# Patient Record
Sex: MALE | Race: WHITE | NOT HISPANIC OR LATINO | Employment: OTHER | URBAN - NONMETROPOLITAN AREA
[De-identification: names, ages, dates, MRNs, and addresses within clinical notes are randomized per-mention and may not be internally consistent; named-entity substitution may affect disease eponyms.]

---

## 2017-01-09 DIAGNOSIS — G89.4 CHRONIC PAIN SYNDROME: ICD-10-CM

## 2017-01-09 RX ORDER — METHOCARBAMOL 500 MG/1
TABLET, FILM COATED ORAL
Qty: 180 TABLET | Refills: 1 | Status: SHIPPED | OUTPATIENT
Start: 2017-01-09 | End: 2017-02-15

## 2017-01-09 NOTE — TELEPHONE ENCOUNTER
Routing refill request to provider for review/approval because:  Drug not on the [facility] refill protocol     Thank you  Colette WALL RN

## 2017-01-09 NOTE — TELEPHONE ENCOUNTER
Methocarbamol      Last Written Prescription Date: 5/4/16  Last Fill Quantity: 180,  # refills: 1   Last Office Visit with [facility], [facility] or  Health prescribing provider: 11/23/16 HERBER Martinez [facility]                                           Next 5 appointments (look out 90 days)     Feb 15, 2017  3:00 PM   Return Visit with Carolann Motley MD   [facility] [place] ([facility])    [address] [facility] [id number] [phone]

## 2017-01-10 DIAGNOSIS — F51.01 PRIMARY INSOMNIA: Primary | ICD-10-CM

## 2017-01-10 NOTE — TELEPHONE ENCOUNTER
zolpidem (AMBIEN) 10 MG tablet      Last Written Prescription Date:  7/13/16  Last Fill Quantity: 60,   # refills: 2  Last Office Visit with [facility], [facility] or [facility] prescribing provider: 11/22/16  Future Office visit:    Next 5 appointments (look out 90 days)     Feb 15, 2017  3:00 PM   Return Visit with Carolann Motley MD   [facility] [place] ([facility] [place])    [address]  [place] [id number] [phone]                   Routing refill request to provider for review/approval because:  Drug not on the [facility], [facility] or [facility] refill protocol or controlled substance

## 2017-01-11 RX ORDER — ZOLPIDEM TARTRATE 10 MG/1
10 TABLET ORAL
Qty: 60 TABLET | Refills: 2 | Status: SHIPPED | OUTPATIENT
Start: 2017-01-11 | End: 2017-07-13

## 2017-01-16 DIAGNOSIS — F33.42 RECURRENT MAJOR DEPRESSION IN COMPLETE REMISSION (H): Primary | ICD-10-CM

## 2017-01-16 RX ORDER — DULOXETIN HYDROCHLORIDE 60 MG/1
60 CAPSULE, DELAYED RELEASE ORAL DAILY
Qty: 90 CAPSULE | Refills: 0 | Status: SHIPPED | OUTPATIENT
Start: 2017-01-16 | End: 2017-05-31

## 2017-01-16 NOTE — TELEPHONE ENCOUNTER
Duloxetine     Last Written Prescription Date: 7/13/16  Last Fill Quantity: 90, # refills: 1  Last Office Visit with [facility], [facility] or  Health prescribing provider: 11/23/16 HERBER [facility]     Next 5 appointments (look out 90 days)     Feb 15, 2017  3:00 PM   Return Visit with Carolann Motley MD   [facility] [place] ([facility] [place])    [address]  [place] [address] [phone]                   BP Readings from Last 3 Encounters:   11/23/16 138/89   09/08/16 143/89   08/10/16 130/88     Pulse: (for Fetzima)  CREATININE   Date Value Ref Range Status   05/09/2012 0.81 0.66 - 1.25 mg/dL Final   ]    Last PHQ-9 score on record=   PHQ-9 SCORE 7/7/2016   Total Score -   Total Score 11

## 2017-01-17 DIAGNOSIS — M96.1 FAILED BACK SURGICAL SYNDROME: Primary | ICD-10-CM

## 2017-01-17 NOTE — TELEPHONE ENCOUNTER
Pt calling in at 9:42 am 1/16/17 for fentanyl patch refill . Pt did not mention how he wanted it processed. Will route to [facility]. Liya julio rn

## 2017-01-17 NOTE — TELEPHONE ENCOUNTER
Medication refill information reviewed.     Due date for Fentanyl is 1/20/2017    Prescriptions prepped for review. - Pt will need to come to [place]     Will route to provider.     Kristi Schneider RN, [facility]  Pain Clinic Care Coordinator

## 2017-01-17 NOTE — TELEPHONE ENCOUNTER
Received call from patient requesting refill(s) of fentaNYL (DURAGESIC) 12 mcg/hr 72 hr patch    Last picked up from pharmacy on 12/21/16    Pt last seen by prescribing provider on 9/8/16  Next appt scheduled for 2/15/17    Last urine drug screen date 9/8/16  Current opioid agreement on file (completed within the last year) Yes Date of opioid agreement: 9/8/16    Processing (pick one and delete the others):  Unable to reach or  leave [facility]     Syeda Montelongo MA  Pain Management Center    Will route to nursing pool for review and preparation of prescription(s).

## 2017-01-18 RX ORDER — FENTANYL 12.5 UG/1
1 PATCH TRANSDERMAL
Qty: 10 PATCH | Refills: 0 | Status: SHIPPED | OUTPATIENT
Start: 2017-01-18 | End: 2017-02-15

## 2017-01-18 NOTE — TELEPHONE ENCOUNTER
Called patient. Script for fentanyl was signed and kept in file folder for patient wife April to pick at [facility] 2nd floor.      Aidan Naidu MA

## 2017-01-18 NOTE — TELEPHONE ENCOUNTER
Signed Prescriptions:                        Disp   Refills    fentaNYL (DURAGESIC) 12 mcg/hr 72 hr patch 10 pat*0        Sig: Place 1 patch onto the skin every 72 hours . May fill           on/after 1/18/2017 NOT to start till 1/20/2017.  Authorizing Provider: YADIRA CARRILLO MD  [facility]

## 2017-01-19 ENCOUNTER — OFFICE VISIT (OUTPATIENT)
Dept: FAMILY MEDICINE | Facility: CLINIC | Age: 47
End: 2017-01-19
Payer: COMMERCIAL

## 2017-01-19 VITALS
DIASTOLIC BLOOD PRESSURE: 94 MMHG | WEIGHT: 230 LBS | HEIGHT: 70 IN | HEART RATE: 80 BPM | SYSTOLIC BLOOD PRESSURE: 136 MMHG | TEMPERATURE: 91.5 F | RESPIRATION RATE: 16 BRPM | BODY MASS INDEX: 32.93 KG/M2

## 2017-01-19 DIAGNOSIS — F33.42 RECURRENT MAJOR DEPRESSION IN COMPLETE REMISSION (H): Primary | ICD-10-CM

## 2017-01-19 DIAGNOSIS — G89.4 CHRONIC PAIN SYNDROME: ICD-10-CM

## 2017-01-19 DIAGNOSIS — S81.801A OPEN WOUND OF LOWER LIMB, RIGHT, INITIAL ENCOUNTER: ICD-10-CM

## 2017-01-19 DIAGNOSIS — F11.20 CONTINUOUS OPIOID DEPENDENCE (H): ICD-10-CM

## 2017-01-19 DIAGNOSIS — F41.9 ANXIETY: ICD-10-CM

## 2017-01-19 PROCEDURE — 99214 OFFICE O/P EST MOD 30 MIN: CPT | Performed by: FAMILY MEDICINE

## 2017-01-19 RX ORDER — LORAZEPAM 0.5 MG/1
TABLET ORAL
Qty: 30 TABLET | Refills: 0 | Status: SHIPPED | OUTPATIENT
Start: 2017-01-19 | End: 2018-01-18

## 2017-01-19 RX ORDER — CEPHALEXIN 500 MG/1
CAPSULE ORAL 4 TIMES DAILY
COMMUNITY
End: 2017-04-17

## 2017-01-19 NOTE — MR AVS SNAPSHOT
"              After Visit Summary   1/19/2017    David Wilcox    MRN: [id number]           Patient Information     Date Of Birth          1970        Visit Information        Provider Department      1/19/2017 11:00 AM Regan Llamas MD [facility]        Today's Diagnoses     Recurrent major depression in complete remission (H)    -  1     Anxiety            Follow-ups after your visit        Your next 10 appointments already scheduled     Feb 15, 2017  3:00 PM   Return Visit with Carolann Motley MD   [facility] ([facility])    [address] [facility] [place] [address] [phone]              Who to contact     If you have questions or need follow up information about today's clinic visit or your schedule please contact [facility] directly at [phone].  Normal or non-critical lab and imaging results will be communicated to you by MyChart, letter or phone within 4 business days after the clinic has received the results. If you do not hear from us within 7 days, please contact the clinic through MyChart or phone. If you have a critical or abnormal lab result, we will notify you by phone as soon as possible.  Submit refill requests through SocialEars or call your pharmacy and they will forward the refill request to us. Please allow 3 business days for your refill to be completed.          Additional Information About Your Visit        MyChart Information     SocialEars lets you send messages to your doctor, view your test results, renew your prescriptions, schedule appointments and more. To sign up, go to [email or web address] . Click on \"Log in\" on the left side of the screen, which will take you to the Welcome page. Then click on \"Sign up Now\" on the right side of the page.     You will be asked to enter the access code listed below, as well as some personal information. Please follow the directions to create your username and " "password.     Your access code is: [email or web address]  Expires: 2017  2:56 PM     Your access code will  in 90 days. If you need help or a new code, please call your [place] clinic or [phone].        Care EveryWhere ID     This is your Care EveryWhere ID. This could be used by other organizations to access your [place] medical records  [id number]        Your Vitals Were     Pulse Temperature Respirations Height BMI (Body Mass Index)       80 91.5  F (33.1  C) (Tympanic) 16 5' 10\" (1.778 m) 33.00 kg/m2        Blood Pressure from Last 3 Encounters:   17 136/94   16 138/89   16 143/89    Weight from Last 3 Encounters:   17 230 lb (104.327 kg)   16 233 lb (105.688 kg)   16 230 lb (104.327 kg)              Today, you had the following     No orders found for display         Where to get your medicines      Some of these will need a paper prescription and others can be bought over the counter.  Ask your nurse if you have questions.     Bring a paper prescription for each of these medications    - LORazepam 0.5 MG tablet       Primary Care Provider Office Phone # Fax #    Regan Llamas [phone]       [facility] [address] [place] [address]        Thank you!     Thank you for choosing [facility]  for your care. Our goal is always to provide you with excellent care. Hearing back from our patients is one way we can continue to improve our services. Please take a few minutes to complete the written survey that you may receive in the mail after your visit with us. Thank you!             Your Updated Medication List - Protect others around you: Learn how to safely use, store and throw away your medicines at www.disposemymeds.org.          This list is accurate as of: 17 11:27 AM.  Always use your most recent med list.                   Brand Name Dispense Instructions for use    DULoxetine 60 MG EC capsule    " CYMBALTA    90 capsule    Take 1 capsule (60 mg) by mouth daily Takes with 30mg tab for 90mg total daily dose       fentaNYL 12 mcg/hr 72 hr patch    DURAGESIC    10 patch    Place 1 patch onto the skin every 72 hours . May fill on/after 1/18/2017 NOT to start till 1/20/2017.       gabapentin 300 MG capsule    NEURONTIN    540 capsule    Start 300mg by mouth twice daily.  Titrate as instructed in clinic to goal dose of 600mg (2 tabs) 3 times daily.  3 month supply       KEFLEX 500 MG capsule   Generic drug:  cephALEXin      Take by mouth 4 times daily       LORazepam 0.5 MG tablet    ATIVAN    30 tablet    One bid prn anxiety. Rare use       methocarbamol 500 MG tablet    ROBAXIN    180 tablet    2 nightly for back pain/spasm       omeprazole 20 MG CR capsule    priLOSEC    90 capsule    Take 1 capsule (20 mg) by mouth daily .       oxyCODONE-acetaminophen  MG per tablet    PERCOCET    40 tablet    One pill bid prn       TYLENOL 500 MG tablet   Generic drug:  acetaminophen      3 po prn for pain       zaleplon 10 MG capsule    SONATA    30 capsule    Take 1 capsule (10 mg) by mouth At Bedtime (do not use with ambien)       zolpidem 10 MG tablet    AMBIEN    60 tablet    Take 1 tablet (10 mg) by mouth nightly as needed for sleep

## 2017-01-19 NOTE — PROGRESS NOTES
"  SUBJECTIVE:                                                    David Wilcox is a 46 year old male who presents to clinic today for the following health issues:       Anxiety Follow-Up    Status since last visit: Worsened     Other associated symptoms:None    Complicating factors:   Significant life event: Yes-  stress   Current substance abuse: None  Depression symptoms: No  KAMRYN-7 SCORE 4/30/2014 8/26/2015 5/4/2016   Total Score 10 - -   Total Score - 7 8        GAD7                     Amount of exercise or physical activity: None    Problems taking medications regularly: No    Medication side effects: none  Diet: regular (no restrictions)  Wound Check      Duration: 1/16/17    Description (location/character/radiation): right leg - went to [facility]     Intensity:  moderate    Accompanying signs and symptoms: none    History (similar episodes/previous evaluation): Previous eval    Precipitating or alleviating factors: None    Therapies tried and outcome: Keflex        : David Wilcox is a 46 year old male with wound R outside lower leg.  Unsure how it happened.  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  Went to ED, keflex given, they felt it looked like a burn.  Unclear what happened.    He thinks it's improving.  Putting ointment and bandage to keep protected.     Anxiety: worse recently.  0.5mg ativan prn with rare use, last script 2014.  He knows of caution with his chronic pain meds on this issue.    Chronic back pain: better on fentanyl.  Knows to not take oxycodone prn with the ativan.  Feels this is stable    Problem list, med list, additional histories reviewed and updated, as indicated.      No cp or sob.  No fever    O:/94 mmHg  Pulse 80  Temp(Src) 91.5  F (33.1  C) (Tympanic)  Resp 16  Ht 5' 10\" (1.778 m)  Wt 230 lb (104.327 kg)  BMI 33.00 kg/m2  GEN: Alert and oriented, in no acute distress  Has 2 inch round ulceration lateral lower leg above ankle.  Central necrosis.  " Minimal surrounding erythema.  No swelling around leg in general.   Mood good today    A; chronic pain, fentanyl and oxycodone, stable       Anxiety, worse recently       Leg wound, healing slowly.  On abx    P; finish abx.  30 more ativan given, see above for my counseling re: use with opioids.    F/u if wound not slowly healing over time.  He agrees.

## 2017-01-19 NOTE — NURSING NOTE
"Chief Complaint   Patient presents with     Wound Check     1/14/17 - [facility], right leg     Anxiety     having panic attacks - requesting refill of Lorazepam        Initial /94 mmHg  Pulse 80  Temp(Src) 91.5  F (33.1  C) (Tympanic)  Resp 16  Ht 5' 10\" (1.778 m)  Wt 230 lb (104.327 kg)  BMI 33.00 kg/m2 Estimated body mass index is 33 kg/(m^2) as calculated from the following:    Height as of this encounter: 5' 10\" (1.778 m).    Weight as of this encounter: 230 lb (104.327 kg).  BP completed using cuff size: zoe Tian, CMA    "

## 2017-02-15 ENCOUNTER — OFFICE VISIT (OUTPATIENT)
Dept: PALLIATIVE MEDICINE | Facility: CLINIC | Age: 47
End: 2017-02-15
Payer: COMMERCIAL

## 2017-02-15 VITALS
HEART RATE: 69 BPM | WEIGHT: 231 LBS | HEIGHT: 70 IN | SYSTOLIC BLOOD PRESSURE: 151 MMHG | DIASTOLIC BLOOD PRESSURE: 96 MMHG | BODY MASS INDEX: 33.07 KG/M2

## 2017-02-15 DIAGNOSIS — M96.1 FAILED BACK SURGICAL SYNDROME: Primary | ICD-10-CM

## 2017-02-15 DIAGNOSIS — G89.29 CHRONIC LOW BACK PAIN, UNSPECIFIED BACK PAIN LATERALITY, WITH SCIATICA PRESENCE UNSPECIFIED: ICD-10-CM

## 2017-02-15 DIAGNOSIS — G89.4 CHRONIC PAIN SYNDROME: ICD-10-CM

## 2017-02-15 DIAGNOSIS — M54.5 CHRONIC LOW BACK PAIN, UNSPECIFIED BACK PAIN LATERALITY, WITH SCIATICA PRESENCE UNSPECIFIED: ICD-10-CM

## 2017-02-15 DIAGNOSIS — G62.9 PERIPHERAL POLYNEUROPATHY: ICD-10-CM

## 2017-02-15 DIAGNOSIS — G60.9 HEREDITARY AND IDIOPATHIC NEUROPATHY: ICD-10-CM

## 2017-02-15 LAB
ERYTHROCYTE [SEDIMENTATION RATE] IN BLOOD BY WESTERGREN METHOD: 4 MM/H (ref 0–15)
FOLATE SERPL-MCNC: 17.2 NG/ML
GLUCOSE SERPL-MCNC: 95 MG/DL (ref 70–99)
TSH SERPL DL<=0.05 MIU/L-ACNC: 1.25 MU/L (ref 0.4–4)
VIT B12 SERPL-MCNC: 522 PG/ML (ref 193–986)

## 2017-02-15 PROCEDURE — 84443 ASSAY THYROID STIM HORMONE: CPT | Performed by: PSYCHIATRY & NEUROLOGY

## 2017-02-15 PROCEDURE — 82947 ASSAY GLUCOSE BLOOD QUANT: CPT | Performed by: PSYCHIATRY & NEUROLOGY

## 2017-02-15 PROCEDURE — 82607 VITAMIN B-12: CPT | Performed by: PSYCHIATRY & NEUROLOGY

## 2017-02-15 PROCEDURE — 99214 OFFICE O/P EST MOD 30 MIN: CPT | Performed by: PSYCHIATRY & NEUROLOGY

## 2017-02-15 PROCEDURE — 82746 ASSAY OF FOLIC ACID SERUM: CPT | Performed by: PSYCHIATRY & NEUROLOGY

## 2017-02-15 PROCEDURE — 36415 COLL VENOUS BLD VENIPUNCTURE: CPT | Performed by: PSYCHIATRY & NEUROLOGY

## 2017-02-15 PROCEDURE — 85652 RBC SED RATE AUTOMATED: CPT | Performed by: PSYCHIATRY & NEUROLOGY

## 2017-02-15 RX ORDER — FENTANYL 12.5 UG/1
1 PATCH TRANSDERMAL
Qty: 10 PATCH | Refills: 0 | Status: SHIPPED | OUTPATIENT
Start: 2017-02-15 | End: 2017-03-09

## 2017-02-15 RX ORDER — OXYCODONE AND ACETAMINOPHEN 10; 325 MG/1; MG/1
.5-1 TABLET ORAL EVERY 4 HOURS PRN
Qty: 40 TABLET | Refills: 0 | Status: SHIPPED | OUTPATIENT
Start: 2017-02-15 | End: 2017-03-31

## 2017-02-15 RX ORDER — METHOCARBAMOL 500 MG/1
500-1000 TABLET, FILM COATED ORAL 3 TIMES DAILY PRN
Qty: 90 TABLET | Refills: 3 | Status: SHIPPED | OUTPATIENT
Start: 2017-02-15 | End: 2017-07-13

## 2017-02-15 RX ORDER — FENTANYL 12.5 UG/1
1 PATCH TRANSDERMAL
Qty: 10 PATCH | Refills: 0 | Status: SHIPPED | OUTPATIENT
Start: 2017-02-15 | End: 2017-02-15

## 2017-02-15 RX ORDER — GABAPENTIN 600 MG/1
1200 TABLET ORAL 3 TIMES DAILY
Qty: 540 TABLET | Refills: 1 | Status: SHIPPED | OUTPATIENT
Start: 2017-02-15 | End: 2017-08-30

## 2017-02-15 ASSESSMENT — PAIN SCALES - GENERAL: PAINLEVEL: EXTREME PAIN (8)

## 2017-02-15 NOTE — NURSING NOTE
"Chief Complaint   Patient presents with     Pain     Follow up        Initial BP (!) 151/96 (BP Location: Left arm)  Pulse 69  Ht 1.778 m (5' 10\")  Wt 104.8 kg (231 lb)  BMI 33.15 kg/m2 Estimated body mass index is 33.15 kg/(m^2) as calculated from the following:    Height as of this encounter: 1.778 m (5' 10\").    Weight as of this encounter: 104.8 kg (231 lb).  Medication Reconciliation: complete     Aidan Naidu MA      "

## 2017-02-15 NOTE — MR AVS SNAPSHOT
After Visit Summary   2/15/2017    David Wilcox    MRN: [id number]           Patient Information     Date Of Birth          1970        Visit Information        Provider Department      2/15/2017 3:00 PM [facility]e [facility]        Today's Diagnoses     Failed back surgical syndrome        Chronic low back pain, unspecified back pain laterality, with sciatica presence unspecified        Chronic pain syndrome          Care Instructions    1. Increase the gabapentin to 900mg (3 of the 300mg tabs) 3 times daily.  After 2-3 weeks, call with update.  We can then increase to 1200mg 3 times daily.  I will send in a new script as well for 600mg tabs.  2. Stop downtstairs for labs today.  3. Look into PT again  4. We aren't making any changes on the opioids  5. Try using the robaxin during the day.  You can try lower doses.  Can be sedating.  Do not drive until you know how the medication affects you.   6. Schedule follow up in 2 months    Nurse Triage line:  [phone]   Call this number with any questions or concerns. You may leave a detailed message anytime. Calls are typically returned Monday through Friday between 8 AM and 4:30 PM. We usually get back to you within 2 business days depending on the issue/request.       Medication refills:    For non-narcotic medications, call your pharmacy directly to request a refill. The pharmacy will contact the Pain Management Center for authorization. Please allow 3-4 days for these refills to be processed.     For narcotic refills, call the nurse triage line or send a [place] [employer] message. Please contact us 7-10 days before your refill is due. The message MUST include the name of the specific medication(s) requested and how you would like to receive the prescription(s). The options are as follows:    Pain Clinic staff can mail the prescription to your pharmacy. Please tell us the name of the pharmacy.    You may pick the prescription up  "at the Pain Clinic (tell us the location) or during a clinic visit with your pain provider    Pain Clinic staff can deliver the prescription to the [facility] pharmacy in the clinic building. Please tell us the location.      Scheduling number: [phone].  Call this number to schedule or change appointments.    We believe regular attendance is key to your success in our program.    Any time you are unable to keep your appointment we ask that you call us at least 24 hours in advance to let us know. This will allow us to offer the appointment time to another patient.             Follow-ups after your visit        Who to contact     If you have questions or need follow up information about today's clinic visit or your schedule please contact [facility] directly at [phone].  Normal or non-critical lab and imaging results will be communicated to you by Fiberstarhart, letter or phone within 4 business days after the clinic has received the results. If you do not hear from us within 7 days, please contact the clinic through Fiberstarhart or phone. If you have a critical or abnormal lab result, we will notify you by phone as soon as possible.  Submit refill requests through Umbrella Here or call your pharmacy and they will forward the refill request to us. Please allow 3 business days for your refill to be completed.          Additional Information About Your Visit        Umbrella Here Information     Umbrella Here lets you send messages to your doctor, view your test results, renew your prescriptions, schedule appointments and more. To sign up, go to [email or web address] . Click on \"Log in\" on the left side of the screen, which will take you to the Welcome page. Then click on \"Sign up Now\" on the right side of the page.     You will be asked to enter the access code listed below, as well as some personal information. Please follow the directions to create your username and password.     Your access code is: [email or web address]  Expires: " "2017  2:56 PM     Your access code will  in 90 days. If you need help or a new code, please call your [place] clinic or [phone].        Care EveryWhere ID     This is your Care EveryWhere ID. This could be used by other organizations to access your [place] medical records  [id number]        Your Vitals Were     Pulse Height BMI (Body Mass Index)             69 1.778 m (5' 10\") 33.15 kg/m2          Blood Pressure from Last 3 Encounters:   02/15/17 (!) 151/96   17 (!) 136/94   16 138/89    Weight from Last 3 Encounters:   02/15/17 104.8 kg (231 lb)   17 104.3 kg (230 lb)   16 105.7 kg (233 lb)              Today, you had the following     No orders found for display         Today's Medication Changes          These changes are accurate as of: 2/15/17  3:47 PM.  If you have any questions, ask your nurse or doctor.               These medicines have changed or have updated prescriptions.        Dose/Directions    * gabapentin 300 MG capsule   Commonly known as:  NEURONTIN   This may have changed:  Another medication with the same name was added. Make sure you understand how and when to take each.   Used for:  Failed back surgical syndrome        Start 300mg by mouth twice daily.  Titrate as instructed in clinic to goal dose of 600mg (2 tabs) 3 times daily.  3 month supply   Quantity:  540 capsule   Refills:  3       * gabapentin 600 MG tablet   Commonly known as:  NEURONTIN   This may have changed:  You were already taking a medication with the same name, and this prescription was added. Make sure you understand how and when to take each.   Used for:  Failed back surgical syndrome        Dose:  1200 mg   Take 2 tablets (1,200 mg) by mouth 3 times daily . Increase to this dose as instructed in clinic. 3 month supply   Quantity:  540 tablet   Refills:  1       methocarbamol 500 MG tablet   Commonly known as:  ROBAXIN   This may have changed:    - how much to take  - how to take " this  - when to take this  - reasons to take this  - additional instructions   Used for:  Chronic pain syndrome        Dose:  500-1000 mg   Take 1-2 tablets (500-1,000 mg) by mouth 3 times daily as needed for muscle spasms . Caution for sedation   Quantity:  90 tablet   Refills:  3       oxyCODONE-acetaminophen  MG per tablet   Commonly known as:  PERCOCET   This may have changed:    - how much to take  - how to take this  - when to take this  - reasons to take this  - additional instructions   Used for:  Chronic low back pain, unspecified back pain laterality, with sciatica presence unspecified        Dose:  0.5-1 tablet   Take 0.5-1 tablets by mouth every 4 hours as needed for moderate to severe pain . Max of 2 tabs/day.  40 tabs per month. Fill on/after 2/18 to start on/after 2/22   Quantity:  40 tablet   Refills:  0       * Notice:  This list has 2 medication(s) that are the same as other medications prescribed for you. Read the directions carefully, and ask your doctor or other care provider to review them with you.         Where to get your medicines      These medications were sent to [facility] [address] [place] [address] - [address] [place] [address] AT [facility] & [address]  [address] [place] [address]     Phone:  [phone]     gabapentin 600 MG tablet    methocarbamol 500 MG tablet         Some of these will need a paper prescription and others can be bought over the counter.  Ask your nurse if you have questions.     Bring a paper prescription for each of these medications     fentaNYL 12 mcg/hr 72 hr patch    oxyCODONE-acetaminophen  MG per tablet                Primary Care Provider Office Phone # Fax #    Regan Llamas [phone]       [facility] [address] [place] [phone]        Thank you!     Thank you for choosing [facility]  for your care. Our goal is always to provide you with excellent care. Hearing back  from our patients is one way we can continue to improve our services. Please take a few minutes to complete the written survey that you may receive in the mail after your visit with us. Thank you!             Your Updated Medication List - Protect others around you: Learn how to safely use, store and throw away your medicines at www.disposemymeds.org.          This list is accurate as of: 2/15/17  3:47 PM.  Always use your most recent med list.                   Brand Name Dispense Instructions for use    DULoxetine 60 MG EC capsule    CYMBALTA    90 capsule    Take 1 capsule (60 mg) by mouth daily Takes with 30mg tab for 90mg total daily dose       fentaNYL 12 mcg/hr 72 hr patch    DURAGESIC    10 patch    Place 1 patch onto the skin every 72 hours . May fill on/after 1/18/2017 NOT to start till 1/20/2017.       * gabapentin 300 MG capsule    NEURONTIN    540 capsule    Start 300mg by mouth twice daily.  Titrate as instructed in clinic to goal dose of 600mg (2 tabs) 3 times daily.  3 month supply       * gabapentin 600 MG tablet    NEURONTIN    540 tablet    Take 2 tablets (1,200 mg) by mouth 3 times daily . Increase to this dose as instructed in clinic. 3 month supply       KEFLEX 500 MG capsule   Generic drug:  cephALEXin      Take by mouth 4 times daily Reported on 2/15/2017       LORazepam 0.5 MG tablet    ATIVAN    30 tablet    One bid prn anxiety. Rare use       methocarbamol 500 MG tablet    ROBAXIN    90 tablet    Take 1-2 tablets (500-1,000 mg) by mouth 3 times daily as needed for muscle spasms . Caution for sedation       omeprazole 20 MG CR capsule    priLOSEC    90 capsule    Take 1 capsule (20 mg) by mouth daily .       oxyCODONE-acetaminophen  MG per tablet    PERCOCET    40 tablet    Take 0.5-1 tablets by mouth every 4 hours as needed for moderate to severe pain . Max of 2 tabs/day.  40 tabs per month. Fill on/after 2/18 to start on/after 2/22       TYLENOL 500 MG tablet   Generic drug:   acetaminophen      3 po prn for pain       zaleplon 10 MG capsule    SONATA    30 capsule    Take 1 capsule (10 mg) by mouth At Bedtime (do not use with ambien)       zolpidem 10 MG tablet    AMBIEN    60 tablet    Take 1 tablet (10 mg) by mouth nightly as needed for sleep       * Notice:  This list has 2 medication(s) that are the same as other medications prescribed for you. Read the directions carefully, and ask your doctor or other care provider to review them with you.

## 2017-02-15 NOTE — PROGRESS NOTES
[facility]    Date of visit: 2/15/2017     Chief complaint:   Chief Complaint   Patient presents with     Pain     Follow up        Interval history:  David Wilcox was last seen by me on 9/8/16.      Recommendations/plan at the last visit included:  1. Physical Therapy:  Not covered by insurance.  Will look into appeals in the near future  2. Clinical Health Psychologist to address issues of relaxation, behavioral change, coping style, and other factors important to improvement.  Difficult due to work, but will look at this again next visit  3. Diagnostic Studies:  Urine drug screen  4. Medication Management:    1. Continue the Robaxin (methocarbamol), but consider taking a dose earlier in the day  2. Continue the Percocet at the same dose  3. Will add on fentanyl 12mcg/hr patch.  Discussed risk and benefits and teaching done  4. Restart gabapentin with titration to 600mg TID  5. Continue Cymbalta at current dose  5. Further procedures recommended: none  6. Recommendations to PCP: none  7. Follow up: 6-8 weeks, but patient will call in 1 week, and when titrated to gabapentin 600mg TID    Since his last visit, David Wilcox reports:    -He reports that he developed cellulitis in his right leg 2-3 weeks ago which was evaluated by his PCP and treated with 10 day oral and topical antibiotic regimen. His wound is better now.  -He reports new upper back-thoracic pain, R>L which started 1-2 months ago insidiously and occurs intermittently. He reports no radiation, describes pain as aching. 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  -He feels that fentanyl patches have been very helpful.  -He feels that his chronic right lower leg numbness is gradually getting numb.  -He is tolerating gabapentin well and feels it is beneficial.  -He continues HEP.  -He states that PT is still not covered by his insurance.    Pain scores:  Pain intensity on  average is 7 on a scale of 0-10.     Current pain treatments:   Percocet 10/325mg daily- takes in the afternoon.  Takes 1-2/day.- takes the edge off- lasts for 4 hours  Robaxin (methocarbamol) 1000mg at night- beneficial  Gabapentin 600 mg TID, beneficial  Cymbalta 60mg daily- tried going to 90mg/day but felt worse    Previous medication treatments included:  Hydrocodone- not helpful  Methadone- taking after 1st surgery  Naprosyn- not helpful  Flexeril- after first back injury (1990s)  Lidocaine- not helpful  Acetaminophen (tylenol)   Gabapentin - didn't work but per notes was at 300mg TID    Other treatments have included:  David Wilcox has been seen at a pain clinic in the past.  MAPS- injections  PT: yes  Acupuncture: no  TENs Unit: yes- out of use- somewhat helpful  Injections: none since last surgery- very  helpful    Side Effects: no side effect    Medications:  Current Outpatient Prescriptions   Medication Sig Dispense Refill     oxyCODONE-acetaminophen (PERCOCET)  MG per tablet Take 0.5-1 tablets by mouth every 4 hours as needed for moderate to severe pain . Max of 2 tabs/day.  40 tabs per month. Fill on/after 2/18 to start on/after 2/22 40 tablet 0     gabapentin (NEURONTIN) 600 MG tablet Take 2 tablets (1,200 mg) by mouth 3 times daily . Increase to this dose as instructed in clinic. 3 month supply 540 tablet 1     methocarbamol (ROBAXIN) 500 MG tablet Take 1-2 tablets (500-1,000 mg) by mouth 3 times daily as needed for muscle spasms . Caution for sedation 90 tablet 3     fentaNYL (DURAGESIC) 12 mcg/hr 72 hr patch Place 1 patch onto the skin every 72 hours . May fill on/after 2/17/2017 not to start till 2/19/2017. 10 patch 0     LORazepam (ATIVAN) 0.5 MG tablet One bid prn anxiety. Rare use 30 tablet 0     DULoxetine (CYMBALTA) 60 MG EC capsule Take 1 capsule (60 mg) by mouth daily Takes with 30mg tab for 90mg total daily dose 90 capsule 0     zolpidem (AMBIEN) 10 MG tablet Take 1 tablet (10 mg) by  "mouth nightly as needed for sleep 60 tablet 2     omeprazole (PRILOSEC) 20 MG capsule Take 1 capsule (20 mg) by mouth daily . 90 capsule 3     gabapentin (NEURONTIN) 300 MG capsule Start 300mg by mouth twice daily.  Titrate as instructed in clinic to goal dose of 600mg (2 tabs) 3 times daily.  3 month supply 540 capsule 3     TYLENOL EXTRA STRENGTH 500 MG OR TABS 3 po prn for pain       cephALEXin (KEFLEX) 500 MG capsule Take by mouth 4 times daily Reported on 2/15/2017       zaleplon (SONATA) 10 MG capsule Take 1 capsule (10 mg) by mouth At Bedtime (do not use with ambien) (Patient not taking: Reported on 2/15/2017) 30 capsule 0       Medical History: any changes in medical history since they were last seen? Right leg cellulitis    Review of Systems:  The 14 system ROS was reviewed from the intake questionnaire, and is positive for: swelling in leg, joint pain, weakness, pain, numbness/tingling, pain, back pain  Any bowel or bladder problems: none  Mood: more freire    Physical Exam:  Blood pressure (!) 151/96, pulse 69, height 1.778 m (5' 10\"), weight 104.8 kg (231 lb).  General: awake, alert   Gait: Antalgic  MSK exam: uses cane for ambulation.  Poor rotation with pain.  Right > left lower thoracic paraspinal tenderness with taut bands on right side.    Assessment:   1. 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    2. Facet arthropathy likely contributing above the level of the future  3. Distant history of alcohol and drug abuse  4. Bipolar disorder- currently managed  5. H/o meningitis after SCS trial  6. Subacute thoracic back pain, likely myofascial etiology.      Plan:  1. Physical Therapy:  Not covered by insurance.  I have asked him to look again in the new year.  2. Clinical Health Psychologist to address issues of relaxation, behavioral change, coping style, and other factors important to improvement.  Difficult due to work, but will look at this again next " visit  3. Diagnostic Studies:  Labs to evaluate for peripheral neuropathy  4. Medication Management:    1. Titrate the gabapentin to 1200mg TID.  Initially will be at 900mg  2. He will try the robaxin during the day  3. No changes to opiods  5. Further procedures recommended: none  6. Recommendations to PCP: none  7. Follow up: 2 month    I saw and examined the patient with the Pain Fellow/Resident. I have reviewed and agree with the resident's note and plan of care and made changes and corrections directly to the body of the note.    TIME SPENT:  BY FELLOW/RESIDENT ALONE 15 MIN  BY MYSELF AND FELLOW/RESIDENT TOGETHER 25 MIN  BY MYSELF WITHOUT THE FELLOW/RESIDENT 0 MIN    These times included 15 minutes I spent counseling him about his diagnosis and treatment options and coordination of care with the primary team    Bindu Motley MD  [facility]

## 2017-02-15 NOTE — PATIENT INSTRUCTIONS
1. Increase the gabapentin to 900mg (3 of the 300mg tabs) 3 times daily.  After 2-3 weeks, call with update.  We can then increase to 1200mg 3 times daily.  I will send in a new script as well for 600mg tabs.  2. Stop downtstairs for labs today.  3. Look into PT again  4. We aren't making any changes on the opioids  5. Try using the robaxin during the day.  You can try lower doses.  Can be sedating.  Do not drive until you know how the medication affects you.   6. Schedule follow up in 2 months    Nurse Triage line:  [phone]   Call this number with any questions or concerns. You may leave a detailed message anytime. Calls are typically returned Monday through Friday between 8 AM and 4:30 PM. We usually get back to you within 2 business days depending on the issue/request.       Medication refills:    For non-narcotic medications, call your pharmacy directly to request a refill. The pharmacy will contact the Pain Management Center for authorization. Please allow 3-4 days for these refills to be processed.     For narcotic refills, call the nurse triage line or send a [employer] message. Please contact us 7-10 days before your refill is due. The message MUST include the name of the specific medication(s) requested and how you would like to receive the prescription(s). The options are as follows:    Pain Clinic staff can mail the prescription to your pharmacy. Please tell us the name of the pharmacy.    You may pick the prescription up at the Pain Clinic (tell us the location) or during a clinic visit with your pain provider    Pain Clinic staff can deliver the prescription to the [facility] pharmacy in the clinic building. Please tell us the location.      Scheduling number: [phone].  Call this number to schedule or change appointments.    We believe regular attendance is key to your success in our program.    Any time you are unable to keep your appointment we ask that you call us at least 24 hours in advance to  let us know. This will allow us to offer the appointment time to another patient.

## 2017-02-16 ENCOUNTER — TELEPHONE (OUTPATIENT)
Dept: PALLIATIVE MEDICINE | Facility: CLINIC | Age: 47
End: 2017-02-16

## 2017-02-16 NOTE — TELEPHONE ENCOUNTER
Please let patient know all his labs for peripheral neuropathy were normal.  I was unable to reach him as  was not set up.    Bindu Motley MD  [facility]

## 2017-02-16 NOTE — LETTER
[facility]  [address] [address]  [phone]      February 20, 2017      David Wilcox  [facility] [place] [id number]      Dear Dr. Tiesha Hernandez wanted you to know that all your labs for peripheral neuropathy were normal.      Sincerely,      Lisa Garner RN-BSN  [facility]

## 2017-02-20 NOTE — TELEPHONE ENCOUNTER
A letter was sent to the pt informing him of the lab results.    Lisa Garner RN-BSN  [facility]

## 2017-03-06 ENCOUNTER — TELEPHONE (OUTPATIENT)
Dept: PALLIATIVE MEDICINE | Facility: CLINIC | Age: 47
End: 2017-03-06

## 2017-03-06 NOTE — TELEPHONE ENCOUNTER
"Called pt. States that he increased to 900 mg TID. Reports that he is functioning and \"doing well.\"  Had \"horrible\" vertigo for the first few days but now it is minimal. Pt feels that the increase has helped his pain and he would like to go to the 1200 mg TID.  Talked about titrating the dose so as to minimize the vertigo or other SE that may occur. Pt states that he would like to \"get it over with\" because he has nothing that he has to do until Thursday. Let him know that I would have Dr. Motley review and that we would get back to him with her specific directions.     JEREMIAH GrissomN, RN-BC  Patient Care Supervisor/Care Coordinator  [facility]    "

## 2017-03-06 NOTE — TELEPHONE ENCOUNTER
Nurse Line Message 3/6/17 1:49 pm    He has a question related to the dose of his gabapentin and would like a call back at [phone].    JEREMIAH PeacockN, RN  Care Coordinator  [facility] [place]

## 2017-03-07 NOTE — TELEPHONE ENCOUNTER
Ok to going to 1200mg TID with the gabapentin - he should start with the nighttime dose and then bring up others.    Script was written this way back on 2/15/17    Bindu Motley MD  [facility]

## 2017-03-08 DIAGNOSIS — M96.1 FAILED BACK SURGICAL SYNDROME: ICD-10-CM

## 2017-03-08 NOTE — TELEPHONE ENCOUNTER
Pt needs Fentanyl patches. Uses [facility] in [place]. Will be picking script- [place]. Spouse to  April Tighe.       Sending to MA pool to prep scripts.     Kristi Schneider RN, [facility]  Pain Clinic Care Coordinator

## 2017-03-08 NOTE — TELEPHONE ENCOUNTER
Called pt and he agreed with that plan.     Kristi Schneider RN, [facility]  Pain Clinic Care Coordinator

## 2017-03-09 ENCOUNTER — TELEPHONE (OUTPATIENT)
Dept: PALLIATIVE MEDICINE | Facility: CLINIC | Age: 47
End: 2017-03-09

## 2017-03-09 RX ORDER — FENTANYL 12.5 UG/1
1 PATCH TRANSDERMAL
Qty: 10 PATCH | Refills: 0 | Status: SHIPPED | OUTPATIENT
Start: 2017-03-09 | End: 2017-04-17

## 2017-03-09 NOTE — TELEPHONE ENCOUNTER
Patient called in clinic upset in regards to his scripts.  Patient would like it from now on if wife, April could come in and get his scripts for him with and ID.  Patient states he is disabled and its hard for him personally to get them.   Couple live far and would like to be able to do this going forward.  Please advise. Routing to nurse pool.    Hilda GOULD    [facility] [place]

## 2017-03-09 NOTE — TELEPHONE ENCOUNTER
Called pt. Let him know that we need him to specify everytime when he would like his wife to  his prescription.  Pt states that he DID specify that when he left a message for his refill.     Reviewed chart and noted the following message on 3/8:  Note      Pt needs Fentanyl patches. Uses [facility] in [place]. Will be picking script- [place]. Spouse to  April Brenden.         Sending to MA pool to prep scripts.      Kristi Schneider RN, [facility]  Pain Clinic Care Coordinator         Apologized for the miscommunication and inconvenience. Pt stated that his wife could not wait for clarification when she stopped by because they are on a timeline with children's schedules this evening. Let him know that we would  the prescription to [facility] in [place] tomorrow.     Prescription put in locked cart to be processed tomorrow.     JEREMIAH GrissomN, RN-BC  Patient Care Supervisor/Care Coordinator  [facility]

## 2017-03-09 NOTE — TELEPHONE ENCOUNTER
Called patient. Script for fentanyl patch was signed and kept in file folder for patient to pick at [facility] 2nd floor.       Aidan Naidu MA

## 2017-03-09 NOTE — TELEPHONE ENCOUNTER
Medication refill information reviewed.     Due date for Fentanyl is 3/21/2017    Prescriptions prepped for review.     Will route to provider.     Kristi Schneider RN, [facility]  Pain Clinic Care Coordinator

## 2017-03-09 NOTE — TELEPHONE ENCOUNTER
Received call from patient requesting refill(s) of fentaNYL (DURAGESIC) 12 mcg/hr 72 hr patch    Last picked up from pharmacy on 2/18/17    Pt last seen by prescribing provider on 2/15/17  Next appt scheduled for 4/17/17    Last urine drug screen date 9/8/16  Current opioid agreement on file (completed within the last year) Yes Date of opioid agreement: 9/8/16    Processing (pick one and delete the others):   at [facility]    Syeda Montelongo MA  Pain Management Center    Will route to nursing pool for review and preparation of prescription(s).

## 2017-03-09 NOTE — TELEPHONE ENCOUNTER
Signed Prescriptions:                        Disp   Refills    fentaNYL (DURAGESIC) 12 mcg/hr 72 hr patch 10 pat*0        Sig: Place 1 patch onto the skin every 72 hours . May fill           on/after 3/20/2017 not to start till 3/21/2017.  Authorizing Provider: YADIRA CARRILLO MD  [facility]

## 2017-03-10 NOTE — TELEPHONE ENCOUNTER
Called the . Script was signed and kept at  for  to deliver to [email or web address].  [address] [address]    Confirmation number: [id number]      Aidan Naidu MA

## 2017-03-13 NOTE — TELEPHONE ENCOUNTER
Called [facility] to verify Fentanyl patches with dispense 03/20/17 received. Closing    Michaela Colon  BSN-RN Care Coordinator  [facility]

## 2017-03-20 ENCOUNTER — TELEPHONE (OUTPATIENT)
Dept: PALLIATIVE MEDICINE | Facility: CLINIC | Age: 47
End: 2017-03-20

## 2017-03-20 NOTE — TELEPHONE ENCOUNTER
Call came in at 8:50 am today.    Pt calling to say that his Wife went to  his scripts at the pharmacy and the would not release them to her.   Nurse called pharmacy and Wife came on the 19th to  Fentanyl script and the release date was the 20th ,to start on the 21st. Please call Pt to inform.  Liya julio rn

## 2017-03-31 DIAGNOSIS — G89.29 CHRONIC LOW BACK PAIN, UNSPECIFIED BACK PAIN LATERALITY, WITH SCIATICA PRESENCE UNSPECIFIED: ICD-10-CM

## 2017-03-31 DIAGNOSIS — M54.5 CHRONIC LOW BACK PAIN, UNSPECIFIED BACK PAIN LATERALITY, WITH SCIATICA PRESENCE UNSPECIFIED: ICD-10-CM

## 2017-03-31 NOTE — TELEPHONE ENCOUNTER
Percocet      Last Written Prescription Date:  2/15/2017 ( to be started on 2/22/2017)   Last Fill Quantity: 40,   # refills: 0  Last Office Visit with [facility], [facility] or [facility] prescribing provider: 2/15/2017 KN  Future Office visit:    Next 5 appointments (look out 90 days)     Apr 17, 2017 11:30 AM CDT   Return Visit with Carolann Motley MD   [facility] [place] ([facility] [place])    [address]  [place] [id number] [phone]                   Routing refill request to provider for review/approval because:  na

## 2017-03-31 NOTE — TELEPHONE ENCOUNTER
Patient is still seeing Pain MD-Dr Motley.     I asked patient if he was supposed to be getting his narcotic Percocet from Dr Motley and he stated he was told by Dr Motley to see if Dr Llamas could order him Percocet and Dr Motley would continue to order the Fentanyl for him as it is a drive for patient to get to Dr Motley's office to  written rx....    Please advise. I did advise patient that Dr HERBER Llamas is out of the office until M 4-3-17. Zoya Miller RN

## 2017-04-03 RX ORDER — OXYCODONE AND ACETAMINOPHEN 10; 325 MG/1; MG/1
TABLET ORAL
Qty: 40 TABLET | Refills: 0 | Status: SHIPPED | OUTPATIENT
Start: 2017-04-03 | End: 2017-04-03

## 2017-04-03 RX ORDER — OXYCODONE AND ACETAMINOPHEN 10; 325 MG/1; MG/1
TABLET ORAL
Qty: 40 TABLET | Refills: 0 | Status: SHIPPED | OUTPATIENT
Start: 2017-04-03 | End: 2017-07-11

## 2017-04-17 ENCOUNTER — OFFICE VISIT (OUTPATIENT)
Dept: PALLIATIVE MEDICINE | Facility: CLINIC | Age: 47
End: 2017-04-17
Payer: COMMERCIAL

## 2017-04-17 VITALS
BODY MASS INDEX: 34.01 KG/M2 | DIASTOLIC BLOOD PRESSURE: 89 MMHG | SYSTOLIC BLOOD PRESSURE: 139 MMHG | HEART RATE: 72 BPM | WEIGHT: 237 LBS

## 2017-04-17 DIAGNOSIS — M21.371 RIGHT FOOT DROP: Primary | ICD-10-CM

## 2017-04-17 DIAGNOSIS — G89.29 CHRONIC LOW BACK PAIN, UNSPECIFIED BACK PAIN LATERALITY, WITH SCIATICA PRESENCE UNSPECIFIED: ICD-10-CM

## 2017-04-17 DIAGNOSIS — M54.5 CHRONIC LOW BACK PAIN, UNSPECIFIED BACK PAIN LATERALITY, WITH SCIATICA PRESENCE UNSPECIFIED: ICD-10-CM

## 2017-04-17 DIAGNOSIS — M96.1 FAILED BACK SURGICAL SYNDROME: ICD-10-CM

## 2017-04-17 DIAGNOSIS — F33.42 RECURRENT MAJOR DEPRESSION IN COMPLETE REMISSION (H): ICD-10-CM

## 2017-04-17 PROCEDURE — 99214 OFFICE O/P EST MOD 30 MIN: CPT | Performed by: PSYCHIATRY & NEUROLOGY

## 2017-04-17 RX ORDER — FENTANYL 12.5 UG/1
1 PATCH TRANSDERMAL
Qty: 10 PATCH | Refills: 0 | Status: SHIPPED | OUTPATIENT
Start: 2017-04-17 | End: 2017-05-15

## 2017-04-17 RX ORDER — DULOXETIN HYDROCHLORIDE 60 MG/1
60 CAPSULE, DELAYED RELEASE ORAL DAILY
Qty: 90 CAPSULE | Refills: 0 | Status: CANCELLED | OUTPATIENT
Start: 2017-04-17

## 2017-04-17 ASSESSMENT — PAIN SCALES - GENERAL: PAINLEVEL: MODERATE PAIN (5)

## 2017-04-17 NOTE — TELEPHONE ENCOUNTER
DULoxetine (CYMBALTA) 60 MG EC capsule  Last Written Prescription Date: 01/06/2017  Last Fill Quantity: 90 capsule, # refills: 0  Last Office Visit with [facility], [facility] or [facility] prescribing provider: 01/19/2017        BP Readings from Last 3 Encounters:   04/17/17 139/89   02/15/17 (!) 151/96   01/19/17 (!) 136/94     Pulse: (for Fetzima)  Creatinine   Date Value Ref Range Status   05/09/2012 0.81 0.66 - 1.25 mg/dL Final   ]    Last PHQ-9 score on record=   PHQ-9 SCORE 7/7/2016   Total Score -   Total Score 11

## 2017-04-17 NOTE — PROGRESS NOTES
"[facility]    Date of visit: 4/17/2017      Chief complaint:   Chief Complaint   Patient presents with     Pain       Interval history:  David Wilcox was last seen by me on 2/15/17.      Recommendations/plan at the last visit included:  1. Physical Therapy:  Not covered by insurance.  I have asked him to look again in the new year.  2. Clinical Health Psychologist to address issues of relaxation, behavioral change, coping style, and other factors important to improvement.  Difficult due to work, but will look at this again next visit  3. Diagnostic Studies:  Labs to evaluate for peripheral neuropathy  4. Medication Management:    1. Titrate the gabapentin to 1200mg TID.  Initially will be at 900mg  2. He will try the robaxin during the day  3. No changes to opiods  5. Further procedures recommended: none  6. Recommendations to PCP: none  7. Follow up: 2 month    Since his last visit, David Wilcox reports:  - he reports he has new spasming in his groin, both sides. Happens more in the evening. He has not been having any sexual intercourse due to this. He also has pain in this area.   - he is mostly just walking 1-2 miles/day  - he was able to use a chainsaw to cut a 1/2 cord of wood  - he reports that the \"drop\" in his right foot has progressed  -He continues to have upper back-thoracic pain, R>L which started 2-3 months ago, reports it is worse when his other pain is worse.   - Denies any new bowel/bladder changes    Pain scores:  Pain intensity on average is 7 on a scale of 0-10.     Current pain treatments:   Percocet 10/325mg daily- takes in the afternoon.  Takes 1-2/day.- takes the edge off- lasts for 4 hours, 40 tabs/month  Fentanyl patch-12 mcg/72 hours  Robaxin (methocarbamol) 1000mg at night- beneficial  Gabapentin 1200 mg TID, beneficial  Cymbalta 60mg daily- tried going to 90mg/day but felt worse    Previous medication treatments included:  Hydrocodone- not " helpful  Methadone- taking after 1st surgery  Naprosyn- not helpful  Flexeril- after first back injury (1990s)  Lidocaine- not helpful  Acetaminophen (tylenol)   Gabapentin    Other treatments have included:  David Wilcox has been seen at a pain clinic in the past.  MAPS- injections  PT: yes  Acupuncture: no  TENs Unit: yes- out of use- somewhat helpful  Injections: none since last surgery- very  helpful    Side Effects: no side effect    Medications:  Current Outpatient Prescriptions   Medication Sig Dispense Refill     oxyCODONE-acetaminophen (PERCOCET)  MG per tablet 0.5 to 1 tab bid prn pain. 40 tablet 0     fentaNYL (DURAGESIC) 12 mcg/hr 72 hr patch Place 1 patch onto the skin every 72 hours . May fill on/after 3/20/2017 not to start till 3/21/2017. 10 patch 0     gabapentin (NEURONTIN) 600 MG tablet Take 2 tablets (1,200 mg) by mouth 3 times daily . Increase to this dose as instructed in clinic. 3 month supply 540 tablet 1     methocarbamol (ROBAXIN) 500 MG tablet Take 1-2 tablets (500-1,000 mg) by mouth 3 times daily as needed for muscle spasms . Caution for sedation 90 tablet 3     LORazepam (ATIVAN) 0.5 MG tablet One bid prn anxiety. Rare use 30 tablet 0     DULoxetine (CYMBALTA) 60 MG EC capsule Take 1 capsule (60 mg) by mouth daily Takes with 30mg tab for 90mg total daily dose 90 capsule 0     zolpidem (AMBIEN) 10 MG tablet Take 1 tablet (10 mg) by mouth nightly as needed for sleep 60 tablet 2     omeprazole (PRILOSEC) 20 MG capsule Take 1 capsule (20 mg) by mouth daily . 90 capsule 3     TYLENOL EXTRA STRENGTH 500 MG OR TABS 3 po prn for pain       zaleplon (SONATA) 10 MG capsule Take 1 capsule (10 mg) by mouth At Bedtime (do not use with ambien) (Patient not taking: Reported on 2/15/2017) 30 capsule 0     gabapentin (NEURONTIN) 300 MG capsule Start 300mg by mouth twice daily.  Titrate as instructed in clinic to goal dose of 600mg (2 tabs) 3 times daily.  3 month supply (Patient not taking:  Reported on 4/17/2017) 540 capsule 3       Medical History: any changes in medical history since they were last seen? Right leg cellulitis    Review of Systems:  The 14 system ROS was reviewed from the intake questionnaire, and is positive for: swelling in leg, joint pain, weakness, pain, numbness/tingling, pain, back pain  Any bowel or bladder problems: none  Mood: more freire    Physical Exam:  Blood pressure 139/89, pulse 72, weight 107.5 kg (237 lb).  General: awake, alert   Gait: Antalgic  MSK exam: uses cane for ambulation.  Poor rotation with pain.  Right > left lower thoracic paraspinal tenderness  Mild weakness right foot    Assessment:   1. 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    2. Facet arthropathy likely contributing above the level of the future  3. Distant history of alcohol and drug abuse  4. Bipolar disorder- currently managed  5. H/o meningitis after SCS trial  6. Subacute thoracic back pain, likely myofascial etiology.    No significant change to foot drop on exam.  Advised to monitor    Plan:  1. Physical Therapy: he is to schedule PT at [place]  2. Clinical Health Psychologist to address issues of relaxation, behavioral change, coping style, and other factors important to improvement.  Difficult due to work, but will look at this again next visit  3. Diagnostic Studies:  Labs to evaluate for peripheral neuropathy  4. Medication Management:    1. Titrate the gabapentin to 1200mg TID.  Initially will be at 900mg  2. He will try the robaxin during the day  3. No changes to opiods. He is to give pill count when he calls for refill  5. Further procedures recommended: none  6. Recommendations to PCP: none  7. Follow up: 2 months    Manisha Eduardo DO  Pain Medicine Fellow     I saw and examined the patient with the Pain Fellow/Resident. I have reviewed and agree with the resident's note and plan of care and made changes and corrections directly to the body of the  note.    TIME SPENT:  BY FELLOW/RESIDENT ALONE 15 MIN  BY MYSELF AND FELLOW/RESIDENT TOGETHER 25 MIN  BY MYSELF WITHOUT THE FELLOW/RESIDENT 0 MIN    These times included 15 minutes I spent counseling him about his diagnosis and treatment options and coordination of care with the primary team    Bindu Motley MD  [facility]

## 2017-04-17 NOTE — MR AVS SNAPSHOT
After Visit Summary   4/17/2017    David Wilcox    MRN: [id number]           Patient Information     Date Of Birth          1970        Visit Information        Provider Department      4/17/2017 11:30 AM Carolann Motley MD [facility]        Today's Diagnoses     Failed back surgical syndrome        Chronic low back pain, unspecified back pain laterality, with sciatica presence unspecified          Care Instructions    1. Schedule physical therapy at [address] [place] [address]     # [phone]  Fax # [phone]  2. Try the daytime dose of the Robaxin (methocarbamol) - you can try a lower dose of 500mg or even 250mg.  Can be sedating.  Do not drive until you know how the medication affects you.    3. Schedule follow up in 2 months    4. Call if you want to consider any other changes or imaging  5. Call us for the Percocet.  When you call, let me know how many you have remaining    ----------------------------------------------------------------  Nurse Triage line:  [phone]   Call this number with any questions or concerns. You may leave a detailed message anytime. Calls are typically returned Monday through Friday between 8 AM and 4:30 PM. We usually get back to you within 2 business days depending on the issue/request.       Medication refills:    For non-narcotic medications, call your pharmacy directly to request a refill. The pharmacy will contact the [facility] for authorization. Please allow 3-4 days for these refills to be processed.     For narcotic refills, call the nurse triage line or send a [employer] message. Please contact us 7-10 days before your refill is due. The message MUST include the name of the specific medication(s) requested and how you would like to receive the prescription(s). The options are as follows:    Pain Clinic staff can mail the prescription to your pharmacy. Please tell us the name of the  "pharmacy.    You may pick the prescription up at the Pain Clinic (tell us the location) or during a clinic visit with your pain provider    Pain Clinic staff can deliver the prescription to the [place] pharmacy in the clinic building. Please tell us the location.      Scheduling number: [phone].  Call this number to schedule or change appointments.    We believe regular attendance is key to your success in our program.    Any time you are unable to keep your appointment we ask that you call us at least 24 hours in advance to let us know. This will allow us to offer the appointment time to another patient.             Follow-ups after your visit        Who to contact     If you have questions or need follow up information about today's clinic visit or your schedule please contact [facility] directly at [phone].  Normal or non-critical lab and imaging results will be communicated to you by MyChart, letter or phone within 4 business days after the clinic has received the results. If you do not hear from us within 7 days, please contact the clinic through MyChart or phone. If you have a critical or abnormal lab result, we will notify you by phone as soon as possible.  Submit refill requests through [employer] or call your pharmacy and they will forward the refill request to us. Please allow 3 business days for your refill to be completed.          Additional Information About Your Visit        Needly Information     Needly lets you send messages to your doctor, view your test results, renew your prescriptions, schedule appointments and more. To sign up, go to [email or web address] . Click on \"Log in\" on the left side of the screen, which will take you to the Welcome page. Then click on \"Sign up Now\" on the right side of the page.     You will be asked to enter the access code listed below, as well as some personal information. Please follow the directions to create your username and password.   "   Your access code is: [email or web address]  Expires: 2017 12:02 PM     Your access code will  in 90 days. If you need help or a new code, please call your [facility] clinic or [phone].        Care EveryWhere ID     This is your Care EveryWhere ID. This could be used by other organizations to access your [facility] medical records  [id number]        Your Vitals Were     Pulse BMI (Body Mass Index)                72 34.01 kg/m2           Blood Pressure from Last 3 Encounters:   17 139/89   02/15/17 (!) 151/96   17 (!) 136/94    Weight from Last 3 Encounters:   17 107.5 kg (237 lb)   02/15/17 104.8 kg (231 lb)   17 104.3 kg (230 lb)              Today, you had the following     No orders found for display         Today's Medication Changes          These changes are accurate as of: 17 12:03 PM.  If you have any questions, ask your nurse or doctor.               These medicines have changed or have updated prescriptions.        Dose/Directions    fentaNYL 12 mcg/hr 72 hr patch   Commonly known as:  DURAGESIC   This may have changed:  additional instructions   Used for:  Failed back surgical syndrome   Changed by:  Carolann Motley MD        Dose:  1 patch   Place 1 patch onto the skin every 72 hours . May fill on/after 2017 not to start till 2017.   Quantity:  10 patch   Refills:  0            Where to get your medicines      Some of these will need a paper prescription and others can be bought over the counter.  Ask your nurse if you have questions.     Bring a paper prescription for each of these medications     fentaNYL 12 mcg/hr 72 hr patch                Primary Care Provider Office Phone # Fax #    Regan Llamas [phone]       [facility] [address] [place] [address] [place] [address]        Thank you!     Thank you for choosing [facility]3]INE  for your care. Our goal is always to provide you with excellent care. Hearing  back from our patients is one way we can continue to improve our services. Please take a few minutes to complete the written survey that you may receive in the mail after your visit with us. Thank you!             Your Updated Medication List - Protect others around you: Learn how to safely use, store and throw away your medicines at www.disposemymeds.org.          This list is accurate as of: 4/17/17 12:03 PM.  Always use your most recent med list.                   Brand Name Dispense Instructions for use    DULoxetine 60 MG EC capsule    CYMBALTA    90 capsule    Take 1 capsule (60 mg) by mouth daily Takes with 30mg tab for 90mg total daily dose       fentaNYL 12 mcg/hr 72 hr patch    DURAGESIC    10 patch    Place 1 patch onto the skin every 72 hours . May fill on/after 4/17/2017 not to start till 4/19/2017.       * gabapentin 300 MG capsule    NEURONTIN    540 capsule    Start 300mg by mouth twice daily.  Titrate as instructed in clinic to goal dose of 600mg (2 tabs) 3 times daily.  3 month supply       * gabapentin 600 MG tablet    NEURONTIN    540 tablet    Take 2 tablets (1,200 mg) by mouth 3 times daily . Increase to this dose as instructed in clinic. 3 month supply       LORazepam 0.5 MG tablet    ATIVAN    30 tablet    One bid prn anxiety. Rare use       methocarbamol 500 MG tablet    ROBAXIN    90 tablet    Take 1-2 tablets (500-1,000 mg) by mouth 3 times daily as needed for muscle spasms . Caution for sedation       omeprazole 20 MG CR capsule    priLOSEC    90 capsule    Take 1 capsule (20 mg) by mouth daily .       oxyCODONE-acetaminophen  MG per tablet    PERCOCET    40 tablet    0.5 to 1 tab bid prn pain.       TYLENOL 500 MG tablet   Generic drug:  acetaminophen      3 po prn for pain       zaleplon 10 MG capsule    SONATA    30 capsule    Take 1 capsule (10 mg) by mouth At Bedtime (do not use with ambien)       zolpidem 10 MG tablet    AMBIEN    60 tablet    Take 1 tablet (10 mg) by mouth  nightly as needed for sleep       * Notice:  This list has 2 medication(s) that are the same as other medications prescribed for you. Read the directions carefully, and ask your doctor or other care provider to review them with you.

## 2017-04-17 NOTE — PATIENT INSTRUCTIONS
1. Schedule physical therapy at [address] [place] [address]     # [phone]  Fax # [phone]  2. Try the daytime dose of the Robaxin (methocarbamol) - you can try a lower dose of 500mg or even 250mg.  Can be sedating.  Do not drive until you know how the medication affects you.    3. Schedule follow up in 2 months    4. Call if you want to consider any other changes or imaging  5. Call us for the Percocet.  When you call, let me know how many you have remaining    ----------------------------------------------------------------  Nurse Triage line:  [phone]   Call this number with any questions or concerns. You may leave a detailed message anytime. Calls are typically returned Monday through Friday between 8 AM and 4:30 PM. We usually get back to you within 2 business days depending on the issue/request.       Medication refills:    For non-narcotic medications, call your pharmacy directly to request a refill. The pharmacy will contact the [facility] for authorization. Please allow 3-4 days for these refills to be processed.     For narcotic refills, call the nurse triage line or send a [employer] message. Please contact us 7-10 days before your refill is due. The message MUST include the name of the specific medication(s) requested and how you would like to receive the prescription(s). The options are as follows:    Pain Clinic staff can mail the prescription to your pharmacy. Please tell us the name of the pharmacy.    You may pick the prescription up at the Pain Clinic (tell us the location) or during a clinic visit with your pain provider    Pain Clinic staff can deliver the prescription to the [place] pharmacy in the clinic building. Please tell us the location.      Scheduling number: [phone].  Call this number to schedule or change appointments.    We believe regular attendance is key to your success in our program.    Any time you are unable to keep your  appointment we ask that you call us at least 24 hours in advance to let us know. This will allow us to offer the appointment time to another patient.

## 2017-04-17 NOTE — NURSING NOTE
"Chief Complaint   Patient presents with     Pain       Initial Wt 107.5 kg (237 lb)  BMI 34.01 kg/m2 Estimated body mass index is 34.01 kg/(m^2) as calculated from the following:    Height as of 2/15/17: 1.778 m (5' 10\").    Weight as of this encounter: 107.5 kg (237 lb).  Medication Reconciliation: complete     Belkis Durbin CMA (AAMA)      "

## 2017-05-15 DIAGNOSIS — M96.1 FAILED BACK SURGICAL SYNDROME: ICD-10-CM

## 2017-05-15 RX ORDER — FENTANYL 12.5 UG/1
1 PATCH TRANSDERMAL
Qty: 10 PATCH | Refills: 0 | Status: SHIPPED | OUTPATIENT
Start: 2017-05-15 | End: 2017-06-16

## 2017-05-15 NOTE — TELEPHONE ENCOUNTER
Reason for call: Medication   If this is a refill request, has the caller requested the refill from the pharmacy already? No  Will the patient be using a [facility]? No  Name of the pharmacy and phone number for the current request: [facility] in [place]     Name of the medication requested: Fentanyl patches     Other request: Patients wife will  when it is ready

## 2017-05-15 NOTE — TELEPHONE ENCOUNTER
Medication refill information reviewed.     Due date for Fentanyl is 5/19/17     Prescriptions prepped for review.     Will route to provider.

## 2017-05-15 NOTE — TELEPHONE ENCOUNTER
Signed Prescriptions:                        Disp   Refills    fentaNYL (DURAGESIC) 12 mcg/hr 72 hr patch 10 pat*0        Sig: Place 1 patch onto the skin every 72 hours . May fill           on/after 5/17/2017 not to start till 5/19/2017.  Authorizing Provider: YADIRA CARRILLO MD  [facility]

## 2017-05-15 NOTE — TELEPHONE ENCOUNTER
Received call from patient requesting refill(s) of fentaNYL (DURAGESIC) 12 mcg/hr 72 hr patch    Last picked up from pharmacy on 4/19/17    Pt last seen by prescribing provider on 4/17/17  Next appt scheduled for none    Last urine drug screen date 9/8/16  Current opioid agreement on file (completed within the last year) Yes Date of opioid agreement: 9/8/16    Processing (pick one and delete the others):  Mail to [facility] [address] [place] [address] - [address] [place] [address] [place] [facility] & [address] [place] [address]    Syeda Montelongo MA  [facility]    Will route to nursing pool for review and preparation of prescription(s).

## 2017-05-31 DIAGNOSIS — F33.42 RECURRENT MAJOR DEPRESSION IN COMPLETE REMISSION (H): ICD-10-CM

## 2017-05-31 RX ORDER — DULOXETIN HYDROCHLORIDE 60 MG/1
60 CAPSULE, DELAYED RELEASE ORAL DAILY
Qty: 90 CAPSULE | Refills: 0 | Status: SHIPPED | OUTPATIENT
Start: 2017-05-31 | End: 2017-07-31

## 2017-05-31 NOTE — TELEPHONE ENCOUNTER
DULoxetine (CYMBALTA) 60 MG EC capsule    Last Written Prescription Date: 01/16/2017  Last Fill Quantity: 90 capsule, # refills: 0  Last Office Visit with [facility], [facility] or [facility] prescribing provider: 01/19/2017        BP Readings from Last 3 Encounters:   04/17/17 139/89   02/15/17 (!) 151/96   01/19/17 (!) 136/94     Pulse: (for Fetzima)  Creatinine   Date Value Ref Range Status   05/09/2012 0.81 0.66 - 1.25 mg/dL Final   ]    Last PHQ-9 score on record=   PHQ-9 SCORE 7/7/2016   Total Score -   Total Score 11

## 2017-06-16 DIAGNOSIS — M96.1 FAILED BACK SURGICAL SYNDROME: ICD-10-CM

## 2017-06-16 RX ORDER — FENTANYL 12.5 UG/1
1 PATCH TRANSDERMAL
Qty: 10 PATCH | Refills: 0 | Status: SHIPPED | OUTPATIENT
Start: 2017-06-16 | End: 2017-07-11

## 2017-06-16 NOTE — TELEPHONE ENCOUNTER
Agree this is not enough time.  Will see if elias is able to sign in [place] today, otherwise will need to be Monday.    Please pack a pink sheet with the script    If signed today, I think it should go to then 2nd floor desk rather than pharmacy, given the pharmacy closes in a couple of hours and unlikely he will get in time, but 2nd floor desk open today and tomorrow.    Pending Prescriptions:                       Disp   Refills    fentaNYL (DURAGESIC) 12 mcg/hr 72 hr patch 10 pat*0        Sig: Place 1 patch onto the skin every 72 hours . May fill           on/after 6/16/2017 not to start till 6/18/2017.        Bindu Motley MD  [facility]

## 2017-06-16 NOTE — TELEPHONE ENCOUNTER
Received call from patient requesting refill(s) of fentaNYL (DURAGESIC) 12 mcg/hr 72 hr patch    Last picked up from pharmacy on 5/19/17    Pt last seen by prescribing provider on 4/17/17  Next appt scheduled for 7/31/17    Last urine drug screen date 9/8/16  Current opioid agreement on file (completed within the last year) Yes Date of opioid agreement: 9/8/16    Processing (pick one and delete the others):   [facility]    Syeda Montelongo MA  [facility]    Will route to nursing pool for review and preparation of prescription(s).

## 2017-06-16 NOTE — TELEPHONE ENCOUNTER
Medication refill information reviewed. I spoke to David and he said he put his last patch on yesterday. I told him that he would need to give us more than a couple hours notice when he is requesting a refill and he became angry and said when he gives more notice he gets yelled at. I told him we always request  between 7 to 10 days notice and that calling on a Friday afternoon to  that same afternoon would not be realistic for us. I told him that I would forward this message to Dr. Motley and that she would be in [place] on Monday. He will wear the current patch until then.    Due date for fentaNYL (DURAGESIC) 12 mcg/hr 72 hr patch is 6/18/17     Prescriptions prepped for review.     Will route to provider.

## 2017-06-16 NOTE — TELEPHONE ENCOUNTER
Routing to [facility] for processing.    Gita Barrios, JEREMIAHN, RN  Care Coordinator  [facility] [place]

## 2017-06-16 NOTE — TELEPHONE ENCOUNTER
Signed Prescriptions:                        Disp   Refills    fentaNYL (DURAGESIC) 12 mcg/hr 72 hr patch 10 pat*0        Sig: Place 1 patch onto the skin every 72 hours . May fill           on/after 6/16/2017 not to start till 6/18/2017.  Authorizing Provider: INA LOPEZ    Signed script placed in touchdown bin at [facility].  Signing for colleague who is not currently at this clinic site.    Ina Lopez APRN CNP FNP RN  [facility]

## 2017-07-10 DIAGNOSIS — M96.1 FAILED BACK SURGICAL SYNDROME: ICD-10-CM

## 2017-07-10 DIAGNOSIS — M54.5 CHRONIC LOW BACK PAIN, UNSPECIFIED BACK PAIN LATERALITY, WITH SCIATICA PRESENCE UNSPECIFIED: ICD-10-CM

## 2017-07-10 DIAGNOSIS — G89.29 CHRONIC LOW BACK PAIN, UNSPECIFIED BACK PAIN LATERALITY, WITH SCIATICA PRESENCE UNSPECIFIED: ICD-10-CM

## 2017-07-10 NOTE — TELEPHONE ENCOUNTER
VM left today at: 2:01 pm    Reason for Call:  Medication or medication refill:    Do you use a [facility]?  Name of the pharmacy and phone number for the current request:   at     Name of the medication requested: fentaNYL (DURAGESIC) 12 mcg/hr 72 hr patch, pt also requested another medication but it was hard to make out what he said.      Can we leave a detailed message on this number? YES    Phone number patient can be reached at: Home number on file [phone] (home)      Call taken on 7/10/2017 at 2:45 PM by Hilda Eid

## 2017-07-10 NOTE — TELEPHONE ENCOUNTER
Received call from patient requesting refill(s) of fentaNYL (DURAGESIC) 12 mcg/hr 72 hr patch Last picked up from pharmacy on 6/20/17    oxyCODONE-acetaminophen (PERCOCET)  MG per tablet  Last picked up from pharmacy on 5/31/17    Pt last seen by prescribing provider on 4/17/17  Next appt scheduled for 7/31/17    Last urine drug screen date 9/8/16  Current opioid agreement on file (completed within the last year) Yes Date of opioid agreement: 9/8/16    Processing (pick one and delete the others):  Memo Montelongo MA  [facility]    Will route to nursing pool for review and preparation of prescription(s).

## 2017-07-11 NOTE — TELEPHONE ENCOUNTER
Medication refill information reviewed.     Due date for (DURAGESIC) 12 mcg/hr 72 hr patch is 7/18/17     oxyCODONE-acetaminophen (PERCOCET)  MG per tablet   is anytime     Prescriptions prepped for review.     Will route to provider.

## 2017-07-12 RX ORDER — OXYCODONE AND ACETAMINOPHEN 10; 325 MG/1; MG/1
TABLET ORAL
Qty: 40 TABLET | Refills: 0 | Status: SHIPPED | OUTPATIENT
Start: 2017-07-12 | End: 2017-07-31

## 2017-07-12 RX ORDER — FENTANYL 12.5 UG/1
1 PATCH TRANSDERMAL
Qty: 10 PATCH | Refills: 0 | Status: SHIPPED | OUTPATIENT
Start: 2017-07-12 | End: 2017-07-31

## 2017-07-12 NOTE — TELEPHONE ENCOUNTER
Signed Rx's placed in  folder, , 2nd floor, [place].  Spoke with patient, and his wife, April, will .  Noted on envelope.

## 2017-07-12 NOTE — TELEPHONE ENCOUNTER
Signed Prescriptions:                        Disp   Refills    fentaNYL (DURAGESIC) 12 mcg/hr 72 hr patch 10 pat*0        Sig: Place 1 patch onto the skin every 72 hours . May fill           on/after 2017 not to start till 2017.  Authorizing Provider: YADIRA CARRILLO    oxyCODONE-acetaminophen (PERCOCET)  *40 tab*0        Si.5 to 1 tab bid prn pain.  Authorizing Provider: YADIRA CARRILLO MD  [facility]

## 2017-07-13 DIAGNOSIS — F51.01 PRIMARY INSOMNIA: ICD-10-CM

## 2017-07-13 DIAGNOSIS — G89.4 CHRONIC PAIN SYNDROME: ICD-10-CM

## 2017-07-13 RX ORDER — METHOCARBAMOL 500 MG/1
500-1000 TABLET, FILM COATED ORAL 3 TIMES DAILY PRN
Qty: 90 TABLET | Refills: 3 | Status: SHIPPED | OUTPATIENT
Start: 2017-07-13 | End: 2017-07-31

## 2017-07-13 RX ORDER — ZOLPIDEM TARTRATE 10 MG/1
10 TABLET ORAL
Qty: 60 TABLET | Refills: 2 | Status: SHIPPED | OUTPATIENT
Start: 2017-07-13 | End: 2018-01-16

## 2017-07-13 NOTE — TELEPHONE ENCOUNTER
methocarbamol      Last Written Prescription Date:  2/15/2017  Last Fill Quantity: 90,   # refills: 3  Last Office Visit with [facility], [facility] or [facility] prescribing provider: 1/19/2017  Future Office visit:    Next 5 appointments (look out 90 days)     Jul 31, 2017  9:00 AM CDT   Return Visit with Carolann Motley MD   [facility] [place] ([facility])    [address]  [place] [address]   [phone]                   Routing refill request to provider for review/approval because:  Drug not on the [facility], [facility] or [facility] refill protocol or controlled substance  zolpidem (AMBIEN) 10 MG tablet      Last Written Prescription Date:  1/11/2017  Last Fill Quantity: 60,   # refills: 2  Last Office Visit with [facility], [facility] or [facility] prescribing provider: 1/19/2017  Future Office visit:    Next 5 appointments (look out 90 days)     Jul 31, 2017  9:00 AM CDT   Return Visit with Carolann Motley MD   [facility] [place] ([facility] [place])    [address]  [place] [address]   [phone]                   Routing refill request to provider for review/approval because:  Drug not on the [facility], [facility] or [facility] refill protocol or controlled substance

## 2017-07-31 ENCOUNTER — OFFICE VISIT (OUTPATIENT)
Dept: PALLIATIVE MEDICINE | Facility: CLINIC | Age: 47
End: 2017-07-31
Payer: COMMERCIAL

## 2017-07-31 VITALS
HEART RATE: 74 BPM | DIASTOLIC BLOOD PRESSURE: 88 MMHG | SYSTOLIC BLOOD PRESSURE: 145 MMHG | BODY MASS INDEX: 33.15 KG/M2 | WEIGHT: 231 LBS

## 2017-07-31 DIAGNOSIS — G89.29 CHRONIC LOW BACK PAIN, UNSPECIFIED BACK PAIN LATERALITY, WITH SCIATICA PRESENCE UNSPECIFIED: ICD-10-CM

## 2017-07-31 DIAGNOSIS — G89.4 CHRONIC PAIN SYNDROME: ICD-10-CM

## 2017-07-31 DIAGNOSIS — M54.5 CHRONIC LOW BACK PAIN, UNSPECIFIED BACK PAIN LATERALITY, WITH SCIATICA PRESENCE UNSPECIFIED: ICD-10-CM

## 2017-07-31 DIAGNOSIS — F33.42 RECURRENT MAJOR DEPRESSION IN COMPLETE REMISSION (H): ICD-10-CM

## 2017-07-31 DIAGNOSIS — M96.1 FAILED BACK SURGICAL SYNDROME: ICD-10-CM

## 2017-07-31 PROCEDURE — 99213 OFFICE O/P EST LOW 20 MIN: CPT | Performed by: PSYCHIATRY & NEUROLOGY

## 2017-07-31 RX ORDER — FENTANYL 12.5 UG/1
1 PATCH TRANSDERMAL
Qty: 15 PATCH | Refills: 0 | Status: SHIPPED | OUTPATIENT
Start: 2017-07-31 | End: 2017-09-14

## 2017-07-31 RX ORDER — METHOCARBAMOL 500 MG/1
500-1000 TABLET, FILM COATED ORAL 3 TIMES DAILY PRN
Qty: 120 TABLET | Refills: 3 | Status: SHIPPED | OUTPATIENT
Start: 2017-07-31 | End: 2017-09-27

## 2017-07-31 RX ORDER — DULOXETIN HYDROCHLORIDE 60 MG/1
60 CAPSULE, DELAYED RELEASE ORAL DAILY
Qty: 90 CAPSULE | Refills: 0 | Status: SHIPPED | OUTPATIENT
Start: 2017-07-31 | End: 2017-08-28

## 2017-07-31 RX ORDER — OXYCODONE AND ACETAMINOPHEN 10; 325 MG/1; MG/1
.5-1 TABLET ORAL EVERY 6 HOURS PRN
Qty: 40 TABLET | Refills: 0 | Status: SHIPPED | OUTPATIENT
Start: 2017-07-31 | End: 2017-09-14

## 2017-07-31 ASSESSMENT — PAIN SCALES - GENERAL: PAINLEVEL: MODERATE PAIN (5)

## 2017-07-31 NOTE — PROGRESS NOTES
[facility]    Date of visit: 7/31/17      Chief complaint:   Chief Complaint   Patient presents with     Pain       Interval history:  David Wilcox was last seen by me on 4/17/17.      Recommendations/plan at the last visit included:  1. Physical Therapy: he is to schedule PT at [facility]  2. Clinical Health Psychologist to address issues of relaxation, behavioral change, coping style, and other factors important to improvement.  Difficult due to work, but will look at this again next visit  3. Diagnostic Studies:  Labs to evaluate for peripheral neuropathy  4. Medication Management:    1. Titrate the gabapentin to 1200mg TID.  Initially will be at 900mg  2. He will try the robaxin during the day  3. No changes to opiods. He is to give pill count when he calls for refill  5. Further procedures recommended: none  6. Recommendations to PCP: none  7. Follow up: 2 months    Since his last visit, David Wilcox reports:  -Fentanyl patch has been lasting about 2 days since march  -He has been increasing his activity level since the spring and into the summer. 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  -No new symptoms otherwise.  -Continues to have low back and mid to upper back pain, worsened with activity.  -He has chronic diarrhea and this has been worse lately. No other bowel or bladder changes.    Pain scores:  Pain intensity on average is 7 on a scale of 0-10.     Current pain treatments:   Percocet 10/325mg daily- takes in the afternoon.  Takes 1-2/day.- takes the edge off- lasts for 4 hours, 40 tabs/month  Fentanyl patch-12 mcg/72 hours  Robaxin (methocarbamol) 1000mg at night and in the morning- the morning dose has been helping  Gabapentin 1200 mg TID, beneficial  Cymbalta 60mg daily- tried going to 90mg/day but felt worse- for pain and mood    Previous medication treatments included:  Hydrocodone- not  helpful  Methadone- taking after 1st surgery  Naprosyn- not helpful  Flexeril- after first back injury (1990s)  Lidocaine- not helpful  Acetaminophen (tylenol)   Gabapentin    Other treatments have included:  David Wilcox has been seen at a pain clinic in the past.  MAPS- injections  PT: yes  Acupuncture: no  TENs Unit: yes- out of use- somewhat helpful  Injections: none since last surgery- very  helpful    Side Effects: no side effect    Medications:  Current Outpatient Prescriptions   Medication Sig Dispense Refill     fentaNYL (DURAGESIC) 12 mcg/hr 72 hr patch Place 1 patch onto the skin every 48 hours . May fill on/after 8/13/2017 not to start till 8/17/2017. 15 patch 0     oxyCODONE-acetaminophen (PERCOCET)  MG per tablet Take 0.5-1 tablets by mouth every 6 hours as needed for moderate to severe pain . Max of 2/day.  Max of 40 tabs per months.  Fill on/after 8/9 to start on/after 8/11 40 tablet 0     methocarbamol (ROBAXIN) 500 MG tablet Take 1-2 tablets (500-1,000 mg) by mouth 3 times daily as needed for muscle spasms . Caution for sedation 120 tablet 3     DULoxetine (CYMBALTA) 60 MG EC capsule Take 1 capsule (60 mg) by mouth daily . 3 month supply 90 capsule 0     zolpidem (AMBIEN) 10 MG tablet Take 1 tablet (10 mg) by mouth nightly as needed for sleep 60 tablet 2     gabapentin (NEURONTIN) 600 MG tablet Take 2 tablets (1,200 mg) by mouth 3 times daily . Increase to this dose as instructed in clinic. 3 month supply 540 tablet 1     LORazepam (ATIVAN) 0.5 MG tablet One bid prn anxiety. Rare use 30 tablet 0     omeprazole (PRILOSEC) 20 MG capsule Take 1 capsule (20 mg) by mouth daily . 90 capsule 3     TYLENOL EXTRA STRENGTH 500 MG OR TABS 3 po prn for pain       [DISCONTINUED] DULoxetine (CYMBALTA) 60 MG EC capsule Take 1 capsule (60 mg) by mouth daily Takes with 30mg tab for 90mg total daily dose 90 capsule 0     zaleplon (SONATA) 10 MG capsule Take 1 capsule (10 mg) by mouth At Bedtime (do not use  with ambien) (Patient not taking: Reported on 2/15/2017) 30 capsule 0     gabapentin (NEURONTIN) 300 MG capsule Start 300mg by mouth twice daily.  Titrate as instructed in clinic to goal dose of 600mg (2 tabs) 3 times daily.  3 month supply (Patient not taking: Reported on 4/17/2017) 540 capsule 3       Medical History: any changes in medical history since they were last seen?     Review of Systems:  The 14 system ROS was reviewed from the intake questionnaire, and is positive for: swelling in leg,diarrhea, back pain  Any bowel or bladder problems: no incontinence, diarrhea on and off  Mood: no changes    Physical Exam:  Blood pressure 145/88, pulse 74, weight 104.8 kg (231 lb).  General: awake, alert , cooperative  Gait: Antalgic on the right, uses a single end cane  MSK exam: uses cane for ambulation. Pain with lumbar ROM, particularly rotation and extension.  Tender to palpation throughout thoracic and lumbar paraspinals, worse on the right. Strength is 4/5 on the right in hip flexion, knee extension, ankle plantar and dorsiflexion. 5/5 throughout the left side.    Assessment:   1. 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    2. Facet arthropathy likely contributing above the level of the future  3. Distant history of alcohol and drug abuse   4. Bipolar disorder- currently managed  5. H/o meningitis after SCS trial  6. Subacute thoracic back pain, likely myofascial etiology.      Plan:  1. Physical Therapy: continue exercises as recommended by PT  2. Clinical Health Psychologist to address issues of relaxation, behavioral change, coping style, and other factors important to improvement.  Difficult due to work, but will look at this again next visit  3. Diagnostic Studies:  none  4. Medication Management:    1. Continue gabapentin, Cymbalta and Robaxin (methocarbamol) at current doses.  2. Will change fentanyl to q48hr dosing with next script.  5. Further procedures recommended:  none  6. Recommendations to PCP: none  7. Follow up: 3 months    Marshal Shrestha DO  Pain Medicine Fellow     I saw and examined the patient with the Pain Fellow/Resident. I have reviewed and agree with the resident's note and plan of care and made changes and corrections directly to the body of the note.    TIME SPENT:  BY FELLOW/RESIDENT ALONE 10 MIN  BY MYSELF AND FELLOW/RESIDENT TOGETHER 15 MIN  BY MYSELF WITHOUT THE FELLOW/RESIDENT 0 MIN    These times included 10 minutes I spent counseling him about his diagnosis and treatment options and coordination of care with the primary team    Bindu Motley MD  [facility]

## 2017-07-31 NOTE — MR AVS SNAPSHOT
After Visit Summary   7/31/2017    David Wilcox    MRN: [id number]           Patient Information     Date Of Birth          1970        Visit Information        Provider Department      7/31/2017 9:00 AM Carolann Motley MD [facility]        Today's Diagnoses     Failed back surgical syndrome        Chronic low back pain, unspecified back pain laterality, with sciatica presence unspecified        Chronic pain syndrome        Recurrent major depression in complete remission (H)          Care Instructions    1. With new prescription, you will use the fentanyl every 48 hours.  This may require prior auth, so send in your script early.    2. Schedule follow up in 3 month    ----------------------------------------------------------------  Nurse Triage line:  [phone]   Call this number with any questions or concerns. You may leave a detailed message anytime. Calls are typically returned Monday through Friday between 8 AM and 4:30 PM. We usually get back to you within 2 business days depending on the issue/request.       Medication refills:    For non-narcotic medications, call your pharmacy directly to request a refill. The pharmacy will contact the [facility] Management Center for authorization. Please allow 3-4 days for these refills to be processed.     For narcotic refills, call the nurse triage line or send a [employer] message. Please contact us 7-10 days before your refill is due. The message MUST include the name of the specific medication(s) requested and how you would like to receive the prescription(s). The options are as follows:    Pain Clinic staff can mail the prescription to your pharmacy. Please tell us the name of the pharmacy.    You may pick the prescription up at the Pain Clinic (tell us the location) or during a clinic visit with your pain provider    Pain Clinic staff can deliver the prescription to the [place] pharmacy in the clinic building. Please tell us the  "location.      Scheduling number: [phone].  Call this number to schedule or change appointments.    We believe regular attendance is key to your success in our program.    Any time you are unable to keep your appointment we ask that you call us at least 24 hours in advance to let us know. This will allow us to offer the appointment time to another patient.               Follow-ups after your visit        Who to contact     If you have questions or need follow up information about today's clinic visit or your schedule please contact [facility] directly at [phone].  Normal or non-critical lab and imaging results will be communicated to you by [employer], letter or phone within 4 business days after the clinic has received the results. If you do not hear from us within 7 days, please contact the clinic through [email or web address] or phone. If you have a critical or abnormal lab result, we will notify you by phone as soon as possible.  Submit refill requests through [employer] or call your pharmacy and they will forward the refill request to us. Please allow 3 business days for your refill to be completed.          Additional Information About Your Visit        [employer] Information     [employer] lets you send messages to your doctor, view your test results, renew your prescriptions, schedule appointments and more. To sign up, go to [email or web address] . Click on \"Log in\" on the left side of the screen, which will take you to the Welcome page. Then click on \"Sign up Now\" on the right side of the page.     You will be asked to enter the access code listed below, as well as some personal information. Please follow the directions to create your username and password.     Your access code is: [email or web address]  Expires: 10/29/2017  9:36 AM     Your access code will  in 90 days. If you need help or a new code, please call your [place] clinic or [phone].        Care EveryWhere ID     This is your Care EveryWhere " ID. This could be used by other organizations to access your [facility] medical records  [id number]        Your Vitals Were     Pulse BMI (Body Mass Index)                74 33.15 kg/m2           Blood Pressure from Last 3 Encounters:   07/31/17 145/88   04/17/17 139/89   02/15/17 (!) 151/96    Weight from Last 3 Encounters:   07/31/17 104.8 kg (231 lb)   04/17/17 107.5 kg (237 lb)   02/15/17 104.8 kg (231 lb)              Today, you had the following     No orders found for display         Today's Medication Changes          These changes are accurate as of: 7/31/17  9:36 AM.  If you have any questions, ask your nurse or doctor.               These medicines have changed or have updated prescriptions.        Dose/Directions    DULoxetine 60 MG EC capsule   Commonly known as:  CYMBALTA   This may have changed:  additional instructions   Used for:  Recurrent major depression in complete remission (H)        Dose:  60 mg   Take 1 capsule (60 mg) by mouth daily . 3 month supply   Quantity:  90 capsule   Refills:  0       fentaNYL 12 mcg/hr 72 hr patch   Commonly known as:  DURAGESIC   This may have changed:    - when to take this  - additional instructions   Used for:  Failed back surgical syndrome        Dose:  1 patch   Place 1 patch onto the skin every 48 hours . May fill on/after 8/13/2017 not to start till 8/17/2017.   Quantity:  15 patch   Refills:  0       oxyCODONE-acetaminophen  MG per tablet   Commonly known as:  PERCOCET   This may have changed:    - how much to take  - how to take this  - when to take this  - reasons to take this  - additional instructions   Used for:  Chronic low back pain, unspecified back pain laterality, with sciatica presence unspecified        Dose:  0.5-1 tablet   Take 0.5-1 tablets by mouth every 6 hours as needed for moderate to severe pain . Max of 2/day.  Max of 40 tabs per months.  Fill on/after 8/9 to start on/after 8/11   Quantity:  40 tablet   Refills:  0             Where to get your medicines      These medications were sent to [id number] - [place], [place] - [address] AT [facility] [address]  [address], [facility] [address]     Phone:  [phone]     DULoxetine 60 MG EC capsule    methocarbamol 500 MG tablet         Some of these will need a paper prescription and others can be bought over the counter.  Ask your nurse if you have questions.     Bring a paper prescription for each of these medications     fentaNYL 12 mcg/hr 72 hr patch    oxyCODONE-acetaminophen  MG per tablet                Primary Care Provider Office Phone # Fax #    Regan Llamas [phone]       [facility] [address] [facility] [address]        Equal Access to Services     ЕЕЛНА FAIR : Hadii cornelia jackson hadasho Soomaali, waaxda luqadaha, qaybta kaalmada adeegyada, kinga pettit. So [facility] [phone].    ATENCIÓN: Si habla español, tiene a chambers disposición servicios gratuitos de asistencia lingüística. Llame al [phone].    We comply with applicable federal civil rights laws and [place] laws. We do not discriminate on the basis of race, color, national origin, age, disability sex, sexual orientation or gender identity.            Thank you!     Thank you for choosing [facility]  for your care. Our goal is always to provide you with excellent care. Hearing back from our patients is one way we can continue to improve our services. Please take a few minutes to complete the written survey that you may receive in the mail after your visit with us. Thank you!             Your Updated Medication List - Protect others around you: Learn how to safely use, store and throw away your medicines at www.disposemymeds.org.          This list is accurate as of: 7/31/17  9:36 AM.  Always use your most recent med list.                   Brand Name Dispense Instructions for use Diagnosis     DULoxetine 60 MG EC capsule    CYMBALTA    90 capsule    Take 1 capsule (60 mg) by mouth daily . 3 month supply    Recurrent major depression in complete remission (H)       fentaNYL 12 mcg/hr 72 hr patch    DURAGESIC    15 patch    Place 1 patch onto the skin every 48 hours . May fill on/after 8/13/2017 not to start till 8/17/2017.    Failed back surgical syndrome       * gabapentin 300 MG capsule    NEURONTIN    540 capsule    Start 300mg by mouth twice daily.  Titrate as instructed in clinic to goal dose of 600mg (2 tabs) 3 times daily.  3 month supply    Failed back surgical syndrome       * gabapentin 600 MG tablet    NEURONTIN    540 tablet    Take 2 tablets (1,200 mg) by mouth 3 times daily . Increase to this dose as instructed in clinic. 3 month supply    Failed back surgical syndrome       LORazepam 0.5 MG tablet    ATIVAN    30 tablet    One bid prn anxiety. Rare use    Recurrent major depression in complete remission (H)       methocarbamol 500 MG tablet    ROBAXIN    120 tablet    Take 1-2 tablets (500-1,000 mg) by mouth 3 times daily as needed for muscle spasms . Caution for sedation    Chronic pain syndrome       omeprazole 20 MG CR capsule    priLOSEC    90 capsule    Take 1 capsule (20 mg) by mouth daily .    Gastroesophageal reflux disease without esophagitis       oxyCODONE-acetaminophen  MG per tablet    PERCOCET    40 tablet    Take 0.5-1 tablets by mouth every 6 hours as needed for moderate to severe pain . Max of 2/day.  Max of 40 tabs per months.  Fill on/after 8/9 to start on/after 8/11    Chronic low back pain, unspecified back pain laterality, with sciatica presence unspecified       TYLENOL 500 MG tablet   Generic drug:  acetaminophen      3 po prn for pain        zaleplon 10 MG capsule    SONATA    30 capsule    Take 1 capsule (10 mg) by mouth At Bedtime (do not use with ambien)    Primary insomnia       zolpidem 10 MG tablet    AMBIEN    60 tablet    Take 1 tablet (10 mg) by mouth  nightly as needed for sleep    Primary insomnia       * Notice:  This list has 2 medication(s) that are the same as other medications prescribed for you. Read the directions carefully, and ask your doctor or other care provider to review them with you.

## 2017-07-31 NOTE — PATIENT INSTRUCTIONS
1. With new prescription, you will use the fentanyl every 48 hours.  This may require prior auth, so send in your script early.    2. Schedule follow up in 3 month    ----------------------------------------------------------------  Nurse Triage line:  [phone]   Call this number with any questions or concerns. You may leave a detailed message anytime. Calls are typically returned Monday through Friday between 8 AM and 4:30 PM. We usually get back to you within 2 business days depending on the issue/request.       Medication refills:    For non-narcotic medications, call your pharmacy directly to request a refill. The pharmacy will contact the [facility] Center for authorization. Please allow 3-4 days for these refills to be processed.     For narcotic refills, call the nurse triage line or send a [employer] message. Please contact us 7-10 days before your refill is due. The message MUST include the name of the specific medication(s) requested and how you would like to receive the prescription(s). The options are as follows:    Pain Clinic staff can mail the prescription to your pharmacy. Please tell us the name of the pharmacy.    You may pick the prescription up at the Pain Clinic (tell us the location) or during a clinic visit with your pain provider    Pain Clinic staff can deliver the prescription to the [facility] pharmacy in the clinic building. Please tell us the location.      Scheduling number: [phone].  Call this number to schedule or change appointments.    We believe regular attendance is key to your success in our program.    Any time you are unable to keep your appointment we ask that you call us at least 24 hours in advance to let us know. This will allow us to offer the appointment time to another patient.

## 2017-08-28 DIAGNOSIS — F33.42 RECURRENT MAJOR DEPRESSION IN COMPLETE REMISSION (H): ICD-10-CM

## 2017-08-28 RX ORDER — DULOXETIN HYDROCHLORIDE 60 MG/1
60 CAPSULE, DELAYED RELEASE ORAL DAILY
Qty: 90 CAPSULE | Refills: 0 | Status: SHIPPED | OUTPATIENT
Start: 2017-08-28 | End: 2017-09-27

## 2017-08-28 NOTE — LETTER
[facility]  [address] [facility] [address]  Phone: [phone]        August 28, 2017      David Wilcox                                                                                                                        [address] [facility] [address]            Dear Mr. Wilcox,    We are concerned about your health care.  We recently provided you with a medication refill.  Many medications require routine follow-up with your Doctor.      At this time we ask that: You schedule a routine office visit with your physician to follow your high blood pressure, depression, and fasting lab work.    Your prescription: Has been refilled for 1 month so you may have time for the above noted follow-up.      Thank you,      Regan Llamas MD/ ss

## 2017-08-28 NOTE — TELEPHONE ENCOUNTER
DULoxetine (CYMBALTA) 60 MG EC capsule    Last Written Prescription Date: 07/31/2017  Last Fill Quantity: 90 capsule, # refills: 0  Last Office Visit with [facility], [facility] or [facility] prescribing provider: 01/19/2017   Next 5 appointments (look out 90 days)     Nov 06, 2017  9:00 AM CST   Return Visit with Carolann Motley MD   [facility] ([facility])    [address] [facility] [address] [phone]                   BP Readings from Last 3 Encounters:   07/31/17 145/88   04/17/17 139/89   02/15/17 (!) 151/96     Pulse: (for Fetzima)  Creatinine   Date Value Ref Range Status   05/09/2012 0.81 0.66 - 1.25 mg/dL Final   ]    Last PHQ-9 score on record=   PHQ-9 SCORE 7/7/2016   Total Score -   Total Score 11

## 2017-08-30 DIAGNOSIS — M96.1 FAILED BACK SURGICAL SYNDROME: ICD-10-CM

## 2017-08-30 RX ORDER — GABAPENTIN 600 MG/1
1200 TABLET ORAL 3 TIMES DAILY
Qty: 540 TABLET | Refills: 1 | Status: SHIPPED | OUTPATIENT
Start: 2017-08-30 | End: 2018-02-07

## 2017-08-30 NOTE — TELEPHONE ENCOUNTER
Received fax request from [facility]  pharmacy requesting refill(s) for gabapentin (NEURONTIN) 600 MG tablet    Last refilled on 5/13/17    Pt last seen on 7/31/17  Next appt scheduled for 11/6/17    Syeda Montelongo MA  [facility] Management Center      Will facilitate refill.

## 2017-09-14 DIAGNOSIS — Z79.891 ENCOUNTER FOR MONITORING OPIOID MAINTENANCE THERAPY: Primary | ICD-10-CM

## 2017-09-14 DIAGNOSIS — Z51.81 ENCOUNTER FOR MONITORING OPIOID MAINTENANCE THERAPY: Primary | ICD-10-CM

## 2017-09-14 DIAGNOSIS — M54.5 CHRONIC LOW BACK PAIN, UNSPECIFIED BACK PAIN LATERALITY, WITH SCIATICA PRESENCE UNSPECIFIED: ICD-10-CM

## 2017-09-14 DIAGNOSIS — G89.29 CHRONIC LOW BACK PAIN, UNSPECIFIED BACK PAIN LATERALITY, WITH SCIATICA PRESENCE UNSPECIFIED: ICD-10-CM

## 2017-09-14 DIAGNOSIS — M96.1 FAILED BACK SURGICAL SYNDROME: ICD-10-CM

## 2017-09-14 RX ORDER — OXYCODONE AND ACETAMINOPHEN 10; 325 MG/1; MG/1
.5-1 TABLET ORAL EVERY 6 HOURS PRN
Qty: 40 TABLET | Refills: 0 | Status: SHIPPED | OUTPATIENT
Start: 2017-09-14 | End: 2017-09-14

## 2017-09-14 RX ORDER — FENTANYL 12.5 UG/1
1 PATCH TRANSDERMAL
Qty: 15 PATCH | Refills: 0 | Status: SHIPPED | OUTPATIENT
Start: 2017-09-14 | End: 2017-10-10

## 2017-09-14 RX ORDER — OXYCODONE AND ACETAMINOPHEN 10; 325 MG/1; MG/1
.5-1 TABLET ORAL EVERY 6 HOURS PRN
Qty: 40 TABLET | Refills: 0 | Status: SHIPPED | OUTPATIENT
Start: 2017-09-14 | End: 2017-10-10

## 2017-09-14 NOTE — TELEPHONE ENCOUNTER
Medication refill information reviewed. Needs updated UDS and OA    Due date for fentaNYL (DURAGESIC) 12 mcg/hr 72 hr patch  is 9/16/17     Prescriptions prepped for review.     Will route to provider.

## 2017-09-14 NOTE — TELEPHONE ENCOUNTER
Urine drug screen due.  Will do opioid agreement later-Dr. Motley is okay with this.    Called pt and informed him of the above.   He will do the urine drug screen @ [facility] by Monday.  His wife April Brenden to .    He also needs a refill of his percocet.  Routed to Dr. Motley to sign.    Lisa Garner RN-BSN  [facility]

## 2017-09-14 NOTE — TELEPHONE ENCOUNTER
9/13 541pm    Patient LM requesting refill of his Fentanyl patch.  [phone]    Jen Hernandez    Pain Management Clinic

## 2017-09-14 NOTE — TELEPHONE ENCOUNTER
I don't feel he has to come down here.  Could we at least have him do a UDS at a [facility] up there?  I can put in an order.    Signed Prescriptions:                        Disp   Refills    fentaNYL (DURAGESIC) 12 mcg/hr 72 hr patch 15 pat*0        Sig: Place 1 patch onto the skin every 48 hours . May fill           on/after 9/14/2017 not to start till 9/16/2017.  Authorizing Provider: YADIRA CARRILLO MD  [facility]

## 2017-09-14 NOTE — TELEPHONE ENCOUNTER
The percocet and fentanyl  script was signed and placed in file folder at the [facility] 2nd floor  for patient .    Lisa Garner RN-BSN  [facility]

## 2017-09-14 NOTE — TELEPHONE ENCOUNTER
Signed Prescriptions:                        Disp   Refills    fentaNYL (DURAGESIC) 12 mcg/hr 72 hr patch 15 pat*0        Sig: Place 1 patch onto the skin every 48 hours . May fill           on/after 9/14/2017 not to start till 9/16/2017.  Authorizing Provider: YADIRA CARRILLO    oxyCODONE-acetaminophen (PERCOCET)  *40 tab*0        Sig: Take 0.5-1 tablets by mouth every 6 hours as needed           for moderate to severe pain . Max of 2/day.  Max           of 40 tabs per months.  Authorizing Provider: YADIRA CARRILLO MD  [facility]

## 2017-09-14 NOTE — TELEPHONE ENCOUNTER
Received call from patient requesting refill(s) of fentaNYL (DURAGESIC) 12 mcg/hr 72 hr patch     Last picked up from pharmacy on 8/17/2017 (patient also picked up Percocet on same day)    [facility] [address] [place], [place] - [address] [facility] [address] [address]  [address] [facility] [address]  Phone: [phone] Fax: [phone]    Pt last seen by prescribing provider on 7/31/2017  Next appt scheduled for 11/6/2017    Last urine drug screen date 9/11/2016  Current opioid agreement on file (completed within the last year) No Date of opioid agreement: 9/8/2016    Processing (pick one and delete the others):       Patient did not state. Will need to call patient for processing.    Will route to nursing pool for review and preparation of prescription(s).

## 2017-09-14 NOTE — TELEPHONE ENCOUNTER
Signed Prescriptions:                        Disp   Refills    fentaNYL (DURAGESIC) 12 mcg/hr 72 hr patch 15 pat*0        Sig: Place 1 patch onto the skin every 48 hours . May fill           on/after 9/14/2017 not to start till 9/16/2017.  Authorizing Provider: YADIRA CARRILLO    oxyCODONE-acetaminophen (PERCOCET)  *40 tab*0        Sig: Take 0.5-1 tablets by mouth every 6 hours as needed           for moderate to severe pain . Max of 2/day.  Max           of 40 tabs per months.  Fill on/after 8/9 to           start on/after 8/11  Authorizing Provider: YADIRA CARRILLO MD  [facility]

## 2017-09-27 ENCOUNTER — OFFICE VISIT (OUTPATIENT)
Dept: FAMILY MEDICINE | Facility: CLINIC | Age: 47
End: 2017-09-27
Payer: COMMERCIAL

## 2017-09-27 VITALS
OXYGEN SATURATION: 97 % | SYSTOLIC BLOOD PRESSURE: 142 MMHG | WEIGHT: 229.2 LBS | TEMPERATURE: 98.8 F | DIASTOLIC BLOOD PRESSURE: 88 MMHG | HEART RATE: 75 BPM | BODY MASS INDEX: 32.89 KG/M2

## 2017-09-27 DIAGNOSIS — Z51.81 ENCOUNTER FOR THERAPEUTIC DRUG MONITORING: Primary | ICD-10-CM

## 2017-09-27 DIAGNOSIS — F33.42 RECURRENT MAJOR DEPRESSION IN COMPLETE REMISSION (H): ICD-10-CM

## 2017-09-27 DIAGNOSIS — M54.5 CHRONIC LOW BACK PAIN, UNSPECIFIED BACK PAIN LATERALITY, WITH SCIATICA PRESENCE UNSPECIFIED: ICD-10-CM

## 2017-09-27 DIAGNOSIS — N52.9 ERECTILE DYSFUNCTION, UNSPECIFIED ERECTILE DYSFUNCTION TYPE: ICD-10-CM

## 2017-09-27 DIAGNOSIS — F33.42 RECURRENT MAJOR DEPRESSION IN COMPLETE REMISSION (H): Primary | ICD-10-CM

## 2017-09-27 DIAGNOSIS — F51.01 PRIMARY INSOMNIA: ICD-10-CM

## 2017-09-27 DIAGNOSIS — G89.29 CHRONIC LOW BACK PAIN, UNSPECIFIED BACK PAIN LATERALITY, WITH SCIATICA PRESENCE UNSPECIFIED: ICD-10-CM

## 2017-09-27 DIAGNOSIS — G89.4 CHRONIC PAIN SYNDROME: ICD-10-CM

## 2017-09-27 DIAGNOSIS — Z51.81 ENCOUNTER FOR MONITORING OPIOID MAINTENANCE THERAPY: ICD-10-CM

## 2017-09-27 DIAGNOSIS — Z79.891 ENCOUNTER FOR MONITORING OPIOID MAINTENANCE THERAPY: ICD-10-CM

## 2017-09-27 PROCEDURE — 80307 DRUG TEST PRSMV CHEM ANLYZR: CPT | Mod: 90 | Performed by: PSYCHIATRY & NEUROLOGY

## 2017-09-27 PROCEDURE — 99214 OFFICE O/P EST MOD 30 MIN: CPT | Performed by: FAMILY MEDICINE

## 2017-09-27 PROCEDURE — 99000 SPECIMEN HANDLING OFFICE-LAB: CPT | Performed by: PSYCHIATRY & NEUROLOGY

## 2017-09-27 RX ORDER — MIRTAZAPINE 15 MG/1
15 TABLET, FILM COATED ORAL AT BEDTIME
Qty: 30 TABLET | Refills: 5 | Status: SHIPPED | OUTPATIENT
Start: 2017-09-27 | End: 2018-05-07

## 2017-09-27 RX ORDER — DULOXETIN HYDROCHLORIDE 60 MG/1
60 CAPSULE, DELAYED RELEASE ORAL DAILY
Qty: 90 CAPSULE | Refills: 3 | Status: SHIPPED | OUTPATIENT
Start: 2017-09-27 | End: 2018-02-07

## 2017-09-27 RX ORDER — METHOCARBAMOL 500 MG/1
500-1000 TABLET, FILM COATED ORAL 3 TIMES DAILY PRN
Qty: 360 TABLET | Refills: 1 | Status: SHIPPED | OUTPATIENT
Start: 2017-09-27 | End: 2018-02-07

## 2017-09-27 RX ORDER — MIRTAZAPINE 15 MG/1
15 TABLET, FILM COATED ORAL AT BEDTIME
Qty: 30 TABLET | Refills: 1 | Status: SHIPPED | OUTPATIENT
Start: 2017-09-27 | End: 2017-09-27

## 2017-09-27 RX ORDER — SILDENAFIL CITRATE 20 MG/1
TABLET ORAL
Qty: 45 TABLET | Refills: 3 | Status: SHIPPED | OUTPATIENT
Start: 2017-09-27 | End: 2018-04-25

## 2017-09-27 NOTE — PROGRESS NOTES
SUBJECTIVE:   David Wilcox is a 47 year old male who presents to clinic today for the following health issues:      Chronic Pain Follow-Up       Type / Location of Pain: mid and lower back, right leg   Analgesia/pain control:       Recent changes:  Worse, numbness in right leg radiating up into torso and left leg       Overall control: Tolerable with discomfort  Activity level/function:      Daily activities:  Can do most things most days, with some rest    Work:  Hobby farm, sometimes affects   Adverse effects:  No  Adherance    Taking medication as directed?  Yes    Participating in other treatments: not applicable  Risk Factors:    Sleep:  Fair    Mood/anxiety:  Controlled, little worse    Recent family or social stressors:  none noted    Other aggravating factors: prolonged sitting, prolonged standing and anything except laying flat   PHQ-9 SCORE 3/21/2016 5/4/2016 7/7/2016   Total Score - - -   Total Score 4 9 11     KAMRYN-7 SCORE 4/30/2014 8/26/2015 5/4/2016   Total Score 10 - -   Total Score - 7 8     Encounter-Level CSA - 09/08/2016:          Controlled Substance Agreement - Scan on 9/29/2016  3:40 PM : CONTROLLED SUBSTANCE AGREEMENT (below)            Encounter-Level CSA - 09/08/2016:          Controlled Substance Agreement - Scan on 1/20/2016 12:40 PM : CONTROLLED SUBSTANCE AGREEMENT 01/14/2016 (below)                  Amount of exercise or physical activity: tries to walk 2 miles a day     Problems taking medications regularly: No    Medication side effects: vertigo  Diet: regular (no restrictions)          S: David Wilcox is a 47 year old male with chronic back pain.  Opioid dependent.  Pain clinic, fentanyl q48 hours and 40 of the 10mg percocet a month.  Doing well there, stable.  Happy with plan.  Due for urine screen through them.   Needs fills on robaxin.  2 tabs bid works for him.      Is having some numbness more in groin over time, hx back surgeries.  Leg pain, weakness, foot drop all chronic.   "Just wanted to let me know.  Not interested in more scans or f/u with spine surgeon at this time.  Erections worsening over time.  Could be related to back, could be related to mood/meds/age.      Recurrent depression: stable, but wonders if he could  Improve mood some.  On cymbalta 60mg.     Insomnia: chronic.  ambien helps some, but has to take 3 day \"washout\" breaks from it every few months, and that causes a few days of terrible sleep for him.  Anything else to do?  Has failed sonata and lunesta, hesitant to do restoril given opioid use    Problem list, med list, additional histories reviewed and updated, as indicated.      No cp or sob.  Walks with cane.  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  Low dose 15mg should be OK with 60mg cymbalta.    Hold off on using it with ambien for first few doses, could add back ambien if sleep still not improved.   Stop if mood worsens    viagra trial as well, discussed use.      He's not interested in MRI/CT or reconnecting with spine surgery at this time.      Fills on robaxin and cymbalta too          "

## 2017-09-27 NOTE — NURSING NOTE
"Chief Complaint   Patient presents with     Pain     med refills       Initial /88 (BP Location: Left arm)  Pulse 75  Temp 98.8  F (37.1  C) (Tympanic)  Wt 229 lb 3.2 oz (104 kg)  SpO2 97%  BMI 32.89 kg/m2 Estimated body mass index is 32.89 kg/(m^2) as calculated from the following:    Height as of 2/15/17: 5' 10\" (1.778 m).    Weight as of this encounter: 229 lb 3.2 oz (104 kg).  Medication Reconciliation: complete    "

## 2017-09-27 NOTE — MR AVS SNAPSHOT
After Visit Summary   9/27/2017    David Wilcox    MRN: [id number]           Patient Information     Date Of Birth          1970        Visit Information        Provider Department      9/27/2017 8:00 AM Regan Llamas MD [facility]        Today's Diagnoses     Recurrent major depression in complete remission (H)    -  1    Chronic pain syndrome        Primary insomnia        Erectile dysfunction, unspecified erectile dysfunction type        Encounter for monitoring opioid maintenance therapy        Chronic low back pain, unspecified back pain laterality, with sciatica presence unspecified           Follow-ups after your visit        Your next 10 appointments already scheduled     Sep 27, 2017  9:00 AM CDT   LAB with  LAB   [facility]    [address] [facility] [address] [phone]           Patient must bring picture ID. Patient should be prepared to give a urine specimen  Please do not eat 10-12 hours before your appointment if you are coming in fasting for labs on lipids, cholesterol, or glucose (sugar). Pregnant women should follow their Care Team instructions. Water with medications is okay. Do not drink coffee or other fluids. If you have concerns about taking  your medications, please ask at office or if scheduling via [employer], send a message by clicking on Secure Messaging, Message Your Care Team.            Nov 06, 2017  9:00 AM CST   Return Visit with Carolann Motley MD   [facility] ([facility])    [address] [facility] [address] [phone]              Who to contact     If you have questions or need follow up information about today's clinic visit or your schedule please contact [facility] directly at [phone].  Normal or non-critical lab and imaging results will be communicated to you by MyChart, letter or phone within 4 business  "days after the clinic has received the results. If you do not hear from us within 7 days, please contact the clinic through EventBoard or phone. If you have a critical or abnormal lab result, we will notify you by phone as soon as possible.  Submit refill requests through EventBoard or call your pharmacy and they will forward the refill request to us. Please allow 3 business days for your refill to be completed.          Additional Information About Your Visit        EventBoard Information     EventBoard lets you send messages to your doctor, view your test results, renew your prescriptions, schedule appointments and more. To sign up, go to [email or web address] . Click on \"Log in\" on the left side of the screen, which will take you to the Welcome page. Then click on \"Sign up Now\" on the right side of the page.     You will be asked to enter the access code listed below, as well as some personal information. Please follow the directions to create your username and password.     Your access code is: [email or web address]  Expires: 10/29/2017  9:36 AM     Your access code will  in 90 days. If you need help or a new code, please call your [place] clinic or [phone].        Care EveryWhere ID     This is your Care EveryWhere ID. This could be used by other organizations to access your [place] medical records  [id number]        Your Vitals Were     Pulse Temperature Pulse Oximetry BMI (Body Mass Index)          75 98.8  F (37.1  C) (Tympanic) 97% 32.89 kg/m2         Blood Pressure from Last 3 Encounters:   17 142/88   17 145/88   17 139/89    Weight from Last 3 Encounters:   17 229 lb 3.2 oz (104 kg)   17 231 lb (104.8 kg)   17 237 lb (107.5 kg)              We Performed the Following     Pain Drug Scr UR W Rptd Meds          Today's Medication Changes          These changes are accurate as of: 17  8:56 AM.  If you have any questions, ask your nurse or doctor.               Start " taking these medicines.        Dose/Directions    mirtazapine 15 MG tablet   Commonly known as:  REMERON   Used for:  Recurrent major depression in complete remission (H), Primary insomnia   Started by:  Regan Llamas MD        Dose:  15 mg   Take 1 tablet (15 mg) by mouth At Bedtime   Quantity:  30 tablet   Refills:  1       sildenafil 20 MG tablet   Commonly known as:  REVATIO   Used for:  Erectile dysfunction, unspecified erectile dysfunction type   Started by:  Regan Llamas MD        2-3 tabs daily for erectile dysfunction.   Quantity:  45 tablet   Refills:  3         These medicines have changed or have updated prescriptions.        Dose/Directions    DULoxetine 60 MG EC capsule   Commonly known as:  CYMBALTA   This may have changed:  additional instructions   Used for:  Recurrent major depression in complete remission (H)   Changed by:  Regan Llamas MD        Dose:  60 mg   Take 1 capsule (60 mg) by mouth daily   Quantity:  90 capsule   Refills:  3       gabapentin 600 MG tablet   Commonly known as:  NEURONTIN   This may have changed:  Another medication with the same name was removed. Continue taking this medication, and follow the directions you see here.   Used for:  Failed back surgical syndrome   Changed by:  Carolann Motley MD        Dose:  1200 mg   Take 2 tablets (1,200 mg) by mouth 3 times daily . Increase to this dose as instructed in clinic. 3 month supply   Quantity:  540 tablet   Refills:  1         Stop taking these medicines if you haven't already. Please contact your care team if you have questions.     zaleplon 10 MG capsule   Commonly known as:  SONATA   Stopped by:  Regan Llamas MD                Where to get your medicines      [facility] [address] [place] [address] AT [facility] [address] [facility] [address]  [address] [place] [address]     Phone:  [phone]     DULoxetine 60 MG EC capsule    methocarbamol 500  MG tablet    mirtazapine 15 MG tablet    sildenafil 20 MG tablet                Primary Care Provider Office Phone # Fax #    Regan Llamas [phone] [address] [place] [address]        Equal Access to Services     ЕЛЕНА FAIR : Gurpreet cornelia jackson adeelo Sosurinderali, waaxda luqadaha, qaybta kaalmada adesugaryada, kinga lai lacecironda pettit. So [facility] [phone].    ATENCIÓN: Si habla español, tiene a chambers disposición servicios gratuitos de asistencia lingüística. Llame al [phone].    We comply with applicable federal civil rights laws and [place] laws. We do not discriminate on the basis of race, color, national origin, age, disability sex, sexual orientation or gender identity.            Thank you!     Thank you for choosing [facility]  for your care. Our goal is always to provide you with excellent care. Hearing back from our patients is one way we can continue to improve our services. Please take a few minutes to complete the written survey that you may receive in the mail after your visit with us. Thank you!             Your Updated Medication List - Protect others around you: Learn how to safely use, store and throw away your medicines at www.disposemymeds.org.          This list is accurate as of: 9/27/17  8:56 AM.  Always use your most recent med list.                   Brand Name Dispense Instructions for use Diagnosis    DULoxetine 60 MG EC capsule    CYMBALTA    90 capsule    Take 1 capsule (60 mg) by mouth daily    Recurrent major depression in complete remission (H)       fentaNYL 12 mcg/hr 72 hr patch    DURAGESIC    15 patch    Place 1 patch onto the skin every 48 hours . May fill on/after 9/14/2017 not to start till 9/16/2017.    Failed back surgical syndrome       gabapentin 600 MG tablet    NEURONTIN    540 tablet    Take 2 tablets (1,200 mg) by mouth 3 times daily . Increase to this dose as instructed in clinic. 3 month supply     Failed back surgical syndrome       LORazepam 0.5 MG tablet    ATIVAN    30 tablet    One bid prn anxiety. Rare use    Recurrent major depression in complete remission (H)       methocarbamol 500 MG tablet    ROBAXIN    360 tablet    Take 1-2 tablets (500-1,000 mg) by mouth 3 times daily as needed for muscle spasms . Caution for sedation    Chronic pain syndrome       mirtazapine 15 MG tablet    REMERON    30 tablet    Take 1 tablet (15 mg) by mouth At Bedtime    Recurrent major depression in complete remission (H), Primary insomnia       omeprazole 20 MG CR capsule    priLOSEC    90 capsule    Take 1 capsule (20 mg) by mouth daily .    Gastroesophageal reflux disease without esophagitis       oxyCODONE-acetaminophen  MG per tablet    PERCOCET    40 tablet    Take 0.5-1 tablets by mouth every 6 hours as needed for moderate to severe pain . Max of 2/day.  Max of 40 tabs per months.    Chronic low back pain, unspecified back pain laterality, with sciatica presence unspecified       sildenafil 20 MG tablet    REVATIO    45 tablet    2-3 tabs daily for erectile dysfunction.    Erectile dysfunction, unspecified erectile dysfunction type       TYLENOL 500 MG tablet   Generic drug:  acetaminophen      3 po prn for pain        zolpidem 10 MG tablet    AMBIEN    60 tablet    Take 1 tablet (10 mg) by mouth nightly as needed for sleep    Primary insomnia

## 2017-09-27 NOTE — TELEPHONE ENCOUNTER
mirtazapine (REMERON) 15 MG tablet       Last Written Prescription Date: 09/27/2017  Last Fill Quantity: 30 tablet; # refills: 1  Last Office Visit with [facility], [facility] or [facility] prescribing provider:  09/27/2017   Next 5 appointments (look out 90 days)     Nov 06, 2017  9:00 AM CST   Return Visit with Carolann Motley MD   [facility] [place] ([facility])    [address] [facility] [address] [phone]                   Last PHQ-9 score on record=   PHQ-9 SCORE 7/7/2016   Total Score -   Total Score 11       Lab Results   Component Value Date    AST 19@ 04/10/2006     Lab Results   Component Value Date    ALT 35@ 04/10/2006

## 2017-10-03 LAB — PAIN DRUG SCR UR W RPTD MEDS: NORMAL

## 2017-10-09 DIAGNOSIS — M54.5 CHRONIC LOW BACK PAIN, UNSPECIFIED BACK PAIN LATERALITY, WITH SCIATICA PRESENCE UNSPECIFIED: ICD-10-CM

## 2017-10-09 DIAGNOSIS — G89.29 CHRONIC LOW BACK PAIN, UNSPECIFIED BACK PAIN LATERALITY, WITH SCIATICA PRESENCE UNSPECIFIED: ICD-10-CM

## 2017-10-09 DIAGNOSIS — M96.1 FAILED BACK SURGICAL SYNDROME: ICD-10-CM

## 2017-10-09 NOTE — TELEPHONE ENCOUNTER
Reason for Call:  Medication or medication refill:     Do you use a [facility]?  Name of the pharmacy and phone number for the current request:   at      Name of the medication requested: fentaNYL (DURAGESIC) 12 mcg/hr 72 hr patch, oxyCODONE-acetaminophen (PERCOCET)  MG per tablet        Can we leave a detailed message on this number? YES     Phone number patient can be reached: [phone]

## 2017-10-10 NOTE — TELEPHONE ENCOUNTER
Medication refill information reviewed. Plan was for patient to update OA at 11/6/17 office visit but he plans on picking his scripts up at the [facility]. Routing to DR. Motley for preference for OA.    Due date for fentaNYL (DURAGESIC) 12 mcg/hr 72 hr patch   And oxyCODONE-acetaminophen (PERCOCET)  MG per tablet is 10/17/17     Prescriptions prepped for review.     Will route to provider.

## 2017-10-10 NOTE — TELEPHONE ENCOUNTER
Received call from patient requesting refill(s) of fentaNYL (DURAGESIC) 12 mcg/hr 72 hr patch   And oxyCODONE-acetaminophen (PERCOCET)  MG per tablet    Last picked up from pharmacy on 9/17/17    Pt last seen by prescribing provider on 7/31/17  Next appt scheduled for     Last urine drug screen date 9/27/17  Current opioid agreement on file (completed within the last year) Yes Date of opioid agreement: 9/8/16 pt probably signed the agreement same day but not scanned yet    Processing (pick one and delete the others):      Syeda Montelongo MA  [facility]    Will route to nursing pool for review and preparation of prescription(s).

## 2017-10-11 RX ORDER — FENTANYL 12.5 UG/1
1 PATCH TRANSDERMAL
Qty: 15 PATCH | Refills: 0 | Status: SHIPPED | OUTPATIENT
Start: 2017-10-11 | End: 2017-11-06

## 2017-10-11 RX ORDER — OXYCODONE AND ACETAMINOPHEN 10; 325 MG/1; MG/1
.5-1 TABLET ORAL EVERY 6 HOURS PRN
Qty: 40 TABLET | Refills: 0 | Status: SHIPPED | OUTPATIENT
Start: 2017-10-11 | End: 2017-11-06

## 2017-10-11 NOTE — TELEPHONE ENCOUNTER
Signed Prescriptions:                        Disp   Refills    fentaNYL (DURAGESIC) 12 mcg/hr 72 hr patch 15 pat*0        Sig: Place 1 patch onto the skin every 48 hours . May fill           on/after 10/15/17 not to start till 10/17/17.  Authorizing Provider: YADIRA CARRILLO    oxyCODONE-acetaminophen (PERCOCET)  *40 tab*0        Sig: Take 0.5-1 tablets by mouth every 6 hours as needed           for moderate to severe pain . Max of 2/day.  Max           of 40 tabs per months. May fill on 10/15/17 and           May start on 10/17/17  Authorizing Provider: YADIRA CARRILLO      Update opioid agreement    Bindu Carrillo MD  [facility]

## 2017-10-11 NOTE — TELEPHONE ENCOUNTER
Called pt and nurse appt made for tomorrow. Scripts placed in trigger point cart.     Kristi Schneider RN, [facility]  [facility] Care Coordinator

## 2017-10-12 ENCOUNTER — OFFICE VISIT (OUTPATIENT)
Dept: PALLIATIVE MEDICINE | Facility: CLINIC | Age: 47
End: 2017-10-12
Payer: COMMERCIAL

## 2017-10-12 DIAGNOSIS — Z79.891 ADMISSION FOR LONG-TERM OPIATE ANALGESIC USE: Primary | ICD-10-CM

## 2017-10-12 PROCEDURE — 99207 ZZC NO CHARGE NURSE ONLY: CPT

## 2017-10-12 NOTE — TELEPHONE ENCOUNTER
This appointment is complete.    JEREMIAH PeacockN, RN  Care Coordinator  [facility] [place]

## 2017-10-12 NOTE — NURSING NOTE
Patient signed opioid agreement and received signed script of Fentanyl and PERCOCET at [facility] 2nd floor.      Aidan Naidu MA

## 2017-10-12 NOTE — MR AVS SNAPSHOT
"              After Visit Summary   10/12/2017    David Wilcox    MRN: [id number]           Patient Information     Date Of Birth          1970        Visit Information        Provider Department      10/12/2017 9:00 AM Nurse,  [facility]        Today's Diagnoses     Admission for long-term opiate analgesic use    -  1      Care Instructions    .          Follow-ups after your visit        Your next 10 appointments already scheduled     Nov 06, 2017  9:00 AM CST   Return Visit with Carolann Motley MD   [facility] ([place] [facility])    [address] [facility]  [place] [address] [phone]              Who to contact     If you have questions or need follow up information about today's clinic visit or your schedule please contact [facility] directly at [phone].  Normal or non-critical lab and imaging results will be communicated to you by MyChart, letter or phone within 4 business days after the clinic has received the results. If you do not hear from us within 7 days, please contact the clinic through MyChart or phone. If you have a critical or abnormal lab result, we will notify you by phone as soon as possible.  Submit refill requests through [employer] or call your pharmacy and they will forward the refill request to us. Please allow 3 business days for your refill to be completed.          Additional Information About Your Visit        MyChart Information     [employer] lets you send messages to your doctor, view your test results, renew your prescriptions, schedule appointments and more. To sign up, go to [email or web address] . Click on \"Log in\" on the left side of the screen, which will take you to the Welcome page. Then click on \"Sign up Now\" on the right side of the page.     You will be asked to enter the access code listed below, as well as some personal information. Please follow the directions to create your username " and password.     Your access code is: [email or web address]  Expires: 10/29/2017  9:36 AM     Your access code will  in 90 days. If you need help or a new code, please call your [place] clinic or [phone].        Care EveryWhere ID     This is your Care EveryWhere ID. This could be used by other organizations to access your [place] medical records  [id number]         Blood Pressure from Last 3 Encounters:   17 142/88   17 145/88   17 139/89    Weight from Last 3 Encounters:   17 104 kg (229 lb 3.2 oz)   17 104.8 kg (231 lb)   17 107.5 kg (237 lb)              Today, you had the following     No orders found for display       Primary Care Provider Office Phone # Fax #    Regan Llamas [phone]       [address] [place] [address]        Equal Access to Services     [facility]: Hadii aad ku hadasho Soomaali, waaxda luqadaha, qaybta kaalmada adeegyada, waxay idiin hayaan adeeg kharapete la'felicitasn . So [facility] [phone].    ATENCIÓN: Si habla español, tiene a chambers disposición servicios gratuitos de asistencia lingüística. Llame al [phone].    We comply with applicable federal civil rights laws and [place] laws. We do not discriminate on the basis of race, color, national origin, age, disability, sex, sexual orientation, or gender identity.            Thank you!     Thank you for choosing [facility]  for your care. Our goal is always to provide you with excellent care. Hearing back from our patients is one way we can continue to improve our services. Please take a few minutes to complete the written survey that you may receive in the mail after your visit with us. Thank you!             Your Updated Medication List - Protect others around you: Learn how to safely use, store and throw away your medicines at www.disposemymeds.org.          This list is accurate as of: 10/12/17 11:59 PM.  Always use your most recent med list.                    Brand Name Dispense Instructions for use Diagnosis    DULoxetine 60 MG EC capsule    CYMBALTA    90 capsule    Take 1 capsule (60 mg) by mouth daily    Recurrent major depression in complete remission (H)       fentaNYL 12 mcg/hr 72 hr patch    DURAGESIC    15 patch    Place 1 patch onto the skin every 48 hours . May fill on/after 10/15/17 not to start till 10/17/17.    Failed back surgical syndrome       gabapentin 600 MG tablet    NEURONTIN    540 tablet    Take 2 tablets (1,200 mg) by mouth 3 times daily . Increase to this dose as instructed in clinic. 3 month supply    Failed back surgical syndrome       LORazepam 0.5 MG tablet    ATIVAN    30 tablet    One bid prn anxiety. Rare use    Recurrent major depression in complete remission (H)       methocarbamol 500 MG tablet    ROBAXIN    360 tablet    Take 1-2 tablets (500-1,000 mg) by mouth 3 times daily as needed for muscle spasms . Caution for sedation    Chronic pain syndrome       mirtazapine 15 MG tablet    REMERON    30 tablet    Take 1 tablet (15 mg) by mouth At Bedtime    Recurrent major depression in complete remission (H), Primary insomnia       omeprazole 20 MG CR capsule    priLOSEC    90 capsule    Take 1 capsule (20 mg) by mouth daily .    Gastroesophageal reflux disease without esophagitis       oxyCODONE-acetaminophen  MG per tablet    PERCOCET    40 tablet    Take 0.5-1 tablets by mouth every 6 hours as needed for moderate to severe pain . Max of 2/day.  Max of 40 tabs per months. May fill on 10/15/17 and May start on 10/17/17    Chronic low back pain, unspecified back pain laterality, with sciatica presence unspecified       sildenafil 20 MG tablet    REVATIO    45 tablet    2-3 tabs daily for erectile dysfunction.    Erectile dysfunction, unspecified erectile dysfunction type       TYLENOL 500 MG tablet   Generic drug:  acetaminophen      3 po prn for pain        zolpidem 10 MG tablet    AMBIEN    60 tablet    Take 1 tablet (10  mg) by mouth nightly as needed for sleep    Primary insomnia

## 2017-10-12 NOTE — LETTER
[facility]    10/12/17    Patient: David Wilcox  YOB: 1970  Medical Record Number: [id number]                                                                  Controlled Substance Agreement  I understand that my care provider has prescribed controlled substances (narcotics, tranquilizers, and/or stimulants) to help manage my condition(s).  I am taking this medicine to help me function or work.  I know that this is strong medicine.  It could have serious side effects and even cause a dependency on the drug.  If I stop these medicines suddenly, I could have severe withdrawal symptoms.    The risks, benefits, and side effects of these medication(s) were explained to me.  I agree that:  1. I will take part in other treatments as advised by my provider.  This may be psychiatry or counseling, physical therapy, behavioral therapy, group treatment, or a referral to a pain clinic.  I will reduce or stop my medicine when my provider tells me to do so.   2. I will take my medicines as prescribed.  I will not change the dose or schedule unless my provider tells me to.  There will be no refills if I  run out early.   I may be contacted at any time without warning and asked to complete a drug test or pill count.   3. I will keep all my appointments at the clinic.  If I miss appointments or fail to follow instructions, my provider may stop my medicine.  4. I will not ask other providers to prescribe controlled substances. And I will not accept controlled substances from other people. If I need another prescribed controlled substance for a new reason, I will notify my provider within one business day.  5. If I enroll in the [place] Medical Marijuana program, I will tell my provider.  I will also sign an agreement to share my medical records with my provider.  6. I will use one pharmacy to fill all of my controlled substance prescriptions.  If my prescription is mailed to my pharmacy, it  may take 5 to 7 days for my medicine to be ready.  7. I understand that my provider, clinic care team, and pharmacy can track controlled substance prescriptions from other providers through a central database (prescription monitoring program).  8. I will bring in my list of medications (or my medicine bottles) each time I come to the clinic.  REV- 04/2016                                                                                                                                            Page 1 of 2      [facility]    10/12/17    Patient: David Wilcox  YOB: 1970  Medical Record Number: [id number]    9. Refills of controlled substances will be made only during office hours.  It is up to me to make sure that I do not run out of my medicines on weekends or holidays.    10. I am responsible for my prescriptions.  If the medicine is lost or stolen, it will not be replaced.   I also agree not to share these medicines with anyone.  11. I agree to not use ANY illegal or recreational drugs.  This includes marijuana, cocaine, bath salts or other drugs.  I agree not to use alcohol unless my provider says I may.  I agree to give urine samples whenever asked.  If I fail to give a urine sample, the provider may stop my medicine.     12. I will tell my nurse or provider right away if I become pregnant or have a new medical problem treated outside of [facility].  13. I understand that this medicine can affect my thinking and judgment.  It may be unsafe for me to drive, use machinery and do dangerous tasks.  I will not do any of these things until I know how the medicine affects me.  If my dose changes, I will wait to see how it affects me.  I will contact my provider if I have concerns about medicine side effects.  I understand that if I do not follow any of the conditions above, my prescriptions or treatment may be stopped.    I agree that my provider, clinic care team, and  pharmacy may work with any city, state or federal law enforcement agency that investigates the misuse, sale, or other diversion of my controlled medicine. I will allow my provider to discuss my care with or share a copy of this agreement with any other treating provider, pharmacy or emergency room where I receive care.  I agree to give up (waive) any right of privacy or confidentiality with respect to these authorizations.   I have read this agreement and have asked questions about anything I did not understand.   ___________________________________    ___________________________  Patient Signature                                                           Date and Time  ___________________________________     ____________________________  Witness                                                                            Date and Time  ___________________________________  Dr. Motley   REV-  04/2016                                                                                                                                                                 Page 2 of 2  Opioid Pain Medicines (also known as Narcotics)  What You Need to Know      What are opioids?   Opioids are pain medicines that must be prescribed by a doctor. Examples are:     morphine (MS Contin, Mimi)    oxycodone (Oxycontin)    oxycodone and acetaminophen (Percocet)    hydrocodone and acetaminophen (Vicodin, Norco)     fentanyl patch (Duragesic)     hydromorphone (Dilaudid)     methadone     What do opioids do well?   Opioids are best for short-term pain after a surgery or injury. They also work well for cancer pain. Unlike other pain medicines, they do not cause liver or kidney failure or ulcers. They may help some people with long-lasting (chronic) pain.     What do opioids NOT do well?   Opioids never get rid of pain entirely, and they do not work well for most patients with chronic pain. Opioids do not reduce swelling, one of the causes of  pain. They also don t work well for nerve pain.     Side effects  Talk to your doctor before you start or decide to keep taking one of these medicines. Side effects include:    Lowers your breathing rate enough that it could cause death    Death due to taking more than the prescribed dose    Serious lifelong opioid use      Dependence is not the same as addiction. Addiction is when people keep using a substance that harms their body, their mind or their relations with others. If you have a history of drug or alcohol abuse, taking opioids can cause a relapse.  Over time, opioids don t work as well. Most people will need higher and higher doses. The higher the dose, the more serious the side effects. We don t know the long-term effects of opioids.   People who have used opioids for a long time have a lower quality of life, worse depression, higher levels of pain and more visits to doctors.  Overdose from prescription drugs is the second leading cause of death in the [place] The risk of overdose rises when opioids are taken with other drugs such as:    Medicines used for anxiety and panic attacks (such as lorazepam, alprazolam, clonazepam    Other sedatives    Alcohol    Illegal drugs such as heroin  Never share your opioids with others. Be sure to store opioids in a secure place, locked if possible.Young children can easily swallow them and overdose.     Are there other ways to manage pain?  Ways to help reduce pain:    Exercise every day.    Treat health problems that may be causing pain.    Treat mental health problems like depression and anxiety.     Worse depression symptoms; Less pleasure in things you usually enjoy    Feeling tired or sluggish    Slower thoughts or cloudy thinking    Being more sensitive to pain over time; Pain is harder to control.    Trouble sleeping or restless sleep    Changes in hormone levels (for example, less testosterone).     Changes in sex drive or ability to have sex    Long lasting  nausea and constipation    Trouble breathing while asleep; This is worse with lung problems like COPD or sleep apnea.    Unsafe driving    Getting sick more often    Itching    Feeling dizzy    Dry mouth    Sweating    Trouble emptying the bladder (peeing). This is worse if you have an enlarged prostate or get urinary tract infections (UTIs).    What else should I know about opioids?  When someone takes opioids for too long or too often, they become dependent. This means that if you stop or reduce the medicine too quickly, you will have withdrawal symptoms.          Practice good sleep habits.  Try to go to bed and get up at the same time every day.    Stop smoking.  Tobacco use can make pain worse.    Do things that you enjoy.    Find a way to work through pain without drugs.  Try deep breathing, meditation, visual imagery and aromatherapy.    Ask your doctor to help you create a plan to manage your pain.

## 2017-10-16 ENCOUNTER — TELEPHONE (OUTPATIENT)
Dept: FAMILY MEDICINE | Facility: CLINIC | Age: 47
End: 2017-10-16

## 2017-10-16 RX ORDER — DULOXETIN HYDROCHLORIDE 30 MG/1
30 CAPSULE, DELAYED RELEASE ORAL DAILY
Qty: 90 CAPSULE | Refills: 0 | Status: CANCELLED | OUTPATIENT
Start: 2017-10-16

## 2017-10-16 NOTE — TELEPHONE ENCOUNTER
Reason for Call:  Medication or medication refill:    Do you use a [facility]?  Name of the pharmacy and phone number for the current request:  [facility]    Name of the medication requested: Duloxetine DR 30 mg Cap. Take daily with 60 mg capsule for total daily dose of 90 mg. Please Advise not on med list.    Can we leave a detailed message on this number? YES    Phone number patient can be reached at: Other phone number:  [phone]*    Best Time: Any Time      Call taken on 10/16/2017 at 1:58 PM by Michelle Bolton

## 2017-10-16 NOTE — TELEPHONE ENCOUNTER
I only want him on 60mg.  He had some issues on 90mg, see the 8/10/16 note;  Should have plenty of the 60mg

## 2017-10-25 DIAGNOSIS — K21.9 GASTROESOPHAGEAL REFLUX DISEASE WITHOUT ESOPHAGITIS: ICD-10-CM

## 2017-10-25 NOTE — TELEPHONE ENCOUNTER
omeprazole (PRILOSEC) 20 MG capsule      Last Written Prescription Date: 10/26/2016  Last Fill Quantity: 90 capsule,  # refills: 3   Last Office Visit with [facility], [facility] or [facility] prescribing provider: 09/27/2017                                         Next 5 appointments (look out 90 days)     Nov 06, 2017  9:00 AM CST   Return Visit with Carolann Motley MD   [facility] ([facility])    [address] [id number] [phone]

## 2017-11-06 ENCOUNTER — OFFICE VISIT (OUTPATIENT)
Dept: PALLIATIVE MEDICINE | Facility: CLINIC | Age: 47
End: 2017-11-06
Payer: COMMERCIAL

## 2017-11-06 VITALS
BODY MASS INDEX: 33.43 KG/M2 | DIASTOLIC BLOOD PRESSURE: 92 MMHG | WEIGHT: 233 LBS | HEART RATE: 73 BPM | SYSTOLIC BLOOD PRESSURE: 151 MMHG

## 2017-11-06 DIAGNOSIS — M54.5 CHRONIC LOW BACK PAIN, UNSPECIFIED BACK PAIN LATERALITY, WITH SCIATICA PRESENCE UNSPECIFIED: ICD-10-CM

## 2017-11-06 DIAGNOSIS — G89.4 CHRONIC PAIN SYNDROME: Primary | ICD-10-CM

## 2017-11-06 DIAGNOSIS — M96.1 FAILED BACK SURGICAL SYNDROME: ICD-10-CM

## 2017-11-06 DIAGNOSIS — G89.29 CHRONIC LOW BACK PAIN, UNSPECIFIED BACK PAIN LATERALITY, WITH SCIATICA PRESENCE UNSPECIFIED: ICD-10-CM

## 2017-11-06 PROCEDURE — 99214 OFFICE O/P EST MOD 30 MIN: CPT | Performed by: PSYCHIATRY & NEUROLOGY

## 2017-11-06 RX ORDER — FENTANYL 12.5 UG/1
1 PATCH TRANSDERMAL
Qty: 15 PATCH | Refills: 0 | Status: SHIPPED | OUTPATIENT
Start: 2017-11-06 | End: 2017-12-18

## 2017-11-06 RX ORDER — OXYCODONE AND ACETAMINOPHEN 10; 325 MG/1; MG/1
.5-1 TABLET ORAL EVERY 6 HOURS PRN
Qty: 60 TABLET | Refills: 0 | Status: SHIPPED | OUTPATIENT
Start: 2017-11-06 | End: 2017-12-18

## 2017-11-06 ASSESSMENT — PAIN SCALES - GENERAL: PAINLEVEL: SEVERE PAIN (7)

## 2017-11-06 NOTE — PATIENT INSTRUCTIONS
1. I will put in a prescription for Narcan.-- your wife will need to learn how to use this.  2. Schedule the MRI at [facility].  I will contact you with results.  [facility]- [phone] or main number [phone]  3. I am increasing the Percocet for the short term to 60 tabs/month.  We will reassess.  4. Keep meds locked up.  5. Schedule follow up in 3 months, but I will talk to you in the meantime when the MRI is back.    ----------------------------------------------------------------  Nurse Triage line:  [phone]   Call this number with any questions or concerns. You may leave a detailed message anytime. Calls are typically returned Monday through Friday between 8 AM and 4:30 PM. We usually get back to you within 2 business days depending on the issue/request.       Medication refills:    For non-narcotic medications, call your pharmacy directly to request a refill. The pharmacy will contact the [facility] for authorization. Please allow 3-4 days for these refills to be processed.     For narcotic refills, call the nurse triage line or send a [employer] message. Please contact us 7-10 days before your refill is due. The message MUST include the name of the specific medication(s) requested and how you would like to receive the prescription(s). The options are as follows:    Pain Clinic staff can mail the prescription to your pharmacy. Please tell us the name of the pharmacy.    You may pick the prescription up at the Pain Clinic (tell us the location) or during a clinic visit with your pain provider    Pain Clinic staff can deliver the prescription to the [facility] in the clinic building. Please tell us the location.      Scheduling number: [phone].  Call this number to schedule or change appointments.    We believe regular attendance is key to your success in our program.    Any time you are unable to keep your appointment we ask that you call us at least  24 hours in advance to let us know. This will allow us to offer the appointment time to another patient.

## 2017-11-06 NOTE — PROGRESS NOTES
[facility]    Date of visit: 11/6/2017     Chief complaint:   Chief Complaint   Patient presents with     Pain     Follow up for medication        Interval history:  David Wilcox was last seen by me on 7/31/17.      Recommendations/plan at the last visit included:  1. Physical Therapy: continue exercises as recommended by PT  2. Clinical Health Psychologist to address issues of relaxation, behavioral change, coping style, and other factors important to improvement.  Difficult due to work, but will look at this again next visit  3. Diagnostic Studies:  none  4. Medication Management:    1. Continue gabapentin, Cymbalta and Robaxin (methocarbamol) at current doses.  2. Will change fentanyl to q48hr dosing with next script.  5. Further procedures recommended: none  6. Recommendations to PCP: none  7. Follow up: 3 months      Since his last visit, David Wilcox reports:  -has been in more pain in the last month  -no specific reason for the flare.  Not sure about it being related to activity  -feels like 500lbs on his shoulders, unless laying down.  Left leg has been weaker.  Hips and lower back worse.  -showers do make him feel better, unable to do a bath      Pain scores:  Pain intensity on average is 7 on a scale of 0-10.     Current pain treatments:   Percocet 10/325mg daily- takes in the afternoon.  Takes 1-2/day.- takes the edge off- lasts for 4 hours, 40 tabs/month  Fentanyl patch-12 mcg/72 hours  Robaxin (methocarbamol) 1000mg at night and in the morning- the morning dose has been helping  Gabapentin 1200 mg TID, beneficial  Cymbalta 60mg daily- tried going to 90mg/day but felt worse- for pain and mood    Previous medication treatments included:  Hydrocodone- not helpful  Methadone- taking after 1st surgery  Naprosyn- not helpful  Flexeril- after first back injury (1990s)  Lidocaine- not helpful  Acetaminophen (tylenol)   Gabapentin    Other treatments have  included:  David Wilcox has been seen at a pain clinic in the past.  MAPS- injections  PT: yes  Acupuncture: no  TENs Unit: yes- out of use- somewhat helpful  Injections: none since last surgery- very  helpful    Side Effects: no side effect    Medications:  Current Outpatient Prescriptions   Medication Sig Dispense Refill     naloxone (NARCAN) nasal spray Spray 1 spray (4 mg) into one nostril alternating nostrils as needed for opioid reversal every 2-3 minutes until assistance arrives 0.2 mL 0     omeprazole (PRILOSEC) 20 MG CR capsule Take 1 capsule (20 mg) by mouth daily . 90 capsule 3     DULoxetine (CYMBALTA) 60 MG EC capsule Take 1 capsule (60 mg) by mouth daily 90 capsule 3     methocarbamol (ROBAXIN) 500 MG tablet Take 1-2 tablets (500-1,000 mg) by mouth 3 times daily as needed for muscle spasms . Caution for sedation 360 tablet 1     mirtazapine (REMERON) 15 MG tablet Take 1 tablet (15 mg) by mouth At Bedtime 30 tablet 5     gabapentin (NEURONTIN) 600 MG tablet Take 2 tablets (1,200 mg) by mouth 3 times daily . Increase to this dose as instructed in clinic. 3 month supply 540 tablet 1     zolpidem (AMBIEN) 10 MG tablet Take 1 tablet (10 mg) by mouth nightly as needed for sleep 60 tablet 2     LORazepam (ATIVAN) 0.5 MG tablet One bid prn anxiety. Rare use 30 tablet 0     TYLENOL EXTRA STRENGTH 500 MG OR TABS 3 po prn for pain       oxyCODONE-acetaminophen (PERCOCET)  MG per tablet Take 0.5-1 tablets by mouth every 6 hours as needed for moderate to severe pain . Max of 2/day.  May fill on 12/18/17 and May start on 12/18/17 60 tablet 0     fentaNYL (DURAGESIC) 12 mcg/hr 72 hr patch Place 1 patch onto the skin every 48 hours . May fill on/after 12/18/17 not to start till 12/18/17. 15 patch 0     sildenafil (REVATIO) 20 MG tablet 2-3 tabs daily for erectile dysfunction. (Patient not taking: Reported on 11/6/2017) 45 tablet 3       Medical History: any changes in medical history since they were last seen?  no    Review of Systems:  The 14 system ROS was reviewed from the intake questionnaire, and is positive for: dizziness, rash, stiffness  Mood: stress, depression, anxiety. Denies suicidal ideation     Physical Exam:  Blood pressure (!) 151/92, pulse 73, weight 105.7 kg (233 lb).  General: awake, alert , cooperative  Gait: Antalgic on the right, uses a single end cane  MSK exam: uses cane for ambulation. Pain with lumbar ROM, particularly rotation and extension.  Tender to palpation throughout thoracic and lumbar paraspinals, worse on the right. Strength is 4/5 on the right in hip flexion, knee extension, ankle plantar and dorsiflexion. 5/5 throughout the left side.    Assessment:   1. 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    2. Facet arthropathy likely contributing above the level of the future  3. Distant history of alcohol and drug abuse   4. Bipolar disorder- currently managed  5. H/o meningitis after SCS trial  6. Subacute thoracic back pain, likely myofascial etiology.      Plan:  1. Physical Therapy: continue exercises as recommended by PT  2. Clinical Health Psychologist to address issues of relaxation, behavioral change, coping style, and other factors important to improvement.  Difficult due to work, but will look at this again next visit  3. Diagnostic Studies:  MRI lumbar spine  4. Medication Management:    1. Increase Percocet to 60/month and will reassess at next visit  2. Keep meds locked  3. Naloxone provided  5. Recommendations to PCP: none  6. Follow up: 3 months, follow up when MRI done      Total time spent was 30 minutes, and more than 50% of face to face time was spent in counseling and/or coordination of care regarding medications, imaging.   Bindu Motley MD  [facility]

## 2017-11-06 NOTE — MR AVS SNAPSHOT
After Visit Summary   11/6/2017    David Wilcox    MRN: [id number]           Patient Information     Date Of Birth          1970        Visit Information        Provider Department      11/6/2017 9:00 AM Carolann Motley MD [facility]        Today's Diagnoses     Failed back surgical syndrome        Chronic low back pain, unspecified back pain laterality, with sciatica presence unspecified          Care Instructions    1. I will put in a prescription for Narcan.-- your wife will need to learn how to use this.  2. Schedule the MRI at [facility].  I will contact you with results.  [facility]) imaging- [phone] or main number [phone]  3. I am increasing the Percocet for the short term to 60 tabs/month.  We will reassess.  4. Keep meds locked up.  5. Schedule follow up in 3 months, but I will talk to you in the meantime when the MRI is back.    ----------------------------------------------------------------  Nurse Triage line:  [phone]   Call this number with any questions or concerns. You may leave a detailed message anytime. Calls are typically returned Monday through Friday between 8 AM and 4:30 PM. We usually get back to you within 2 business days depending on the issue/request.       Medication refills:    For non-narcotic medications, call your pharmacy directly to request a refill. The pharmacy will contact the [facility] for authorization. Please allow 3-4 days for these refills to be processed.     For narcotic refills, call the nurse triage line or send a [email or web address] message. Please contact us 7-10 days before your refill is due. The message MUST include the name of the specific medication(s) requested and how you would like to receive the prescription(s). The options are as follows:    Pain Clinic staff can mail the prescription to your pharmacy. Please tell us the name of the pharmacy.    You may pick the prescription up at the  "Pain Clinic (tell us the location) or during a clinic visit with your pain provider    Pain Clinic staff can deliver the prescription to the [place] pharmacy in the clinic building. Please tell us the location.      Scheduling number: [phone].  Call this number to schedule or change appointments.    We believe regular attendance is key to your success in our program.    Any time you are unable to keep your appointment we ask that you call us at least 24 hours in advance to let us know. This will allow us to offer the appointment time to another patient.               Follow-ups after your visit        Who to contact     If you have questions or need follow up information about today's clinic visit or your schedule please contact [facility] directly at [phone].  Normal or non-critical lab and imaging results will be communicated to you by Hortorhart, letter or phone within 4 business days after the clinic has received the results. If you do not hear from us within 7 days, please contact the clinic through Hortorhart or phone. If you have a critical or abnormal lab result, we will notify you by phone as soon as possible.  Submit refill requests through TATE'S LIST or call your pharmacy and they will forward the refill request to us. Please allow 3 business days for your refill to be completed.          Additional Information About Your Visit        TATE'S LIST Information     TATE'S LIST lets you send messages to your doctor, view your test results, renew your prescriptions, schedule appointments and more. To sign up, go to [email or web address] . Click on \"Log in\" on the left side of the screen, which will take you to the Welcome page. Then click on \"Sign up Now\" on the right side of the page.     You will be asked to enter the access code listed below, as well as some personal information. Please follow the directions to create your username and password.     Your access code is: [email or web address]  Expires: " 2018  9:37 AM     Your access code will  in 90 days. If you need help or a new code, please call your [facility] clinic or [phone].        Care EveryWhere ID     This is your Care EveryWhere ID. This could be used by other organizations to access your [facility] medical records  [id number]        Your Vitals Were     Pulse BMI (Body Mass Index)                73 33.43 kg/m2           Blood Pressure from Last 3 Encounters:   17 (!) 151/92   17 142/88   17 145/88    Weight from Last 3 Encounters:   17 105.7 kg (233 lb)   17 104 kg (229 lb 3.2 oz)   17 104.8 kg (231 lb)              Today, you had the following     No orders found for display         Today's Medication Changes          These changes are accurate as of: 17  9:37 AM.  If you have any questions, ask your nurse or doctor.               These medicines have changed or have updated prescriptions.        Dose/Directions    fentaNYL 12 mcg/hr 72 hr patch   Commonly known as:  DURAGESIC   This may have changed:  additional instructions   Used for:  Failed back surgical syndrome        Dose:  1 patch   Place 1 patch onto the skin every 48 hours . May fill on/after 17 not to start till 17.   Quantity:  15 patch   Refills:  0       oxyCODONE-acetaminophen  MG per tablet   Commonly known as:  PERCOCET   This may have changed:  additional instructions   Used for:  Chronic low back pain, unspecified back pain laterality, with sciatica presence unspecified        Dose:  0.5-1 tablet   Take 0.5-1 tablets by mouth every 6 hours as needed for moderate to severe pain . Max of 2/day.  Allow early fill as changing amount per month. May fill on 17 and May start on 17   Quantity:  60 tablet   Refills:  0            Where to get your medicines      Some of these will need a paper prescription and others can be bought over the counter.  Ask your nurse if you have questions.     Bring a paper  prescription for each of these medications     fentaNYL 12 mcg/hr 72 hr patch    oxyCODONE-acetaminophen  MG per tablet                Primary Care Provider Office Phone # Fax #    Regan Llamas [phone] [address] [place] [phone]        Equal Access to Services     [facility] MARV : Hadii aad ku hadasho Soomaali, waaxda luqadaha, qaybta kaalmada adeegyada, waxay irenain hayfelicitasn adesugar joellepete pettit. So [facility] [phone].    ATENCIÓN: Si habla español, tiene a chambers disposición servicios gratuitos de asistencia lingüística. [facility] [phone].    We comply with applicable federal civil rights laws and [place] laws. We do not discriminate on the basis of race, color, national origin, age, disability, sex, sexual orientation, or gender identity.            Thank you!     Thank you for choosing [facility]  for your care. Our goal is always to provide you with excellent care. Hearing back from our patients is one way we can continue to improve our services. Please take a few minutes to complete the written survey that you may receive in the mail after your visit with us. Thank you!             Your Updated Medication List - Protect others around you: Learn how to safely use, store and throw away your medicines at www.disposemymeds.org.          This list is accurate as of: 11/6/17  9:37 AM.  Always use your most recent med list.                   Brand Name Dispense Instructions for use Diagnosis    DULoxetine 60 MG EC capsule    CYMBALTA    90 capsule    Take 1 capsule (60 mg) by mouth daily    Recurrent major depression in complete remission (H)       fentaNYL 12 mcg/hr 72 hr patch    DURAGESIC    15 patch    Place 1 patch onto the skin every 48 hours . May fill on/after 11/14/17 not to start till 11/16/17.    Failed back surgical syndrome       gabapentin 600 MG tablet    NEURONTIN    540 tablet    Take 2 tablets (1,200 mg) by mouth 3 times daily .  Increase to this dose as instructed in clinic. 3 month supply    Failed back surgical syndrome       LORazepam 0.5 MG tablet    ATIVAN    30 tablet    One bid prn anxiety. Rare use    Recurrent major depression in complete remission (H)       methocarbamol 500 MG tablet    ROBAXIN    360 tablet    Take 1-2 tablets (500-1,000 mg) by mouth 3 times daily as needed for muscle spasms . Caution for sedation    Chronic pain syndrome       mirtazapine 15 MG tablet    REMERON    30 tablet    Take 1 tablet (15 mg) by mouth At Bedtime    Recurrent major depression in complete remission (H), Primary insomnia       omeprazole 20 MG CR capsule    priLOSEC    90 capsule    Take 1 capsule (20 mg) by mouth daily .    Gastroesophageal reflux disease without esophagitis       oxyCODONE-acetaminophen  MG per tablet    PERCOCET    60 tablet    Take 0.5-1 tablets by mouth every 6 hours as needed for moderate to severe pain . Max of 2/day.  Allow early fill as changing amount per month. May fill on 11/9/17 and May start on 11/11/17    Chronic low back pain, unspecified back pain laterality, with sciatica presence unspecified       sildenafil 20 MG tablet    REVATIO    45 tablet    2-3 tabs daily for erectile dysfunction.    Erectile dysfunction, unspecified erectile dysfunction type       TYLENOL 500 MG tablet   Generic drug:  acetaminophen      3 po prn for pain        zolpidem 10 MG tablet    AMBIEN    60 tablet    Take 1 tablet (10 mg) by mouth nightly as needed for sleep    Primary insomnia

## 2017-11-06 NOTE — NURSING NOTE
"Chief Complaint   Patient presents with     Pain     Follow up for medication        Initial BP (!) 151/92  Pulse 73  Wt 105.7 kg (233 lb)  BMI 33.43 kg/m2 Estimated body mass index is 33.43 kg/(m^2) as calculated from the following:    Height as of 2/15/17: 1.778 m (5' 10\").    Weight as of this encounter: 105.7 kg (233 lb).  Medication Reconciliation: complete     Aidan Naiud MA    "

## 2017-12-11 DIAGNOSIS — F33.42 RECURRENT MAJOR DEPRESSION IN COMPLETE REMISSION (H): ICD-10-CM

## 2017-12-11 DIAGNOSIS — F51.01 PRIMARY INSOMNIA: ICD-10-CM

## 2017-12-11 RX ORDER — MIRTAZAPINE 15 MG/1
15 TABLET, FILM COATED ORAL AT BEDTIME
Qty: 30 TABLET | Refills: 5 | Status: CANCELLED | OUTPATIENT
Start: 2017-12-11

## 2017-12-11 NOTE — TELEPHONE ENCOUNTER
mirtazapine (REMERON) 15 MG tablet       Last Written Prescription Date: 09/27/2017  Last Fill Quantity: 30 tablet; # refills: 5  Last Office Visit with [facility], [facility] or [facility] prescribing provider:  09/27/2017   Next 5 appointments (look out 90 days)     Feb 07, 2018  1:30 PM CST   Return Visit with Carolann Motley MD   [facility] [place] ([place] [facility])    [address]  [place] [address] [phone]                   Last PHQ-9 score on record=   PHQ-9 SCORE 7/7/2016   Total Score -   Total Score 11       Lab Results   Component Value Date    AST 19@ 04/10/2006     Lab Results   Component Value Date    ALT 35@ 04/10/2006

## 2017-12-18 DIAGNOSIS — M96.1 FAILED BACK SURGICAL SYNDROME: ICD-10-CM

## 2017-12-18 DIAGNOSIS — G89.29 CHRONIC LOW BACK PAIN, UNSPECIFIED BACK PAIN LATERALITY, WITH SCIATICA PRESENCE UNSPECIFIED: ICD-10-CM

## 2017-12-18 DIAGNOSIS — M54.5 CHRONIC LOW BACK PAIN, UNSPECIFIED BACK PAIN LATERALITY, WITH SCIATICA PRESENCE UNSPECIFIED: ICD-10-CM

## 2017-12-18 RX ORDER — FENTANYL 12.5 UG/1
1 PATCH TRANSDERMAL
Qty: 15 PATCH | Refills: 0 | Status: SHIPPED | OUTPATIENT
Start: 2017-12-18 | End: 2018-01-17

## 2017-12-18 RX ORDER — OXYCODONE AND ACETAMINOPHEN 10; 325 MG/1; MG/1
.5-1 TABLET ORAL EVERY 6 HOURS PRN
Qty: 60 TABLET | Refills: 0 | Status: SHIPPED | OUTPATIENT
Start: 2017-12-18 | End: 2018-01-17

## 2017-12-18 NOTE — TELEPHONE ENCOUNTER
Please remind patient to call with 7-10 days notice  Pt will  in clinic at [place] location. Ok, for patient wife (April) to pick Rx.     Signed Prescriptions:                        Disp   Refills    oxyCODONE-acetaminophen (PERCOCET)  *60 tab*0        Sig: Take 0.5-1 tablets by mouth every 6 hours as needed           for moderate to severe pain . Max of 2/day.  May           fill on 12/18/17 and May start on 12/18/17  Authorizing Provider: YADIRA CARRILLO    fentaNYL (DURAGESIC) 12 mcg/hr 72 hr patch 15 pat*0        Sig: Place 1 patch onto the skin every 48 hours . May fill           on/after 12/18/17 not to start till 12/18/17.  Authorizing Provider: YADIRA CARRILLO MD  [facility]

## 2017-12-18 NOTE — TELEPHONE ENCOUNTER
Medication refill information reviewed.     Due date for oxyCODONE-acetaminophen (PERCOCET)  MG per tablet  And fentaNYL (DURAGESIC is 12/18/17     Prescriptions prepped for review.     Will route to provider.

## 2017-12-18 NOTE — TELEPHONE ENCOUNTER
Signed Rx placed in  folder, , 2nd floor, [place]. Note for wife to be able to  put on envelope. Patient was informed.

## 2017-12-18 NOTE — TELEPHONE ENCOUNTER
Patient requesting refill(s) of oxyCODONE-acetaminophen (PERCOCET)  MG per tablet  Last picked up from pharmacy on 11/18/17    fentaNYL (DURAGESIC) 12 mcg/hr 72 hr patch  Last picked up from pharmacy on 11/16/17    Pt last seen by prescribing provider on 11/6/17  Next appt scheduled for 2/7/18    Last urine drug screen date 9/27/17  Current opioid agreement on file (completed within the last year) Yes Date of opioid agreement: 11/1/17    Processing (pick one)     Pt will  in clinic at [facility]. Ok, for patient wife (April) to pick Rx.       Will route to nursing pool for review and preparation of prescription(s).

## 2017-12-18 NOTE — TELEPHONE ENCOUNTER
12/17 849pm    Patient LM requesting refill Oxycodone and Fentanyl patches. [phone] Patient wife (April) will  when ready.     Jen Hernandez    [facility]

## 2018-01-08 ENCOUNTER — HOSPITAL ENCOUNTER (OUTPATIENT)
Dept: MRI IMAGING | Facility: CLINIC | Age: 48
Discharge: HOME | End: 2018-01-08
Attending: PSYCHIATRY & NEUROLOGY | Admitting: PSYCHIATRY & NEUROLOGY
Payer: MEDICARE

## 2018-01-08 ENCOUNTER — TELEPHONE (OUTPATIENT)
Dept: PALLIATIVE MEDICINE | Facility: CLINIC | Age: 48
End: 2018-01-08

## 2018-01-08 DIAGNOSIS — M96.1 FAILED BACK SURGICAL SYNDROME: ICD-10-CM

## 2018-01-08 DIAGNOSIS — G89.29 CHRONIC LOW BACK PAIN, UNSPECIFIED BACK PAIN LATERALITY, WITH SCIATICA PRESENCE UNSPECIFIED: ICD-10-CM

## 2018-01-08 DIAGNOSIS — M54.5 CHRONIC LOW BACK PAIN, UNSPECIFIED BACK PAIN LATERALITY, WITH SCIATICA PRESENCE UNSPECIFIED: ICD-10-CM

## 2018-01-08 PROCEDURE — 72158 MRI LUMBAR SPINE W/O & W/DYE: CPT

## 2018-01-08 PROCEDURE — 25000128 H RX IP 250 OP 636: Performed by: RADIOLOGY

## 2018-01-08 PROCEDURE — A9585 GADOBUTROL INJECTION: HCPCS | Performed by: RADIOLOGY

## 2018-01-08 RX ORDER — GADOBUTROL 604.72 MG/ML
10 INJECTION INTRAVENOUS ONCE
Status: COMPLETED | OUTPATIENT
Start: 2018-01-08 | End: 2018-01-08

## 2018-01-08 RX ADMIN — GADOBUTROL 10 ML: 604.72 INJECTION INTRAVENOUS at 10:29

## 2018-01-08 NOTE — TELEPHONE ENCOUNTER
Please let patient know that his lumbar spine imaging doesn't show a specific cause for his flare.  No evidence of infection or arachnoiditis.  There was question of the screw from fusion being slightly loose on the last imaging, and that is still the case with this imaging although there is artifact from the metal.    Bindu Motley MD  [facility]      IMPRESSION:    1. No evidence of epidural abscess. No evidence of arachnoiditis.  2. At L3-L4 there is moderate bilateral foraminal stenosis.  3. At L5-S1 there is moderate right and mild left foraminal stenosis,  likely not clinically significant at a solidly fused level.  4. Note that the facet screws were found to likely be loose on the  prior CT study. This cannot be determined due to metallic artifact on  MRI but is likely still present if there has been no surgery in  between these exams.

## 2018-01-09 NOTE — TELEPHONE ENCOUNTER
Information below is provided to the patient.    JOSEE Peacock, RN  Care Coordinator  [facility] [place]

## 2018-01-16 DIAGNOSIS — F51.01 PRIMARY INSOMNIA: ICD-10-CM

## 2018-01-16 NOTE — TELEPHONE ENCOUNTER
Requested Prescriptions   Pending Prescriptions Disp Refills     zolpidem (AMBIEN) 10 MG tablet 60 tablet      Sig: Take 1 tablet (10 mg) by mouth nightly as needed for sleep    There is no refill protocol information for this order          Last Written Prescription Date:  7/13/17  Last Fill Quantity: 60,  # refills: 2   Last Office Visit with [facility], [facility] or [facility] prescribing provider:  9/27/17 Dr. HERBER Llamas   Future Office Visit:    Next 5 appointments (look out 90 days)     Feb 07, 2018  1:30 PM CST   Return Visit with Carolann Motley MD   [facility] [place] ([place] [facility] [place])    [address]  [place] [address] [phone]

## 2018-01-17 DIAGNOSIS — M96.1 FAILED BACK SURGICAL SYNDROME: ICD-10-CM

## 2018-01-17 DIAGNOSIS — M54.5 CHRONIC LOW BACK PAIN, UNSPECIFIED BACK PAIN LATERALITY, WITH SCIATICA PRESENCE UNSPECIFIED: ICD-10-CM

## 2018-01-17 DIAGNOSIS — G89.29 CHRONIC LOW BACK PAIN, UNSPECIFIED BACK PAIN LATERALITY, WITH SCIATICA PRESENCE UNSPECIFIED: ICD-10-CM

## 2018-01-17 RX ORDER — ZOLPIDEM TARTRATE 10 MG/1
10 TABLET ORAL
Qty: 60 TABLET | Refills: 2 | Status: SHIPPED | OUTPATIENT
Start: 2018-01-17 | End: 2018-09-19

## 2018-01-17 RX ORDER — OXYCODONE AND ACETAMINOPHEN 10; 325 MG/1; MG/1
.5-1 TABLET ORAL EVERY 6 HOURS PRN
Qty: 60 TABLET | Refills: 0 | Status: SHIPPED | OUTPATIENT
Start: 2018-01-17 | End: 2018-02-07

## 2018-01-17 RX ORDER — FENTANYL 12.5 UG/1
1 PATCH TRANSDERMAL
Qty: 15 PATCH | Refills: 0 | Status: SHIPPED | OUTPATIENT
Start: 2018-01-17 | End: 2018-04-20 | Stop reason: DRUGHIGH

## 2018-01-17 NOTE — TELEPHONE ENCOUNTER
Signed Prescriptions:                        Disp   Refills    oxyCODONE-acetaminophen (PERCOCET)  *60 tab*0        Sig: Take 0.5-1 tablets by mouth every 6 hours as needed           for moderate to severe pain . Max of 2/day.  May           fill on 1/18/18 and May start on 1/20/18  Authorizing Provider: YADIRA CARRILLO    fentaNYL (DURAGESIC) 12 mcg/hr 72 hr patch 15 pat*0        Sig: Place 1 patch onto the skin every 48 hours . May fill           on/after 1/18/18 not to start till 1/20/18.  Authorizing Provider: YADIRA CARRILLO MD  [facility]

## 2018-01-17 NOTE — TELEPHONE ENCOUNTER
Scripts signed for fentanyl patches and percocet.      Called patient; Will  at [facility].  Patient stated OK for wife, April Tighe, to  scripts at [facility] .    Gita Grayson, PT  [facility] [place]

## 2018-01-17 NOTE — TELEPHONE ENCOUNTER
1/16 731pm    Patient LM requesting Fentanyl and Oxycodone. [phone].     Jen Hernandez    [facility]

## 2018-01-17 NOTE — TELEPHONE ENCOUNTER
Dependent on this, important given mental health hx as in problem list.  Will fill for another 6 mo.

## 2018-01-17 NOTE — TELEPHONE ENCOUNTER
Received call from patient requesting refill(s) of oxyCODONE-acetaminophen (PERCOCET)  MG per tablet  And fentaNYL (DURAGESIC) 12 mcg/hr 72 hr patch    Last picked up from pharmacy on 12/21/17    Pt last seen by prescribing provider on 11/6/17  Next appt scheduled for 2/7/18    Last urine drug screen date 9/27/17  Current opioid agreement on file (completed within the last year) Yes Date of opioid agreement: 10/12/17    Processing (pick one and delete the others):  Mail to   [facility] [address] [place] [address] - [address] [place] [address] AT [facility] & [address]  [address] [facility] [address] [facility] [address]  Phone: [phone] Fax: [phone]      Syeda Montelongo MA  [facility]    Will route to nursing pool for review and preparation of prescription(s).

## 2018-01-17 NOTE — TELEPHONE ENCOUNTER
Medication refill information reviewed.     Due date for oxyCODONE-acetaminophen (PERCOCET)  MG per tablet  And fentaNYL (DURAGESIC) 12 mcg/hr 72 hr patch is 1/20/18     Prescriptions prepped for review.     Will route to provider.

## 2018-01-18 DIAGNOSIS — F33.42 RECURRENT MAJOR DEPRESSION IN COMPLETE REMISSION (H): ICD-10-CM

## 2018-01-18 RX ORDER — LORAZEPAM 0.5 MG/1
TABLET ORAL
Qty: 30 TABLET | Refills: 0 | Status: SHIPPED | OUTPATIENT
Start: 2018-01-18 | End: 2024-08-19

## 2018-01-18 NOTE — TELEPHONE ENCOUNTER
Requested Prescriptions   Pending Prescriptions Disp Refills     LORazepam (ATIVAN) 0.5 MG tablet 30 tablet 0     Sig: One bid prn anxiety. Rare use    There is no refill protocol information for this order      LORazepam (ATIVAN) 0.5 MG tablet      Last Written Prescription Date:  1/19/2017  Last Fill Quantity: 30,   # refills: 0  Last Office Visit: 9/27/2017  Future Office visit:    Next 5 appointments (look out 90 days)     Feb 07, 2018  1:30 PM CST   Return Visit with Carolann Motley MD   [facility] [place] ([place] [facility] [place])    [address]  [place] [address]   [phone]                   Routing refill request to provider for review/approval because:  Drug not on the [facility], [facility] or [facility] refill protocol or controlled substance

## 2018-02-07 ENCOUNTER — OFFICE VISIT (OUTPATIENT)
Dept: PALLIATIVE MEDICINE | Facility: CLINIC | Age: 48
End: 2018-02-07
Payer: COMMERCIAL

## 2018-02-07 ENCOUNTER — TELEPHONE (OUTPATIENT)
Dept: PALLIATIVE MEDICINE | Facility: CLINIC | Age: 48
End: 2018-02-07

## 2018-02-07 VITALS
HEART RATE: 85 BPM | DIASTOLIC BLOOD PRESSURE: 85 MMHG | WEIGHT: 252 LBS | SYSTOLIC BLOOD PRESSURE: 144 MMHG | BODY MASS INDEX: 36.16 KG/M2

## 2018-02-07 DIAGNOSIS — M96.1 FAILED BACK SURGICAL SYNDROME: ICD-10-CM

## 2018-02-07 DIAGNOSIS — M54.5 CHRONIC LOW BACK PAIN, UNSPECIFIED BACK PAIN LATERALITY, WITH SCIATICA PRESENCE UNSPECIFIED: ICD-10-CM

## 2018-02-07 DIAGNOSIS — G89.29 CHRONIC LOW BACK PAIN, UNSPECIFIED BACK PAIN LATERALITY, WITH SCIATICA PRESENCE UNSPECIFIED: ICD-10-CM

## 2018-02-07 DIAGNOSIS — G89.4 CHRONIC PAIN SYNDROME: ICD-10-CM

## 2018-02-07 DIAGNOSIS — F33.42 RECURRENT MAJOR DEPRESSION IN COMPLETE REMISSION (H): ICD-10-CM

## 2018-02-07 PROCEDURE — 99214 OFFICE O/P EST MOD 30 MIN: CPT | Performed by: PSYCHIATRY & NEUROLOGY

## 2018-02-07 RX ORDER — FENTANYL 25 UG/1
1 PATCH TRANSDERMAL
Qty: 15 PATCH | Refills: 0 | Status: SHIPPED | OUTPATIENT
Start: 2018-02-07 | End: 2018-03-12

## 2018-02-07 RX ORDER — METHOCARBAMOL 500 MG/1
500-1000 TABLET, FILM COATED ORAL 3 TIMES DAILY PRN
Qty: 360 TABLET | Refills: 1 | Status: SHIPPED | OUTPATIENT
Start: 2018-02-07 | End: 2018-04-25

## 2018-02-07 RX ORDER — DULOXETIN HYDROCHLORIDE 60 MG/1
60 CAPSULE, DELAYED RELEASE ORAL DAILY
Qty: 90 CAPSULE | Refills: 6 | Status: SHIPPED | OUTPATIENT
Start: 2018-02-07 | End: 2019-05-02

## 2018-02-07 RX ORDER — OXYCODONE AND ACETAMINOPHEN 10; 325 MG/1; MG/1
.5-1 TABLET ORAL EVERY 6 HOURS PRN
Qty: 60 TABLET | Refills: 0 | Status: SHIPPED | OUTPATIENT
Start: 2018-02-07 | End: 2018-03-12

## 2018-02-07 RX ORDER — GABAPENTIN 600 MG/1
1200 TABLET ORAL 3 TIMES DAILY
Qty: 540 TABLET | Refills: 1 | Status: SHIPPED | OUTPATIENT
Start: 2018-02-07 | End: 2018-09-07

## 2018-02-07 ASSESSMENT — PAIN SCALES - GENERAL: PAINLEVEL: SEVERE PAIN (7)

## 2018-02-07 NOTE — MR AVS SNAPSHOT
After Visit Summary   2/7/2018    David Wilcox    MRN: [id number]           Patient Information     Date Of Birth          1970        Visit Information        Provider Department      2/7/2018 1:30 PM Carolann Motley MD [facility]        Today's Diagnoses     Failed back surgical syndrome        Chronic low back pain, unspecified back pain laterality, with sciatica presence unspecified        Recurrent major depression in complete remission (H)        Chronic pain syndrome          Care Instructions    1. Use the fentanyl 12mcg patches, using 2 patches at a time.  You will take off your current patch and put on the 2 NEW ones today.  Let me know when you start the next prescription for the fentanyl 25mcg patch, so that I can line up your prescriptions in the future  2. You will continue the Percocet, and limit if able to 1 tab/day.   3. Schedule follow up in 3 months.  4. Schedule epidural steroid injection [phone]    ----------------------------------------------------------------  Nurse Triage line:  [phone]   Call this number with any questions or concerns. You may leave a detailed message anytime. Calls are typically returned Monday through Friday between 8 AM and 4:30 PM. We usually get back to you within 2 business days depending on the issue/request.       Medication refills:    For non-narcotic medications, call your pharmacy directly to request a refill. The pharmacy will contact the [facility] for authorization. Please allow 3-4 days for these refills to be processed.     For narcotic refills, call the nurse triage line or send a Jasper message. Please contact us 7-10 days before your refill is due. The message MUST include the name of the specific medication(s) requested and how you would like to receive the prescription(s). The options are as follows:    Pain Clinic staff can mail the prescription to your pharmacy. Please tell us the name of  "the pharmacy.    You may pick the prescription up at the Pain Clinic (tell us the location) or during a clinic visit with your pain provider    Pain Clinic staff can deliver the prescription to the [place] pharmacy in the clinic building. Please tell us the location.      Scheduling number: [phone].  Call this number to schedule or change appointments.    We believe regular attendance is key to your success in our program.    Any time you are unable to keep your appointment we ask that you call us at least 24 hours in advance to let us know. This will allow us to offer the appointment time to another patient.               Follow-ups after your visit        Additional Services     PAIN INJECTION EVAL/TREAT/FOLLOW UP                 Who to contact     If you have questions or need follow up information about today's clinic visit or your schedule please contact [facility] directly at [phone].  Normal or non-critical lab and imaging results will be communicated to you by Visibizhart, letter or phone within 4 business days after the clinic has received the results. If you do not hear from us within 7 days, please contact the clinic through Visibizhart or phone. If you have a critical or abnormal lab result, we will notify you by phone as soon as possible.  Submit refill requests through uberVU or call your pharmacy and they will forward the refill request to us. Please allow 3 business days for your refill to be completed.          Additional Information About Your Visit        uberVU Information     uberVU lets you send messages to your doctor, view your test results, renew your prescriptions, schedule appointments and more. To sign up, go to [email or web address] . Click on \"Log in\" on the left side of the screen, which will take you to the Welcome page. Then click on \"Sign up Now\" on the right side of the page.     You will be asked to enter the access code listed below, as well as some personal " information. Please follow the directions to create your username and password.     Your access code is: [email or web address]  Expires: 2018  1:51 PM     Your access code will  in 90 days. If you need help or a new code, please call your [facility] clinic or [phone].        Care EveryWhere ID     This is your Care EveryWhere ID. This could be used by other organizations to access your [facility] medical records  [id number]        Your Vitals Were     Pulse BMI (Body Mass Index)                85 36.16 kg/m2           Blood Pressure from Last 3 Encounters:   18 144/85   17 (!) 151/92   17 142/88    Weight from Last 3 Encounters:   18 114.3 kg (252 lb)   17 105.7 kg (233 lb)   17 104 kg (229 lb 3.2 oz)              We Performed the Following     PAIN INJECTION EVAL/TREAT/FOLLOW UP          Today's Medication Changes          These changes are accurate as of 18  1:51 PM.  If you have any questions, ask your nurse or doctor.               These medicines have changed or have updated prescriptions.        Dose/Directions    * fentaNYL 12 mcg/hr 72 hr patch   Commonly known as:  DURAGESIC   This may have changed:  Another medication with the same name was added. Make sure you understand how and when to take each.   Used for:  Failed back surgical syndrome   Changed by:  Carolann Motley MD        Dose:  1 patch   Place 1 patch onto the skin every 48 hours . May fill on/after 18 not to start till 18.   Quantity:  15 patch   Refills:  0       * fentaNYL 25 mcg/hr 72 hr patch   Commonly known as:  DURAGESIC   This may have changed:  You were already taking a medication with the same name, and this prescription was added. Make sure you understand how and when to take each.   Used for:  Failed back surgical syndrome   Changed by:  Carolann Motley MD        Dose:  1 patch   Place 1 patch onto the skin every 72 hours . Fill on/after  to start on/after     Quantity:  15 patch   Refills:  0       oxyCODONE-acetaminophen  MG per tablet   Commonly known as:  PERCOCET   This may have changed:  additional instructions   Used for:  Chronic low back pain, unspecified back pain laterality, with sciatica presence unspecified   Changed by:  Carolann Motley MD        Dose:  0.5-1 tablet   Take 0.5-1 tablets by mouth every 6 hours as needed for moderate to severe pain . Max of 2/day.  May fill on 2/16/18 and May start on 2/19/18   Quantity:  60 tablet   Refills:  0       * Notice:  This list has 2 medication(s) that are the same as other medications prescribed for you. Read the directions carefully, and ask your doctor or other care provider to review them with you.         Where to get your medicines      These medications were sent to [facility] [id number] [place] [address] [address]    Hours:  M-F 9-7 SAT 9-6 SUN 11-3 Phone:  [phone]     DULoxetine 60 MG EC capsule    gabapentin 600 MG tablet    methocarbamol 500 MG tablet         Some of these will need a paper prescription and others can be bought over the counter.  Ask your nurse if you have questions.     Bring a paper prescription for each of these medications     fentaNYL 25 mcg/hr 72 hr patch    oxyCODONE-acetaminophen  MG per tablet                Primary Care Provider Office Phone # Fax #    Regan Llamas [phone]       [address] [place] [phone]        Equal Access to Services     MELIDA FAIR AH: Hadii cornelia ku hadasho Soomaali, waaxda luqadaha, qaybta kaalmada adeegyada, kinga pettit. So [facility] [phone].    ATENCIÓN: Si habla español, tiene a chambers disposición servicios gratuitos de asistencia lingüística. Rex al [phone].    We comply with applicable federal civil rights laws and [place] laws. We do not discriminate on the basis of race, color, national origin,  age, disability, sex, sexual orientation, or gender identity.            Thank you!     Thank you for choosing [facility]  for your care. Our goal is always to provide you with excellent care. Hearing back from our patients is one way we can continue to improve our services. Please take a few minutes to complete the written survey that you may receive in the mail after your visit with us. Thank you!             Your Updated Medication List - Protect others around you: Learn how to safely use, store and throw away your medicines at www.disposemymeds.org.          This list is accurate as of 2/7/18  1:51 PM.  Always use your most recent med list.                   Brand Name Dispense Instructions for use Diagnosis    DULoxetine 60 MG EC capsule    CYMBALTA    90 capsule    Take 1 capsule (60 mg) by mouth daily    Recurrent major depression in complete remission (H)       * fentaNYL 12 mcg/hr 72 hr patch    DURAGESIC    15 patch    Place 1 patch onto the skin every 48 hours . May fill on/after 1/18/18 not to start till 1/20/18.    Failed back surgical syndrome       * fentaNYL 25 mcg/hr 72 hr patch    DURAGESIC    15 patch    Place 1 patch onto the skin every 72 hours . Fill on/after 2/13 to start on/after 2/18    Failed back surgical syndrome       gabapentin 600 MG tablet    NEURONTIN    540 tablet    Take 2 tablets (1,200 mg) by mouth 3 times daily . Increase to this dose as instructed in clinic. 3 month supply    Failed back surgical syndrome       LORazepam 0.5 MG tablet    ATIVAN    30 tablet    One bid prn anxiety. Rare use    Recurrent major depression in complete remission (H)       methocarbamol 500 MG tablet    ROBAXIN    360 tablet    Take 1-2 tablets (500-1,000 mg) by mouth 3 times daily as needed for muscle spasms . Caution for sedation    Chronic pain syndrome       mirtazapine 15 MG tablet    REMERON    30 tablet    Take 1 tablet (15 mg) by mouth At Bedtime    Recurrent major depression in  complete remission (H), Primary insomnia       naloxone nasal spray    NARCAN    0.2 mL    Spray 1 spray (4 mg) into one nostril alternating nostrils as needed for opioid reversal every 2-3 minutes until assistance arrives    Chronic low back pain, unspecified back pain laterality, with sciatica presence unspecified, Failed back surgical syndrome       omeprazole 20 MG CR capsule    priLOSEC    90 capsule    Take 1 capsule (20 mg) by mouth daily .    Gastroesophageal reflux disease without esophagitis       oxyCODONE-acetaminophen  MG per tablet    PERCOCET    60 tablet    Take 0.5-1 tablets by mouth every 6 hours as needed for moderate to severe pain . Max of 2/day.  May fill on 2/16/18 and May start on 2/19/18    Chronic low back pain, unspecified back pain laterality, with sciatica presence unspecified       sildenafil 20 MG tablet    REVATIO    45 tablet    2-3 tabs daily for erectile dysfunction.    Erectile dysfunction, unspecified erectile dysfunction type       TYLENOL 500 MG tablet   Generic drug:  acetaminophen      3 po prn for pain        zolpidem 10 MG tablet    AMBIEN    60 tablet    Take 1 tablet (10 mg) by mouth nightly as needed for sleep    Primary insomnia       * Notice:  This list has 2 medication(s) that are the same as other medications prescribed for you. Read the directions carefully, and ask your doctor or other care provider to review them with you.

## 2018-02-07 NOTE — PATIENT INSTRUCTIONS
1. Use the fentanyl 12mcg patches, using 2 patches at a time.  You will take off your current patch and put on the 2 NEW ones today.  Let me know when you start the next prescription for the fentanyl 25mcg patch, so that I can line up your prescriptions in the future  2. You will continue the Percocet, and limit if able to 1 tab/day.   3. Schedule follow up in 3 months.  4. Schedule epidural steroid injection [phone]    ----------------------------------------------------------------  Nurse Triage line:  [phone]   Call this number with any questions or concerns. You may leave a detailed message anytime. Calls are typically returned Monday through Friday between 8 AM and 4:30 PM. We usually get back to you within 2 business days depending on the issue/request.       Medication refills:    For non-narcotic medications, call your pharmacy directly to request a refill. The pharmacy will contact the [facility] for authorization. Please allow 3-4 days for these refills to be processed.     For narcotic refills, call the nurse triage line or send a [employer] message. Please contact us 7-10 days before your refill is due. The message MUST include the name of the specific medication(s) requested and how you would like to receive the prescription(s). The options are as follows:    Pain Clinic staff can mail the prescription to your pharmacy. Please tell us the name of the pharmacy.    You may pick the prescription up at the Pain Clinic (tell us the location) or during a clinic visit with your pain provider    Pain Clinic staff can deliver the prescription to the [place] pharmacy in the clinic building. Please tell us the location.      Scheduling number: [phone].  Call this number to schedule or change appointments.    We believe regular attendance is key to your success in our program.    Any time you are unable to keep your appointment we ask that you call us at least 24 hours in advance to let us  know. This will allow us to offer the appointment time to another patient.

## 2018-02-07 NOTE — PROGRESS NOTES
[facility]    Date of visit: 2/7/2018     Chief complaint:   Chief Complaint   Patient presents with     Pain       Interval history:  David Wilcox was last seen by me on 11/6/17.      Recommendations/plan at the last visit included:  1. Physical Therapy: continue exercises as recommended by PT  2. Clinical Health Psychologist to address issues of relaxation, behavioral change, coping style, and other factors important to improvement.  Difficult due to work, but will look at this again next visit  3. Diagnostic Studies:  MRI lumbar spine  4. Medication Management:    1. Increase Percocet to 60/month and will reassess at next visit  2. Keep meds locked  3. Naloxone provided  5. Recommendations to PCP: none  6. Follow up: 3 months, follow up when MRI done      Since his last visit, David Wilcox reports:  -flare has not gotten better  -doing the treadmill daily, but not as long as he used to  -feels poor long-term control, including getting up on the morning.  -willing to consider injection    Pain scores:  Pain intensity on average is 7 on a scale of 0-10.     Current pain treatments:   Percocet 10/325mg daily- takes in the afternoon.  Takes 1-2/day.- takes the edge off- lasts for 4 hours, 60 tabs/month  Fentanyl patch-12 mcg/72 hours  Robaxin (methocarbamol) 1000mg at night and in the morning- the morning dose has been helping  Gabapentin 1200 mg TID, beneficial  Cymbalta 60mg daily- tried going to 90mg/day but felt worse- for pain and mood    Previous medication treatments included:  Hydrocodone- not helpful  Methadone- taking after 1st surgery  Naprosyn- not helpful  Flexeril- after first back injury (1990s)  Lidocaine- not helpful  Acetaminophen (tylenol)   Gabapentin    Other treatments have included:  David Wilcox has been seen at a pain clinic in the past.  MAPS- injections  PT: yes  Acupuncture: no  TENs Unit: yes- out of use- somewhat helpful  Injections: none  since last surgery- very  helpful    Side Effects: no side effect    Medications:  Current Outpatient Prescriptions   Medication Sig Dispense Refill     fentaNYL (DURAGESIC) 25 mcg/hr 72 hr patch Place 1 patch onto the skin every 72 hours . Fill on/after 2/13 to start on/after 2/18 15 patch 0     oxyCODONE-acetaminophen (PERCOCET)  MG per tablet Take 0.5-1 tablets by mouth every 6 hours as needed for moderate to severe pain . Max of 2/day.  May fill on 2/16/18 and May start on 2/19/18 60 tablet 0     DULoxetine (CYMBALTA) 60 MG EC capsule Take 1 capsule (60 mg) by mouth daily 90 capsule 6     methocarbamol (ROBAXIN) 500 MG tablet Take 1-2 tablets (500-1,000 mg) by mouth 3 times daily as needed for muscle spasms . Caution for sedation 360 tablet 1     gabapentin (NEURONTIN) 600 MG tablet Take 2 tablets (1,200 mg) by mouth 3 times daily . Increase to this dose as instructed in clinic. 3 month supply 540 tablet 1     LORazepam (ATIVAN) 0.5 MG tablet One bid prn anxiety. Rare use 30 tablet 0     zolpidem (AMBIEN) 10 MG tablet Take 1 tablet (10 mg) by mouth nightly as needed for sleep 60 tablet 2     fentaNYL (DURAGESIC) 12 mcg/hr 72 hr patch Place 1 patch onto the skin every 48 hours . May fill on/after 1/18/18 not to start till 1/20/18. 15 patch 0     naloxone (NARCAN) nasal spray Spray 1 spray (4 mg) into one nostril alternating nostrils as needed for opioid reversal every 2-3 minutes until assistance arrives 0.2 mL 0     omeprazole (PRILOSEC) 20 MG CR capsule Take 1 capsule (20 mg) by mouth daily . 90 capsule 3     sildenafil (REVATIO) 20 MG tablet 2-3 tabs daily for erectile dysfunction. 45 tablet 3     mirtazapine (REMERON) 15 MG tablet Take 1 tablet (15 mg) by mouth At Bedtime 30 tablet 5     TYLENOL EXTRA STRENGTH 500 MG OR TABS 3 po prn for pain         Medical History: any changes in medical history since they were last seen? none    Review of Systems:  The 14 system ROS was reviewed from the intake  questionnaire, and is positive for: fatigue, dizziness, n/t, weakness  Mood: stress, depression, anxiety. Denies suicidal ideation     Physical Exam:  Blood pressure 144/85, pulse 85, weight 114.3 kg (252 lb).  General: awake, alert , cooperative  Gait: Antalgic on the right, uses a single end cane  MSK exam: uses cane for ambulation. Pain with lumbar ROM, particularly rotation and extension.  Tender to palpation throughout thoracic and lumbar paraspinals, worse on the right. Pain with range of motion     Assessment:   1. 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    2. Facet arthropathy likely contributing above the level of the future  3. Distant history of alcohol and drug abuse   4. Bipolar disorder- currently managed  5. H/o meningitis after SCS trial  6. Subacute thoracic back pain, likely myofascial etiology.      Plan:  1. Physical Therapy: continue exercises as recommended by PT  2. Clinical Health Psychologist to address issues of relaxation, behavioral change, coping style, and other factors important to improvement.  Difficult due to work, but will look at this again next visit  3. Diagnostic Studies: none  4. Schedule epidural steroid injection - transforaminal epidural steroid injection vs interlam  5. Medication Management:    1. Change fentanyl patch to 25mcg/hr  2. Limit the Percocet, down to 1/day if able, may not be able to do that the first week.  3. With next refill, will need to determien due date for the meds and decrease quantity on the Percocet  6. Recommendations to PCP: none  7. Follow up: 3 months,       Total time spent was 30 minutes, and more than 50% of face to face time was spent in counseling and/or coordination of care regarding medications, imaging.   Bindu Motley MD  [facility]

## 2018-02-07 NOTE — TELEPHONE ENCOUNTER
Pt calling in stating his fentaNYL (DURAGESIC) 25 mcg/hr 72 hr patch script is not correct based on the information him and Dr. Motley spoke about.   He would like a call back before he fills the script.         Hilda GOULD    [facility] [place]

## 2018-02-07 NOTE — NURSING NOTE
"Chief Complaint   Patient presents with     Pain       Initial /85 (BP Location: Right arm, Patient Position: Chair, Cuff Size: Adult Small)  Pulse 85  Wt 114.3 kg (252 lb)  BMI 36.16 kg/m2 Estimated body mass index is 36.16 kg/(m^2) as calculated from the following:    Height as of 2/15/17: 1.778 m (5' 10\").    Weight as of this encounter: 114.3 kg (252 lb).  Medication Reconciliation: complete     Syeda Montelongo MA  [facility]      "

## 2018-02-08 ENCOUNTER — TELEPHONE (OUTPATIENT)
Dept: PALLIATIVE MEDICINE | Facility: CLINIC | Age: 48
End: 2018-02-08

## 2018-02-08 NOTE — TELEPHONE ENCOUNTER
Patients Fentanyl 25 mcg script reads to change every 72 hours. He reports he is supposed to change every 48 hours. Qty is for 15. Will confirm with Dr. Motley and call both patient and pharmacy to clarify.    JEREMIAH PeacockN, RN  Care Coordinator  [facility]

## 2018-02-08 NOTE — TELEPHONE ENCOUNTER
Pre-screening Questions for Radiology Injections:    Injection to be done at which interventional clinic site? [facility] [place]    Procedure ordered by Tiesha    Procedure ordered? Lumbar Epidural Steroid Injection    What insurance would patient like us to bill for this procedure? [employer]      Worker's comp or MVA (motor vehicle accident) -Any injection DO NOT SCHEDULE and route to Jen Hernandez.      [employer] insurance - For SI joint injections, DO NOT SCHEDULE and route Venus Alejandra.      [employer]- MBB's must be scheduled at LEAST two weeks apart      [employer] - Any injection besides hip/shoulder/knee joint DO NOT SCHEDULE and route to Venus Alejandra. She will obtain PA and call pt back to schedule procedure or notify pt of denial.       [employer]-PA REQUIRED FOR NON-ALVA OR Joint injections    Any chance of pregnancy? Not Applicable   If YES, do NOT schedule and route to RN pool    Is an  needed? No     Patient has a drive home? (mandatory) YES:     Is patient taking any blood thinners (plavix, coumadin, jantoven, warfarin, heparin, pradaxa or dabigatran )? No   If hold needed, do NOT schedule, route to RN pool     Is patient taking any aspirin products? No     If more than 325mg/day do NOT schedule; route to RN pool     For CERVICAL procedures, hold all aspirin products for 6 days.      Does the patient have a bleeding or clotting disorder? No     If YES, okay to schedule AND route to RN nurse pool    **For any patients with platelet count <100, must be forwarded to provider**    Is patient diabetic?  No  If YES, have them bring their glucometer.    Does patient have an active infection or treated for one within the past week? No     Is patient currently taking any antibiotics?  No     For patients on chronic, preventative, or prophylactic antibiotics, procedures may be scheduled.     For patients on antibiotics for active or recent infection:    Bayron Kumar  Tiesha Mack Burton, [place]-antibiotic course must have been completed for 4 days    Dr. Jackson-antibiotic course must have been completed for 7 days    Is patient currently taking any steroid medications? (i.e. Prednisone, Medrol)  No     For patients on steroid medications:    Tiesha Martines Burton, [place]-steroid course must have been completed for 4 days    -steroid course must have been completed for 7 days    Reviewed with patient:  If you are started on any steroids or antibiotics between now and your appointment, you must contact us because it may affect our ability to perform your procedure.  Yes    Is patient actively being treated for cancer or immunocompromised? No  If YES, do NOT schedule and route to RN pool     Are you able to get on and off an exam table with minimal or no assistance? Yes  If NO, do NOT schedule and route to RN pool    Are you able to roll over and lay on your stomach with minimal or no assistance? Yes  If NO, do NOT schedule and route to RN pool     Any allergies to contrast dye, iodine, shellfish, or numbing and steroid medications? No  If YES, route to RN pool AND add allergy information to appointment notes    Allergies: Aspartame; Food; Saccharin; and Sucralose      Has the patient had a flu shot or any other vaccinations within 7 days before or after the procedure.  No     Does patient have an MRI/CT?  YES: MRI  (SI joint, hip injections, lumbar sympathetic blocks, and stellate ganglion blocks do not require an MRI)    Was the MRI done w/in the last 3 years?  Yes    Was MRI done at [place]? Yes      If not, where was it done? N/A       If MRI was not done at [place], [facility] or [facility] do NOT schedule and route to nursing.  If pt has an imaging disc, the injection may be scheduled but pt has to bring disc to appt. If they show up w/out disc the injection cannot be done    Reminders (please tell patient if applicable):       Instructed pt to arrive 30  minutes early for IV start if this is for a cervical procedure, ALL sympathetic (stellate ganglion, hypogastric, or lumbar sympathetic block) and all sedation procedures (RFA, spinal cord stimulation trials).  Not Applicable   -IVs are not routinely placed for Dr. Ramsay cervical cases   -Dr. Hickman: IVs for cervical ESIs and cervical TBDs (not CMBBs/facet inj)      If NPO for sedation, informed patient that it is okay to take medications with sips of water (except if they are to hold blood thinners).  Not Applicable   *DO take blood pressure medication if it is prescribed*      If this is for a cervical ALVA, informed patient that aspirin needs to be held for 6 days.   Not Applicable      For all patients not having spinal cord stimulator (SCS) trials or radiofrequency ablations (RFAs), informed patient:    IV sedation is not provided for this procedure.  If you feel that an oral anti-anxiety medication is needed, you can discuss this further with your referring provider or primary care provider.  The Pain Clinic provider will discuss specifics of what the procedure includes at your appointment.  Most procedures last 10-20 minutes.  We use numbing medications to help with any discomfort during the procedure.  Not Applicable      Do not schedule procedures requiring IV placement in the first appointment of the day or first appointment after lunch.       For patients 85 or older we recommend having an adult stay w/ them for the remainder of the day.       Does the patient have any questions?  NO  Jen Hernandez  [facility]

## 2018-02-08 NOTE — TELEPHONE ENCOUNTER
Yes, should be q48hr to match what he did with previous patch.    So quantity is correct, but directions should be changed.    Bindu Motley MD  [facility]

## 2018-02-09 NOTE — TELEPHONE ENCOUNTER
Confirmed with patient and pharmacist sig should read change every 48 hours.    JEREMIAH PeacockN, RN  Care Coordinator  [facility] [place]

## 2018-03-12 DIAGNOSIS — M96.1 FAILED BACK SURGICAL SYNDROME: ICD-10-CM

## 2018-03-12 DIAGNOSIS — G89.29 CHRONIC LOW BACK PAIN, UNSPECIFIED BACK PAIN LATERALITY, WITH SCIATICA PRESENCE UNSPECIFIED: ICD-10-CM

## 2018-03-12 DIAGNOSIS — M54.5 CHRONIC LOW BACK PAIN, UNSPECIFIED BACK PAIN LATERALITY, WITH SCIATICA PRESENCE UNSPECIFIED: ICD-10-CM

## 2018-03-12 RX ORDER — FENTANYL 25 UG/1
1 PATCH TRANSDERMAL
Qty: 15 PATCH | Refills: 0 | Status: SHIPPED | OUTPATIENT
Start: 2018-03-12 | End: 2018-04-20

## 2018-03-12 RX ORDER — OXYCODONE AND ACETAMINOPHEN 10; 325 MG/1; MG/1
.5-1 TABLET ORAL EVERY 6 HOURS PRN
Qty: 60 TABLET | Refills: 0 | Status: SHIPPED | OUTPATIENT
Start: 2018-03-12 | End: 2018-04-25

## 2018-03-12 NOTE — TELEPHONE ENCOUNTER
Wife, April will  script.  Notified patient it would be at the desk.    Next month, will consider decrease of the Percocet to 7.5/325mg dose.    Signed Prescriptions:                        Disp   Refills    oxyCODONE-acetaminophen (PERCOCET)  *60 tab*0        Sig: Take 0.5-1 tablets by mouth every 6 hours as needed           for moderate to severe pain . Max of 2/day.  May           fill on 3/18/18 and May start on 3/20/18  Authorizing Provider: YADIRA CARRILLO    fentaNYL (DURAGESIC) 25 mcg/hr 72 hr patch 15 pat*0        Sig: Place 1 patch onto the skin every 72 hours . Fill           on/after 3/18/18 to start on/after 3/20/18  Authorizing Provider: YADIRA CARRILLO MD on 3/12/2018 at 11:24 AM

## 2018-03-12 NOTE — TELEPHONE ENCOUNTER
Medication refill information reviewed.     Due date for oxyCODONE-acetaminophen (PERCOCET)  MG per tablet and fentaNYL (DURAGESIC) 25 mcg/hr 72 hr patch is 3/20/18     Prescriptions prepped for review.     Will route to provider.

## 2018-03-12 NOTE — TELEPHONE ENCOUNTER
Received call from patient requesting refill(s) of oxyCODONE-acetaminophen (PERCOCET)  MG per tablet   Last picked up from pharmacy on  2/21/18    fentaNYL (DURAGESIC) 25 mcg/hr 72 hr patch   2/15/18    Pt last seen by prescribing provider on 2/7/18  Next appt scheduled for 3/26/18    Last urine drug screen date 9/27/17  Current opioid agreement on file (completed within the last year) Yes Date of opioid agreement: 10/12/17    Processing (pick one and delete the others):   [facility] clinic    Syeda Montelongo MA  [facility]    Will route to nursing pool for review and preparation of prescription(s).

## 2018-03-12 NOTE — TELEPHONE ENCOUNTER
3/9 705pm    Refill Oxycodone and Fentanyl. Patient will . [phone]    Jen Hernandez    [facility]

## 2018-03-12 NOTE — TELEPHONE ENCOUNTER
Called patient at 13:23. Spoke with patient. Let him know the scripts will be waiting for him at the .  Wife April to .    Violet Mckenzie RT (R)

## 2018-03-26 ENCOUNTER — RADIANT APPOINTMENT (OUTPATIENT)
Dept: RADIOLOGY | Facility: CLINIC | Age: 48
End: 2018-03-26
Attending: PSYCHIATRY & NEUROLOGY
Payer: COMMERCIAL

## 2018-03-26 ENCOUNTER — RADIOLOGY INJECTION OFFICE VISIT (OUTPATIENT)
Dept: PALLIATIVE MEDICINE | Facility: CLINIC | Age: 48
End: 2018-03-26
Attending: PSYCHIATRY & NEUROLOGY
Payer: COMMERCIAL

## 2018-03-26 VITALS — SYSTOLIC BLOOD PRESSURE: 139 MMHG | OXYGEN SATURATION: 99 % | DIASTOLIC BLOOD PRESSURE: 92 MMHG | HEART RATE: 67 BPM

## 2018-03-26 DIAGNOSIS — M54.16 LUMBAR RADICULOPATHY: Primary | ICD-10-CM

## 2018-03-26 DIAGNOSIS — M54.16 LUMBAR RADICULOPATHY: ICD-10-CM

## 2018-03-26 PROCEDURE — 62323 NJX INTERLAMINAR LMBR/SAC: CPT | Performed by: PSYCHIATRY & NEUROLOGY

## 2018-03-26 PROCEDURE — 64483 NJX AA&/STRD TFRM EPI L/S 1: CPT | Mod: RT | Performed by: PSYCHIATRY & NEUROLOGY

## 2018-03-26 ASSESSMENT — PAIN SCALES - GENERAL: PAINLEVEL: SEVERE PAIN (7)

## 2018-03-26 NOTE — NURSING NOTE
"Chief Complaint   Patient presents with     Pain       Initial /88  Pulse 70 Estimated body mass index is 36.16 kg/(m^2) as calculated from the following:    Height as of 2/15/17: 1.778 m (5' 10\").    Weight as of 2/7/18: 114.3 kg (252 lb).  Medication Reconciliation: complete     Pre-procedure Intake    Have you been fasting? NA    If yes, for how long? No     Are you taking a prescribed blood thinner such as coumadin, Plavix, Xarelto?    No    If yes, when did you take your last dose? No     Do you take aspirin?  No    If cervical procedure, have you held aspirin for 6 days?   NA    Do you have any allergies to contrast dye, iodine, steroid and/or numbing medications?  NO    Are you currently taking antibiotics or have an active infection?  NO    Have you had a fever/elevated temperature within the past week? NO    Are you currently taking oral steroids? NO    Do you have a ? Yes       Are you pregnant or breastfeeding?  Not Applicable    Are the vital signs normal?  Yes      Aidan Naidu MA            "

## 2018-03-26 NOTE — MR AVS SNAPSHOT
After Visit Summary   3/26/2018    David Wilcox    MRN: [id number]           Patient Information     Date Of Birth          1970        Visit Information        Provider Department      3/26/2018 2:15 PM Carolann Motley MD [facility] [place]        Care Instructions    [facility]   Procedure Discharge Instructions    Today you saw:   Dr. Carolann Motley     You had an:  Trans foraminal Epidural steroid injection      Medications used:  Lidocaine   Dexamethasone  Omnipaque  Ropivicaine            Be cautious when walking. Numbness and/or weakness in the lower extremities may occur for up to 6-8 hours after the procedure due to effect of the local anesthetic    Do not drive for 6 hours. The effect of the local anesthetic could slow your reflexes.     You may resume your regular activities after 24 hours    Avoid strenuous activity for the first 24 hours    You may shower, however avoid swimming, tub baths or hot tubs for 24 hours following your procedure    You may have a mild to moderate increase in pain for several days following the injection.    It may take up to 14 days for the steroid medication to start working although you may feel the effect as early as a few days after the procedure.       You may use ice packs for 10-15 minutes, 3 to 4 times a day at the injection site for comfort    Do not use heat to painful areas for 6 to 8 hours. This will give the local anesthetic time to wear off and prevent you from accidentally burning your skin.     You may use anti-inflammatory medications (such as Ibuprofen or Aleve or Advil) or Tylenol for pain control if necessary    If you were fasting, you may resume your normal diet and medications after the procedure    If you experience any of the following, call the pain center nursing line during work hours at [phone] or the after hours provider line at [phone]:  -Fever over 100 degree F  -Swelling, bleeding,  "redness, drainage, warmth at the injection site  -Progressive weakness or numbness in your legs   -Loss of bowel or bladder function  -Unusual new onset of pain that is not improving      Phone #s:  Appointment line: [phone];  Nurse line: [phone]            Follow-ups after your visit        Your next 10 appointments already scheduled     2018  1:30 PM CDT   Return Visit with Carolann Motley MD   [facility] [place] ([facility] [place])    [id number]  [place] [address] [phone]              Who to contact     If you have questions or need follow up information about today's clinic visit or your schedule please contact [facility] directly at [phone].  Normal or non-critical lab and imaging results will be communicated to you by MyChart, letter or phone within 4 business days after the clinic has received the results. If you do not hear from us within 7 days, please contact the clinic through MyChart or phone. If you have a critical or abnormal lab result, we will notify you by phone as soon as possible.  Submit refill requests through [employer] or call your pharmacy and they will forward the refill request to us. Please allow 3 business days for your refill to be completed.          Additional Information About Your Visit        [employer] Information     [employer] lets you send messages to your doctor, view your test results, renew your prescriptions, schedule appointments and more. To sign up, go to [email or web address] . Click on \"Log in\" on the left side of the screen, which will take you to the Welcome page. Then click on \"Sign up Now\" on the right side of the page.     You will be asked to enter the access code listed below, as well as some personal information. Please follow the directions to create your username and password.     Your access code is: [email or web address]  Expires: 2018  2:51 PM     Your access code will  in 90 " days. If you need help or a new code, please call your [place] clinic or [phone].        Care EveryWhere ID     This is your Care EveryWhere ID. This could be used by other organizations to access your [place] medical records  [id number]        Your Vitals Were     Pulse                   70            Blood Pressure from Last 3 Encounters:   03/26/18 137/88   02/07/18 144/85   11/06/17 (!) 151/92    Weight from Last 3 Encounters:   02/07/18 114.3 kg (252 lb)   11/06/17 105.7 kg (233 lb)   09/27/17 104 kg (229 lb 3.2 oz)              Today, you had the following     No orders found for display       Primary Care Provider Office Phone # Fax #    Regan Llamas [phone] [address] [place] [id number]        Equal Access to Services     [facility]: Hadii aad ku hadasho Soomaali, waaxda luqadaha, qaybta kaalmada adeegyada, waxay irenain hayaan ulises rodriges . So [facility] [phone].    ATENCIÓN: Si habla español, tiene a chambers disposición servicios gratuitos de asistencia lingüística. Rex al [phone].    We comply with applicable federal civil rights laws and [place] laws. We do not discriminate on the basis of race, color, national origin, age, disability, sex, sexual orientation, or gender identity.            Thank you!     Thank you for choosing [facility]  for your care. Our goal is always to provide you with excellent care. Hearing back from our patients is one way we can continue to improve our services. Please take a few minutes to complete the written survey that you may receive in the mail after your visit with us. Thank you!             Your Updated Medication List - Protect others around you: Learn how to safely use, store and throw away your medicines at www.disposemymeds.org.          This list is accurate as of 3/26/18  2:44 PM.  Always use your most recent med list.                   Brand Name Dispense Instructions for use  Diagnosis    DULoxetine 60 MG EC capsule    CYMBALTA    90 capsule    Take 1 capsule (60 mg) by mouth daily    Recurrent major depression in complete remission (H)       * fentaNYL 12 mcg/hr 72 hr patch    DURAGESIC    15 patch    Place 1 patch onto the skin every 48 hours . May fill on/after 1/18/18 not to start till 1/20/18.    Failed back surgical syndrome       * fentaNYL 25 mcg/hr 72 hr patch    DURAGESIC    15 patch    Place 1 patch onto the skin every 72 hours . Fill on/after 3/18/18 to start on/after 3/20/18    Failed back surgical syndrome       gabapentin 600 MG tablet    NEURONTIN    540 tablet    Take 2 tablets (1,200 mg) by mouth 3 times daily . Increase to this dose as instructed in clinic. 3 month supply    Failed back surgical syndrome       LORazepam 0.5 MG tablet    ATIVAN    30 tablet    One bid prn anxiety. Rare use    Recurrent major depression in complete remission (H)       methocarbamol 500 MG tablet    ROBAXIN    360 tablet    Take 1-2 tablets (500-1,000 mg) by mouth 3 times daily as needed for muscle spasms . Caution for sedation    Chronic pain syndrome       mirtazapine 15 MG tablet    REMERON    30 tablet    Take 1 tablet (15 mg) by mouth At Bedtime    Recurrent major depression in complete remission (H), Primary insomnia       naloxone nasal spray    NARCAN    0.2 mL    Spray 1 spray (4 mg) into one nostril alternating nostrils as needed for opioid reversal every 2-3 minutes until assistance arrives    Chronic low back pain, unspecified back pain laterality, with sciatica presence unspecified, Failed back surgical syndrome       omeprazole 20 MG CR capsule    priLOSEC    90 capsule    Take 1 capsule (20 mg) by mouth daily .    Gastroesophageal reflux disease without esophagitis       oxyCODONE-acetaminophen  MG per tablet    PERCOCET    60 tablet    Take 0.5-1 tablets by mouth every 6 hours as needed for moderate to severe pain . Max of 2/day.  May fill on 3/18/18 and May start on  3/20/18    Chronic low back pain, unspecified back pain laterality, with sciatica presence unspecified       sildenafil 20 MG tablet    REVATIO    45 tablet    2-3 tabs daily for erectile dysfunction.    Erectile dysfunction, unspecified erectile dysfunction type       TYLENOL 500 MG tablet   Generic drug:  acetaminophen      3 po prn for pain        zolpidem 10 MG tablet    AMBIEN    60 tablet    Take 1 tablet (10 mg) by mouth nightly as needed for sleep    Primary insomnia       * Notice:  This list has 2 medication(s) that are the same as other medications prescribed for you. Read the directions carefully, and ask your doctor or other care provider to review them with you.

## 2018-03-26 NOTE — NURSING NOTE
Discharge Information    IV Discontiued Time:  NA    Amount of Fluid Infused:  NA    Discharge Criteria = When patient returns to baseline or as per MD order    Consciousness:  Pt is fully awake    Circulation:  BP +/- 20% of pre-procedure level    Respiration:  Patient is able to breathe deeply    O2 Sat:  Patient is able to maintain O2 Sat >92% on room air    Activity:  Moves 4 extremities on command    Ambulation:  Patient is able to stand and walk or stand and pivot into wheelchair    Dressing:  Clean/dry or No Dressing    Notes:   Discharge instructions and AVS given to patient    Patient meets criteria for discharge?  YES    Admitted to PCU?  No    Responsible adult present to accompany patient home?  Yes    Signature/Title:    Lelia Schneider RN Care Coordinator  [facility] [place]

## 2018-03-26 NOTE — PATIENT INSTRUCTIONS
[facility]   Procedure Discharge Instructions    Today you saw:   Dr. Carolann Motley     You had an:  Trans foraminal Epidural steroid injection      Medications used:  Lidocaine   Dexamethasone  Omnipaque  Ropivicaine            Be cautious when walking. Numbness and/or weakness in the lower extremities may occur for up to 6-8 hours after the procedure due to effect of the local anesthetic    Do not drive for 6 hours. The effect of the local anesthetic could slow your reflexes.     You may resume your regular activities after 24 hours    Avoid strenuous activity for the first 24 hours    You may shower, however avoid swimming, tub baths or hot tubs for 24 hours following your procedure    You may have a mild to moderate increase in pain for several days following the injection.    It may take up to 14 days for the steroid medication to start working although you may feel the effect as early as a few days after the procedure.       You may use ice packs for 10-15 minutes, 3 to 4 times a day at the injection site for comfort    Do not use heat to painful areas for 6 to 8 hours. This will give the local anesthetic time to wear off and prevent you from accidentally burning your skin.     You may use anti-inflammatory medications (such as Ibuprofen or Aleve or Advil) or Tylenol for pain control if necessary    If you were fasting, you may resume your normal diet and medications after the procedure    If you experience any of the following, call the pain center nursing line during work hours at [phone] or the after hours provider line at [phone]:  -Fever over 100 degree F  -Swelling, bleeding, redness, drainage, warmth at the injection site  -Progressive weakness or numbness in your legs   -Loss of bowel or bladder function  -Unusual new onset of pain that is not improving      Phone #s:  Appointment line: [phone];  Nurse line: [phone]

## 2018-03-26 NOTE — PROGRESS NOTES
Pre procedure Diagnosis: lumbar radiculopathy    Post procedure Diagnosis: Same  Procedure performed: attempted right L5/S1 transforaminal epidural steroid injection, transitioned to caudal epidural steroid injection   Anesthesia: none  Complications: none  Operators: Bindu Motley MD     Indications:   David Wilcox is a 47 year old male seen by me in clinic.  They have a history of low back and leg pain, with pain going into the right leg the worst..  Exam shows antalgic gait, decreased power right leg and they have tried conservative treatment including medications.    MRI was done on 1/8/18 which showed   IMPRESSION:    1. No evidence of epidural abscess. No evidence of arachnoiditis.  2. At L3-L4 there is moderate bilateral foraminal stenosis.  3. At L5-S1 there is moderate right and mild left foraminal stenosis,  likely not clinically significant at a solidly fused level.  4. Note that the facet screws were found to likely be loose on the  prior CT study. This cannot be determined due to metallic artifact on  MRI but is likely still present if there has been no surgery in  between these exams.    Options/alternatives, benefits and risks were discussed with the patient including bleeding, infection, tissue trauma, numbness, weakness, paralysis, spinal cord injury, radiation exposure, headache and reaction to medications.   Questions were answered to his satisfaction and he agrees to proceed. Voluntary informed consent was obtained and signed.     Vitals were reviewed: Yes  Allergies were reviewed:  Yes   Medications were reviewed:  Yes   Pre-procedure pain score: 7/10    Procedure:  After getting informed consent, patient was brought into the procedure suite and was placed in a prone position on the procedure table.   A Pause for the Cause was performed.  Patient was prepped and draped in sterile fashion.     After identifying the right L5/S1 neuroforamen, the C-arm was rotated to a right lateral oblique angle.   A total of 3ml of Lidocaine 1% was used to anesthetize the skin and the needle track at a skin entry site coaxial with the fluoroscopy beam, and overriding the superior aspect of the neuroforamen.  A 22 gauge 5 inch spinal needle was advanced, but unable to get into foramen due to bony material.  Needle redirected by unable to get into foramen.  Needle removed.    The sacral hiatus and cornua were palpated.  Under lateral fluoroscopic guidance sacral hiatus was identified.  2 ml of lidocaine 1% was then injected at the needle entry point to anesthetize the skin. Then a 22 gauge 5 inch quincke type spinal needle was inserted into sacral hiatus.  Needle was moved lower x1 occurrence.  Once the needle was advanced through the sacral hiatus, the needle angle was decreased to allow entrance into the sacral canal and epidural space.  This was verified in both the AP and lateral view for correct alignment.   Attempt to move more cranial led to inability to inject through the needle.  The needle was withdrawn to injected. The needle was advanced using intermittent fluoroscopy.  After negative aspiration for heme and CSF, 6ml of Omnipaque 300 contrast dye was injected.  The contrast showed good epidural spread, and was verified in both the AP and lateral view. 4ml was wasted.    The injection was then accomplished with 1.5ml of  0.5% ropivacaine, 10mg of dexamethasone and 3.5ml of preservative free saline.  Spread was noted up to L5. The needle was withdrawn, patient's back was cleaned, and they were brought to recovery area.     Hemostasis was achieved, the area was cleaned, and bandaids were placed when appropriate.  The patient tolerated the procedure well, and was taken to the recovery room.    Images were saved to PACS.    Post-procedure pain score: 2/10  Follow-up includes:   -f/u phone call in one week  -f/u with referring provider  Advised loss of bowel control that has been going on for a while not likely related  to spine, given his imaging in 2018 and symptoms before then.  Advised sometimes from numbing could have problems today, but in general I would not expect that from the back.    Bindu Motley MD  [facility]

## 2018-04-03 ENCOUNTER — TELEPHONE (OUTPATIENT)
Dept: PALLIATIVE MEDICINE | Facility: CLINIC | Age: 48
End: 2018-04-03

## 2018-04-03 NOTE — TELEPHONE ENCOUNTER
Patient had a caudal epidural steroid injection on 3/26/18.  Called patient for an update.      Pt reported the following details:  He said he now feels back to where he was before the injection. Said the week after the injection he had fluid retention, diarrhea and, in addition, an allergy attack due to accidentally eating artificial sweeteners. I  asked if pt felt he should speak to nursing but he said currently he feels well other than pain, and would prefer to wait a little longer to see if he will benefit from the injection.Told patient that the information will be forwarded to his provider.  Also explained that, if a steroid medication was used, it could take up to 14 days to feel the full effect and if pt has any further questions or concerns pt should call the clinic at [phone].

## 2018-04-20 DIAGNOSIS — M96.1 FAILED BACK SURGICAL SYNDROME: ICD-10-CM

## 2018-04-20 NOTE — TELEPHONE ENCOUNTER
Received call from patient requesting refill(s) of fentaNYL (DURAGESIC) 12 mcg/hr 72 hr patch  AND oxyCODONE-acetaminophen (PERCOCET)  MG per tablet    Last picked up from pharmacy on 3/21/18    Pt last seen by prescribing provider on 3/26/18  Next appt scheduled for 4/25/18     checked in the past 6 months? Yes If no, print current report and give to RN    Last urine drug screen date 9/27/17  Current opioid agreement on file (completed within the last year) Yes Date of opioid agreement: 10/11/17    Mail to [facility] [address] [place] [address]  Phone: [phone] Fax: [phone]    Syeda Montelongo MA  [facility]    Will route to nursing pool for review and preparation of prescription(s).

## 2018-04-20 NOTE — TELEPHONE ENCOUNTER
Medication refill information reviewed. Spoke to patient and he would like to pick this up at the [facility] on Monday as he forgot to call early enough. He has enough med until Monday only. Percocet was pended for 7.5 MG based on med note. Also sig on Fentanyl adjusted to reflect an every 48 hour use vs 72 hour use. Please review.    Due date for fentaNYL (DURAGESIC) 25mcg/hr 72 hr patch  AND oxyCODONE-acetaminophen (PERCOCET)  MG per tablet is 4/20/18     Prescriptions prepped for review.     Will route to provider.

## 2018-04-20 NOTE — TELEPHONE ENCOUNTER
4/19 921pm    Patient LM requesting refill Oxycodone and Fentanyl. [phone].    Jen Hernandez    [facility]

## 2018-04-23 RX ORDER — OXYCODONE AND ACETAMINOPHEN 7.5; 325 MG/1; MG/1
TABLET ORAL
Qty: 60 TABLET | Refills: 0 | Status: SHIPPED | OUTPATIENT
Start: 2018-04-23 | End: 2018-05-16

## 2018-04-23 RX ORDER — FENTANYL 25 UG/1
1 PATCH TRANSDERMAL
Qty: 15 PATCH | Refills: 0 | Status: SHIPPED | OUTPATIENT
Start: 2018-04-23 | End: 2018-05-16

## 2018-04-23 NOTE — TELEPHONE ENCOUNTER
Signed Prescriptions:                        Disp   Refills    fentaNYL (DURAGESIC) 25 mcg/hr 72 hr patch 15 pat*0        Sig: Place 1 patch onto the skin every 48 hours . Fill           on/after 4/22/18 to start on/after 4/22/18  Authorizing Provider: YADIRA CARRILLO    oxyCODONE-acetaminophen (PERCOCET) 7.5-325*60 tab*0        Sig: Take 0.5-1 tablets by mouth every 6 hours as needed           for moderate to severe pain . Max of 2/day.  May           fill on 4/22/18 and May start on 4/22/18  Authorizing Provider: YADIRA CARRILLO MD  [facility]

## 2018-04-23 NOTE — TELEPHONE ENCOUNTER
Signed Rx's placed in  tray, , Raphael. [place] reminder slip enclosed. Also enclosed consent to communicate form for patient to fill out and send back so that we can allow her to  future prescriptions.

## 2018-04-25 ENCOUNTER — OFFICE VISIT (OUTPATIENT)
Dept: PALLIATIVE MEDICINE | Facility: CLINIC | Age: 48
End: 2018-04-25
Payer: COMMERCIAL

## 2018-04-25 VITALS
SYSTOLIC BLOOD PRESSURE: 128 MMHG | BODY MASS INDEX: 33.47 KG/M2 | DIASTOLIC BLOOD PRESSURE: 78 MMHG | HEIGHT: 69 IN | HEART RATE: 79 BPM | WEIGHT: 226 LBS

## 2018-04-25 DIAGNOSIS — G89.4 CHRONIC PAIN SYNDROME: ICD-10-CM

## 2018-04-25 DIAGNOSIS — G89.29 CHRONIC LOW BACK PAIN, UNSPECIFIED BACK PAIN LATERALITY, WITH SCIATICA PRESENCE UNSPECIFIED: ICD-10-CM

## 2018-04-25 DIAGNOSIS — M54.5 CHRONIC LOW BACK PAIN, UNSPECIFIED BACK PAIN LATERALITY, WITH SCIATICA PRESENCE UNSPECIFIED: ICD-10-CM

## 2018-04-25 PROCEDURE — 99214 OFFICE O/P EST MOD 30 MIN: CPT | Performed by: PSYCHIATRY & NEUROLOGY

## 2018-04-25 RX ORDER — METHOCARBAMOL 500 MG/1
500-1000 TABLET, FILM COATED ORAL 3 TIMES DAILY PRN
Qty: 360 TABLET | Refills: 1 | Status: SHIPPED | OUTPATIENT
Start: 2018-04-25 | End: 2018-10-31

## 2018-04-25 RX ORDER — OXYCODONE AND ACETAMINOPHEN 10; 325 MG/1; MG/1
.5-1 TABLET ORAL EVERY 6 HOURS PRN
Qty: 60 TABLET | Refills: 0 | Status: SHIPPED | OUTPATIENT
Start: 2018-04-25 | End: 2018-04-25

## 2018-04-25 ASSESSMENT — PAIN SCALES - GENERAL: PAINLEVEL: EXTREME PAIN (8)

## 2018-04-25 NOTE — MR AVS SNAPSHOT
After Visit Summary   4/25/2018    David Wilcox    MRN: [id number]           Patient Information     Date Of Birth          1970        Visit Information        Provider Department      4/25/2018 1:30 PM Carolann Motley MD [facility]        Today's Diagnoses     Chronic low back pain, unspecified back pain laterality, with sciatica presence unspecified        Chronic pain syndrome          Care Instructions    1. Schedule imaging at [facility]  - I would like this in the next few days.  [facility]) imaging- [phone] or main number [phone]    2. Continue fentanyl patch at 1 patch every 48 hours.  3. You arleady have the new script of Percocet  5. I will call with MRI result  6. Update with next refill of fentanyl  7. Schedule follow up  3 months            Follow-ups after your visit        Future tests that were ordered for you today     Open Future Orders        Priority Expected Expires Ordered    MR Lumbar Spine w/o Contrast Routine  4/25/2019 4/25/2018            Who to contact     If you have questions or need follow up information about today's clinic visit or your schedule please contact [facility] directly at [phone].  Normal or non-critical lab and imaging results will be communicated to you by MyChart, letter or phone within 4 business days after the clinic has received the results. If you do not hear from us within 7 days, please contact the clinic through [facility] or phone. If you have a critical or abnormal lab result, we will notify you by phone as soon as possible.  Submit refill requests through [facility] or call your pharmacy and they will forward the refill request to us. Please allow 3 business days for your refill to be completed.          Additional Information About Your Visit        MyChart Information     [employer] lets you send messages to your doctor, view your test results, renew your prescriptions, schedule  "appointments and more. To sign up, go to [email or web address] . Click on \"Log in\" on the left side of the screen, which will take you to the Welcome page. Then click on \"Sign up Now\" on the right side of the page.     You will be asked to enter the access code listed below, as well as some personal information. Please follow the directions to create your username and password.     Your access code is: [email or web address]  Expires: 2018  2:51 PM     Your access code will  in 90 days. If you need help or a new code, please call your [place] clinic or [phone].        Care EveryWhere ID     This is your Care EveryWhere ID. This could be used by other organizations to access your [place] medical records  [id number]        Your Vitals Were     Pulse Height BMI (Body Mass Index)             79 1.753 m (5' 9\") 33.37 kg/m2          Blood Pressure from Last 3 Encounters:   18 128/78   18 (!) 139/92   18 144/85    Weight from Last 3 Encounters:   18 102.5 kg (226 lb)   18 114.3 kg (252 lb)   17 105.7 kg (233 lb)                 Today's Medication Changes          These changes are accurate as of 18  2:03 PM.  If you have any questions, ask your nurse or doctor.               These medicines have changed or have updated prescriptions.        Dose/Directions    oxyCODONE-acetaminophen 7.5-325 MG per tablet   Commonly known as:  PERCOCET   This may have changed:  Another medication with the same name was removed. Continue taking this medication, and follow the directions you see here.   Used for:  Failed back surgical syndrome        Take 0.5-1 tablets by mouth every 6 hours as needed for moderate to severe pain . Max of 2/day.  May fill on 18 and May start on 18   Quantity:  60 tablet   Refills:  0         Stop taking these medicines if you haven't already. Please contact your care team if you have questions.     sildenafil 20 MG tablet   Commonly known as:  " REVATIO                Where to get your medicines      These medications were sent to [facility] [id number] [place] [facility] - [address] [address]    Hours:  M-F 9-7 SAT 9-6 SUN 11-3 Phone:  [phone]     methocarbamol 500 MG tablet                Primary Care Provider Office Phone # Fax #    Regan Llamas [phone]       [address] [facility] [address]        Equal Access to Services     MELIDA FAIR : Hadii aad ku hadasho Soomaali, waaxda luqadaha, qaybta kaalmada adeegyada, waxay idiin hayaan adeeg kharash lakerline . So [facility] [phone].    ATENCIÓN: Si habla español, tiene a chambers disposición servicios gratuitos de asistencia lingüística. [facility] [phone].    We comply with applicable federal civil rights laws and [place] laws. We do not discriminate on the basis of race, color, national origin, age, disability, sex, sexual orientation, or gender identity.            Thank you!     Thank you for choosing [facility]  for your care. Our goal is always to provide you with excellent care. Hearing back from our patients is one way we can continue to improve our services. Please take a few minutes to complete the written survey that you may receive in the mail after your visit with us. Thank you!             Your Updated Medication List - Protect others around you: Learn how to safely use, store and throw away your medicines at www.disposemymeds.org.          This list is accurate as of 4/25/18  2:03 PM.  Always use your most recent med list.                   Brand Name Dispense Instructions for use Diagnosis    DULoxetine 60 MG EC capsule    CYMBALTA    90 capsule    Take 1 capsule (60 mg) by mouth daily    Recurrent major depression in complete remission (H)       fentaNYL 25 mcg/hr 72 hr patch    DURAGESIC    15 patch    Place 1 patch onto the skin every 48 hours . Fill on/after 4/22/18 to start on/after 4/22/18     Failed back surgical syndrome       gabapentin 600 MG tablet    NEURONTIN    540 tablet    Take 2 tablets (1,200 mg) by mouth 3 times daily . Increase to this dose as instructed in clinic. 3 month supply    Failed back surgical syndrome       LORazepam 0.5 MG tablet    ATIVAN    30 tablet    One bid prn anxiety. Rare use    Recurrent major depression in complete remission (H)       methocarbamol 500 MG tablet    ROBAXIN    360 tablet    Take 1-2 tablets (500-1,000 mg) by mouth 3 times daily as needed for muscle spasms . Caution for sedation    Chronic pain syndrome       mirtazapine 15 MG tablet    REMERON    30 tablet    Take 1 tablet (15 mg) by mouth At Bedtime    Recurrent major depression in complete remission (H), Primary insomnia       naloxone nasal spray    NARCAN    0.2 mL    Spray 1 spray (4 mg) into one nostril alternating nostrils as needed for opioid reversal every 2-3 minutes until assistance arrives    Chronic low back pain, unspecified back pain laterality, with sciatica presence unspecified, Failed back surgical syndrome       omeprazole 20 MG CR capsule    priLOSEC    90 capsule    Take 1 capsule (20 mg) by mouth daily .    Gastroesophageal reflux disease without esophagitis       oxyCODONE-acetaminophen 7.5-325 MG per tablet    PERCOCET    60 tablet    Take 0.5-1 tablets by mouth every 6 hours as needed for moderate to severe pain . Max of 2/day.  May fill on 4/22/18 and May start on 4/22/18    Failed back surgical syndrome       TYLENOL 500 MG tablet   Generic drug:  acetaminophen      3 po prn for pain        zolpidem 10 MG tablet    AMBIEN    60 tablet    Take 1 tablet (10 mg) by mouth nightly as needed for sleep    Primary insomnia

## 2018-04-25 NOTE — PATIENT INSTRUCTIONS
1. Schedule imaging at [facility]  - I would like this in the next few days.  [facility] ([place]) imaging- [phone] or main number [phone]    2. Continue fentanyl patch at 1 patch every 48 hours.  3. You arleady have the new script of Percocet  5. I will call with MRI result  6. Update with next refill of fentanyl  7. Schedule follow up  3 months

## 2018-04-25 NOTE — NURSING NOTE
"Chief Complaint   Patient presents with     Pain     Follow up        Initial /78  Pulse 79  Ht 1.753 m (5' 9\")  Wt 102.5 kg (226 lb)  BMI 33.37 kg/m2 Estimated body mass index is 33.37 kg/(m^2) as calculated from the following:    Height as of this encounter: 1.753 m (5' 9\").    Weight as of this encounter: 102.5 kg (226 lb).  Medication Reconciliation: complete     Aidan Naidu MA      "

## 2018-04-25 NOTE — PROGRESS NOTES
[facility]    Date of visit: 4/25/2018     Chief complaint:   Chief Complaint   Patient presents with     Pain     Follow up        Interval history:  David Wilcox was last seen by me on 2/7/18.      Recommendations/plan at the last visit included:  1. Physical Therapy: continue exercises as recommended by PT  2. Clinical Health Psychologist to address issues of relaxation, behavioral change, coping style, and other factors important to improvement.  Difficult due to work, but will look at this again next visit  3. Diagnostic Studies: none  4. Schedule epidural steroid injection - transforaminal epidural steroid injection vs interlam  5. Medication Management:    1. Change fentanyl patch to 25mcg/hr  2. Limit the Percocet, down to 1/day if able, may not be able to do that the first week.  3. With next refill, will need to determien due date for the meds and decrease quantity on the Percocet  6. Recommendations to PCP: none  7. Follow up: 3 months,   1.       Since his last visit, Davdi Wilcox reports:  -bad flare after his last caudal injection, now 2 months later getting back to   -flare has not gotten better  -willing to consider injection  -having some bowel and bladder loss of control.  This has been going on for 1 months.  At times he will have normal bowel movements. Happening 2-3 times per week.  Usually bowel.  -no fevers  -had some weakness in the legs but back to baseline.    Pain scores:  Pain intensity on average is 8 on a scale of 0-10.     Current pain treatments:   Percocet 10/325mg daily- takes in the afternoon.  Takes 1-2/day.- takes the edge off- lasts for 4 hours, 60 tabs/month  Fentanyl patch-25 mcg/48 hours  Robaxin (methocarbamol) 1000mg at night and in the morning- the morning dose has been helping  Gabapentin 1200 mg TID, beneficial  Cymbalta 60mg daily- tried going to 90mg/day but felt worse- for pain and mood    Previous medication treatments  included:  Hydrocodone- not helpful  Methadone- taking after 1st surgery  Naprosyn- not helpful  Flexeril- after first back injury (1990s)  Lidocaine- not helpful  Acetaminophen (tylenol)   Gabapentin    Other treatments have included:  David Wilcox has been seen at a pain clinic in the past.  MAPS- injections  PT: yes  Acupuncture: no  TENs Unit: yes- out of use- somewhat helpful  Injections: none since last surgery- very  helpful    Side Effects: Vertigo?    Medications:  Current Outpatient Prescriptions   Medication Sig Dispense Refill     DULoxetine (CYMBALTA) 60 MG EC capsule Take 1 capsule (60 mg) by mouth daily 90 capsule 6     fentaNYL (DURAGESIC) 25 mcg/hr 72 hr patch Place 1 patch onto the skin every 48 hours . Fill on/after 4/22/18 to start on/after 4/22/18 15 patch 0     gabapentin (NEURONTIN) 600 MG tablet Take 2 tablets (1,200 mg) by mouth 3 times daily . Increase to this dose as instructed in clinic. 3 month supply 540 tablet 1     LORazepam (ATIVAN) 0.5 MG tablet One bid prn anxiety. Rare use 30 tablet 0     methocarbamol (ROBAXIN) 500 MG tablet Take 1-2 tablets (500-1,000 mg) by mouth 3 times daily as needed for muscle spasms . Caution for sedation 360 tablet 1     mirtazapine (REMERON) 15 MG tablet Take 1 tablet (15 mg) by mouth At Bedtime 30 tablet 5     omeprazole (PRILOSEC) 20 MG CR capsule Take 1 capsule (20 mg) by mouth daily . 90 capsule 3     oxyCODONE-acetaminophen (PERCOCET)  MG per tablet Take 0.5-1 tablets by mouth every 6 hours as needed for moderate to severe pain . Max of 2/day.  May fill on 3/18/18 and May start on 3/20/18 60 tablet 0     sildenafil (REVATIO) 20 MG tablet 2-3 tabs daily for erectile dysfunction. 45 tablet 3     TYLENOL EXTRA STRENGTH 500 MG OR TABS 3 po prn for pain       zolpidem (AMBIEN) 10 MG tablet Take 1 tablet (10 mg) by mouth nightly as needed for sleep 60 tablet 2     naloxone (NARCAN) nasal spray Spray 1 spray (4 mg) into one nostril alternating  "nostrils as needed for opioid reversal every 2-3 minutes until assistance arrives (Patient not taking: Reported on 3/26/2018) 0.2 mL 0     oxyCODONE-acetaminophen (PERCOCET) 7.5-325 MG per tablet Take 0.5-1 tablets by mouth every 6 hours as needed for moderate to severe pain . Max of 2/day.  May fill on 4/22/18 and May start on 4/22/18 (Patient not taking: Reported on 4/25/2018) 60 tablet 0       Medical History: any changes in medical history since they were last seen? none    Review of Systems:  The 14 system ROS was reviewed from the intake questionnaire, and is positive for:, vertigo  Mood: stress, depression, anxiety. Denies suicidal ideation     Physical Exam:  Blood pressure 128/78, pulse 79, height 1.753 m (5' 9\"), weight 102.5 kg (226 lb).  General: awake, alert , cooperative  Gait: Antalgic on the right, uses a single end cane  MSK exam: uses cane for ambulation. Pain with lumbar ROM, tenderness to palpation  Normal sensation to LT  Mild weakness hip flexion, otherwise normal    Assessment:   1. 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    2. Facet arthropathy likely contributing above the level of the future  3. Distant history of alcohol and drug abuse   4. Bipolar disorder- currently managed  5. H/o meningitis after SCS trial      Plan:  1. Physical Therapy: continue exercises as recommended by PT  2. Clinical Health Psychologist to address issues of relaxation, behavioral change, coping style, and other factors important to improvement.  Difficult due to work, but will look at this again next visit  3. Diagnostic Studies: MRI lumbar spine to make sure no concerns from the injection  4. Schedule epidural steroid injection - transforaminal epidural steroid injection vs interlam  5. Medication Management:    1. Continue fentanyl patch  2. Given an update with next refill  6. Recommendations to PCP: none  7. Follow up: 3 months,       Total time spent was 30 minutes, and " more than 50% of face to face time was spent in counseling and/or coordination of care regarding medications, imaging.   Bindu Motley MD  [facility]

## 2018-04-27 ENCOUNTER — HOSPITAL ENCOUNTER (OUTPATIENT)
Dept: MRI IMAGING | Facility: CLINIC | Age: 48
Discharge: HOME | End: 2018-04-27
Attending: PSYCHIATRY & NEUROLOGY | Admitting: PSYCHIATRY & NEUROLOGY
Payer: MEDICARE

## 2018-04-27 ENCOUNTER — TELEPHONE (OUTPATIENT)
Dept: PALLIATIVE MEDICINE | Facility: CLINIC | Age: 48
End: 2018-04-27

## 2018-04-27 DIAGNOSIS — M54.16 LUMBAR RADICULOPATHY: Primary | ICD-10-CM

## 2018-04-27 DIAGNOSIS — M54.5 CHRONIC LOW BACK PAIN, UNSPECIFIED BACK PAIN LATERALITY, WITH SCIATICA PRESENCE UNSPECIFIED: ICD-10-CM

## 2018-04-27 DIAGNOSIS — G89.29 CHRONIC LOW BACK PAIN, UNSPECIFIED BACK PAIN LATERALITY, WITH SCIATICA PRESENCE UNSPECIFIED: ICD-10-CM

## 2018-04-27 PROCEDURE — 72158 MRI LUMBAR SPINE W/O & W/DYE: CPT

## 2018-04-27 PROCEDURE — A9585 GADOBUTROL INJECTION: HCPCS | Performed by: RADIOLOGY

## 2018-04-27 PROCEDURE — 25000128 H RX IP 250 OP 636: Performed by: RADIOLOGY

## 2018-04-27 RX ORDER — TOPIRAMATE 25 MG/1
TABLET, FILM COATED ORAL
Qty: 120 TABLET | Refills: 1 | Status: SHIPPED | OUTPATIENT
Start: 2018-04-27 | End: 2018-05-07

## 2018-04-27 RX ORDER — GADOBUTROL 604.72 MG/ML
10 INJECTION INTRAVENOUS ONCE
Status: COMPLETED | OUTPATIENT
Start: 2018-04-27 | End: 2018-04-27

## 2018-04-27 RX ADMIN — GADOBUTROL 10 ML: 604.72 INJECTION INTRAVENOUS at 15:16

## 2018-04-27 NOTE — TELEPHONE ENCOUNTER
Called patient with MRI results.  He had some bowel and bladder issues, as well as pain, weakness after last injection about 1 month ago.    Lumbar spine imaging is mostly unchanged, no reason for symtoms in imaging.  Advised he make an appt with PCP    He is on max dose of gabapentin and higher doses of cymbalta caused problems.    Plan:  topamax titration.  Outlined and read back by patient.    1 tab= 25mg  AM   PM  0   25mg (1 tab).  If tolerating, after 1 week go to the next line.  25mg (1 tab)  25mg (1 tab).  If tolerating, after 1 week go to the next line.  25mg (1 tab)  50mg (2 tabs).  If tolerating, after 1 week go to the next line.  50mg (2 tabs)  50mg (2 tabs). Call us when you are at this dose or with any concerns    -remain well hydrated, due to risk of kidney stones  -do not drive until you know how it affects you  -new numbness or tingling in the toes may be related to how well hydrated you are- if this occurs- drink more fluids and it should get better     Allergy of saccharin noted when singing topamax order, called [facility] pharmacy- unclear of what this reaction is, but could be related to sulfa.  Patient has tolerated sulfa.  Advised of potential for reaction but hard to say if this will happen.  Patient understands.    Bindu Carrillo MD  [facility]     Signed Prescriptions:                        Disp   Refills    topiramate (TOPAMAX) 25 MG tablet          120 ta*1        Sig: Take 25mg (1 tab) by mouth at bedtime.  Increase by           25mg (1 tab) every 7 days, to goal of 50mg (2           tabs) twice daily  Authorizing Provider: YADIRA CARRILLO

## 2018-04-30 ENCOUNTER — TELEPHONE (OUTPATIENT)
Dept: PALLIATIVE MEDICINE | Facility: CLINIC | Age: 48
End: 2018-04-30

## 2018-04-30 DIAGNOSIS — M54.16 LUMBAR RADICULOPATHY: Primary | ICD-10-CM

## 2018-04-30 NOTE — TELEPHONE ENCOUNTER
Received letter from pharmacy stating patient is requesting changing his Rx (topiramate (TOPAMAX) 25 MG tablet) for the fallowing reasons: patient is concerned about this medication because it has polysorbate 80 as an active ingredient. He did not want to take Topamax because of that. Patient is requesting alternative medication.     Will route to Dr. Motley for further decisions.       Syeda Montelongo MA  [facility]

## 2018-04-30 NOTE — TELEPHONE ENCOUNTER
I believe this is what led to the warning we discussed on Friday.    I am concerned about TCA in the setting of Cymbalta. There is an interaction.  I would be willing to do low dose but not much further.  This would be nortriptyline which is sedating which is why it is used at night (and given to patients with sleep problems).  When he first tries, he should not have to get up and go the next morning as sometimes it can last slightly into the morning.  He should have someone with him in case he is overly sedated by it.  Other side effect is dry mouth.    He is already on gabapentin, so I would not use Lyrica.  He has already tried higher doses of Cymbalta.     Other than the nortripyline, I don't have something different to offer him.    Bindu Motley MD  [facility]

## 2018-05-01 RX ORDER — NORTRIPTYLINE HCL 10 MG
10 CAPSULE ORAL AT BEDTIME
Qty: 60 CAPSULE | Refills: 3 | Status: SHIPPED | OUTPATIENT
Start: 2018-05-01 | End: 2018-09-04

## 2018-05-01 NOTE — TELEPHONE ENCOUNTER
Signed Prescriptions:                        Disp   Refills    nortriptyline (PAMELOR) 10 MG capsule      60 cap*3        Sig: Take 1 capsule (10 mg) by mouth At Bedtime . If           tolerating, after 1 week increase to 20mg. Call           when at this dose or any concerns.  Authorizing Provider: YADIRA CARRILLO MD  [facility]

## 2018-05-01 NOTE — TELEPHONE ENCOUNTER
Reviewed the information below with the patient and he will give the Nortriptyline a try. Topamax was declined at the pharmacy related to an allergy of an ingredient in the coating. Pharmacy selected.    JEREMIAH PeacockN, RN  Care Coordinator  [facility] [place]

## 2018-05-07 ENCOUNTER — OFFICE VISIT (OUTPATIENT)
Dept: FAMILY MEDICINE | Facility: CLINIC | Age: 48
End: 2018-05-07
Payer: COMMERCIAL

## 2018-05-07 VITALS
BODY MASS INDEX: 33.37 KG/M2 | HEART RATE: 78 BPM | SYSTOLIC BLOOD PRESSURE: 136 MMHG | RESPIRATION RATE: 24 BRPM | OXYGEN SATURATION: 97 % | DIASTOLIC BLOOD PRESSURE: 84 MMHG | TEMPERATURE: 97.8 F | WEIGHT: 226 LBS

## 2018-05-07 DIAGNOSIS — G89.4 CHRONIC PAIN SYNDROME: ICD-10-CM

## 2018-05-07 DIAGNOSIS — F33.42 RECURRENT MAJOR DEPRESSION IN COMPLETE REMISSION (H): Primary | ICD-10-CM

## 2018-05-07 DIAGNOSIS — R15.9 INCONTINENCE OF FECES, UNSPECIFIED FECAL INCONTINENCE TYPE: ICD-10-CM

## 2018-05-07 DIAGNOSIS — F51.01 PRIMARY INSOMNIA: ICD-10-CM

## 2018-05-07 DIAGNOSIS — F11.20 CONTINUOUS OPIOID DEPENDENCE (H): ICD-10-CM

## 2018-05-07 PROCEDURE — 99214 OFFICE O/P EST MOD 30 MIN: CPT | Performed by: FAMILY MEDICINE

## 2018-05-07 RX ORDER — MIRTAZAPINE 15 MG/1
15 TABLET, FILM COATED ORAL AT BEDTIME
Qty: 90 TABLET | Refills: 3 | Status: SHIPPED | OUTPATIENT
Start: 2018-05-07 | End: 2019-04-30

## 2018-05-07 ASSESSMENT — PATIENT HEALTH QUESTIONNAIRE - PHQ9: 5. POOR APPETITE OR OVEREATING: MORE THAN HALF THE DAYS

## 2018-05-07 ASSESSMENT — ANXIETY QUESTIONNAIRES
1. FEELING NERVOUS, ANXIOUS, OR ON EDGE: SEVERAL DAYS
5. BEING SO RESTLESS THAT IT IS HARD TO SIT STILL: MORE THAN HALF THE DAYS
6. BECOMING EASILY ANNOYED OR IRRITABLE: SEVERAL DAYS
GAD7 TOTAL SCORE: 9
3. WORRYING TOO MUCH ABOUT DIFFERENT THINGS: MORE THAN HALF THE DAYS
2. NOT BEING ABLE TO STOP OR CONTROL WORRYING: SEVERAL DAYS
7. FEELING AFRAID AS IF SOMETHING AWFUL MIGHT HAPPEN: NOT AT ALL

## 2018-05-07 ASSESSMENT — PAIN SCALES - GENERAL: PAINLEVEL: SEVERE PAIN (7)

## 2018-05-07 NOTE — MR AVS SNAPSHOT
"              After Visit Summary   5/7/2018    David Wilcox    MRN: [id number]           Patient Information     Date Of Birth          1970        Visit Information        Provider Department      5/7/2018 11:00 AM Regan Llamas MD [facility]        Today's Diagnoses     Recurrent major depression in complete remission (H)    -  1    Primary insomnia        Chronic pain syndrome        Continuous opioid dependence (H)        Incontinence of feces, unspecified fecal incontinence type           Follow-ups after your visit        Your next 10 appointments already scheduled     Jul 30, 2018 11:30 AM CDT   Return Visit with Carolann Motley MD   [facility] ([facility])    [address] [facility] [address] [facility] [address] [phone]              Who to contact     If you have questions or need follow up information about today's clinic visit or your schedule please contact [facility] directly at [phone].  Normal or non-critical lab and imaging results will be communicated to you by MyChart, letter or phone within 4 business days after the clinic has received the results. If you do not hear from us within 7 days, please contact the clinic through [employer] or phone. If you have a critical or abnormal lab result, we will notify you by phone as soon as possible.  Submit refill requests through [employer] or call your pharmacy and they will forward the refill request to us. Please allow 3 business days for your refill to be completed.          Additional Information About Your Visit        [employer] Information     [employer] lets you send messages to your doctor, view your test results, renew your prescriptions, schedule appointments and more. To sign up, go to [email or web address] . Click on \"Log in\" on the left side of the screen, which will take you to the Welcome page. Then click on \"Sign up Now\" on the right side of the page.     You " will be asked to enter the access code listed below, as well as some personal information. Please follow the directions to create your username and password.     Your access code is: [email or web address]  Expires: 2018  2:51 PM     Your access code will  in 90 days. If you need help or a new code, please call your [place] [facility] clinic or [phone].        Care EveryWhere ID     This is your Care EveryWhere ID. This could be used by other organizations to access your [facility] medical records  [id number]        Your Vitals Were     Pulse Temperature Respirations Pulse Oximetry BMI (Body Mass Index)       78 97.8  F (36.6  C) (Tympanic) 24 97% 33.37 kg/m2        Blood Pressure from Last 3 Encounters:   18 136/84   18 128/78   18 (!) 139/92    Weight from Last 3 Encounters:   18 226 lb (102.5 kg)   18 226 lb (102.5 kg)   18 252 lb (114.3 kg)              Today, you had the following     No orders found for display         Today's Medication Changes          These changes are accurate as of 18 11:45 AM.  If you have any questions, ask your nurse or doctor.               Stop taking these medicines if you haven't already. Please contact your care team if you have questions.     topiramate 25 MG tablet   Commonly known as:  TOPAMAX   Stopped by:  Regan Llamas MD                Where to get your medicines      These medications were sent to [place] [address] [place] [address] [address]    Hours:  M-F 9-7 SAT 9-6 SUN 11-3 Phone:  [phone]     mirtazapine 15 MG tablet                Primary Care Provider Office Phone # Fax #    Regan Llamas [phone] [address] [place] [address]        Equal Access to Services     [facility] AH: Gurpreet jackson hadasho Soomaali, waaxda luqadaha, qaybta kaalmada adeegyada, waxivette rodriges . So [facility]  [phone].    ATENCIÓN: Si marcus dennis, tiene a chambers disposición servicios gratuitos de asistencia lingüística. Rex mayes [phone].    We comply with applicable federal civil rights laws and [place] laws. We do not discriminate on the basis of race, color, national origin, age, disability, sex, sexual orientation, or gender identity.            Thank you!     Thank you for choosing [facility]  for your care. Our goal is always to provide you with excellent care. Hearing back from our patients is one way we can continue to improve our services. Please take a few minutes to complete the written survey that you may receive in the mail after your visit with us. Thank you!             Your Updated Medication List - Protect others around you: Learn how to safely use, store and throw away your medicines at www.disposemymeds.org.          This list is accurate as of 5/7/18 11:45 AM.  Always use your most recent med list.                   Brand Name Dispense Instructions for use Diagnosis    DULoxetine 60 MG EC capsule    CYMBALTA    90 capsule    Take 1 capsule (60 mg) by mouth daily    Recurrent major depression in complete remission (H)       fentaNYL 25 mcg/hr 72 hr patch    DURAGESIC    15 patch    Place 1 patch onto the skin every 48 hours . Fill on/after 4/22/18 to start on/after 4/22/18    Failed back surgical syndrome       gabapentin 600 MG tablet    NEURONTIN    540 tablet    Take 2 tablets (1,200 mg) by mouth 3 times daily . Increase to this dose as instructed in clinic. 3 month supply    Failed back surgical syndrome       LORazepam 0.5 MG tablet    ATIVAN    30 tablet    One bid prn anxiety. Rare use    Recurrent major depression in complete remission (H)       methocarbamol 500 MG tablet    ROBAXIN    360 tablet    Take 1-2 tablets (500-1,000 mg) by mouth 3 times daily as needed for muscle spasms . Caution for sedation    Chronic pain syndrome       mirtazapine 15 MG tablet    REMERON     90 tablet    Take 1 tablet (15 mg) by mouth At Bedtime    Recurrent major depression in complete remission (H), Primary insomnia       naloxone nasal spray    NARCAN    0.2 mL    Spray 1 spray (4 mg) into one nostril alternating nostrils as needed for opioid reversal every 2-3 minutes until assistance arrives    Chronic low back pain, unspecified back pain laterality, with sciatica presence unspecified, Failed back surgical syndrome       nortriptyline 10 MG capsule    PAMELOR    60 capsule    Take 1 capsule (10 mg) by mouth At Bedtime . If tolerating, after 1 week increase to 20mg. Call when at this dose or any concerns.    Lumbar radiculopathy       omeprazole 20 MG CR capsule    priLOSEC    90 capsule    Take 1 capsule (20 mg) by mouth daily .    Gastroesophageal reflux disease without esophagitis       oxyCODONE-acetaminophen 7.5-325 MG per tablet    PERCOCET    60 tablet    Take 0.5-1 tablets by mouth every 6 hours as needed for moderate to severe pain . Max of 2/day.  May fill on 4/22/18 and May start on 4/22/18    Failed back surgical syndrome       TYLENOL 500 MG tablet   Generic drug:  acetaminophen      3 po prn for pain        zolpidem 10 MG tablet    AMBIEN    60 tablet    Take 1 tablet (10 mg) by mouth nightly as needed for sleep    Primary insomnia

## 2018-05-07 NOTE — PROGRESS NOTES
SUBJECTIVE:   David Wilcox is a 47 year old male who presents to clinic today for the following health issues:      Medication Followup     Taking Medication as prescribed: yes    Side Effects:  None    Medication Helping Symptoms:  yes       S: David Wilcox is a 47 year old male with remeron.  Sleep and depression.  Improved sleep some, alternates months with it and ambien.      Depression: improved some with better sleep    Chronic pain: has found a regimen that works.  Some low dose TCA at night now too.  Is going to titrate to 20mg if possible.     Once a month will have some fecal incontinence.  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  Is doing kegel type exercises he learned in previous rehab from this.     Can't predict when it happens    Problem list, med list, additional histories reviewed and updated, as indicated.      No fever.  No new pains    O:/84  Pulse 78  Temp 97.8  F (36.6  C) (Tympanic)  Resp 24  Wt 226 lb (102.5 kg)  SpO2 97%  BMI 33.37 kg/m2   GEN: Alert and oriented, in no acute distress  Walking with cane    A: depression, some improvement      Insomnia, ongoing ,some improvement      Chronic pain, opioid dependent        Opioid dependency, continuous        Fecal incontinence, occasional    P: he wasn't interested in incontinence therapy.  Will see how it goes for now    Fills on remeron.  Stay on ambien too, should have enough for a  Few more months.  Fills on that OK long term.  opioids per chronic pain clinic.

## 2018-05-08 ASSESSMENT — PATIENT HEALTH QUESTIONNAIRE - PHQ9: SUM OF ALL RESPONSES TO PHQ QUESTIONS 1-9: 10

## 2018-05-08 ASSESSMENT — ANXIETY QUESTIONNAIRES: GAD7 TOTAL SCORE: 9

## 2018-05-16 DIAGNOSIS — M96.1 FAILED BACK SURGICAL SYNDROME: ICD-10-CM

## 2018-05-16 RX ORDER — OXYCODONE AND ACETAMINOPHEN 7.5; 325 MG/1; MG/1
TABLET ORAL
Qty: 60 TABLET | Refills: 0 | Status: SHIPPED | OUTPATIENT
Start: 2018-05-16 | End: 2018-06-26

## 2018-05-16 RX ORDER — FENTANYL 25 UG/1
1 PATCH TRANSDERMAL
Qty: 15 PATCH | Refills: 0 | Status: SHIPPED | OUTPATIENT
Start: 2018-05-16 | End: 2018-06-26

## 2018-05-16 NOTE — TELEPHONE ENCOUNTER
Medication refill information reviewed.     Due date for  fentaNYL (DURAGESIC) 25 mcg/hr 72 hr patch  AND oxyCODONE-acetaminophen (PERCOCET) 7.5-325 MG per tablet  is 5/22/18     Prescriptions prepped for review.     Will route to provider.

## 2018-05-16 NOTE — TELEPHONE ENCOUNTER
Signed Prescriptions:                        Disp   Refills    fentaNYL (DURAGESIC) 25 mcg/hr 72 hr patch 15 pat*0        Sig: Place 1 patch onto the skin every 48 hours . Fill           on/after 5/20/18 to start on/after 5/22/18  Authorizing Provider: YADIRA CARRILLO    oxyCODONE-acetaminophen (PERCOCET) 7.5-325*60 tab*0        Sig: Take 0.5-1 tablets by mouth every 6 hours as needed           for moderate to severe pain . Max of 2/day.  May           fill on 5/20/18 and May start on 5/22/18  Authorizing Provider: YADIRA CARRILLO MD  [facility]

## 2018-05-16 NOTE — TELEPHONE ENCOUNTER
Received call from patient requesting refill(s) of fentaNYL (DURAGESIC) 25 mcg/hr 72 hr patch  AND oxyCODONE-acetaminophen (PERCOCET) 7.5-325 MG per tablet   Last picked up from pharmacy on 4/23/18    nortriptyline (PAMELOR) 10 MG capsule  Pt have refills left and will call the pharmacy to fill.     Pt last seen by prescribing provider on 4/25/18  Next appt scheduled for 7/30/18     checked in the past 6 months? Yes If no, print current report and give to RN    Last urine drug screen date 9/27/17  Current opioid agreement on file (completed within the last year) Yes Date of opioid agreement: 10/12/17    Processing (pick one and delete the others):  Patient's wife will  at [facility]    Syeda Montelongo MA  [facility]    Will route to nursing pool for review and preparation of prescription(s).

## 2018-05-16 NOTE — TELEPHONE ENCOUNTER
Patient left  5/15 at 9:37 pm      Rx- Fentanyl, Oxycodone, Nortriptyline         Venus REZA    [facility]

## 2018-07-25 DIAGNOSIS — M96.1 FAILED BACK SURGICAL SYNDROME: ICD-10-CM

## 2018-07-25 RX ORDER — OXYCODONE AND ACETAMINOPHEN 7.5; 325 MG/1; MG/1
TABLET ORAL
Qty: 60 TABLET | Refills: 0 | Status: SHIPPED | OUTPATIENT
Start: 2018-07-25 | End: 2018-10-31

## 2018-07-25 RX ORDER — FENTANYL 25 UG/1
1 PATCH TRANSDERMAL
Qty: 15 PATCH | Refills: 0 | Status: SHIPPED | OUTPATIENT
Start: 2018-07-25 | End: 2018-10-31

## 2018-07-25 NOTE — TELEPHONE ENCOUNTER
Received call from patient requesting refill(s) of oxyCODONE-acetaminophen (PERCOCET) 7.5-325 MG per tablet  AND fentaNYL (DURAGESIC) 25 mcg/hr 72 hr patch    Last picked up from pharmacy on 6/29/18    Pt last seen by prescribing provider on 4/25/18  Next appt scheduled for 7/30/18     checked in the past 6 months? Yes If no, print current report and give to RN    Last urine drug screen date 9/27/18  Current opioid agreement on file (completed within the last year) Yes Date of opioid agreement: 10/12/17    Processing (pick one and delete the others):   [facility]    Syeda Montelongo MA  [facility]    Will route to nursing pool for review and preparation of prescription(s).

## 2018-07-25 NOTE — TELEPHONE ENCOUNTER
Signed Prescriptions:                        Disp   Refills    fentaNYL (DURAGESIC) 25 mcg/hr 72 hr patch 15 pat*0        Sig: Place 1 patch onto the skin every 48 hours . Fill           on/after 7/27/18 to start on/after 7/31/18  Authorizing Provider: INA LOPEZ    oxyCODONE-acetaminophen (PERCOCET) 7.5-325*60 tab*0        Sig: Take 0.5-1 tablets by mouth every 6 hours as needed           for moderate to severe pain . Max of 2/day.  May           fill on 7/25/18 and May start on 7/27/18  Authorizing Provider: INA LOPEZ    Checked [place] , no concerning fills.   UDS and OA are up to date.   Signing for colleague who is out of the office this week.     Signed script placed in locked in top drawer of trigger point cart at [facility]    Ina TORIBIO RN CNP, FNP  [facility] [place]

## 2018-07-25 NOTE — TELEPHONE ENCOUNTER
Reason for call:  Medication   If this is a refill request, has the caller requested the refill from the pharmacy already? N/A  Will the patient be using a [facility]? N/A  Name of the pharmacy and phone number for the current request: N/A     Name of the medication requested: fentaNYL (DURAGESIC) 25 mcg/hr 72 hr patch and oxyCODONE-acetaminophen (PERCOCET) 7.5-325 MG per tablet     Other request: Patient will p/u at clinic. Pt will also bring signed form with wife so wife can p/u script for him.     Phone number to reach patient:  Home number on file [phone] (home)      Hilda GOULD    [facility] [place]

## 2018-07-25 NOTE — TELEPHONE ENCOUNTER
Medication refill information reviewed.     Due date for oxyCODONE-acetaminophen (PERCOCET) 7.5-325 MG per tablet is 7/27/18  AND fentaNYL (DURAGESIC) 25 mcg/hr 72 hr patch is 7/31/18     Prescriptions prepped for review.     Will route to provider.

## 2018-07-26 NOTE — TELEPHONE ENCOUNTER
Called pt and let him know that the prescriptions are ready to be picked up.   Verified that his wife, April, will  the prescriptions and will bring in the signed consent.     Prescriptions put at the  at the [facility], 2nd floor.     JOSEE Grissom, RN-BC  Patient Care Supervisor/Care Coordinator  [facility] [place]

## 2018-07-30 ENCOUNTER — OFFICE VISIT (OUTPATIENT)
Dept: PALLIATIVE MEDICINE | Facility: CLINIC | Age: 48
End: 2018-07-30
Payer: COMMERCIAL

## 2018-07-30 VITALS — SYSTOLIC BLOOD PRESSURE: 134 MMHG | HEART RATE: 67 BPM | DIASTOLIC BLOOD PRESSURE: 86 MMHG

## 2018-07-30 DIAGNOSIS — G89.4 CHRONIC PAIN SYNDROME: Primary | ICD-10-CM

## 2018-07-30 DIAGNOSIS — M96.1 FAILED BACK SURGICAL SYNDROME: ICD-10-CM

## 2018-07-30 PROCEDURE — 99214 OFFICE O/P EST MOD 30 MIN: CPT | Performed by: PSYCHIATRY & NEUROLOGY

## 2018-07-30 RX ORDER — OXYCODONE AND ACETAMINOPHEN 10; 325 MG/1; MG/1
.5-1 TABLET ORAL EVERY 6 HOURS PRN
Qty: 60 TABLET | Refills: 0 | Status: SHIPPED | OUTPATIENT
Start: 2018-07-30 | End: 2018-08-28

## 2018-07-30 RX ORDER — FENTANYL 12.5 UG/1
1 PATCH TRANSDERMAL
Qty: 15 PATCH | Refills: 0 | Status: SHIPPED | OUTPATIENT
Start: 2018-07-30 | End: 2018-08-28

## 2018-07-30 ASSESSMENT — PAIN SCALES - GENERAL: PAINLEVEL: EXTREME PAIN (8)

## 2018-07-30 NOTE — PATIENT INSTRUCTIONS
1. You are going to change the fentanyl to 12 mcg/hr patches  2. You are also going to change the Percocet to the higher dose of 10/325mg.  This will account for the change of the fentanyl.  Max of 2/day.    3. Follow up in 3 months.    ----------------------------------------------------------------  Nurse Triage line:  [phone]   Call this number with any questions or concerns. You may leave a detailed message anytime. Calls are typically returned Monday through Friday between 8 AM and 4:30 PM. We usually get back to you within 2 business days depending on the issue/request.       Medication refills:    For non-narcotic medications, call your pharmacy directly to request a refill. The pharmacy will contact the [facility] for authorization. Please allow 3-4 days for these refills to be processed.     For narcotic refills, call the nurse triage line or send a [employer] message. Please contact us 7-10 days before your refill is due. The message MUST include the name of the specific medication(s) requested and how you would like to receive the prescription(s). The options are as follows:    Pain Clinic staff can mail the prescription to your pharmacy. Please tell us the name of the pharmacy.    You may pick the prescription up at the Pain Clinic (tell us the location) or during a clinic visit with your pain provider    Pain Clinic staff can deliver the prescription to the [place] pharmacy in the clinic building. Please tell us the location.      Scheduling number: [phone].  Call this number to schedule or change appointments.    We believe regular attendance is key to your success in our program.    Any time you are unable to keep your appointment we ask that you call us at least 24 hours in advance to let us know. This will allow us to offer the appointment time to another patient.

## 2018-07-30 NOTE — MR AVS SNAPSHOT
After Visit Summary   7/30/2018    David Wilcox    MRN: [id number]           Patient Information     Date Of Birth          1970        Visit Information        Provider Department      7/30/2018 11:30 AM Carolann Motley MD [facility]        Today's Diagnoses     Failed back surgical syndrome          Care Instructions    1. You are going to change the fentanyl to 12 mcg/hr patches  2. You are also going to change the Percocet to the higher dose of 10/325mg.  This will account for the change of the fentanyl.  Max of 2/day.    3. Follow up in 3 months.    ----------------------------------------------------------------  Nurse Triage line:  [phone]   Call this number with any questions or concerns. You may leave a detailed message anytime. Calls are typically returned Monday through Friday between 8 AM and 4:30 PM. We usually get back to you within 2 business days depending on the issue/request.       Medication refills:    For non-narcotic medications, call your pharmacy directly to request a refill. The pharmacy will contact the [facility] for authorization. Please allow 3-4 days for these refills to be processed.     For narcotic refills, call the nurse triage line or send a [employer] message. Please contact us 7-10 days before your refill is due. The message MUST include the name of the specific medication(s) requested and how you would like to receive the prescription(s). The options are as follows:    Pain Clinic staff can mail the prescription to your pharmacy. Please tell us the name of the pharmacy.    You may pick the prescription up at the Pain Clinic (tell us the location) or during a clinic visit with your pain provider    Pain Clinic staff can deliver the prescription to the [facility] pharmacy in the clinic building. Please tell us the location.      Scheduling number: [phone].  Call this number to schedule or change appointments.    We believe  "regular attendance is key to your success in our program.    Any time you are unable to keep your appointment we ask that you call us at least 24 hours in advance to let us know. This will allow us to offer the appointment time to another patient.               Follow-ups after your visit        Who to contact     If you have questions or need follow up information about today's clinic visit or your schedule please contact [facility] directly at [phone].  Normal or non-critical lab and imaging results will be communicated to you by [employer], letter or phone within 4 business days after the clinic has received the results. If you do not hear from us within 7 days, please contact the clinic through [employer] or phone. If you have a critical or abnormal lab result, we will notify you by phone as soon as possible.  Submit refill requests through [employer] or call your pharmacy and they will forward the refill request to us. Please allow 3 business days for your refill to be completed.          Additional Information About Your Visit        [employer] Information     [employer] lets you send messages to your doctor, view your test results, renew your prescriptions, schedule appointments and more. To sign up, go to [email or web address] . Click on \"Log in\" on the left side of the screen, which will take you to the Welcome page. Then click on \"Sign up Now\" on the right side of the page.     You will be asked to enter the access code listed below, as well as some personal information. Please follow the directions to create your username and password.     Your access code is: [email or web address]  Expires: 10/28/2018 12:07 PM     Your access code will  in 90 days. If you need help or a new code, please call your [place] clinic or [phone].        Care EveryWhere ID     This is your Care EveryWhere ID. This could be used by other organizations to access your [place] medical records  [id number]        Your " Vitals Were     Pulse                   67            Blood Pressure from Last 3 Encounters:   07/30/18 134/86   05/07/18 136/84   04/25/18 128/78    Weight from Last 3 Encounters:   05/07/18 102.5 kg (226 lb)   04/25/18 102.5 kg (226 lb)   02/07/18 114.3 kg (252 lb)              Today, you had the following     No orders found for display         Today's Medication Changes          These changes are accurate as of 7/30/18 12:08 PM.  If you have any questions, ask your nurse or doctor.               These medicines have changed or have updated prescriptions.        Dose/Directions    * fentaNYL 25 mcg/hr 72 hr patch   Commonly known as:  DURAGESIC   This may have changed:  Another medication with the same name was added. Make sure you understand how and when to take each.   Used for:  Failed back surgical syndrome   Changed by:  Carolann Motley MD        Dose:  1 patch   Place 1 patch onto the skin every 48 hours . Fill on/after 7/27/18 to start on/after 7/31/18   Quantity:  15 patch   Refills:  0       * fentaNYL 12 mcg/hr 72 hr patch   Commonly known as:  DURAGESIC   This may have changed:  You were already taking a medication with the same name, and this prescription was added. Make sure you understand how and when to take each.   Used for:  Failed back surgical syndrome   Changed by:  Carolann Motley MD        Dose:  1 patch   Place 1 patch onto the skin every 48 hours remove old patch.  Fill on/after 7/27/18 to start on/after 7/31/18   Quantity:  15 patch   Refills:  0       * oxyCODONE-acetaminophen 7.5-325 MG per tablet   Commonly known as:  PERCOCET   This may have changed:  Another medication with the same name was added. Make sure you understand how and when to take each.   Used for:  Failed back surgical syndrome   Changed by:  Carolann Motley MD        Take 0.5-1 tablets by mouth every 6 hours as needed for moderate to severe pain . Max of 2/day.  May fill on 7/25/18 and May start  on 7/27/18   Quantity:  60 tablet   Refills:  0       * oxyCODONE-acetaminophen  MG per tablet   Commonly known as:  PERCOCET   This may have changed:  You were already taking a medication with the same name, and this prescription was added. Make sure you understand how and when to take each.   Used for:  Failed back surgical syndrome   Changed by:  Carolann Motley MD        Dose:  0.5-1 tablet   Take 0.5-1 tablets by mouth every 6 hours as needed for severe pain Max of 2/day.  May fill on 7/30/18 and May start on 7/30/18   Quantity:  60 tablet   Refills:  0       * Notice:  This list has 4 medication(s) that are the same as other medications prescribed for you. Read the directions carefully, and ask your doctor or other care provider to review them with you.         Where to get your medicines      Some of these will need a paper prescription and others can be bought over the counter.  Ask your nurse if you have questions.     Bring a paper prescription for each of these medications     fentaNYL 12 mcg/hr 72 hr patch    oxyCODONE-acetaminophen  MG per tablet               Information about OPIOIDS     PRESCRIPTION OPIOIDS: WHAT YOU NEED TO KNOW   We gave you an opioid (narcotic) pain medicine. It is important to manage your pain, but opioids are not always the best choice. You should first try all the other options your care team gave you. Take this medicine for as short a time (and as few doses) as possible.     These medicines have risks:    DO NOT drive when on new or higher doses of pain medicine. These medicines can affect your alertness and reaction times, and you could be arrested for driving under the influence (DUI). If you need to use opioids long-term, talk to your care team about driving.    DO NOT operate heave machinery    DO NOT do any other dangerous activities while taking these medicines.     DO NOT drink any alcohol while taking these medicines.      If the opioid prescribed  includes acetaminophen, DO NOT take with any other medicines that contain acetaminophen. Read all labels carefully. Look for the word  acetaminophen  or  Tylenol.  Ask your pharmacist if you have questions or are unsure.    You can get addicted to pain medicines, especially if you have a history of addiction (chemical, alcohol or substance dependence). Talk to your care team about ways to reduce this risk.    Store your pills in a secure place, locked if possible. We will not replace any lost or stolen medicine. If you don t finish your medicine, please throw away (dispose) as directed by your pharmacist. The [place] Pollution Control Agency has more information about safe disposal: https://www.pca.[facility].mn.us/living-green/managing-unwanted-medications.     All opioids tend to cause constipation. Drink plenty of water and eat foods that have a lot of fiber, such as fruits, vegetables, prune juice, apple juice and high-fiber cereal. Take a laxative (Miralax, milk of magnesia, Colace, Senna) if you don t move your bowels at least every other day.          Primary Care Provider Office Phone # Fax #    Regan Llamas [phone]       [address] [place] [phone]        Equal Access to Services     ЕЛЕНА FAIR AH: Hadii cornelia deniso Soluis, waaxda luqadaha, qaybta kaalmada adesugaryada, kinga pettit. So [facility] [phone].    ATENCIÓN: Si habla español, tiene a chambers disposición servicios gratuitos de asistencia lingüística. Rex al [phone].    We comply with applicable federal civil rights laws and [place] laws. We do not discriminate on the basis of race, color, national origin, age, disability, sex, sexual orientation, or gender identity.            Thank you!     Thank you for choosing [facility]  for your care. Our goal is always to provide you with excellent care. Hearing back from our patients is one way we can continue to improve  our services. Please take a few minutes to complete the written survey that you may receive in the mail after your visit with us. Thank you!             Your Updated Medication List - Protect others around you: Learn how to safely use, store and throw away your medicines at www.disposemymeds.org.          This list is accurate as of 7/30/18 12:08 PM.  Always use your most recent med list.                   Brand Name Dispense Instructions for use Diagnosis    DULoxetine 60 MG EC capsule    CYMBALTA    90 capsule    Take 1 capsule (60 mg) by mouth daily    Recurrent major depression in complete remission (H)       * fentaNYL 25 mcg/hr 72 hr patch    DURAGESIC    15 patch    Place 1 patch onto the skin every 48 hours . Fill on/after 7/27/18 to start on/after 7/31/18    Failed back surgical syndrome       * fentaNYL 12 mcg/hr 72 hr patch    DURAGESIC    15 patch    Place 1 patch onto the skin every 48 hours remove old patch.  Fill on/after 7/27/18 to start on/after 7/31/18    Failed back surgical syndrome       gabapentin 600 MG tablet    NEURONTIN    540 tablet    Take 2 tablets (1,200 mg) by mouth 3 times daily . Increase to this dose as instructed in clinic. 3 month supply    Failed back surgical syndrome       LORazepam 0.5 MG tablet    ATIVAN    30 tablet    One bid prn anxiety. Rare use    Recurrent major depression in complete remission (H)       methocarbamol 500 MG tablet    ROBAXIN    360 tablet    Take 1-2 tablets (500-1,000 mg) by mouth 3 times daily as needed for muscle spasms . Caution for sedation    Chronic pain syndrome       mirtazapine 15 MG tablet    REMERON    90 tablet    Take 1 tablet (15 mg) by mouth At Bedtime    Recurrent major depression in complete remission (H), Primary insomnia       naloxone nasal spray    NARCAN    0.2 mL    Spray 1 spray (4 mg) into one nostril alternating nostrils as needed for opioid reversal every 2-3 minutes until assistance arrives    Chronic low back pain,  unspecified back pain laterality, with sciatica presence unspecified, Failed back surgical syndrome       nortriptyline 10 MG capsule    PAMELOR    60 capsule    Take 1 capsule (10 mg) by mouth At Bedtime . If tolerating, after 1 week increase to 20mg. Call when at this dose or any concerns.    Lumbar radiculopathy       omeprazole 20 MG CR capsule    priLOSEC    90 capsule    Take 1 capsule (20 mg) by mouth daily .    Gastroesophageal reflux disease without esophagitis       * oxyCODONE-acetaminophen 7.5-325 MG per tablet    PERCOCET    60 tablet    Take 0.5-1 tablets by mouth every 6 hours as needed for moderate to severe pain . Max of 2/day.  May fill on 7/25/18 and May start on 7/27/18    Failed back surgical syndrome       * oxyCODONE-acetaminophen  MG per tablet    PERCOCET    60 tablet    Take 0.5-1 tablets by mouth every 6 hours as needed for severe pain Max of 2/day.  May fill on 7/30/18 and May start on 7/30/18    Failed back surgical syndrome       TYLENOL 500 MG tablet   Generic drug:  acetaminophen      3 po prn for pain        zolpidem 10 MG tablet    AMBIEN    60 tablet    Take 1 tablet (10 mg) by mouth nightly as needed for sleep    Primary insomnia       * Notice:  This list has 4 medication(s) that are the same as other medications prescribed for you. Read the directions carefully, and ask your doctor or other care provider to review them with you.

## 2018-07-30 NOTE — PROGRESS NOTES
[facility]    Date of visit: 07/30/18      Chief complaint:   Chief Complaint   Patient presents with     Pain       Interval history:  David Wilcox was last seen by me on 4/25/2018.      Recommendations/plan at the last visit included:  1. Physical Therapy: continue exercises as recommended by PT  2. Clinical Health Psychologist to address issues of relaxation, behavioral change, coping style, and other factors important to improvement.  Difficult due to work, but will look at this again next visit  3. Diagnostic Studies: MRI lumbar spine to make sure no concerns from the injection  4. Schedule epidural steroid injection - transforaminal epidural steroid injection vs interlam  5. Medication Management:    1. Continue fentanyl patch  2. Given an update with next refill  6. Recommendations to PCP: none  7. Follow up: 3 months      Since his last visit, David Wilcox reports:  - increased weakness, fatigue, and pain. The pain has not changed in location from his previous visit, but he states that he is noticing it more. 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 He attributes some of the symptoms to the increase dose of his fentanyl patch from his previous appointment (up to 25mcg/hr from 12.5mcg/hr). He especially notices increased weakness and pre-syncopal like feelings when in extreme heat.     He notes that his worsening symptoms began approximately 1 month ago when taking care of his son who was in a mental health institution. He states that the stressors in his life have since improved but his own health has not improved.     Pain scores:  Pain intensity on average is 8 on a scale of 0-10.     Current pain treatments:   Percocet 7.5/325mg daily- twice per day, morning and lunch  Fentanyl patch-25 mcg/48 hours - feels it works too strongly with heat/sun exposure  Robaxin (methocarbamol) 1000mg at night  and in the morning- the morning dose has been helping  Gabapentin 1200 mg TID, beneficial  Cymbalta 60mg daily- tried going to 90mg/day but felt worse- for pain and mood  Nortriptyline 20 mg at bedtime - causing dry mouth but okay to continue    Previous medication treatments included:  Hydrocodone- not helpful  Methadone- taking after 1st surgery  Naprosyn- not helpful  Flexeril- after first back injury (1990s)  Lidocaine- not helpful  Acetaminophen (tylenol)   Gabapentin    Other treatments have included:  David Wilcox has been seen at a pain clinic in the past.  MAPS- injections  PT: yes  Acupuncture: no  TENs Unit: yes- out of use- somewhat helpful  Injections: none since last surgery- very  helpful    Side Effects: none    Medications:  Current Outpatient Prescriptions   Medication Sig Dispense Refill     DULoxetine (CYMBALTA) 60 MG EC capsule Take 1 capsule (60 mg) by mouth daily 90 capsule 6     fentaNYL (DURAGESIC) 25 mcg/hr 72 hr patch Place 1 patch onto the skin every 48 hours . Fill on/after 7/27/18 to start on/after 7/31/18 15 patch 0     gabapentin (NEURONTIN) 600 MG tablet Take 2 tablets (1,200 mg) by mouth 3 times daily . Increase to this dose as instructed in clinic. 3 month supply 540 tablet 1     LORazepam (ATIVAN) 0.5 MG tablet One bid prn anxiety. Rare use 30 tablet 0     methocarbamol (ROBAXIN) 500 MG tablet Take 1-2 tablets (500-1,000 mg) by mouth 3 times daily as needed for muscle spasms . Caution for sedation 360 tablet 1     mirtazapine (REMERON) 15 MG tablet Take 1 tablet (15 mg) by mouth At Bedtime 90 tablet 3     omeprazole (PRILOSEC) 20 MG CR capsule Take 1 capsule (20 mg) by mouth daily . 90 capsule 3     oxyCODONE-acetaminophen (PERCOCET) 7.5-325 MG per tablet Take 0.5-1 tablets by mouth every 6 hours as needed for moderate to severe pain . Max of 2/day.  May fill on 7/25/18 and May start on 7/27/18 60 tablet 0     TYLENOL EXTRA STRENGTH 500 MG OR TABS 3 po prn for pain       zolpidem  (AMBIEN) 10 MG tablet Take 1 tablet (10 mg) by mouth nightly as needed for sleep 60 tablet 2     fentaNYL (DURAGESIC) 12 mcg/hr 72 hr patch Place 1 patch onto the skin every 48 hours remove old patch.  Fill on/after 8/28/18 to start on/after 8/30/18 15 patch 0     naloxone (NARCAN) nasal spray Spray 1 spray (4 mg) into one nostril alternating nostrils as needed for opioid reversal every 2-3 minutes until assistance arrives (Patient not taking: Reported on 7/30/2018) 0.2 mL 0     nortriptyline (PAMELOR) 10 MG capsule Take 2 capsules (20 mg) by mouth At Bedtime 60 capsule 3     oxyCODONE-acetaminophen (PERCOCET)  MG per tablet Take 0.5-1 tablets by mouth every 6 hours as needed for severe pain Max of 2/day.  May fill on 8/28/18 and May start on 8/30/18 60 tablet 0       Medical History: any changes in medical history since they were last seen? Increased fatigue, weakness that he attributes to increased dose of fentanyl patch, especially while out in the heat.     Review of Systems:  The 14 system ROS was reviewed from the intake questionnaire, and is positive for: itching  Mood: denies concern    Physical Exam:  Blood pressure 134/86, pulse 67.  General: awake, alert , cooperative  Gait: Antalgic on the right, uses a single end cane  MSK exam: uses cane for ambulation. Pain with lumbar ROM, particularly rotation and extension.  Tender to palpation throughout thoracic and lumbar paraspinals,    Assessment:   1. 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    2. Facet arthropathy likely contributing above the level of the future  3. Distant history of alcohol and drug abuse   4. Bipolar disorder- currently managed  5. H/o meningitis after SCS trial  6. Subacute thoracic back pain, likely myofascial etiology.    Plan:  1. Physical Therapy: continue exercises as recommended by PT  2. Clinical Health Psychologist to address issues of relaxation, behavioral change, coping style, and  other factors important to improvement.  Difficult due to work, but will look at this again next visit  3. Diagnostic Studies: none  4. Schedule epidural steroid injection - transforaminal epidural steroid injection vs interlam  5. Medication Management:    1. Change fentanyl patch to 12.5mcg/hr   (from 25 mcg/hr) and plan on changing back to higher dose if needed sometime before winter, when he will have less exposure to heat/sun.   6. Increase Percocet to 10 mg dose (0.5 to 2 tabs per day as needed) to compensate for decreased fentanyl dose  7. Recommendations to PCP: none  8. Follow up: 3 months    Total time spent was 30 minutes, and more than 50% of face to face time was spent in counseling and/or coordination of care regarding medications, imaging.   Bindu Motley MD  [facility]

## 2018-08-16 ENCOUNTER — TELEPHONE (OUTPATIENT)
Dept: FAMILY MEDICINE | Facility: CLINIC | Age: 48
End: 2018-08-16

## 2018-08-16 NOTE — TELEPHONE ENCOUNTER
Reason for call:  Patient reporting a symptom    Symptom or request: David says yesterday he was bending over to  a pail and thinks he may have torn something in his mid back area. He says he is having a hard time breathing and moving. He says he is on a high dose of pain meds already so doesn't think they will give him anything more. He is afraid if he goes in, he would sit there for several hours to have them tell him they can't do anything.     Duration (how long have symptoms been present): Yesterday    Phone Number patient can be reached at:  Home number on file [phone] (home)    Best Time:  anytime    Can we leave a detailed message on this number:  YES    Call taken on 8/16/2018 at 3:21 PM by Kristi Mir

## 2018-08-16 NOTE — TELEPHONE ENCOUNTER
Pt called. States he is in terrible pain, unable to take deep breaths. Advised he needs to go the emergency. Nannette Rubio RN

## 2018-08-17 ENCOUNTER — TELEPHONE (OUTPATIENT)
Dept: PALLIATIVE MEDICINE | Facility: CLINIC | Age: 48
End: 2018-08-17

## 2018-08-17 NOTE — TELEPHONE ENCOUNTER
Per Dr. Motley ok to increase Percocet to 4 tabs daily for a max of 5 days and offer a repeat caudal ALVA if its an exacerbation of chronic pain. Patient refused ALVA. I recommended he use the additional tabs sparingly if able and he agreed.    Gita Barrios, JEREMIAHN, RN  Care Coordinator  [facility]

## 2018-08-17 NOTE — TELEPHONE ENCOUNTER
Received call from patient who was seen in the ER last night. At that time it was suggested that he start taking more oxycodone than what he is currently prescribed. He would like to discuss this with someone. Please call. Phone #[phone]      Stacey Spears    [facility]

## 2018-08-17 NOTE — TELEPHONE ENCOUNTER
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     He went to the ED 8/17/18 and received injections of hydromorphone and Toradol which did not offer any relief. Patient reports they did imaging and did not find any issues.     He reports the ER told him to use additional Percocet from his current supply and he has thus far used 1 additional tab. We reviewed the policy here about self escalating dose of pain medication.     Patient reports he is laying flat on his back today because of the pain. We discussed conservative treatments and time to heal for acute injury.     Current pain regimen is Fentanyl 12 mcg and Percocet 10 mg bid.    Patient is wondering if there are options for a temporary increase in his Percocet dose.    Routing to Dr. Motley to review.

## 2018-08-31 ENCOUNTER — TELEPHONE (OUTPATIENT)
Dept: PALLIATIVE MEDICINE | Facility: CLINIC | Age: 48
End: 2018-08-31

## 2018-08-31 NOTE — TELEPHONE ENCOUNTER
8- at 3:53pm    Faxed completed form to [phone] - confirmation received. Form placed in clinic's Scanning bin to be processed into patient's chart.    Received 8-    Reason for Message:  Form  Our goal is to have forms completed within 72 hours, however some forms may require a visit or additional information.     Who is the form from? -[facility] (if other please explain)  Where did the form come from? form was faxed in  What clinic location was the form placed at? [place]  Where was the form placed? 's Box  What number is listed as a contact on the form? Ph: [phone]    Phone call message - patient request for a letter, form or note:     Date needed: not indicated  Please fax to [phone] or [phone]  Has the patient signed a consent form for release of information? Not Applicable    Type of letter, form or note: Therapy Duplication Change Authorization Form    Violet [place]  Patient Representative  [facility] [place]

## 2018-09-04 ENCOUNTER — TELEPHONE (OUTPATIENT)
Dept: PALLIATIVE MEDICINE | Facility: CLINIC | Age: 48
End: 2018-09-04

## 2018-09-04 DIAGNOSIS — M54.16 LUMBAR RADICULOPATHY: ICD-10-CM

## 2018-09-04 RX ORDER — NORTRIPTYLINE HCL 10 MG
20 CAPSULE ORAL AT BEDTIME
Qty: 60 CAPSULE | Refills: 3 | Status: SHIPPED | OUTPATIENT
Start: 2018-09-04 | End: 2018-12-31

## 2018-09-04 NOTE — TELEPHONE ENCOUNTER
Signed Prescriptions:                        Disp   Refills    nortriptyline (PAMELOR) 10 MG capsule      60 cap*3        Sig: Take 2 capsules (20 mg) by mouth At Bedtime  Authorizing Provider: YADIRA CARRILLO MD  [facility]

## 2018-09-04 NOTE — TELEPHONE ENCOUNTER
Received fax request from [facility] pharmacy requesting refill(s) for nortriptyline (PAMELOR) 10 MG capsule    Last refilled on 8/1/2018    Pt last seen on 7/30/2018  Next appt scheduled for 10/31/2018    Will facilitate refill.

## 2018-09-07 DIAGNOSIS — M96.1 FAILED BACK SURGICAL SYNDROME: ICD-10-CM

## 2018-09-07 RX ORDER — GABAPENTIN 600 MG/1
1200 TABLET ORAL 3 TIMES DAILY
Qty: 540 TABLET | Refills: 1 | Status: SHIPPED | OUTPATIENT
Start: 2018-09-07 | End: 2019-03-19

## 2018-09-07 NOTE — TELEPHONE ENCOUNTER
Received fax request from   [facility] [id number] - [place], [place] - [address] [facility] [address]  [address] [address]  Phone: [phone] Fax: [phone]       pharmacy requesting refill(s) for gabapentin (NEURONTIN) 600 MG tablet    Last refilled on 2/7/18  Pt last seen on 7/30/18  Next appt scheduled for 10/31/18    Will facilitate refill.

## 2018-09-07 NOTE — TELEPHONE ENCOUNTER
"Received 9-7-2018    Reason for Fax:  Medication  If this is a refill request, has the caller requested the refill from the pharmacy already? Yes  Will the patient be using a [facility]? No  Name of the pharmacy and phone number for the current request: [facility] [address] [place] [facility], [address]    Name of the medication requested: Gabapentin (Neurontin) 600 mg tablet    Other: Faxed refill request stated \"Patient expects to  prescription at 9- at 12:00.\"    Violet Duluth  Patient Representative  [facility]  "

## 2018-09-18 NOTE — TELEPHONE ENCOUNTER
Prescription filled on 9-7-2018. Closing encounter.    Violet Vancouver  Patient Representative  [facility]

## 2018-09-19 DIAGNOSIS — F51.01 PRIMARY INSOMNIA: ICD-10-CM

## 2018-09-19 RX ORDER — ZOLPIDEM TARTRATE 10 MG/1
10 TABLET ORAL
Qty: 60 TABLET | Refills: 2 | Status: SHIPPED | OUTPATIENT
Start: 2018-09-19 | End: 2019-03-27

## 2018-09-19 NOTE — TELEPHONE ENCOUNTER
zolpidem (AMBIEN) 10 MG tablet   Last Written Prescription Date:  1/17/18  Last Fill Quantity: 60,   # refills: 2  Last Office Visit: 5/7/18 [place]  Future Office visit:    Next 5 appointments (look out 90 days)     Oct 31, 2018  1:00 PM CDT   Return Visit with Carolann Motley MD   [facility] [place] ([facility] [place])    [address]  [place] [address]   [phone]                   Routing refill request to provider for review/approval because:  Drug not on the [facility], [facility] or  Health refill protocol or controlled substance

## 2018-09-19 NOTE — TELEPHONE ENCOUNTER
Routing refill request to provider for review/approval because:  Drug not on the [facility] refill protocol     Please advise refill.    GAYATRI Armstrong

## 2018-09-24 ENCOUNTER — TELEPHONE (OUTPATIENT)
Dept: PALLIATIVE MEDICINE | Facility: CLINIC | Age: 48
End: 2018-09-24

## 2018-09-24 NOTE — TELEPHONE ENCOUNTER
Patient is calling, he states that he was told that he can not  the Nortriptyline until this Thursday and he is out of medication.     Please call ASAP.      Venus REZA    [facility]

## 2018-09-24 NOTE — TELEPHONE ENCOUNTER
Patient calling back, he states that he did find a bottle with some in there and he is okay on this medication for now. Please disregard previous message.        Venus REZA    [facility]

## 2018-09-24 NOTE — TELEPHONE ENCOUNTER
Medication was send to [facility] pharmacy on 9/7/18 because we received paper refill request from [facility]. Called patient to informed refills is at the [facility] he stated he switched to [facility] because it is cheaper for him. He stated he will go to [facility] and ask Rx to be transfer to them.       Syeda Montelongo MA  [facility]

## 2018-09-24 NOTE — TELEPHONE ENCOUNTER
Patient left VM 9/23 at 10:49 am    [facility] pharmacy is telling him that there are no refills left for gabapentin and he has 2 days left and he will be out          Venus REZA    [facility]

## 2018-10-03 DIAGNOSIS — M96.1 FAILED BACK SURGICAL SYNDROME: ICD-10-CM

## 2018-10-03 RX ORDER — FENTANYL 12.5 UG/1
1 PATCH TRANSDERMAL
Qty: 15 PATCH | Refills: 0 | Status: SHIPPED | OUTPATIENT
Start: 2018-10-03 | End: 2018-11-01

## 2018-10-03 RX ORDER — OXYCODONE AND ACETAMINOPHEN 10; 325 MG/1; MG/1
.5-1 TABLET ORAL EVERY 6 HOURS PRN
Qty: 60 TABLET | Refills: 0 | Status: SHIPPED | OUTPATIENT
Start: 2018-10-03 | End: 2018-11-27

## 2018-10-03 NOTE — TELEPHONE ENCOUNTER
Writer spoke with patient. Signed Rx for fentanyl and PERCOCET was mailed out to [facility] [place] [facility].     Aidan Naidu MA

## 2018-10-03 NOTE — TELEPHONE ENCOUNTER
Patient left  10/2 at 9:28 pm    Rx- Fentanyl patches and Oxycodone, will  when ready. Please call        Venus REZA    [facility]

## 2018-10-03 NOTE — TELEPHONE ENCOUNTER
Medication refill information reviewed.     Due date for Fentanyl Patches, and Oxycodone is 10/3     Prescriptions prepped for review.     Will route to provider.     [place] [facility]    Torrey Rodrigues, RN  Care Coordinator   [facility]

## 2018-10-03 NOTE — TELEPHONE ENCOUNTER
Signed Prescriptions:                        Disp   Refills    fentaNYL (DURAGESIC) 12 mcg/hr 72 hr patch 15 pat*0        Sig: Place 1 patch onto the skin every 48 hours 10/3remove           old patch.  Fill on/after 10/3/18 to start           on/after 10/3/18  Authorizing Provider: YADIRA CARRILLO    oxyCODONE-acetaminophen (PERCOCET)  *60 tab*0        Sig: Take 0.5-1 tablets by mouth every 6 hours as needed           for severe pain Max of 2/day.  May fill on           10/3/18 and May start on 10/3/18  Authorizing Provider: YADIRA CARRILLO MD  [facility]

## 2018-10-03 NOTE — TELEPHONE ENCOUNTER
Received call from patient requesting refill(s) of Fentanyl Patches, and Oxycodone    Last picked up from pharmacy on 09/04/2018    Pt last seen by prescribing provider on 07/30/2018  Next appt scheduled for 10/31/2018     checked in the past 6 months? Yes If no, print current report and give to RN    Last urine drug screen date 09/27/2017  Current opioid agreement on file (completed within the last year) Yes Date of opioid agreement: 10/12/2017            Pt will  in clinic at [facility]      [facility]      Will route to nursing pool for review and preparation of prescription(s).

## 2018-10-31 ENCOUNTER — TELEPHONE (OUTPATIENT)
Dept: PALLIATIVE MEDICINE | Facility: CLINIC | Age: 48
End: 2018-10-31

## 2018-10-31 ENCOUNTER — OFFICE VISIT (OUTPATIENT)
Dept: PALLIATIVE MEDICINE | Facility: CLINIC | Age: 48
End: 2018-10-31
Payer: COMMERCIAL

## 2018-10-31 VITALS
HEART RATE: 71 BPM | BODY MASS INDEX: 34.85 KG/M2 | WEIGHT: 236 LBS | DIASTOLIC BLOOD PRESSURE: 98 MMHG | SYSTOLIC BLOOD PRESSURE: 156 MMHG

## 2018-10-31 DIAGNOSIS — G89.4 CHRONIC PAIN SYNDROME: ICD-10-CM

## 2018-10-31 DIAGNOSIS — M47.819 FACET ARTHROPATHY: Primary | ICD-10-CM

## 2018-10-31 DIAGNOSIS — K21.9 GASTROESOPHAGEAL REFLUX DISEASE WITHOUT ESOPHAGITIS: ICD-10-CM

## 2018-10-31 DIAGNOSIS — M96.1 FAILED BACK SURGICAL SYNDROME: ICD-10-CM

## 2018-10-31 PROCEDURE — 99214 OFFICE O/P EST MOD 30 MIN: CPT | Performed by: PSYCHIATRY & NEUROLOGY

## 2018-10-31 RX ORDER — METHOCARBAMOL 500 MG/1
500-1000 TABLET, FILM COATED ORAL 3 TIMES DAILY PRN
Qty: 360 TABLET | Refills: 1 | Status: SHIPPED | OUTPATIENT
Start: 2018-10-31 | End: 2019-01-23

## 2018-10-31 RX ORDER — FENTANYL 25 UG/1
1 PATCH TRANSDERMAL
Qty: 15 PATCH | Refills: 0 | Status: SHIPPED | OUTPATIENT
Start: 2018-10-31 | End: 2018-11-27

## 2018-10-31 RX ORDER — OXYCODONE AND ACETAMINOPHEN 7.5; 325 MG/1; MG/1
.5-1 TABLET ORAL EVERY 6 HOURS PRN
Qty: 38 TABLET | Refills: 0 | Status: SHIPPED | OUTPATIENT
Start: 2018-10-31 | End: 2018-11-01

## 2018-10-31 ASSESSMENT — PAIN SCALES - GENERAL: PAINLEVEL: EXTREME PAIN (8)

## 2018-10-31 ASSESSMENT — PATIENT HEALTH QUESTIONNAIRE - PHQ9: SUM OF ALL RESPONSES TO PHQ QUESTIONS 1-9: 11

## 2018-10-31 NOTE — PROGRESS NOTES
"[facility]    Date of visit: 10/31/2018     Chief complaint:   Chief Complaint   Patient presents with     Pain     Interval history:  David Wilcox was last seen by me on 7/30/2018.      Recommendations/plan at the last visit included:  1. Physical Therapy: continue exercises as recommended by PT  2. Clinical Health Psychologist to address issues of relaxation, behavioral change, coping style, and other factors important to improvement.  Difficult due to work, but will look at this again next visit  3. Diagnostic Studies: none  4. Schedule epidural steroid injection - transforaminal epidural steroid injection vs interlam  5. Medication Management:    1. Change fentanyl patch to 12.5mcg/hr   (from 25 mcg/hr) and plan on changing back to higher dose if needed sometime before winter, when he will have less exposure to heat/sun.   6. Increase Percocet to 10 mg dose (0.5 to 2 tabs per day as needed) to compensate for decreased fentanyl dose  7. Recommendations to PCP: none  8. Follow up: 3 months    Since his last visit, David Wilcox reports:  -he has been struggling.  He has been trying to get to more activities like h has before.  -he went to [place] x 2 and it takes almost a week for him to get \"functional\"  -spends about 4 hours per day up, the rest of it is spent laying down.  -he had problems in [place] because he wasn't able to lay down.  -40% back pain, 60% leg pain  -previous epidural steroid injection not fully helpful, wondering if we can try at a higher level (preiv      Pain scores:  Pain intensity on average is 8 on a scale of 0-10.     Current pain treatments:   Percocet 10/325mg daily- 2/day  Fentanyl patch-12 mcg/48 hours - feels it works too strongly with heat/sun exposure  Robaxin (methocarbamol) 1000mg at night and in the morning- the morning dose has been helping  Gabapentin 1200 mg TID, beneficial  Cymbalta 60mg daily- tried going to 90mg/day but felt " worse- for pain and mood  Nortriptyline 20 mg at bedtime - causing dry mouth but okay to continue    Previous medication treatments included:  Hydrocodone- not helpful  Methadone- taking after 1st surgery  Naprosyn- not helpful  Flexeril- after first back injury (1990s)  Lidocaine- not helpful  Acetaminophen (tylenol)   Gabapentin    Other treatments have included:  David Wilcox has been seen at a pain clinic in the past.  MAPS- injections  PT: yes  Acupuncture: no  TENs Unit: yes- out of use- somewhat helpful  Injections: none since last surgery- very  helpful    Side Effects: None    Medications:  Current Outpatient Prescriptions   Medication Sig Dispense Refill     DULoxetine (CYMBALTA) 60 MG EC capsule Take 1 capsule (60 mg) by mouth daily 90 capsule 6     fentaNYL (DURAGESIC) 12 mcg/hr 72 hr patch Place 1 patch onto the skin every 48 hours 10/3remove old patch.  Fill on/after 10/3/18 to start on/after 10/3/18 15 patch 0     fentaNYL (DURAGESIC) 25 mcg/hr 72 hr patch Place 1 patch onto the skin every 48 hours remove old patch.  Fill on/after 10/31/18 to start on/after 11/2/18 15 patch 0     gabapentin (NEURONTIN) 600 MG tablet Take 2 tablets (1,200 mg) by mouth 3 times daily . Increase to this dose as instructed in clinic. 3 month supply 540 tablet 1     LORazepam (ATIVAN) 0.5 MG tablet One bid prn anxiety. Rare use 30 tablet 0     methocarbamol (ROBAXIN) 500 MG tablet Take 1-2 tablets (500-1,000 mg) by mouth 3 times daily as needed for muscle spasms . Caution for sedation 360 tablet 1     mirtazapine (REMERON) 15 MG tablet Take 1 tablet (15 mg) by mouth At Bedtime 90 tablet 3     nortriptyline (PAMELOR) 10 MG capsule Take 2 capsules (20 mg) by mouth At Bedtime 60 capsule 3     omeprazole (PRILOSEC) 20 MG CR capsule TAKE ONE CAPSULE BY MOUTH EVERY DAY 90 capsule 2     oxyCODONE-acetaminophen (PERCOCET)  MG per tablet Take 0.5-1 tablets by mouth every 6 hours as needed for severe pain Max of 2/day.  May  "fill on 10/3/18 and May start on 10/3/18 60 tablet 0     oxyCODONE-acetaminophen (PERCOCET) 7.5-325 MG per tablet Take 0.5-1 tablets by mouth every 6 hours as needed for severe pain . Max of 2/day.  May fill on 10/31/18 and May start on 11/2/18 38 tablet 0     TYLENOL EXTRA STRENGTH 500 MG OR TABS 3 po prn for pain       zolpidem (AMBIEN) 10 MG tablet Take 1 tablet (10 mg) by mouth nightly as needed for sleep 60 tablet 2     naloxone (NARCAN) nasal spray Spray 1 spray (4 mg) into one nostril alternating nostrils as needed for opioid reversal every 2-3 minutes until assistance arrives (Patient not taking: Reported on 7/30/2018) 0.2 mL 0       Medical History: any changes in medical history since they were last seen? none    Review of Systems:  The 14 system ROS was reviewed from the intake questionnaire, and is positive for: dizziness, headache, swelling in feet, diarrhea, stiffness, n/t  Mood: denies concern    Physical Exam:  Blood pressure (!) 156/98, pulse 71, weight 107 kg (236 lb).  General: awake, alert , cooperative  Gait: Antalgic on the right, uses a single end cane  MSK exam: uses cane for ambulation. Pain with lumbar ROM, particularly rotation and extension.  Tender to palpation throughout thoracic and lumbar paraspinals      Assessment:   1. 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    2. Facet arthropathy likely contributing above the level of the future  3. Distant history of alcohol and drug abuse   4. Bipolar disorder- currently managed  5. H/o meningitis after SCS trial  6. Subacute thoracic back pain, likely myofascial etiology.    Plan:  1. Physical Therapy: continue exercises as recommended by PT.  Discuss gradual reconditioning and working on expanding his \"upright time\" to more than 4 hours  2. Clinical Health Psychologist to address issues of relaxation, behavioral change, coping style, and other factors important to improvement.  Difficult due to work.  He " does followin with his PCP for mental health, and they requested we do PHQ9 which was put into his chart today.  Denies suicidal ideation   3. Diagnostic Studies: none  4. Schedule epidural steroid injection - transforaminal epidural steroid injection vs interlam - hard to tell what I can get with transforaminal epidural steroid injection due to hardware, will need to decide day of.  If interlaminar, then only option would be above fusion, and caudal has been done.  5. Medication Management:    1. Increase fentanyl to 25mcg for the winter, and decrease Percocet to 7.5/325mg daily + 8 days per month he can use 2/day.  6. Recommendations to PCP: none  7. Follow up: 3 months    Total time spent was 30 minutes, and more than 50% of face to face time was spent in counseling and/or coordination of care regarding medications, imaging.   Bindu Motley MD  [facility]

## 2018-10-31 NOTE — TELEPHONE ENCOUNTER
"Requested Prescriptions   Pending Prescriptions Disp Refills     omeprazole (PRILOSEC) 20 MG CR capsule [Pharmacy Med Name: OMEPRAZOLE DR 20 MG CAPSULE] 90 capsule 2     Sig: TAKE ONE CAPSULE BY MOUTH EVERY DAY    PPI Protocol Passed    10/31/2018  8:55 AM       Passed - Not on Clopidogrel (unless Pantoprazole ordered)       Passed - No diagnosis of osteoporosis on record       Passed - Recent (12 mo) or future (30 days) visit within the authorizing provider's specialty    Patient had office visit in the last 12 months or has a visit in the next 30 days with authorizing provider or within the authorizing provider's specialty.  See \"Patient Info\" tab in inbasket, or \"Choose Columns\" in Meds & Orders section of the refill encounter.             Passed - Patient is age 18 or older        Last Written Prescription Date:  10/25/17  Last Fill Quantity: 90,  # refills: 3   Last office visit: 5/7/2018 with prescribing provider:     Future Office Visit:   Next 5 appointments (look out 90 days)     Oct 31, 2018  1:00 PM CDT   Return Visit with Carolann Motley MD   [facility] [place] ([facility] [place])    [id number] [facility] [address]  [place] [address] [phone]                   "

## 2018-10-31 NOTE — MR AVS SNAPSHOT
After Visit Summary   10/31/2018    David Wilocx    MRN: [id number]           Patient Information     Date Of Birth          1970        Visit Information        Provider Department      10/31/2018 1:00 PM Carolann Motley MD [facility]        Today's Diagnoses     Failed back surgical syndrome        Chronic pain syndrome          Care Instructions    1. Follow up in 3 months  2. Injection [phone]  3. Next month, change in Percocet scripts to max of 1 tab/day except on 8 days of the month when you can do max of 2 tabs/day    ----------------------------------------------------------------  Clinic Number:  [phone]   Call this number with any questions about your care and for scheduling assistance. Calls are returned Monday through Friday between 8 AM and 4:30 PM. We usually get back to you within 2 business days depending on the issue/request.       Medication refills:    For non-narcotic medications, call your pharmacy directly to request a refill. The pharmacy will contact the [facility] for authorization. Please allow 3-4 days for these refills to be processed.     For narcotic refills, call the clinic number or send a [employer] message. Please contact us 7-10 days before your refill is due. The message MUST include the name of the specific medication(s) requested and how you would like to receive the prescription(s). The options are as follows:    Pain Clinic staff can mail the prescription to your pharmacy. Please tell us the name of the pharmacy.    You may pick the prescription up at the Pain Clinic (tell us the location) or during a clinic visit with your pain provider    Pain Clinic staff can deliver the prescription to the [facility] pharmacy in the clinic building. Please tell us the location.      We believe regular attendance is key to your success in our program.    Any time you are unable to keep your appointment we ask that you call us at least  24 hours in advance to let us know. This will allow us to offer the appointment time to another patient.               Follow-ups after your visit        Who to contact     If you have questions or need follow up information about today's clinic visit or your schedule please contact [facility] directly at [phone].  Normal or non-critical lab and imaging results will be communicated to you by MyChart, letter or phone within 4 business days after the clinic has received the results. If you do not hear from us within 7 days, please contact the clinic through MyChart or phone. If you have a critical or abnormal lab result, we will notify you by phone as soon as possible.  Submit refill requests through [employer] or call your pharmacy and they will forward the refill request to us. Please allow 3 business days for your refill to be completed.          Additional Information About Your Visit        Care EveryWhere ID     This is your Care EveryWhere ID. This could be used by other organizations to access your [facility] medical records  [id number]        Your Vitals Were     Pulse BMI (Body Mass Index)                71 34.85 kg/m2           Blood Pressure from Last 3 Encounters:   10/31/18 (!) 156/98   07/30/18 134/86   05/07/18 136/84    Weight from Last 3 Encounters:   10/31/18 107 kg (236 lb)   05/07/18 102.5 kg (226 lb)   04/25/18 102.5 kg (226 lb)              Today, you had the following     No orders found for display         Today's Medication Changes          These changes are accurate as of 10/31/18  1:27 PM.  If you have any questions, ask your nurse or doctor.               These medicines have changed or have updated prescriptions.        Dose/Directions    * fentaNYL 12 mcg/hr 72 hr patch   Commonly known as:  DURAGESIC   This may have changed:  Another medication with the same name was changed. Make sure you understand how and when to take each.   Used for:  Failed back surgical syndrome    Changed by:  Carolann Motley MD        Dose:  1 patch   Place 1 patch onto the skin every 48 hours 10/3remove old patch.  Fill on/after 10/3/18 to start on/after 10/3/18   Quantity:  15 patch   Refills:  0       * fentaNYL 25 mcg/hr 72 hr patch   Commonly known as:  DURAGESIC   This may have changed:  additional instructions   Used for:  Failed back surgical syndrome   Changed by:  Carolann Motley MD        Dose:  1 patch   Place 1 patch onto the skin every 48 hours remove old patch.  Fill on/after 10/31/18 to start on/after 11/2/18   Quantity:  15 patch   Refills:  0       omeprazole 20 MG CR capsule   Commonly known as:  priLOSEC   This may have changed:  See the new instructions.   Used for:  Gastroesophageal reflux disease without esophagitis   Changed by:  Regan Llamas MD        TAKE ONE CAPSULE BY MOUTH EVERY DAY   Quantity:  90 capsule   Refills:  2       * oxyCODONE-acetaminophen  MG per tablet   Commonly known as:  PERCOCET   This may have changed:  Another medication with the same name was changed. Make sure you understand how and when to take each.   Used for:  Failed back surgical syndrome   Changed by:  Carolann Motley MD        Dose:  0.5-1 tablet   Take 0.5-1 tablets by mouth every 6 hours as needed for severe pain Max of 2/day.  May fill on 10/3/18 and May start on 10/3/18   Quantity:  60 tablet   Refills:  0       * oxyCODONE-acetaminophen 7.5-325 MG per tablet   Commonly known as:  PERCOCET   This may have changed:    - how much to take  - how to take this  - when to take this  - reasons to take this  - additional instructions   Used for:  Failed back surgical syndrome   Changed by:  Carolann Motley MD        Dose:  0.5-1 tablet   Take 0.5-1 tablets by mouth every 6 hours as needed for severe pain . Max of 2/day.  May fill on 10/31/18 and May start on 11/2/18   Quantity:  38 tablet   Refills:  0       * Notice:  This list has 4 medication(s) that are the  same as other medications prescribed for you. Read the directions carefully, and ask your doctor or other care provider to review them with you.         Where to get your medicines      These medications were sent to [facility] [id number] [place] [address] [address]    Hours:  M-F 9-7 SAT 9-6 SUN 11-3 Phone:  [phone]     methocarbamol 500 MG tablet    omeprazole 20 MG CR capsule         Some of these will need a paper prescription and others can be bought over the counter.  Ask your nurse if you have questions.     Bring a paper prescription for each of these medications     fentaNYL 25 mcg/hr 72 hr patch    oxyCODONE-acetaminophen 7.5-325 MG per tablet               Information about OPIOIDS     PRESCRIPTION OPIOIDS: WHAT YOU NEED TO KNOW   We gave you an opioid (narcotic) pain medicine. It is important to manage your pain, but opioids are not always the best choice. You should first try all the other options your care team gave you. Take this medicine for as short a time (and as few doses) as possible.    Some activities can increase your pain, such as bandage changes or therapy sessions. It may help to take your pain medicine 30 to 60 minutes before these activities. Reduce your stress by getting enough sleep, working on hobbies you enjoy and practicing relaxation or meditation. Talk to your care team about ways to manage your pain beyond prescription opioids.    These medicines have risks:    DO NOT drive when on new or higher doses of pain medicine. These medicines can affect your alertness and reaction times, and you could be arrested for driving under the influence (DUI). If you need to use opioids long-term, talk to your care team about driving.    DO NOT operate heavy machinery    DO NOT do any other dangerous activities while taking these medicines.    DO NOT drink any alcohol while taking these medicines.     If the opioid prescribed includes  acetaminophen, DO NOT take with any other medicines that contain acetaminophen. Read all labels carefully. Look for the word  acetaminophen  or  Tylenol.  Ask your pharmacist if you have questions or are unsure.    You can get addicted to pain medicines, especially if you have a history of addiction (chemical, alcohol or substance dependence). Talk to your care team about ways to reduce this risk.    All opioids tend to cause constipation. Drink plenty of water and eat foods that have a lot of fiber, such as fruits, vegetables, prune juice, apple juice and high-fiber cereal. Take a laxative (Miralax, milk of magnesia, Colace, Senna) if you don t move your bowels at least every other day. Other side effects include upset stomach, sleepiness, dizziness, throwing up, tolerance (needing more of the medicine to have the same effect), physical dependence and slowed breathing.    Store your pills in a secure place, locked if possible. We will not replace any lost or stolen medicine. If you don t finish your medicine, please throw away (dispose) as directed by your pharmacist. The [place] Pollution Control Agency has more information about safe disposal: https://www.pca.[facility].[place].us/living-green/managing-unwanted-medications         Primary Care Provider Office Phone # Fax #    Regan Llamas [phone]       [address] [place] [phone]        Equal Access to Services     ЕЛЕНА FAIR : Hadii cornelia jackson hadasho Soomaali, waaxda luqadaha, qaybta kaalmada adesugaryada, kinga pettit. So [facility] [phone].    ATENCIÓN: Si habla español, tiene a chambers disposición servicios gratuitos de asistencia lingüística. Rex mayes [phone].    We comply with applicable federal civil rights laws and [place] laws. We do not discriminate on the basis of race, color, national origin, age, disability, sex, sexual orientation, or gender identity.            Thank you!     Thank you for  [facility]osing [facility]  [facility] your [facility]. Our goal is always to provide you with excellent care. Hearing back from our patients is one way we can continue to improve our services. Please take a few minutes to complete the written survey that you may receive in the mail after your visit with us. Thank you!             Your Updated Medication List - Protect others around you: Learn how to safely use, store and throw away your medicines at www.disposemymeds.org.          This list is accurate as of 10/31/18  1:27 PM.  Always use your most recent med list.                   Brand Name Dispense Instructions for use Diagnosis    DULoxetine 60 MG EC capsule    CYMBALTA    90 capsule    Take 1 capsule (60 mg) by mouth daily    Recurrent major depression in complete remission (H)       * fentaNYL 12 mcg/hr 72 hr patch    DURAGESIC    15 patch    Place 1 patch onto the skin every 48 hours 10/3remove old patch.  Fill on/after 10/3/18 to start on/after 10/3/18    Failed back surgical syndrome       * fentaNYL 25 mcg/hr 72 hr patch    DURAGESIC    15 patch    Place 1 patch onto the skin every 48 hours remove old patch.  Fill on/after 10/31/18 to start on/after 11/2/18    Failed back surgical syndrome       gabapentin 600 MG tablet    NEURONTIN    540 tablet    Take 2 tablets (1,200 mg) by mouth 3 times daily . Increase to this dose as instructed in clinic. 3 month supply    Failed back surgical syndrome       LORazepam 0.5 MG tablet    ATIVAN    30 tablet    One bid prn anxiety. Rare use    Recurrent major depression in complete remission (H)       methocarbamol 500 MG tablet    ROBAXIN    360 tablet    Take 1-2 tablets (500-1,000 mg) by mouth 3 times daily as needed for muscle spasms . Caution for sedation    Chronic pain syndrome       mirtazapine 15 MG tablet    REMERON    90 tablet    Take 1 tablet (15 mg) by mouth At Bedtime    Recurrent major depression in complete remission (H), Primary insomnia       naloxone  nasal spray    NARCAN    0.2 mL    Spray 1 spray (4 mg) into one nostril alternating nostrils as needed for opioid reversal every 2-3 minutes until assistance arrives    Chronic low back pain, unspecified back pain laterality, with sciatica presence unspecified, Failed back surgical syndrome       nortriptyline 10 MG capsule    PAMELOR    60 capsule    Take 2 capsules (20 mg) by mouth At Bedtime    Lumbar radiculopathy       omeprazole 20 MG CR capsule    priLOSEC    90 capsule    TAKE ONE CAPSULE BY MOUTH EVERY DAY    Gastroesophageal reflux disease without esophagitis       * oxyCODONE-acetaminophen  MG per tablet    PERCOCET    60 tablet    Take 0.5-1 tablets by mouth every 6 hours as needed for severe pain Max of 2/day.  May fill on 10/3/18 and May start on 10/3/18    Failed back surgical syndrome       * oxyCODONE-acetaminophen 7.5-325 MG per tablet    PERCOCET    38 tablet    Take 0.5-1 tablets by mouth every 6 hours as needed for severe pain . Max of 2/day.  May fill on 10/31/18 and May start on 11/2/18    Failed back surgical syndrome       TYLENOL 500 MG tablet   Generic drug:  acetaminophen      3 po prn for pain        zolpidem 10 MG tablet    AMBIEN    60 tablet    Take 1 tablet (10 mg) by mouth nightly as needed for sleep    Primary insomnia       * Notice:  This list has 4 medication(s) that are the same as other medications prescribed for you. Read the directions carefully, and ask your doctor or other care provider to review them with you.

## 2018-10-31 NOTE — PATIENT INSTRUCTIONS
1. Follow up in 3 months  2. Injection [phone]  3. Next month, change in Percocet scripts to max of 1 tab/day except on 8 days of the month when you can do max of 2 tabs/day    ----------------------------------------------------------------  Clinic Number:  [phone]   Call this number with any questions about your care and for scheduling assistance. Calls are returned Monday through Friday between 8 AM and 4:30 PM. We usually get back to you within 2 business days depending on the issue/request.       Medication refills:    For non-narcotic medications, call your pharmacy directly to request a refill. The pharmacy will contact the [facility] for authorization. Please allow 3-4 days for these refills to be processed.     For narcotic refills, call the clinic number or send a [employer] message. Please contact us 7-10 days before your refill is due. The message MUST include the name of the specific medication(s) requested and how you would like to receive the prescription(s). The options are as follows:    Pain Clinic staff can mail the prescription to your pharmacy. Please tell us the name of the pharmacy.    You may pick the prescription up at the Pain Clinic (tell us the location) or during a clinic visit with your pain provider    Pain Clinic staff can deliver the prescription to the [facility] pharmacy in the clinic building. Please tell us the location.      We believe regular attendance is key to your success in our program.    Any time you are unable to keep your appointment we ask that you call us at least 24 hours in advance to let us know. This will allow us to offer the appointment time to another patient.

## 2018-10-31 NOTE — TELEPHONE ENCOUNTER
Pre-screening Questions for Radiology Injections:    Injection to be done at which interventional clinic site? [facility]    Instruct patient to arrive as directed prior to the scheduled appointment time:    [place] AND [place]: 30 minutes before      Procedure ordered by Dr. Motley    Procedure ordered? Lumbar Epidural Steroid Injection    What insurance would patient like us to bill for this procedure? [employer]      Worker's comp or MVA (motor vehicle accident) -Any injection DO NOT SCHEDULE and route to Venus Justyn.      [employer] insurance - For SI joint injections, DO NOT SCHEDULE and route Venus Justyn. IMASTE FREEDOM NO PA REQUIRED EFFECTIVE 11/1/2017      [employer]- MBB's must be scheduled at LEAST two weeks apart      [employer] - Any injection besides hip/shoulder/knee joint DO NOT SCHEDULE and route to Venus Alejandra. She will obtain PA and call pt back to schedule procedure or notify pt of denial.        [employer]-Route to Venus for review    Any chance of pregnancy? NO   If YES, do NOT schedule and route to RN pool    Is an  needed? No     Patient has a drive home? (mandatory) YES: INFORMED    Is patient taking any blood thinners (plavix, coumadin, jantoven, warfarin, heparin, pradaxa or dabigatran )? No   If hold needed, do NOT schedule, route to RN pool     Is patient taking any aspirin products (includes Excedrin and Fiorinal)? No     If more than 325mg/day do NOT schedule; route to RN pool     For CERVICAL procedures, hold all aspirin products for 6 days.     Tell pt that if aspirin product is not held for 6 days, the procedure WILL BE cancelled.      Does the patient have a bleeding or clotting disorder? No     If YES, okay to schedule AND route to RN nurse pool    For any patients with platelet count <100, must be forwarded to provider    Is patient diabetic?  No  If YES, have them bring their glucometer.    Does patient have an active  infection or treated for one within the past week? No     Is patient currently taking any antibiotics?  No     For patients on chronic, preventative, or prophylactic antibiotics, procedures may be scheduled.     For patients on antibiotics for active or recent infection:    Tiesha Martines Burton, [place]-antibiotic course must have been completed for 4 days    Is patient currently taking any steroid medications? (i.e. Prednisone, Medrol)  No     For patients on steroid medications:    Tiesha Martines Burton, [place]-steroid course must have been completed for 4 days    Reviewed with patient:  If you are started on any steroids or antibiotics between now and your appointment, you must contact us because the procedure may need to be cancelled.  Yes    Is patient actively being treated for cancer or immunocompromised? No  If YES, do NOT schedule and route to RN pool     Are you able to get on and off an exam table with minimal or no assistance? Yes  If NO, do NOT schedule and route to RN pool    Are you able to roll over and lay on your stomach with minimal or no assistance? Yes  If NO, do NOT schedule and route to RN pool     Any allergies to contrast dye, iodine, shellfish, or numbing and steroid medications? No  If YES, route to RN pool AND add allergy information to appointment notes    Allergies: Aspartame; Food; Saccharin; and Sucralose      Has the patient had a flu shot or any other vaccinations within 7 days before or after the procedure.  No     Does patient have an MRI/CT?  YES: MRI  (SI joint, hip injections, lumbar sympathetic blocks, and stellate ganglion blocks do not require an MRI)    Was the MRI done w/in the last 3 years?  Yes    Was MRI done at [facility]? Yes      If not, where was it done? N/A       If MRI was not done at [facility], [facility] or [facility] do NOT schedule and route to nursing.  If pt has an imaging disc, the injection may be scheduled but pt has to bring disc to  appt. If they show up w/out disc the injection cannot be done    Reminders (please tell patient if applicable):       Instructed pt to arrive 30 minutes early for IV start if this is for a cervical procedure, ALL sympathetic (stellate ganglion, hypogastric, or lumbar sympathetic block) and all sedation procedures (RFA, spinal cord stimulation trials).  Not Applicable   -IVs are not routinely placed for Dr. Ramsay cervical cases   -Dr. Hickman: IVs for cervical ESIs and cervical TBDs (not CMBBs/facet inj)      If NPO for sedation, informed patient that it is okay to take medications with sips of water (except if they are to hold blood thinners).  Not Applicable   *DO take blood pressure medication if it is prescribed*      If this is for a cervical ALVA, informed patient that aspirin needs to be held for 6 days.   Not Applicable      For all patients not having spinal cord stimulator (SCS) trials or radiofrequency ablations (RFAs), informed patient:    IV sedation is not provided for this procedure.  If you feel that an oral anti-anxiety medication is needed, you can discuss this further with your referring provider or primary care provider.  The Pain Clinic provider will discuss specifics of what the procedure includes at your appointment.  Most procedures last 10-20 minutes.  We use numbing medications to help with any discomfort during the procedure.  Not Applicable      Do not schedule procedures requiring IV placement in the first appointment of the day or first appointment after lunch. Do NOT schedule at 0745, 0815 or 1245. N/A      For patients 85 or older we recommend having an adult stay w/ them for the remainder of the day.   N/A    Does the patient have any questions?  NO  Stacey Spears  [facility]

## 2018-11-19 ENCOUNTER — RADIANT APPOINTMENT (OUTPATIENT)
Dept: RADIOLOGY | Facility: CLINIC | Age: 48
End: 2018-11-19
Attending: PSYCHIATRY & NEUROLOGY
Payer: COMMERCIAL

## 2018-11-19 ENCOUNTER — RADIOLOGY INJECTION OFFICE VISIT (OUTPATIENT)
Dept: PALLIATIVE MEDICINE | Facility: CLINIC | Age: 48
End: 2018-11-19
Payer: COMMERCIAL

## 2018-11-19 VITALS
RESPIRATION RATE: 16 BRPM | SYSTOLIC BLOOD PRESSURE: 128 MMHG | DIASTOLIC BLOOD PRESSURE: 91 MMHG | OXYGEN SATURATION: 98 % | HEART RATE: 91 BPM

## 2018-11-19 DIAGNOSIS — M54.16 LUMBAR RADICULOPATHY: Primary | ICD-10-CM

## 2018-11-19 DIAGNOSIS — M96.1 FAILED BACK SURGICAL SYNDROME: ICD-10-CM

## 2018-11-19 PROCEDURE — 64483 NJX AA&/STRD TFRM EPI L/S 1: CPT | Mod: RT | Performed by: PSYCHIATRY & NEUROLOGY

## 2018-11-19 ASSESSMENT — PAIN SCALES - GENERAL: PAINLEVEL: EXTREME PAIN (8)

## 2018-11-19 NOTE — PROGRESS NOTES
Pre procedure Diagnosis: lumbar radiculopathy    Post procedure Diagnosis: Same  Procedure performed: attempted right L4-5, changed to right L3-4 transforaminal epidural steroid injection   Anesthesia: none  Complications: none  Operators: Bindu Motley MD and Constance Flores DO     Indications:   David Wilcox is a 48 year old male seen by me in clinic.  They have a history of low back and leg pain, with pain going into the right leg the worst..  Exam shows antalgic gait, decreased power right leg and they have tried conservative treatment including medications.    MRI was done on 1/8/18 which showed   IMPRESSION:    1. No evidence of epidural abscess. No evidence of arachnoiditis.  2. At L3-L4 there is moderate bilateral foraminal stenosis.  3. At L5-S1 there is moderate right and mild left foraminal stenosis,  likely not clinically significant at a solidly fused level.  4. Note that the facet screws were found to likely be loose on the  prior CT study. This cannot be determined due to metallic artifact on  MRI but is likely still present if there has been no surgery in  between these exams.    Options/alternatives, benefits and risks were discussed with the patient including bleeding, infection, tissue trauma, numbness, weakness, paralysis, spinal cord injury, radiation exposure, headache and reaction to medications.   Questions were answered to his satisfaction and he agrees to proceed. Voluntary informed consent was obtained and signed.     Vitals were reviewed: Yes  Allergies were reviewed:  Yes   Medications were reviewed:  Yes   Pre-procedure pain score: 8/10    Procedure:  After getting informed consent, patient was brought into the procedure suite and was placed in a prone position on the procedure table.   A Pause for the Cause was performed.  Patient was prepped and draped in sterile fashion.     After identifying the right L4-5 neuroforamen, the C-arm was rotated to a right lateral oblique angle.  A  total of 3ml of Lidocaine 1% was used to anesthetize the skin and the needle track at a skin entry site coaxial with the fluoroscopy beam, and overriding the superior aspect of the neuroforamen.  A 22 gauge 5 inch spinal needle was advanced under intermittent fluoroscopy until it encountered bony material consistent with the fusion.  Needle could not be passed further so was withdrawn.    Unable to identify right S1 due to hardware.  Therefore decision made to do injection on right at L3    After identifying the right L3-4 neuroforamen, the C-arm was rotated to a right lateral oblique angle.  A total of 3ml of Lidocaine 1% was used to anesthetize the skin and the needle track at a skin entry site coaxial with the fluoroscopy beam, and overriding the superior aspect of the neuroforamen.  A 20 gauge 5 inch spinal needle was advanced under intermittent fluoroscopy until it entered the foramen superiorly.    The position was then inspected from anteroposterior and lateral views, and the needle adjusted appropriately.  A total of 1.5ml of Omnipaque-300 was injected, confirming appropriate position, with spread into the nerve root sheath and the epidural space, with no intravascular uptake.   8.5ml was wasted.    1 ml of 0.5% bupivacaine with 10mg of dexamethasone was injected.  The needle was flushed with lidocaine and removed.    During the procedure, there was not a paresthesia- associated with pressure    Hemostasis was achieved, the area was cleaned, and bandaids were placed when appropriate.  The patient tolerated the procedure well, and was taken to the recovery room.    Images were saved to PACS.    Post-procedure pain score: 3/10  Follow-up includes:   -f/u phone call in one week  -f/u with me in clinic      Bindu Motley MD  [facility]

## 2018-11-19 NOTE — MR AVS SNAPSHOT
After Visit Summary   11/19/2018    David Wilcox    MRN: [id number]           Patient Information     Date Of Birth          1970        Visit Information        Provider Department      11/19/2018 1:15 PM Carolann Motley MD [facility] [place]        Care Instructions    [facility]   Procedure Discharge Instructions    Today you saw: Dr. Carolann Motley      You had an:  Lumbar Epidural steroid injection      Medications used:  Lidocaine   Bupivacaine   Dexamethasone  Omnipaque            Be cautious when walking. Numbness and/or weakness in the lower extremities may occur for up to 6-8 hours after the procedure due to effect of the local anesthetic    Do not drive for 6 hours. The effect of the local anesthetic could slow your reflexes.     You may resume your regular activities after 24 hours    Avoid strenuous activity for the first 24 hours    You may shower, however avoid swimming, tub baths or hot tubs for 24 hours following your procedure    You may have a mild to moderate increase in pain for several days following the injection.    It may take up to 14 days for the steroid medication to start working although you may feel the effect as early as a few days after the procedure.       You may use ice packs for 10-15 minutes, 3 to 4 times a day at the injection site for comfort    Do not use heat to painful areas for 6 to 8 hours. This will give the local anesthetic time to wear off and prevent you from accidentally burning your skin.     You may use anti-inflammatory medications (such as Ibuprofen or Aleve or Advil) or Tylenol for pain control if necessary    If you experience any of the following, call the Pain Clinic during work hours at [phone] or the Provider Line after hours at [phone]:  -Fever over 100 degree F  -Swelling, bleeding, redness, drainage, warmth at the injection site  -Progressive weakness or numbness in your legs   -Loss of  bowel or bladder function  -Unusual new onset of pain that is not improving                Follow-ups after your visit        Your next 10 appointments already scheduled     Jan 23, 2019  1:00 PM CST   Return Visit with Carolann Motley MD   [facility] [place] ([facility] [place])    [address]  [place] [address] [phone]              Who to contact     If you have questions or need follow up information about today's clinic visit or your schedule please contact [facility] [place] directly at [phone].  Normal or non-critical lab and imaging results will be communicated to you by MyChart, letter or phone within 4 business days after the clinic has received the results. If you do not hear from us within 7 days, please contact the clinic through MyChart or phone. If you have a critical or abnormal lab result, we will notify you by phone as soon as possible.  Submit refill requests through [employer] or call your pharmacy and they will forward the refill request to us. Please allow 3 business days for your refill to be completed.          Additional Information About Your Visit        Care EveryWhere ID     This is your Care EveryWhere ID. This could be used by other organizations to access your [facility] medical records  [id number]        Your Vitals Were     Pulse                   93            Blood Pressure from Last 3 Encounters:   11/19/18 152/79   10/31/18 (!) 156/98   07/30/18 134/86    Weight from Last 3 Encounters:   10/31/18 107 kg (236 lb)   05/07/18 102.5 kg (226 lb)   04/25/18 102.5 kg (226 lb)              Today, you had the following     No orders found for display       Primary Care Provider Office Phone # Fax #    Regan Llamas [phone]       [address] [place] [address]        Equal Access to Services     ЕЛЕНА FAIR : Gurpreet Nugent, alexis murray, kinga preston  marly watkinsisabel lacecironda ah. So [facility] [phone].    ATENCIÓN: Si wilverla jeannie, tiene a chambers disposición servicios gratuitos de asistencia lingüística. Rex mayes [phone].    We comply with applicable federal civil rights laws and [place] laws. We do not discriminate on the basis of race, color, national origin, age, disability, sex, sexual orientation, or gender identity.            Thank you!     Thank you for choosing [facility]  for your care. Our goal is always to provide you with excellent care. Hearing back from our patients is one way we can continue to improve our services. Please take a few minutes to complete the written survey that you may receive in the mail after your visit with us. Thank you!             Your Updated Medication List - Protect others around you: Learn how to safely use, store and throw away your medicines at www.disposemymeds.org.          This list is accurate as of 11/19/18  1:34 PM.  Always use your most recent med list.                   Brand Name Dispense Instructions for use Diagnosis    DULoxetine 60 MG EC capsule    CYMBALTA    90 capsule    Take 1 capsule (60 mg) by mouth daily    Recurrent major depression in complete remission (H)       fentaNYL 25 mcg/hr 72 hr patch    DURAGESIC    15 patch    Place 1 patch onto the skin every 48 hours remove old patch.  Fill on/after 10/31/18 to start on/after 11/2/18    Failed back surgical syndrome       gabapentin 600 MG tablet    NEURONTIN    540 tablet    Take 2 tablets (1,200 mg) by mouth 3 times daily . Increase to this dose as instructed in clinic. 3 month supply    Failed back surgical syndrome       LORazepam 0.5 MG tablet    ATIVAN    30 tablet    One bid prn anxiety. Rare use    Recurrent major depression in complete remission (H)       methocarbamol 500 MG tablet    ROBAXIN    360 tablet    Take 1-2 tablets (500-1,000 mg) by mouth 3 times daily as needed for muscle spasms . Caution for sedation    Chronic  pain syndrome       mirtazapine 15 MG tablet    REMERON    90 tablet    Take 1 tablet (15 mg) by mouth At Bedtime    Recurrent major depression in complete remission (H), Primary insomnia       naloxone 4 MG/0.1ML nasal spray    NARCAN    0.2 mL    Spray 1 spray (4 mg) into one nostril alternating nostrils as needed for opioid reversal every 2-3 minutes until assistance arrives    Chronic low back pain, unspecified back pain laterality, with sciatica presence unspecified, Failed back surgical syndrome       nortriptyline 10 MG capsule    PAMELOR    60 capsule    Take 2 capsules (20 mg) by mouth At Bedtime    Lumbar radiculopathy       omeprazole 20 MG CR capsule    priLOSEC    90 capsule    TAKE ONE CAPSULE BY MOUTH EVERY DAY    Gastroesophageal reflux disease without esophagitis       oxyCODONE-acetaminophen  MG per tablet    PERCOCET    60 tablet    Take 0.5-1 tablets by mouth every 6 hours as needed for severe pain Max of 2/day.  May fill on 10/3/18 and May start on 10/3/18    Failed back surgical syndrome       TYLENOL 500 MG tablet   Generic drug:  acetaminophen      3 po prn for pain        zolpidem 10 MG tablet    AMBIEN    60 tablet    Take 1 tablet (10 mg) by mouth nightly as needed for sleep    Primary insomnia

## 2018-11-19 NOTE — NURSING NOTE
Pre-procedure Intake    Have you been fasting? NA    If yes, for how long? No     Are you taking a prescribed blood thinner such as coumadin, Plavix, Xarelto?    No    If yes, when did you take your last dose? No     Do you take aspirin?  No    If cervical procedure, have you held aspirin for 6 days?   NA    Do you have any allergies to contrast dye, iodine, steroid and/or numbing medications?  NO    Are you currently taking antibiotics or have an active infection?  NO    Have you had a fever/elevated temperature within the past week? NO    Are you currently taking oral steroids? NO    Do you have a ? Yes       Are you pregnant or breastfeeding?  Not applicable     Are the vital signs normal?  Yes    Aidan Naidu MA

## 2018-11-19 NOTE — NURSING NOTE
Discharge Information    IV Discontiued Time:  NA    Amount of Fluid Infused:  NA    Discharge Criteria = When patient returns to baseline or as per MD order    Consciousness:  Pt is fully awake    Circulation:  BP +/- 20% of pre-procedure level    Respiration:  Patient is able to breathe deeply    O2 Sat:  Patient is able to maintain O2 Sat >92% on room air    Activity:  Moves 4 extremities on command    Ambulation:  Patient is able to stand and walk or stand and pivot into wheelchair    Dressing:  Clean/dry or No Dressing    Notes:   Discharge instructions and AVS given to patient    Patient meets criteria for discharge?  YES    Admitted to PCU?  No    Responsible adult present to accompany patient home?  Yes    Signature/Title:    Torrey Rodrigues RN Care Coordinator  [facility]

## 2018-11-27 ENCOUNTER — TELEPHONE (OUTPATIENT)
Dept: PALLIATIVE MEDICINE | Facility: CLINIC | Age: 48
End: 2018-11-27

## 2018-11-27 DIAGNOSIS — M96.1 FAILED BACK SURGICAL SYNDROME: ICD-10-CM

## 2018-11-27 NOTE — TELEPHONE ENCOUNTER
Please check to see where he wants meds- previously he has picked up in [place].    Pending Prescriptions:                       Disp   Refills    fentaNYL (DURAGESIC) 25 mcg/hr 72 hr patch15 pat*0            Sig: Place 1 patch onto the skin every 48 hours remove           old patch.  Fill on/after 11/30/18 to start           on/after 12/2/18    oxyCODONE-acetaminophen (PERCOCET) 7.5-32*38 tab*0            Sig: Take 0.5-1 tablets by mouth every 6 hours as           needed for severe pain . Max of 2/day.  May fill           on 11/30/18 and May start on 12/2/18      From study:  Patient is a current opioid user with chronic pain. To follow patient safety pain recommendations, I have verified that Pain clinic has supported opioid use.

## 2018-11-27 NOTE — TELEPHONE ENCOUNTER
Reason for call:  Medication   If this is a refill request, has the caller requested the refill from the pharmacy already? N/A  Will the patient be using a [facility]? No  Name of the pharmacy and phone number for the current request: [facility] [id number] [facility] - [place], [place] - [address] [place] [address]    Name of the medication requested: fentaNYL (DURAGESIC) 25 mcg/hr 72 hr patch  AND Oxycodone    Other request: Please send to [facility] in [facility] in [place]    Phone number to reach patient:  Home number on file [phone] (home)      Stacey Spears    [facility]

## 2018-11-27 NOTE — TELEPHONE ENCOUNTER
"Patient had a attempted right L4-5, changed to right L3-4 transforaminal epidural steroid injection  on 11/19/18.  Called patient for an update.      Pt reported the following details:  First few days after injection were \"rough\", but beginning Minimal pain relief.   Told patient that the information will be forwarded to her provider.  Also explained that, if a steroid medication was used, it could take up to 14 days to feel the full effect and if pt has any further questions or concerns pt should call the clinic at [phone].      Patient is requesting a medication refill for his pain. Please call to discuss.    "

## 2018-11-27 NOTE — TELEPHONE ENCOUNTER
This was managed in a separate encounter.    JEREMIAH PeacockN, RN  Care Coordinator  [facility]

## 2018-11-27 NOTE — TELEPHONE ENCOUNTER
He would like them mailed to the [facility] pharmacy in [place].    JEREMIAH PeacockN, RN  Care Coordinator  [facility] [place]

## 2018-11-28 RX ORDER — OXYCODONE AND ACETAMINOPHEN 7.5; 325 MG/1; MG/1
.5-1 TABLET ORAL EVERY 6 HOURS PRN
Qty: 38 TABLET | Refills: 0 | Status: SHIPPED | OUTPATIENT
Start: 2018-11-28 | End: 2019-01-02

## 2018-11-28 RX ORDER — FENTANYL 25 UG/1
1 PATCH TRANSDERMAL
Qty: 15 PATCH | Refills: 0 | Status: SHIPPED | OUTPATIENT
Start: 2018-11-28 | End: 2019-01-02

## 2018-11-28 NOTE — TELEPHONE ENCOUNTER
Signed Prescriptions:                        Disp   Refills    fentaNYL (DURAGESIC) 25 mcg/hr 72 hr patch 15 pat*0        Sig: Place 1 patch onto the skin every 48 hours remove old           patch.  Fill on/after 11/30/18 to start on/after           12/2/18  Authorizing Provider: YADIRA CARRILLO    oxyCODONE-acetaminophen (PERCOCET) 7.5-325*38 tab*0        Sig: Take 0.5-1 tablets by mouth every 6 hours as needed           for severe pain . Max of 2/day.  May fill on           11/30/18 and May start on 12/2/18  Authorizing Provider: YADIRA CARRILLO MD  [facility]

## 2018-12-21 ENCOUNTER — TELEPHONE (OUTPATIENT)
Dept: PALLIATIVE MEDICINE | Facility: CLINIC | Age: 48
End: 2018-12-21

## 2018-12-21 DIAGNOSIS — M96.1 FAILED BACK SURGICAL SYNDROME: ICD-10-CM

## 2018-12-21 NOTE — TELEPHONE ENCOUNTER
Received call from patient who states that he is having an increase in pain and is currently having a lot of pain. Patient would like to know what to do. Phone #[phone]      Stacey Spears    [facility]

## 2018-12-24 RX ORDER — METHYLPREDNISOLONE 4 MG
TABLET, DOSE PACK ORAL
Qty: 21 TABLET | Refills: 0 | Status: SHIPPED | OUTPATIENT
Start: 2018-12-24 | End: 2019-01-23

## 2018-12-24 NOTE — TELEPHONE ENCOUNTER
Spoke to patient and he was upset no one has called him sooner. He is having Increased pain in right leg and hip. Patient reports it's his normal pain X10. Medication is not helping. He has never had a pain flare like this. Reports he is desperate. Last injection helped a little but now the pain is unbearable. Patient has used a couple of extra oxycodone that he typically saves for trips to [place]. Will route as high priority.    JEREMIAH PeacockN, RN  Care Coordinator  [facility] [place]

## 2018-12-24 NOTE — TELEPHONE ENCOUNTER
It is possible something new is going on with his back, leading to this pain.  He has been stable with mild fluctuations for some time, so I am worried about that.  It would take new imaging to know.  That would get done after the holiday.    He is at max dose of gabapentin     We could try a steroid dose pack if he wants.  This could cause insomnia or changes in mood so make sure he is aware of that.  He should let me know a pharmacy that is available past noon, as I believe [facility] pharmacies are closing at noon.    If any red flags like incontinence or significant weakness (not related to pain), then he needs to go into ER for evaluation.      If he wants to change the percocet to 2/day consistently, I am ok with that.  We would probably check back in next Monday to see how he is doing and determine plan.  He would run out early- which is something that may happen now.  He would need to let us know when he would need more.    He should avoid using any extra lorazepam, but shouldn't stop his normal dose abruptly.  I don't want him to use more due to risks with combination of opioid and benzo.    Let me know above.    Bindu Motley MD  [facility]

## 2018-12-24 NOTE — TELEPHONE ENCOUNTER
Writer called Pt.      Pt will do as directed with percocet and increase to 2 per day.      Pt would like to start the steroid dose pack.  Send to [facility] [place].    Pt stated he has some weakness but not severe.    Pt verbalized understanding to go to the ED with significant weakness or loss of bowel or bladder,    Torrey Rodrigues, RN  Care Coordinator   [facility] [place]

## 2018-12-24 NOTE — TELEPHONE ENCOUNTER
Signed Prescriptions:                        Disp   Refills    methylPREDNISolone (MEDROL) 4 MG tablet th*21 tab*0        Sig: Follow Package Directions  Authorizing Provider: YADIRA CARRILLO MD  [facility]

## 2018-12-31 ENCOUNTER — TELEPHONE (OUTPATIENT)
Dept: PALLIATIVE MEDICINE | Facility: CLINIC | Age: 48
End: 2018-12-31

## 2018-12-31 DIAGNOSIS — M96.1 FAILED BACK SURGICAL SYNDROME: ICD-10-CM

## 2018-12-31 DIAGNOSIS — M54.16 LUMBAR RADICULOPATHY: ICD-10-CM

## 2018-12-31 RX ORDER — NORTRIPTYLINE HCL 10 MG
20 CAPSULE ORAL AT BEDTIME
Qty: 60 CAPSULE | Refills: 3 | Status: SHIPPED | OUTPATIENT
Start: 2018-12-31 | End: 2019-04-24

## 2018-12-31 NOTE — TELEPHONE ENCOUNTER
Received fax request from [facility]  pharmacy requesting refill(s) for nortriptyline (PAMELOR) 10 MG capsule    Last refilled on 12/1/18    Pt last seen on 11/9/18  Next appt scheduled for none      Syeda Montelongo MA  [facility]      Will facilitate refill.

## 2018-12-31 NOTE — TELEPHONE ENCOUNTER
Pending Prescriptions:                       Disp   Refills    nortriptyline (PAMELOR) 10 MG capsule     60 cap*3            Sig: Take 2 capsules (20 mg) by mouth At Bedtime    MD [facility]

## 2018-12-31 NOTE — TELEPHONE ENCOUNTER
Reason for call:  Medication   If this is a refill request, has the caller requested the refill from the pharmacy already? No  Will the patient be using a [facility]? No  Name of the pharmacy and phone number for the current request: unknown    Name of the medication requested: fentanyl and oxycodene    Phone number to reach patient:  Home number on file [phone] (home)

## 2019-01-02 RX ORDER — FENTANYL 25 UG/1
1 PATCH TRANSDERMAL
Qty: 15 PATCH | Refills: 0 | Status: SHIPPED | OUTPATIENT
Start: 2019-01-02 | End: 2019-01-23

## 2019-01-02 RX ORDER — OXYCODONE AND ACETAMINOPHEN 7.5; 325 MG/1; MG/1
.5-1 TABLET ORAL EVERY 6 HOURS PRN
Qty: 40 TABLET | Refills: 0 | Status: SHIPPED | OUTPATIENT
Start: 2019-01-02 | End: 2019-01-23

## 2019-01-02 NOTE — TELEPHONE ENCOUNTER
Please have patient do UDS at clinic closer to home before end of day Friday.  We can send opioid agreement in the mail.  This was my error for not doing at last appt

## 2019-01-02 NOTE — TELEPHONE ENCOUNTER
Please have patient go to have UDS done in [facility] closer to home, and send the opioid agreement.  This was my fault for not doing.   I would want him to do within 2 days of notification.    Script Eprescribed- please let patient know      Signed Prescriptions:                        Disp   Refills    fentaNYL (DURAGESIC) 25 mcg/hr 72 hr patch 15 pat*0        Sig: Place 1 patch onto the skin every 48 hours remove old           patch.  Fill on/after 1/2/19 to start on/after           1/2/19  Authorizing Provider: YADIRA CARRILLO    oxyCODONE-acetaminophen (PERCOCET) 7.5-325*40 tab*0        Sig: Take 0.5-1 tablets by mouth every 6 hours as needed           for severe pain . Max of 2/day.  May fill on           1/2/19 and May start on 1/2/19  Authorizing Provider: YADIRA CARRILLO MD  [facility]

## 2019-01-02 NOTE — TELEPHONE ENCOUNTER
Received call from patient requesting refill(s) of fentaNYL (DURAGESIC) 25 mcg/hr 72 hr patch  AND Oxycodone    Last picked up from pharmacy on 12/3/18    Pt last seen by prescribing provider on 10/31/18  Next appt scheduled for 1/23/19     checked in the past 6 months? No If no, print current report and give to RN    Last urine drug screen date 9/27/17  Current opioid agreement on file (completed within the last year) Yes Date of opioid agreement: 10/12/17    Processing (pick one and delete the others):    Called pt and he would like Rx to be mail to     [place] [id number] [place] [address] [address]  Phone: [phone] Fax: [phone]    Syeda Montelongo MA  [facility]    Will route to nursing pool for review and preparation of prescription(s).

## 2019-01-02 NOTE — TELEPHONE ENCOUNTER
Medication refill information reviewed.     Due date for  fentaNYL (DURAGESIC) 25 mcg/hr 72 hr patch  AND Oxycodone is 1/2/19     Prescriptions prepped for review.     Will route to provider.

## 2019-01-03 ENCOUNTER — TELEPHONE (OUTPATIENT)
Dept: PALLIATIVE MEDICINE | Facility: CLINIC | Age: 49
End: 2019-01-03

## 2019-01-03 NOTE — TELEPHONE ENCOUNTER
Was is informed and he stated he would like to do the UDS and CSA on his apt with Dr. Motley  date at 1/23/19.     Syeda Montelongo MA  [facility]

## 2019-01-04 ENCOUNTER — TELEPHONE (OUTPATIENT)
Dept: PALLIATIVE MEDICINE | Facility: CLINIC | Age: 49
End: 2019-01-04

## 2019-01-04 NOTE — TELEPHONE ENCOUNTER
Prior Authorization Approval    Authorization Effective Date: 1/1/2019  Authorization Expiration Date: 1/30/2020  Medication: oxyCODONE-acetaminophen (PERCOCET) 7.5-325 MG-APPROVED  Approved Dose/Quantity:    Reference #: [id number]   Insurance Company: [employer] - Phone [phone] Fax [phone]  Expected CoPay:       CoPay Card Available:      Foundation Assistance Needed:    Which Pharmacy is filling the prescription (Not needed for infusion/clinic administered): [id number] [place] [address]  Pharmacy Notified: Yes  Patient Notified: Yes

## 2019-01-04 NOTE — TELEPHONE ENCOUNTER
Central Prior Authorization Team   Phone: [phone]      PA Initiation    Medication: oxyCODONE-acetaminophen (PERCOCET) 7.5-325 MG   Insurance Company: [employer] - Phone [phone] Fax [phone]  Pharmacy Filling the Rx: [id number] - [place], [place] - [address] [place] [address]  Filling Pharmacy Phone: [phone]  Filling Pharmacy Fax: [phone]  Start Date: 1/4/2019

## 2019-01-23 ENCOUNTER — OFFICE VISIT (OUTPATIENT)
Dept: PALLIATIVE MEDICINE | Facility: CLINIC | Age: 49
End: 2019-01-23
Payer: COMMERCIAL

## 2019-01-23 VITALS
BODY MASS INDEX: 35.15 KG/M2 | SYSTOLIC BLOOD PRESSURE: 134 MMHG | HEART RATE: 78 BPM | WEIGHT: 238 LBS | DIASTOLIC BLOOD PRESSURE: 88 MMHG

## 2019-01-23 DIAGNOSIS — Z51.81 ENCOUNTER FOR MONITORING OPIOID MAINTENANCE THERAPY: ICD-10-CM

## 2019-01-23 DIAGNOSIS — N52.9 ERECTILE DYSFUNCTION, UNSPECIFIED ERECTILE DYSFUNCTION TYPE: ICD-10-CM

## 2019-01-23 DIAGNOSIS — Z79.891 ENCOUNTER FOR MONITORING OPIOID MAINTENANCE THERAPY: ICD-10-CM

## 2019-01-23 DIAGNOSIS — M96.1 FAILED BACK SURGICAL SYNDROME: Primary | ICD-10-CM

## 2019-01-23 DIAGNOSIS — G89.4 CHRONIC PAIN SYNDROME: ICD-10-CM

## 2019-01-23 PROBLEM — E66.01 MORBID OBESITY (H): Status: ACTIVE | Noted: 2019-01-23

## 2019-01-23 PROCEDURE — 99000 SPECIMEN HANDLING OFFICE-LAB: CPT | Performed by: PSYCHIATRY & NEUROLOGY

## 2019-01-23 PROCEDURE — 99214 OFFICE O/P EST MOD 30 MIN: CPT | Performed by: PSYCHIATRY & NEUROLOGY

## 2019-01-23 PROCEDURE — 80307 DRUG TEST PRSMV CHEM ANLYZR: CPT | Mod: 90 | Performed by: PSYCHIATRY & NEUROLOGY

## 2019-01-23 RX ORDER — OXYCODONE AND ACETAMINOPHEN 7.5; 325 MG/1; MG/1
.5-1 TABLET ORAL EVERY 6 HOURS PRN
Qty: 40 TABLET | Refills: 0 | Status: SHIPPED | OUTPATIENT
Start: 2019-01-23 | End: 2019-02-27

## 2019-01-23 RX ORDER — FENTANYL 25 UG/1
1 PATCH TRANSDERMAL
Qty: 15 PATCH | Refills: 0 | Status: SHIPPED | OUTPATIENT
Start: 2019-01-23 | End: 2019-02-27

## 2019-01-23 RX ORDER — METHOCARBAMOL 500 MG/1
500-1000 TABLET, FILM COATED ORAL 3 TIMES DAILY PRN
Qty: 360 TABLET | Refills: 1 | Status: SHIPPED | OUTPATIENT
Start: 2019-01-23 | End: 2019-06-03

## 2019-01-23 ASSESSMENT — PAIN SCALES - GENERAL: PAINLEVEL: SEVERE PAIN (7)

## 2019-01-23 NOTE — LETTER
[facility]  01/23/19    Patient: David Wilcox  YOB: 1970  Medical Record Number: [id number]                                                                  Opioid / Opioid Plus Controlled Substance Agreement    I understand that my care provider has prescribed an opioid (narcotic) controlled substance to help manage my condition(s). I am taking this medicine to help me function or work. I know this is strong medicine, and that it can cause serious side effects. Opioid medicine can be sedating, addicting and may cause a dependency on the drug. They can affect my ability to drive or think, and cause depression. They need to be taken exactly as prescribed. Combining opioids with certain medicines or chemicals (such as cocaine, sedatives and tranquilizers, sleeping pills, meth) can be dangerous or even fatal. Also, if I stop opioids suddenly, I may have severe withdrawal symptoms. Last, I understand that opioids do not work for all types of pain nor for all patients. If not helpful, I may be asked to stop them.        The risks, benefits, and side effects of these medicine(s) were explained to me. I agree that:    1. I will take part in other treatments as advised by my care team. This may be psychiatry or counseling, physical therapy, behavioral therapy, group treatment or a referral to a pain clinic. I will reduce or stop my medicine when my care team tells me to do so.  2. I will take my medicines as prescribed. I will not change the dose or schedule unless my care team tells me to. There will be no refills if I  run out early.   I may be contactedwithout warning and asked to complete a urine drug test or pill count at any time.   3. I will keep all my appointments, and understand this is part of the monitoring of opioids. My care team may require an office visit for EVERY opioid/controlled substance refill. If I miss appointments or don t follow instructions, my care team  may stop my medicine.  4. I will not ask other providers to prescribe controlled substances, and I will not accept controlled substances from other people. If I need another prescribed controlled substance for a new reason, I will tell my care team within 1 business day.  5. I will use one pharmacy to fill all of my controlled substance prescriptions, and it is up to me to make sure that I do not run out of my medicines on weekends or holidays. If my care team is willing to refill my opioid prescription without a visit, I must request refills only during office hours, refills may take up to 3 days to process, and it may take up to 5 to 7 days for my medicine to be mailed and ready at my pharmacy. Prescriptions will not be mailed anywhere except my pharmacy.        [id number]  Rev 12/18         Registration to scan to EHR                             Page 1 of 2               Controlled Substance Agreement Opioid        [facility]  01/23/19  Patient: David Wilcox  YOB: 1970  Medical Record Number: [id number]                                                                  6. I am responsible for my prescriptions. If the medicine/prescription is lost or stolen, it will not be replaced. I also agree not to share controlled substance medicines with anyone.  7. I agree to not use ANY illegal or recreational drugs. This includes marijuana, cocaine, bath salts or other drugs. I agree not to use alcohol unless my care team says I may.          I agree to give urine samples whenever asked. If I don t give a urine sample, the care team may stop my medicine.    8. If I enroll in the [place] Medical Marijuana program, I will tell my care team. I will also sign an agreement to share my medical records with my care team.   9. I will bring in my list of medicines (or my medicine bottles) each time I come to the clinic.   10. I will tell my care team right away if I become pregnant or have a new  medical problem treated outside of my regular clinic.  11. I understand that this medicine can affect my thinking and judgment. It may be unsafe for me to drive, use machinery and do dangerous tasks. I will not do any of these things until I know how the medicine affects me. If my dose changes, I will wait to see how it affects me. I will contact my care team if I have concerns about medicine side effects.    I understand that if I do not follow any of the conditions above, my prescriptions or treatment may be stopped.      I agree that my provider, clinic care team, and pharmacy may work with any city, state or federal law enforcement agency that investigates the misuse, sale, or other diversion of my controlled medicine. I will allow my provider to discuss my care with or share a copy of this agreement with any other treating provider, pharmacy or emergency room where I receive care. I agree to give up (waive) any right of privacy or confidentiality with respect to these consents.     I have read this agreement and have asked questions about anything I did not understand.      ________________________________________________________________________  Patient signature - Date/Time -  David Wilcox                                      ________________________________________________________________________  Witness signature                                                            ________________________________________________________________________  Provider signature - Carolann Motley MD      [id number]  Rev 12/18         Registration to scan to EHR                         Page 2 of 2                   Controlled Substance Agreement Opioid           Page 1 of 2  Opioid Pain Medicines (also known as Narcotics)  What You Need to Know    What are opioids?   Opioids are pain medicines that must be prescribed by a doctor.  They are also known as narcotics.    Examples are:     morphine (MS Contin,  Mimi)    oxycodone (Oxycontin)    oxycodone and acetaminophen (Percocet)    hydrocodone and acetaminophen (Vicodin, Norco)     fentanyl patch (Duragesic)     hydromorphone (Dilaudid)     methadone     What do opioids do well?   Opioids are best for short-term pain after a surgery or injury. They also work well for cancer pain. Unlike other pain medicines, they do not cause liver or kidney failure or ulcers. They may help some people with long-lasting (chronic) pain.     What do opioids NOT do well?   Opioids never get rid of pain entirely, and they do not work well for most patients with chronic pain. Opioids do not reduce swelling, one of the causes of pain. They also don t work well for nerve pain.                           For informational purposes only.  Not to replace the advice of your care provider.  Copyright [address] [facility]. All right reserved. [employer] 365799-Xmv 02/18.      Page 2 of 2    Risks and side effects   Talk to your doctor before you start or decide to keep taking one of these medicines. Side effects include:    Lowering your breathing rate enough to cause death    Overdose, including death, especially if taking higher than prescribed doses    Long-term opioid use    Worse depression symptoms; less pleasure in things you usually enjoy    Feeling tired or sluggish    Slower thoughts or cloudy thinking    Being more sensitive to pain over time; pain is harder to control    Trouble sleeping or restless sleep    Changes in hormone levels (for example, less testosterone)    Changes in sex drive or ability to have sex    Constipation    Unsafe driving    Itching and sweating    Feeling dizzy    Nausea, vomiting and dry mouth    What else should I know about opioids?  When someone takes opioids for too long or too often, they become dependent. This means that if you stop or reduce the medicine too quickly, you will have withdrawal symptoms.    Dependence is not the same as addiction.  Addiction is when people keep using a substance that harms their body, their mind or their relations with others. If you have a history of drug or alcohol abuse, taking opioids can cause a relapse.    Over time, opioids don t work as well. Most people will need higher and higher doses. The higher the dose, the more serious the side effects. We don t know the long-term effects of opioids.      Prescribed opioids aren't the best way to manage chronic pain    Other ways to manage pain include:      Ibuprofen or acetaminophen.  You should always try this first.      Treat health problems that may be causing pain.      acupuncture or massage, deep breathing, meditation, visual imagery, aromatherapy.      Use heat or ice at the pain site      Physical therapy and exercise      Stop smoking      See a counselor or therapist                                                  People who have used opioids for a long time may have a lower quality of life, worse depression, higher levels of pain and more visits to doctors.    Never share your opioids with others. Be sure to store opioids in a secure place, locked if possible.Young children can easily swallow them and overdose.     You can overdose on opioids.  Signs of overdose include decrease or loss of consciousness, slowed breathing, trouble waking and blue lips.  If someone is worried about overdose, they should call 911.    If you are at risk for overdose, you may get naloxone (Narcan, a medicine that reverses the effects of opioids.  If you overdose, a friend or family member can give you Narcan while waiting for the ambulance.  They need to know the signs of overdose and how to give Narcan.    While you're taking opioids:    Don't use alcohol or street drugs. Taking them together can cause death.    Don't take any of these medicines unless your doctor says its okay.  Taking these with opioids can cause death.    Benzodiazepines (such as lorazepam         or  diazepam)    Muscle relaxers (such as cyclobenzaprine)    sleeping pills    other opioids    Safe disposal of opioids  Find your area drug take-back program, your pharmacy mail-back program, buy a special disposal bag (such as Deterra) from your pharmacy or flush them down the toilet.  Use the guidelines at:  www.fda.gov/drugs/resourcesforyou

## 2019-01-23 NOTE — PROGRESS NOTES
"[facility]    Date of visit: 1/23/2019     Chief complaint:   Chief Complaint   Patient presents with     Pain     Interval history:  David Wilcox was last seen by me on 10/31/2018.      Recommendations/plan at the last visit included:  1. Physical Therapy: continue exercises as recommended by PT.  Discuss gradual reconditioning and working on expanding his \"upright time\" to more than 4 hours  2. Clinical Health Psychologist to address issues of relaxation, behavioral change, coping style, and other factors important to improvement.  Difficult due to work.  He does followin with his PCP for mental health, and they requested we do PHQ9 which was put into his chart today.  Denies suicidal ideation   3. Diagnostic Studies: none  4. Schedule epidural steroid injection - transforaminal epidural steroid injection vs interlam - hard to tell what I can get with transforaminal epidural steroid injection due to hardware, will need to decide day of.  If interlaminar, then only option would be above fusion, and caudal has been done.  5. Medication Management:    1. Increase fentanyl to 25mcg for the winter, and decrease Percocet to 7.5/325mg daily + 8 days per month he can use 2/day.  6. Recommendations to PCP: none  7. Follow up: 3 months      Since his last visit, David Wilcox reports:  -feels like the medications are working for him for the winter  -doing a lot of winter lifting/works  -he continues to have some intermittent problems with incontinence of bowel (he had this in past, MRI was done after this).  -when working hard, has some intermittent numbness along left groin.  This comes and goes.  -has problems with erectile dysfunction- has started conversations with this PCP about this.      Pain scores:  Pain intensity on average is 7 on a scale of 0-10.     Current pain treatments:   Percocet 10/325mg daily- 2/day  Fentanyl patch-12 mcg/48 hours - feels it works too " strongly with heat/sun exposure  Robaxin (methocarbamol) 1000mg at night and in the morning- the morning dose has been helping  Gabapentin 1200 mg TID, beneficial  Cymbalta 60mg daily- tried going to 90mg/day but felt worse- for pain and mood  Nortriptyline 20 mg at bedtime - causing dry mouth but okay to continue    Previous medication treatments included:  Hydrocodone- not helpful  Methadone- taking after 1st surgery  Naprosyn- not helpful  Flexeril- after first back injury (1990s)  Lidocaine- not helpful  Acetaminophen (tylenol)   Gabapentin    Other treatments have included:  David Wilcox has been seen at a pain clinic in the past.  MAPS- injections  PT: yes  Acupuncture: no  TENs Unit: yes- out of use- somewhat helpful  Injections: none since last surgery- very  helpful    Side Effects: None    Medications:  Current Outpatient Medications   Medication Sig Dispense Refill     DULoxetine (CYMBALTA) 60 MG EC capsule Take 1 capsule (60 mg) by mouth daily 90 capsule 6     fentaNYL (DURAGESIC) 25 mcg/hr 72 hr patch Place 1 patch onto the skin every 48 hours remove old patch.  Fill on/after 1/2/19 to start on/after 1/2/19 15 patch 0     gabapentin (NEURONTIN) 600 MG tablet Take 2 tablets (1,200 mg) by mouth 3 times daily . Increase to this dose as instructed in clinic. 3 month supply 540 tablet 1     LORazepam (ATIVAN) 0.5 MG tablet One bid prn anxiety. Rare use 30 tablet 0     methocarbamol (ROBAXIN) 500 MG tablet Take 1-2 tablets (500-1,000 mg) by mouth 3 times daily as needed for muscle spasms . Caution for sedation 360 tablet 1     methylPREDNISolone (MEDROL) 4 MG tablet therapy pack Follow Package Directions 21 tablet 0     mirtazapine (REMERON) 15 MG tablet Take 1 tablet (15 mg) by mouth At Bedtime 90 tablet 3     naloxone (NARCAN) nasal spray Spray 1 spray (4 mg) into one nostril alternating nostrils as needed for opioid reversal every 2-3 minutes until assistance arrives (Patient not taking: Reported on  7/30/2018) 0.2 mL 0     nortriptyline (PAMELOR) 10 MG capsule Take 2 capsules (20 mg) by mouth At Bedtime 60 capsule 3     omeprazole (PRILOSEC) 20 MG CR capsule TAKE ONE CAPSULE BY MOUTH EVERY DAY 90 capsule 2     oxyCODONE-acetaminophen (PERCOCET) 7.5-325 MG per tablet Take 0.5-1 tablets by mouth every 6 hours as needed for severe pain . Max of 2/day.  May fill on 1/2/19 and May start on 1/2/19 40 tablet 0     TYLENOL EXTRA STRENGTH 500 MG OR TABS 3 po prn for pain       zolpidem (AMBIEN) 10 MG tablet Take 1 tablet (10 mg) by mouth nightly as needed for sleep 60 tablet 2       Medical History: any changes in medical history since they were last seen? none    Review of Systems:  The 14 system ROS was reviewed from the intake questionnaire, and is positive for: none  Mood: denies concern    Physical Exam:  Blood pressure 134/88, pulse 78, weight 108 kg (238 lb).  General: awake, alert , cooperative  Gait: Antalgic on the right, uses a single end cane  MSK exam: uses cane for ambulation  Pain with flexion and extension rotation of right leg.   Reflexes 2+ in LE    Assessment:   1. 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    2. Facet arthropathy likely contributing above the level of the future  3. Distant history of alcohol and drug abuse   4. Bipolar disorder- currently managed  5. H/o meningitis after SCS trial  6. Thoracic back pain, likely myofascial etiology.  7.     Plan:  1. Physical Therapy: continue exercises as recommended by PT.   2. Clinical Health Psychologist to address issues of relaxation, behavioral change, coping style, and other factors important to improvement.  Not at this time   3. Diagnostic Studies: UDS and opioid agreement today  4. Procedures: none at this time  5. Medication Management:  No changes, next month's were eprescribed in, patient awre.  6. Recommendations to PCP: none. Advised he talk with PCP further about erectile dysfunction.  Could be  Cymbalta but he states he has been on that for some time.  Back imaging doesn't sure severe stenosis that would explain.  7. Follow up: 3 months    Total time spent was 25 minutes, and more than 50% of face to face time was spent in counseling and/or coordination of care regarding medications, imaging.   Bindu Motley MD  [facility]

## 2019-01-23 NOTE — PATIENT INSTRUCTIONS
1. Go down to the lab for a drug screen.  2. Opioid agreement today  3. No changes to meds.  I sent in opioids for 2/1 start date.  4. Follow up 3 months.    ----------------------------------------------------------------  Clinic Number:  [phone]   Call this number with any questions about your care and for scheduling assistance. Calls are returned Monday through Friday between 8 AM and 4:30 PM. We usually get back to you within 2 business days depending on the issue/request.       Medication refills:    For non-narcotic medications, call your pharmacy directly to request a refill. The pharmacy will contact the [facility] for authorization. Please allow 3-4 days for these refills to be processed.     For narcotic refills, call the clinic number or send a [employer] message. Please contact us 7-10 days before your refill is due. The message MUST include the name of the specific medication(s) requested and how you would like to receive the prescription(s). The options are as follows:    Pain Clinic staff can mail the prescription to your pharmacy. Please tell us the name of the pharmacy.    You may pick the prescription up at the Pain Clinic (tell us the location) or during a clinic visit with your pain provider    Pain Clinic staff can deliver the prescription to the [facility] pharmacy in the clinic building. Please tell us the location.      We believe regular attendance is key to your success in our program.    Any time you are unable to keep your appointment we ask that you call us at least 24 hours in advance to let us know. This will allow us to offer the appointment time to another patient.

## 2019-01-28 LAB — PAIN DRUG SCR UR W RPTD MEDS: NORMAL

## 2019-01-29 ENCOUNTER — TELEPHONE (OUTPATIENT)
Dept: PALLIATIVE MEDICINE | Facility: CLINIC | Age: 49
End: 2019-01-29

## 2019-01-29 NOTE — TELEPHONE ENCOUNTER
Prior Authorization Retail Medication Request    Medication/Dose: methocarbamol (ROBAXIN) 500 MG tablet  ICD code (if different than what is on RX):  Chronic pain syndrome [G89.4  Previously Tried and Failed:    Rationale:      Insurance Name:  [facility]   Insurance ID:  [id number]      Pharmacy Information (if different than what is on RX)  [facility]  Phone:  [phone]    Syeda Montelongo MA  [facility]

## 2019-02-04 NOTE — TELEPHONE ENCOUNTER
PA Initiation    Medication: methocarbamol (ROBAXIN) 500 MG tablet  Insurance Company: [employer] - Phone [phone] Fax [phone]  [id number] [place] [facility] [place], [place] - [address] [place] [address]  Filling Pharmacy Phone: [phone]  Filling Pharmacy Fax:    Start Date: 2/4/2019    Central Prior Authorization Team   Phone: [phone]

## 2019-02-04 NOTE — TELEPHONE ENCOUNTER
Prior Authorization Approval    Authorization Effective Date: 1/5/2019  Authorization Expiration Date: 2/4/2020  Medication: methocarbamol (ROBAXIN) 500 MG tablet  Approved Dose/Quantity: 360  Reference #: [id number]   Insurance Company: [employer] - Phone [phone] Fax [phone]  Which Pharmacy is filling the prescription (Not needed for infusion/clinic administered): [id number] [place] [address]  Pharmacy Notified: Yes  Patient Notified: Yes

## 2019-02-26 DIAGNOSIS — M96.1 FAILED BACK SURGICAL SYNDROME: ICD-10-CM

## 2019-02-26 NOTE — TELEPHONE ENCOUNTER
Reason for call:  Medication   If this is a refill request, has the caller requested the refill from the pharmacy already? No  Will the patient be using a [facility]? No  Name of the pharmacy and phone number for the current request[facility]    Name of the medication requested: fentaNYL (DURAGESIC) 25 mcg/hr 72 hr patch/ oxyCODONE-acetaminophen (PERCOCET) 7.5-325 MG per tablet    Other request: n/a    Phone number to reach patient:  Home number on file [phone] (home)    Best Time:  n/a    Can we leave a detailed message on this number?  YES       Cate CHACON    [facility] [place]

## 2019-02-27 RX ORDER — FENTANYL 25 UG/1
1 PATCH TRANSDERMAL
Qty: 15 PATCH | Refills: 0 | Status: SHIPPED | OUTPATIENT
Start: 2019-02-27 | End: 2019-04-24

## 2019-02-27 RX ORDER — OXYCODONE AND ACETAMINOPHEN 7.5; 325 MG/1; MG/1
.5-1 TABLET ORAL EVERY 6 HOURS PRN
Qty: 40 TABLET | Refills: 0 | Status: SHIPPED | OUTPATIENT
Start: 2019-02-27 | End: 2019-04-24

## 2019-02-27 NOTE — TELEPHONE ENCOUNTER
Medication refill information reviewed.     Due date for fentaNYL (DURAGESIC) 25 mcg/hr 72 hr patch and oxyCODONE-acetaminophen (PERCOCET) 7.5-325 MG per tablet is 3/6/19     Prescriptions prepped for review.     Will route to provider.     [id number]   [address] [place], [place] [address]  Phone: [phone] Fax: [phone]        Torrey Rodrigues, RN  Care Coordinator   [facility] [place]

## 2019-02-27 NOTE — TELEPHONE ENCOUNTER
Script Eprescribed to pharmacy    Will send this to MA team to notify patient.    Signed Prescriptions:                        Disp   Refills    fentaNYL (DURAGESIC) 25 mcg/hr 72 hr patch 15 pat*0        Sig: Place 1 patch onto the skin every 48 hours remove old           patch.  Fill on/after 3/4/19 to start on/after           3/6/19  Authorizing Provider: YADIRA CARRILLO    oxyCODONE-acetaminophen (PERCOCET) 7.5-325*40 tab*0        Sig: Take 0.5-1 tablets by mouth every 6 hours as needed           for severe pain . Max of 2/day.  May fill on           3/4/19 and May start on 3/6/19  Authorizing Provider: YADIRA CARRILLO MD  [facility]

## 2019-02-27 NOTE — TELEPHONE ENCOUNTER
Received call from patient requesting refill(s) of     fentaNYL (DURAGESIC) 25 mcg/hr 72 hr patch and oxyCODONE-acetaminophen (PERCOCET) 7.5-325 MG per tablet     Last picked up from pharmacy both on 2/4/2019    Pt last seen by prescribing provider on 1/23/2019  Next appt scheduled for 4/24/2019      checked in the past 6 months? No If no, print current report and give to RN     Last urine drug screen date  Current opioid agreement on file (completed within the last year) Yes Date of opioid agreement: 1/23/2019     Processing:  [facility]:     [id number]   [address] [address]  Phone: [phone] Fax: [phone]     Will route to nursing pool for review and preparation of prescription(s).

## 2019-03-19 DIAGNOSIS — M96.1 FAILED BACK SURGICAL SYNDROME: ICD-10-CM

## 2019-03-19 RX ORDER — GABAPENTIN 600 MG/1
1200 TABLET ORAL 3 TIMES DAILY
Qty: 540 TABLET | Refills: 0 | Status: SHIPPED | OUTPATIENT
Start: 2019-03-19 | End: 2019-06-03

## 2019-03-19 NOTE — TELEPHONE ENCOUNTER
Received fax request from  pharmacy requesting refill(s) for gabapentin (NEURONTIN) 600 MG tablet    Last refilled on 12/18/19    Pt last seen on 1/23/19  Next appt scheduled for 4/24/19    Syeda Montelongo MA  [facility]      Will facilitate refill.

## 2019-03-19 NOTE — TELEPHONE ENCOUNTER
Signed Prescriptions:                        Disp   Refills    gabapentin (NEURONTIN) 600 MG tablet       540 ta*0        Sig: Take 2 tablets (1,200 mg) by mouth 3 times daily . 3           month supply  Authorizing Provider: REA LOPEZ RN CNP, FNP  [facility]    Sending to Dr. Bindu Motley as NILAM

## 2019-03-27 ENCOUNTER — TELEPHONE (OUTPATIENT)
Dept: FAMILY MEDICINE | Facility: CLINIC | Age: 49
End: 2019-03-27

## 2019-03-27 DIAGNOSIS — F51.01 PRIMARY INSOMNIA: ICD-10-CM

## 2019-03-27 RX ORDER — ZOLPIDEM TARTRATE 10 MG/1
TABLET ORAL
Qty: 31 TABLET | Refills: 0 | Status: SHIPPED | OUTPATIENT
Start: 2019-03-27 | End: 2019-04-22

## 2019-03-27 NOTE — TELEPHONE ENCOUNTER
In done folder. Refilled x 1 while Primary Care Provider is out.  Thanks Colette Jaeger [facility]-BC

## 2019-03-28 NOTE — TELEPHONE ENCOUNTER
Signed RX Zolpidem faxed to [facility]  [address]le [address]

## 2019-04-04 DIAGNOSIS — M96.1 FAILED BACK SURGICAL SYNDROME: ICD-10-CM

## 2019-04-04 NOTE — TELEPHONE ENCOUNTER
Medication refill information reviewed.     Due date for fentanyl and percocot is 4/5/19     Prepped warm weather dosing of prescriptions per patient's request.     Did not remove fentanyl 25 mcg and percocet #40 tabs from med list yet.     Will await provider review.     Myla Rhoades, JEREMIAHN, RN-BC  Patient Care Supervisor/Care Coordinator  [facility] [place]

## 2019-04-04 NOTE — TELEPHONE ENCOUNTER
Received call from patient requesting refill(s) of fentaNYL (DURAGESIC) 25 mcg/hr 72 hr patch AND oxyCODONE-acetaminophen (PERCOCET) 7.5-325 MG per tablet    Last picked up from pharmacy on 3/4/19    Pt last seen by prescribing provider on 1/23/19  Next appt scheduled for 4/24/19     checked in the past 6 months? Yes If no, print current report and give to RN    Last urine drug screen date 1/23/19  Current opioid agreement on file (completed within the last year) Yes Date of opioid agreement: 1/23/19    Processing (pick one and delete the others):      E-prescribe to [facility] pharmacy      Syeda Montelongo MA  [facility]    Will route to nursing pool for review and preparation of prescription(s).

## 2019-04-04 NOTE — TELEPHONE ENCOUNTER
Reviewed chart. Pt's current medication regime is as follows:    Fentanyl 25 mcg, 1 patch every 48 hours  Percocet 7.5/325 mg, max 2/day. #40 typical 30 day supply.    ---------  Called pt. States that they had change his medication plan last summer since he was getting dizzy with the 25 mcg patch when it was very warm.     He would like to return to warm weather dosing:    Fentanyl 12 mcg q 48 hours   Percocet 7.5/325 mg, 2/day, #60/month    Pt will need medication by tomorrow. States that he forgot to call sooner.   ---------  Will route to MA pool for assistance with gathering opioid refill information.    Myla Rhoades, JEREMIAHN, RN-BC  Patient Care Supervisor/Care Coordinator  [facility] [place]

## 2019-04-04 NOTE — TELEPHONE ENCOUNTER
Patient left  4/3 at 8:33 pm      He would like to have a med change.    Fentynal patch, would like to go to 12.5 and have the Oxy s to two 7.5. If this can be reviewed        Venus REZA    [facility]

## 2019-04-05 ENCOUNTER — TELEPHONE (OUTPATIENT)
Dept: PALLIATIVE MEDICINE | Facility: CLINIC | Age: 49
End: 2019-04-05

## 2019-04-05 RX ORDER — OXYCODONE AND ACETAMINOPHEN 7.5; 325 MG/1; MG/1
TABLET ORAL
Qty: 60 TABLET | Refills: 0 | Status: SHIPPED | OUTPATIENT
Start: 2019-04-05 | End: 2019-05-03

## 2019-04-05 RX ORDER — FENTANYL 12.5 UG/1
1 PATCH TRANSDERMAL
Qty: 15 PATCH | Refills: 0 | Status: SHIPPED | OUTPATIENT
Start: 2019-04-05 | End: 2019-05-03

## 2019-04-05 NOTE — TELEPHONE ENCOUNTER
Received call from [facility] in [facility] in [place] stating that the patient's insurance is requiring a PA for the Fentanyl 12mcg patches. Routing for a PA.       Stacey Spears    [facility]

## 2019-04-05 NOTE — TELEPHONE ENCOUNTER
Prior Authorization Approval    Authorization Effective Date:  3/6/2019  Authorization Expiration Date:  4/4/2020  Medication: Fentanyl   Approved Dose/Quantity: 15  Reference #:   [id number]  Insurance Company: [employer] - Phone [phone] Fax [phone]  Which Pharmacy is filling the prescription (Not needed for infusion[facility]clinic administered[id number] [place] [address]  Pharmacy Notified: Yes **Pharmacy will notify patient when script is ready for .**  Patient Notified: Yes

## 2019-04-05 NOTE — TELEPHONE ENCOUNTER
Prior Authorization Retail Medication Request    Medication/Dose: Fentanyl   ICD code (if different than what is on RX):  Failed back surgical syndrome [M96.1]   Previously Tried and Failed:    Rationale:      Insurance Name:  [facility]  Insurance ID:  [id number]       Pharmacy Information (if different than what is on RX)  [facility]  Phone:  [phone]    Syeda Montelongo MA  [facility]

## 2019-04-05 NOTE — TELEPHONE ENCOUNTER
PA Initiation    Med[place]: [place] [facility]  Insurance Company: [employer]O - Phone [phone] Fax [phone]  [id number] [place], [place] - [address] [place] [address]  Filling Pharmacy Phone: [phone]  Filling Pharmacy Fax:    Start Date: 4/5/2019    Central Prior Authorization Team   Phone: [phone]

## 2019-04-05 NOTE — TELEPHONE ENCOUNTER
Script Eprescribed to pharmacy    Will send this to MA team to notify patient.    Signed Prescriptions:                        Disp   Refills    fentaNYL (DURAGESIC) 12 mcg/hr 72 hr patch 15 pat*0        Sig: Place 1 patch onto the skin every 48 hours remove old           patch.  Fill and start on 4/5/19  Authorizing Provider: YADIRA CARRILLO    oxyCODONE-acetaminophen (PERCOCET) 7.5-325*60 tab*0        Sig: Take 0.5-1 tablets po q 6 hrs PRN pain. Max of 2/day.           May fill and start on 4/5/19  Authorizing Provider: YADIRA CARRILLO MD  [facility]

## 2019-04-05 NOTE — TELEPHONE ENCOUNTER
Routed to [facility] to gather information about PA details to send to central PA team. Pt is due to start medication today.     JEREMIAH GrissomN, RN-BC  Patient Care Supervisor/Care Coordinator  [facility] [place]

## 2019-04-22 DIAGNOSIS — F51.01 PRIMARY INSOMNIA: ICD-10-CM

## 2019-04-22 DIAGNOSIS — F33.42 RECURRENT MAJOR DEPRESSION IN COMPLETE REMISSION (H): ICD-10-CM

## 2019-04-22 RX ORDER — ZOLPIDEM TARTRATE 10 MG/1
TABLET ORAL
Qty: 31 TABLET | Refills: 0 | Status: SHIPPED | OUTPATIENT
Start: 2019-04-22 | End: 2019-04-22

## 2019-04-22 RX ORDER — ZOLPIDEM TARTRATE 10 MG/1
TABLET ORAL
Qty: 31 TABLET | Refills: 5 | Status: SHIPPED | OUTPATIENT
Start: 2019-04-22 | End: 2019-06-03

## 2019-04-24 ENCOUNTER — OFFICE VISIT (OUTPATIENT)
Dept: PALLIATIVE MEDICINE | Facility: CLINIC | Age: 49
End: 2019-04-24
Payer: COMMERCIAL

## 2019-04-24 VITALS
WEIGHT: 234 LBS | DIASTOLIC BLOOD PRESSURE: 99 MMHG | BODY MASS INDEX: 34.56 KG/M2 | SYSTOLIC BLOOD PRESSURE: 154 MMHG | HEART RATE: 82 BPM

## 2019-04-24 DIAGNOSIS — M54.16 LUMBAR RADICULOPATHY: Primary | ICD-10-CM

## 2019-04-24 DIAGNOSIS — M79.18 MYOFASCIAL PAIN: ICD-10-CM

## 2019-04-24 DIAGNOSIS — M47.819 FACET ARTHROPATHY: ICD-10-CM

## 2019-04-24 DIAGNOSIS — M54.16 LUMBAR RADICULOPATHY: ICD-10-CM

## 2019-04-24 DIAGNOSIS — M54.6 PAIN IN THORACIC SPINE: ICD-10-CM

## 2019-04-24 PROCEDURE — 99214 OFFICE O/P EST MOD 30 MIN: CPT | Performed by: PSYCHIATRY & NEUROLOGY

## 2019-04-24 RX ORDER — NORTRIPTYLINE HCL 10 MG
20 CAPSULE ORAL AT BEDTIME
Qty: 60 CAPSULE | Refills: 3 | Status: SHIPPED | OUTPATIENT
Start: 2019-04-24 | End: 2019-06-03

## 2019-04-24 ASSESSMENT — PAIN SCALES - GENERAL: PAINLEVEL: SEVERE PAIN (6)

## 2019-04-24 NOTE — PATIENT INSTRUCTIONS
----------------------------------------------------------------  Clinic Number:  [phone]   Call this number with any questions about your care and for scheduling assistance. Calls are returned Monday through Friday between 8 AM and 4:30 PM. We usually get back to you within 2 business days depending on the issue/request.       Medication refills:    For non-opioid medications, call your pharmacy directly to request a refill. The pharmacy will contact the Pain Management Center for authorization. Please allow 3-4 days for these refills to be processed.     For opioid refills, call the clinic number or send a [employer] message. Please contact us 7-10 days before your refill is due. The message MUST include the name of the specific medication(s) requested and how you would like to receive the prescription(s). The options are as follows:    Pain Clinic staff can mail the prescription to your pharmacy. Please tell us the name of the pharmacy.    You may pick the prescription up at the Pain Clinic (tell us the location) or during a clinic visit with your pain provider    Pain Clinic staff can deliver the prescription to the [facility] pharmacy in the clinic building. Please tell us the location.      We believe regular attendance is key to your success in our program.    Any time you are unable to keep your appointment we ask that you call us at least 24 hours in advance to let us know. This will allow us to offer the appointment time to another patient.

## 2019-04-24 NOTE — PROGRESS NOTES
[facility]    Date of visit: 4/24/2019     Chief complaint:   Chief Complaint   Patient presents with     Pain     Interval history:  David Wilcox was last seen by me on 1/23/19    Recommendations/plan at the last visit included:  1. Physical Therapy: continue exercises as recommended by PT.   2. Clinical Health Psychologist to address issues of relaxation, behavioral change, coping style, and other factors important to improvement.  Not at this time   3. Diagnostic Studies: UDS and opioid agreement today  4. Procedures: none at this time  5. Medication Management:  No changes, next month's were eprescribed in, patient awre.  6. Recommendations to PCP: none. Advised he talk with PCP further about erectile dysfunction.  Could be Cymbalta but he states he has been on that for some time.  Back imaging doesn't sure severe stenosis that would explain.  7. Follow up: 3 months      Since his last visit, David Wilcox reports:  -he was having increased pain in his leg, and meds weren't helping  -he went to acupuncture and at a specific site, he had a sudden improvement in pain.   -since then he has been much better  - doing more farmwork.  -change of mixture of fentanyl and Percocet went well.    Pain scores:  Pain intensity on average is 8 on a scale of 0-10.     Current pain treatments:   Percocet 7.5/325mg daily- 2/day  Fentanyl patch-12 mcg/48 hours - feels it works too strongly with heat/sun exposure  Robaxin (methocarbamol) 1000mg at night and in the morning- the morning dose has been helping  Gabapentin 1200 mg TID, beneficial  Cymbalta 60mg daily- tried going to 90mg/day but felt worse- for pain and mood  Nortriptyline 20 mg at bedtime - causing dry mouth but okay to continue    Previous medication treatments included:  Hydrocodone- not helpful  Methadone- taking after 1st surgery  Naprosyn- not helpful  Flexeril- after first back injury (1990s)  Lidocaine- not  helpful  Acetaminophen (tylenol)   Gabapentin    Other treatments have included:  David Wilcox has been seen at a pain clinic in the past.  MAPS- injections  PT: yes  Acupuncture: no  TENs Unit: yes- out of use- somewhat helpful  Injections: none since last surgery- very  helpful    Side Effects: None    Medications:  Current Outpatient Medications   Medication Sig Dispense Refill     gabapentin (NEURONTIN) 600 MG tablet Take 2 tablets (1,200 mg) by mouth 3 times daily . 3 month supply 540 tablet 0     LORazepam (ATIVAN) 0.5 MG tablet One bid prn anxiety. Rare use 30 tablet 0     methocarbamol (ROBAXIN) 500 MG tablet Take 1-2 tablets (500-1,000 mg) by mouth 3 times daily as needed for muscle spasms . 3 month supply 360 tablet 1     naloxone (NARCAN) nasal spray Spray 1 spray (4 mg) into one nostril alternating nostrils as needed for opioid reversal every 2-3 minutes until assistance arrives 0.2 mL 0     omeprazole (PRILOSEC) 20 MG CR capsule TAKE ONE CAPSULE BY MOUTH EVERY DAY 90 capsule 2     TYLENOL EXTRA STRENGTH 500 MG OR TABS 3 po prn for pain       zolpidem (AMBIEN) 10 MG tablet TAKE 1 TABLET BY MOUTH EVERY DAY AT BEDTIME AS NEEDED FOR SLEEP 31 tablet 5     DULoxetine (CYMBALTA) 60 MG capsule Take 1 capsule (60 mg) by mouth daily . 3 month supply 90 capsule 3     fentaNYL (DURAGESIC) 12 mcg/hr 72 hr patch Place 1 patch onto the skin every 48 hours remove old patch.  Fill and start on 5/5/19 15 patch 0     mirtazapine (REMERON) 15 MG tablet Take 1 tablet (15 mg) by mouth At Bedtime DUE FOR APPOINTMENT MAY 2019. NO FURTHER REFILLS 90 tablet 0     nortriptyline (PAMELOR) 10 MG capsule Take 2 capsules (20 mg) by mouth At Bedtime 60 capsule 3     oxyCODONE-acetaminophen (PERCOCET) 7.5-325 MG per tablet Take 0.5-1 tablets po q 6 hrs PRN pain. Max of 2/day.  May fill and start on 5/5/19 60 tablet 0       Medical History: any changes in medical history since they were last seen? none    Review of Systems:  The 14  system ROS was reviewed from the intake questionnaire, and is positive for: fatigue, back pain, n/t, stress, joint pain  Mood: denies concern    Physical Exam:  Blood pressure (!) 154/99, pulse 82, weight 106.1 kg (234 lb).  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    2. Facet arthropathy likely contributing above the level of the future  3. Distant history of alcohol and drug abuse   4. Bipolar disorder- currently managed  5. H/o meningitis after SCS trial  6. Thoracic back pain, likely myofascial etiology.  7.     Plan:  1. Physical Therapy: continue exercises as recommended by PT.   2. Clinical Health Psychologist to address issues of relaxation, behavioral change, coping style, and other factors important to improvement.  Not at this time   3. Diagnostic Studies: none  4. Acupuncture referral  5. Procedures: none at this time  6. Medication Management:  No changes  7. Recommendations to PCP: none.   8. Follow up: 3 months    Total time spent was 25 minutes, and more than 50% of face to face time was spent in counseling and/or coordination of care regarding medications, imaging.   Bindu Motley MD  [facility]

## 2019-04-24 NOTE — TELEPHONE ENCOUNTER
Received fax request from [facility] pharmacy requesting refill(s) for nortriptyline (PAMELOR) 10 MG capsule    Last refilled on 03/28/19    Pt last seen on 01/23/19  Next appt scheduled for 04/24/19    Will facilitate refill.

## 2019-04-30 DIAGNOSIS — F33.42 RECURRENT MAJOR DEPRESSION IN COMPLETE REMISSION (H): ICD-10-CM

## 2019-04-30 DIAGNOSIS — F51.01 PRIMARY INSOMNIA: ICD-10-CM

## 2019-04-30 RX ORDER — MIRTAZAPINE 15 MG/1
15 TABLET, FILM COATED ORAL AT BEDTIME
Qty: 90 TABLET | Refills: 0 | Status: SHIPPED | OUTPATIENT
Start: 2019-04-30 | End: 2019-06-03

## 2019-05-01 DIAGNOSIS — F33.42 RECURRENT MAJOR DEPRESSION IN COMPLETE REMISSION (H): ICD-10-CM

## 2019-05-01 NOTE — TELEPHONE ENCOUNTER
Received fax request from [facility] pharmacy requesting refill(s) for DULoxetine (CYMBALTA) 60 MG EC capsule    Last refilled on 01/30/19    Pt last seen on 04/24/19  Next appt scheduled for 07/24/19    Will facilitate refill.

## 2019-05-01 NOTE — TELEPHONE ENCOUNTER
Will ask nursing ot call patient.    From fill date and refill information- I don't think he is taking daily.  This is not a medication that is as needed-- needs to be taking regularly.    If he has been off, then we need to retitrate.    Please determine details.    Bindu Motley MD  [facility]

## 2019-05-02 DIAGNOSIS — M96.1 FAILED BACK SURGICAL SYNDROME: ICD-10-CM

## 2019-05-02 RX ORDER — DULOXETIN HYDROCHLORIDE 60 MG/1
60 CAPSULE, DELAYED RELEASE ORAL DAILY
Qty: 90 CAPSULE | Refills: 3 | Status: SHIPPED | OUTPATIENT
Start: 2019-05-02 | End: 2020-05-06

## 2019-05-02 NOTE — TELEPHONE ENCOUNTER
Signed Prescriptions:                        Disp   Refills    DULoxetine (CYMBALTA) 60 MG capsule        90 cap*3        Sig: Take 1 capsule (60 mg) by mouth daily . 3 month           supply  Authorizing Provider: YADIAR CARRILLO MD  [facility]

## 2019-05-02 NOTE — TELEPHONE ENCOUNTER
Please process a refill of Fentanyl 12 mcg and Percocet 7.5 MG to go to [facility] in [place].    JEREMIAH PeacockN, RN  Care Coordinator  [facility] [place]

## 2019-05-02 NOTE — TELEPHONE ENCOUNTER
Patient receives 90 day supplies of this medication so filling now is appropriate. Spoke with him and he continues to use Cymbalta 60 MG 1 cap daily.    JEREMIAH PeacockN, RN  Care Coordinator  [facility] [place]

## 2019-05-03 RX ORDER — FENTANYL 12.5 UG/1
1 PATCH TRANSDERMAL
Qty: 15 PATCH | Refills: 0 | Status: SHIPPED | OUTPATIENT
Start: 2019-05-03 | End: 2019-06-03

## 2019-05-03 RX ORDER — OXYCODONE AND ACETAMINOPHEN 7.5; 325 MG/1; MG/1
TABLET ORAL
Qty: 60 TABLET | Refills: 0 | Status: SHIPPED | OUTPATIENT
Start: 2019-05-03 | End: 2019-06-26

## 2019-05-03 NOTE — TELEPHONE ENCOUNTER
Medication refill information reviewed.     Due date for fentaNYL (DURAGESIC) 12 mcg/hr 72 hr patch     oxyCODONE-acetaminophen (PERCOCET) 7.5-325 MG per tablet is 5/5/19     Prescriptions prepped for review.     Will route to provider.

## 2019-05-03 NOTE — TELEPHONE ENCOUNTER
Patient requesting refill(s) of fentaNYL (DURAGESIC) 12 mcg/hr 72 hr patch  Last picked up from pharmacy on 4/5/19     oxyCODONE-acetaminophen (PERCOCET) 7.5-325 MG per tablet  Last picked up from pharmacy on 4/8/19    Pt last seen by prescribing provider on 4/24/19  Next appt scheduled for 4/24/19     checked in the past 6 months? Yes If no, print current report and give to RN    Last urine drug screen date 1/23/19  Current opioid agreement on file (completed within the last year) Yes Date of opioid agreement: 1/23/19    Processing (pick one and delete the others):      [facility] [place] [facility]    Will route to nursing pool for review and preparation of prescription(s).

## 2019-05-03 NOTE — TELEPHONE ENCOUNTER
Script Eprescribed to pharmacy    Will send this to MA team to notify patient.    Signed Prescriptions:                        Disp   Refills    oxyCODONE-acetaminophen (PERCOCET) 7.5-325*60 tab*0        Sig: Take 0.5-1 tablets po q 6 hrs PRN pain. Max of 2/day.           May fill and start on 5/5/19  Authorizing Provider: YADIRA CARRILLO    fentaNYL (DURAGESIC) 12 mcg/hr 72 hr patch 15 pat*0        Sig: Place 1 patch onto the skin every 48 hours remove old           patch.  Fill and start on 5/5/19  Authorizing Provider: YADIRA CARRILLO MD  [facility]

## 2019-05-23 ENCOUNTER — TELEPHONE (OUTPATIENT)
Dept: FAMILY MEDICINE | Facility: CLINIC | Age: 49
End: 2019-05-23

## 2019-05-31 ENCOUNTER — TELEPHONE (OUTPATIENT)
Dept: PALLIATIVE MEDICINE | Facility: CLINIC | Age: 49
End: 2019-05-31

## 2019-05-31 DIAGNOSIS — M96.1 FAILED BACK SURGICAL SYNDROME: Primary | ICD-10-CM

## 2019-05-31 RX ORDER — OXYCODONE HCL 10 MG/1
10 TABLET, FILM COATED, EXTENDED RELEASE ORAL EVERY 12 HOURS
Qty: 60 TABLET | Refills: 0 | Status: SHIPPED | OUTPATIENT
Start: 2019-05-31 | End: 2019-06-26

## 2019-05-31 NOTE — TELEPHONE ENCOUNTER
Prior Authorization Retail Medication Request    Medication/Dose: OxyContin 10 MG  ICD code (if different than what is on RX):    Previously Tried and Failed:  Fentanyl patch  Rationale:  Side effects with Fentanyl aptch    Coverage information:     Subscriber: [id number] RENY MOREL     Rel to sub: 01 - Self     Member ID: [id number]     Payor: [address] [place] [address] Ph: [phone]     Benefit plan: [id number]-[id number] MEDICARE Ph: [phone]     Group number: [id number]     Member effective dates: from 01/01/19      Pharmacy Information (if different than what is on RX)    [facility] [address] [place] [address] [address]  Phone: [phone] Fax: [phone]

## 2019-05-31 NOTE — TELEPHONE ENCOUNTER
Spoke with patient.  I am recommending that he stop the fentanyl patch- he has already moved.    Discussed Oxycontin vs long acting morphine.  Called pharmacy- both require prior authorization.    Oxycontin 10mg q12hr ordered, he is off fentanly patch.  Understands it is still in his system for 12 hours.    He is going to use Percocet max of 4/day until the Oxycontin is approved.    Understands he shouldn't increase dose until tomorrow due to fentanyl in skin.  He doesn't sounds sedated, but reminded him of narcan that he has.    Pharmacy to send PA form.    Signed Prescriptions:                        Disp   Refills    oxyCODONE (OXYCONTIN) 10 MG 12 hr tablet   60 tab*0        Sig: Take 1 tablet (10 mg) by mouth every 12 hours  Authorizing Provider: YADIRA CARRILLO MD  [facility]

## 2019-05-31 NOTE — TELEPHONE ENCOUNTER
Spoke with patient and he reports that when he is active in the heat or when his body is warm he is having symptoms of becoming over medicated from the Fentanyl patch. Examples of activity are gardening in the heat or sitting on a tractor. During these activities he is getting dizzy and light headed and having slurred speech. He feels it is to the point where it may be unsafe and he would like to discuss other options.    Routing to Dr. Motley to review.      JEREMIAH PeacockN, RN  Care Coordinator  [facility] [place]

## 2019-05-31 NOTE — TELEPHONE ENCOUNTER
Prior Authorization Approval    Authorization Effective Date: 5/1/2019  Authorization Expiration Date: 5/30/2020  Medication: OxyContin 10 MG- APPROVED  Approved Dose/Quantity:   Reference #: [id number]   Insurance Company: [employer] - Phone [phone] Fax [phone]  Expected CoPay:       CoPay Card Available:      Foundation Assistance Needed:    Which Pharmacy is filling the prescription (Not needed for infusion[facility]clinic administered[facility] [id number] [place] [address]  Pharmacy Notified: Yes  Patient Notified: Yes  **Instructed pharmacy to notify patient when script is ready to /ship.**  Pharmacy received paid claim. Will attach approval letter from insurance when received.

## 2019-06-03 ENCOUNTER — OFFICE VISIT (OUTPATIENT)
Dept: FAMILY MEDICINE | Facility: CLINIC | Age: 49
End: 2019-06-03
Payer: COMMERCIAL

## 2019-06-03 VITALS
TEMPERATURE: 97.7 F | WEIGHT: 236.4 LBS | DIASTOLIC BLOOD PRESSURE: 86 MMHG | HEART RATE: 80 BPM | RESPIRATION RATE: 18 BRPM | BODY MASS INDEX: 33.84 KG/M2 | HEIGHT: 70 IN | SYSTOLIC BLOOD PRESSURE: 138 MMHG

## 2019-06-03 DIAGNOSIS — K21.9 GASTROESOPHAGEAL REFLUX DISEASE WITHOUT ESOPHAGITIS: ICD-10-CM

## 2019-06-03 DIAGNOSIS — F51.01 PRIMARY INSOMNIA: ICD-10-CM

## 2019-06-03 DIAGNOSIS — F11.20 CONTINUOUS OPIOID DEPENDENCE (H): ICD-10-CM

## 2019-06-03 DIAGNOSIS — G89.4 CHRONIC PAIN SYNDROME: ICD-10-CM

## 2019-06-03 DIAGNOSIS — M54.16 LUMBAR RADICULOPATHY: ICD-10-CM

## 2019-06-03 DIAGNOSIS — F13.20 SEDATIVE, HYPNOTIC OR ANXIOLYTIC DEPENDENCE (H): ICD-10-CM

## 2019-06-03 DIAGNOSIS — M96.1 FAILED BACK SURGICAL SYNDROME: ICD-10-CM

## 2019-06-03 DIAGNOSIS — F33.42 RECURRENT MAJOR DEPRESSION IN COMPLETE REMISSION (H): Primary | ICD-10-CM

## 2019-06-03 PROCEDURE — 99214 OFFICE O/P EST MOD 30 MIN: CPT | Performed by: FAMILY MEDICINE

## 2019-06-03 PROCEDURE — 99207 C PAF COMPLETED  NO CHARGE: CPT | Performed by: FAMILY MEDICINE

## 2019-06-03 RX ORDER — ZOLPIDEM TARTRATE 10 MG/1
TABLET ORAL
Qty: 31 TABLET | Refills: 5 | Status: SHIPPED | OUTPATIENT
Start: 2019-06-03 | End: 2019-12-03

## 2019-06-03 RX ORDER — GABAPENTIN 600 MG/1
1200 TABLET ORAL 3 TIMES DAILY
Qty: 540 TABLET | Refills: 3 | Status: SHIPPED | OUTPATIENT
Start: 2019-06-03 | End: 2020-06-22

## 2019-06-03 RX ORDER — NORTRIPTYLINE HCL 10 MG
20 CAPSULE ORAL AT BEDTIME
Qty: 180 CAPSULE | Refills: 3 | Status: SHIPPED | OUTPATIENT
Start: 2019-06-03 | End: 2020-06-16

## 2019-06-03 RX ORDER — MIRTAZAPINE 15 MG/1
15 TABLET, FILM COATED ORAL AT BEDTIME
Qty: 90 TABLET | Refills: 3 | Status: SHIPPED | OUTPATIENT
Start: 2019-06-03 | End: 2020-06-10

## 2019-06-03 RX ORDER — METHOCARBAMOL 500 MG/1
500-1000 TABLET, FILM COATED ORAL 3 TIMES DAILY PRN
Qty: 360 TABLET | Refills: 1 | Status: SHIPPED | OUTPATIENT
Start: 2019-06-03 | End: 2020-05-26

## 2019-06-03 ASSESSMENT — PATIENT HEALTH QUESTIONNAIRE - PHQ9
SUM OF ALL RESPONSES TO PHQ QUESTIONS 1-9: 11
SUM OF ALL RESPONSES TO PHQ QUESTIONS 1-9: 11
10. IF YOU CHECKED OFF ANY PROBLEMS, HOW DIFFICULT HAVE THESE PROBLEMS MADE IT FOR YOU TO DO YOUR WORK, TAKE CARE OF THINGS AT HOME, OR GET ALONG WITH OTHER PEOPLE: VERY DIFFICULT

## 2019-06-03 ASSESSMENT — ANXIETY QUESTIONNAIRES
7. FEELING AFRAID AS IF SOMETHING AWFUL MIGHT HAPPEN: NOT AT ALL
5. BEING SO RESTLESS THAT IT IS HARD TO SIT STILL: MORE THAN HALF THE DAYS
3. WORRYING TOO MUCH ABOUT DIFFERENT THINGS: SEVERAL DAYS
1. FEELING NERVOUS, ANXIOUS, OR ON EDGE: SEVERAL DAYS
GAD7 TOTAL SCORE: 8
GAD7 TOTAL SCORE: 8
6. BECOMING EASILY ANNOYED OR IRRITABLE: SEVERAL DAYS
2. NOT BEING ABLE TO STOP OR CONTROL WORRYING: SEVERAL DAYS
GAD7 TOTAL SCORE: 8
4. TROUBLE RELAXING: MORE THAN HALF THE DAYS
7. FEELING AFRAID AS IF SOMETHING AWFUL MIGHT HAPPEN: NOT AT ALL

## 2019-06-03 ASSESSMENT — MIFFLIN-ST. JEOR: SCORE: 1953.55

## 2019-06-03 NOTE — PROGRESS NOTES
Subjective     David Wilcox is a 48 year old male who presents to clinic today for the following health issues:    HPI     Medication Followup of all medications    Taking Medication as prescribed: yes    Side Effects:  None    Medication Helping Symptoms:  Switched Fentanyl due to side effects to oxycontin       Depression and Anxiety Follow-Up    How are you doing with your depression since your last visit? Worsened over the winter, starting to get outside now. Had some medication changes recently as well    How are you doing with your anxiety since your last visit?  Worsened over the winter, starting to get outside now. Had some medication changes recently as well    Are you having other symptoms that might be associated with depression or anxiety? No    Have you had a significant life event? No     Do you have any concerns with your use of alcohol or other drugs? No    Social History     Tobacco Use     Smoking status: Current Every Day Smoker     Packs/day: 0.50     Types: Cigarettes     Last attempt to quit: 2013     Years since quittin.7     Smokeless tobacco: Never Used     Tobacco comment: <1/2 pack per day   Substance Use Topics     Alcohol use: Yes     Alcohol/week: 0.0 oz     Comment: rare     Drug use: No     PHQ 2018 10/31/2018 6/3/2019   PHQ-9 Total Score 10 11 11   Q9: Thoughts of better off dead/self-harm past 2 weeks Not at all Not at all Not at all     KAMRYN-7 SCORE 2016 2018 6/3/2019   Total Score - - -   Total Score - - 8 (mild anxiety)   Total Score 8 9 8     S :David Wilcox is a 48 year old male with depression.   Cymbalta.  Stable.  Fills.  Some mention of bipolar in past when came to our system, not sure about that.      Insomnia: dependent on ambien.  6 month fills.  Sedative chronic use     Chronic pain: opioid dependent per pain clinic.      Gerd: stable on PPI    Problem list, med list, additional histories reviewed and updated, as indicated.      No cp or sob    O:BP  "138/86 (BP Location: Right arm, Patient Position: Chair, Cuff Size: Adult Large)   Pulse 80   Temp 97.7  F (36.5  C) (Tympanic)   Resp 18   Ht 1.778 m (5' 10\")   Wt 107.2 kg (236 lb 6.4 oz)   BMI 33.92 kg/m    GEN: Alert and oriented, in no acute distress  Walks with cane due to foot drop, not using AFO, getting by  Affect is bright, smiling, pleasant interaction    A:   depression.   Cymbalta.  Stable.  Fills.    Insomnia: dependent on ambien.  6 month fills  Chronic pain: opioid dependent per pain clinic.    Gerd: stable on PPI  Opioid use, ongoing  Sedative use, ongoing     P: fills.  Not using much ativan at all, 30 given 1.5 years ago, still has decent amount left, not asking for more.    Continue pain clinic, opioid use.      Doing well.  Not due for any labs.    Weight management plan: diet/exercise e              "

## 2019-06-03 NOTE — NURSING NOTE
"Chief Complaint   Patient presents with     Depression       Initial /86 (BP Location: Right arm, Patient Position: Chair, Cuff Size: Adult Large)   Pulse 80   Temp 97.7  F (36.5  C) (Tympanic)   Resp 18   Ht 1.778 m (5' 10\")   Wt 107.2 kg (236 lb 6.4 oz)   BMI 33.92 kg/m   Estimated body mass index is 33.92 kg/m  as calculated from the following:    Height as of this encounter: 1.778 m (5' 10\").    Weight as of this encounter: 107.2 kg (236 lb 6.4 oz).    Patient presents to the clinic using cane    Health Maintenance that is potentially due pending provider review:  PHQ9    Kimberlyn Mcnamara MA  1:52 PM 6/3/2019  .        "

## 2019-06-04 ASSESSMENT — ANXIETY QUESTIONNAIRES: GAD7 TOTAL SCORE: 8

## 2019-06-04 ASSESSMENT — PATIENT HEALTH QUESTIONNAIRE - PHQ9: SUM OF ALL RESPONSES TO PHQ QUESTIONS 1-9: 11

## 2019-06-25 DIAGNOSIS — M96.1 FAILED BACK SURGICAL SYNDROME: ICD-10-CM

## 2019-06-25 NOTE — TELEPHONE ENCOUNTER
Medication refill information reviewed.     Due date for    oxyCODONE-acetaminophen (PERCOCET) 7.5-325 MG is anytime (last filled on 5/6/19)     oxyCODONE (OXYCONTIN) 10 MG 12 hr tablet is 7/1/19     Prescriptions prepped for review.     Will route to provider.

## 2019-06-25 NOTE — TELEPHONE ENCOUNTER
Received fax from pharmacy requesting refill(s) of     oxyCODONE-acetaminophen (PERCOCET) 7.5-325 MG per tablet  Last picked up from pharmacy on 05/06/19      oxyCODONE (OXYCONTIN) 10 MG 12 hr tablet  Last picked up from pharmacy on 06/01/19    Pt last seen by prescribing provider on 04/24/19    Next appt scheduled for 07/24/19     checked in the past 6 months? Yes If no, print current report and give to RN    Last urine drug screen date 01/23/19  Current opioid agreement on file (completed within the last year) Yes Date of opioid agreement: 01/23/19    Processing (pick one and delete the others):      E-prescribe to   [place] [address] [place] [address] [address]  Phone: [phone] Fax: [phone]        Will route to nursing pool for review and preparation of prescription(s).     Hamida Kelly [facility]  [place]

## 2019-06-26 RX ORDER — OXYCODONE AND ACETAMINOPHEN 7.5; 325 MG/1; MG/1
TABLET ORAL
Qty: 60 TABLET | Refills: 0 | Status: SHIPPED | OUTPATIENT
Start: 2019-06-26 | End: 2019-07-24

## 2019-06-26 RX ORDER — OXYCODONE HCL 10 MG/1
10 TABLET, FILM COATED, EXTENDED RELEASE ORAL EVERY 12 HOURS
Qty: 60 TABLET | Refills: 0 | Status: SHIPPED | OUTPATIENT
Start: 2019-06-26 | End: 2019-07-24

## 2019-06-26 NOTE — TELEPHONE ENCOUNTER
Script Eprescribed to pharmacy    Will send this to MA team to notify patient.    Signed Prescriptions:                        Disp   Refills    oxyCODONE (OXYCONTIN) 10 MG 12 hr tablet   60 tab*0        Sig: Take 1 tablet (10 mg) by mouth every 12 hours May           fill on 6/29/19 to start on 7/1/19  Authorizing Provider: YADIRA CARRILLO    oxyCODONE-acetaminophen (PERCOCET) 7.5-325*60 tab*0        Sig: Take 0.5-1 tablets po q 6 hrs PRN pain. Max of 2/day.           May fill and start on 6/26/19  Authorizing Provider: YADIRA CARRILLO MD  [facility]

## 2019-06-26 NOTE — TELEPHONE ENCOUNTER
Called pt and let him know that his prescriptions have been processed and sent to the pharmacy. Reviewed fill and start dates of meds.     JOSEE Grissom, RN-BC  Patient Care Supervisor/Care Coordinator  [facility] [place]

## 2019-07-05 ENCOUNTER — TELEPHONE (OUTPATIENT)
Dept: FAMILY MEDICINE | Facility: CLINIC | Age: 49
End: 2019-07-05

## 2019-07-05 ASSESSMENT — PATIENT HEALTH QUESTIONNAIRE - PHQ9: SUM OF ALL RESPONSES TO PHQ QUESTIONS 1-9: 3

## 2019-07-05 NOTE — TELEPHONE ENCOUNTER
Panel Management Review      Patient has the following on his problem list:     Depression / Dysthymia review    Measure:  Needs PHQ-9 score of 4 or less during index window.  Administer PHQ-9 and if score is 5 or more, send encounter to provider for next steps.    5 - 7 month window range: end date 7/06/2019    PHQ-9 SCORE 5/7/2018 10/31/2018 6/3/2019   PHQ-9 Total Score - - -   PHQ-9 Total Score MyChart - - 11 (Moderate depression)   PHQ-9 Total Score 10 11 11       If PHQ-9 recheck is 5 or more, route to provider for next steps.    Patient is due for:  PHQ9      Composite cancer screening  Chart review shows that this patient is due/due soon for the following None  Summary:    Patient is due/failing the following:   PHQ9    Action needed:   Patient needs to do PHQ9.    Type of outreach:    Phone, spoke to patient.  completed PHQ-9    Questions for provider review:    None                                                                                                                                    Jazmine Castro MA

## 2019-07-22 ENCOUNTER — TELEPHONE (OUTPATIENT)
Dept: PALLIATIVE MEDICINE | Facility: CLINIC | Age: 49
End: 2019-07-22

## 2019-07-22 DIAGNOSIS — M96.1 FAILED BACK SURGICAL SYNDROME: ICD-10-CM

## 2019-07-22 NOTE — TELEPHONE ENCOUNTER
Patient left  7/20 at 2:07 pm    Rx- OxyContin and Oxycodone           Venus REZA    [facility]

## 2019-07-22 NOTE — TELEPHONE ENCOUNTER
These will be filled at his 7/24/19 OV. Notified patient.    JEREMIAH PeacockN, RN  Care Coordinator  [facility] [place]

## 2019-07-24 ENCOUNTER — OFFICE VISIT (OUTPATIENT)
Dept: PALLIATIVE MEDICINE | Facility: CLINIC | Age: 49
End: 2019-07-24
Payer: COMMERCIAL

## 2019-07-24 VITALS
BODY MASS INDEX: 34.26 KG/M2 | HEART RATE: 76 BPM | SYSTOLIC BLOOD PRESSURE: 122 MMHG | DIASTOLIC BLOOD PRESSURE: 84 MMHG | WEIGHT: 238.8 LBS

## 2019-07-24 DIAGNOSIS — M96.1 FAILED BACK SURGICAL SYNDROME: ICD-10-CM

## 2019-07-24 PROCEDURE — 99214 OFFICE O/P EST MOD 30 MIN: CPT | Performed by: PSYCHIATRY & NEUROLOGY

## 2019-07-24 RX ORDER — OXYCODONE AND ACETAMINOPHEN 7.5; 325 MG/1; MG/1
TABLET ORAL
Qty: 60 TABLET | Refills: 0 | Status: SHIPPED | OUTPATIENT
Start: 2019-07-24 | End: 2019-08-20

## 2019-07-24 RX ORDER — OXYCODONE HCL 10 MG/1
10 TABLET, FILM COATED, EXTENDED RELEASE ORAL EVERY 12 HOURS
Qty: 52 TABLET | Refills: 0 | Status: SHIPPED | OUTPATIENT
Start: 2019-07-24 | End: 2019-08-20

## 2019-07-24 ASSESSMENT — PAIN SCALES - GENERAL: PAINLEVEL: SEVERE PAIN (6)

## 2019-07-24 NOTE — PROGRESS NOTES
"[facility]    Date of visit: 7/24/2019     Chief complaint:   Chief Complaint   Patient presents with     Pain     Interval history:  David Wilcox was last seen by me on 4/24/19    Recommendations/plan at the last visit included:  1. Physical Therapy: continue exercises as recommended by PT.   2. Clinical Health Psychologist to address issues of relaxation, behavioral change, coping style, and other factors important to improvement.  Not at this time   3. Diagnostic Studies: none  4. Acupuncture referral  5. Procedures: none at this time  6. Medication Management:  No changes  7. Recommendations to PCP: none.   8. Follow up: 3 months    Since his last visit, David Wilcox reports:  -he is doing \"ok\".  -overall feels like he is losing some capabilities  -digging, being on the tractor.  -changed from fentanyl to Oxycontin due to being out in the sun.  -not acupuncture     Pain scores:  Pain intensity on average is 7 on a scale of 0-10.     Current pain treatments:   Percocet 7.5/325mg daily- 2/day  Oxycontin 10mg q12  Robaxin (methocarbamol) 1000mg at night and in the morning- the morning dose has been helping  Gabapentin 1200 mg TID, beneficial  Cymbalta 60mg daily- tried going to 90mg/day but felt worse- for pain and mood  Nortriptyline 20 mg at bedtime - causing dry mouth but okay to continue    Previous medication treatments included:  Hydrocodone- not helpful  Methadone- taking after 1st surgery  Naprosyn- not helpful  Flexeril- after first back injury (1990s)  Lidocaine- not helpful  Acetaminophen (tylenol)   Gabapentin  Fentanyl patch-12 mcg/48 hours - feels it works too strongly with heat/sun exposure    Other treatments have included:  David Wilcox has been seen at a pain clinic in the past.  MAPS- injections  PT: yes  Acupuncture: no  TENs Unit: yes- out of use- somewhat helpful  Injections: none since last surgery- very  helpful    Side " Effects: None    Medications:  Current Outpatient Medications   Medication Sig Dispense Refill     DULoxetine (CYMBALTA) 60 MG capsule Take 1 capsule (60 mg) by mouth daily . 3 month supply 90 capsule 3     gabapentin (NEURONTIN) 600 MG tablet Take 2 tablets (1,200 mg) by mouth 3 times daily . 3 month supply 540 tablet 3     LORazepam (ATIVAN) 0.5 MG tablet One bid prn anxiety. Rare use 30 tablet 0     methocarbamol (ROBAXIN) 500 MG tablet Take 1-2 tablets (500-1,000 mg) by mouth 3 times daily as needed for muscle spasms . 3 month supply 360 tablet 1     mirtazapine (REMERON) 15 MG tablet Take 1 tablet (15 mg) by mouth At Bedtime 90 tablet 3     naloxone (NARCAN) nasal spray Spray 1 spray (4 mg) into one nostril alternating nostrils as needed for opioid reversal every 2-3 minutes until assistance arrives 0.2 mL 0     nortriptyline (PAMELOR) 10 MG capsule Take 2 capsules (20 mg) by mouth At Bedtime 180 capsule 3     omeprazole (PRILOSEC) 20 MG DR capsule Take 1 capsule (20 mg) by mouth daily 90 capsule 3     oxyCODONE (OXYCONTIN) 10 MG 12 hr tablet Take 1 tablet (10 mg) by mouth every 12 hours May fill on 7/29/19 to start on 7/31/19 52 tablet 0     oxyCODONE-acetaminophen (PERCOCET) 7.5-325 MG per tablet Take 0.5-1 tablets po q 6 hrs PRN pain. Max of 2/day.  May fill and start on 7/26/19 60 tablet 0     TYLENOL EXTRA STRENGTH 500 MG OR TABS 3 po prn for pain       zolpidem (AMBIEN) 10 MG tablet TAKE 1 TABLET BY MOUTH EVERY DAY AT BEDTIME AS NEEDED FOR SLEEP 31 tablet 5       Medical History: any changes in medical history since they were last seen? None    Review of Systems:  The 14 system ROS was reviewed from the intake questionnaire, and is positive for: weakness, depression  Mood: depression, denies suicidal ideation     Physical Exam:  Blood pressure 122/84, pulse 76, weight 108.3 kg (238 lb 12.8 oz).  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    2. Facet arthropathy likely contributing above the level of the future  3. Distant history of alcohol and drug abuse   4. Bipolar disorder- currently managed  5. H/o meningitis after SCS trial  6. Thoracic back pain, likely myofascial etiology.      Plan:  1. Physical Therapy: continue exercises as recommended by PT.   2. Clinical Health Psychologist to address issues of relaxation, behavioral change, coping style, and other factors important to improvement.  Not at this time   3. Diagnostic Studies: none  4. Procedures: none at this time  5. Medication Management:  No changes. Advised if he wants to rotate back to fentanyl before visit, he can reach out.  6. Recommendations to PCP: none.   7. Follow up: 3 months    Total time spent was 25 minutes, and more than 50% of face to face time was spent in counseling and/or coordination of care regarding medications, mood, activity.  Bindu Motley MD  [facility]

## 2019-07-24 NOTE — PATIENT INSTRUCTIONS
1. Follow up in 3 months  2. If you want to change back to fentanyl before then, please do.    ----------------------------------------------------------------  Clinic Number:  [phone]     Call with any questions about your care and for scheduling assistance.     Calls are returned Monday through Friday between 8 AM and 4:30 PM. We usually get back to you within 2 business days depending on the issue/request.    If we are prescribing your medications:    For opioid medication refills, call the clinic or send a Exos message 7 days in advance.  Please include:    Name of requested medication    Name of the pharmacy.    For non-opioid medications, call your pharmacy directly to request a refill. Please allow 3-4 days to be processed.     Per [place] State Law:    All controlled substance prescriptions must be filled within 30 days of being written.      For those controlled substances allowing refills, pickup must occur within 30 days of last fill.      We believe regular attendance is key to your success in our program!      Any time you are unable to keep your appointment we ask that you call us at least 24 hours in advance to cancel.This will allow us to offer the appointment time to another patient.     Multiple missed appointments may lead to dismissal from the clinic.

## 2019-08-20 DIAGNOSIS — M96.1 FAILED BACK SURGICAL SYNDROME: ICD-10-CM

## 2019-08-20 RX ORDER — OXYCODONE AND ACETAMINOPHEN 7.5; 325 MG/1; MG/1
TABLET ORAL
Qty: 60 TABLET | Refills: 0 | Status: SHIPPED | OUTPATIENT
Start: 2019-08-20 | End: 2019-09-17

## 2019-08-20 RX ORDER — OXYCODONE HCL 10 MG/1
10 TABLET, FILM COATED, EXTENDED RELEASE ORAL EVERY 12 HOURS
Qty: 52 TABLET | Refills: 0 | Status: SHIPPED | OUTPATIENT
Start: 2019-08-20 | End: 2019-08-21

## 2019-08-20 NOTE — TELEPHONE ENCOUNTER
Reason for call:  Medication   If this is a refill request, has the caller requested the refill from the pharmacy already? N/A  Will the patient be using a [facility]? No  Name of the pharmacy and phone number for the current request: [facility] [id number] [place] [facility], [address]    Name of the medication requested: oxyCODONE (OXYCONTIN) 10 MG 12 hr tablet, oxyCODONE-acetaminophen (PERCOCET) 7.5-325 MG per tablet    Other request: Please send to [facility] in [employer]    Phone number to reach patient:  Home number on file [phone] (home)      Stacey Spears    [facility]

## 2019-08-20 NOTE — TELEPHONE ENCOUNTER
Medication refill information reviewed.     Due date for oxyCODONE (OXYCONTIN) 10 MG 12 hr tablet  is 8/30/19     Due date for oxyCODONE-acetaminophen (PERCOCET) 7.5-325 MG per tablet is 8/25/19     Prescriptions prepped for review.     Will route to provider.     Torrey Rodrigues RN  Care Coordinator   [facility] [place]

## 2019-08-20 NOTE — TELEPHONE ENCOUNTER
Received call from patient requesting refill(s) of oxyCODONE (OXYCONTIN) 10 MG 12 hr tablet     Last picked up from pharmacy on 07/29/19    oxyCODONE-acetaminophen (PERCOCET) 7.5-325 MG per tablet    Last picked up from pharmacy on 07/26/19    Pt last seen by prescribing provider on 07/24/19    Next appt scheduled for 10/21/19     checked in the past 6 months? Yes If no, print current report and give to RN    Last urine drug screen date 01/23/19  Current opioid agreement on file (completed within the last year) Yes Date of opioid agreement: 01/23/19    Processing (pick one and delete the others):      E-prescribe to     [place] [id number] [place] [id number]  Phone: [phone] Fax: [phone]    Will route to nursing pool for review and preparation of prescription(s).     Hamida Kelly [facility]  [place]

## 2019-08-20 NOTE — TELEPHONE ENCOUNTER
Script Eprescribed to pharmacy    Will send this to MA team to notify patient.    Signed Prescriptions:                        Disp   Refills    oxyCODONE (OXYCONTIN) 10 MG 12 hr tablet   52 tab*0        Sig: Take 1 tablet (10 mg) by mouth every 12 hours May           fill on 8/28/19 to start on 8/30/19  Authorizing Provider: YADIRA CARRILLO    oxyCODONE-acetaminophen (PERCOCET) 7.5-325*60 tab*0        Sig: Take 0.5-1 tablets po q 6 hrs PRN pain. Max of 2/day.           May fill 8/23/19 start on 8/25/19/19  Authorizing Provider: YADIRA CARRILLO MD  [facility]

## 2019-08-21 RX ORDER — OXYCODONE HCL 10 MG/1
10 TABLET, FILM COATED, EXTENDED RELEASE ORAL EVERY 12 HOURS
Qty: 60 TABLET | Refills: 0 | Status: SHIPPED | OUTPATIENT
Start: 2019-08-21 | End: 2019-09-17

## 2019-08-21 NOTE — TELEPHONE ENCOUNTER
This message is for the RN's it was on accident to the G PA AZUL Rios MA on 8/21/2019 at 10:43 AM

## 2019-08-21 NOTE — TELEPHONE ENCOUNTER
Script Eprescribed to pharmacy    Will send this to MA team to notify patient.    Signed Prescriptions:                        Disp   Refills    oxyCODONE-acetaminophen (PERCOCET) 7.5-325*60 tab*0        Sig: Take 0.5-1 tablets po q 6 hrs PRN pain. Max of 2/day.           May fill 8/23/19 start on 8/25/19/19  Authorizing Provider: YADIRA CARRILLO    oxyCODONE (OXYCONTIN) 10 MG 12 hr tablet   60 tab*0        Sig: Take 1 tablet (10 mg) by mouth every 12 hours May           fill on 8/23/19 to start on 8/24/19  Authorizing Provider: YADIRA CARRILLO MD  [facility]

## 2019-08-21 NOTE — TELEPHONE ENCOUNTER
Spoke to pt and informed him to  Rx by the fill date. Pt is concerned about the oxyCODONE (OXYCOTIN) 10 mg he said he is running out last day for it will be this Friday 08/23/19    RN please call pt back.   Thanks.    Jairon Rios MA on 8/21/2019 at 10:33 AM

## 2019-08-21 NOTE — TELEPHONE ENCOUNTER
Routing to Dr Motley to sign script for OxyContin. Patient started his script the day it was picked up last month on 7/29/19 not on the start date of 7/31/19.    It was a 26 day supply to align dates.      He reports he will need to start this on 7/24/19. Script prepped for review. Start dates remain 1 day off.       JEREMIAH PeacockN, RN  Care Coordinator  [facility] [place]

## 2019-08-21 NOTE — TELEPHONE ENCOUNTER
Cancelled previous script at pharmacy and notified patient that the new script with adjusted dates was approved.    JEREMIAH PeacockN, RN  Care Coordinator  [facility] [place]      .

## 2019-09-16 DIAGNOSIS — M96.1 FAILED BACK SURGICAL SYNDROME: ICD-10-CM

## 2019-09-16 NOTE — TELEPHONE ENCOUNTER
Reason for call:  Medication   If this is a refill request, has the caller requested the refill from the pharmacy already? No  Will the patient be using a [facility]? No  Name of the pharmacy and phone number for the current request: [facility] in [place]    Name of the medication requested: oxyCODONE (OXYCONTIN) 10 MG 12 hr tablet & oxyCODONE-acetaminophen (PERCOCET) 7.5-325 MG per tablet    Other request:     Phone number to reach patient:  Cell number on file:    Telephone Information:   Mobile [phone]       Best Time:      Can we leave a detailed message on this number?  YES       Briseyda Cerna    [facility]

## 2019-09-17 RX ORDER — OXYCODONE HCL 10 MG/1
10 TABLET, FILM COATED, EXTENDED RELEASE ORAL EVERY 12 HOURS
Qty: 60 TABLET | Refills: 0 | Status: SHIPPED | OUTPATIENT
Start: 2019-09-17 | End: 2019-10-25

## 2019-09-17 RX ORDER — OXYCODONE AND ACETAMINOPHEN 7.5; 325 MG/1; MG/1
TABLET ORAL
Qty: 60 TABLET | Refills: 0 | Status: SHIPPED | OUTPATIENT
Start: 2019-09-17 | End: 2019-10-25

## 2019-09-17 NOTE — TELEPHONE ENCOUNTER
patient requesting refill(s) of   oxyCODONE (OXYCONTIN) 10 MG      Last picked up from pharmacy on 08/23/2019    Pt last seen by prescribing provider on 07/24/2019  Next appt scheduled for 10/21/2019     checked in the past 6 months? Yes If no, print current report and give to RN    Last urine drug screen date 01/23/2019  Current opioid agreement on file (completed within the last year) Yes Date of opioid agreement: 01/23/2019      E-prescribe to   [place] [address] [place] [address]  Phone: [phone] Fax: [phone]        Will route to nursing pool for review and preparation of prescription(s).

## 2019-09-17 NOTE — TELEPHONE ENCOUNTER
Medication refill information reviewed.     Due date for oxyCODONE (OXYCONTIN) 10 MG is 9/23/19     Prescriptions prepped for review.     Will route to provider.

## 2019-09-17 NOTE — TELEPHONE ENCOUNTER
Script Eprescribed to pharmacy    Will send this to MA team to notify patient.    Signed Prescriptions:                        Disp   Refills    oxyCODONE (OXYCONTIN) 10 MG 12 hr tablet   60 tab*0        Sig: Take 1 tablet (10 mg) by mouth every 12 hours May           fill on 9/21/19 to start on 9/23/19  Authorizing Provider: YADIRA CARRILLO    oxyCODONE-acetaminophen (PERCOCET) 7.5-325*60 tab*0        Sig: Take 0.5-1 tablets po q 6 hrs PRN pain. Max of 2/day.           May fill 9/21/19 start on 9/23/19  Authorizing Provider: YADIRA CARRILLO MD  [facility]

## 2019-09-17 NOTE — TELEPHONE ENCOUNTER
Pt was notified or Rx approval and fill date     Jairon Rios CMA   [facility] [place]  September 17, 2019

## 2019-10-21 ENCOUNTER — OFFICE VISIT (OUTPATIENT)
Dept: PALLIATIVE MEDICINE | Facility: CLINIC | Age: 49
End: 2019-10-21
Payer: COMMERCIAL

## 2019-10-21 VITALS
HEART RATE: 70 BPM | BODY MASS INDEX: 34.29 KG/M2 | SYSTOLIC BLOOD PRESSURE: 152 MMHG | WEIGHT: 239 LBS | DIASTOLIC BLOOD PRESSURE: 105 MMHG

## 2019-10-21 DIAGNOSIS — M53.3 SI (SACROILIAC) JOINT DYSFUNCTION: ICD-10-CM

## 2019-10-21 DIAGNOSIS — M96.1 FAILED BACK SURGICAL SYNDROME: Primary | ICD-10-CM

## 2019-10-21 DIAGNOSIS — G44.209 TENSION-TYPE HEADACHE, NOT INTRACTABLE, UNSPECIFIED CHRONICITY PATTERN: ICD-10-CM

## 2019-10-21 PROCEDURE — 99214 OFFICE O/P EST MOD 30 MIN: CPT | Performed by: PSYCHIATRY & NEUROLOGY

## 2019-10-21 RX ORDER — OXYCODONE AND ACETAMINOPHEN 7.5; 325 MG/1; MG/1
TABLET ORAL
Qty: 60 TABLET | Refills: 0 | Status: CANCELLED | OUTPATIENT
Start: 2019-10-21

## 2019-10-21 RX ORDER — OXYCODONE HCL 10 MG/1
10 TABLET, FILM COATED, EXTENDED RELEASE ORAL EVERY 12 HOURS
Qty: 60 TABLET | Refills: 0 | Status: CANCELLED | OUTPATIENT
Start: 2019-10-21

## 2019-10-21 ASSESSMENT — PAIN SCALES - GENERAL: PAINLEVEL: EXTREME PAIN (9)

## 2019-10-21 NOTE — PROGRESS NOTES
[facility]    Date of visit: 10/21/2019     Chief complaint:   Chief Complaint   Patient presents with     Pain     Interval history:  David Wilcox was last seen by me on 7/24/19    Recommendations/plan at the last visit included:  1. Physical Therapy: continue exercises as recommended by PT.   2. Clinical Health Psychologist to address issues of relaxation, behavioral change, coping style, and other factors important to improvement.  Not at this time   3. Diagnostic Studies: none  4. Procedures: none at this time  5. Medication Management:  No changes. Advised if he wants to rotate back to fentanyl before visit, he can reach out.  6. Recommendations to PCP: none.   7. 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  -He reports some very minimal vertigo that started this summer. Denies syncopal episode, falls. Denies any vision changes. Notes increase in headache frequency to 1/week that may be associated with obtaining new glasses and potential vision strain.  -He has been doing well overall with pain control, states he has occasional exacerbation with activity but he feels good. He knows that he did well last year with the fentanyl patch and is unsure if he should continue that this year. He does note that he gets more stiff during the winter.  -Continues to endorse bilateral buttock pain with tightness that extends upwards and into his right leg with involvement of his toes.  -walks half mile a day on treadmill.    Pain scores:  Pain intensity on average is 7 on a scale of 0-10.     Current pain treatments:   Percocet 7.5/325mg daily- twice a day - consistent use  Oxycontin 10mg q12 - helpful (no side effects)  Robaxin (methocarbamol) 1000mg at night and in the morning- the morning dose has been  helping  Gabapentin 1200 mg TID, beneficial  Cymbalta 60mg daily- tried going to 90mg/day but felt worse- for pain and mood  Nortriptyline 20 mg at bedtime - continues to have dry mouth but wishes to continue  Ativan 0.5mg very sparingly (averages about once every 3-6 months for severe panic attacks.  Ambien at night (per PCP Dr. Llamas)  Mirtazapine 15mg at night    Previous medication treatments included:  Hydrocodone- not helpful  Methadone- taking after 1st surgery  Naprosyn- not helpful  Flexeril- after first back injury (1990s)  Lidocaine- not helpful  Acetaminophen (tylenol)   Gabapentin  Fentanyl patch-12 mcg/48 hours - feels it works too strongly with heat/sun exposure    Other treatments have included:  David Wilcox has been seen at a pain clinic in the past.  MAPS- injections  PT: yes  Acupuncture: no  TENs Unit: yes- out of use- somewhat helpful  Injections: none since last surgery- very  helpful    Side Effects: Dry mouth, notes it is due to the nortriptyline as it helps with pain significantly    Medications:  Current Outpatient Medications   Medication Sig Dispense Refill     DULoxetine (CYMBALTA) 60 MG capsule Take 1 capsule (60 mg) by mouth daily . 3 month supply 90 capsule 3     gabapentin (NEURONTIN) 600 MG tablet Take 2 tablets (1,200 mg) by mouth 3 times daily . 3 month supply 540 tablet 3     LORazepam (ATIVAN) 0.5 MG tablet One bid prn anxiety. Rare use 30 tablet 0     methocarbamol (ROBAXIN) 500 MG tablet Take 1-2 tablets (500-1,000 mg) by mouth 3 times daily as needed for muscle spasms . 3 month supply 360 tablet 1     mirtazapine (REMERON) 15 MG tablet Take 1 tablet (15 mg) by mouth At Bedtime 90 tablet 3     naloxone (NARCAN) nasal spray Spray 1 spray (4 mg) into one nostril alternating nostrils as needed for opioid reversal every 2-3 minutes until assistance arrives 0.2 mL 0     nortriptyline (PAMELOR) 10 MG capsule Take 2 capsules (20 mg) by mouth At Bedtime 180 capsule 3     omeprazole  (PRILOSEC) 20 MG DR capsule Take 1 capsule (20 mg) by mouth daily 90 capsule 3     TYLENOL EXTRA STRENGTH 500 MG OR TABS 3 po prn for pain       oxyCODONE (OXYCONTIN) 10 MG 12 hr tablet Take 1 tablet (10 mg) by mouth every 12 hours May fill on 12/22/19 to start on 12/24/19 60 tablet 0     oxyCODONE-acetaminophen (PERCOCET) 7.5-325 MG per tablet Take 0.5-1 tablets po q 6 hrs PRN pain. Max of 2/day.  May fill 12/22/19 start on 12/24/19 60 tablet 0     zolpidem (AMBIEN) 10 MG tablet TAKE 1 TABLET BY MOUTH EVERY DAY AT BEDTIME AS NEEDED FOR SLEEP 30 tablet 5       Medical History: any changes in medical history since they were last seen? None    Review of Systems:  The 14 system ROS was reviewed from the intake questionnaire, and is positive for: headache, dizziness  Mood: denies     Physical Exam:  Blood pressure (!) 152/105, pulse 70, weight 108.4 kg (239 lb).  General: awake, alert , cooperative  Gait: Antalgic on the right, uses a single end cane  MSK exam: uses cane for ambulation. Right PSIS tenderness. Some axial/rotational low back pain.  Slow sit to stand with cane. 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    2. Facet arthropathy likely contributing above the level of the future  3. Distant history of alcohol and drug abuse   4. Bipolar disorder- currently managed  5. H/o meningitis after SCS trial  6. Thoracic back pain, likely myofascial etiology.      Plan:  1. Physical Therapy: continue exercises as recommended by PT.   2. Clinical Health Psychologist to address issues of relaxation, behavioral  change, coping style, and other factors important to improvement.  Not at this time   3. Diagnostic Studies: Order Head CT to evaluate occasional headaches with transient vertigo  4. Procedures: Schedule SI Joint Injection (Right vs bilateral)  5. Medication Management:  No changes.   6. Recommendations to PCP: David to follow up with Primary Care provider regarding elevated blood pressure.   7. Follow up: 3 months    Jay Aguila MD  10/21/2019 11:36 AM  Pain Medicine Fellow    I saw and examined the patient with the Pain Fellow/Resident. I have reviewed and agree with the resident's note and plan of care and made changes and corrections directly to the body of the note.    TIME SPENT:  BY FELLOW/RESIDENT ALONE 15 MIN  BY MYSELF AND FELLOW/RESIDENT TOGETHER 15 MIN  BY MYSELF WITHOUT THE FELLOW/RESIDENT 10 MIN    These times included 15 minutes I spent counseling him about his diagnosis and treatment options and coordination of care with the primary team    Bindu Motley MD  [facility]

## 2019-10-21 NOTE — PATIENT INSTRUCTIONS
1. [facility] Scheduling[facility]    [phone]    2. Schedule SI joint injection [phone]    3. Follow up in 3 months.  4. Follow up with Dr. Llamas    ----------------------------------------------------------------  Clinic Number:  [phone]     Call with any questions about your care and for scheduling assistance.     Calls are returned Monday through Friday between 8 AM and 4:30 PM. We usually get back to you within 2 business days depending on the issue/request.    If we are prescribing your medications:    For opioid medication refills, call the clinic or send a Sunible message 7 days in advance.  Please include:    Name of requested medication    Name of the pharmacy.    For non-opioid medications, call your pharmacy directly to request a refill. Please allow 3-4 days to be processed.     Per [place] State Law:    All controlled substance prescriptions must be filled within 30 days of being written.      For those controlled substances allowing refills, pickup must occur within 30 days of last fill.      We believe regular attendance is key to your success in our program!      Any time you are unable to keep your appointment we ask that you call us at least 24 hours in advance to cancel.This will allow us to offer the appointment time to another patient.     Multiple missed appointments may lead to dismissal from the clinic.

## 2019-10-24 ENCOUNTER — TELEPHONE (OUTPATIENT)
Dept: PALLIATIVE MEDICINE | Facility: CLINIC | Age: 49
End: 2019-10-24

## 2019-10-25 ENCOUNTER — TELEPHONE (OUTPATIENT)
Dept: PALLIATIVE MEDICINE | Facility: CLINIC | Age: 49
End: 2019-10-25

## 2019-10-25 DIAGNOSIS — M96.1 FAILED BACK SURGICAL SYNDROME: ICD-10-CM

## 2019-10-25 RX ORDER — OXYCODONE HCL 10 MG/1
10 TABLET, FILM COATED, EXTENDED RELEASE ORAL EVERY 12 HOURS
Qty: 60 TABLET | Refills: 0 | Status: SHIPPED | OUTPATIENT
Start: 2019-10-25 | End: 2019-11-19

## 2019-10-25 RX ORDER — OXYCODONE AND ACETAMINOPHEN 7.5; 325 MG/1; MG/1
TABLET ORAL
Qty: 60 TABLET | Refills: 0 | Status: SHIPPED | OUTPATIENT
Start: 2019-10-25 | End: 2019-11-19

## 2019-10-25 NOTE — TELEPHONE ENCOUNTER
Patient called last night because he had run out of his prescription.  Of note patient was unable to fill new prescription secondary to the pharmacy not having the order.  I apologized to the patient for the miscommunication.  I told him I would fill the prescription first thing in the morning.  Prescription was filled this morning prior to 8:00.  Nurse called patient to inform that the prescription had been sent.

## 2019-10-25 NOTE — TELEPHONE ENCOUNTER
I did call an apologize to patient as well.  Discussed safe way to restart meds.    Bindu Motley MD  [facility]

## 2019-10-25 NOTE — TELEPHONE ENCOUNTER
Both OxyContin and oxycodone have been sent into the pharmacy and the patient has been notified.     JEREMIAH PeacockN, RN  Care Coordinator  [facility] [place]

## 2019-10-28 ENCOUNTER — TELEPHONE (OUTPATIENT)
Dept: PALLIATIVE MEDICINE | Facility: CLINIC | Age: 49
End: 2019-10-28

## 2019-10-28 NOTE — TELEPHONE ENCOUNTER
Pre-screening Questions for Radiology Injections:    Injection to be done at which interventional clinic site? [facility] [place]    Instruct patient to arrive as directed prior to the scheduled appointment time:    [place] minutes before      [place]: 30 minutes before; if IV needed 1 hour before     Procedure ordered by Dr. Motley    Procedure ordered? Bilateral SI Joint Injection      Transforaminal Cervical ALVA - Dr. Stacy Mack ONLY    What insurance would patient like us to bill for this procedure? [employer]      Worker's comp or MVA (motor vehicle accident) -Any injection DO NOT SCHEDULE and route to Venus Alejandra.      [employer] insurance - For SI joint injections, DO NOT SCHEDULE and route Venus Alejandra.       [employer] - Any injection besides hip/shoulder/knee joint DO NOT SCHEDULE and route to Venus Alejandra. She will obtain PA and call pt back to schedule procedure or notify pt of denial.       [employer]-Route to [place] for review      **BCBS- ALL need to be routed to [place] for review if a PA is needed**      IF SCHEDULING IN [place] AND NEEDS A PA, IT IS OKAY TO SCHEDULE. [place] HANDLES THEIR OWN PA'S AFTER THE PATIENT IS SCHEDULED. PLEASE SCHEDULE AT LEAST 1 WEEK OUT SO A PA CAN BE OBTAINED.    Any chance of pregnancy? Not Applicable   If YES, do NOT schedule and route to RN pool    Is an  needed? No     Patient has a drive home? (mandatory) YES: INFORMED    Is patient taking any blood thinners (plavix, coumadin, jantoven, warfarin, heparin, pradaxa or dabigatran )? No   If hold needed, do NOT schedule, route to RN pool     Is patient taking any aspirin products (includes Excedrin and Fiorinal)? No     If more than 325mg/day do NOT schedule; route to RN pool     For CERVICAL procedures, hold all aspirin products for 6 days.     Tell pt that if aspirin product is not held for 6 days, the procedure WILL BE cancelled.      Does the patient have a bleeding or clotting  disorder? No     If YES, okay to schedule AND route to RN nurse pool    For any patients with platelet count <100, must be forwarded to provider    Is patient diabetic?  No  If YES, instruct them to bring their glucometer.    Does patient have an active infection or treated for one within the past week? No     Is patient currently taking any antibiotics?  No     For patients on chronic, preventative, or prophylactic antibiotics, procedures may be scheduled.     For patients on antibiotics for active or recent infection:antibiotic course must have been completed for 4 days    Is patient currently taking any steroid medications? (i.e. Prednisone, Medrol)  No     For patients on steroid medications, course must have been completed for 4 days    Reviewed with patient:  If you are started on any steroids or antibiotics between now and your appointment, you must contact us because the procedure may need to be cancelled.  Yes    Is patient actively being treated for cancer or immunocompromised? No  If YES, do NOT schedule and route to RN pool     Are you able to get on and off an exam table with minimal or no assistance? Yes  If NO, do NOT schedule and route to RN pool    Are you able to roll over and lay on your stomach with minimal or no assistance? Yes  If NO, do NOT schedule and route to RN pool     Any allergies to contrast dye, iodine, shellfish, or numbing and steroid medications? No  If YES, route to RN pool AND add allergy information to appointment notes    Allergies: Aspartame; Food; Saccharin; and Sucralose      Has the patient had a flu shot or any other vaccinations within 7 days before or after the procedure.  No     Does patient have an MRI/CT?  Not Applicable  Check Procedure Scheduling Grid to see if required.      Was the MRI done within the last 3 years?  NA    If yes, where was the MRI done i.e.[facility], [facility], [place], [facility] etc? N/A      If no, do not schedule and route to RN  pool    If MRI was not done at [facility], [facility] or [facility] do NOT schedule and route to RN pool.      If pt has an imaging disc, the injection MAY be scheduled but pt has to bring disc to appt.     If they show up without the disc the injection cannot be done    Reminders (please tell patient if applicable):       Instructed pt to arrive 30 minutes early for IV start if required. (Check Procedure Scheduling Grid)  Not Applicable      If celiac plexus block, informed patient NPO for 6 hours and that it is okay to take medications with sips of water, especially blood pressure medications  Not Applicable         If this is for a cervical procedure, informed patient that aspirin needs to be held for 6 days.   Not Applicable      For all patients not having spinal cord stimulator (SCS) trials or radiofrequency ablations (RFAs), informed patient:    IV sedation is not provided for this procedure.  If you feel that an oral anti-anxiety medication is needed, you can discuss this further with your referring provider or primary care provider.  The Pain Clinic provider will discuss specifics of what the procedure includes at your appointment.  Most procedures last 10-20 minutes.  We use numbing medications to help with any discomfort during the procedure.  Not Applicable      Do not schedule procedures requiring IV placement in the first appointment of the day or first appointment after lunch. Do NOT schedule at 0745, 0815 or 1245. N/A      For patients 85 or older we recommend having an adult stay w/ them for the remainder of the day.   N/A    Does the patient have any questions?  NO  Stacey Spears  [facility]

## 2019-10-30 ENCOUNTER — HOSPITAL ENCOUNTER (OUTPATIENT)
Dept: CT IMAGING | Facility: CLINIC | Age: 49
Discharge: HOME | End: 2019-10-30
Attending: PSYCHIATRY & NEUROLOGY | Admitting: PSYCHIATRY & NEUROLOGY
Payer: COMMERCIAL

## 2019-10-30 DIAGNOSIS — G44.209 TENSION-TYPE HEADACHE, NOT INTRACTABLE, UNSPECIFIED CHRONICITY PATTERN: ICD-10-CM

## 2019-10-30 PROCEDURE — 70450 CT HEAD/BRAIN W/O DYE: CPT

## 2019-10-31 ENCOUNTER — TELEPHONE (OUTPATIENT)
Dept: PALLIATIVE MEDICINE | Facility: CLINIC | Age: 49
End: 2019-10-31

## 2019-10-31 NOTE — TELEPHONE ENCOUNTER
Called patient for CT head results.  No concerns    FINDINGS: The cerebral hemispheres, brainstem, and cerebellum  demonstrate normal morphology and attenuation. No evidence of acute  ischemia, hemorrhage, mass, mass effect, or hydrocephalus. The  visualized calvarium, tympanic cavities, mastoid cavities, and  paranasal sinuses are unremarkable.     Subtle lucency is present within the left petrous apex which probably  represents pneumatization with mucosal thickening/opacification. This  is of doubtful clinical significance.                                                                      IMPRESSION: No acute intracranial abnormality.

## 2019-11-11 ENCOUNTER — RADIOLOGY INJECTION OFFICE VISIT (OUTPATIENT)
Dept: PALLIATIVE MEDICINE | Facility: CLINIC | Age: 49
End: 2019-11-11
Payer: COMMERCIAL

## 2019-11-11 ENCOUNTER — ANCILLARY PROCEDURE (OUTPATIENT)
Dept: RADIOLOGY | Facility: CLINIC | Age: 49
End: 2019-11-11
Attending: PSYCHIATRY & NEUROLOGY
Payer: COMMERCIAL

## 2019-11-11 VITALS
DIASTOLIC BLOOD PRESSURE: 95 MMHG | RESPIRATION RATE: 16 BRPM | OXYGEN SATURATION: 99 % | SYSTOLIC BLOOD PRESSURE: 135 MMHG | HEART RATE: 75 BPM

## 2019-11-11 DIAGNOSIS — M53.3 SI (SACROILIAC) JOINT DYSFUNCTION: Primary | ICD-10-CM

## 2019-11-11 DIAGNOSIS — M53.3 SI (SACROILIAC) JOINT DYSFUNCTION: ICD-10-CM

## 2019-11-11 PROCEDURE — 27096 INJECT SACROILIAC JOINT: CPT | Mod: 50 | Performed by: PSYCHIATRY & NEUROLOGY

## 2019-11-11 ASSESSMENT — PAIN SCALES - GENERAL: PAINLEVEL: SEVERE PAIN (6)

## 2019-11-11 NOTE — NURSING NOTE
Discharge Information    IV Discontiued Time:  NA    Amount of Fluid Infused:  NA    Discharge Criteria = When patient returns to baseline or as per MD order    Consciousness:  Pt is fully awake    Circulation:  BP +/- 20% of pre-procedure level    Respiration:  Patient is able to breathe deeply    O2 Sat:  Patient is able to maintain O2 Sat >92% on room air    Activity:  Moves 4 extremities on command    Ambulation:  Patient is able to stand and walk or stand and pivot into wheelchair    Dressing:  Clean/dry or No Dressing    Notes:   Discharge instructions and AVS given to patient    Patient meets criteria for discharge?  YES    Admitted to PCU?  No    Responsible adult present to accompany patient home?  Yes    Signature/Title:    Torrey Rodrigues RN  RN Care Coordinator  [facility] [place]

## 2019-11-11 NOTE — PROGRESS NOTES
Pre procedure Diagnosis: SI joint dysfunction    Post procedure Diagnosis: Same  Procedure performed: bilateral SI joint injections  Anesthesia: none  Complications: none  Operators: Bindu Motley MD and Johnson Sanabria MD     Indications:   David Wilcox is a 49 year old male was seen by me in clinic.  They have a history of bilateral low back and buttock pain.  Exam shows SI joint tenderness bilaterally  and they have tried conservative treatment including medications, previous PT.    Options/alternatives, benefits and risks were discussed with the patient including bleeding, infection, tissue trauma, exposure to radiation, reaction to medications including seizure, nerve injury, weakness, and numbness.  Questions were answered to his satisfaction and he agrees to proceed. Voluntary informed consent was obtained and signed.     Vitals were reviewed: Yes  Allergies were reviewed:  Yes   Medications were reviewed:  Yes   Pre-procedure pain score: 6/10    Procedure:  After getting informed consent, patient was brought into the procedure suite and was placed in a prone position on the procedure table.   A Pause for the Cause was performed.  Patient was prepped and draped in sterile fashion.     After identifying the bilateral SI joints, the C-arm was rotated to a obliquely to obtain the best view of the inferior angle of the joint.  A total of 3 ml of Lidocaine 1%  was used to anesthetize the skin at a skin entry site coaxial with the fluoroscopy beam at this location.  A 22gauge 3.5 inch needle was advanced under intermittent fluoroscopy until it was felt to enter the SI joint.    A total of 4ml of Omnipaque-300 was injected, confirming appropriate position, with spread into the intraarticular space, with no intravascular uptake noted.  6ml was wasted. Location was verified in lateral view.    3 ml of 0.2% ropivacaine with 80mg of kenalog was injected, divided equally between the two sides.  The needle was flushed with  lidocaine and removed.     Hemostasis was achieved, the area was cleaned, and bandaids were placed when appropriate.  The patient tolerated the procedure well, and was taken to the recovery room.    Images were saved to PACS.    Post-procedure pain score: 6/10  Follow-up includes:   -f/u with referring provider      Johnson Sanabria DO  Pain Medicine Fellow

## 2019-11-11 NOTE — PATIENT INSTRUCTIONS
[facility]   Procedure Discharge Instructions    Today you saw:  Dr. Carolann Motley      You had an: sacroiliac joint injection      Medications used:  Lidocaine   Omnipaque  Ropivicaine   Kenalog           If you were holding your blood thinning medication, please restart taking it: N/A    Be cautious when walking. Numbness and/or weakness in the lower extremities may occur for up to 6-8 hours after the procedure due to effect of the local anesthetic    Do not drive for 6 hours. The effect of the local anesthetic could slow your reflexes.     You may resume your regular activities after 24 hours    Avoid strenuous activity for the first 24 hours    You may shower, however avoid swimming, tub baths or hot tubs for 24 hours following your procedure    You may have a mild to moderate increase in pain for several days following the injection.    It may take up to 14 days for the steroid medication to start working although you may feel the effect as early as a few days after the procedure.       You may use ice packs for 10-15 minutes, 3 to 4 times a day at the injection site for comfort    Do not use heat to painful areas for 6 to 8 hours. This will give the local anesthetic time to wear off and prevent you from accidentally burning your skin.     Unless you have been directed to avoid the use of anti-inflammatory medications (NSAIDS), you may use medications such as ibuprofen, Aleve or Tylenol for pain control if needed.     Possible side effects of steroids that you may experience include flushing, elevated blood pressure, increased appetite, mild headaches and restlessness.  All of these symptoms will get better with time.    If you experience any of the following, call the Pain Clinic during work hours (Mon-Friday 8-4:30 pm) at [phone] or the Provider Line after hours at [phone]:  -Fever over 100 degree F  -Swelling, bleeding, redness, drainage, warmth at the injection  site  -Progressive weakness or numbness in your legs  -Unusual new onset of pain that is not improving

## 2019-11-18 DIAGNOSIS — M96.1 FAILED BACK SURGICAL SYNDROME: ICD-10-CM

## 2019-11-18 NOTE — TELEPHONE ENCOUNTER
Patient requesting refill(s) of oxyCODONE (OXYCONTIN) 10 MG   Last picked up from pharmacy on 10/25/19     oxyCODONE-acetaminophen (PERCOCET) 7.5-325 MG  Last picked up from pharmacy on 10/25/19    Pt last seen by prescribing provider on 11/11/19  Next appt scheduled for 1/20/20     checked in the past 6 months? Yes If no, print current report and give to RN    Last urine drug screen date 1/23/19  Current opioid agreement on file (completed within the last year) Yes Date of opioid agreement: 1/23/19    Processing (pick one and delete the others):      E-prescribe to [facility] [id number] [place], [place]   Pharmacy    Will route to nursing pool for review and preparation of prescription(s).

## 2019-11-18 NOTE — TELEPHONE ENCOUNTER
Reason for call:  Medication   If this is a refill request, has the caller requested the refill from the pharmacy already? No  Will the patient be using a [facility]? No  Name of the pharmacy and phone number for the current request:   [facility] [id number] [place] [address] [address]  Phone: [phone] Fax: [phone]      Name of the medication requested:   oxyCODONE (OXYCONTIN) 10 MG   oxyCODONE-acetaminophen (PERCOCET) 7.5-325 MG    Other request: no    Phone number to reach patient:  Cell number on file:    Telephone Information:   Mobile [phone]       Best Time:  n/a    Can we leave a detailed message on this number?  Not Applicable

## 2019-11-19 RX ORDER — OXYCODONE HCL 10 MG/1
10 TABLET, FILM COATED, EXTENDED RELEASE ORAL EVERY 12 HOURS
Qty: 60 TABLET | Refills: 0 | Status: SHIPPED | OUTPATIENT
Start: 2019-11-19 | End: 2019-12-18

## 2019-11-19 RX ORDER — OXYCODONE AND ACETAMINOPHEN 7.5; 325 MG/1; MG/1
TABLET ORAL
Qty: 60 TABLET | Refills: 0 | Status: SHIPPED | OUTPATIENT
Start: 2019-11-19 | End: 2019-12-18

## 2019-11-19 NOTE — TELEPHONE ENCOUNTER
Medication refill information reviewed.     Due date for     oxyCODONE (OXYCONTIN) 10 MG      oxyCODONE-acetaminophen (PERCOCET) 7.5-325 MG is 11/24/19     Prescriptions prepped for review.     Will route to provider.

## 2019-11-19 NOTE — TELEPHONE ENCOUNTER
Script Eprescribed to pharmacy    Will send this to MA team to notify patient.    Signed Prescriptions:                        Disp   Refills    oxyCODONE (OXYCONTIN) 10 MG 12 hr tablet   60 tab*0        Sig: Take 1 tablet (10 mg) by mouth every 12 hours May           fill on 11/22/19 to start on 11/24/19  Authorizing Provider: YADIRA CARRILLO    oxyCODONE-acetaminophen (PERCOCET) 7.5-325*60 tab*0        Sig: Take 0.5-1 tablets po q 6 hrs PRN pain. Max of 2/day.           May fill 11/22/19 start on 11/24/19  Authorizing Provider: YADIRA CARRILLO MD  [facility]

## 2019-11-20 NOTE — TELEPHONE ENCOUNTER
Patient was notified.       Jairon Rios, [facility]   [facility] Center  November 20, 2019

## 2019-12-03 DIAGNOSIS — F51.01 PRIMARY INSOMNIA: ICD-10-CM

## 2019-12-03 NOTE — TELEPHONE ENCOUNTER
Requested Prescriptions   Pending Prescriptions Disp Refills     zolpidem (AMBIEN) 10 MG tablet       Sig: TAKE 1 TABLET BY MOUTH EVERY DAY AT BEDTIME AS NEEDED FOR SLEEP       There is no refill protocol information for this order        Last Written Prescription Date:  6/3/19  Last Fill Quantity: 31,  # refills: 5   Last office visit: 6/3/2019 with prescribing provider:  HERBER Llamas  Future Office Visit:   Next 5 appointments (look out 90 days)    Jan 20, 2020 11:30 AM CST  Return Visit with Carolann Motley MD  [facility] [place] ([facility] [place]) [address] [facility]  [place] [id number] [phone]

## 2019-12-04 RX ORDER — ZOLPIDEM TARTRATE 10 MG/1
TABLET ORAL
Qty: 30 TABLET | Refills: 5 | Status: SHIPPED | OUTPATIENT
Start: 2019-12-04 | End: 2020-01-27

## 2019-12-18 ENCOUNTER — TELEPHONE (OUTPATIENT)
Dept: PALLIATIVE MEDICINE | Facility: CLINIC | Age: 49
End: 2019-12-18

## 2019-12-18 DIAGNOSIS — M96.1 FAILED BACK SURGICAL SYNDROME: ICD-10-CM

## 2019-12-18 RX ORDER — OXYCODONE AND ACETAMINOPHEN 7.5; 325 MG/1; MG/1
TABLET ORAL
Qty: 60 TABLET | Refills: 0 | Status: SHIPPED | OUTPATIENT
Start: 2019-12-18 | End: 2020-01-20

## 2019-12-18 RX ORDER — OXYCODONE HCL 10 MG/1
10 TABLET, FILM COATED, EXTENDED RELEASE ORAL EVERY 12 HOURS
Qty: 60 TABLET | Refills: 0 | Status: SHIPPED | OUTPATIENT
Start: 2019-12-18 | End: 2020-01-20

## 2019-12-18 NOTE — TELEPHONE ENCOUNTER
Script Eprescribed to pharmacy    Will send this to MA team to notify patient.    Signed Prescriptions:                        Disp   Refills    oxyCODONE (OXYCONTIN) 10 MG 12 hr tablet   60 tab*0        Sig: Take 1 tablet (10 mg) by mouth every 12 hours May           fill on 12/22/19 to start on 12/24/19  Authorizing Provider: YADIRA CARRILLO    oxyCODONE-acetaminophen (PERCOCET) 7.5-325*60 tab*0        Sig: Take 0.5-1 tablets po q 6 hrs PRN pain. Max of 2/day.           May fill 12/22/19 start on 12/24/19  Authorizing Provider: YADIRA CARRILLO MD  [facility]

## 2019-12-18 NOTE — TELEPHONE ENCOUNTER
Received call from patient requesting refill(s) of oxyCODONE (OXYCONTIN) 10 MG 12 hr tablet and  oxyCODONE-acetaminophen (PERCOCET) 7.5-325 MG per tablet    Last dispensed from pharmacy on 11/22/19 for both    Pt last seen by prescribing provider on 10/21/19 office visit- has had injection since  Next appt scheduled for 01/20/20- note added to update DSD and OA     checked in the past 6 months? Yes If no, print current report and give to RN    Last urine drug screen date 01/23/19  Current opioid agreement on file (completed within the last year) Yes Date of opioid agreement: 01/23/19    Processing (pick one and delete the others):      E-prescribe to pharmacy-[facility] [id number] [place] [facility] [address]      Will route to nursing pool for review and preparation of prescription(s).

## 2019-12-18 NOTE — TELEPHONE ENCOUNTER
Medication refill information reviewed.     Due date for oxyCODONE (OXYCONTIN) 10 MG 12 hr tablet and  oxyCODONE-acetaminophen (PERCOCET) 7.5-325 MG per tablet is 12/24/19     Prescriptions prepped for review.     Will route to provider.

## 2019-12-18 NOTE — TELEPHONE ENCOUNTER
Reason for call:  Medication   If this is a refill request, has the caller requested the refill from the pharmacy already? No  Will the patient be using a [facility]? No  Name of the pharmacy and phone number for the current request: [facility]    Name of the medication requested: oxyCODONE (OXYCONTIN) 10 MG 12 hr tablet   oxyCODONE-acetaminophen (PERCOCET) 7.5-325 MG per tablet    Other request: N/A    Phone number to reach patient:  Home number on file [phone] (home)    Best Time:  N/A    Can we leave a detailed message on this number?  YES     Stacey NEELY    [facility]

## 2020-01-06 ENCOUNTER — TELEPHONE (OUTPATIENT)
Dept: FAMILY MEDICINE | Facility: CLINIC | Age: 50
End: 2020-01-06

## 2020-01-06 NOTE — TELEPHONE ENCOUNTER
Reason for call:  Patient reporting a symptom    Symptom or request: Intense Headache, stream light sensitivity. Pt currently is having this headache now. This has been going on for 3 days off and on.  Pt has been trying to quit smoking. Last one was 12/31/20.    Phone Number patient can be reached at:  [phone]    Best Time:  Any Time      Can we leave a detailed message on this number:  YES    Call taken on 1/6/2020 at 12:48 PM by Michelle Bolton

## 2020-01-06 NOTE — TELEPHONE ENCOUNTER
Spouse called, states pt has had a headache for the past three days, light sensitivity. Reports pain is front of head/behind eyes. Reports this is the worst headache he has ever experienced. Quit smoking on Dec 31. Last BP in clinic 152/105. Advised needs to go to the emergency room for further evaluation. Verbalized understanding. Nannette Rubio RN

## 2020-01-09 ENCOUNTER — PATIENT OUTREACH (OUTPATIENT)
Dept: CARE COORDINATION | Facility: CLINIC | Age: 50
End: 2020-01-09

## 2020-01-09 DIAGNOSIS — Z76.89 HEALTH CARE HOME: Primary | ICD-10-CM

## 2020-01-09 ASSESSMENT — ACTIVITIES OF DAILY LIVING (ADL): DEPENDENT_IADLS:: INDEPENDENT

## 2020-01-09 NOTE — LETTER
[place] CARE COORDINATION  [facility]  [address] [address]  [phone]    January 9, 2020    David Wilcox  [address] [place] [id number]      Dear David,    I am a clinic care coordinator who works with Regan Llamas MD at [facility]. I wanted to thank you for spending the time to talk with me.  I wanted to introduce myself and provide you with my contact information so that you can call me with questions or concerns about your health care. Below is a description of clinic care coordination and how I can further assist you.     The clinic care coordinator is a registered nurse and/or  who understand the health care system. The goal of clinic care coordination is to help you manage your health and improve access to the [place] system in the most efficient manner. The registered nurse can assist you in meeting your health care goals by providing education, coordinating services, and strengthening the communication among your providers. The  can assist you with financial, behavioral, psychosocial, chemical dependency, counseling, and/or psychiatric resources.    Please feel free to contact me at [phone], with any questions or concerns. We at [facility] are focused on providing you with the highest-quality healthcare experience possible and that all starts with you.     Sincerely,     John Franz RN  Clinic Care Coordinator    Enclosed: I have enclosed a copy of a 24 Hour Access Plan. This has helpful phone numbers for you to call when needed. Please keep this in an easy to access place to use as needed.

## 2020-01-09 NOTE — LETTER
Health Care Home - Access Care Plan    About Me:    Patient Name:  David Morel    YOB: 1970  Age:                      49 year old   Deangelo MRN:     [id number] Telephone Information:   Home Phone [phone]   Mobile [phone]       Address:  [address] [facility] [address] Email address:  No e-mail address on record      Emergency Contact(s)   Name Relationship Lgl Grd Work Phone Home Phone Mobile Phone   1Alisha MOREL, APRIL Spouse  [phone]    2. NO OTHER                  Health Maintenance: Routine Health maintenance Reviewed: Due/Overdue   Health Maintenance Due   Topic Date Due     HIV SCREENING  09/15/1986     PNEUMOCOCCAL IMMUNIZATION 19-64 MEDIUM RISK (1 of 1 - PPSV23) 09/15/1990     PREVENTIVE CARE VISIT  05/09/2013     LIPID  05/09/2017     INFLUENZA VACCINE (1) 09/01/2019     PHQ-9  01/05/2020     URINE DRUG SCREEN  01/23/2020       My Access Plan  Medical Emergency 911   Questions or concerns during clinic hours Primary Clinic Line, I will call the clinic directly: [facility] [phone]   24 Hour Appointment Line [phone] or  [phone]) (toll free)   24 Hour Nurse Line [phone] (toll free)   Questions or concerns outside clinic hours 24 Hour Appointment Line, I will call the after-hours on-call line:   [facility] [phone] or [phone]) (toll-free)   Preferred Urgent Care [facility] [phone]   Preferred Hospital [place], [place]  [phone]   [id number] - [place], [place] - [address] AT [facility] [address]     Behavioral Health Crisis Line The National Suicide Prevention Lifeline at 1-173.545.7684 or 911                     My Care Team Members  Patient Care Team       Relationship Specialty Notifications Start End    Regan Llamas MD PCP - General Family Practice  9/2/10     Phone:  [phone] Fax: [phone] [address] [place] [address]    Regan Llamas MD Assigned PCP   6/9/19     Phone: [phone] Fax: [phone] [address] [place] [address]           My Medical and Care Information  Problem List   Patient Active Problem List   Diagnosis     Recurrent major depression in complete remission (H)     Chronic low back pain     GERD (gastroesophageal reflux disease)     Insomnia     CARDIOVASCULAR SCREENING; LDL GOAL LESS THAN 160     Anxiety     Right foot drop     Continuous opioid dependence (H)     Chronic pain syndrome     Erectile dysfunction, unspecified erectile dysfunction type     Incontinence of feces, unspecified fecal incontinence type     Obesity (BMI 35.0-39.9) with comorbidity (H)     Sedative, hypnotic or anxiolytic dependence (H)      Current Medications and Allergies:  See printed Medication Report

## 2020-01-20 ENCOUNTER — OFFICE VISIT (OUTPATIENT)
Dept: PALLIATIVE MEDICINE | Facility: CLINIC | Age: 50
End: 2020-01-20
Payer: COMMERCIAL

## 2020-01-20 VITALS
SYSTOLIC BLOOD PRESSURE: 152 MMHG | WEIGHT: 248 LBS | DIASTOLIC BLOOD PRESSURE: 97 MMHG | HEART RATE: 76 BPM | BODY MASS INDEX: 35.58 KG/M2

## 2020-01-20 DIAGNOSIS — Z51.81 ENCOUNTER FOR MONITORING OPIOID MAINTENANCE THERAPY: Primary | ICD-10-CM

## 2020-01-20 DIAGNOSIS — M96.1 FAILED BACK SURGICAL SYNDROME: ICD-10-CM

## 2020-01-20 DIAGNOSIS — Z79.891 ENCOUNTER FOR MONITORING OPIOID MAINTENANCE THERAPY: Primary | ICD-10-CM

## 2020-01-20 PROCEDURE — 99213 OFFICE O/P EST LOW 20 MIN: CPT | Performed by: PSYCHIATRY & NEUROLOGY

## 2020-01-20 RX ORDER — OXYCODONE AND ACETAMINOPHEN 7.5; 325 MG/1; MG/1
TABLET ORAL
Qty: 60 TABLET | Refills: 0 | Status: SHIPPED | OUTPATIENT
Start: 2020-01-20 | End: 2020-02-18

## 2020-01-20 RX ORDER — OXYCODONE HCL 10 MG/1
10 TABLET, FILM COATED, EXTENDED RELEASE ORAL EVERY 12 HOURS
Qty: 60 TABLET | Refills: 0 | Status: SHIPPED | OUTPATIENT
Start: 2020-01-20 | End: 2020-02-18

## 2020-01-20 ASSESSMENT — PAIN SCALES - GENERAL: PAINLEVEL: EXTREME PAIN (8)

## 2020-01-20 NOTE — PATIENT INSTRUCTIONS
1. Start topamax  Topamax:  1 tab= 25mg    AM   PM  0   25mg (1 tab).  If tolerating, after 1 week go to the next line.  25mg (1 tab)  25mg (1 tab).  If tolerating, after 1 week go to the next line.  25mg (1 tab)  50mg (2 tabs).  If tolerating, after 1 week go to the next line.  50mg (2 tabs)  50mg (2 tabs). Call us when you are at this dose or with any concerns    -remain well hydrated, due to risk of kidney stones  -do not drive until you know how it affects you  -new numbness or tingling in the toes may be related to how well hydrated you are- if this occurs- drink more fluids and it should get better      2. Follow up in 3 months.    ----------------------------------------------------------------  Clinic Number:  [phone]     Call with any questions about your care and for scheduling assistance.     Calls are returned Monday through Friday between 8 AM and 4:30 PM. We usually get back to you within 2 business days depending on the issue/request.    If we are prescribing your medications:    For opioid medication refills, call the clinic or send a RFIDeas message 7 days in advance.  Please include:    Name of requested medication    Name of the pharmacy.    For non-opioid medications, call your pharmacy directly to request a refill. Please allow 3-4 days to be processed.     Per [place] State Law:    All controlled substance prescriptions must be filled within 30 days of being written.      For those controlled substances allowing refills, pickup must occur within 30 days of last fill.      We believe regular attendance is key to your success in our program!      Any time you are unable to keep your appointment we ask that you call us at least 24 hours in advance to cancel.This will allow us to offer the appointment time to another patient.     Multiple missed appointments may lead to dismissal from the clinic.

## 2020-01-20 NOTE — PROGRESS NOTES
[facility]    Date of visit: 1/20/2020     Chief complaint:   Chief Complaint   Patient presents with     Pain     Interval history:  David Wilcox was last seen by me on 10/21/19    Recommendations/plan at the last visit included:  1. Physical Therapy: continue exercises as recommended by PT.   2. Clinical Health Psychologist to address issues of relaxation, behavioral change, coping style, and other factors important to improvement.  Not at this time   3. Diagnostic Studies: Order Head CT to evaluate occasional headaches with transient vertigo  4. Procedures: Schedule SI Joint Injection (Right vs bilateral)  5. Medication Management:  No changes.   6. Recommendations to PCP: David to follow up with Primary Care provider regarding elevated blood pressure.   7. Follow up: 3 months    Since his last visit, David Wilcox reports:  - he quit smoking Dec 31st, 2019, is using hard candy to supplement oral fixation.  - States he is now having worsening of his headache since quitting smoking.   - Pain is behind both eyes, Has had a continual headache since Jan 3rd. It waxes and wanes throughout the day but doesn't go away.   - Has tried migraine head wrap with minimal improvement.   - Discussed topamax for headache prevention and to help with his pain. Discussed side effects.    - Patient is hesitant to add an additional medicatin but is willing to try it. We discussed that if his pain improves on it we could consider decreasing or removing one of his other pain medications medications.   - Saw optometrist and got new prescription, has not got the new glasses yet. Diagnosed with Dry eyes. Can't afford the prescription eye drops ($350/month).   - Needs refills       Pain scores:  Pain intensity on average is 9 on a scale of 0-10.     Current pain treatments:   Percocet 7.5/325mg daily- twice a day - consistent use  Oxycontin 10mg q12 - helpful (no side effects)  Robaxin  (methocarbamol) 1000mg at night and in the morning- the morning dose has been helping  Gabapentin 1200 mg TID, beneficial  Cymbalta 60mg daily- tried going to 90mg/day but felt worse- for pain and mood  Nortriptyline 20 mg at bedtime - continues to have dry mouth but wishes to continue  Ativan 0.5mg very sparingly (averages about once every 3-6 months for severe panic attacks.  Ambien at night (per PCP Dr. Llamas)  Mirtazapine 15mg at night    Previous medication treatments included:  Hydrocodone- not helpful  Methadone- taking after 1st surgery  Naprosyn- not helpful  Flexeril- after first back injury (1990s)  Lidocaine- not helpful  Acetaminophen (tylenol)   Gabapentin  Fentanyl patch-12 mcg/48 hours - feels it works too strongly with heat/sun exposure    Other treatments have included:  David TRAVIS Brenden has been seen at a pain clinic in the past.  MAPS- injections  PT: yes  Acupuncture: no  TENs Unit: yes- out of use- somewhat helpful  Injections: none since last surgery- very  helpful    Side Effects: Dry mouth, dry eyes    Medications:  Current Outpatient Medications   Medication Sig Dispense Refill     oxyCODONE (OXYCONTIN) 10 MG 12 hr tablet Take 1 tablet (10 mg) by mouth every 12 hours May fill on 1/20/20 to start on 1/23/20 60 tablet 0     oxyCODONE-acetaminophen (PERCOCET) 7.5-325 MG per tablet Take 0.5-1 tablets po q 6 hrs PRN pain. Max of 2/day.  May fill on 1/20/20 to start on 1/23/20 60 tablet 0     DULoxetine (CYMBALTA) 60 MG capsule Take 1 capsule (60 mg) by mouth daily . 3 month supply 90 capsule 3     gabapentin (NEURONTIN) 600 MG tablet Take 2 tablets (1,200 mg) by mouth 3 times daily . 3 month supply 540 tablet 3     LORazepam (ATIVAN) 0.5 MG tablet One bid prn anxiety. Rare use 30 tablet 0     methocarbamol (ROBAXIN) 500 MG tablet Take 1-2 tablets (500-1,000 mg) by mouth 3 times daily as needed for muscle spasms . 3 month supply 360 tablet 1     mirtazapine (REMERON) 15 MG tablet Take 1 tablet (15 mg)  by mouth At Bedtime 90 tablet 3     naloxone (NARCAN) nasal spray Spray 1 spray (4 mg) into one nostril alternating nostrils as needed for opioid reversal every 2-3 minutes until assistance arrives 0.2 mL 0     nortriptyline (PAMELOR) 10 MG capsule Take 2 capsules (20 mg) by mouth At Bedtime 180 capsule 3     omeprazole (PRILOSEC) 20 MG DR capsule Take 1 capsule (20 mg) by mouth daily 90 capsule 3     TYLENOL EXTRA STRENGTH 500 MG OR TABS 3 po prn for pain       zolpidem (AMBIEN) 10 MG tablet TAKE 1 TABLET BY MOUTH EVERY DAY AT BEDTIME AS NEEDED FOR SLEEP 30 tablet 5       Medical History: any changes in medical history since they were last seen? As abpve    Review of Systems:  The 14 system ROS was reviewed from the intake questionnaire, and is positive for: headache- worsening, dizziness, weight gain  Mood: denies issue    Physical Exam:  Blood pressure (!) 152/97, pulse 76, weight 112.5 kg (248 lb).  General: awake, alert , cooperative  Gait: Antalgic on the right, uses a single point cane  MSK exam: sits comfortably, at least antigravity strength in bilateral upper and lower extremities.   Slow sit to stand with cane.   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 Headaches- since stopping tobacco more daily  2. 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    3. Facet arthropathy likely contributing above the level of the future  4. Distant history of alcohol and drug abuse   5. Bipolar disorder- currently managed  6. H/o meningitis after SCS trial  7. Thoracic back pain, likely myofascial etiology.    Unclear why stopping tobacco led to headaches worsening.  Would expect he would be  outside of the window of significant withdrawal.      Plan:  1. Physical Therapy: continue exercises as recommended by PT.   2. Clinical Health Psychologist to address issues of relaxation, behavioral change, coping style, and other factors important to improvement.  Not at this time   3. Diagnostic Studies: none  4. Procedures: none  5. Medication Management:    1. Start topamax, uptitrate to dose of 50 mg BID. Patient is to call into clinic once he reaches this dose with an update on how he is doing.   2. We could consider bring down another medication pending how this works for him  6. Recommendations to PCP: David to follow up with Primary Care provider regarding elevated blood pressure.   7. Follow up: 3 months    Lynn Gallegos DO, MBA  Pain Medicine Fellow    I saw and examined the patient with the Pain Fellow/Resident. I have reviewed and agree with the resident's note and plan of care and made changes and corrections directly to the body of the note.    TIME SPENT:  BY FELLOW/RESIDENT ALONE 10 MIN  BY MYSELF AND FELLOW/RESIDENT TOGETHER 20 MIN  BY MYSELF WITHOUT THE FELLOW/RESIDENT 0 MIN    These times included 15 minutes I spent counseling him about his diagnosis and treatment options and coordination of care with the primary team    Bindu Motley MD  [facility]

## 2020-01-22 DIAGNOSIS — M96.1 FAILED BACK SURGICAL SYNDROME: Primary | ICD-10-CM

## 2020-01-22 NOTE — TELEPHONE ENCOUNTER
Please send Topamax prescription to the [facility] pharmacy in [place]. He does not have this medication at home so has not started it yet.     JOSEE Peacock, RN  Care Coordinator  [facility] [place]

## 2020-01-22 NOTE — TELEPHONE ENCOUNTER
"Went back to review telephone note from 4/30/18    \"Received letter from pharmacy stating patient is requesting changing his Rx (topiramate (TOPAMAX) 25 MG tablet) for the fallowing reasons: patient is concerned about this medication because it has polysorbate 80 as an active ingredient. He did not want to take Topamax because of that. Patient is requesting alternative medication. \"      He is allergic to artificial sweeteners.  I think this is in the coating. Could we call the pharmacy to determine if there is a formulation available now that doesn't contain any artificial sweeteners?    I talked with patient about this and plan to reach out to pharmacy.    Bindu Motley MD  [facility]   "

## 2020-01-22 NOTE — TELEPHONE ENCOUNTER
"Called patient. Patient was last prescribed Topamax on 4/27/2018 but was discontinued by another provider on 5/7/2018 stating patient  had an \"Allergic response.\" Patient does not remember stating that.    At his last office visit with Dr. Motley (and Pain Fellow) 01/20/2019. Plan stated:        Medication Management:    1. Start topamax, uptitrate to dose of 50 mg BID. Patient is to call into clinic once he reaches this dose with an update on how he is doing.     Medication was not ordered at this visit.    Routing to provider to review.    JOHAN Cano [facility]    "

## 2020-01-22 NOTE — TELEPHONE ENCOUNTER
Reason for call:  Medication   If this is a refill request, has the caller requested the refill from the pharmacy already? No  Will the patient be using a [facility]? No  Name of the pharmacy and phone number for the current request: [facility]    Name of the medication requested: topamax    Other request: Pt was supposed to be prescribed medication after 1/20 appointment. Please call pt once sent to pharmacy    Phone number to reach patient:  Home number on file [phone] (home)    Best Time:  Anytime    Can we leave a detailed message on this number?  YES     Stacey NEELY    [facility]

## 2020-01-24 NOTE — TELEPHONE ENCOUNTER
Called pharmacy and spoke to pharmacist. Relayed message from Bindu Motley MD. He will check on formulations to see if he can find one without polysorbate 80 (artificial sweeteners).     Pharmacist said that the 2 different brands he has, has the polysorbate 80; explained that it is a binding ingredient.     Will relay message to provider.     JEREMIAH GrissomN, RN-BC  Patient Care Supervisor  [facility] [place]

## 2020-01-27 ENCOUNTER — OFFICE VISIT (OUTPATIENT)
Dept: FAMILY MEDICINE | Facility: CLINIC | Age: 50
End: 2020-01-27
Payer: COMMERCIAL

## 2020-01-27 VITALS
BODY MASS INDEX: 36.36 KG/M2 | HEART RATE: 83 BPM | SYSTOLIC BLOOD PRESSURE: 138 MMHG | TEMPERATURE: 98.3 F | WEIGHT: 254 LBS | RESPIRATION RATE: 16 BRPM | OXYGEN SATURATION: 97 % | HEIGHT: 70 IN | DIASTOLIC BLOOD PRESSURE: 80 MMHG

## 2020-01-27 DIAGNOSIS — F11.20 CONTINUOUS OPIOID DEPENDENCE (H): ICD-10-CM

## 2020-01-27 DIAGNOSIS — F51.01 PRIMARY INSOMNIA: ICD-10-CM

## 2020-01-27 DIAGNOSIS — G44.221 CHRONIC TENSION-TYPE HEADACHE, INTRACTABLE: Primary | ICD-10-CM

## 2020-01-27 DIAGNOSIS — M54.50 CHRONIC LOW BACK PAIN, UNSPECIFIED BACK PAIN LATERALITY, UNSPECIFIED WHETHER SCIATICA PRESENT: ICD-10-CM

## 2020-01-27 DIAGNOSIS — G89.29 CHRONIC LOW BACK PAIN, UNSPECIFIED BACK PAIN LATERALITY, UNSPECIFIED WHETHER SCIATICA PRESENT: ICD-10-CM

## 2020-01-27 PROCEDURE — 99214 OFFICE O/P EST MOD 30 MIN: CPT | Performed by: FAMILY MEDICINE

## 2020-01-27 RX ORDER — TOPIRAMATE 25 MG/1
TABLET, FILM COATED ORAL
Qty: 120 TABLET | Refills: 1 | Status: SHIPPED | OUTPATIENT
Start: 2020-01-27 | End: 2020-02-18

## 2020-01-27 RX ORDER — PREDNISONE 20 MG/1
TABLET ORAL
Qty: 10 TABLET | Refills: 0 | Status: SHIPPED | OUTPATIENT
Start: 2020-01-27 | End: 2020-07-20

## 2020-01-27 RX ORDER — ZOLPIDEM TARTRATE 10 MG/1
TABLET ORAL
Qty: 30 TABLET | Refills: 5 | Status: SHIPPED | OUTPATIENT
Start: 2020-01-27 | End: 2020-07-20

## 2020-01-27 ASSESSMENT — MIFFLIN-ST. JEOR: SCORE: 2028.39

## 2020-01-27 ASSESSMENT — ANXIETY QUESTIONNAIRES
GAD7 TOTAL SCORE: 7
1. FEELING NERVOUS, ANXIOUS, OR ON EDGE: NEARLY EVERY DAY
7. FEELING AFRAID AS IF SOMETHING AWFUL MIGHT HAPPEN: NOT AT ALL
IF YOU CHECKED OFF ANY PROBLEMS ON THIS QUESTIONNAIRE, HOW DIFFICULT HAVE THESE PROBLEMS MADE IT FOR YOU TO DO YOUR WORK, TAKE CARE OF THINGS AT HOME, OR GET ALONG WITH OTHER PEOPLE: EXTREMELY DIFFICULT
2. NOT BEING ABLE TO STOP OR CONTROL WORRYING: NOT AT ALL
3. WORRYING TOO MUCH ABOUT DIFFERENT THINGS: NOT AT ALL
5. BEING SO RESTLESS THAT IT IS HARD TO SIT STILL: NOT AT ALL
6. BECOMING EASILY ANNOYED OR IRRITABLE: SEVERAL DAYS

## 2020-01-27 ASSESSMENT — PATIENT HEALTH QUESTIONNAIRE - PHQ9
SUM OF ALL RESPONSES TO PHQ QUESTIONS 1-9: 18
5. POOR APPETITE OR OVEREATING: NEARLY EVERY DAY

## 2020-01-27 ASSESSMENT — PAIN SCALES - GENERAL: PAINLEVEL: MILD PAIN (3)

## 2020-01-27 NOTE — PROGRESS NOTES
"Subjective     David Wilcox is a 49 year old male who presents to clinic today for the following health issues:    HPI     Chief Complaint   Patient presents with     Recheck Medication     refills      Headache     headache everyday since 1/3/20 -he would rate his headaches  3-10 on the pain scale . patient did quit smoking 12/31/19     Depression     recheck        S:David Wilcox is a 49 year old male with headaches.  Has had them one to two times a month for \"years\" but usually can work through them.  No nausea.  Some photophobia.      Has one now that has lasted several weeks.  Not resolving.  Stabbing behind eyes.  Went to ED, CT scan head fine.  Saw eye doctor, no glaucoma, they did dilated eye exam, nothing found.     Never been diagnosed with migraines.      Complex chronic pain hx, on opioids.  Was going to try topapmax per his pain doctor, but didn't yet.      Doesn't take anti-inflammatories.     No fever, not sick.      Also needs his chronic insomnia medication refilled    O:/80   Pulse 83   Temp 98.3  F (36.8  C)   Resp 16   Ht 1.778 m (5' 10\")   Wt 115.2 kg (254 lb)   SpO2 97%   BMI 36.45 kg/m    GEN: Alert and oriented, in no acute distress  Balance fine  Normal pupils  CV: RRR, no murmur  RESP: lungs clear bilaterally, good effort  EXT: no edema or lesions noted in lower extremities  Walks chronically with cane    A: headache, not improving       Insomnia, chronic        Chronic pain, opioid dependent            P: will try a round of prednisone to break the HA.  He wanted me to resend the script for topamax and maybe try that as he would like to have better HA control.  On a lot of serotonin agents already, but could try imitrex for possibility of migraine he hasn't had diagnosed.  Reassured by CT scan and no neuro findings, but consider MRI if not improving with prednisone, time, topamax, and/or possibly a triptan trial.    He agrees, will f/u if not improving.      "

## 2020-01-28 RX ORDER — TOPIRAMATE 25 MG/1
50 TABLET, FILM COATED ORAL 2 TIMES DAILY
Qty: 120 TABLET | Refills: 3 | Status: SHIPPED | OUTPATIENT
Start: 2020-01-28 | End: 2020-02-27 | Stop reason: SINTOL

## 2020-01-28 ASSESSMENT — ANXIETY QUESTIONNAIRES: GAD7 TOTAL SCORE: 7

## 2020-01-28 NOTE — TELEPHONE ENCOUNTER
Script sent to [facility].  Please notify patient- let him know about change in pharmacy.  In the future, we will work with a [facility] pharmacy closer to his home, but let's start with this one as they found out how to order.    Let him know pharmacy needs to order, so won't be immediately available.    Signed Prescriptions:                        Disp   Refills    topiramate (TOPAMAX) 25 MG tablet          120 ta*3        Sig: Take 2 tablets (50 mg) by mouth 2 times daily Titrate           as instructed in clinic; Must have Sun           Pharmaceutical brand due to allergy  Authorizing Provider: YADIRA CARRILLO MD  [facility]

## 2020-01-28 NOTE — TELEPHONE ENCOUNTER
Spoke to patient and he received this script from [facility] pharmacy in [place]. Let patient know that we spoke to the pharmacist at [facility] on 1/24/20 and the pharmacist said that the 2 different brands he has both have the polysorbate 80. Patient will double check and he will call us if we should cancel  the script at [facility] pharmacy.     JEREMIAH PeacockN, RN  Care Coordinator  [facility]

## 2020-01-28 NOTE — TELEPHONE ENCOUNTER
Patient called back and the prescription from [facility] does have the polysorbate 80 so he is going to call the [facility] and ask them to order the Topamax from [place].     JEREMIAH PeacockN, RN  Care Coordinator  [facility]

## 2020-01-28 NOTE — TELEPHONE ENCOUNTER
Could we call a [facility] pharmacy to see if there are any options?    Bindu Motley MD  [facility]

## 2020-01-28 NOTE — TELEPHONE ENCOUNTER
Called [facility] pharmacy and spoke with the pharmacist. They are able to get a brand of topiramate that does NOT have the polysorbate 80. The brand is [employer]. The pharmacist recommended that the Rx is written as [facility] with the specific brand listed.     Will inform provider and have her review the prescription. Updated Rx to include the brand required and  pharmacy.     JEREMIAH GrissomN, RN-BC  Patient Care Supervisor  [facility] [place]

## 2020-02-10 ENCOUNTER — TELEPHONE (OUTPATIENT)
Dept: PALLIATIVE MEDICINE | Facility: CLINIC | Age: 50
End: 2020-02-10

## 2020-02-10 NOTE — TELEPHONE ENCOUNTER
Reason for call:  Other   Patient called regarding (reason for call): call back  Additional comments: Pt is wanting to be off of topiramate (TOPAMAX) 25 MG tablet and would like talk to nursing about possibly switching to something else.    Phone number to reach patient:  Home number on file [phone] (home)    Best Time:  Anytime    Can we leave a detailed message on this number?  YES     Stacey NEELY    [facility]       Refill pended to physician in PCP's absence.

## 2020-02-11 ENCOUNTER — HOSPITAL ENCOUNTER (EMERGENCY)
Facility: CLINIC | Age: 50
Discharge: HOME | End: 2020-02-11
Attending: NURSE PRACTITIONER | Admitting: NURSE PRACTITIONER
Payer: COMMERCIAL

## 2020-02-11 ENCOUNTER — APPOINTMENT (OUTPATIENT)
Dept: CT IMAGING | Facility: CLINIC | Age: 50
End: 2020-02-11
Attending: NURSE PRACTITIONER
Payer: COMMERCIAL

## 2020-02-11 VITALS
DIASTOLIC BLOOD PRESSURE: 90 MMHG | BODY MASS INDEX: 36.59 KG/M2 | TEMPERATURE: 97.7 F | SYSTOLIC BLOOD PRESSURE: 138 MMHG | WEIGHT: 255 LBS | HEART RATE: 75 BPM | RESPIRATION RATE: 15 BRPM | OXYGEN SATURATION: 96 %

## 2020-02-11 DIAGNOSIS — R06.02 SHORTNESS OF BREATH: ICD-10-CM

## 2020-02-11 LAB
ALBUMIN SERPL-MCNC: 3.8 G/DL (ref 3.4–5)
ALP SERPL-CCNC: 68 U/L (ref 40–150)
ALT SERPL W P-5'-P-CCNC: 59 U/L (ref 0–70)
ANION GAP SERPL CALCULATED.3IONS-SCNC: 9 MMOL/L (ref 3–14)
AST SERPL W P-5'-P-CCNC: 23 U/L (ref 0–45)
BASOPHILS # BLD AUTO: 0.1 10E9/L (ref 0–0.2)
BASOPHILS NFR BLD AUTO: 0.5 %
BILIRUB DIRECT SERPL-MCNC: <0.1 MG/DL (ref 0–0.2)
BILIRUB SERPL-MCNC: 0.2 MG/DL (ref 0.2–1.3)
BUN SERPL-MCNC: 8 MG/DL (ref 7–30)
CALCIUM SERPL-MCNC: 8.7 MG/DL (ref 8.5–10.1)
CHLORIDE SERPL-SCNC: 107 MMOL/L (ref 94–109)
CO2 SERPL-SCNC: 22 MMOL/L (ref 20–32)
CREAT SERPL-MCNC: 0.78 MG/DL (ref 0.66–1.25)
D DIMER PPP FEU-MCNC: 1.7 UG/ML FEU (ref 0–0.5)
DIFFERENTIAL METHOD BLD: ABNORMAL
EOSINOPHIL NFR BLD AUTO: 1.8 %
ERYTHROCYTE [DISTWIDTH] IN BLOOD BY AUTOMATED COUNT: 13.6 % (ref 10–15)
GFR SERPL CREATININE-BSD FRML MDRD: >90 ML/MIN/{1.73_M2}
GLUCOSE SERPL-MCNC: 111 MG/DL (ref 70–99)
HCT VFR BLD AUTO: 43.8 % (ref 40–53)
HGB BLD-MCNC: 14.8 G/DL (ref 13.3–17.7)
IMM GRANULOCYTES # BLD: 0.1 10E9/L (ref 0–0.4)
IMM GRANULOCYTES NFR BLD: 0.9 %
INR PPP: 0.96 (ref 0.86–1.14)
LYMPHOCYTES # BLD AUTO: 4.4 10E9/L (ref 0.8–5.3)
LYMPHOCYTES NFR BLD AUTO: 35.6 %
MCH RBC QN AUTO: 30 PG (ref 26.5–33)
MCHC RBC AUTO-ENTMCNC: 33.8 G/DL (ref 31.5–36.5)
MCV RBC AUTO: 89 FL (ref 78–100)
MONOCYTES # BLD AUTO: 1 10E9/L (ref 0–1.3)
MONOCYTES NFR BLD AUTO: 8 %
NEUTROPHILS # BLD AUTO: 6.5 10E9/L (ref 1.6–8.3)
NEUTROPHILS NFR BLD AUTO: 53.2 %
NRBC # BLD AUTO: 0 10*3/UL
NRBC BLD AUTO-RTO: 0 /100
NT-PROBNP SERPL-MCNC: 5 PG/ML (ref 0–450)
PLATELET # BLD AUTO: 263 10E9/L (ref 150–450)
POTASSIUM SERPL-SCNC: 3.4 MMOL/L (ref 3.4–5.3)
PROT SERPL-MCNC: 7.3 G/DL (ref 6.8–8.8)
RBC # BLD AUTO: 4.94 10E12/L (ref 4.4–5.9)
SODIUM SERPL-SCNC: 138 MMOL/L (ref 133–144)
TROPONIN I SERPL-MCNC: <0.015 UG/L (ref 0–0.04)
TSH SERPL DL<=0.005 MIU/L-ACNC: 1.53 MU/L (ref 0.4–4)
WBC # BLD AUTO: 12.3 10E9/L (ref 4–11)

## 2020-02-11 PROCEDURE — 93010 ELECTROCARDIOGRAM REPORT: CPT | Mod: Z6 | Performed by: NURSE PRACTITIONER

## 2020-02-11 PROCEDURE — 85610 PROTHROMBIN TIME: CPT | Performed by: NURSE PRACTITIONER

## 2020-02-11 PROCEDURE — 25000128 H RX IP 250 OP 636: Performed by: RADIOLOGY

## 2020-02-11 PROCEDURE — 25000128 H RX IP 250 OP 636: Performed by: NURSE PRACTITIONER

## 2020-02-11 PROCEDURE — 96375 TX/PRO/DX INJ NEW DRUG ADDON: CPT | Performed by: NURSE PRACTITIONER

## 2020-02-11 PROCEDURE — 25000125 ZZHC RX 250: Performed by: NURSE PRACTITIONER

## 2020-02-11 PROCEDURE — 85379 FIBRIN DEGRADATION QUANT: CPT | Performed by: NURSE PRACTITIONER

## 2020-02-11 PROCEDURE — 71275 CT ANGIOGRAPHY CHEST: CPT

## 2020-02-11 PROCEDURE — 84484 ASSAY OF TROPONIN QUANT: CPT | Performed by: NURSE PRACTITIONER

## 2020-02-11 PROCEDURE — 80076 HEPATIC FUNCTION PANEL: CPT | Performed by: NURSE PRACTITIONER

## 2020-02-11 PROCEDURE — 96374 THER/PROPH/DIAG INJ IV PUSH: CPT | Mod: 59 | Performed by: NURSE PRACTITIONER

## 2020-02-11 PROCEDURE — 84443 ASSAY THYROID STIM HORMONE: CPT | Performed by: NURSE PRACTITIONER

## 2020-02-11 PROCEDURE — 93005 ELECTROCARDIOGRAM TRACING: CPT | Performed by: NURSE PRACTITIONER

## 2020-02-11 PROCEDURE — 80048 BASIC METABOLIC PNL TOTAL CA: CPT | Performed by: NURSE PRACTITIONER

## 2020-02-11 PROCEDURE — 25000132 ZZH RX MED GY IP 250 OP 250 PS 637: Performed by: NURSE PRACTITIONER

## 2020-02-11 PROCEDURE — 25000125 ZZHC RX 250: Performed by: RADIOLOGY

## 2020-02-11 PROCEDURE — 40000275 ZZH STATISTIC RCP TIME EA 10 MIN

## 2020-02-11 PROCEDURE — 85025 COMPLETE CBC W/AUTO DIFF WBC: CPT | Performed by: NURSE PRACTITIONER

## 2020-02-11 PROCEDURE — 83880 ASSAY OF NATRIURETIC PEPTIDE: CPT | Performed by: NURSE PRACTITIONER

## 2020-02-11 PROCEDURE — 99285 EMERGENCY DEPT VISIT HI MDM: CPT | Mod: 25 | Performed by: NURSE PRACTITIONER

## 2020-02-11 PROCEDURE — 94640 AIRWAY INHALATION TREATMENT: CPT

## 2020-02-11 RX ORDER — LORAZEPAM 2 MG/ML
1 INJECTION INTRAMUSCULAR ONCE
Status: COMPLETED | OUTPATIENT
Start: 2020-02-11 | End: 2020-02-11

## 2020-02-11 RX ORDER — INHALER, ASSIST DEVICES
SPACER (EA) MISCELLANEOUS
Qty: 1 EACH | Refills: 0 | Status: SHIPPED | OUTPATIENT
Start: 2020-02-11 | End: 2021-12-27

## 2020-02-11 RX ORDER — IPRATROPIUM BROMIDE AND ALBUTEROL SULFATE 2.5; .5 MG/3ML; MG/3ML
3 SOLUTION RESPIRATORY (INHALATION) ONCE
Status: COMPLETED | OUTPATIENT
Start: 2020-02-11 | End: 2020-02-11

## 2020-02-11 RX ORDER — DEXAMETHASONE SODIUM PHOSPHATE 10 MG/ML
10 INJECTION, SOLUTION INTRAMUSCULAR; INTRAVENOUS ONCE
Status: COMPLETED | OUTPATIENT
Start: 2020-02-11 | End: 2020-02-11

## 2020-02-11 RX ORDER — ALBUTEROL SULFATE 90 UG/1
2 AEROSOL, METERED RESPIRATORY (INHALATION) EVERY 4 HOURS PRN
Qty: 1 INHALER | Refills: 0 | Status: SHIPPED | OUTPATIENT
Start: 2020-02-11 | End: 2020-07-20

## 2020-02-11 RX ORDER — IOPAMIDOL 755 MG/ML
100 INJECTION, SOLUTION INTRAVASCULAR ONCE
Status: COMPLETED | OUTPATIENT
Start: 2020-02-11 | End: 2020-02-11

## 2020-02-11 RX ORDER — ASPIRIN 81 MG/1
324 TABLET, CHEWABLE ORAL ONCE
Status: COMPLETED | OUTPATIENT
Start: 2020-02-11 | End: 2020-02-11

## 2020-02-11 RX ADMIN — DEXAMETHASONE SODIUM PHOSPHATE 10 MG: 10 INJECTION, SOLUTION INTRAMUSCULAR; INTRAVENOUS at 15:10

## 2020-02-11 RX ADMIN — SODIUM CHLORIDE 70 ML: 9 INJECTION, SOLUTION INTRAVENOUS at 13:45

## 2020-02-11 RX ADMIN — ASPIRIN 81 MG 324 MG: 81 TABLET ORAL at 12:43

## 2020-02-11 RX ADMIN — LORAZEPAM 1 MG: 2 INJECTION INTRAMUSCULAR; INTRAVENOUS at 12:40

## 2020-02-11 RX ADMIN — IOPAMIDOL 85 ML: 755 INJECTION, SOLUTION INTRAVENOUS at 13:45

## 2020-02-11 RX ADMIN — IPRATROPIUM BROMIDE AND ALBUTEROL SULFATE 3 ML: .5; 3 SOLUTION RESPIRATORY (INHALATION) at 14:58

## 2020-02-11 NOTE — TELEPHONE ENCOUNTER
Given his symptoms, and the other things that it could potentially be, I recommend that he be seen.    If it is topamax, I would think that those symptoms would be wearing off, but I don't think we can ignore that there are possible other causes.    Bindu Motley MD  [facility]

## 2020-02-11 NOTE — ED AVS SNAPSHOT
[facility]  [address] [place] [address]  Phone:  [phone]  Fax:  [phone]                                    David Wilcox   MRN: [id number]    Department:  [facility]   Date of Visit:  2/11/2020           After Visit Summary Signature Page    I have received my discharge instructions, and my questions have been answered. I have discussed any challenges I see with this plan with the nurse or doctor.    ..........................................................................................................................................  Patient/Patient Representative Signature      ..........................................................................................................................................  Patient Representative Print Name and Relationship to Patient    ..................................................               ................................................  Date                                   Time    ..........................................................................................................................................  Reviewed by Signature/Title    ...................................................              ..............................................  Date                                               Time          22EPIC Rev 08/18

## 2020-02-11 NOTE — ED PROVIDER NOTES
"  History   No chief complaint on file.    The history is provided by the patient.     David Wilcox is a 49 year old male who presents to the emergency department for shortness of breath. Patient reports having shortness of breath for a couple days. He states his symptoms are worsening. He reports having chest tightness, \"it feels like a blood pressure cuff is around hs throat and chest. He denies any swelling/trouble breathing. He states this has never happened before. Patient does have a history of anxiety so he did take a Lorazepam last night which did help, although he does not feel these symptoms are from anxiety. He denies any cough or cold symptoms. He denies any recent travel or prolonged sitting. Patient denies any heart problems. Patient did quit smoking 1/1/20. He is not taking any Aspirin. He does not have a history of HTN of high cholesterol. He does have heart disease in his family with his grandparents.    Allergies:  Allergies   Allergen Reactions     Aspartame      A.k.a Nutrasweet.  Can take Truvia (stevia plant extract).     Food      Artifical sweetners-vomiting     Saccharin      Vomiting, diarrhea     Sucralose      A.k.a. Splenda       Problem List:    Patient Active Problem List    Diagnosis Date Noted     Sedative, hypnotic or anxiolytic dependence (H) 06/03/2019     Priority: Medium     Obesity (BMI 35.0-39.9) with comorbidity (H) 01/23/2019     Priority: Medium     Incontinence of feces, unspecified fecal incontinence type 05/07/2018     Priority: Medium     after surgery, occasional.         Erectile dysfunction, unspecified erectile dysfunction type 09/27/2017     Priority: Medium     Chronic pain syndrome 11/23/2016     Priority: Medium     Patient is followed by Regan Llamas MD for ongoing prescription of pain medication.  All refills should be approved by this provider, or covering partner.    Medication(s): see other entry .   Maximum quantity per month: see other entry  Clinic " "visit frequency required: Q 3 months     Controlled substance agreement:  Encounter-Level CSA - 9/8/16:               Controlled Substance Agreement - Scan on 9/29/2016  3:40 PM : CONTROLLED SUBSTANCE AGREEMENT (below)          Encounter-Level CSA - 1/14/16:               Controlled Substance Agreement - Scan on 1/20/2016 12:40 PM : CONTROLLED SUBSTANCE AGREEMENT 01/14/2016 (below)            Pain Clinic evaluation in the past: Yes ongoing     DIRE Total Score(s):  No flowsheet data found.    Last [facility] website verification:  none   [email or web address]       Continuous opioid dependence (H) 01/14/2016     Priority: Medium     Right foot drop 08/26/2015     Priority: Medium     Nerve damage from low back.  Has AFO       Anxiety 09/23/2013     Priority: Medium     See depression entry for more details.         CARDIOVASCULAR SCREENING; LDL GOAL LESS THAN 160 06/27/2011     Priority: Medium     Insomnia 11/11/2008     Priority: Medium     Chronic.  Ambien dependent for sleep.  6 months given 1/27/20.  Important for him to get his sleep given apparent hx of bipolar.  11/23/16, trying a switch to sonata.  ambien wasn't working great anymore.  Update: didn't work, back to ambien.  Thinks he failed lunesta as well.         Recurrent major depression in complete remission (H) 05/21/2008     Priority: Medium     Serious questions about bipolar.    On cymbalta with nightly ambien, chronic use. .  \"if I don't get my ambien, I might not sleep for days, then I'll get manic.\"  Been very stable with these two meds.  Failed many other ssris.     30 ativan for rare use \"panic attacks.\"  30 tabs given 1/18/18.  Should last \"months\", he agrees.      Suicide attempt in past, pills.  [facility] [place] [facility] [place] that's apparently where bipolar label was brought up.      No current psychiatric involvement.  I have strongly encouraged him on that, but he hasn't been interested.         Chronic low back pain 05/21/2008     " Priority: Medium     Tree fell on him in past.  S/p lumbar fusion L4-5 and L5-S1.  Chronic back/leg pain on R.  No reflex and numbness on R foot.     Was on rare (once a month) methadone use when I met him due to concerns over problems in past with short acting opiods.    Pain flared 9/10, no longer on methadone.      Pain clinic in past.      Has some malfunction of hardware in back, had revision nov 2010, L5-S1 fusion revised.         Update: cervical/thoracic/lumbar MRI updated at 6/14 in another system.  All unchanged.  Will scan  Update : had nerve stimulator placed in back, got infected, inpatient for 2+ weeks to treat for meningitis, recovered close to baseline.  This was spring 2015.  Now on 10/325 percocet, 40/month.  Gave 3 month supply 4/3/2017.  Pain clinic doing oxycontin.         GERD (gastroesophageal reflux disease) 05/21/2008     Priority: Medium        Past Medical History:    Past Medical History:   Diagnosis Date     Acute bronchitis      Acute pancreatitis      Acute sinusitis, unspecified      ADHD      Anxiety depression      Attention deficit disorder with hyperactivity(314.01)      Bipolar disorder, unspecified (H)      Cervicalgia      Dysthymic disorder      Esophageal reflux      Fixation of spine with fusion      Hyperlipidemia      Low back pain Jan 21.2001     Lumbago      Meningitis      Narcotic abuse (H)      Neck pain      Other general counseling and advice for contraceptive management      Pain in limb      Personal history of other diseases of digestive system      Sciatica      Spasm of muscle      Tobacco use disorder        Past Surgical History:    Past Surgical History:   Procedure Laterality Date     FUSION LUMBAR ANTERIOR THREE+ LEVELS      L3-S1 fused (3 surgery 2002, 2010, 2014)       Family History:    Family History   Problem Relation Age of Onset     Hypertension Maternal Grandmother      SOPHIAA.JUVE. Paternal Grandmother      Neurologic Disorder Paternal Grandfather       Asthma Son        Social History:  Marital Status:   [2]  Social History     Tobacco Use     Smoking status: Former Smoker     Packs/day: 0.50     Types: Cigarettes, Cigars     Smokeless tobacco: Never Used     Tobacco comment: <1/2 pack per day   Substance Use Topics     Alcohol use: Yes     Alcohol/week: 0.0 standard drinks     Comment: rare     Drug use: No        Medications:    DULoxetine (CYMBALTA) 60 MG capsule  gabapentin (NEURONTIN) 600 MG tablet  LORazepam (ATIVAN) 0.5 MG tablet  methocarbamol (ROBAXIN) 500 MG tablet  mirtazapine (REMERON) 15 MG tablet  naloxone (NARCAN) nasal spray  nortriptyline (PAMELOR) 10 MG capsule  omeprazole (PRILOSEC) 20 MG DR capsule  oxyCODONE (OXYCONTIN) 10 MG 12 hr tablet  oxyCODONE-acetaminophen (PERCOCET) 7.5-325 MG per tablet  predniSONE (DELTASONE) 20 MG tablet  topiramate (TOPAMAX) 25 MG tablet  topiramate (TOPAMAX) 25 MG tablet  TYLENOL EXTRA STRENGTH 500 MG OR TABS  zolpidem (AMBIEN) 10 MG tablet          Review of Systems   All other systems reviewed and are negative.      Physical Exam          Physical Exam  Vitals signs and nursing note reviewed.   Constitutional:       Appearance: He is well-developed. He is diaphoretic.      Comments: Anxious appearing.   HENT:      Head: Normocephalic.      Nose: Nose normal.   Eyes:      General: No scleral icterus.     Conjunctiva/sclera: Conjunctivae normal.   Neck:      Musculoskeletal: Normal range of motion and neck supple.   Cardiovascular:      Rate and Rhythm: Normal rate and regular rhythm.      Heart sounds: Normal heart sounds.   Pulmonary:      Effort: Pulmonary effort is normal.      Breath sounds: Normal breath sounds.   Abdominal:      Palpations: Abdomen is soft.      Tenderness: There is no abdominal tenderness.   Musculoskeletal: Normal range of motion.   Skin:     General: Skin is warm.      Coloration: Skin is pale.   Neurological:      Mental Status: He is alert.      Motor: No abnormal muscle tone.    Psychiatric:         Behavior: Behavior normal.         ED Course       Procedures            EKG Interpretation:      Interpreted by CATARINO Scott CNP  Time reviewed: 1225  Symptoms at time of EKG: chest tightness radiating to neck and shortness of breath   Rhythm: normal sinus   Rate: normal  Axis: normal  Ectopy: none  Conduction: normal  ST Segments/ T Waves: No ST-T wave changes  Q Waves: none  Comparison to prior: No old EKG available  Clinical Impression: normal EKG      Critical Care time: 10 minutes      Results for orders placed or performed during the hospital encounter of 02/11/20 (from the past 24 hour(s))   CBC with platelets differential   Result Value Ref Range    WBC 12.3 (H) 4.0 - 11.0 10e9/L    RBC Count 4.94 4.4 - 5.9 10e12/L    Hemoglobin 14.8 13.3 - 17.7 g/dL    Hematocrit 43.8 40.0 - 53.0 %    MCV 89 78 - 100 fl    MCH 30.0 26.5 - 33.0 pg    MCHC 33.8 31.5 - 36.5 g/dL    RDW 13.6 10.0 - 15.0 %    Platelet Count 263 150 - 450 10e9/L    Diff Method Automated Method     % Neutrophils 53.2 %    % Lymphocytes 35.6 %    % Monocytes 8.0 %    % Eosinophils 1.8 %    % Basophils 0.5 %    % Immature Granulocytes 0.9 %    Nucleated RBCs 0 0 /100    Absolute Neutrophil 6.5 1.6 - 8.3 10e9/L    Absolute Lymphocytes 4.4 0.8 - 5.3 10e9/L    Absolute Monocytes 1.0 0.0 - 1.3 10e9/L    Absolute Basophils 0.1 0.0 - 0.2 10e9/L    Abs Immature Granulocytes 0.1 0 - 0.4 10e9/L    Absolute Nucleated RBC 0.0    Troponin I   Result Value Ref Range    Troponin I ES <0.015 0.000 - 0.045 ug/L   Basic metabolic panel   Result Value Ref Range    Sodium 138 133 - 144 mmol/L    Potassium 3.4 3.4 - 5.3 mmol/L    Chloride 107 94 - 109 mmol/L    Carbon Dioxide 22 20 - 32 mmol/L    Anion Gap 9 3 - 14 mmol/L    Glucose 111 (H) 70 - 99 mg/dL    Urea Nitrogen 8 7 - 30 mg/dL    Creatinine 0.78 0.66 - 1.25 mg/dL    GFR Estimate >90 >60 mL/min/[1.73_m2]    GFR Estimate If Black >90 >60 mL/min/[1.73_m2]    Calcium 8.7 8.5 -  10.1 mg/dL   D dimer quantitative   Result Value Ref Range    D Dimer 1.7 (H) 0.0 - 0.50 ug/ml FEU   TSH with free T4 reflex   Result Value Ref Range    TSH 1.53 0.40 - 4.00 mU/L   INR   Result Value Ref Range    INR 0.96 0.86 - 1.14   Hepatic panel   Result Value Ref Range    Bilirubin Direct <0.1 0.0 - 0.2 mg/dL    Bilirubin Total 0.2 0.2 - 1.3 mg/dL    Albumin 3.8 3.4 - 5.0 g/dL    Protein Total 7.3 6.8 - 8.8 g/dL    Alkaline Phosphatase 68 40 - 150 U/L    ALT 59 0 - 70 U/L    AST 23 0 - 45 U/L   Nt probnp inpatient (BNP)   Result Value Ref Range    N-Terminal Pro BNP Inpatient 5 0 - 450 pg/mL   CT Chest Pulmonary Embolism w Contrast    Narrative    CT CHEST PULMONARY EMBOLISM WITH CONTRAST  2/11/2020 1:58 PM    HISTORY:  Shortness of breath. PE suspected, intermediate probability,  positive D-dimer.    TECHNIQUE: Scans obtained from the apices through the diaphragm with  IV contrast. 85 mL Isovue 370 injected. Radiation dose for this scan  was reduced using automated exposure control, adjustment of the mA  and/or kV according to patient size, or iterative reconstruction  technique.    COMPARISON:  None.    FINDINGS:  Groundglass densities in the lungs likely represent mild  atelectasis. Lungs are otherwise grossly clear without infiltrate,  effusion, pneumothorax, nodule, or mass.    The heart is grossly normal in appearance. There is mild ectasia of  the proximal ascending thoracic aorta which measures up to 3.9 cm in  diameter. There is minimal nonaneurysmal atherosclerosis. Thoracic  aorta is otherwise unremarkable. There is diffuse fatty infiltration  of the liver. _______ focal fatty sparing is seen around the  gallbladder fossa. Tiny hypoattenuating lesion in the anterolateral  right lobe of the liver (segment VIII) (image 119 series 4) measures  up to 1.0 cm and likely represents a cyst but cannot be completely  characterized on this study. Subcentimeter hypodense lesion in segment  III of the liver  (image 142 series 4) is too small to characterize.    No aggressive osseous lesions or acute osseous fractures are  identified. Visualized portions of the thyroid are unremarkable.    Visualized portions of the upper abdominal contents are otherwise  grossly unremarkable.      The central to third order pulmonary arteries demonstrate no filling  defects to suggest pulmonary artery embolism.      Impression    IMPRESSION:   1.  No evidence for pulmonary artery embolism.  2.  Mild ectasia of the proximal ascending thoracic aorta. Mild  atherosclerosis.  3. Diffuse fatty infiltration of the liver with mild focal fatty  sparing adjacent to the gallbladder fossa.  4. Hypoattenuating lesions in the right and left lobes of the liver  likely represent cysts. The one in the left lobe of the liver is too  small to characterize. The one on the right is suboptimally seen but  still likely represents a cyst.  5. Evaluation of the solid organs of the abdomen and pelvis is limited  due to the early phase of contrast administration.       Medications   aspirin (ASA) chewable tablet 324 mg (324 mg Oral Given 2/11/20 1243)   LORazepam (ATIVAN) injection 1 mg (1 mg Intravenous Given 2/11/20 1240)   sodium chloride (PF) 0.9% PF flush 3 mL (3 mLs Intravenous Given 2/11/20 1344)   iopamidol (ISOVUE-370) solution 100 mL (85 mLs Intravenous Given by Other Clinician 2/11/20 1345)   sodium chloride 0.9 % bag 500mL for CT scan flush use (70 mLs As instructed Given by Other Clinician 2/11/20 1345)   dexamethasone PF (DECADRON) injection 10 mg (10 mg Intravenous Given 2/11/20 1510)   ipratropium - albuterol 0.5 mg/2.5 mg/3 mL (DUONEB) neb solution 3 mL (3 mLs Nebulization Given 2/11/20 5418)       Assessments & Plan (with Medical Decision Making)  David is an otherwise healthy 49-year-old male who presents to the emergency room today with a 2-day history of shortness of breath and chest tightness radiating to his neck.  Please refer to HPI and  focused exam.  Patient arrives here borderline hypertensive, he is otherwise hemodynamically stable and afebrile.  Patient recently just quit smoking, he does have a family history of heart disease and certainly does have risk factors, however given some improvement with lorazepam last evening this may be anxiety related.  Pulmonary embolism is on the differential given his shortness of breath.  EKG was obtained which is negative for any acute ischemic findings.  Patient was given IV Ativan here as well as 324 mg of baby aspirin.  Patient reports this did help his symptoms some.  Blood work today reveals an undetectable troponin, INR of 0.96, CMP that is unremarkable, BNP of 5, CBC returns with a mild leukocytosis of 12.3.  TSH is unremarkable.  D-dimer is elevated at 1.7.  As a result of which I did obtain a CT PE which returns negative for PE.  Patient continued to have mild shortness of breath here, I did have respiratory therapy give him a DuoNeb to see if this helped his symptoms and patient reports that he does feel much better and even the neck tightness has resolved. This may be viral in etiology or he may have early smoking-related lung issues.  Patient was given dose of Decadron here and I will send him home with an albuterol inhaler.  Patient never demonstrated any wheezing, he was never hypoxic.  It is still likely that anxiety may be playing a role in his symptoms.  I would like patient to follow-up with his primary care provider in the next week to reevaluate his symptoms.  Reasons to return to the emergency room were discussed in detail which patient is agreeable to and was discharged in stable condition.  Patient was given inhaler spacer use instructions per respiratory therapy prior to discharge.     I have reviewed the nursing notes.    I have reviewed the findings, diagnosis, plan and need for follow up with the patient.    Discharge Medication List as of 2/11/2020  3:30 PM      START taking these  medications    Details   albuterol (PROAIR HFA) 108 (90 Base) MCG/ACT inhaler Inhale 2 puffs into the lungs every 4 hours as needed for shortness of breath / dyspnea, Disp-1 Inhaler, R-0, E-Prescribe      spacer (OPTICHAMBER MAT) holding chamber Use with Inhaler, Disp-1 each, R-0, E-Prescribe             Final diagnoses:   Shortness of breath     This document serves as a record of services personally performed by Suzette Flores CNP. It was created on their behalf by Mary Rivera, a trained medical scribe. The creation of this record is based on the provider's personal observations and the statements of the patient. This document has been checked and approved by the attending provider.    Note: Chart documentation done in part with Dragon Voice Recognition software. Although reviewed after completion, some word and grammatical errors may remain.    2/11/2020   [facility] EMERGENCY DEPARTMENT     Azalia Flores APRN CNP  02/11/20 1977

## 2020-02-11 NOTE — PROGRESS NOTES
Pt was instructed on how to use MDI with spacer.  He was educated on how to use and clean it properly.  Questions were answered.  No concerns at this time.    Geoff Valverde RT on 2/11/2020 at 3:35 PM

## 2020-02-11 NOTE — TELEPHONE ENCOUNTER
Reviewed symptoms with Dr Motley. Spoke to patient and told him the recommendation is that he be evaluated in the ED. Asked that he provide an update and he agreed.     JEREMIAH PeacockN, RN  Care Coordinator  [facility]

## 2020-02-11 NOTE — TELEPHONE ENCOUNTER
Spoke to patient he quit taking the Topamax yesterday.     He reports he is having a heaviness in his chest. No hand, arm, shoulder, neck or chest pain. He is having a tightness in his throat and minor difficulty breathing (slight shortness of breath) No swelling of the face, lips or mouth.     He is going to take some Benedryl just incase it is an allergic response. He is to call 911 if he has worsening of symptoms or pain listed above.     Routing to Dr Motley to review.     JEREMIAH PeacockN, RN  Care Coordinator  [facility]

## 2020-02-16 ENCOUNTER — HEALTH MAINTENANCE LETTER (OUTPATIENT)
Age: 50
End: 2020-02-16

## 2020-02-17 ENCOUNTER — TELEPHONE (OUTPATIENT)
Dept: PALLIATIVE MEDICINE | Facility: CLINIC | Age: 50
End: 2020-02-17

## 2020-02-17 DIAGNOSIS — M96.1 FAILED BACK SURGICAL SYNDROME: ICD-10-CM

## 2020-02-17 DIAGNOSIS — Z51.81 ENCOUNTER FOR MONITORING OPIOID MAINTENANCE THERAPY: Primary | ICD-10-CM

## 2020-02-17 DIAGNOSIS — Z79.891 ENCOUNTER FOR MONITORING OPIOID MAINTENANCE THERAPY: Primary | ICD-10-CM

## 2020-02-17 NOTE — LETTER
[facility]  02/19/20    Patient: David Wilcox  YOB: 1970  Medical Record Number: [id number]                                                                  Opioid / Opioid Plus Controlled Substance Agreement    I understand that my care provider has prescribed an opioid (narcotic) controlled substance to help manage my condition(s). I am taking this medicine to help me function or work. I know this is strong medicine, and that it can cause serious side effects. Opioid medicine can be sedating, addicting and may cause a dependency on the drug. They can affect my ability to drive or think, and cause depression. They need to be taken exactly as prescribed. Combining opioids with certain medicines or chemicals (such as cocaine, sedatives and tranquilizers, sleeping pills, meth) can be dangerous or even fatal. Also, if I stop opioids suddenly, I may have severe withdrawal symptoms. Last, I understand that opioids do not work for all types of pain nor for all patients. If not helpful, I may be asked to stop them.        The risks, benefits, and side effects of these medicine(s) were explained to me. I agree that:    1. I will take part in other treatments as advised by my care team. This may be psychiatry or counseling, physical therapy, behavioral therapy, group treatment or a referral to a pain clinic. I will reduce or stop my medicine when my care team tells me to do so.  2. I will take my medicines as prescribed. I will not change the dose or schedule unless my care team tells me to. There will be no refills if I  run out early.   I may be contactedwithout warning and asked to complete a urine drug test or pill count at any time.   3. I will keep all my appointments, and understand this is part of the monitoring of opioids. My care team may require an office visit for EVERY opioid/controlled substance refill. If I miss appointments or don t follow instructions, my care team  may stop my medicine.  4. I will not ask other providers to prescribe controlled substances, and I will not accept controlled substances from other people. If I need another prescribed controlled substance for a new reason, I will tell my care team within 1 business day.  5. I will use one pharmacy to fill all of my controlled substance prescriptions, and it is up to me to make sure that I do not run out of my medicines on weekends or holidays. If my care team is willing to refill my opioid prescription without a visit, I must request refills only during office hours, refills may take up to 3 days to process, and it may take up to 5 to 7 days for my medicine to be mailed and ready at my pharmacy. Prescriptions will not be mailed anywhere except my pharmacy.        [id number]  Rev 12/18         Registration to scan to EHR                             Page 1 of 2               Controlled Substance Agreement Opioid        [facility]  02/19/20  Patient: David Wilcox  YOB: 1970  Medical Record Number: [id number]                                                                  6. I am responsible for my prescriptions. If the medicine/prescription is lost or stolen, it will not be replaced. I also agree not to share controlled substance medicines with anyone.  7. I agree to not use ANY illegal or recreational drugs. This includes marijuana, cocaine, bath salts or other drugs. I agree not to use alcohol unless my care team says I may.          I agree to give urine samples whenever asked. If I don t give a urine sample, the care team may stop my medicine.    8. If I enroll in the [place] Medical Marijuana program, I will tell my care team. I will also sign an agreement to share my medical records with my care team.   9. I will bring in my list of medicines (or my medicine bottles) each time I come to the clinic.   10. I will tell my care team right away if I become pregnant or have a new  medical problem treated outside of my regular clinic.  11. I understand that this medicine can affect my thinking and judgment. It may be unsafe for me to drive, use machinery and do dangerous tasks. I will not do any of these things until I know how the medicine affects me. If my dose changes, I will wait to see how it affects me. I will contact my care team if I have concerns about medicine side effects.    I understand that if I do not follow any of the conditions above, my prescriptions or treatment may be stopped.      I agree that my provider, clinic care team, and pharmacy may work with any city, state or federal law enforcement agency that investigates the misuse, sale, or other diversion of my controlled medicine. I will allow my provider to discuss my care with or share a copy of this agreement with any other treating provider, pharmacy or emergency room where I receive care. I agree to give up (waive) any right of privacy or confidentiality with respect to these consents.     I have read this agreement and have asked questions about anything I did not understand.      ________________________________________________________________________  Patient signature - Date/Time -  David Wilcox                                      ________________________________________________________________________  Witness signature                                                            ________________________________________________________________________  Provider signature - Carolann Motley MD      [id number]  Rev 12/18         Registration to scan to EHR                         Page 2 of 2                   Controlled Substance Agreement Opioid           Page 1 of 2  Opioid Pain Medicines (also known as Narcotics)  What You Need to Know    What are opioids?   Opioids are pain medicines that must be prescribed by a doctor.  They are also known as narcotics.    Examples are:     morphine (MS Contin,  Mimi)    oxycodone (Oxycontin)    oxycodone and acetaminophen (Percocet)    hydrocodone and acetaminophen (Vicodin, Norco)     fentanyl patch (Duragesic)     hydromorphone (Dilaudid)     methadone     What do opioids do well?   Opioids are best for short-term pain after a surgery or injury. They also work well for cancer pain. Unlike other pain medicines, they do not cause liver or kidney failure or ulcers. They may help some people with long-lasting (chronic) pain.     What do opioids NOT do well?   Opioids never get rid of pain entirely, and they do not work well for most patients with chronic pain. Opioids do not reduce swelling, one of the causes of pain. They also don t work well for nerve pain.                           For informational purposes only.  Not to replace the advice of your care provider.  Copyright [facility]. All right reserved. [employer] [id number] 02/18.      Page 2 of 2    Risks and side effects   Talk to your doctor before you start or decide to keep taking one of these medicines. Side effects include:    Lowering your breathing rate enough to cause death    Overdose, including death, especially if taking higher than prescribed doses    Long-term opioid use    Worse depression symptoms; less pleasure in things you usually enjoy    Feeling tired or sluggish    Slower thoughts or cloudy thinking    Being more sensitive to pain over time; pain is harder to control    Trouble sleeping or restless sleep    Changes in hormone levels (for example, less testosterone)    Changes in sex drive or ability to have sex    Constipation    Unsafe driving    Itching and sweating    Feeling dizzy    Nausea, vomiting and dry mouth    What else should I know about opioids?  When someone takes opioids for too long or too often, they become dependent. This means that if you stop or reduce the medicine too quickly, you will have withdrawal symptoms.    Dependence is not the same as addiction.  Addiction is when people keep using a substance that harms their body, their mind or their relations with others. If you have a history of drug or alcohol abuse, taking opioids can cause a relapse.    Over time, opioids don t work as well. Most people will need higher and higher doses. The higher the dose, the more serious the side effects. We don t know the long-term effects of opioids.      Prescribed opioids aren't the best way to manage chronic pain    Other ways to manage pain include:      Ibuprofen or acetaminophen.  You should always try this first.      Treat health problems that may be causing pain.      acupuncture or massage, deep breathing, meditation, visual imagery, aromatherapy.      Use heat or ice at the pain site      Physical therapy and exercise      Stop smoking      See a counselor or therapist                                                  People who have used opioids for a long time may have a lower quality of life, worse depression, higher levels of pain and more visits to doctors.    Never share your opioids with others. Be sure to store opioids in a secure place, locked if possible.Young children can easily swallow them and overdose.     You can overdose on opioids.  Signs of overdose include decrease or loss of consciousness, slowed breathing, trouble waking and blue lips.  If someone is worried about overdose, they should call 911.    If you are at risk for overdose, you may get naloxone (Narcan, a medicine that reverses the effects of opioids.  If you overdose, a friend or family member can give you Narcan while waiting for the ambulance.  They need to know the signs of overdose and how to give Narcan.    While you're taking opioids:    Don't use alcohol or street drugs. Taking them together can cause death.    Don't take any of these medicines unless your doctor says its okay.  Taking these with opioids can cause death.    Benzodiazepines (such as lorazepam         or  diazepam)    Muscle relaxers (such as cyclobenzaprine)    sleeping pills    other opioids    Safe disposal of opioids  Find your area drug take-back program, your pharmacy mail-back program, buy a special disposal bag (such as Deterra) from your pharmacy or flush them down the toilet.  Use the guidelines at:  www.fda.gov/drugs/resourcesforyou

## 2020-02-17 NOTE — TELEPHONE ENCOUNTER
Reason for call:  Medication   If this is a refill request, has the caller requested the refill from the pharmacy already? No  Will the patient be using a [facility]? Unknown  Name of the pharmacy and phone number for the current request: Unknown    Name of the medication requested: oxyCODONE (OXYCONTIN) 10 MG 12 hr tablet/ oxyCODONE-acetaminophen (PERCOCET) 7.5-325 MG per tablet    Other request: call when ready    Phone number to reach patient:  Home number on file [phone] (home)    Best Time:  n/a    Can we leave a detailed message on this number?  YES    Travel screening: Not Applicable        Cate CHACON    [facility] [place]

## 2020-02-17 NOTE — TELEPHONE ENCOUNTER
Received call from patient requesting refill(s) of  oxyCODONE (OXYCONTIN) 10 MG 12 hr tablet/ oxyCODONE-acetaminophen (PERCOCET) 7.5-325 MG per tablet     Last dispensed from pharmacy on 1/20/2020    Pt last seen by prescribing provider on 1/20/2020  Next appt scheduled for 4/20/2020     checked in the past 6 months? Yes If no, print current report and give to RN    Last urine drug screen date 1/23/2019  Current opioid agreement on file (completed within the last year) NO Date of opioid agreement: 1/23/2019    E-prescribe to    [facility] [id number] [place] [facility]  [address] [address]  Phone: [phone] Fax: [phone]    Will route to nursing pool for review and preparation of prescription(s).

## 2020-02-18 DIAGNOSIS — M54.16 RADICULOPATHY, LUMBAR REGION: ICD-10-CM

## 2020-02-18 RX ORDER — OXYCODONE HCL 10 MG/1
10 TABLET, FILM COATED, EXTENDED RELEASE ORAL EVERY 12 HOURS
Qty: 60 TABLET | Refills: 0 | Status: SHIPPED | OUTPATIENT
Start: 2020-02-18 | End: 2020-03-23

## 2020-02-18 RX ORDER — OXYCODONE AND ACETAMINOPHEN 7.5; 325 MG/1; MG/1
TABLET ORAL
Qty: 60 TABLET | Refills: 0 | Status: SHIPPED | OUTPATIENT
Start: 2020-02-18 | End: 2020-03-23

## 2020-02-18 NOTE — TELEPHONE ENCOUNTER
Medication refill information reviewed. UDS and OA are overdue (patient was seen on 1/20/20 and these were not completed. Will route to Dr Motley to review)    Due date for oxyCODONE (OXYCONTIN) 10 MG 12 hr tablet/ oxyCODONE-acetaminophen (PERCOCET) 7.5-325 MG per tablet  is 2/22/20     Prescriptions prepped for review.     Will route to provider.

## 2020-02-18 NOTE — TELEPHONE ENCOUNTER
"Requested Prescriptions   Pending Prescriptions Disp Refills     topiramate (TOPAMAX) 25 MG tablet [Pharmacy Med Name: TOPIRAMATE 25 MG TABLET] 120 tablet 1     Sig: TAKE 25MG (1 TAB) BY MOUTH AT BEDTIME. INCREASE BY 25MG (1 TAB) EVERY 7 DAYS, TO GOAL OF 50MG (2 TABS) TWICE DAILY       Anti-Seizure Meds Protocol  Failed - 2/18/2020  8:58 AM        Failed - Review Authorizing provider's last note.      Refer to last progress notes: confirm request is for original authorizing provider (cannot be through other providers).          Failed - Normal CBC on file in past 26 months     Recent Labs   Lab Test 02/11/20  1222   WBC 12.3*   RBC 4.94   HGB 14.8   HCT 43.8                    Passed - Recent (12 mo) or future (30 days) visit within the authorizing provider's specialty     Patient has had an office visit with the authorizing provider or a provider within the authorizing providers department within the previous 12 mos or has a future within next 30 days. See \"Patient Info\" tab in inbasket, or \"Choose Columns\" in Meds & Orders section of the refill encounter.              Passed - Normal ALT or AST on file in past 26 months     Recent Labs   Lab Test 02/11/20  1225   ALT 59     Recent Labs   Lab Test 02/11/20  1225   AST 23             Passed - Normal platelet count on file in past 26 months     Recent Labs   Lab Test 02/11/20  1222                  Passed - Medication is active on med list        Last Written Prescription Date:  1/28/20  Last Fill Quantity: 120,  # refills: 3   Last office visit: 1/27/2020 with prescribing provider:  Regan Llamas     Future Office Visit:   Next 5 appointments (look out 90 days)    Apr 20, 2020 11:00 AM CDT  Return Visit with Carolann Motley MD  [facility] [place] ([facility] [place]) [address]  [place] [address] [phone]           "

## 2020-02-18 NOTE — TELEPHONE ENCOUNTER
Please advise on further refills      P: will try a round of prednisone to break the HA.  He wanted me to resend the script for topamax and maybe try that as he would like to have better HA control.  On a lot of serotonin agents already, but could try imitrex for possibility of migraine he hasn't had diagnosed.  Reassured by CT scan and no neuro findings, but consider MRI if not improving with prednisone, time, topamax, and/or possibly a triptan trial.     He agrees, will f/u if not improving.

## 2020-02-18 NOTE — TELEPHONE ENCOUNTER
I would be fine with him signing by one sent in the mail, and doing the UDS in the next 1-2 days at [facility] closest to him.    I put in order, could we call and discuss that with him.    Script Eprescribed to pharmacy    Will send this to RN team to notify patient.    Signed Prescriptions:                        Disp   Refills    oxyCODONE (OXYCONTIN) 10 MG 12 hr tablet   60 tab*0        Sig: Take 1 tablet (10 mg) by mouth every 12 hours May           fill on 2/19/20 to start on 2/22/20  Authorizing Provider: YADIRA CARRILLO    oxyCODONE-acetaminophen (PERCOCET) 7.5-325*60 tab*0        Sig: Take 0.5-1 tablets po q 6 hrs PRN pain. Max of 2/day.           May fill on 2/19/20 to start on 2/22/20  Authorizing Provider: YADIRA CARRILLO MD  [facility]

## 2020-02-19 NOTE — TELEPHONE ENCOUNTER
Asked patient to come in and complete these annual requirements and he reports he will not be able to make it into the [facility] anytime soon but could go to [place].  Mailed letter with a self addressed and stamped envelope asked patient to sign and mail back. Also asked him to complete his lab at a location that is convenient and he will go to [place] to complete this lab.    JEREMIAH PeacockN, RN  Care Coordinator  [facility] [place]

## 2020-02-20 RX ORDER — TOPIRAMATE 50 MG/1
50 TABLET, FILM COATED ORAL 2 TIMES DAILY
Qty: 60 TABLET | Refills: 5 | Status: SHIPPED | OUTPATIENT
Start: 2020-02-20 | End: 2020-03-04

## 2020-02-25 ENCOUNTER — TELEPHONE (OUTPATIENT)
Dept: FAMILY MEDICINE | Facility: CLINIC | Age: 50
End: 2020-02-25

## 2020-02-25 DIAGNOSIS — M96.1 FAILED BACK SURGICAL SYNDROME: ICD-10-CM

## 2020-02-25 NOTE — TELEPHONE ENCOUNTER
[facility] is requesting 90 day supply on Pt's Topiramate 50 mg one twice daily.  This was already sent 2/20/20   [facility]

## 2020-02-25 NOTE — TELEPHONE ENCOUNTER
I am not going to send a 90 day supply when patient is still titrating.    wlil ask nursing to please call patient to determine if he wants to further titrate, as dose is low.    Once he is stable, we will choose a pill size that is appropriate, and 90 days upply.    Bindu Motley MD  [facility]

## 2020-02-27 RX ORDER — TOPIRAMATE 25 MG/1
50 TABLET, FILM COATED ORAL 2 TIMES DAILY
Status: CANCELLED | OUTPATIENT
Start: 2020-02-27

## 2020-02-27 NOTE — TELEPHONE ENCOUNTER
Thank you.  It wasn't clear when he called that what he was experiencing was due to topamax, and that wasn't mentioned in ER note.    It has been removed from list.    ER note 2/21/20:    Patient continued to have mild shortness of breath here, I did have respiratory therapy give him a DuoNeb to see if this helped his symptoms and patient reports that he does feel much better and even the neck tightness has resolved. This may be viral in etiology or he may have early smoking-related lung issues.  Patient was given dose of Decadron here and I will send him home with an albuterol inhaler.  Patient never demonstrated any wheezing, he was never hypoxic.  It is still likely that anxiety may be playing a role in his symptoms.

## 2020-02-27 NOTE — TELEPHONE ENCOUNTER
Pt stated that he doesn't want it refilled.      Pt stated that he had a reaction that scared him.      Pt does not want this medication refilled.    Torrey Rodrigues, RN  Care Coordinator   [facility] [place]

## 2020-03-04 DIAGNOSIS — M54.16 RADICULOPATHY, LUMBAR REGION: ICD-10-CM

## 2020-03-04 RX ORDER — TOPIRAMATE 50 MG/1
50 TABLET, FILM COATED ORAL 2 TIMES DAILY
Qty: 180 TABLET | Refills: 0 | Status: SHIPPED | OUTPATIENT
Start: 2020-03-04 | End: 2020-07-20

## 2020-03-23 DIAGNOSIS — M96.1 FAILED BACK SURGICAL SYNDROME: ICD-10-CM

## 2020-03-23 RX ORDER — OXYCODONE HCL 10 MG/1
10 TABLET, FILM COATED, EXTENDED RELEASE ORAL EVERY 12 HOURS
Qty: 60 TABLET | Refills: 0 | Status: SHIPPED | OUTPATIENT
Start: 2020-03-23 | End: 2020-04-20

## 2020-03-23 RX ORDER — OXYCODONE AND ACETAMINOPHEN 7.5; 325 MG/1; MG/1
TABLET ORAL
Qty: 60 TABLET | Refills: 0 | Status: SHIPPED | OUTPATIENT
Start: 2020-03-23 | End: 2020-04-20

## 2020-03-23 NOTE — TELEPHONE ENCOUNTER
LVM 3/20 @ 8:29pm      Reason for call:  Medication   If this is a refill request, has the caller requested the refill from the pharmacy already? No  Will the patient be using a [facility]? No  Name of the pharmacy and phone number for the current request: [facility] [id number] [place] [address]     Name of the medication requested: oxyCODONE (OXYCONTIN) 10 MG 12 hr tablet/ oxyCODONE-acetaminophen (PERCOCET) 7.5-325 MG per tablet     Phone number to reach patient:  Home number on file [phone] (home)    Can we leave a detailed message on this number?  YES    Travel screening: Not Applicable           Cate CHACON    [facility] [place]

## 2020-03-23 NOTE — TELEPHONE ENCOUNTER
Medication refill information reviewed.     Due date for oxyCODONE (OXYCONTIN) 10 MG 12 hr tablet and oxyCODONE-acetaminophen (PERCOCET) 7.5-325 MG per tablet is 3/23/20     Prescriptions prepped for review.     Will route to provider.

## 2020-03-23 NOTE — TELEPHONE ENCOUNTER
Unable to do UDS now due to COVID 19    Script Eprescribed to pharmacy    Will send this to MA team to notify patient.    Signed Prescriptions:                        Disp   Refills    oxyCODONE-acetaminophen (PERCOCET) 7.5-325*60 tab*0        Sig: Take 0.5-1 tablets po q 6 hrs PRN pain. Max of 2/day.           May fill on 3/21/20 to start on 3/23/20  Authorizing Provider: YADIRA CARRILLO    oxyCODONE (OXYCONTIN) 10 MG 12 hr tablet   60 tab*0        Sig: Take 1 tablet (10 mg) by mouth every 12 hours May           fill on 3/21/20 to start on 3/23/20  Authorizing Provider: YADIRA CARRILLO MD  [facility]

## 2020-03-23 NOTE — TELEPHONE ENCOUNTER
Received call from patient requesting refill(s) of oxyCODONE (OXYCONTIN) 10 MG 12 hr tablet and oxyCODONE-acetaminophen (PERCOCET) 7.5-325 MG per tablet      Last dispensed from pharmacy on 02/24/2020    Pt last seen by prescribing provider on 01/20/2020  Next appt scheduled for 04/20/2020     checked in the past 6 months? Yes If no, print current report and give to RN    Last urine drug screen date 2/18/2020 - Placed and is marked as future. Last completed 01/23/2019  Current opioid agreement on file (completed within the last year) Yes Date of opioid agreement: 02/19/2020    Processing (pick one and delete the others):     [facility]      Will route to nursing pool for review and preparation of prescription(s).     Anna Stephen on 3/23/2020 at 9:25 AM

## 2020-04-20 ENCOUNTER — VIRTUAL VISIT (OUTPATIENT)
Dept: PALLIATIVE MEDICINE | Facility: CLINIC | Age: 50
End: 2020-04-20
Payer: COMMERCIAL

## 2020-04-20 DIAGNOSIS — G44.209 TENSION-TYPE HEADACHE, NOT INTRACTABLE, UNSPECIFIED CHRONICITY PATTERN: ICD-10-CM

## 2020-04-20 DIAGNOSIS — M96.1 FAILED BACK SURGICAL SYNDROME: Primary | ICD-10-CM

## 2020-04-20 PROCEDURE — 99214 OFFICE O/P EST MOD 30 MIN: CPT | Mod: 95 | Performed by: PSYCHIATRY & NEUROLOGY

## 2020-04-20 RX ORDER — OXYCODONE AND ACETAMINOPHEN 7.5; 325 MG/1; MG/1
TABLET ORAL
Qty: 60 TABLET | Refills: 0 | Status: SHIPPED | OUTPATIENT
Start: 2020-04-20 | End: 2020-05-19

## 2020-04-20 RX ORDER — OXYCODONE HCL 10 MG/1
10 TABLET, FILM COATED, EXTENDED RELEASE ORAL EVERY 12 HOURS
Qty: 60 TABLET | Refills: 0 | Status: SHIPPED | OUTPATIENT
Start: 2020-04-20 | End: 2020-05-19

## 2020-04-20 ASSESSMENT — PAIN SCALES - GENERAL: PAINLEVEL: MODERATE PAIN (5)

## 2020-04-20 NOTE — PROGRESS NOTES
"David Wilcox is a 49 year old male who is being evaluated via a billable video visit.      The patient has been notified of following:     \"This video visit will be conducted via a call between you and your physician/provider. We have found that certain health care needs can be provided without the need for an in-person physical exam.  This service lets us provide the care you need with a video conversation.  If a prescription is necessary we can send it directly to your pharmacy.  If lab work is needed we can place an order for that and you can then stop by our lab to have the test done at a later time.    Video visits are billed at different rates depending on your insurance coverage.  Please reach out to your insurance provider with any questions.    If during the course of the call the physician/provider feels a video visit is not appropriate, you will not be charged for this service.\"    Patient has given verbal consent for Video visit? Yes    How would you like to obtain your AVS? Shaila    Patient would like the video invitation sent by: Text to cell phone: [phone]       Hamida Kelly CMA  [facility]  [place]        Video Start Time: 1057                              [facility]    Date of visit: 4/20/2020     Chief complaint:   Chief Complaint   Patient presents with     Pain     Telephone Visit due to COVID-19     Interval history:  David Wilcox was last seen by me on 1/20/20    Recommendations/plan at the last visit included:  1. Physical Therapy: continue exercises as recommended by PT.   2. Clinical Health Psychologist to address issues of relaxation, behavioral change, coping style, and other factors important to improvement.  Not at this time   3. Diagnostic Studies: none  4. Procedures: none  5. Medication Management:    1. Start topamax, uptitrate to dose of 50 mg BID. Patient is to call into clinic once he reaches this dose with an " "update on how he is doing.   2. We could consider bring down another medication pending how this works for him  6. Recommendations to PCP: David to follow up with Primary Care provider regarding elevated blood pressure.   7. Follow up: 3 months     Since his last visit, David Wilcox reports:  -he has been busy with floor repair  -if he does something \"big\", then he has problems for 2 days.  Less functional right leg.    -his headaches, was not able to take the topamax- had significant side effects   -wants to keep meds as they are.      Pain scores:  Pain intensity on average is 5 on a scale of 0-10.     Current pain treatments:   Percocet 7.5/325mg daily- twice a day - consistent use  Oxycontin 10mg q12 - helpful (no side effects)  Robaxin (methocarbamol) 1000mg at night and in the morning- the morning dose has been helping  Gabapentin 1200 mg TID, beneficial  Cymbalta 60mg daily- tried going to 90mg/day but felt worse- for pain and mood  Nortriptyline 20 mg at bedtime - continues to have dry mouth but wishes to continue  Ativan 0.5mg very sparingly (averages about once every 3-6 months for severe panic attacks.  Ambien at night (per PCP Dr. Llamas)  Mirtazapine 15mg at night    Previous medication treatments included:  Hydrocodone- not helpful    Methadone- taking after 1st surgery  Naprosyn- not helpful  Flexeril- after first back injury (1990s)  Lidocaine- not helpful  Acetaminophen (tylenol)   Gabapentin  Fentanyl patch-12 mcg/48 hours - feels it works too strongly with heat/sun exposure    Other treatments have included:  David Wilcox has been seen at a pain clinic in the past.  MAPS- injections  PT: yes  Acupuncture: no  TENs Unit: yes- out of use- somewhat helpful  Injections: none since last surgery- very  helpful    Side Effects: none    Medications:  Current Outpatient Medications   Medication Sig Dispense Refill     albuterol (PROAIR HFA) 108 (90 Base) MCG/ACT inhaler Inhale 2 puffs into the lungs every 4 " hours as needed for shortness of breath / dyspnea 1 Inhaler 0     DULoxetine (CYMBALTA) 60 MG capsule Take 1 capsule (60 mg) by mouth daily . 3 month supply 90 capsule 3     gabapentin (NEURONTIN) 600 MG tablet Take 2 tablets (1,200 mg) by mouth 3 times daily . 3 month supply 540 tablet 3     LORazepam (ATIVAN) 0.5 MG tablet One bid prn anxiety. Rare use 30 tablet 0     methocarbamol (ROBAXIN) 500 MG tablet Take 1-2 tablets (500-1,000 mg) by mouth 3 times daily as needed for muscle spasms . 3 month supply 360 tablet 1     mirtazapine (REMERON) 15 MG tablet Take 1 tablet (15 mg) by mouth At Bedtime 90 tablet 3     nortriptyline (PAMELOR) 10 MG capsule Take 2 capsules (20 mg) by mouth At Bedtime 180 capsule 3     omeprazole (PRILOSEC) 20 MG DR capsule Take 1 capsule (20 mg) by mouth daily 90 capsule 3     oxyCODONE (OXYCONTIN) 10 MG 12 hr tablet Take 1 tablet (10 mg) by mouth every 12 hours May fill on 4/20/20 to start on 4/22/20 60 tablet 0     oxyCODONE-acetaminophen (PERCOCET) 7.5-325 MG per tablet Take 0.5-1 tablets po q 6 hrs PRN pain. Max of 2/day.  May fill on 4/20/20 to start on 4/22/20 60 tablet 0     predniSONE (DELTASONE) 20 MG tablet 2 tablets daily for 3 days, then one tab for 4 days, then stop 10 tablet 0     spacer (OPTICHAMBER MAT) holding chamber Use with Inhaler 1 each 0     topiramate (TOPAMAX) 50 MG tablet Take 1 tablet (50 mg) by mouth 2 times daily 180 tablet 0     TYLENOL EXTRA STRENGTH 500 MG OR TABS 3 po prn for pain       zolpidem (AMBIEN) 10 MG tablet TAKE 1 TABLET BY MOUTH EVERY DAY AT BEDTIME AS NEEDED FOR SLEEP 30 tablet 5     naloxone (NARCAN) nasal spray Spray 1 spray (4 mg) into one nostril alternating nostrils as needed for opioid reversal every 2-3 minutes until assistance arrives (Patient not taking: Reported on 1/27/2020) 0.2 mL 0       Medical History: any changes in medical history since they were last seen? 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 Headaches- since stopping tobacco more daily  2. 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    3. Facet arthropathy likely contributing above the level of the future  4. Distant history of alcohol and drug abuse   5. Bipolar disorder- currently managed  6. H/o meningitis after SCS trial  7. Thoracic back pain, likely myofascial etiology.        Plan:  1. Physical Therapy: continue exercises as recommended by PT.   2. Clinical Health Psychologist to address issues of relaxation, behavioral change, coping style, and other factors important to improvement.  Not at this time   3. Diagnostic Studies: none  4. Procedures: none  5. Medication Management:    1. Refills sent in for opioids.  2. Could consider CGRP medications if become more migraine in nature  6. Recommendations to PCP: none  7. Follow up: 3 months      Video-Visit Details    Type of service:  Video Visit    Video End Time (time video stopped): 1117    Originating Location (pt. Location): Home    Distant Location (provider location):  [facility]     Mode of Communication:  Video Conference via [facility]      Bindu Motley MD  [facility]

## 2020-05-06 DIAGNOSIS — F33.42 RECURRENT MAJOR DEPRESSION IN COMPLETE REMISSION (H): ICD-10-CM

## 2020-05-06 RX ORDER — DULOXETIN HYDROCHLORIDE 60 MG/1
60 CAPSULE, DELAYED RELEASE ORAL DAILY
Qty: 90 CAPSULE | Refills: 3 | Status: SHIPPED | OUTPATIENT
Start: 2020-05-06 | End: 2021-01-25

## 2020-05-06 NOTE — TELEPHONE ENCOUNTER
Received fax request from [facility] pharmacy requesting refill(s) for DULoxetine (CYMBALTA) 60 MG capsule    Last refilled on 02/05/20    Pt last seen on 04/20/20-virtual visit  Next appt scheduled for 07/13/20    Will facilitate refill.

## 2020-05-06 NOTE — TELEPHONE ENCOUNTER
Signed Prescriptions:                        Disp   Refills    DULoxetine (CYMBALTA) 60 MG capsule        90 cap*3        Sig: Take 1 capsule (60 mg) by mouth daily . 3 month           supply  Authorizing Provider: YADIRA CARRILLO MD  [facility]

## 2020-05-19 DIAGNOSIS — M96.1 FAILED BACK SURGICAL SYNDROME: ICD-10-CM

## 2020-05-19 RX ORDER — OXYCODONE AND ACETAMINOPHEN 7.5; 325 MG/1; MG/1
TABLET ORAL
Qty: 60 TABLET | Refills: 0 | Status: SHIPPED | OUTPATIENT
Start: 2020-05-19 | End: 2020-05-19

## 2020-05-19 RX ORDER — OXYCODONE HCL 10 MG/1
10 TABLET, FILM COATED, EXTENDED RELEASE ORAL EVERY 12 HOURS
Qty: 60 TABLET | Refills: 0 | Status: SHIPPED | OUTPATIENT
Start: 2020-05-19 | End: 2020-06-17

## 2020-05-19 NOTE — TELEPHONE ENCOUNTER
Reason for call:  Medication   If this is a refill request, has the caller requested the refill from the pharmacy already? No  Will the patient be using a [facility]? No  Name of the pharmacy and phone number for the current request: [facility] [id number] [place] [facility], [address]  Name of the medication requested: oxyCODONE (OXYCONTIN) 10 MG 12 hr tablet && oxyCODONE-acetaminophen (PERCOCET) 7.5-325 MG per tablet       Other request:     Phone number to reach patient:  Cell number on file:    Telephone Information:   Mobile [phone]       Best Time:      Can we leave a detailed message on this number?  YES    Travel screening: Not Applicable

## 2020-05-19 NOTE — TELEPHONE ENCOUNTER
Received call from patient requesting refill(s) of oxyCODONE (OXYCONTIN) 10 MG 12 hr tablet and oxyCODONE-acetaminophen (PERCOCET) 7.5-325 MG per tablet    Last dispensed from pharmacy on 04/20/20 for both    Pt last seen by prescribing provider on 04/20/20- virtual visit  Next appt scheduled for 07/13/20     checked in the past 6 months? Yes If no, print current report and give to RN    Last urine drug screen date 02/18/20  Current opioid agreement on file (completed within the last year) Yes Date of opioid agreement: 02/19/20    Processing (pick one and delete the others):      [facility] [id number] [place] [facility] - [address]     Will route to nursing pool for review and preparation of prescription(s).

## 2020-05-19 NOTE — TELEPHONE ENCOUNTER
Pharmacy called and stated they do not have enough of the oxyCODONE-acetaminophen (PERCOCET) 7.5-325 MG per tablet, they said please send it to [facility] in [place]           Briseyda Cerna    [facility]

## 2020-05-19 NOTE — TELEPHONE ENCOUNTER
Prescription is not due to start until 5/22.     Called [facility] to see if they can order in a supply before Friday; often it only takes a day to get the medication in. Spoke to the pharmacist. He explained that they typically have a 3 day turn around to get CII meds in and, with the holiday, they will not get there until after the holiday. Pharmacist will cancel the Rx and we will send to [facility].    New Rx prepped.     JOSEE Grissom, RN-BC  Patient Care Supervisor  [facility] [place]

## 2020-05-19 NOTE — TELEPHONE ENCOUNTER
Script Eprescribed to pharmacy    Will send this to MA team to notify patient.    Signed Prescriptions:                        Disp   Refills    oxyCODONE (OXYCONTIN) 10 MG 12 hr tablet   60 tab*0        Sig: Take 1 tablet (10 mg) by mouth every 12 hours May           fill on 5/20/20 to start on 5/22/20  Authorizing Provider: YADIRA CARRILLO    oxyCODONE-acetaminophen (PERCOCET) 7.5-325*60 tab*0        Sig: Take 0.5-1 tablets po q 6 hrs PRN pain. Max of 2/day.           May fill on 5/20/20 to start on 5/22/20  Authorizing Provider: YADIRA CARRILLO MD  [facility]

## 2020-05-19 NOTE — TELEPHONE ENCOUNTER
Medication refill information reviewed.     Due date for oxycontin 10 mg and percocet 7.5 is 5/22/2020     Prescriptions prepped for review.     Will route to provider.     JEREMIAH GrissomN, RN-BC  Patient Care Supervisor  [facility] [place]

## 2020-05-20 RX ORDER — OXYCODONE AND ACETAMINOPHEN 7.5; 325 MG/1; MG/1
TABLET ORAL
Qty: 60 TABLET | Refills: 0 | Status: SHIPPED | OUTPATIENT
Start: 2020-05-20 | End: 2020-06-17

## 2020-05-20 NOTE — TELEPHONE ENCOUNTER
Script Eprescribed to pharmacy    Will send this to MA team to notify patient.    Signed Prescriptions:                        Disp   Refills    oxyCODONE (OXYCONTIN) 10 MG 12 hr tablet   60 tab*0        Sig: Take 1 tablet (10 mg) by mouth every 12 hours May           fill on 5/20/20 to start on 5/22/20  Authorizing Provider: YADIRA CARRILLO    oxyCODONE-acetaminophen (PERCOCET) 7.5-325*60 tab*0        Sig: Take 0.5-1 tablets po q 6 hrs PRN pain. Max of 2/day.           May fill on 5/20/20 to start on 5/22/20  Authorizing Provider: YADIRA CARRILLO MD  [facility]

## 2020-05-26 ENCOUNTER — TELEPHONE (OUTPATIENT)
Dept: PALLIATIVE MEDICINE | Facility: CLINIC | Age: 50
End: 2020-05-26

## 2020-05-26 DIAGNOSIS — G89.4 CHRONIC PAIN SYNDROME: ICD-10-CM

## 2020-05-26 RX ORDER — METHOCARBAMOL 500 MG/1
500-1000 TABLET, FILM COATED ORAL 3 TIMES DAILY PRN
Qty: 360 TABLET | Refills: 1 | Status: SHIPPED | OUTPATIENT
Start: 2020-05-26 | End: 2020-07-16

## 2020-05-26 NOTE — TELEPHONE ENCOUNTER
Prior Authorization Retail Medication Request    Medication/Dose: methocarbamol (ROBAXIN) 500 MG tablet   ICD code (if different than what is on RX):    Previously Tried and Failed:    Rationale:      Insurance Name:    Insurance ID:  [id number]  PCN: [id number]   BIN: [id number]  Group: [id number]      Pharmacy Information     [facility] [address] [place] [facility]  [address] [facility] [address]  Phone: [phone] Fax: [phone]

## 2020-05-26 NOTE — TELEPHONE ENCOUNTER
[facility] [id number] [place] [facility] [place] [place] Refill authorization requested    Drug: methocarbamol (ROBAXIN) 500 MG tablet   Qty: 360.0  Last filled 12/31/2019  Last seen: 7/13/2020  Next appointment 7/13/2020    Aidan Naidu MA

## 2020-05-26 NOTE — TELEPHONE ENCOUNTER
Signed Prescriptions:                        Disp   Refills    methocarbamol (ROBAXIN) 500 MG tablet      360 ta*1        Sig: Take 1-2 tablets (500-1,000 mg) by mouth 3 times           daily as needed for muscle spasms . 3 month           supply  Authorizing Provider: YADIRA CARRILLO MD  [facility]

## 2020-05-27 NOTE — TELEPHONE ENCOUNTER
Central Prior Authorization Team   Phone: [phone]    PA Initiation    Medication: methocarbamol (ROBAXIN) 500 MG tablet   Insurance Company: [employer] - Phone [phone] Fax [phone]  [id number] [place], [place] - [address] [place] [address]  Filling Pharmacy Phone: [phone]  Filling Pharmacy Fax: [phone]  Start Date: 5/27/2020

## 2020-05-27 NOTE — TELEPHONE ENCOUNTER
Prior Authorization Approval    Authorization Effective Date: 4/27/2020  Authorization Expiration Date: 5/27/2021  Medication: methocarbamol (ROBAXIN) 500 MG-APPROVED  Approved Dose/Quantity:    Reference #:     Insurance Company: [employer] - Phone [phone] Fax [phone]  Expected CoPay:       CoPay Card Available:      Foundation Assistance Needed:    Which Pharmacy is filling the prescription (Not needed for infusion/clinic administered): [id number] [place] [address]  Pharmacy Notified: Yes  Patient Notified: Yes  **Instructed pharmacy to notify patient when script is ready to /ship.**

## 2020-06-09 DIAGNOSIS — F51.01 PRIMARY INSOMNIA: ICD-10-CM

## 2020-06-09 DIAGNOSIS — F33.42 RECURRENT MAJOR DEPRESSION IN COMPLETE REMISSION (H): ICD-10-CM

## 2020-06-10 RX ORDER — MIRTAZAPINE 15 MG/1
15 TABLET, FILM COATED ORAL AT BEDTIME
Qty: 90 TABLET | Refills: 0 | Status: SHIPPED | OUTPATIENT
Start: 2020-06-10 | End: 2020-07-20

## 2020-06-16 DIAGNOSIS — M54.16 LUMBAR RADICULOPATHY: ICD-10-CM

## 2020-06-16 RX ORDER — NORTRIPTYLINE HCL 10 MG
20 CAPSULE ORAL AT BEDTIME
Qty: 180 CAPSULE | Refills: 0 | Status: SHIPPED | OUTPATIENT
Start: 2020-06-16 | End: 2020-07-20

## 2020-06-17 DIAGNOSIS — M96.1 FAILED BACK SURGICAL SYNDROME: ICD-10-CM

## 2020-06-17 RX ORDER — OXYCODONE HCL 10 MG/1
10 TABLET, FILM COATED, EXTENDED RELEASE ORAL EVERY 12 HOURS
Qty: 60 TABLET | Refills: 0 | Status: SHIPPED | OUTPATIENT
Start: 2020-06-17 | End: 2020-07-16

## 2020-06-17 RX ORDER — OXYCODONE AND ACETAMINOPHEN 7.5; 325 MG/1; MG/1
TABLET ORAL
Qty: 60 TABLET | Refills: 0 | Status: SHIPPED | OUTPATIENT
Start: 2020-06-17 | End: 2020-07-16

## 2020-06-17 NOTE — TELEPHONE ENCOUNTER
Reason for call:  Medication   If this is a refill request, has the caller requested the refill from the pharmacy already? No  Will the patient be using a [facility]? No  Name of the pharmacy and phone number for the current request: [facility] [id number] [place] [address]     Name of the medication requested: oxyCODONE-acetaminophen (PERCOCET) 7.5-325 MG per tablet && oxyCODONE (OXYCONTIN) 10 MG 12 hr tablet     Other request:     Phone number to reach patient:  Cell number on file:    Telephone Information:   Mobile [phone]       Best Time:      Can we leave a detailed message on this number?  YES    Travel screening: Not Applicable

## 2020-06-17 NOTE — TELEPHONE ENCOUNTER
Covering for provider who is out of the office.  Refill appears appropriate and was sent to requested pharmacy. OxyContin due at the same time, therefore this was refilled as well.    Peggy Henderson PA-C  [facility]

## 2020-06-17 NOTE — TELEPHONE ENCOUNTER
Patient requesting refill(s) of   oxyCODONE-acetaminophen (PERCOCET) 7.5-325 MG per tablet   Last dispensed from pharmacy on 5/20/2020    oxyCODONE (OXYCONTIN) 10 MG 12 hr tablet   Next appt scheduled for 5/20/2020    Pt last seen by prescribing provider on 7/13/2020  Next appt scheduled for 7/13/2020     checked in the past 6 months? Yes If no, print current report and give to RN    Last urine drug screen date 02/18/2020  Current opioid agreement on file (completed within the last year) Yes Date of opioid agreement: 02/19/2020    Processing (pick one and delete the others):      E-prescribe to [facility] [id number] [place], [place]   pharmacy    Will route to nursing pool for review and preparation of prescription(s).

## 2020-06-17 NOTE — TELEPHONE ENCOUNTER
Medication refill information reviewed.     Due date for oxyCODONE-acetaminophen (PERCOCET) 7.5-325 MG per tablet  is 6/21/20     Prescriptions prepped for review.     Will route to provider.

## 2020-06-21 DIAGNOSIS — M96.1 FAILED BACK SURGICAL SYNDROME: ICD-10-CM

## 2020-06-22 RX ORDER — GABAPENTIN 600 MG/1
TABLET ORAL
Qty: 540 TABLET | Refills: 0 | Status: SHIPPED | OUTPATIENT
Start: 2020-06-22 | End: 2020-07-16

## 2020-06-22 NOTE — TELEPHONE ENCOUNTER
Routing refill request to provider for review/approval because:  Drug not on the [facility] refill protocol. Patient needs to be seen because:  Last OV 1/27/2020: Return in about 4 weeks (around 2/24/2020), or if symptoms worsen or fail to improve, for Routine Visit.       gabapentin (NEURONTIN) 600 MG tablet   Last Written Prescription Date:  6/3/19  Last Fill Quantity: 540,  # refills: 3   Last office visit: 1/27/2020 with prescribing provider:  Dr. Llamas   Future Office Visit:      Dona SORIANO RN, BSN

## 2020-07-16 ENCOUNTER — VIRTUAL VISIT (OUTPATIENT)
Dept: PALLIATIVE MEDICINE | Facility: CLINIC | Age: 50
End: 2020-07-16
Payer: COMMERCIAL

## 2020-07-16 DIAGNOSIS — M96.1 FAILED BACK SURGICAL SYNDROME: ICD-10-CM

## 2020-07-16 DIAGNOSIS — G89.4 CHRONIC PAIN SYNDROME: ICD-10-CM

## 2020-07-16 PROCEDURE — 99214 OFFICE O/P EST MOD 30 MIN: CPT | Mod: 95 | Performed by: PSYCHIATRY & NEUROLOGY

## 2020-07-16 RX ORDER — OXYCODONE AND ACETAMINOPHEN 7.5; 325 MG/1; MG/1
TABLET ORAL
Qty: 60 TABLET | Refills: 0 | Status: SHIPPED | OUTPATIENT
Start: 2020-07-16 | End: 2020-08-22

## 2020-07-16 RX ORDER — OXYCODONE HCL 10 MG/1
10 TABLET, FILM COATED, EXTENDED RELEASE ORAL EVERY 12 HOURS
Qty: 60 TABLET | Refills: 0 | Status: SHIPPED | OUTPATIENT
Start: 2020-07-16 | End: 2020-08-22

## 2020-07-16 RX ORDER — METHOCARBAMOL 500 MG/1
500-1000 TABLET, FILM COATED ORAL 3 TIMES DAILY PRN
Qty: 360 TABLET | Refills: 1 | Status: SHIPPED | OUTPATIENT
Start: 2020-07-16 | End: 2020-12-16

## 2020-07-16 RX ORDER — GABAPENTIN 600 MG/1
1200 TABLET ORAL 3 TIMES DAILY
Qty: 540 TABLET | Refills: 1 | Status: SHIPPED | OUTPATIENT
Start: 2020-07-16 | End: 2020-12-16

## 2020-07-16 ASSESSMENT — PAIN SCALES - GENERAL: PAINLEVEL: SEVERE PAIN (6)

## 2020-07-16 NOTE — PROGRESS NOTES
"David Wilcox is a 49 year old male who is being evaluated via a billable video visit.      The patient has been notified of following:     \"This video visit will be conducted via a call between you and your physician/provider. We have found that certain health care needs can be provided without the need for an in-person physical exam.  This service lets us provide the care you need with a video conversation.  If a prescription is necessary we can send it directly to your pharmacy.  If lab work is needed we can place an order for that and you can then stop by our lab to have the test done at a later time.    Video visits are billed at different rates depending on your insurance coverage.  Please reach out to your insurance provider with any questions.    If during the course of the call the physician/provider feels a video visit is not appropriate, you will not be charged for this service.\"    Patient has given verbal consent for Video visit? Yes  How would you like to obtain your Mail AVS  If you are dropped from the video visit, the video invite should be resent to [phone]  Will anyone else be joining your video visit? [facility]    Date of visit: 7/16/2020     Chief complaint:   Chief Complaint   Patient presents with     Pain     Video visit due to COVID-19      Interval history:  David Wilcox was last seen by me on 4/20/20    Recommendations/plan at the last visit included:  1. Physical Therapy: continue exercises as recommended by PT.   2. Clinical Health Psychologist to address issues of relaxation, behavioral change, coping style, and other factors important to improvement.  Not at this time   3. Diagnostic Studies: none  4. Procedures: none  5. Medication Management:    1. Refills sent in for opioids.  2. Could consider CGRP medications if become more migraine in nature  6. Recommendations to PCP: none  7. Follow up: 3 months     Since his " "last visit, David Wilcox reports:  -overall has been \"ok\".   -feels exhausted constantly  -this has kept him from getting out of the house as much, and has noticed that even around his house he is not doing as well.  -he had lab testing done   -pain stable.    Pain scores:  Pain intensity on average is 6 on a scale of 0-10.     Current pain treatments:   Percocet 7.5/325mg daily- twice a day - consistent use  Oxycontin 10mg q12 - helpful (no side effects)  Robaxin (methocarbamol) 1000mg at night and in the morning- the morning dose has been helping  Gabapentin 1200 mg TID, beneficial  Cymbalta 60mg daily- tried going to 90mg/day but felt worse- for pain and mood  Nortriptyline 20 mg at bedtime - continues to have dry mouth but wishes to continue  Ativan 0.5mg very sparingly (averages about once every 3-6 months for severe panic attacks.  Ambien at night (per PCP Dr. Llamas)  Mirtazapine 15mg at night    Previous medication treatments included:  Hydrocodone- not helpful    Methadone- taking after 1st surgery  Naprosyn- not helpful  Flexeril- after first back injury (1990s)  Lidocaine- not helpful  Acetaminophen (tylenol)   Gabapentin  Fentanyl patch-12 mcg/48 hours - feels it works too strongly with heat/sun exposure    Other treatments have included:  David Wilcox has been seen at a pain clinic in the past.  MAPS- injections  PT: yes  Acupuncture: no  TENs Unit: yes- out of use- somewhat helpful  Injections: none since last surgery- very  helpful    Side Effects: None    Medications:  Current Outpatient Medications   Medication Sig Dispense Refill     DULoxetine (CYMBALTA) 60 MG capsule Take 1 capsule (60 mg) by mouth daily . 3 month supply 90 capsule 3     gabapentin (NEURONTIN) 600 MG tablet Take 2 tablets (1,200 mg) by mouth 3 times daily . 3 month supply 540 tablet 1     LORazepam (ATIVAN) 0.5 MG tablet One bid prn anxiety. Rare use 30 tablet 0     methocarbamol (ROBAXIN) 500 MG tablet Take 1-2 tablets (500-1,000 " mg) by mouth 3 times daily as needed for muscle spasms . 3 month supply 360 tablet 1     naloxone (NARCAN) 4 MG/0.1ML nasal spray Spray 1 spray (4 mg) into one nostril alternating nostrils as needed for opioid reversal every 2-3 minutes until assistance arrives 0.2 mL 0     oxyCODONE (OXYCONTIN) 10 MG 12 hr tablet Take 1 tablet (10 mg) by mouth every 12 hours May fill on 7/19/20 to start on 7/21/20 60 tablet 0     oxyCODONE-acetaminophen (PERCOCET) 7.5-325 MG per tablet Take 0.5-1 tablets po q 6 hrs PRN pain. Max of 2/day.  May fill on 7/19/20 to start on 7/21/20 60 tablet 0     spacer (OPTICHAMBER MAT) holding chamber Use with Inhaler 1 each 0     TYLENOL EXTRA STRENGTH 500 MG OR TABS 3 po prn for pain       albuterol (PROAIR HFA) 108 (90 Base) MCG/ACT inhaler Inhale 2 puffs into the lungs every 4 hours as needed for shortness of breath / dyspnea 1 Inhaler 1     mirtazapine (REMERON) 15 MG tablet Take 1 tablet (15 mg) by mouth At Bedtime 90 tablet 3     nortriptyline (PAMELOR) 10 MG capsule Take 2 capsules (20 mg) by mouth At Bedtime 180 capsule 3     omeprazole (PRILOSEC) 20 MG DR capsule Take 1 capsule (20 mg) by mouth daily 90 capsule 3     zolpidem (AMBIEN) 10 MG tablet TAKE 1 TABLET BY MOUTH EVERY DAY AT BEDTIME AS NEEDED FOR SLEEP 30 tablet 5       Medical History: any changes in medical history since they were last seen? 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 Headaches- since stopping tobacco more daily  2. 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     3. Facet arthropathy likely contributing above the level of the future  4. Distant history of alcohol and drug abuse   5. Bipolar disorder- currently managed  6. H/o meningitis after SCS trial  7. Thoracic back pain, likely myofascial etiology.        Plan:  1. Physical Therapy: continue exercises as recommended by PT.   2. Clinical Health Psychologist to address issues of relaxation, behavioral change, coping style, and other factors important to improvement.  Not at this time   3. Diagnostic Studies: none  4. Procedures: none  5. Medication Management:  No changes. Refills sent. New narcan sent.  6. Recommendations to PCP: none  7. Follow up: 3 months      Video-Visit Details    Type of service:  Video Visit    Video Start Time: 1130  Video End Time: 1155    Originating Location (pt. Location): Home    Distant Location (provider location):  [facility]     Platform used for Video Visit: Ricardo Motley MD  [facility]

## 2020-07-20 ENCOUNTER — OFFICE VISIT (OUTPATIENT)
Dept: FAMILY MEDICINE | Facility: CLINIC | Age: 50
End: 2020-07-20
Payer: COMMERCIAL

## 2020-07-20 VITALS
HEART RATE: 81 BPM | WEIGHT: 257.8 LBS | OXYGEN SATURATION: 97 % | RESPIRATION RATE: 16 BRPM | TEMPERATURE: 97.8 F | BODY MASS INDEX: 36.91 KG/M2 | SYSTOLIC BLOOD PRESSURE: 136 MMHG | DIASTOLIC BLOOD PRESSURE: 90 MMHG | HEIGHT: 70 IN

## 2020-07-20 DIAGNOSIS — J30.81 ALLERGIC RHINITIS DUE TO ANIMALS: ICD-10-CM

## 2020-07-20 DIAGNOSIS — E66.01 MORBID OBESITY (H): ICD-10-CM

## 2020-07-20 DIAGNOSIS — F33.42 RECURRENT MAJOR DEPRESSION IN COMPLETE REMISSION (H): Primary | ICD-10-CM

## 2020-07-20 DIAGNOSIS — F51.01 PRIMARY INSOMNIA: ICD-10-CM

## 2020-07-20 DIAGNOSIS — F13.20 SEDATIVE, HYPNOTIC OR ANXIOLYTIC DEPENDENCE (H): ICD-10-CM

## 2020-07-20 DIAGNOSIS — F11.20 CONTINUOUS OPIOID DEPENDENCE (H): ICD-10-CM

## 2020-07-20 DIAGNOSIS — K21.9 GASTROESOPHAGEAL REFLUX DISEASE WITHOUT ESOPHAGITIS: ICD-10-CM

## 2020-07-20 DIAGNOSIS — M54.16 LUMBAR RADICULOPATHY: ICD-10-CM

## 2020-07-20 PROCEDURE — 99214 OFFICE O/P EST MOD 30 MIN: CPT | Performed by: FAMILY MEDICINE

## 2020-07-20 RX ORDER — MIRTAZAPINE 15 MG/1
15 TABLET, FILM COATED ORAL AT BEDTIME
Qty: 90 TABLET | Refills: 3 | Status: SHIPPED | OUTPATIENT
Start: 2020-07-20 | End: 2021-01-25

## 2020-07-20 RX ORDER — ALBUTEROL SULFATE 90 UG/1
2 AEROSOL, METERED RESPIRATORY (INHALATION) EVERY 4 HOURS PRN
Qty: 1 INHALER | Refills: 1 | Status: SHIPPED | OUTPATIENT
Start: 2020-07-20 | End: 2020-10-28

## 2020-07-20 RX ORDER — ZOLPIDEM TARTRATE 10 MG/1
TABLET ORAL
Qty: 30 TABLET | Refills: 5 | Status: SHIPPED | OUTPATIENT
Start: 2020-07-20 | End: 2020-12-21

## 2020-07-20 RX ORDER — NORTRIPTYLINE HCL 10 MG
20 CAPSULE ORAL AT BEDTIME
Qty: 180 CAPSULE | Refills: 3 | Status: SHIPPED | OUTPATIENT
Start: 2020-07-20 | End: 2021-01-25

## 2020-07-20 ASSESSMENT — ANXIETY QUESTIONNAIRES
3. WORRYING TOO MUCH ABOUT DIFFERENT THINGS: NOT AT ALL
5. BEING SO RESTLESS THAT IT IS HARD TO SIT STILL: NOT AT ALL
6. BECOMING EASILY ANNOYED OR IRRITABLE: SEVERAL DAYS
7. FEELING AFRAID AS IF SOMETHING AWFUL MIGHT HAPPEN: NOT AT ALL
1. FEELING NERVOUS, ANXIOUS, OR ON EDGE: NOT AT ALL
2. NOT BEING ABLE TO STOP OR CONTROL WORRYING: SEVERAL DAYS
GAD7 TOTAL SCORE: 3

## 2020-07-20 ASSESSMENT — PATIENT HEALTH QUESTIONNAIRE - PHQ9
SUM OF ALL RESPONSES TO PHQ QUESTIONS 1-9: 9
5. POOR APPETITE OR OVEREATING: SEVERAL DAYS

## 2020-07-20 ASSESSMENT — MIFFLIN-ST. JEOR: SCORE: 2045.62

## 2020-07-20 NOTE — PROGRESS NOTES
"Subjective     David Wilcox is a 49 year old male who presents to clinic today for the following health issues:    HPI        Chief Complaint   Patient presents with     Medication Request     refill on all meds       Insomnia: chronic ambien, needs fills  Gerd: PPI, needs fills  Depression: stable for now, fills on meds.  See meds and problem list  Allergies: works on farm.  Albuterol on occasion, rare use.  Fills  Back pain: chronic.  On opioids per pain management.  Nortriptyline at night as well  Opioid and sedative use: see med list.  Stable.  No over sedation issues  Obesity: morbid with many comorbidities.  37 BMI      No cp or sob    Problem list, med list, additional histories reviewed and updated, as indicated.                     Objective    BP (!) 136/90   Pulse 81   Temp 97.8  F (36.6  C) (Tympanic)   Resp 16   Ht 1.778 m (5' 10\")   Wt 116.9 kg (257 lb 12.8 oz)   SpO2 97%   BMI 36.99 kg/m    Body mass index is 36.99 kg/m .  Physical Exam   GEN: Alert and oriented, in no acute distress  CV: RRR, no murmur  RESP: lungs clear bilaterally, good effort  Neck: neck supple without mass or lymphadenopathy  ENT: oropharynx clear, no exudate or palate/tonsil asymmetry  Walks with a cane    A: Insomnia: chronic ambien, needs fills  Gerd: PPI, needs fills  Depression: stable for now, fills on meds.  See meds and problem list  Allergies: works on farm.  Albuterol on occasion, rare use.  Fills  Back pain: chronic.  On opioids per pain management.  Nortriptyline at night as well  Opioid and sedative use: see med list.  Stable.  No over sedation issues  Obesity: morbid with many comorbidities.  37 BM    P: fills.  Continue meds without change.  No labs needed today.  Back in 6 mo.     Weight management plan: diet/exercise     "

## 2020-07-21 ASSESSMENT — ANXIETY QUESTIONNAIRES: GAD7 TOTAL SCORE: 3

## 2020-08-22 ENCOUNTER — TELEPHONE (OUTPATIENT)
Dept: PALLIATIVE MEDICINE | Facility: CLINIC | Age: 50
End: 2020-08-22

## 2020-08-22 DIAGNOSIS — M96.1 FAILED BACK SURGICAL SYNDROME: ICD-10-CM

## 2020-08-22 RX ORDER — OXYCODONE HCL 10 MG/1
10 TABLET, FILM COATED, EXTENDED RELEASE ORAL EVERY 12 HOURS
Qty: 60 TABLET | Refills: 0 | Status: SHIPPED | OUTPATIENT
Start: 2020-08-22 | End: 2020-09-15

## 2020-08-22 RX ORDER — OXYCODONE AND ACETAMINOPHEN 7.5; 325 MG/1; MG/1
TABLET ORAL
Qty: 60 TABLET | Refills: 0 | Status: SHIPPED | OUTPATIENT
Start: 2020-08-22 | End: 2020-09-15

## 2020-08-23 ENCOUNTER — MYC MEDICAL ADVICE (OUTPATIENT)
Dept: PALLIATIVE MEDICINE | Facility: CLINIC | Age: 50
End: 2020-08-23

## 2020-08-23 NOTE — TELEPHONE ENCOUNTER
Received on call page from patient. He states that he is out of his opioid medication. Reports he has been trying to reach the clinic all week but unable to. He was waiting on hold for about an hour when trying to call. Confirmed that he had the right number. There is no documentation of a call.     His last visit with Dr. Motley was on 7/16/2020.  is appropriate. Refill sent in for percocet and oxycontin.     Signed Prescriptions:                        Disp   Refills    oxyCODONE (OXYCONTIN) 10 MG 12 hr tablet   60 tab*0        Sig: Take 1 tablet (10 mg) by mouth every 12 hours  Authorizing Provider: HERMINIO CLEARY    oxyCODONE-acetaminophen (PERCOCET) 7.5-325*60 tab*0        Sig: Take 0.5-1 tablets po q 6 hrs PRN pain. Max of 2/day.  Authorizing Provider: HERMINIO CLEARY MD  [facility]

## 2020-08-24 ENCOUNTER — OFFICE VISIT (OUTPATIENT)
Dept: FAMILY MEDICINE | Facility: CLINIC | Age: 50
End: 2020-08-24
Payer: COMMERCIAL

## 2020-08-24 VITALS
SYSTOLIC BLOOD PRESSURE: 126 MMHG | RESPIRATION RATE: 14 BRPM | HEART RATE: 80 BPM | TEMPERATURE: 97.3 F | DIASTOLIC BLOOD PRESSURE: 68 MMHG | OXYGEN SATURATION: 99 % | BODY MASS INDEX: 37.31 KG/M2 | WEIGHT: 260 LBS

## 2020-08-24 DIAGNOSIS — M65.4 DE QUERVAIN'S DISEASE (TENOSYNOVITIS): Primary | ICD-10-CM

## 2020-08-24 PROCEDURE — 99213 OFFICE O/P EST LOW 20 MIN: CPT | Performed by: PHYSICIAN ASSISTANT

## 2020-08-24 ASSESSMENT — PAIN SCALES - GENERAL: PAINLEVEL: MODERATE PAIN (5)

## 2020-08-24 NOTE — PATIENT INSTRUCTIONS
Use the brace at all times when doing work.     If your insurance does not cover the Voltaren gel, take Ibuprofen 600 mg three times per day for the next two weeks for pain.       Patient Education     Treating De Quervain Tenosynovitis     The surgery releases the protective sheath, creating more space for the tendons. This allows them to move more freely and relieves inflammation.     The goal of your treatment is to ease your pain and allow you to use your thumb again. Treatment will depend on how bad the pain is.  Nonsurgical Treatment  Just taking a break from the activities that caused your pain may be enough. Your healthcare provider may also have you take nonsteroidal, anti-inflammatory medicine (NSAIDs), such as ibuprofen. Or you may wear a splint for a few weeks to rest the thumb and wrist. To lesson swelling, your healthcare provider may inject an anti-inflammatory medicine, such as cortisone, around the tendons. You may have more pain at first. But in a few days your thumb should feel better.  Surgery  If other kinds of treatment don t ease your pain, or if the pain is bad, you may need surgery. The sheath that surrounds the tendons is released so the tendons can move more easily. This helps reduce the inflammation. It also allows you to straighten your thumb without pain. Surgery is done with local or regional anesthetic on an outpatient basis. So you can go home the same day. You will likely have a splint or dressing on your wrist for a few days while the tissue heals. You may need physical therapy to help strengthen muscles. Your healthcare provider will talk with you about the risks and possible complications of surgery.  Date Last Reviewed: 1/1/2018 2000-2019 [employer]. [address] [place] [address], [place] [address]. All rights reserved. This information is not intended as a substitute for professional medical care. Always follow your healthcare professional's instructions.

## 2020-08-24 NOTE — PROGRESS NOTES
Subjective     David Wilcox is a 49 year old male who presents to clinic today for the following health issues:    HPI   Musculoskeletal problem/pain  Onset/Duration: 1.5 weeks   Description  Location: wrist - right  Joint Swelling: no  Redness: no  Pain: YES  Warmth: no  Intensity:  moderate  Progression of Symptoms:  same and intermittent  Accompanying signs and symptoms:   Fevers: no  Numbness/tingling/weakness: no  History  Trauma to the area: YES  Recent illness:  no  Previous similar problem: YES  Previous evaluation:  YES  Precipitating or alleviating factors:  Aggravating factors include: overuse  Therapies tried and outcome: nothing      Review of Systems   Constitutional, msk, neuro, skin systems are negative, except as otherwise noted.      Objective    /68   Pulse 80   Temp 97.3  F (36.3  C) (Tympanic)   Resp 14   Wt 117.9 kg (260 lb)   SpO2 99%   BMI 37.31 kg/m    Body mass index is 37.31 kg/m .  Physical Exam   Constitutional: healthy, alert, and no distress  Head: Normocephalic. Atraumatic  Eyes: No conjunctival injection, sclera anicteric  Respiratory: No resp distress.  Musculoskeletal: extremities normal- no gross deformities noted, and normal muscle tone. There is no swelling or limited ROM of the R wrist. There is reproducible tenderness over the distal volar wrist, and with ulnar deviation. Finkelsteins positive.   Neurologic: Gait normal. CN 2-12 grossly intact. Strength, coordination and sensation is grossly intact in the radial, median and ulnar distributions of the hand. Able to give thumbs up, okay sign,   Psychiatric: mentation appears normal and affect normal/bright         Assessment & Plan   De Quervain's disease (tenosynovitis)  History and exam are consistent with DeQuervains tenosynovitis of the R wrist. Hx of overuse with the R wrist. Recommended conservative therapies including rest/bracing, ice, elevation, NSAIDs (Voltaren gel).   As pain recedes, begin normal  activities slowly as tolerated. Consider Physical Therapy if symptoms not better with symptomatic care.   Return to clinic in 4 weeks if symptoms not significantly improved, or sooner for any new changing or worsening symptoms.   - Wrist/Arm/Hand Supplies Order  - diclofenac (VOLTAREN) 1 % topical gel; Place 4 g onto the skin 4 times daily     Return in about 4 weeks (around 9/21/2020), or if symptoms worsen or fail to improve, for In-Clinic Visit.    Sacha Prado PA-C  [facility]    DME (Durable Medical Equipment) Orders and Documentation  Orders Placed This Encounter   Procedures     Wrist/Arm/Hand Supplies Order      The patient was assessed and it was determined the patient is in need of the following listed DME Supplies/Equipment. Please complete supporting documentation below to demonstrate medical necessity.      Wrist/Arm/Hand Bracing Supplies Documentation  Patient requires the use of the ordered bracing device due to following medical need/condition: De Quervains tenosynovitis.

## 2020-09-14 DIAGNOSIS — M96.1 FAILED BACK SURGICAL SYNDROME: ICD-10-CM

## 2020-09-14 NOTE — TELEPHONE ENCOUNTER
Reason for call:  Medication   If this is a refill request, has the caller requested the refill from the pharmacy already? No  Will the patient be using a [facility]? No  Name of the pharmacy and phone number for the current request: [facility] [id number] [place] [address]    Name of the medication requested: oxyCODONE (OXYCONTIN) 10 MG 12 hr tablet / oxyCODONE-acetaminophen (PERCOCET) 7.5-325 MG per tablet    Other request: call when ready    Phone number to reach patient:  Home number on file [phone] (home)  Can we leave a detailed message on this number?  YES    Travel screening: Not Applicable       Cate CHACON    [facility] [place]

## 2020-09-15 RX ORDER — OXYCODONE AND ACETAMINOPHEN 7.5; 325 MG/1; MG/1
TABLET ORAL
Qty: 60 TABLET | Refills: 0 | Status: SHIPPED | OUTPATIENT
Start: 2020-09-15 | End: 2020-10-20

## 2020-09-15 RX ORDER — OXYCODONE HCL 10 MG/1
10 TABLET, FILM COATED, EXTENDED RELEASE ORAL EVERY 12 HOURS
Qty: 60 TABLET | Refills: 0 | Status: SHIPPED | OUTPATIENT
Start: 2020-09-15 | End: 2020-09-24

## 2020-09-15 NOTE — TELEPHONE ENCOUNTER
Medication refill information reviewed.     Due date for oxyCODONE (OXYCONTIN) 10 MG 12 hr tablet and oxyCODONE-acetaminophen (PERCOCET) 7.5-325 MG per tablet is 9/22/20     Prescriptions prepped for review.     Will route to provider.

## 2020-09-15 NOTE — TELEPHONE ENCOUNTER
Script Eprescribed to pharmacy    Will send this to MA team to notify patient.    Signed Prescriptions:                        Disp   Refills    oxyCODONE (OXYCONTIN) 10 MG 12 hr tablet   60 tab*0        Sig: Take 1 tablet (10 mg) by mouth every 12 hours Fill           9/20/20 to start 9/22/20  Authorizing Provider: YADIRA CARRILLO    oxyCODONE-acetaminophen (PERCOCET) 7.5-325*60 tab*0        Sig: Take 0.5-1 tablets po q 6 hrs PRN pain. Max of 2/day.           Fill 9/20/20 to start 9/22/20  Authorizing Provider: YADIRA CARRILLO MD  [facility]

## 2020-09-15 NOTE — TELEPHONE ENCOUNTER
Received call from patient requesting refill(s) of oxyCODONE (OXYCONTIN) 10 MG 12 hr tablet and oxyCODONE-acetaminophen (PERCOCET) 7.5-325 MG per tablet    Last dispensed from pharmacy on 08/23/20 for both    Pt last seen by prescribing provider on 07/16/20-virtual visit  Next appt scheduled for 10/21/20-in person     checked in the past 6 months? Yes If no, print current report and give to RN    Last urine drug screen date 02/18/20  Current opioid agreement on file (completed within the last year) Yes Date of opioid agreement: 02/19/20    [facility] [id number] [place] [facility] [place], [place] - [address] [place] [address]     Will route to nursing pool for review and preparation of prescription(s).

## 2020-09-23 ENCOUNTER — NURSE TRIAGE (OUTPATIENT)
Dept: PALLIATIVE MEDICINE | Facility: CLINIC | Age: 50
End: 2020-09-23

## 2020-09-24 ENCOUNTER — TELEPHONE (OUTPATIENT)
Dept: PALLIATIVE MEDICINE | Facility: CLINIC | Age: 50
End: 2020-09-24

## 2020-09-24 DIAGNOSIS — M96.1 FAILED BACK SURGICAL SYNDROME: ICD-10-CM

## 2020-09-24 NOTE — TELEPHONE ENCOUNTER
"  Reason for Disposition    [1] Prescription not at pharmacy AND [2] was prescribed today by PCP     Pt to follow up with clinic tomorrow due to controlled substance. RN unable to confirm with pharmacy as pharmacy is now closed.    Additional Information    Negative: [1] Request for URGENT new prescription or refill of \"essential\" medication (i.e., likelihood of harm to patient if not taken) AND [2] triager unable to fill per unit policy    Negative: Diabetes drug error or overdose (e.g., insulin or extra dose)    Negative: [1] DOUBLE DOSE (an extra dose or lesser amount) of prescription drug AND [2] NO symptoms (Exception: a double dose of antibiotics)    Negative: MORE THAN A DOUBLE DOSE of a prescription or over-the-counter (OTC) drug    Negative: [1] DOUBLE DOSE (an extra dose or lesser amount) of prescription drug AND [2] any symptoms (e.g., dizziness, nausea, pain, sleepiness)    Negative: Took another person's prescription drug    Negative: Drug overdose and nurse unable to answer question    Negative: Caller requesting information not related to medicine    Negative: Caller requesting a prescription for Strep throat and has a positive culture result    Negative: Rash while taking a medication or within 3 days of stopping it    Negative: Immunization reaction suspected    Negative: [1] Asthma AND [2] having symptoms of asthma (cough, wheezing, etc)    Negative: [1] DOUBLE DOSE (an extra dose or lesser amount) of over-the-counter (OTC) drug AND [2] any symptoms (e.g., dizziness, nausea, pain, sleepiness)    Protocols used: MEDICATION QUESTION CALL-A-AH    "

## 2020-09-24 NOTE — TELEPHONE ENCOUNTER
Patient called and state that the pharmacy has not received his prescription and that he is out. He stated he thinks the pharmacy stole it.      Patient also stated that he has nothing left            Briseyda Eryn    [facility]

## 2020-09-24 NOTE — TELEPHONE ENCOUNTER
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

## 2020-09-24 NOTE — TELEPHONE ENCOUNTER
Patient was only able to fill a 10 day supply of his OxyContin today due to pharmacy supply.     He paid cash because it also required a PA. PA sent to team in a separate encounter.     Patient will need an 18 day supply of OxyContin to start in 11 days to align with next months Percocet refill.     Script pended for review.     JEREMIAH PeacockN, RN  Care Coordinator  [facility] [place]

## 2020-09-24 NOTE — TELEPHONE ENCOUNTER
Central Prior Authorization Team   Phone: [phone]      PA Initiation    Medication: oxyCODONE (OXYCONTIN) 10 MG 12 hr tablet - INITIATED  Insurance Company: [employer] - Phone [phone] Fax [phone]  [id number] [place], [place] - [address] [place] [address]  Filling Pharmacy Phone: [phone]  Filling Pharmacy Fax: [phone]  Start Date: 9/24/2020

## 2020-09-24 NOTE — TELEPHONE ENCOUNTER
This is being managed in a separate encounter.     JEREMIAH PeacockN, RN  Care Coordinator  [facility] [place]

## 2020-09-24 NOTE — TELEPHONE ENCOUNTER
Patient calling regarding his prescription, is out of his oxycontin prescription.     Oxycontin 10mg every 12 hours and oxycodone-acetaminophen 7.5-325mg prescribed by .     Outpatient Medication Detail      Disp  Refills  Start  End  HAYDEE    oxyCODONE-acetaminophen (PERCOCET) 7.5-325 MG per tablet  60 tablet  0  9/15/2020   No    Sig: Take 0.5-1 tablets po q 6 hrs PRN pain. Max of 2/day. Fill 9/20/20 to start 9/22/20    Sent to pharmacy as: oxyCODONE-Acetaminophen 7.5-325 MG Oral Tablet (PERCOCET)    Class: E-Prescribe    Earliest Fill Date: 9/15/2020    Order: [id number]    E-Prescribing Status: Receipt confirmed by pharmacy (9/15/2020 12:45 PM CDT)      Outpatient Medication Detail      Disp  Refills  Start  End  HAYDEE    oxyCODONE (OXYCONTIN) 10 MG 12 hr tablet  60 tablet  0  9/15/2020   No    Sig - Route: Take 1 tablet (10 mg) by mouth every 12 hours Fill 9/20/20 to start 9/22/20 - Oral    Sent to pharmacy as: oxyCODONE HCl ER 10 MG Oral Tablet ER 12 Hour Abuse-Deterrent (oxyCONTIN)    Class: E-Prescribe    Earliest Fill Date: 9/15/2020    Order: [id number]    E-Prescribing Status: Receipt confirmed by pharmacy (9/15/2020 12:45 PM CDT)      RN advised that both prescriptions were received by the pharmacy on 9/15/20 per our notes.  RN attempted to contact the pharmacy to follow up on the status but the pharmacy is closed.   Patient is requesting call once the rx has been confirmed to be at the pharmacy. Pt states the clinic may need to talk to his wife April in the morning, NIMA on file for April from 8/6/18.    Please advise and follow up on status of current rx's.    Patient advised if he develops any withdrawal sx to go to ED tonight.  Pt stated understanding.    Cha Camargo RN 09/23/20 8:02 PM  [facility] Nurse Advisor

## 2020-09-25 NOTE — TELEPHONE ENCOUNTER
Central Prior Authorization Team   Phone: [phone]      Pharmacy received a paid claim and will notify pt of reimbursement - pt was only able to receive a 10 days supply due to pharmacy supply - pharmacy is requesting a new rx for the remaining 18 days supply.      Prior Authorization Approval    Authorization Effective Date: 8/25/2020  Authorization Expiration Date: 9/25/2021  Medication: oxyCODONE (OXYCONTIN) 10 MG 12 hr tablet - APPROVED  Approved Dose/Quantity: 60 FOR 30  Reference #:     Insurance Company: [employer] - Phone [phone] Fax [phone]  Expected CoPay:       CoPay Card Available:      Foundation Assistance Needed:    Which Pharmacy is filling the prescription (Not needed for infusion/clinic administered): [facility] [id number] [place] [address]  Pharmacy Notified: Yes  Patient Notified: Yes

## 2020-09-28 RX ORDER — OXYCODONE HCL 10 MG/1
10 TABLET, FILM COATED, EXTENDED RELEASE ORAL EVERY 12 HOURS
Qty: 36 TABLET | Refills: 0 | Status: SHIPPED | OUTPATIENT
Start: 2020-09-28 | End: 2020-11-19 | Stop reason: ALTCHOICE

## 2020-09-28 NOTE — TELEPHONE ENCOUNTER
Script Eprescribed to pharmacy    Will send this to MA team to notify patient.    Signed Prescriptions:                        Disp   Refills    oxyCODONE (OXYCONTIN) 10 MG 12 hr tablet   36 tab*0        Sig: Take 1 tablet (10 mg) by mouth every 12 hours Fill           10/2/20 to start 10/4/20. 18 day supply to align           fill dates with Percocet.  Authorizing Provider: YADIRA CARRILLO MD  [facility]

## 2020-09-29 NOTE — TELEPHONE ENCOUNTER
Patient was notified of this already.     JEREMIAH PeacockN, RN  Care Coordinator  [facility] [place]

## 2020-10-20 ENCOUNTER — TELEPHONE (OUTPATIENT)
Dept: PALLIATIVE MEDICINE | Facility: CLINIC | Age: 50
End: 2020-10-20

## 2020-10-20 DIAGNOSIS — M96.1 FAILED BACK SURGICAL SYNDROME: ICD-10-CM

## 2020-10-20 RX ORDER — OXYCODONE AND ACETAMINOPHEN 7.5; 325 MG/1; MG/1
TABLET ORAL
Qty: 70 TABLET | Refills: 0 | Status: SHIPPED | OUTPATIENT
Start: 2020-10-20 | End: 2020-11-20

## 2020-10-20 RX ORDER — FENTANYL 12.5 UG/1
1 PATCH TRANSDERMAL
Qty: 15 PATCH | Refills: 0 | Status: SHIPPED | OUTPATIENT
Start: 2020-10-20 | End: 2020-11-20

## 2020-10-20 NOTE — TELEPHONE ENCOUNTER
Called patient about appt tomorrow- changing to video.    He advised he wants to go back to fentanyl (stop oxycontin).  He has done this in the winter in the past.    Given due date on 10/22, orders placed today    Script Eprescribed to pharmacy      Signed Prescriptions:                        Disp   Refills    oxyCODONE-acetaminophen (PERCOCET) 7.5-325*70 tab*0        Sig: Take 0.5-1 tablets po q 6 hrs PRN pain. Max of 2/day,           and on 10 days of the month, max of 3/day. Fill           10/20/20 to start 10/22/20  Authorizing Provider: YADIRA CARRILLO    fentaNYL (DURAGESIC) 12 mcg/hr 72 hr patch 15 pat*0        Sig: Place 1 patch onto the skin every 48 hours remove old           patch. Fill 10/20/20 to start 10/22/20  Authorizing Provider: YADIRA CARRILLO MD  [facility]

## 2020-10-21 ENCOUNTER — VIRTUAL VISIT (OUTPATIENT)
Dept: PALLIATIVE MEDICINE | Facility: CLINIC | Age: 50
End: 2020-10-21
Payer: COMMERCIAL

## 2020-10-21 DIAGNOSIS — G89.4 CHRONIC PAIN SYNDROME: ICD-10-CM

## 2020-10-21 DIAGNOSIS — M96.1 FAILED BACK SURGICAL SYNDROME: Primary | ICD-10-CM

## 2020-10-21 PROCEDURE — 99213 OFFICE O/P EST LOW 20 MIN: CPT | Mod: 95 | Performed by: PSYCHIATRY & NEUROLOGY

## 2020-10-21 ASSESSMENT — PAIN SCALES - GENERAL: PAINLEVEL: SEVERE PAIN (6)

## 2020-10-21 NOTE — PROGRESS NOTES
"David Wilcox is a 50 year old male who is being evaluated via a billable video visit.      The patient has been notified of following:     \"This video visit will be conducted via a call between you and your physician/provider. We have found that certain health care needs can be provided without the need for an in-person physical exam.  This service lets us provide the care you need with a video conversation.  If a prescription is necessary we can send it directly to your pharmacy.  If lab work is needed we can place an order for that and you can then stop by our lab to have the test done at a later time.    Video visits are billed at different rates depending on your insurance coverage.  Please reach out to your insurance provider with any questions.    If during the course of the call the physician/provider feels a video visit is not appropriate, you will not be charged for this service.\"    Patient has given verbal consent for Video visit? Yes  How would you like to obtain your AVS? MyChart  If you are dropped from the video visit, the video invite should be resent to: Text to cell phone: [phone]  Will anyone else be joining your video visit? No      Aidan Naidu MA                                [facility]    Date of visit: 10/21/2020     Chief complaint:   Chief Complaint   Patient presents with     Pain     Video visit due to COVID-19      Interval history:  David Wilcox was last seen by me on 7/16/20    Recommendations/plan at the last visit included:  1. Physical Therapy: continue exercises as recommended by PT.   2. Clinical Health Psychologist to address issues of relaxation, behavioral change, coping style, and other factors important to improvement.  Not at this time   3. Diagnostic Studies: none  4. Procedures: none  5. Medication Management:  No changes. Refills sent. New narcan sent.  6. Recommendations to PCP: none  7. Follow up: 3 months       Since his last " visit, David Wilcox reports:  -overall, doing ok  -after discussion yesterday, switching to the fentanyl for the winter- has done this in the past. Not yet started  -no other new concerns/issues.    Pain scores:  Pain intensity on average is 7 on a scale of 0-10.     Current pain treatments:   Percocet 7.5/325mg daily- twice a day - consistent use  Oxycontin 10mg q12 - helpful (no side effects)  Robaxin (methocarbamol) 1000mg at night and in the morning- the morning dose has been helping  Gabapentin 1200 mg TID, beneficial  Cymbalta 60mg daily- tried going to 90mg/day but felt worse- for pain and mood  Nortriptyline 20 mg at bedtime - continues to have dry mouth but wishes to continue  Ativan 0.5mg very sparingly (averages about once every 3-6 months for severe panic attacks.  Ambien at night (per PCP Dr. Llamas)  Mirtazapine 15mg at night    Previous medication treatments included:  Hydrocodone- not helpful    Methadone- taking after 1st surgery  Naprosyn- not helpful  Flexeril- after first back injury (1990s)  Lidocaine- not helpful  Acetaminophen (tylenol)   Gabapentin  Fentanyl patch-12 mcg/48 hours - feels it works too strongly with heat/sun exposure    Other treatments have included:  David Wilcox has been seen at a pain clinic in the past.  MAPS- injections  PT: yes  Acupuncture: no  TENs Unit: yes- out of use- somewhat helpful  Injections: none since last surgery- very  helpful    Side Effects: None    Medications:  Current Outpatient Medications   Medication Sig Dispense Refill     albuterol (PROAIR HFA) 108 (90 Base) MCG/ACT inhaler Inhale 2 puffs into the lungs every 4 hours as needed for shortness of breath / dyspnea 1 Inhaler 1     DULoxetine (CYMBALTA) 60 MG capsule Take 1 capsule (60 mg) by mouth daily . 3 month supply 90 capsule 3     fentaNYL (DURAGESIC) 12 mcg/hr 72 hr patch Place 1 patch onto the skin every 48 hours remove old patch. Fill 10/20/20 to start 10/22/20 15 patch 0     gabapentin  (NEURONTIN) 600 MG tablet Take 2 tablets (1,200 mg) by mouth 3 times daily . 3 month supply 540 tablet 1     LORazepam (ATIVAN) 0.5 MG tablet One bid prn anxiety. Rare use 30 tablet 0     methocarbamol (ROBAXIN) 500 MG tablet Take 1-2 tablets (500-1,000 mg) by mouth 3 times daily as needed for muscle spasms . 3 month supply 360 tablet 1     mirtazapine (REMERON) 15 MG tablet Take 1 tablet (15 mg) by mouth At Bedtime 90 tablet 3     naloxone (NARCAN) 4 MG/0.1ML nasal spray Spray 1 spray (4 mg) into one nostril alternating nostrils as needed for opioid reversal every 2-3 minutes until assistance arrives 0.2 mL 0     nortriptyline (PAMELOR) 10 MG capsule Take 2 capsules (20 mg) by mouth At Bedtime 180 capsule 3     omeprazole (PRILOSEC) 20 MG DR capsule Take 1 capsule (20 mg) by mouth daily 90 capsule 3     oxyCODONE (OXYCONTIN) 10 MG 12 hr tablet Take 1 tablet (10 mg) by mouth every 12 hours Fill 10/2/20 to start 10/4/20. 18 day supply to align fill dates with Percocet. 36 tablet 0     oxyCODONE-acetaminophen (PERCOCET) 7.5-325 MG per tablet Take 0.5-1 tablets po q 6 hrs PRN pain. Max of 2/day, and on 10 days of the month, max of 3/day. Fill 10/20/20 to start 10/22/20 70 tablet 0     spacer (OPTICHAMBER MAT) holding chamber Use with Inhaler 1 each 0     TYLENOL EXTRA STRENGTH 500 MG OR TABS 3 po prn for pain       zolpidem (AMBIEN) 10 MG tablet TAKE 1 TABLET BY MOUTH EVERY DAY AT BEDTIME AS NEEDED FOR SLEEP 30 tablet 5     diclofenac (VOLTAREN) 1 % topical gel Place 4 g onto the skin 4 times daily (Patient not taking: Reported on 10/21/2020) 350 g 0       Medical History: any changes in medical history since they were last seen? 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 Headaches- since stopping tobacco more daily  2. 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    3. Facet arthropathy likely contributing above the level of the future  4. Distant history of alcohol and drug abuse   5. Bipolar disorder- currently managed  6. H/o meningitis after SCS trial  7. Thoracic back pain, likely myofascial etiology.        Plan:  1. Physical Therapy: continue exercises as recommended by PT.   2. Clinical Health Psychologist to address issues of relaxation, behavioral change, coping style, and other factors important to improvement.  Not at this time   3. Diagnostic Studies: none  4. Procedures: none  5. Medication Management:  No changes. Refills sent yesterday  6. Recommendations to PCP: none  7. Follow up: 3 months- in person          Video-Visit Details    Type of service:  Video Visit    Video Start Time: 1251  Video End Time: 0105    Originating Location (pt. Location): Home    Distant Location (provider location):  [facility]     Platform used for Video Visit: Sabina Motley MD  [facility]

## 2020-10-21 NOTE — PATIENT INSTRUCTIONS
1. Follow up in 3 months- in person    ----------------------------------------------------------------  [facility] Number:  [phone]     Call with any questions about your care and for scheduling assistance.     Calls are returned Monday through Friday between 8 AM and 4:30 PM. We usually get back to you within 2 business days depending on the issue/request.    If we are prescribing your medications:    For opioid medication refills, call the clinic or send a [employer] message 7 days in advance.  Please include:    Name of requested medication    Name of the pharmacy.    For non-opioid medications, call your pharmacy directly to request a refill. Please allow 3-4 days to be processed.     Per [place] State Law:    All controlled substance prescriptions must be filled within 30 days of being written.      For those controlled substances allowing refills, pickup must occur within 30 days of last fill.      We believe regular attendance is key to your success in our program!      Any time you are unable to keep your appointment we ask that you call us at least 24 hours in advance to cancel.This will allow us to offer the appointment time to another patient.     Multiple missed appointments may lead to dismissal from the clinic.

## 2020-10-28 DIAGNOSIS — J30.81 ALLERGIC RHINITIS DUE TO ANIMALS: ICD-10-CM

## 2020-10-28 RX ORDER — ALBUTEROL SULFATE 90 UG/1
AEROSOL, METERED RESPIRATORY (INHALATION)
Qty: 8.5 INHALER | Refills: 1 | Status: SHIPPED | OUTPATIENT
Start: 2020-10-28 | End: 2021-01-25

## 2020-11-19 ENCOUNTER — ALLIED HEALTH/NURSE VISIT (OUTPATIENT)
Dept: FAMILY MEDICINE | Facility: CLINIC | Age: 50
End: 2020-11-19
Payer: COMMERCIAL

## 2020-11-19 ENCOUNTER — VIRTUAL VISIT (OUTPATIENT)
Dept: FAMILY MEDICINE | Facility: CLINIC | Age: 50
End: 2020-11-19
Payer: COMMERCIAL

## 2020-11-19 DIAGNOSIS — Z20.822 SUSPECTED 2019 NOVEL CORONAVIRUS INFECTION: ICD-10-CM

## 2020-11-19 DIAGNOSIS — Z20.822 SUSPECTED 2019 NOVEL CORONAVIRUS INFECTION: Primary | ICD-10-CM

## 2020-11-19 PROCEDURE — 99207 PR NO CHARGE LOS: CPT

## 2020-11-19 PROCEDURE — U0003 INFECTIOUS AGENT DETECTION BY NUCLEIC ACID (DNA OR RNA); SEVERE ACUTE RESPIRATORY SYNDROME CORONAVIRUS 2 (SARS-COV-2) (CORONAVIRUS DISEASE [COVID-19]), AMPLIFIED PROBE TECHNIQUE, MAKING USE OF HIGH THROUGHPUT TECHNOLOGIES AS DESCRIBED BY CMS-2020-01-R: HCPCS | Performed by: FAMILY MEDICINE

## 2020-11-19 PROCEDURE — 99213 OFFICE O/P EST LOW 20 MIN: CPT | Mod: 95 | Performed by: FAMILY MEDICINE

## 2020-11-19 NOTE — PROGRESS NOTES
"David Wilcox is a 50 year old male who is being evaluated via a billable video visit.      The patient has been notified of following:     \"This video visit will be conducted via a call between you and your physician/provider. We have found that certain health care needs can be provided without the need for an in-person physical exam.  This service lets us provide the care you need with a video conversation.  If a prescription is necessary we can send it directly to your pharmacy.  If lab work is needed we can place an order for that and you can then stop by our lab to have the test done at a later time.    Video visits are billed at different rates depending on your insurance coverage.  Please reach out to your insurance provider with any questions.    If during the course of the call the physician/provider feels a video visit is not appropriate, you will not be charged for this service.\"    Patient has given verbal consent for Video visit? Yes  How would you like to obtain your AVS? MyChart  If you are dropped from the video visit, the video invite should be resent to: Text to cell phone: [phone]  Will anyone else be joining your video visit? No    Subjective     David Wilcox is a 50 year old male who presents today via video visit for the following health issues:    HPI       Concern for COVID-19  About how many days ago did these symptoms start? 3 days  Is this your first visit for this illness? Yes  In the 14 days before your symptoms started, have you had close contact with someone with COVID-19 (Coronavirus)? Yes, I have been in contact with someone who has symptoms of COVID-19/Coronavirus (no lab test done or waiting on results).  Do you have a fever or chills? No  Are you having new or worsening difficulty breathing? No  Do you have new or worsening cough? No  Have you had any new or unexplained body aches? No    Have you experienced any of the following NEW symptoms?    Headache: YES needle to eye " left sided    Sore throat: No    Loss of taste or smell: YES    Chest pain: No    Diarrhea: No    Rash: No  What treatments have you tried? Takes tylenol  Who do you live with? Wife and 2 children   Age 16 and 15  Are you, or a household member, a healthcare worker or a ? No  Do you live in a nursing home, group home, or shelter? No  Do you have a way to get food/medications if quarantined? Yes, I have a friend or family member who can help me.    1    Video Start Time: 1115    covid exposure 10 days ago.  Sx 3 days ago.  Loss of taste/smell.  Others in family with viral sx.     No fever.   Breathing OK    Review of Systems   No fever or sob      Objective             Physical Exam     GENERAL: Healthy, alert and no distress  EYES: Eyes grossly normal to inspection.  No discharge or erythema, or obvious scleral/conjunctival abnormalities.  RESP: No audible wheeze, cough, or visible cyanosis.  No visible retractions or increased work of breathing.    SKIN: Visible skin clear. No significant rash, abnormal pigmentation or lesions.  NEURO: Cranial nerves grossly intact.  Mentation and speech appropriate for age.  PSYCH: Mentation appears normal, affect normal/bright, judgement and insight intact, normal speech and appearance well-groomed.      {D        A: viral syndrome, possible covid    P: test ordered  Hopefully can get today with family, will call as they have some scheduled.          Video-Visit Details    Type of service:  Video Visit    Video End Time:11:20 AM    Originating Location (pt. Location): Home    Distant Location (provider location):  [facility]     Platform used for Video Visit: Ricardo

## 2020-11-20 DIAGNOSIS — M96.1 FAILED BACK SURGICAL SYNDROME: ICD-10-CM

## 2020-11-20 LAB
SARS-COV-2 RNA SPEC QL NAA+PROBE: NOT DETECTED
SPECIMEN SOURCE: NORMAL

## 2020-11-20 RX ORDER — OXYCODONE AND ACETAMINOPHEN 7.5; 325 MG/1; MG/1
TABLET ORAL
Qty: 70 TABLET | Refills: 0 | Status: SHIPPED | OUTPATIENT
Start: 2020-11-20 | End: 2020-12-16

## 2020-11-20 RX ORDER — FENTANYL 12.5 UG/1
1 PATCH TRANSDERMAL
Qty: 15 PATCH | Refills: 0 | Status: SHIPPED | OUTPATIENT
Start: 2020-11-20 | End: 2020-12-16

## 2020-11-20 NOTE — TELEPHONE ENCOUNTER
Informed patient that their refill of fentaNYL (DURAGESIC) 12 mcg/hr 72 hr patch and  oxyCODONE-acetaminophen (PERCOCET) 7.5-325 MG  were E-scribed to the pharmacy. Patient was informed of fill and start date.    Hamida Kelly [facility]  [place]

## 2020-11-20 NOTE — TELEPHONE ENCOUNTER
Medication refill information reviewed.     Due date for     fentaNYL (DURAGESIC) 12 mcg/hr 72 hr patch  oxyCODONE-acetaminophen (PERCOCET) 7.5-325 MG per tablet is 11/21/20     Prescriptions prepped for review.     Will route to provider.

## 2020-11-20 NOTE — TELEPHONE ENCOUNTER
Please remind him to call earlier for refill.    Script Eprescribed to pharmacy    Will send this to MA team to notify patient.    Signed Prescriptions:                        Disp   Refills    fentaNYL (DURAGESIC) 12 mcg/hr 72 hr patch 15 pat*0        Sig: Place 1 patch onto the skin every 48 hours remove old           patch. Fill 11/20/20 to start 11/21/20  Authorizing Provider: YADIRA CARRILLO    oxyCODONE-acetaminophen (PERCOCET) 7.5-325*70 tab*0        Sig: Take 0.5-1 tablets po q 6 hrs PRN pain. Max of 2/day,           and on 10 days of the month, max of 3/day. Fill           11/20/20 to start 11/21/20  Authorizing Provider: YADIRA CARRILLO MD  [facility]

## 2020-11-20 NOTE — TELEPHONE ENCOUNTER
Reason for call:  Medication   If this is a refill request, has the caller requested the refill from the pharmacy already? No  Will the patient be using a [facility]? No  Name of the pharmacy and phone number for the current request: [facility] [id number] [place] [address]    Name of the medication requested: fentaNYL (DURAGESIC) 12 mcg/hr 72 hr patch--------oxyCODONE-acetaminophen (PERCOCET) 7.5-325 MG per tablet    Other request: call when ready    Phone number to reach patient:  Home number on file [phone] (home)    Can we leave a detailed message on this number?  YES    Travel screening: Not Applicable         Cate CHACON    [facility]

## 2020-11-20 NOTE — TELEPHONE ENCOUNTER
Patient requesting refill(s) of fentaNYL (DURAGESIC) 12 mcg/hr 72 hr patch  Last dispensed from pharmacy on 10/20/2020    oxyCODONE-acetaminophen (PERCOCET) 7.5-325 MG per tablet  Last dispensed from pharmacy on 10/21/2020    Pt last seen by prescribing provider on 10/21/2020  Next appt scheduled for None      checked in the past 6 months? Yes If no, print current report and give to RN    Last urine drug screen date 02/18/2020  Current opioid agreement on file (completed within the last year) Yes Date of opioid agreement: 02/19/2020    E-prescribe to [facility] [id number] [place], [place] pharmacy    Will route to nursing pool for review and preparation of prescription(s).

## 2020-11-29 ENCOUNTER — HEALTH MAINTENANCE LETTER (OUTPATIENT)
Age: 50
End: 2020-11-29

## 2020-12-10 ENCOUNTER — TELEPHONE (OUTPATIENT)
Dept: PALLIATIVE MEDICINE | Facility: CLINIC | Age: 50
End: 2020-12-10

## 2020-12-10 NOTE — TELEPHONE ENCOUNTER
Patient called and stated he is going to be gone for a couple of months and we is wondering how he is suppose to get his refills for his medications           Briseyda Cerna    [facility]

## 2020-12-10 NOTE — TELEPHONE ENCOUNTER
Spoke to patient [place] and wanted Dr Motley to know he may need his prescriptions sent to an alternative pharmacy for the winter months. He is going to schedule a video visit with Dr Motley prior to leaving on his trip. Will route as an FYI.    JEREMIAH PeacockN, RN  Care Coordinator  [facility] [place]

## 2020-12-16 ENCOUNTER — VIRTUAL VISIT (OUTPATIENT)
Dept: PALLIATIVE MEDICINE | Facility: CLINIC | Age: 50
End: 2020-12-16
Payer: COMMERCIAL

## 2020-12-16 DIAGNOSIS — M96.1 FAILED BACK SURGICAL SYNDROME: ICD-10-CM

## 2020-12-16 DIAGNOSIS — M54.16 LUMBAR RADICULOPATHY: Primary | ICD-10-CM

## 2020-12-16 DIAGNOSIS — G89.4 CHRONIC PAIN SYNDROME: ICD-10-CM

## 2020-12-16 PROCEDURE — 99213 OFFICE O/P EST LOW 20 MIN: CPT | Mod: 95 | Performed by: PSYCHIATRY & NEUROLOGY

## 2020-12-16 RX ORDER — METHOCARBAMOL 500 MG/1
500-1000 TABLET, FILM COATED ORAL 3 TIMES DAILY PRN
Qty: 360 TABLET | Refills: 1 | Status: SHIPPED | OUTPATIENT
Start: 2020-12-16 | End: 2021-01-25

## 2020-12-16 RX ORDER — OXYCODONE AND ACETAMINOPHEN 7.5; 325 MG/1; MG/1
TABLET ORAL
Qty: 70 TABLET | Refills: 0 | Status: SHIPPED | OUTPATIENT
Start: 2020-12-16 | End: 2021-01-22

## 2020-12-16 RX ORDER — GABAPENTIN 600 MG/1
1200 TABLET ORAL 3 TIMES DAILY
Qty: 540 TABLET | Refills: 1 | Status: SHIPPED | OUTPATIENT
Start: 2020-12-16 | End: 2021-01-25

## 2020-12-16 RX ORDER — FENTANYL 12.5 UG/1
1 PATCH TRANSDERMAL
Qty: 15 PATCH | Refills: 0 | Status: SHIPPED | OUTPATIENT
Start: 2020-12-16 | End: 2021-01-22

## 2020-12-16 ASSESSMENT — PAIN SCALES - GENERAL: PAINLEVEL: MODERATE PAIN (5)

## 2020-12-16 NOTE — PATIENT INSTRUCTIONS
To Whom It May concern:  David Wilcox,  1970 is currently under my care at [facility], and will be traveling in the next few months. I am aware of this, and will be prescribing as permitted. He is prescribed opioids for chronic pain, and is able to receive 30 day supplies due to our long-standing doctor:patient relationship.    Bindu Motley MD  [facility]   [phone]  -------------------  Refills sent in for December.  Non-controlled substances can be transferred from pharmacy to pharmacy.    ----------------------------------------------------------------  Clinic Number:  [phone]     Call with any questions about your care and for scheduling assistance.     Calls are returned Monday through Friday between 8 AM and 4:30 PM. We usually get back to you within 2 business days depending on the issue/request.    If we are prescribing your medications:    For opioid medication refills, call the clinic or send a [employer] message 7 days in advance.  Please include:    Name of requested medication    Name of the pharmacy.    For non-opioid medications, call your pharmacy directly to request a refill. Please allow 3-4 days to be processed.     Per [place] State Law:    All controlled substance prescriptions must be filled within 30 days of being written.      For those controlled substances allowing refills, pickup must occur within 30 days of last fill.      We believe regular attendance is key to your success in our program!      Any time you are unable to keep your appointment we ask that you call us at least 24 hours in advance to cancel.This will allow us to offer the appointment time to another patient.     Multiple missed appointments may lead to dismissal from the clinic.

## 2020-12-16 NOTE — PROGRESS NOTES
"David Wilcox is a 50 year old male who is being evaluated via a billable video visit.      The patient has been notified of following:     \"This video visit will be conducted via a call between you and your physician/provider. We have found that certain health care needs can be provided without the need for an in-person physical exam.  This service lets us provide the care you need with a video conversation.  If a prescription is necessary we can send it directly to your pharmacy.  If lab work is needed we can place an order for that and you can then stop by our lab to have the test done at a later time.    Video visits are billed at different rates depending on your insurance coverage.  Please reach out to your insurance provider with any questions.    If during the course of the call the physician/provider feels a video visit is not appropriate, you will not be charged for this service.\"    Patient has given verbal consent for Video visit? Yes  How would you like to obtain your AVS? MyChart  If you are dropped from the video visit, the video invite should be resent to: Text to cell phone: [phone]  Will anyone else be joining your video visit? No    Belkis Durbin CMA (AAMA)      Video-Visit Details    Type of service:  Video Visit    Video Start Time: 1331  Video End Time: 1552    Originating Location (pt. Location): Home    Distant Location (provider location):  [facility]     Platform used for Video Visit: Ricardo Motley MD  [facility]                                  [facility]    Date of visit: 12/16/2020     Chief complaint:   Chief Complaint   Patient presents with     Pain     Video visit due to COVID-19     Interval history:  David Wilcox was last seen by me on 10/21/20    Recommendations/plan at the last visit included:  1. Physical Therapy: continue exercises as recommended by PT.   2. Clinical Health Psychologist " to address issues of relaxation, behavioral change, coping style, and other factors important to improvement.  Not at this time   3. Diagnostic Studies: none  4. Procedures: none  5. Medication Management:  No changes. Refills sent yesterday  6. Recommendations to PCP: none  7. Follow up: 3 months- in person       Since his last visit, David Wilcox reports:  -he has been doing pretty well.  -he bought a camper, and will be travelling in various states. Will need to  medications in different states.  -overall, doing ok  -no other new concerns/issues.    Pain scores:  Pain intensity on average is 5 on a scale of 0-10.     Current pain treatments:   Percocet 7.5/325mg daily- twice a day - consistent use   not currently taking due to winter months, changed to fentanyl  Fentanyl 12mcg every 48 hrs    Robaxin (methocarbamol) 1000mg at night and in the morning- the morning dose has been helping  Gabapentin 1200 mg TID, beneficial  Cymbalta 60mg daily- tried going to 90mg/day but felt worse- for pain and mood  Nortriptyline 20 mg at bedtime - continues to have dry mouth but wishes to continue  Ativan 0.5mg very sparingly (averages about once every 3-6 months for severe panic attacks.  Mirtazapine 15mg at night    Previous medication treatments included:  Hydrocodone- not helpful    Methadone- taking after 1st surgery  Naprosyn- not helpful  Flexeril- after first back injury (1990s)  Lidocaine- not helpful  Acetaminophen (tylenol)   Gabapentin  Fentanyl patch-12 mcg/48 hours - feels it works too strongly with heat/sun exposure    Other treatments have included:  David Wilcox has been seen at a pain clinic in the past.  MAPS- injections  PT: yes  Acupuncture: no  TENs Unit: yes- out of use- somewhat helpful  Injections: none since last surgery- very  helpful    Side Effects: None    Medications:  Current Outpatient Medications   Medication Sig Dispense Refill     albuterol (PROAIR HFA/PROVENTIL HFA/VENTOLIN HFA) 108  (90 Base) MCG/ACT inhaler INHALE 2 PUFFS INTO THE LUNGS EVERY 4 HOURS AS NEEDED FOR SHORTNESS OF BREATH OR DYSNPEA 8.5 Inhaler 1     DULoxetine (CYMBALTA) 60 MG capsule Take 1 capsule (60 mg) by mouth daily . 3 month supply 90 capsule 3     fentaNYL (DURAGESIC) 12 mcg/hr 72 hr patch Place 1 patch onto the skin every 48 hours remove old patch. Fill 12/19/20 to start 12/21/20 15 patch 0     gabapentin (NEURONTIN) 600 MG tablet Take 2 tablets (1,200 mg) by mouth 3 times daily . 3 month supply 540 tablet 1     LORazepam (ATIVAN) 0.5 MG tablet One bid prn anxiety. Rare use 30 tablet 0     methocarbamol (ROBAXIN) 500 MG tablet Take 1-2 tablets (500-1,000 mg) by mouth 3 times daily as needed for muscle spasms . 3 month supply 360 tablet 1     mirtazapine (REMERON) 15 MG tablet Take 1 tablet (15 mg) by mouth At Bedtime 90 tablet 3     nortriptyline (PAMELOR) 10 MG capsule Take 2 capsules (20 mg) by mouth At Bedtime 180 capsule 3     omeprazole (PRILOSEC) 20 MG DR capsule Take 1 capsule (20 mg) by mouth daily 90 capsule 3     oxyCODONE-acetaminophen (PERCOCET) 7.5-325 MG per tablet Take 0.5-1 tablets po q 6 hrs PRN pain. Max of 2/day, and on 10 days of the month, max of 3/day. Fill 12/19/20 to start 12/21/20 70 tablet 0     spacer (OPTICHAMBER MAT) holding chamber Use with Inhaler 1 each 0     TYLENOL EXTRA STRENGTH 500 MG OR TABS 3 po prn for pain       zolpidem (AMBIEN) 10 MG tablet TAKE 1 TABLET BY MOUTH EVERY DAY AT BEDTIME AS NEEDED FOR SLEEP 30 tablet 5     diclofenac (VOLTAREN) 1 % topical gel Place 4 g onto the skin 4 times daily (Patient not taking: Reported on 10/21/2020) 350 g 0     naloxone (NARCAN) 4 MG/0.1ML nasal spray Spray 1 spray (4 mg) into one nostril alternating nostrils as needed for opioid reversal every 2-3 minutes until assistance arrives (Patient not taking: Reported on 12/16/2020) 0.2 mL 0       Medical History: any changes in medical history since they were last seen? 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 Headaches- since stopping tobacco more daily  2. 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    3. Facet arthropathy likely contributing above the level of the future  4. Distant history of alcohol and drug abuse   5. Bipolar disorder- currently managed  6. H/o meningitis after SCS trial  7. Thoracic back pain, likely myofascial etiology.    I am aware that David will be traveling the next few months, and will be getting refills in other states.  She is on controlled substances for chronic pain.    Plan:  1. Physical Therapy: continue exercises as recommended by PT.   2. Clinical Health Psychologist to address issues of relaxation, behavioral change, coping style, and other factors important to improvement.  Not at this time   3. Diagnostic Studies: none  4. Procedures: none  5. Medication Management:    1. No changes. Refills sent  2. Advised that he carry meds on his trip in original container, and discussed medication safety.  3. He will need to notify us in advance of where he will be filling  4. Discussed using bigger chain stores, and even calling now to [employer] to see if they know information about where scripts can be sent.  6. Recommendations to PCP: none  7. Follow up: 3-4 months, when he returns.  virtual    Bindu Motley MD  [facility]

## 2020-12-21 ENCOUNTER — OFFICE VISIT (OUTPATIENT)
Dept: FAMILY MEDICINE | Facility: CLINIC | Age: 50
End: 2020-12-21
Payer: COMMERCIAL

## 2020-12-21 VITALS
DIASTOLIC BLOOD PRESSURE: 100 MMHG | RESPIRATION RATE: 16 BRPM | TEMPERATURE: 98.7 F | HEIGHT: 70 IN | BODY MASS INDEX: 37.22 KG/M2 | WEIGHT: 260 LBS | HEART RATE: 82 BPM | SYSTOLIC BLOOD PRESSURE: 170 MMHG

## 2020-12-21 DIAGNOSIS — G89.29 CHRONIC LOW BACK PAIN, UNSPECIFIED BACK PAIN LATERALITY, UNSPECIFIED WHETHER SCIATICA PRESENT: ICD-10-CM

## 2020-12-21 DIAGNOSIS — M54.50 CHRONIC LOW BACK PAIN, UNSPECIFIED BACK PAIN LATERALITY, UNSPECIFIED WHETHER SCIATICA PRESENT: ICD-10-CM

## 2020-12-21 DIAGNOSIS — F11.20 CONTINUOUS OPIOID DEPENDENCE (H): ICD-10-CM

## 2020-12-21 DIAGNOSIS — F51.01 PRIMARY INSOMNIA: ICD-10-CM

## 2020-12-21 DIAGNOSIS — F13.20 SEDATIVE, HYPNOTIC OR ANXIOLYTIC DEPENDENCE (H): ICD-10-CM

## 2020-12-21 DIAGNOSIS — E66.01 MORBID OBESITY (H): ICD-10-CM

## 2020-12-21 DIAGNOSIS — Z00.00 WELL ADULT EXAM: Primary | ICD-10-CM

## 2020-12-21 DIAGNOSIS — F33.42 RECURRENT MAJOR DEPRESSION IN COMPLETE REMISSION (H): ICD-10-CM

## 2020-12-21 DIAGNOSIS — Z23 NEED FOR PROPHYLACTIC VACCINATION AND INOCULATION AGAINST INFLUENZA: ICD-10-CM

## 2020-12-21 PROCEDURE — 99396 PREV VISIT EST AGE 40-64: CPT | Mod: 25 | Performed by: FAMILY MEDICINE

## 2020-12-21 PROCEDURE — 99214 OFFICE O/P EST MOD 30 MIN: CPT | Mod: 25 | Performed by: FAMILY MEDICINE

## 2020-12-21 PROCEDURE — G0008 ADMIN INFLUENZA VIRUS VAC: HCPCS | Performed by: FAMILY MEDICINE

## 2020-12-21 PROCEDURE — 90686 IIV4 VACC NO PRSV 0.5 ML IM: CPT | Performed by: FAMILY MEDICINE

## 2020-12-21 RX ORDER — ZOLPIDEM TARTRATE 10 MG/1
TABLET ORAL
Qty: 30 TABLET | Refills: 5 | Status: SHIPPED | OUTPATIENT
Start: 2020-12-21 | End: 2021-01-25

## 2020-12-21 ASSESSMENT — ENCOUNTER SYMPTOMS
CHILLS: 0
HEADACHES: 1
HEARTBURN: 0
HEMATOCHEZIA: 0
SHORTNESS OF BREATH: 1
JOINT SWELLING: 0
SORE THROAT: 0
ARTHRALGIAS: 0
FEVER: 0
PARESTHESIAS: 0
DIARRHEA: 1
NERVOUS/ANXIOUS: 1
COUGH: 0
DYSURIA: 0
DIZZINESS: 0
PALPITATIONS: 0
HEMATURIA: 0
MYALGIAS: 1
EYE PAIN: 0
CONSTIPATION: 1
FREQUENCY: 1
NAUSEA: 0
ABDOMINAL PAIN: 0
WEAKNESS: 0

## 2020-12-21 ASSESSMENT — ACTIVITIES OF DAILY LIVING (ADL): CURRENT_FUNCTION: NO ASSISTANCE NEEDED

## 2020-12-21 ASSESSMENT — MIFFLIN-ST. JEOR: SCORE: 2050.6

## 2020-12-21 NOTE — LETTER
[facility]  [address] [place] [id number]  Phone: [phone]  Fax: [phone]      December 21, 2020      RE: David Wilcox  [address] [facility] [address]        To whom it may concern:    David Wilcox is under my professional care.  He is going on a road camping trip and will be bringing his prescription medications with him.  I am enclosing a list of his meds.      He needs no assistance in organizing and self administering his medications, he has been doing that independently for years.    Please call if any questions.    Regan Llamas MD

## 2020-12-21 NOTE — PROGRESS NOTES
"SUBJECTIVE:   CC: aDvid Wilcox is an 50 year old male who presents for preventative health visit.     {Split Bill scripting  The purpose of this visit is to discuss your medical history and prevent health problems before you are sick. You may be responsible for a co-pay, coinsurance, or deductible if your visit today includes services such as checking on a sore throat, having an x-ray or lab test, or treating and evaluating a new or existing condition :835259}  Patient has been advised of split billing requirements and indicates understanding: {Yes and No:944149}  Healthy Habits:     In general, how would you rate your overall health?  Fair    Frequency of exercise:  2-3 days/week    Duration of exercise:  Less than 15 minutes    Do you usually eat at least 4 servings of fruit and vegetables a day, include whole grains    & fiber and avoid regularly eating high fat or \"junk\" foods?  Yes    Taking medications regularly:  Yes    Medication side effects:  Other    Ability to successfully perform activities of daily living:  No assistance needed    Home Safety:  Throw rugs in the hallway    Hearing Impairment:  No hearing concerns    In the past 6 months, have you been bothered by leaking of urine?  No    In general, how would you rate your overall mental or emotional health?  Fair      PHQ-2 Total Score: 2    Additional concerns today:  No    {Add if <65 person on Medicare  - Required Questions (Optional):713274}  {Outside tests to abstract? :994344}    {additional problems to add (Optional):915588}    Today's PHQ-2 Score:   PHQ-2 ( 1999 Pfizer) 12/21/2020   Q1: Little interest or pleasure in doing things 1   Q2: Feeling down, depressed or hopeless 1   PHQ-2 Score 2   Q1: Little interest or pleasure in doing things Several days   Q2: Feeling down, depressed or hopeless Several days   PHQ-2 Score 2       Abuse: Current or Past(Physical, Sexual or Emotional)- { :277776}  Do you feel safe in your environment? { " ":555489}        Social History     Tobacco Use     Smoking status: Former Smoker     Packs/day: 0.50     Types: Cigarettes, Cigars     Smokeless tobacco: Never Used     Tobacco comment: <1/2 pack per day   Substance Use Topics     Alcohol use: Yes     Alcohol/week: 0.0 standard drinks     Comment: rare     {Rooming Staff- Complete this question if Prescreen response is not shown below for today's visit. If you drink alcohol do you typically have >3 drinks per day or >7 drinks per week? (Optional):868581}    Alcohol Use 12/21/2020   Prescreen: >3 drinks/day or >7 drinks/week? Not Applicable   Prescreen: >3 drinks/day or >7 drinks/week? -   {add AUDIT responses (Optional) (A score of 7 for adult men is an indication of hazardous drinking; a score of 8 or more is an indication of an alcohol use disorder.  A score of 7 or more for adult women is an indication of hazardous drinking or an alchohol use disorder):590758}    Last PSA: No results found for: PSA    Reviewed orders with patient. Reviewed health maintenance and updated orders accordingly - { :767897::\"Yes\"}  {Chronicprobdata (optional):573799}    Reviewed and updated as needed this visit by clinical staff                 Reviewed and updated as needed this visit by Provider                {HISTORY OPTIONS (Optional):990623}    Review of Systems   Constitutional: Negative for chills and fever.   HENT: Negative for congestion, ear pain, hearing loss and sore throat.    Eyes: Negative for pain and visual disturbance.   Respiratory: Positive for shortness of breath. Negative for cough.    Cardiovascular: Positive for peripheral edema. Negative for chest pain and palpitations.   Gastrointestinal: Positive for constipation and diarrhea. Negative for abdominal pain, heartburn, hematochezia and nausea.   Genitourinary: Positive for frequency and impotence. Negative for discharge, dysuria, genital sores, hematuria and urgency.   Musculoskeletal: Positive for myalgias. " "Negative for arthralgias and joint swelling.   Skin: Positive for rash.   Neurological: Positive for headaches. Negative for dizziness, weakness and paresthesias.   Psychiatric/Behavioral: Positive for mood changes. The patient is nervous/anxious.      {MALE ROS (Optional):322709::\"CONSTITUTIONAL: NEGATIVE for fever, chills, change in weight\",\"INTEGUMENTARY/SKIN: NEGATIVE for worrisome rashes, moles or lesions\",\"EYES: NEGATIVE for vision changes or irritation\",\"ENT: NEGATIVE for ear, mouth and throat problems\",\"RESP: NEGATIVE for significant cough or SOB\",\"CV: NEGATIVE for chest pain, palpitations or peripheral edema\",\"GI: NEGATIVE for nausea, abdominal pain, heartburn, or change in bowel habits\",\" male: negative for dysuria, hematuria, decreased urinary stream, erectile dysfunction, urethral discharge\",\"MUSCULOSKELETAL: NEGATIVE for significant arthralgias or myalgia\",\"NEURO: NEGATIVE for weakness, dizziness or paresthesias\",\"PSYCHIATRIC: NEGATIVE for changes in mood or affect\"}    OBJECTIVE:   There were no vitals taken for this visit.    Physical Exam  {Exam Choices (Optional):891883}    {Diagnostic Test Results (Optional):977116::\"Diagnostic Test Results:\",\"Labs reviewed in Epic\"}    ASSESSMENT/PLAN:   {Diag Picklist:371871}    Patient has been advised of split billing requirements and indicates understanding: {YES / NO:102927::\"Yes\"}  COUNSELING:   {MALE COUNSELING MESSAGES:691385::\"Reviewed preventive health counseling, as reflected in patient instructions\"}    Estimated body mass index is 37.31 kg/m  as calculated from the following:    Height as of 7/20/20: 1.778 m (5' 10\").    Weight as of 8/24/20: 117.9 kg (260 lb).     {Weight Management Plan (ACO) Complete if BMI is abnormal-  Ages 18-64  BMI >24.9.  Age 65+ with BMI <23 or >30 (Optional):202858}    He reports that he has quit smoking. His smoking use included cigarettes and cigars. He smoked 0.50 packs per day. He has never used smokeless " tobacco.      Counseling Resources:  ATP IV Guidelines  Pooled Cohorts Equation Calculator  FRAX Risk Assessment  ICSI Preventive Guidelines  Dietary Guidelines for Americans, 2010  [employer] MyPlate  ASA Prophylaxis  Lung CA Screening    Regan Llamas MD  [facility]

## 2020-12-21 NOTE — PROGRESS NOTES
"SUBJECTIVE:   CC: David Wilcox is an 50 year old male who presents for preventative health visit.         Healthy Habits:     In general, how would you rate your overall health?  Fair    Frequency of exercise:  2-3 days/week    Duration of exercise:  Less than 15 minutes    Do you usually eat at least 4 servings of fruit and vegetables a day, include whole grains    & fiber and avoid regularly eating high fat or \"junk\" foods?  Yes    Taking medications regularly:  Yes    Medication side effects:  Other    Ability to successfully perform activities of daily living:  No assistance needed    Home Safety:  Throw rugs in the hallway    Hearing Impairment:  No hearing concerns    In the past 6 months, have you been bothered by leaking of urine?  No    In general, how would you rate your overall mental or emotional health?  Fair      PHQ-2 Total Score: 2    Additional concerns today:  No          Leaving in a week to travel going to be gone for 2 months or longer, wanting refills on medications and wanting a letter to show he is competent to be traveling alone      Depression; stable on meds   chronic low back pain: opioids from pain clinic.  Stable use  Opioid use, continuous  Sedative use: insomnia, nightly ambien  Obesity :ongoing            Today's PHQ-2 Score:   PHQ-2 ( 1999 Pfizer) 12/21/2020   Q1: Little interest or pleasure in doing things 1   Q2: Feeling down, depressed or hopeless 1   PHQ-2 Score 2   Q1: Little interest or pleasure in doing things Several days   Q2: Feeling down, depressed or hopeless Several days   PHQ-2 Score 2       Abuse: Current or Past(Physical, Sexual or Emotional)- NA  Do you feel safe in your environment? Yes        Social History     Tobacco Use     Smoking status: Former Smoker     Packs/day: 0.50     Types: Cigarettes, Cigars     Smokeless tobacco: Never Used     Tobacco comment: <1/2 pack per day   Substance Use Topics     Alcohol use: Yes     Alcohol/week: 0.0 standard drinks     " "Comment: rare         Alcohol Use 12/21/2020   Prescreen: >3 drinks/day or >7 drinks/week? Not Applicable   Prescreen: >3 drinks/day or >7 drinks/week? -       Last PSA: No results found for: PSA      Reviewed and updated as needed this visit by clinical staff  Tobacco  Allergies    Med Hx  Surg Hx  Fam Hx  Soc Hx        Reviewed and updated as needed this visit by Provider                  Review of Systems   Constitutional: Negative for chills and fever.   HENT: Negative for congestion, ear pain, hearing loss and sore throat.    Eyes: Negative for pain and visual disturbance.   Respiratory: Positive for shortness of breath. Negative for cough.    Cardiovascular: Positive for peripheral edema. Negative for chest pain and palpitations.   Gastrointestinal: Positive for constipation and diarrhea. Negative for abdominal pain, heartburn, hematochezia and nausea.   Genitourinary: Positive for frequency and impotence. Negative for discharge, dysuria, genital sores, hematuria and urgency.   Musculoskeletal: Positive for myalgias. Negative for arthralgias and joint swelling.   Skin: Positive for rash.   Neurological: Positive for headaches. Negative for dizziness, weakness and paresthesias.   Psychiatric/Behavioral: Positive for mood changes. The patient is nervous/anxious.        OBJECTIVE:   BP (!) 170/100   Pulse 82   Temp 98.7  F (37.1  C) (Tympanic)   Resp 16   Ht 1.778 m (5' 10\")   Wt 117.9 kg (260 lb)   BMI 37.31 kg/m      Physical Exam  GEN: Alert and oriented, in no acute distress  CV: RRR, no murmur  RESP: lungs clear bilaterally, good effort  EXT: no edema or lesions noted in lower extremities  Walks with cane, some chronic foot drop  No rash    ASSESSMENT/PLAN:   Well exam  Depression; stable on meds   chronic low back pain: opioids from pain clinic.  Stable use  Opioid use, continuous  Sedative use: insomnia, nightly ambien  Obesity :ongoing, morbid     Fills.  Continue meds.  Letter written and med " "list given. Flu shot given as well    Another 6 months on ambien.  Back in 6 mo     Patient has been advised of split billing requirements and indicates understanding: Yes  COUNSELING:   Reviewed preventive health counseling, as reflected in patient instructions       Regular exercise       Healthy diet/nutrition    Estimated body mass index is 37.31 kg/m  as calculated from the following:    Height as of 7/20/20: 1.778 m (5' 10\").    Weight as of 8/24/20: 117.9 kg (260 lb).     Weight management plan: diete/xercise    He reports that he has quit smoking. His smoking use included cigarettes and cigars. He smoked 0.50 packs per day. He has never used smokeless tobacco.          Regan Llamas MD  [facility]  "

## 2020-12-23 ENCOUNTER — TELEPHONE (OUTPATIENT)
Dept: FAMILY MEDICINE | Facility: CLINIC | Age: 50
End: 2020-12-23

## 2020-12-23 NOTE — TELEPHONE ENCOUNTER
Reason for Call:  Other call back    Detailed comments: pharmacy says pt states is ok to get zolpidem early per his dr  they just need to verify  Phone Number Patient can be reached at: Home number on file [phone]    Best Time: asap    Can we leave a detailed message on this number? YES    Call taken on 12/23/2020 at 9:14 AM by Gisela Pearl

## 2021-01-21 ENCOUNTER — TELEPHONE (OUTPATIENT)
Dept: PALLIATIVE MEDICINE | Facility: CLINIC | Age: 51
End: 2021-01-21

## 2021-01-21 DIAGNOSIS — M96.1 FAILED BACK SURGICAL SYNDROME: ICD-10-CM

## 2021-01-21 NOTE — TELEPHONE ENCOUNTER
Reason for call:  Medication   If this is a refill request, has the caller requested the refill from the pharmacy already? No  Will the patient be using a [facility]? No  Name of the pharmacy and phone number for the current request: [facility] [place]    Name of the medication requested:   oxyCODONE-acetaminophen (PERCOCET) 7.5-325 MG per tablet    fentaNYL (DURAGESIC) 12 mcg/hr 72 hr patch    Other request: NONE    Phone number to reach patient:  Cell number on file:    Telephone Information:   Mobile [phone]       Best Time:  ANYTIME    Can we leave a detailed message on this number?  YES    Travel screening: Not Applicable     Stacey NEELY    [facility]

## 2021-01-22 RX ORDER — FENTANYL 12.5 UG/1
1 PATCH TRANSDERMAL
Qty: 15 PATCH | Refills: 0 | Status: SHIPPED | OUTPATIENT
Start: 2021-01-22 | End: 2021-02-17

## 2021-01-22 RX ORDER — OXYCODONE AND ACETAMINOPHEN 7.5; 325 MG/1; MG/1
TABLET ORAL
Qty: 70 TABLET | Refills: 0 | Status: SHIPPED | OUTPATIENT
Start: 2021-01-22 | End: 2021-02-17

## 2021-01-22 NOTE — TELEPHONE ENCOUNTER
jeff message regarding medication refills sent. Johnny/MA  [facility] [place]

## 2021-01-22 NOTE — TELEPHONE ENCOUNTER
Received call from patient requesting refill(s) of oxyCODONE-acetaminophen (PERCOCET) 7.5-325 MG per tablet     Last dispensed from pharmacy on 12/19/2020    Patient's last office/virtual visit by prescribing provider on 12/16/2020  Next office/virtual appointment scheduled for not listed    Last urine drug screen date: future order placed on 2/18/2020, was not completed  Current opioid agreement on file (completed within the last year) Yes Date of opioid agreement: 2/19/2020    E-prescribe to [facility] pharmacy    Will route to nursing Jasper for review and preparation of prescription(s).       Received call from patient requesting refill(s) of fentaNYL (DURAGESIC) 12 mcg/hr 72 hr patch        Last dispensed from pharmacy on 12/19/2020    Patient's last office/virtual visit by prescribing provider on 12/16/2020  Next office/virtual appointment scheduled for non listed    Last urine drug screen date: future order placed on 2/18/2020, was not completed  Current opioid agreement on file (completed within the last year) Yes Date of opioid agreement: 2/19/2020    E-prescribe to [facility]  pharmacy    Will route to nursing Jasper for review and preparation of prescription(s).

## 2021-01-22 NOTE — TELEPHONE ENCOUNTER
Medication refill information reviewed.     Due date for percocet and fentanyl patches is anytime after 1/20/21    Prescriptions prepped for review.     Will route to provider. Will be good to remind patient to call about a week ahead of time for refills to avoid any gaps in his medications.     JEREMIAH GrissomN, RN-BC  Patient Care Supervisor  [facility] [place]

## 2021-01-22 NOTE — TELEPHONE ENCOUNTER
Signed Prescriptions:                        Disp   Refills    fentaNYL (DURAGESIC) 12 mcg/hr 72 hr patch 15 pat*0        Sig: Place 1 patch onto the skin every 48 hours remove old           patch. May fill and start today, 1/22/21  Authorizing Provider: INA LOPEZ    oxyCODONE-acetaminophen (PERCOCET) 7.5-325*70 tab*0        Sig: Take 0.5-1 tablets po q 6 hrs PRN pain. Max of 2/day,           and on 10 days of the month, max of 3/day. May           fill and start today, 1/22/21  Authorizing Provider: INA LOPEZ    Reviewed [place]  January 22, 2021- no concerning fills.  Signed for colleague who is out of the office today.   Please remind patient to call 5-7 days in advance for refills to avoid gaps in his medication. Thanks.    Ina TORIBIO, RN CNP, FNP  [facility]  [facility]

## 2021-01-25 DIAGNOSIS — F33.42 RECURRENT MAJOR DEPRESSION IN COMPLETE REMISSION (H): ICD-10-CM

## 2021-01-25 DIAGNOSIS — M54.16 LUMBAR RADICULOPATHY: ICD-10-CM

## 2021-01-25 DIAGNOSIS — M96.1 FAILED BACK SURGICAL SYNDROME: ICD-10-CM

## 2021-01-25 DIAGNOSIS — J30.81 ALLERGIC RHINITIS DUE TO ANIMALS: ICD-10-CM

## 2021-01-25 DIAGNOSIS — G89.4 CHRONIC PAIN SYNDROME: ICD-10-CM

## 2021-01-25 DIAGNOSIS — F51.01 PRIMARY INSOMNIA: ICD-10-CM

## 2021-01-25 DIAGNOSIS — K21.9 GASTROESOPHAGEAL REFLUX DISEASE WITHOUT ESOPHAGITIS: ICD-10-CM

## 2021-01-25 NOTE — TELEPHONE ENCOUNTER
Patient called and stated his prescription is needing a PA. He stated they only gave him a 7 day supply          Briseyda Cerna    [facility]

## 2021-01-26 ENCOUNTER — TELEPHONE (OUTPATIENT)
Dept: PALLIATIVE MEDICINE | Facility: CLINIC | Age: 51
End: 2021-01-26

## 2021-01-26 NOTE — TELEPHONE ENCOUNTER
Prior Authorization Retail Medication Request    Medication/Dose: oxyCODONE-acetaminophen (PERCOCET)  ICD code (if different than what is on RX):    Previously Tried and Failed:    Rationale:      Insurance Name:    Insurance ID:  [id number]      Pharmacy Information     [facility]  [address] [place] [id number]  Phone: [phone] Fax: [phone]

## 2021-01-26 NOTE — TELEPHONE ENCOUNTER
Central Prior Authorization Team   Phone: [phone]      Prior Authorization Not Needed per Insurance    01/26/2021  Medication: oxyCODONE-acetaminophen (PERCOCET) 7.5-325 MG per tablet - NOT NEEDED  Insurance Company: [employer] - Phone [phone] Fax [phone]  Expected CoPay:      Pharmacy Filling the Rx: [id number] - [place], [place] - [address] [place] [address] AT [facility] & [address]  Pharmacy Notified: Yes  Patient Notified: Yes (**Instructed pharmacy to notify patient when script is ready to /ship.**)    Pharmacy received a paid claim on 01/25/2021.

## 2021-01-26 NOTE — TELEPHONE ENCOUNTER
PA started in new encounter.    Iza Retana [facility]   Pain Management [place]

## 2021-01-26 NOTE — TELEPHONE ENCOUNTER
"Requested Prescriptions   Pending Prescriptions Disp Refills     albuterol (PROAIR HFA/PROVENTIL HFA/VENTOLIN HFA) 108 (90 Base) MCG/ACT inhaler 8.5 Inhaler 1       Asthma Maintenance Inhalers - Anticholinergics Passed - 1/25/2021  2:10 PM        Passed - Patient is age 12 years or older        Passed - Recent (12 mo) or future (30 days) visit within the authorizing provider's specialty     Patient has had an office visit with the authorizing provider or a provider within the authorizing providers department within the previous 12 mos or has a future within next 30 days. See \"Patient Info\" tab in inbasket, or \"Choose Columns\" in Meds & Orders section of the refill encounter.              Passed - Medication is active on med list       Short-Acting Beta Agonist Inhalers Protocol  Passed - 1/25/2021  2:10 PM        Passed - Patient is age 12 or older        Passed - Recent (12 mo) or future (30 days) visit within the authorizing provider's specialty     Patient has had an office visit with the authorizing provider or a provider within the authorizing providers department within the previous 12 mos or has a future within next 30 days. See \"Patient Info\" tab in inbasket, or \"Choose Columns\" in Meds & Orders section of the refill encounter.              Passed - Medication is active on med list           DULoxetine (CYMBALTA) 60 MG capsule 90 capsule      Sig: Take 1 capsule (60 mg) by mouth daily . 3 month supply       Serotonin-Norepinephrine Reuptake Inhibitors  Failed - 1/25/2021  2:10 PM        Failed - Blood pressure under 140/90 in past 12 months     BP Readings from Last 3 Encounters:   12/21/20 (!) 170/100   08/24/20 126/68   07/20/20 (!) 136/90                 Failed - PHQ-9 score of less than 5 in past 6 months     Please review last PHQ-9 score.           Passed - Medication is active on med list        Passed - Patient is age 18 or older        Passed - Recent (6 mo) or future (30 days) visit within the " "authorizing provider's specialty     Patient had office visit in the last 6 months or has a visit in the next 30 days with authorizing provider or within the authorizing provider's specialty.  See \"Patient Info\" tab in inbasket, or \"Choose Columns\" in Meds & Orders section of the refill encounter.               gabapentin (NEURONTIN) 600 MG tablet 540 tablet      Sig: Take 2 tablets (1,200 mg) by mouth 3 times daily . 3 month supply       There is no refill protocol information for this order        methocarbamol (ROBAXIN) 500 MG tablet 360 tablet      Sig: Take 1-2 tablets (500-1,000 mg) by mouth 3 times daily as needed for muscle spasms . 3 month supply       There is no refill protocol information for this order        mirtazapine (REMERON) 15 MG tablet 90 tablet      Sig: Take 1 tablet (15 mg) by mouth At Bedtime       Atypical Antidepressants Protocol Failed - 1/25/2021  2:10 PM        Failed - Patient has PHQ-9 score less than 5 in past 6 months.     Please review last PHQ-9 score.           Passed - Medication active on med list        Passed - Patient is age 18 or older        Passed - Recent (6 mo) or future (30 days) visit within the authorizing provider's specialty     Patient had office visit in the last 6 months or has a visit in the next 30 days with authorizing provider or within the authorizing provider's specialty.  See \"Patient Info\" tab in inbasket, or \"Choose Columns\" in Meds & Orders section of the refill encounter.               nortriptyline (PAMELOR) 10 MG capsule 180 capsule      Sig: Take 2 capsules (20 mg) by mouth At Bedtime       Tricyclic Agents ( Annual appt and no PHQ9) Failed - 1/25/2021  2:10 PM        Failed - Blood Pressure under 140/90 in past 12 mos     BP Readings from Last 3 Encounters:   12/21/20 (!) 170/100   08/24/20 126/68   07/20/20 (!) 136/90                 Passed - Recent (12 mo) or future (30 days) visit within authorizing provider's specialty     Patient has had an " "office visit with the authorizing provider or a provider within the authorizing providers department within the previous 12 mos or has a future within next 30 days. See \"Patient Info\" tab in inbasket, or \"Choose Columns\" in Meds & Orders section of the refill encounter.              Passed - Medication is active on med list        Passed - Patient is age 18 or older           omeprazole (PRILOSEC) 20 MG DR capsule 90 capsule      Sig: Take 1 capsule (20 mg) by mouth daily       PPI Protocol Passed - 1/25/2021  2:10 PM        Passed - Not on Clopidogrel (unless Pantoprazole ordered)        Passed - No diagnosis of osteoporosis on record        Passed - Recent (12 mo) or future (30 days) visit within the authorizing provider's specialty     Patient has had an office visit with the authorizing provider or a provider within the authorizing providers department within the previous 12 mos or has a future within next 30 days. See \"Patient Info\" tab in inbasket, or \"Choose Columns\" in Meds & Orders section of the refill encounter.              Passed - Medication is active on med list        Passed - Patient is age 18 or older           zolpidem (AMBIEN) 10 MG tablet 30 tablet      Sig: TAKE 1 TABLET BY MOUTH EVERY DAY AT BEDTIME AS NEEDED FOR SLEEP       There is no refill protocol information for this order          "

## 2021-01-26 NOTE — TELEPHONE ENCOUNTER
Spoke to pharmacy patient has not picked up medication yet, but patient will be able to get the full quantity (70 tablets)    [facility]   Pain Management [place]

## 2021-01-27 ENCOUNTER — TELEPHONE (OUTPATIENT)
Dept: PALLIATIVE MEDICINE | Facility: CLINIC | Age: 51
End: 2021-01-27

## 2021-01-28 ENCOUNTER — TELEPHONE (OUTPATIENT)
Dept: FAMILY MEDICINE | Facility: CLINIC | Age: 51
End: 2021-01-28

## 2021-01-28 DIAGNOSIS — G89.4 CHRONIC PAIN SYNDROME: ICD-10-CM

## 2021-01-28 RX ORDER — ALBUTEROL SULFATE 90 UG/1
AEROSOL, METERED RESPIRATORY (INHALATION)
Qty: 8.5 INHALER | Refills: 1 | Status: SHIPPED | OUTPATIENT
Start: 2021-01-28 | End: 2021-03-08

## 2021-01-28 RX ORDER — ZOLPIDEM TARTRATE 10 MG/1
TABLET ORAL
Qty: 30 TABLET | Refills: 5 | Status: SHIPPED | OUTPATIENT
Start: 2021-01-28 | End: 2021-07-30

## 2021-01-28 RX ORDER — NORTRIPTYLINE HCL 10 MG
20 CAPSULE ORAL AT BEDTIME
Qty: 180 CAPSULE | Refills: 1 | Status: SHIPPED | OUTPATIENT
Start: 2021-01-28 | End: 2021-07-28

## 2021-01-28 RX ORDER — GABAPENTIN 600 MG/1
1200 TABLET ORAL 3 TIMES DAILY
Qty: 540 TABLET | Refills: 1 | Status: SHIPPED | OUTPATIENT
Start: 2021-01-28 | End: 2021-07-26

## 2021-01-28 RX ORDER — DULOXETIN HYDROCHLORIDE 60 MG/1
60 CAPSULE, DELAYED RELEASE ORAL DAILY
Qty: 90 CAPSULE | Refills: 1 | Status: SHIPPED | OUTPATIENT
Start: 2021-01-28 | End: 2021-08-02

## 2021-01-28 RX ORDER — MIRTAZAPINE 15 MG/1
15 TABLET, FILM COATED ORAL AT BEDTIME
Qty: 90 TABLET | Refills: 1 | Status: SHIPPED | OUTPATIENT
Start: 2021-01-28 | End: 2021-12-15

## 2021-01-28 RX ORDER — METHOCARBAMOL 500 MG/1
500-1000 TABLET, FILM COATED ORAL 3 TIMES DAILY PRN
Qty: 360 TABLET | Refills: 1 | Status: SHIPPED | OUTPATIENT
Start: 2021-01-28 | End: 2021-02-01

## 2021-01-28 NOTE — TELEPHONE ENCOUNTER
Te Dc request for Drug Change  Methocarbamol 500 mg tablets are not covered by patient's plan. The preferred alternative is Tizanidine HCL. Please call/fax the pharmacy to change medication along with strength, Directions quantity and refills.

## 2021-02-17 ENCOUNTER — MYC REFILL (OUTPATIENT)
Dept: PALLIATIVE MEDICINE | Facility: CLINIC | Age: 51
End: 2021-02-17

## 2021-02-17 ENCOUNTER — MYC MEDICAL ADVICE (OUTPATIENT)
Dept: FAMILY MEDICINE | Facility: CLINIC | Age: 51
End: 2021-02-17

## 2021-02-17 DIAGNOSIS — M96.1 FAILED BACK SURGICAL SYNDROME: ICD-10-CM

## 2021-02-17 RX ORDER — OXYCODONE AND ACETAMINOPHEN 7.5; 325 MG/1; MG/1
TABLET ORAL
Qty: 70 TABLET | Refills: 0 | Status: SHIPPED | OUTPATIENT
Start: 2021-02-17 | End: 2021-03-29

## 2021-02-17 RX ORDER — FENTANYL 12.5 UG/1
1 PATCH TRANSDERMAL
Qty: 15 PATCH | Refills: 0 | Status: SHIPPED | OUTPATIENT
Start: 2021-02-17 | End: 2021-03-29

## 2021-02-17 NOTE — TELEPHONE ENCOUNTER
Refills have been requested for the following medications:         fentaNYL (DURAGESIC) 12 mcg/hr 72 hr patch [CATARINO Abdullahi CNP]         oxyCODONE-acetaminophen (PERCOCET) 7.5-325 MG per tablet [CATARINO Abdullahi CNP]     Preferred pharmacy: [facility] #[id number] - [place], [place] - [address] [place] [address] AT [facility] [address] [facility] [address]

## 2021-02-17 NOTE — TELEPHONE ENCOUNTER
Patient requesting refill(s) of fentaNYL (DURAGESIC) 12 mcg/hr 72 hr patch   Last dispensed from pharmacy on 01/26/2021    oxyCODONE-acetaminophen (PERCOCET) 7.5-325 MG per tablet  Last dispensed from pharmacy on 01/26/2021    Patient's last office/virtual visit by prescribing provider on 12/16/2020  Next office/virtual appointment scheduled for None     Last urine drug screen date 02/18/2020  Current opioid agreement on file (completed within the last year) Yes Date of opioid agreement: 12/19/2020    E-prescribe to [id number] - [place], [place] pharmacy    Will route to nursing [place] for review and preparation of prescription(s).

## 2021-02-17 NOTE — TELEPHONE ENCOUNTER
Script Eprescribed to pharmacy  [place] Prescription Monitoring Program checked    Will send this to MA team to notify patient.    Signed Prescriptions:                        Disp   Refills    fentaNYL (DURAGESIC) 12 mcg/hr 72 hr patch 15 pat*0        Sig: Place 1 patch onto the skin every 48 hours remove old           patch. Fill 02/23/21 to start on 02/25/21  Authorizing Provider: YADIRA CARRILLO    oxyCODONE-acetaminophen (PERCOCET) 7.5-325*70 tab*0        Sig: Take 0.5-1 tablets po q 6 hrs PRN pain. Max of 2/day,           and on 10 days of the month, max of 3/day. Fill           02/23/21 to start on 02/25/21  Authorizing Provider: YADIRA CARRILLO MD  [facility] Pain Management

## 2021-02-17 NOTE — TELEPHONE ENCOUNTER
Medication refill information reviewed.     Due date for     fentaNYL (DURAGESIC) 12 mcg/hr 72 hr patch     oxyCODONE-acetaminophen (PERCOCET) 7.5-325 MG per tablet is 02/25/21     Prescriptions prepped for review.     Will route to provider.

## 2021-02-18 NOTE — TELEPHONE ENCOUNTER
Spoke to patient, notified that prescriptions was sent to preferred pharmacy.    Able to fill 02/23/21 and start 02/25/21      Shayna Chase MA  [facility] Pain Management [place]

## 2021-02-22 ENCOUNTER — TELEPHONE (OUTPATIENT)
Dept: SURGERY | Facility: CLINIC | Age: 51
End: 2021-02-22

## 2021-02-22 ENCOUNTER — TELEPHONE (OUTPATIENT)
Dept: ORTHOPEDICS | Facility: CLINIC | Age: 51
End: 2021-02-22

## 2021-02-22 ENCOUNTER — TELEPHONE (OUTPATIENT)
Dept: PALLIATIVE MEDICINE | Facility: CLINIC | Age: 51
End: 2021-02-22

## 2021-02-22 NOTE — TELEPHONE ENCOUNTER
RECORDS RECEIVED FROM: Table saw accident - Partially severed Rt index fingertip through nail with an open displaced fx ED follow up. Patient seen at [facility] - xrays done on 2/21/2021.   DATE RECEIVED: Feb 23, 2021     NOTES STATUS DETAILS   OFFICE NOTE from referring provider Care Everywhere    OFFICE NOTE from other specialist N/A    DISCHARGE SUMMARY from hospital N/A    DISCHARGE REPORT from the ER Care Everywhere    OPERATIVE REPORT N/A    MEDICATION LIST Internal    IMPLANT RECORD/STICKER N/A    LABS     CBC/DIFF N/A    CULTURES N/A    INJECTIONS DONE IN RADIOLOGY N/A    MRI N/A    CT SCAN N/A    XRAYS (IMAGES & REPORTS) resolved 02/21/2021   TUMOR     PATHOLOGY  Slides & report N/A      02/22/21   3:22 PM   Called Debbie for images  Lupe Urrutia CMA

## 2021-02-22 NOTE — TELEPHONE ENCOUNTER
Per Dr. Brizuela-patient on her schedule for fracture of finger, patient needs to see ortho. Please call patient and [facility]rose.    Discussed and reviewed chart with ИВАН More to see if patient could be seen by [place] ortho provider.  Patient was seen in [facility] ED 02-, he was told to follow-up with Dr. Anguiano (hand specialist). Patient needs to follow-up with hand specialist    Call back to patient, patient informed he was scheduled with one of general surgeons at [place] and he should be seen by a hand specialist.  Patient got very upset that he was scheduled with wrong provider, informed patient per his ED notes they wanted him to see a hand specialist. Patient states he had an appointment scheduled with Dr. Anguiano([facility]) but wants to be seen within the [place] system and when he called he was told he could cancel and see Dr. Brizuela. I apolised  to patient, told him I would call our ortho  and have them get him scheduled within [place]. Patient still verbally  upset, but said he would wait for call back.    Call to [place] ortho , spoke with Stacey, she said she will send a High importance message to the schedulers at the  (for hand specialist) she will have them contact patient before the end of the day.    Call back to patient, informed of above. Patient states he will await call back. .........................Ina Hi CMA  ([facility])

## 2021-02-22 NOTE — TELEPHONE ENCOUNTER
Patient is extremely upset as he was scheduled with the wrong provider initially.  Needs to seen hand surgeon tomorrow per Debbie.  Patient needs to be called today for appointment. Ortho [facility] unable to help as no available appts for scheduling.  Pls reach out to patient regarding hand appointment.

## 2021-02-22 NOTE — TELEPHONE ENCOUNTER
"Routing to Dr Motley to review. Patient was in the ED 2/21/21 Notes below:    Patient Story: Pt was pushing wood through a table saw when fingers on right hand went through the saw. Pt has lac to right thumb, controled bleeding with Band-Aid. Pointer finger has tip partially severed through nail. Pt did take his night time meds before incident occurred and states \"I am currently heavily medicated\".     Use cephalexin and hydrocodone-apap as directed. Please follow-up with Dr Santos tomorrow or Tuesday. If your symptoms worsen you should return to the emergency department for further evaluation.   -----------------    JEREMIAH PeacockN, RN  Care Coordinator  [facility] Pain Management [place]        "

## 2021-02-22 NOTE — TELEPHONE ENCOUNTER
Reason for call:  Other   Patient called regarding (reason for call): call back  Additional comments: Pt callling to inform care team that he was in the ER last night after running his fingers through a table saw. Pt states he was prescribed hydrocodone and wanted to inform the care team. Pt also is looking for a recommendation for and orthopedic doctor through [employer].    Phone number to reach patient:  Cell number on file:    Telephone Information:   Mobile [phone]       Best Time:  anytime    Can we leave a detailed message on this number?  YES    Travel screening: Not Applicable     Stacey NEELY    [facility] Pain Management

## 2021-02-22 NOTE — TELEPHONE ENCOUNTER
Dr Vidal Rodriguez will work patient in tomorrow 9 AM.  Patient informed of date, time, location of appointment. Bere Wilson RN

## 2021-02-23 ENCOUNTER — TELEPHONE (OUTPATIENT)
Dept: PALLIATIVE MEDICINE | Facility: CLINIC | Age: 51
End: 2021-02-23

## 2021-02-23 ENCOUNTER — HOSPITAL ENCOUNTER (OUTPATIENT)
Facility: AMBULATORY SURGERY CENTER | Age: 51
End: 2021-02-23
Attending: PLASTIC SURGERY
Payer: COMMERCIAL

## 2021-02-23 ENCOUNTER — PRE VISIT (OUTPATIENT)
Dept: ORTHOPEDICS | Facility: CLINIC | Age: 51
End: 2021-02-23

## 2021-02-23 ENCOUNTER — OFFICE VISIT (OUTPATIENT)
Dept: ORTHOPEDICS | Facility: CLINIC | Age: 51
End: 2021-02-23
Payer: COMMERCIAL

## 2021-02-23 DIAGNOSIS — S62.660B OPEN NONDISPLACED FRACTURE OF DISTAL PHALANX OF RIGHT INDEX FINGER, INITIAL ENCOUNTER: Primary | ICD-10-CM

## 2021-02-23 DIAGNOSIS — Z11.59 ENCOUNTER FOR SCREENING FOR OTHER VIRAL DISEASES: ICD-10-CM

## 2021-02-23 PROCEDURE — 99203 OFFICE O/P NEW LOW 30 MIN: CPT | Performed by: PLASTIC SURGERY

## 2021-02-23 RX ORDER — CEFAZOLIN SODIUM 2 G/50ML
2 SOLUTION INTRAVENOUS SEE ADMIN INSTRUCTIONS
Status: CANCELLED | OUTPATIENT
Start: 2021-02-23

## 2021-02-23 RX ORDER — CEFAZOLIN SODIUM 2 G/50ML
2 SOLUTION INTRAVENOUS
Status: CANCELLED | OUTPATIENT
Start: 2021-02-23

## 2021-02-23 NOTE — TELEPHONE ENCOUNTER
Pt didn't call today  Please follow up tomorrow.  He is currently at Percocet 7.5mg/325, taking 1 every 4 hours.   I hope to wean back in next 1-2 days.  Please see where he is at.    Bindu Motley MD  [facility] Pain Management
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
## 2021-02-23 NOTE — TELEPHONE ENCOUNTER
Late documentation.  Spoke with patient yesterday.  He was not going to fill the hydrocodone.  Allowed him to use his short acting every 4 hours as needed, and follow up tomorrow to see if that worked.    Bindu Motley MD  [facility] Pain Management
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
## 2021-02-23 NOTE — TELEPHONE ENCOUNTER
FUTURE VISIT INFORMATION      SURGERY INFORMATION:    Date: 3.3.21    Location: Replaced by [facility]    Surgeon:  Dr. Vidal Rodriguez    Anesthesia Type:      Procedure:     Consult: 21    RECORDS REQUESTED FROM:       Primary Care Provider: Dr. Llamas    Most recent EKG+ Tracin20

## 2021-02-23 NOTE — NURSING NOTE
Reason For Visit:   Chief Complaint   Patient presents with     Consult For     Table saw accident - right index finger laceration and fracture       Primary MD: Regan Llamas    ?  No    Age: 50 year old    Date of injury: 2/21/21  Type of injury: Table saw injjury.  Date of surgery: NA  Type of surgery: NA.  Smoker: No (quit 1 year ago).  Request smoking cessation information: No      There were no vitals taken for this visit.      Pain Assessment  Patient Currently in Pain: Yes  0-10 Pain Scale: 9  Primary Pain Location: Finger (Comment which one)(Right index finger)               QuickDASH Assessment  No flowsheet data found.       Allergies   Allergen Reactions     Aspartame      A.k.a Nutrasweet.  Can take Truvia (stevia plant extract).     Food      Artifical sweetners-vomiting     Saccharin      Vomiting, diarrhea     Sucralose      A.k.a. Sheridan Velazco, ATC

## 2021-02-23 NOTE — NURSING NOTE
Teaching Flowsheet   Relevant Diagnosis: Right index finger tablesaw injury           Teaching Topic: Preop teaching for right index finger open reduction and internal fixation with nailbed repair     Person(s) involved in teaching:   Patient     Motivation Level:  Asks Questions: Yes  Eager to Learn: Yes  Cooperative: Yes  Receptive (willing/able to accept information): Yes  Any cultural factors/Hoahaoism beliefs that may influence understanding or compliance? No        Patient demonstrates understanding of the following:  Reason for the appointment, diagnosis and treatment plan: Yes  Knowledge of proper use of medications and conditions for which they are ordered (with special attention to potential side effects or drug interactions): Yes  Which situations necessitate calling provider and whom to contact: Yes        Teaching Concerns Addressed:   Needs PAC appointment prior to surgery as well as a Covid test.     Proper use and care of  (medical equip, care aids, etc.): Yes  Nutritional needs and diet plan: Yes  Pain management techniques: Yes, patient has a pain management provider and plan  Wound Care: Yes  How and/when to access community resources: Yes     Instructional Materials Used/Given: preop packet and soap     Time spent with patient: 15 minutes.

## 2021-02-23 NOTE — TELEPHONE ENCOUNTER
Patient called asking to speak to a nurse about his oxyCODONE-acetaminophen (PERCOCET) 7.5-325 MG per tablet. He stated it is NOT working          Briseyda Cerna    [place]

## 2021-02-24 NOTE — TELEPHONE ENCOUNTER
Spoke with David and he reports his pain level is high. He is scheduled for surgery next Wednesday and the bone is broken in 4 pieces. They are going to remove some of his finger and put in hardware.     He would like to continue on the increased dose until surgery if that is an option.     We discussed that his surgeon can manage his post op pain.     Routing to Dr Motley to review.     Gita Barrios, JEREMIAHN, RN  Care Coordinator  [facility] [place]

## 2021-02-24 NOTE — TELEPHONE ENCOUNTER
Called patient.  He has enough meds likely until surgery.    I am contacting his surgeon, as we may need to move some short acting into long acting to provide some flexibility for postoperative pain.    Note sent to Dr. Rodriguez.  Patient to reach out if he hasn't heard from me by Monday.    Bindu Motley MD  [facility]

## 2021-02-25 ENCOUNTER — TELEPHONE (OUTPATIENT)
Dept: PLASTIC SURGERY | Facility: CLINIC | Age: 51
End: 2021-02-25

## 2021-02-25 NOTE — TELEPHONE ENCOUNTER
This is being managed in a separate encounter    JEREMIAH PeacockN, RN  Care Coordinator  [facility] [place]

## 2021-02-25 NOTE — TELEPHONE ENCOUNTER
Surgery is scheduled with Dr. Rodriguez on 3/3 at [facility].  Scheduled per urgency.    H&P: to be completed by PAC: virtual visit 2/26    Additional appointments:   NO ADDITIONAL APPOINTMENTS NEEDED AT THIS TIME    COVID-19 test: 3/1 at [facility]     Post-op: 3/9 as a in person visit.    The RN completed the education regarding the surgery.     Patient will receive surgery arrival and start time from PAC.    The surgery packet was sent via US mail. and sent via [employer].    Patient will complete COVID-19 test that was scheduled by surgical coordinator 2-4 days prior to surgery.     I called the patient and was able to confirm the scheduled information above.

## 2021-02-25 NOTE — TELEPHONE ENCOUNTER
Surgery is scheduled with Dr. Rodriguez on 4/16 at the [facility] ([facility] .  Scheduled per orders, 6 weeks after first surgery.    H&P: to be completed by Surgeon    Additional appointments:   NO ADDITIONAL APPOINTMENTS NEEDED AT THIS TIME    COVID-19 test: 4/14 at [facility]     Post-op: 4/27 as a in person visit.    The RN completed the education regarding the surgery.     Patient will receive a phone call from pre-admission nurses 1-2 days prior to surgery with arrival and start time.    The surgery packet was sent via US mail. and sent via [employer].    Patient will complete COVID-19 test that was scheduled by surgical coordinator 2-4 days prior to surgery.     I called the patient and was able to confirm the scheduled information above.

## 2021-02-26 ENCOUNTER — VIRTUAL VISIT (OUTPATIENT)
Dept: SURGERY | Facility: CLINIC | Age: 51
End: 2021-02-26
Payer: COMMERCIAL

## 2021-02-26 ENCOUNTER — TELEPHONE (OUTPATIENT)
Dept: PALLIATIVE MEDICINE | Facility: CLINIC | Age: 51
End: 2021-02-26

## 2021-02-26 ENCOUNTER — MYC MEDICAL ADVICE (OUTPATIENT)
Dept: PALLIATIVE MEDICINE | Facility: CLINIC | Age: 51
End: 2021-02-26

## 2021-02-26 ENCOUNTER — PRE VISIT (OUTPATIENT)
Dept: SURGERY | Facility: CLINIC | Age: 51
End: 2021-02-26

## 2021-02-26 ENCOUNTER — ANESTHESIA EVENT (OUTPATIENT)
Dept: SURGERY | Facility: CLINIC | Age: 51
End: 2021-02-26
Payer: COMMERCIAL

## 2021-02-26 DIAGNOSIS — Z01.818 PREOP EXAMINATION: Primary | ICD-10-CM

## 2021-02-26 PROCEDURE — 99203 OFFICE O/P NEW LOW 30 MIN: CPT | Mod: 95 | Performed by: CLINICAL NURSE SPECIALIST

## 2021-02-26 ASSESSMENT — LIFESTYLE VARIABLES: TOBACCO_USE: 1

## 2021-02-26 ASSESSMENT — PAIN SCALES - GENERAL: PAINLEVEL: EXTREME PAIN (9)

## 2021-02-26 NOTE — TELEPHONE ENCOUNTER
Reason for call:  Other   Patient called regarding (reason for call): call back  Additional comments: Pt calling to state that his wife went to  his medications and the pharmacy told her they need a PA for oxyCODONE-acetaminophen (PERCOCET) 7.5-325 MG per tablet and fentaNYL (DURAGESIC) 12 mcg/hr 72 hr patch. Pt states he is due to start both today.    Phone number to reach patient:  Cell number on file:    Telephone Information:   Mobile [phone]       Best Time:  anytime    Can we leave a detailed message on this number?  YES    Travel screening: Not Applicable     Stacey NEELY    [facility]

## 2021-02-26 NOTE — TELEPHONE ENCOUNTER
Called [facility]  They are going to submit now. Fentanyl patch is going first, then fentanyl    Advised to give patient number  Lola- [phone]    Pt advised.    Bindu Motley MD  [facility]

## 2021-02-26 NOTE — PATIENT INSTRUCTIONS
Preparing for Your Surgery      Name:  David Wilcox   MRN:  [id number]   :  1970   Today's Date:  2021       Arriving for surgery:  Surgery date:  3/3/21  Arrival time:  1 pm    Restrictions due to COVID 19:  One consistent visitor per patient is allowed  No ill visitors  All visitors must wear face mask     parking is available for anyone with mobility limitations or disabilities.  ([place]  24 hours/ 7 days a week; [facility] [place] [place]  7 am- 3:30 pm, Mon- Fri)    Please come to:     [facility] [address]  [address] [place]  [address]     -    On arrival to hospital, you will be asked some screening questions and then directed to Patient Registration. Patient Registration will then give you further instructions.  [phone]?     - ?If you are in need of a wheelchair, please ask the Screener on arrival.    What can I eat or drink?  -  You may eat and drink normally for up to 8 hours before your surgery. (Until 7 am)  -  You may have clear liquids until 2 hours before surgery. (Until 1 pm)    Examples of clear liquids:  Water  Clear broth  Juices (apple, white grape, white cranberry  and cider) without pulp  Noncarbonated, powder based beverages  (lemonade and Eloy-Aid)  Sodas (Sprite, 7-Up, ginger ale and seltzer)  Coffee or tea (without milk or cream)  Gatorade    -  No Alcohol for at least 24 hours before surgery     Which medicines can I take?    Hold Aspirin for 7 days before surgery.   Hold Multivitamins for 7 days before surgery.  Hold Supplements for 7 days before surgery.  Hold Ibuprofen (Advil, Motrin) for 1 day before surgery--unless otherwise directed by surgeon.  Hold Naproxen (Aleve) for 4 days before surgery    -  PLEASE TAKE these medications the day of surgery:  Duloxetine (Cymbalta), Fentanyl patch, Gabapentin, Omeprazole, Methocarbamol  Albuterol inhaler if needed  Lorazepam (Ativan) if needed  Oxycodone-Acetaminophen  (Norco) if needed    How do I prepare myself?  - Please take 2 showers before surgery using Scrubcare or Hibiclens soap.    Use this soap only from the neck to your toes.     Leave the soap on your skin for one minute--then rinse thoroughly.      You may use your own shampoo and conditioner; no other hair products.   - Please remove all jewelry and body piercings.  - No lotions, deodorants or fragrance.  - Bring your ID and insurance card.    - All patients are required to have a Covid-19 test within 4 days of surgery/procedure.      -Patients will be contacted by the [facility] scheduling team within 1 week of surgery to make an appointment.      - Patients may call the Scheduling team at [phone] if they have not been scheduled within 4 days of  surgery.    ALL PATIENTS GOING HOME THE SAME DAY OF SURGERY ARE REQUIRED TO HAVE A RESPONSIBLE ADULT TO DRIVE AND BE IN ATTENDANCE WITH THEM FOR 24 HOURS FOLLOWING SURGERY     Questions or Concerns:    - For any questions regarding the day of surgery or your hospital stay, please contact the Pre Admission Nursing Office at [phone].       - If you have health changes between today and your surgery please call your surgeon.       For questions after surgery please call your surgeons office.     AFTER YOUR SURGERY  Breathing exercises   Breathing exercises help you recover faster. Take deep breaths and let the air out slowly. This will:     Help you wake up after surgery.    Help prevent complications like pneumonia.  Preventing complications will help you go home sooner.   Nausea and vomiting   You may feel sick to your stomach after surgery; if so, let your nurse know.    Pain control:  After surgery, you may have pain. Our goal is to help you manage your pain. Pain medicine will help you feel comfortable enough to do activities that will help you heal.  These activities may include breathing exercises, walking and physical therapy.   To help your health  care team treat your pain we will ask: 1) If you have pain  2) where it is located 3) describe your pain in your words  Methods of pain control include medications given by mouth, vein or by nerve block for some surgeries.  Sequential Compression Device (SCD):  You may need to wear SCD S (also called pneumo boots)on your legs or feet. These are wraps connected to a machine that pumps in air and releases it. The repeated pumping helps prevent blood clots from forming.

## 2021-02-26 NOTE — TELEPHONE ENCOUNTER
Central Prior Authorization Team   Phone: [phone]    PA Initiation    Medication:   Insurance Company: [employer] - Phone [phone] Fax [phone]  Pharmacy Filling the Rx: [id number] - [place], [place] - [facility] [address] AT [facility] & [address]  Filling Pharmacy Phone: [phone]  Filling Pharmacy Fax: [phone]  Start Date: 2/26/2021

## 2021-02-26 NOTE — TELEPHONE ENCOUNTER
Prior Authorization Retail Medication Request    Medication/Dose: oxyCODONE-acetaminophen (PERCOCET) 7.5-325 MG per tablet and fentaNYL (DURAGESIC) 12 mcg/hr 72 hr patch    Routing high priority as patient is due to start his refill today.    Gita Barrios, JEREMIAHN, RN  Care Coordinator  [facility] [place]

## 2021-02-26 NOTE — TELEPHONE ENCOUNTER
Central Prior Authorization Team   Phone: [phone]      PA NOT NEEDED    Medication: oxycodone-acetaminophinen-PA NOT NEEDED  Insurance Company: [employer] - Phone [phone] Fax [phone]  Pharmacy Filling the Rx: [id number] - [place], [place] - [address] [place] [address] AT [facility] & [address]  Filling Pharmacy Phone: [phone]  Filling Pharmacy Fax:    Start Date: 2/26/2021    Started PA on CMM and a response of Drug is covered by current benefit plan. No further PA activity needed.  Called pharmacy to see what rejection they are getting.  Per the pharmacy, PA is not needed for percocet.  The patient picked up the medication today. They are needing a PA on the fentaNYL patches which there is already a telephone encounter.

## 2021-02-26 NOTE — TELEPHONE ENCOUNTER
PA not needed.  Pharmacy states medication processed through insurance and will notify patient when ready.

## 2021-02-26 NOTE — TELEPHONE ENCOUNTER
Central Prior Authorization Team   Phone: [phone]    PA Initiation    Medication: fentaNYL (DURAGESIC) 12 mcg/hr 72 hr patch  Insurance Company: [employer] - Phone [phone] Fax [phone]  Pharmacy Filling the Rx: [id number] - [place], [place] - [address] [place] [address] AT [facility] & [address]  Filling Pharmacy Phone: [phone]  Filling Pharmacy Fax: [phone]  Start Date: 2/26/2021
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
## 2021-02-26 NOTE — TELEPHONE ENCOUNTER
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    Important Lab Values: ...  Rationale: ...    Insurance Name:   Coverage information:     Subscriber: [id number] RENY MOREL     Rel to sub: 01 - Self     Member ID: [id number]     Payor: [employer] Ph: [phone]     Benefit plan: [employer] Ph: [phone]     Group number:      Member effective dates: from 01/01/21          Pharmacy Information (if different than what is on RX)  Name:  ...  Phone:  ...

## 2021-02-26 NOTE — TELEPHONE ENCOUNTER
Prior Authorization Approval    Authorization Effective Date: 1/27/2021  Authorization Expiration Date: 2/26/2022  Medication: fentaNYL (DURAGESIC) 12 mcg/hr 72 hr patch-APPROVED  Approved Dose/Quantity:    Reference #:     Insurance Company: [employer] - Phone [phone] Fax [phone]  Expected CoPay:       CoPay Card Available:      Foundation Assistance Needed:    Which Pharmacy is filling the prescription (Not needed for infusion/clinic administered): [id number] - [address] AT [facility] & [address]  Pharmacy Notified: Yes  Patient Notified: Yes  ADVISED PATIENT PHARMACY WILL HAVE THE MEDICATION READY IN A HALF HOUR.

## 2021-02-26 NOTE — PROGRESS NOTES
David is a 50 year old who is being evaluated via a billable video visit.      How would you like to obtain your AVS? MyChart    Will anyone else be joining your video visit? No          HPI           Review of Systems         Objective    Vitals - Patient Reported  Pain Score: Extreme Pain (9)  Pain Loc: Hand        Physical Exam       CONCEPCION Piper LPN

## 2021-03-01 DIAGNOSIS — Z11.59 ENCOUNTER FOR SCREENING FOR OTHER VIRAL DISEASES: ICD-10-CM

## 2021-03-01 LAB
SARS-COV-2 RNA RESP QL NAA+PROBE: NORMAL
SPECIMEN SOURCE: NORMAL

## 2021-03-01 PROCEDURE — U0003 INFECTIOUS AGENT DETECTION BY NUCLEIC ACID (DNA OR RNA); SEVERE ACUTE RESPIRATORY SYNDROME CORONAVIRUS 2 (SARS-COV-2) (CORONAVIRUS DISEASE [COVID-19]), AMPLIFIED PROBE TECHNIQUE, MAKING USE OF HIGH THROUGHPUT TECHNOLOGIES AS DESCRIBED BY CMS-2020-01-R: HCPCS | Performed by: PLASTIC SURGERY

## 2021-03-01 PROCEDURE — U0005 INFEC AGEN DETEC AMPLI PROBE: HCPCS | Performed by: PLASTIC SURGERY

## 2021-03-02 LAB
LABORATORY COMMENT REPORT: NORMAL
SARS-COV-2 RNA RESP QL NAA+PROBE: NEGATIVE
SPECIMEN SOURCE: NORMAL

## 2021-03-03 ENCOUNTER — APPOINTMENT (OUTPATIENT)
Dept: GENERAL RADIOLOGY | Facility: CLINIC | Age: 51
End: 2021-03-03
Attending: PLASTIC SURGERY
Payer: COMMERCIAL

## 2021-03-03 ENCOUNTER — ANESTHESIA (OUTPATIENT)
Dept: SURGERY | Facility: CLINIC | Age: 51
End: 2021-03-03
Payer: COMMERCIAL

## 2021-03-03 ENCOUNTER — HOSPITAL ENCOUNTER (OUTPATIENT)
Facility: CLINIC | Age: 51
Discharge: HOME | End: 2021-03-03
Attending: PLASTIC SURGERY | Admitting: PLASTIC SURGERY
Payer: COMMERCIAL

## 2021-03-03 VITALS
SYSTOLIC BLOOD PRESSURE: 129 MMHG | BODY MASS INDEX: 38.59 KG/M2 | TEMPERATURE: 97.7 F | RESPIRATION RATE: 18 BRPM | WEIGHT: 254.63 LBS | DIASTOLIC BLOOD PRESSURE: 94 MMHG | OXYGEN SATURATION: 98 % | HEIGHT: 68 IN | HEART RATE: 83 BPM

## 2021-03-03 DIAGNOSIS — S62.660B OPEN NONDISPLACED FRACTURE OF DISTAL PHALANX OF RIGHT INDEX FINGER, INITIAL ENCOUNTER: ICD-10-CM

## 2021-03-03 LAB
CREAT SERPL-MCNC: 0.9 MG/DL (ref 0.66–1.25)
GFR SERPL CREATININE-BSD FRML MDRD: >90 ML/MIN/{1.73_M2}
GLUCOSE BLDC GLUCOMTR-MCNC: 89 MG/DL (ref 70–99)
HGB BLD-MCNC: 15.3 G/DL (ref 13.3–17.7)
POTASSIUM SERPL-SCNC: 4 MMOL/L (ref 3.4–5.3)

## 2021-03-03 PROCEDURE — 85018 HEMOGLOBIN: CPT | Performed by: CLINICAL NURSE SPECIALIST

## 2021-03-03 PROCEDURE — 360N000083 HC SURGERY LEVEL 3 W/ FLUORO, PER MIN: Performed by: PLASTIC SURGERY

## 2021-03-03 PROCEDURE — 272N000001 HC OR GENERAL SUPPLY STERILE: Performed by: PLASTIC SURGERY

## 2021-03-03 PROCEDURE — 84132 ASSAY OF SERUM POTASSIUM: CPT | Performed by: CLINICAL NURSE SPECIALIST

## 2021-03-03 PROCEDURE — 250N000011 HC RX IP 250 OP 636: Performed by: STUDENT IN AN ORGANIZED HEALTH CARE EDUCATION/TRAINING PROGRAM

## 2021-03-03 PROCEDURE — 370N000017 HC ANESTHESIA TECHNICAL FEE, PER MIN: Performed by: PLASTIC SURGERY

## 2021-03-03 PROCEDURE — 999N000179 XR SURGERY CARM FLUORO LESS THAN 5 MIN W STILLS: Mod: TC

## 2021-03-03 PROCEDURE — 250N000011 HC RX IP 250 OP 636: Performed by: PLASTIC SURGERY

## 2021-03-03 PROCEDURE — 82962 GLUCOSE BLOOD TEST: CPT

## 2021-03-03 PROCEDURE — 82565 ASSAY OF CREATININE: CPT | Performed by: CLINICAL NURSE SPECIALIST

## 2021-03-03 PROCEDURE — 999N000141 HC STATISTIC PRE-PROCEDURE NURSING ASSESSMENT: Performed by: PLASTIC SURGERY

## 2021-03-03 PROCEDURE — 710N000012 HC RECOVERY PHASE 2, PER MINUTE: Performed by: PLASTIC SURGERY

## 2021-03-03 PROCEDURE — 250N000011 HC RX IP 250 OP 636: Performed by: NURSE ANESTHETIST, CERTIFIED REGISTERED

## 2021-03-03 PROCEDURE — 36415 COLL VENOUS BLD VENIPUNCTURE: CPT | Performed by: CLINICAL NURSE SPECIALIST

## 2021-03-03 PROCEDURE — 258N000003 HC RX IP 258 OP 636: Performed by: ANESTHESIOLOGY

## 2021-03-03 PROCEDURE — 250N000009 HC RX 250: Performed by: STUDENT IN AN ORGANIZED HEALTH CARE EDUCATION/TRAINING PROGRAM

## 2021-03-03 DEVICE — IMPLANTABLE DEVICE: Type: IMPLANTABLE DEVICE | Site: FINGER | Status: FUNCTIONAL

## 2021-03-03 RX ORDER — CEFAZOLIN SODIUM 2 G/100ML
2 INJECTION, SOLUTION INTRAVENOUS
Status: COMPLETED | OUTPATIENT
Start: 2021-03-03 | End: 2021-03-03

## 2021-03-03 RX ORDER — FENTANYL CITRATE 50 UG/ML
25-50 INJECTION, SOLUTION INTRAMUSCULAR; INTRAVENOUS
Status: DISCONTINUED | OUTPATIENT
Start: 2021-03-03 | End: 2021-03-03 | Stop reason: HOSPADM

## 2021-03-03 RX ORDER — NALOXONE HYDROCHLORIDE 0.4 MG/ML
0.4 INJECTION, SOLUTION INTRAMUSCULAR; INTRAVENOUS; SUBCUTANEOUS
Status: DISCONTINUED | OUTPATIENT
Start: 2021-03-03 | End: 2021-03-03 | Stop reason: HOSPADM

## 2021-03-03 RX ORDER — PROPOFOL 10 MG/ML
INJECTION, EMULSION INTRAVENOUS PRN
Status: DISCONTINUED | OUTPATIENT
Start: 2021-03-03 | End: 2021-03-03

## 2021-03-03 RX ORDER — FENTANYL CITRATE 50 UG/ML
INJECTION, SOLUTION INTRAMUSCULAR; INTRAVENOUS PRN
Status: DISCONTINUED | OUTPATIENT
Start: 2021-03-03 | End: 2021-03-03

## 2021-03-03 RX ORDER — HYDROMORPHONE HYDROCHLORIDE 1 MG/ML
.3-.5 INJECTION, SOLUTION INTRAMUSCULAR; INTRAVENOUS; SUBCUTANEOUS EVERY 5 MIN PRN
Status: DISCONTINUED | OUTPATIENT
Start: 2021-03-03 | End: 2021-03-03 | Stop reason: HOSPADM

## 2021-03-03 RX ORDER — SODIUM CHLORIDE, SODIUM LACTATE, POTASSIUM CHLORIDE, CALCIUM CHLORIDE 600; 310; 30; 20 MG/100ML; MG/100ML; MG/100ML; MG/100ML
INJECTION, SOLUTION INTRAVENOUS CONTINUOUS
Status: DISCONTINUED | OUTPATIENT
Start: 2021-03-03 | End: 2021-03-03 | Stop reason: HOSPADM

## 2021-03-03 RX ORDER — PROPOFOL 10 MG/ML
INJECTION, EMULSION INTRAVENOUS CONTINUOUS PRN
Status: DISCONTINUED | OUTPATIENT
Start: 2021-03-03 | End: 2021-03-03

## 2021-03-03 RX ORDER — ONDANSETRON 2 MG/ML
4 INJECTION INTRAMUSCULAR; INTRAVENOUS EVERY 30 MIN PRN
Status: DISCONTINUED | OUTPATIENT
Start: 2021-03-03 | End: 2021-03-03 | Stop reason: HOSPADM

## 2021-03-03 RX ORDER — NALOXONE HYDROCHLORIDE 0.4 MG/ML
0.2 INJECTION, SOLUTION INTRAMUSCULAR; INTRAVENOUS; SUBCUTANEOUS
Status: DISCONTINUED | OUTPATIENT
Start: 2021-03-03 | End: 2021-03-03 | Stop reason: HOSPADM

## 2021-03-03 RX ORDER — BUPIVACAINE HYDROCHLORIDE AND EPINEPHRINE 5; 5 MG/ML; UG/ML
INJECTION, SOLUTION PERINEURAL PRN
Status: DISCONTINUED | OUTPATIENT
Start: 2021-03-03 | End: 2021-03-03

## 2021-03-03 RX ORDER — FLUMAZENIL 0.1 MG/ML
0.2 INJECTION, SOLUTION INTRAVENOUS
Status: DISCONTINUED | OUTPATIENT
Start: 2021-03-03 | End: 2021-03-03 | Stop reason: HOSPADM

## 2021-03-03 RX ORDER — CEFAZOLIN SODIUM 2 G/100ML
2 INJECTION, SOLUTION INTRAVENOUS SEE ADMIN INSTRUCTIONS
Status: DISCONTINUED | OUTPATIENT
Start: 2021-03-03 | End: 2021-03-03 | Stop reason: HOSPADM

## 2021-03-03 RX ORDER — LABETALOL HYDROCHLORIDE 5 MG/ML
10 INJECTION, SOLUTION INTRAVENOUS
Status: DISCONTINUED | OUTPATIENT
Start: 2021-03-03 | End: 2021-03-03 | Stop reason: HOSPADM

## 2021-03-03 RX ORDER — CEFAZOLIN SODIUM 2 G/100ML
2 INJECTION, SOLUTION INTRAVENOUS
Status: DISCONTINUED | OUTPATIENT
Start: 2021-03-03 | End: 2021-03-03 | Stop reason: HOSPADM

## 2021-03-03 RX ORDER — BUPIVACAINE HYDROCHLORIDE 2.5 MG/ML
INJECTION, SOLUTION INFILTRATION; PERINEURAL PRN
Status: DISCONTINUED | OUTPATIENT
Start: 2021-03-03 | End: 2021-03-03 | Stop reason: HOSPADM

## 2021-03-03 RX ORDER — OXYCODONE HYDROCHLORIDE 5 MG/1
5 TABLET ORAL
Status: DISCONTINUED | OUTPATIENT
Start: 2021-03-03 | End: 2021-03-03 | Stop reason: HOSPADM

## 2021-03-03 RX ORDER — HYDROXYZINE HYDROCHLORIDE 25 MG/1
25 TABLET, FILM COATED ORAL
Status: DISCONTINUED | OUTPATIENT
Start: 2021-03-03 | End: 2021-03-03 | Stop reason: HOSPADM

## 2021-03-03 RX ORDER — ONDANSETRON 4 MG/1
4 TABLET, ORALLY DISINTEGRATING ORAL EVERY 30 MIN PRN
Status: DISCONTINUED | OUTPATIENT
Start: 2021-03-03 | End: 2021-03-03 | Stop reason: HOSPADM

## 2021-03-03 RX ORDER — ACETAMINOPHEN 325 MG/1
650 TABLET ORAL
Status: DISCONTINUED | OUTPATIENT
Start: 2021-03-03 | End: 2021-03-03 | Stop reason: HOSPADM

## 2021-03-03 RX ORDER — LIDOCAINE 40 MG/G
CREAM TOPICAL
Status: DISCONTINUED | OUTPATIENT
Start: 2021-03-03 | End: 2021-03-03 | Stop reason: HOSPADM

## 2021-03-03 RX ADMIN — FENTANYL CITRATE 100 MCG: 50 INJECTION, SOLUTION INTRAMUSCULAR; INTRAVENOUS at 16:52

## 2021-03-03 RX ADMIN — CEFAZOLIN 2 G: 10 INJECTION, POWDER, FOR SOLUTION INTRAVENOUS at 16:48

## 2021-03-03 RX ADMIN — PROPOFOL 75 MCG/KG/MIN: 10 INJECTION, EMULSION INTRAVENOUS at 16:45

## 2021-03-03 RX ADMIN — FENTANYL CITRATE 100 MCG: 50 INJECTION, SOLUTION INTRAMUSCULAR; INTRAVENOUS at 17:19

## 2021-03-03 RX ADMIN — FENTANYL CITRATE 50 MCG: 50 INJECTION, SOLUTION INTRAMUSCULAR; INTRAVENOUS at 14:58

## 2021-03-03 RX ADMIN — BUPIVACAINE HYDROCHLORIDE AND EPINEPHRINE BITARTRATE 30 ML: 5; .005 INJECTION, SOLUTION EPIDURAL; INTRACAUDAL; PERINEURAL at 14:58

## 2021-03-03 RX ADMIN — FENTANYL CITRATE 50 MCG: 50 INJECTION, SOLUTION INTRAMUSCULAR; INTRAVENOUS at 17:59

## 2021-03-03 RX ADMIN — SODIUM CHLORIDE, POTASSIUM CHLORIDE, SODIUM LACTATE AND CALCIUM CHLORIDE: 600; 310; 30; 20 INJECTION, SOLUTION INTRAVENOUS at 16:35

## 2021-03-03 RX ADMIN — MIDAZOLAM 2 MG: 1 INJECTION INTRAMUSCULAR; INTRAVENOUS at 16:37

## 2021-03-03 ASSESSMENT — MIFFLIN-ST. JEOR: SCORE: 1994.5

## 2021-03-03 NOTE — PROGRESS NOTES
Temp:  [97.9  F (36.6  C)] 97.9  F (36.6  C)  Pulse:  [65-79] 79  Resp:  [10-22] 10  BP: (116-174)/() 150/100  SpO2:  [98 %-100 %] 100 %    Right brachial plexus nerve block performed without complications, zero mg versed, 50 mcg fentanyl given.  NSR, VSS, 2L O2 via NC.  Pt tolerated well.  Will continue to monitor.

## 2021-03-03 NOTE — PROGRESS NOTES
SPIRITUAL HEALTH SERVICES  [facility] 3C   PRE-SURGERY VISIT    Had pre-surgery visit with pt and spouse April.  Provided spiritual support, prayer.     Nika Schilling  Chaplain Resident  Pager: [phone]

## 2021-03-03 NOTE — ANESTHESIA PROCEDURE NOTES
Pre-Procedure   Staff -   Anesthesiologist:  Adán Llamas DO  Resident/Fellow: Wanda Salinas MD  Performed By: resident  Location: pre-op  Pre-Anesthestic Checklist: patient identified, IV checked, site marked, risks and benefits discussed, informed consent, monitors and equipment checked, pre-op evaluation, at physician/surgeon's request and post-op pain management  Timeout:  Correct Patient: Yes   Correct Procedure: Yes   Correct Site: Yes   Correct Position: Yes   Correct Laterality: Yes   Site Marked: Yes    Procedure Documentation  Procedure: Supraclavicular  Diagnosis: POST OPERATIVE PAIN  Laterality: right  Patient Position:supine  Patient Prep/Sterile Barriers: sterile gloves, mask, Chloraprep  Needle type: short bevel  Needle Gauge: 21.   Needle Length (millimeters) 110   Ultrasound guided, Ultrasound used to identify targeted nerve, plexus, vascular marker, or fascial plane and place a needle adjacent to it in real-time, Ultrasound was used to visualize the spread of anesthetic in close proximity to the above referenced structure  A permanent image is entered into the patient's record.    Assessment/Narrative      The placement was negative for: blood aspirated, painful injection and site bleeding  Paresthesias: No.  Bolus given via needle..   Secured via.   Insertion/Infusion Method: Single Shot  Complications: none  Injection made incrementally with aspirations every 5 mL.

## 2021-03-04 DIAGNOSIS — S62.660B OPEN NONDISPLACED FRACTURE OF DISTAL PHALANX OF RIGHT INDEX FINGER, INITIAL ENCOUNTER: Primary | ICD-10-CM

## 2021-03-04 NOTE — DISCHARGE INSTRUCTIONS
[facility]  Same-Day Surgery   Adult Discharge Orders & Instructions     For 24 hours after surgery    1. Get plenty of rest.  A responsible adult must stay with you for at least 24 hours after you leave the hospital.   2. Do not drive or use heavy equipment.  If you have weakness or tingling, don't drive or use heavy equipment until this feeling goes away.  3. Do not drink alcohol.  4. Avoid strenuous or risky activities.  Ask for help when climbing stairs.   5. You may feel lightheaded.  IF so, sit for a few minutes before standing.  Have someone help you get up.   6. If you have nausea (feel sick to your stomach): Drink only clear liquids such as apple juice, ginger ale, broth or 7-Up.  Rest may also help.  Be sure to drink enough fluids.  Move to a regular diet as you feel able.  7. You may have a slight fever. Call the doctor if your fever is over 100 F (37.7 C) (taken under the tongue) or lasts longer than 24 hours.  8. You may have a dry mouth, a sore throat, muscle aches or trouble sleeping.  These should go away after 24 hours.  9. Do not make important or legal decisions.   Call your doctor for any of the followin.  Signs of infection (fever, growing tenderness at the surgery site, a large amount of drainage or bleeding, severe pain, foul-smelling drainage, redness, swelling).    2. It has been over 8 to 10 hours since surgery and you are still not able to urinate (pass water).    3.  Headache for over 24 hours.    4.  Numbness, tingling or weakness the day after surgery (if you had spinal anesthesia).  To contact a doctor, call ___Dr. Rodriguez's clinic at [phone] during business hours from 7:30am- 4:30pm___ or:        [phone] and ask for the resident on call for plastic surgery_____ (answered 24 hours a day)      Emergency Department:    [facility]: [phone]       (TTY for hearing impaired: [phone])    [facility]: [phone]        (TTY for hearing impaired: [phone])

## 2021-03-04 NOTE — OP NOTE
DATE OF OPERATION: March 3, 2021    PREOPERATIVE DIAGNOSIS: Right index finger distal phalanx shaft fracture and nailbed injury.    POSTOPERATIVE DIAGNOSIS: Right index finger distal phalanx shaft fracture and nailbed injury.    PROCEDURES PERFORMED:   1.  Open reduction internal fixation of right index finger distal phalangeal shaft fracture with 0.045 K wire.  2.  Right index finger nailbed repair.    SURGEON: Vidal Rodriguez MD    ASSISTANT: None.    ANESTHESIA: Monitored anesthesia care with a regional block.    ESTIMATED BLOOD LOSS: 5 mL    TOURNIQUET TIME: 29 min    SPECIMENS: None.    COMPLICATIONS: None.    DRAINS: None.    IMPLANTS: 0.045 K wire x1.    INDICATIONS:  Patient is a 50-year-old male who sustained a table saw injury to the right index finger distal phalanx.  This caused a fracture to the distal phalanx shaft and a nailbed injury.  He was seen in the emergency room and closed temporarily with nylon suture.  Risk benefits alternatives were discussed for open reduction internal fixation of the fracture and nailbed repair.  Patient accepted the associated risks and wished to proceed with surgery.    DESCRIPTION OF PROCEDURE:  Informed consent and markings were performed in the preoperative holding area.  Patient underwent a regional block with the anesthesia team.  Preoperative antibiotics were given.  He was then taken to the operating room and placed in supine position with all pressure points well-padded.  Sedation was achieved.  The right hand was placed on a hand table and a tourniquet was applied to the upper arm.  The extremity was then prepped and draped circumferentially in a sterile fashion.  A timeout was performed.    Sutures were removed.  The extremity was exsanguinated using an Esmarch and the tourniquet was brought to 250.  The wound was thoroughly irrigated and skin edges freshened with a pair of tenotomy scissors.  Under fluoroscopic image guidance, a 0.045 K wire was driven  retrograde through the fracture fragment and into the proximal distal phalanx through the DIP joint to stabilize and fixate the fracture fragment.  Adequate reduction was confirmed on fluoroscopic imaging.  The wire was then shortened and buried underneath the skin.    The nail was removed using a Warne elevator.  The ulnar side paronychial fold laceration was repaired with 4-0 nylon interrupted sutures.  The nailbed laceration was then repaired with 5-0 chromic interrupted sutures.  This extended into the radial side paronychial fold and therefore gone peritoneal fold was released with a 15 blade scalpel.  This edge was then repaired using again a 5-0 chromic interrupted suture.  This released peritoneal fold incision was then closed with 4-0 nylon.    Tourniquet was brought down and turn to time was noted to be 20 minutes.  Finger was pink and well-perfused.  An aluminum piece was sewn in underneath the eponychial fold to stented open.  Soft dressings were applied and the wound and foam splint was applied.  Counts were correct at the end of the case.  Patient was then awakened and transferred to the postoperative area in stable condition.    DISPOSITION: Home.

## 2021-03-04 NOTE — OR NURSING
Instructions reviewed with April over the phone. Responsible adult verbalized understanding of discharge instructions. Paper copy of discharge instructions sent with patient.

## 2021-03-04 NOTE — ANESTHESIA POSTPROCEDURE EVALUATION
Patient: David Wilcox    Procedure(s):  Right index finger open reduction internal fixation with nailbed repair    Diagnosis:Open nondisplaced fracture of distal phalanx of right index finger, initial encounter [S68.463Q]  Diagnosis Additional Information: No value filed.    Anesthesia Type:  MAC    Note:  Disposition: Admission   Postop Pain Control: Uneventful            Sign Out: Well controlled pain   PONV:    Neuro/Psych: Uneventful            Sign Out: Acceptable/Baseline neuro status   Airway/Respiratory: Uneventful            Sign Out: Acceptable/Baseline resp. status   CV/Hemodynamics: Uneventful            Sign Out: Acceptable CV status   Other NRE:    DID A NON-ROUTINE EVENT OCCUR?          Last vitals:  Vitals:    03/03/21 1500 03/03/21 1503 03/03/21 1510   BP: (!) 156/112 (!) 150/100 (!) 150/92   Pulse: 71 79 79   Resp: 22 10    Temp:      SpO2: 98% 100%        Last vitals prior to Anesthesia Care Transfer:  CRNA VITALS  3/3/2021 1737 - 3/3/2021 1837      3/3/2021             Pulse:  101    Ht Rate:  99    SpO2:  96 %          Electronically Signed By: Pam Bain MD  March 3, 2021  6:40 PM

## 2021-03-04 NOTE — ANESTHESIA CARE TRANSFER NOTE
Patient: David Wilcox    Procedure(s):  Right index finger open reduction internal fixation with nailbed repair    Diagnosis: Open nondisplaced fracture of distal phalanx of right index finger, initial encounter [S52.483R]  Diagnosis Additional Information: No value filed.    Anesthesia Type:   MAC     Note:    Oropharynx: oropharynx clear of all foreign objects and spontaneously breathing  Level of Consciousness: awake  Oxygen Supplementation: room air    Independent Airway: airway patency satisfactory and stable  Dentition: dentition unchanged  Vital Signs Stable: post-procedure vital signs reviewed and stable  Report to RN Given: handoff report given  Patient transferred to: Phase II    Handoff Report: Identifed the Patient, Identified the Reponsible Provider, Reviewed the pertinent medical history, Discussed the surgical course, Reviewed Intra-OP anesthesia mangement and issues during anesthesia, Set expectations for post-procedure period and Allowed opportunity for questions and acknowledgement of understanding      Vitals: (Last set prior to Anesthesia Care Transfer)  CRNA VITALS  3/3/2021 1737 - 3/3/2021 1814      3/3/2021             Pulse:  101    Ht Rate:  99    SpO2:  96 %        Electronically Signed By: CATARINO Reagan CRNA  March 3, 2021  6:14 PM

## 2021-03-07 DIAGNOSIS — J30.81 ALLERGIC RHINITIS DUE TO ANIMALS: ICD-10-CM

## 2021-03-08 RX ORDER — ALBUTEROL SULFATE 90 UG/1
AEROSOL, METERED RESPIRATORY (INHALATION)
Qty: 8.5 G | Refills: 0 | Status: SHIPPED | OUTPATIENT
Start: 2021-03-08 | End: 2021-12-15

## 2021-03-09 ENCOUNTER — ANCILLARY PROCEDURE (OUTPATIENT)
Dept: GENERAL RADIOLOGY | Facility: CLINIC | Age: 51
End: 2021-03-09
Attending: PLASTIC SURGERY
Payer: COMMERCIAL

## 2021-03-09 ENCOUNTER — OFFICE VISIT (OUTPATIENT)
Dept: ORTHOPEDICS | Facility: CLINIC | Age: 51
End: 2021-03-09
Payer: COMMERCIAL

## 2021-03-09 ENCOUNTER — THERAPY VISIT (OUTPATIENT)
Dept: OCCUPATIONAL THERAPY | Facility: CLINIC | Age: 51
End: 2021-03-09
Payer: COMMERCIAL

## 2021-03-09 DIAGNOSIS — Z47.89 AFTERCARE FOLLOWING SURGERY OF THE MUSCULOSKELETAL SYSTEM: ICD-10-CM

## 2021-03-09 DIAGNOSIS — M25.641 STIFFNESS OF FINGER JOINT, RIGHT: ICD-10-CM

## 2021-03-09 DIAGNOSIS — S62.660B OPEN NONDISPLACED FRACTURE OF DISTAL PHALANX OF RIGHT INDEX FINGER, INITIAL ENCOUNTER: ICD-10-CM

## 2021-03-09 DIAGNOSIS — S62.660B OPEN NONDISPLACED FRACTURE OF DISTAL PHALANX OF RIGHT INDEX FINGER, INITIAL ENCOUNTER: Primary | ICD-10-CM

## 2021-03-09 PROCEDURE — 99024 POSTOP FOLLOW-UP VISIT: CPT | Performed by: PLASTIC SURGERY

## 2021-03-09 PROCEDURE — 73140 X-RAY EXAM OF FINGER(S): CPT | Mod: RT | Performed by: RADIOLOGY

## 2021-03-09 PROCEDURE — 97760 ORTHOTIC MGMT&TRAING 1ST ENC: CPT | Mod: GO | Performed by: OCCUPATIONAL THERAPIST

## 2021-03-09 PROCEDURE — 97165 OT EVAL LOW COMPLEX 30 MIN: CPT | Mod: GO | Performed by: OCCUPATIONAL THERAPIST

## 2021-03-09 NOTE — LETTER
3/9/2021         RE: David Wilcox  [address] [facility] [address]        Dear Colleague,    Thank you for referring your patient, David Wilcox, to the [facility] [place]. Please see a copy of my visit note below.    Patient returns for a postoperative follow-up.    INTERVAL HISTORY: Pain well controlled.  Doing well.    PHYSICAL EXAMINATION:  Right index finger splint removed.  This shows well-healing incisions and intact eponychial fold stent.  Incisions are not fully healed at this time.  Sutures intact.  No sign of infection.    IMAGING: X-ray imaging of the right index finger shows stable positioning of reduced shaft fracture and K wire fixation.    ASSESSMENT: 1 week status post ORIF of right index finger distal phalangeal shaft fracture and nailbed repair.    PLAN: Hand therapy today for custom protective splint fabrication.  Return in the next 1 to 2 weeks for suture removal.  We are scheduled for operative pin removal on 4/16/2021, 5 weeks from now.    20 minutes spent on the date of the encounter performing chart review, history and physical, documentation, review of tests, communication with other healthcare professionals, and placing orders as noted above.          Vidal Rodriguez MD
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
## 2021-03-09 NOTE — NURSING NOTE
Reason For Visit:   Chief Complaint   Patient presents with     RECHECK     DOS 3/3/21 Right index finger open reduction internal fixation with nailbed repair       Primary MD: Regan Llamas    Age: 50 year old    ?  No      There were no vitals taken for this visit.      Pain Assessment  Patient Currently in Pain: Yes  0-10 Pain Scale: 2  Primary Pain Location: Finger (Comment which one)(Right index)               QuickDASH Assessment  No flowsheet data found.       Current Outpatient Medications   Medication Sig Dispense Refill     albuterol (PROAIR HFA/PROVENTIL HFA/VENTOLIN HFA) 108 (90 Base) MCG/ACT inhaler INHALE 2 PUFFS INTO THE LUNGS EVERY 4 HOURS AS NEEDED FOR SHORTNESS OF BREATH 8.5 g 0     DULoxetine (CYMBALTA) 60 MG capsule Take 1 capsule (60 mg) by mouth daily . 3 month supply (Patient taking differently: Take 60 mg by mouth every morning . 3 month supply) 90 capsule 1     fentaNYL (DURAGESIC) 12 mcg/hr 72 hr patch Place 1 patch onto the skin every 48 hours remove old patch. Fill 02/23/21 to start on 02/25/21 15 patch 0     gabapentin (NEURONTIN) 600 MG tablet Take 2 tablets (1,200 mg) by mouth 3 times daily . 3 month supply 540 tablet 1     LORazepam (ATIVAN) 0.5 MG tablet One bid prn anxiety. Rare use (Patient taking differently: as needed One bid prn anxiety. Rare use) 30 tablet 0     METHOCARBAMOL PO Take 1,000 mg by mouth 2 times daily       mirtazapine (REMERON) 15 MG tablet Take 1 tablet (15 mg) by mouth At Bedtime 90 tablet 1     naloxone (NARCAN) 4 MG/0.1ML nasal spray Spray 1 spray (4 mg) into one nostril alternating nostrils as needed for opioid reversal every 2-3 minutes until assistance arrives 0.2 mL 0     nortriptyline (PAMELOR) 10 MG capsule Take 2 capsules (20 mg) by mouth At Bedtime 180 capsule 1     omeprazole (PRILOSEC) 20 MG DR capsule Take 1 capsule (20 mg) by mouth daily (Patient taking differently: Take 20 mg by mouth every morning ) 90 capsule 1      oxyCODONE-acetaminophen (PERCOCET) 7.5-325 MG per tablet Take 0.5-1 tablets po q 6 hrs PRN pain. Max of 2/day, and on 10 days of the month, max of 3/day. Fill 02/23/21 to start on 02/25/21 (Patient taking differently: as needed Take 0.5-1 tablets po q 6 hrs PRN pain. Max of 2/day, and on 10 days of the month, max of 3/day. Fill 02/23/21 to start on 02/25/21) 70 tablet 0     spacer (OPTICHAMBER MAT) holding chamber Use with Inhaler 1 each 0     TYLENOL EXTRA STRENGTH 500 MG OR TABS At Bedtime        zolpidem (AMBIEN) 10 MG tablet TAKE 1 TABLET BY MOUTH EVERY DAY AT BEDTIME AS NEEDED FOR SLEEP 30 tablet 5       Allergies   Allergen Reactions     Aspartame      A.k.a Nutrasweet.  Can take Truvia (stevia plant extract).     Food      Artifical sweetners-vomiting     Saccharin      Vomiting, diarrhea     Sucralose      A.k.a. Sheridan Velazco, ATC

## 2021-03-09 NOTE — PROGRESS NOTES
Patient returns for a postoperative follow-up.    INTERVAL HISTORY: Pain well controlled.  Doing well.    PHYSICAL EXAMINATION:  Right index finger splint removed.  This shows well-healing incisions and intact eponychial fold stent.  Incisions are not fully healed at this time.  Sutures intact.  No sign of infection.    IMAGING: X-ray imaging of the right index finger shows stable positioning of reduced shaft fracture and K wire fixation.    ASSESSMENT: 1 week status post ORIF of right index finger distal phalangeal shaft fracture and nailbed repair.    PLAN: Hand therapy today for custom protective splint fabrication.  Return in the next 1 to 2 weeks for suture removal.  We are scheduled for operative pin removal on 4/16/2021, 5 weeks from now.    20 minutes spent on the date of the encounter performing chart review, history and physical, documentation, review of tests, communication with other healthcare professionals, and placing orders as noted above.

## 2021-03-18 DIAGNOSIS — S62.660B OPEN NONDISPLACED FRACTURE OF DISTAL PHALANX OF RIGHT INDEX FINGER, INITIAL ENCOUNTER: Primary | ICD-10-CM

## 2021-03-29 DIAGNOSIS — M96.1 FAILED BACK SURGICAL SYNDROME: ICD-10-CM

## 2021-03-29 DIAGNOSIS — S62.660B OPEN NONDISPLACED FRACTURE OF DISTAL PHALANX OF RIGHT INDEX FINGER, INITIAL ENCOUNTER: Primary | ICD-10-CM

## 2021-03-29 RX ORDER — OXYCODONE AND ACETAMINOPHEN 7.5; 325 MG/1; MG/1
TABLET ORAL
Qty: 70 TABLET | Refills: 0 | Status: SHIPPED | OUTPATIENT
Start: 2021-03-29 | End: 2021-04-28

## 2021-03-29 RX ORDER — FENTANYL 12.5 UG/1
1 PATCH TRANSDERMAL
Qty: 15 PATCH | Refills: 0 | Status: SHIPPED | OUTPATIENT
Start: 2021-03-29 | End: 2021-05-26

## 2021-03-29 NOTE — TELEPHONE ENCOUNTER
E-prescription confirmation received. Patient was informed and was asked to call to schedule and in person visit with provider.

## 2021-03-29 NOTE — TELEPHONE ENCOUNTER
Medication refill information reviewed. Patient is due for a follow up as well as CSA and UDS. Routing to Dr Motley to review.     Due date for fentaNYL (DURAGESIC) 12 mcg/hr 72 hr patch and oxyCODONE-acetaminophen (PERCOCET) 7.5-325 MG per tablet is 03/29/21     Prescriptions prepped for review.     Will route to provider.

## 2021-03-29 NOTE — TELEPHONE ENCOUNTER
Reason for call:  Medication   If this is a refill request, has the caller requested the refill from the pharmacy already? No  Will the patient be using a [facility]? No  Name of the pharmacy and phone number for the current request: [id number] - [place], [place] - [address] AT [facility] [address]    Name of the medication requested:   oxyCODONE-acetaminophen (PERCOCET) 7.5-325 MG per tablet    fentaNYL (DURAGESIC) 12 mcg/hr 72 hr patch    Other request: none    Phone number to reach patient:  Cell number on file:    Telephone Information:   Mobile [phone]       Best Time:  anytime    Can we leave a detailed message on this number?  YES    Travel screening: Not Applicable     Stacey NEELY    [facility]

## 2021-03-29 NOTE — TELEPHONE ENCOUNTER
Received call from patient requesting refill(s) of fentaNYL (DURAGESIC) 12 mcg/hr 72 hr patch and oxyCODONE-acetaminophen (PERCOCET) 7.5-325 MG per tablet    Last dispensed from pharmacy on 02/26/21 for both    Patient's last office/virtual visit by prescribing provider on 12/16/20  Next office/virtual appointment scheduled for : none    Last urine drug screen date 01/23/19  Current opioid agreement on file (completed within the last year) No Date of opioid agreement: 02/19/20    E-prescribe to pharmacy-[id number] - [place], [place] - [facility] [address] [facility] [address]     Will route to nursing pool for review and preparation of prescription(s).

## 2021-03-29 NOTE — TELEPHONE ENCOUNTER
Please have him schedule in person visit for follow up     Script Eprescribed to pharmacy  [place] Prescription Monitoring Program checked    Will send this to MA team to notify patient.    Pending Prescriptions:                       Disp   Refills    oxyCODONE-acetaminophen (PERCOCET) 7.5-32*70 tab*0            Sig: Take 0.5-1 tablets po q 6 hrs PRN pain. Max of           2/day, and on 10 days of the month, max of 3/day.           Fill 03/29/21 to start on 03/29/21    fentaNYL (DURAGESIC) 12 mcg/hr 72 hr patch15 pat*0            Sig: Place 1 patch onto the skin every 48 hours remove           old patch. Fill 03/29/21 to start on 03/29/21      Bindu Motley MD  [facility]

## 2021-03-31 ENCOUNTER — OFFICE VISIT (OUTPATIENT)
Dept: FAMILY MEDICINE | Facility: CLINIC | Age: 51
End: 2021-03-31
Payer: COMMERCIAL

## 2021-03-31 VITALS
WEIGHT: 258 LBS | DIASTOLIC BLOOD PRESSURE: 110 MMHG | HEIGHT: 68 IN | OXYGEN SATURATION: 100 % | SYSTOLIC BLOOD PRESSURE: 170 MMHG | TEMPERATURE: 98.7 F | HEART RATE: 87 BPM | BODY MASS INDEX: 39.1 KG/M2 | RESPIRATION RATE: 16 BRPM

## 2021-03-31 DIAGNOSIS — Z11.59 ENCOUNTER FOR SCREENING FOR OTHER VIRAL DISEASES: ICD-10-CM

## 2021-03-31 DIAGNOSIS — I10 HYPERTENSION, UNSPECIFIED TYPE: ICD-10-CM

## 2021-03-31 DIAGNOSIS — L03.011 CELLULITIS OF FINGER OF RIGHT HAND: ICD-10-CM

## 2021-03-31 DIAGNOSIS — I10 HYPERTENSION, UNSPECIFIED TYPE: Primary | ICD-10-CM

## 2021-03-31 PROCEDURE — 99214 OFFICE O/P EST MOD 30 MIN: CPT | Performed by: FAMILY MEDICINE

## 2021-03-31 RX ORDER — LISINOPRIL 20 MG/1
TABLET ORAL
Qty: 90 TABLET | Refills: 0 | Status: SHIPPED | OUTPATIENT
Start: 2021-03-31 | End: 2021-06-28

## 2021-03-31 RX ORDER — LISINOPRIL 20 MG/1
20 TABLET ORAL DAILY
Qty: 30 TABLET | Refills: 2 | Status: SHIPPED | OUTPATIENT
Start: 2021-03-31 | End: 2021-03-31

## 2021-03-31 ASSESSMENT — MIFFLIN-ST. JEOR: SCORE: 2009.78

## 2021-03-31 NOTE — PROGRESS NOTES
"A: cellulitis finger, worse       Htn, not controlled    P: augmentin for finger, soak.  Surgery for hardware removal in 2 weeks    lisionpril for BP.  Back a few weeks after surgery          s :David Wilcox is a 50 year old male with R index finger complex injury, surgery.  Has hardware removal in 2  Weeks.      Was doing OK, healing OK.  Then started swelling a day ago, more pain.  No pus.  Improved with some soaking, but still \"seems worse\" to him    No fever    Also htn.  Up and down in past, hasn't been on meds, but needs to  No cp or sob    O:BP (!) 170/110   Pulse 87   Temp 98.7  F (37.1  C) (Tympanic)   Resp 16   Ht 1.727 m (5' 8\")   Wt 117 kg (258 lb)   SpO2 100%   BMI 39.23 kg/m    GEN: Alert and oriented, in no acute distress  CV: RRR, no murmur  Finger healing from surgery, fracture, hardware.  Is pink, no pus.  More tender.  Has small metal ronnie sticking out tip of finger \"been that way  Since surgery.\"        "

## 2021-04-06 ENCOUNTER — TELEPHONE (OUTPATIENT)
Dept: ORTHOPEDICS | Facility: CLINIC | Age: 51
End: 2021-04-06

## 2021-04-06 NOTE — TELEPHONE ENCOUNTER
Left voicemail for patient to call back after missed appointment today.    Patient is scheduled for surgery with Dr. Rodriguez on 4/16. RN would like to confirm that patient is aware of his covid test appointment and see if he has additional questions before surgery. Dr. Rodriguez agreed to update his H&P on DOS. Most recent pre-op physical done 3/3/21.    Peggy REDDING RN

## 2021-04-09 ENCOUNTER — TELEPHONE (OUTPATIENT)
Dept: FAMILY MEDICINE | Facility: CLINIC | Age: 51
End: 2021-04-09

## 2021-04-09 ENCOUNTER — VIRTUAL VISIT (OUTPATIENT)
Dept: FAMILY MEDICINE | Facility: CLINIC | Age: 51
End: 2021-04-09
Payer: COMMERCIAL

## 2021-04-09 DIAGNOSIS — I10 HYPERTENSION, UNSPECIFIED TYPE: Primary | ICD-10-CM

## 2021-04-09 DIAGNOSIS — G89.4 CHRONIC PAIN SYNDROME: ICD-10-CM

## 2021-04-09 DIAGNOSIS — F11.20 CONTINUOUS OPIOID DEPENDENCE (H): ICD-10-CM

## 2021-04-09 PROCEDURE — 99214 OFFICE O/P EST MOD 30 MIN: CPT | Mod: 95 | Performed by: NURSE PRACTITIONER

## 2021-04-09 RX ORDER — LOSARTAN POTASSIUM 50 MG/1
50 TABLET ORAL DAILY
Qty: 30 TABLET | Refills: 0 | Status: SHIPPED | OUTPATIENT
Start: 2021-04-09 | End: 2021-05-24

## 2021-04-09 ASSESSMENT — PATIENT HEALTH QUESTIONNAIRE - PHQ9: SUM OF ALL RESPONSES TO PHQ QUESTIONS 1-9: 12

## 2021-04-09 NOTE — TELEPHONE ENCOUNTER
Reason for call:  Patient reporting a symptom    Symptom or request: Pt is having Leg pain enough to for pt to have Tears.   Pt was started on Lisinopril a week ago.     Phone Number patient can be reached at:  Home number on file [phone] (home)    Best Time:  Any Time      Can we leave a detailed message on this number:  YES    Call taken on 4/9/2021 at 8:05 AM by Michelle Bolton

## 2021-04-09 NOTE — TELEPHONE ENCOUNTER
Call placed to patient. He is experiencing bilateral l/e leg pain, below the knees. Pain is described as constant and severe. New medication Lisinopril. He has taken already this am. Video appointment scheduled. He is advised Acetaminophen or Ibuprofen to help with pain management. He has not tried otc therapies for his pain.    Jacklyn PERRY RN

## 2021-04-09 NOTE — PROGRESS NOTES
David is a 50 year old who is being evaluated via a billable video visit.      How would you like to obtain your AVS? MyChart  If the video visit is dropped, the invitation should be resent by: Text to cell phone: [phone]  Will anyone else be joining your video visit? No      Video Start Time: 12:41 PM    Assessment & Plan       ICD-10-CM    1. Hypertension, unspecified type  I10 losartan (COZAAR) 50 MG tablet   2. Chronic pain syndrome  G89.4    3. Continuous opioid dependence (H)  F11.20      Patient was started on lisinopril and is now having leg pain that is waking him up at night that has been present since starting the lisinopril.  I advised patient to stop the lisinopril wait 1 day then start losartan.  Discussed with patient to follow-up with his PCP in the next 1 to 2 weeks.  I discussed that this should be face-to-face as his blood pressure needs to be checked and labs will need to be completed to evaluate kidney function and to assure electrolytes are stable.           PCP for follow up of medication change.     Return in about 2 weeks (around 4/23/2021) for with PCP for medication check and labs. .    CATARINO Zamora [facility]   David is a 50 year old who presents for the following health issues     HPI     Medication Followup of Lisinopril    Taking Medication as prescribed: yes    Side Effects:  Pain in both legs from knees down worse at night started 2 days after starting lisinopril    Medication Helping Symptoms:  unknown       Also having some leg pain. It is waking him at 2-3 am and is nightly. Symptoms started after starting the lisinopril. Symptoms began 2 days after starting the medication. He has not had any other side effects. He has not checked his blood pressure outside of clinic since starting the medication.      Patient has a significant medical history of opioid dependence and sedative dependence along with chronic pain syndrome.   He follows with pain management.  He currently has not been sleeping well due to the leg pain that is waking him up between 2 and 3 AM and he is unable to fall back asleep.        Review of Systems   Constitutional, HEENT, cardiovascular, pulmonary, gi and gu systems are negative, except as otherwise noted.      Objective           Vitals:  No vitals were obtained today due to virtual visit.    Physical Exam   GENERAL: Healthy, alert and no distress  EYES: Eyes grossly normal to inspection.  No discharge or erythema, or obvious scleral/conjunctival abnormalities.  RESP: No audible wheeze, cough, or visible cyanosis.  No visible retractions or increased work of breathing.    SKIN: Visible skin clear. No significant rash, abnormal pigmentation or lesions.  NEURO: Cranial nerves grossly intact.  Mentation and speech appropriate for age.  PSYCH: Mentation appears normal, affect normal/bright, judgement and insight intact, normal speech and appearance well-groomed.          Video-Visit Details    Type of service:  Video Visit    Video End Time:12:48 PM    Originating Location (pt. Location): Home    Distant Location (provider location):  [facility]     Platform used for Video Visit: [employer]

## 2021-04-12 ENCOUNTER — TELEPHONE (OUTPATIENT)
Dept: ORTHOPEDICS | Facility: CLINIC | Age: 51
End: 2021-04-12

## 2021-04-12 ENCOUNTER — MYC MEDICAL ADVICE (OUTPATIENT)
Dept: SURGERY | Facility: AMBULATORY SURGERY CENTER | Age: 51
End: 2021-04-12

## 2021-04-14 ENCOUNTER — DOCUMENTATION ONLY (OUTPATIENT)
Dept: PLASTIC SURGERY | Facility: CLINIC | Age: 51
End: 2021-04-14

## 2021-04-14 NOTE — PROGRESS NOTES
Pre-op nurse called and left a voicemail for writer stating patient would like to cancel surgery due to pins falling out and patient feeling they do not need surgery. Sent message to surgeon and confirmed to cancel surgery.

## 2021-04-28 ENCOUNTER — OFFICE VISIT (OUTPATIENT)
Dept: PALLIATIVE MEDICINE | Facility: CLINIC | Age: 51
End: 2021-04-28
Payer: COMMERCIAL

## 2021-04-28 VITALS — SYSTOLIC BLOOD PRESSURE: 126 MMHG | HEART RATE: 79 BPM | DIASTOLIC BLOOD PRESSURE: 85 MMHG

## 2021-04-28 DIAGNOSIS — Z51.81 ENCOUNTER FOR MONITORING OPIOID MAINTENANCE THERAPY: ICD-10-CM

## 2021-04-28 DIAGNOSIS — Z79.891 ENCOUNTER FOR MONITORING OPIOID MAINTENANCE THERAPY: ICD-10-CM

## 2021-04-28 DIAGNOSIS — M54.16 LUMBAR RADICULOPATHY: ICD-10-CM

## 2021-04-28 DIAGNOSIS — M96.1 FAILED BACK SURGICAL SYNDROME: Primary | ICD-10-CM

## 2021-04-28 PROBLEM — S62.660B: Status: RESOLVED | Noted: 2021-02-23 | Resolved: 2021-04-28

## 2021-04-28 PROBLEM — M25.641 STIFFNESS OF FINGER JOINT, RIGHT: Status: RESOLVED | Noted: 2021-03-09 | Resolved: 2021-04-28

## 2021-04-28 PROBLEM — Z47.89 AFTERCARE FOLLOWING SURGERY OF THE MUSCULOSKELETAL SYSTEM: Status: RESOLVED | Noted: 2021-03-09 | Resolved: 2021-04-28

## 2021-04-28 PROCEDURE — 99214 OFFICE O/P EST MOD 30 MIN: CPT | Performed by: PSYCHIATRY & NEUROLOGY

## 2021-04-28 PROCEDURE — 80307 DRUG TEST PRSMV CHEM ANLYZR: CPT | Performed by: PSYCHIATRY & NEUROLOGY

## 2021-04-28 RX ORDER — OXYCODONE HCL 10 MG/1
10 TABLET, FILM COATED, EXTENDED RELEASE ORAL EVERY 12 HOURS
Qty: 60 TABLET | Refills: 0 | Status: SHIPPED | OUTPATIENT
Start: 2021-04-28 | End: 2021-05-27

## 2021-04-28 RX ORDER — OXYCODONE AND ACETAMINOPHEN 7.5; 325 MG/1; MG/1
TABLET ORAL
Qty: 70 TABLET | Refills: 0 | Status: SHIPPED | OUTPATIENT
Start: 2021-04-28 | End: 2021-06-01

## 2021-04-28 RX ORDER — FENTANYL 12.5 UG/1
1 PATCH TRANSDERMAL
Qty: 15 PATCH | Refills: 0 | Status: CANCELLED | OUTPATIENT
Start: 2021-04-28

## 2021-04-28 ASSESSMENT — PAIN SCALES - GENERAL: PAINLEVEL: MODERATE PAIN (4)

## 2021-04-28 NOTE — LETTER
Opioid / Opioid Plus Controlled Substance Agreement    This is an agreement between you and your provider about the safe and appropriate use of controlled substance/opioids prescribed by your care team. Controlled substances are medicines that can cause physical and mental dependence (abuse).    There are strict laws about having and using these medicines. We here at [facility] are committing to working with you in your efforts to get better. To support you in this work, we ll help you schedule regular office appointments for medicine refills. If we must cancel or change your appointment for any reason, we ll make sure you have enough medicine to last until your next appointment.     As a Provider, I will:    Listen carefully to your concerns and treat you with respect.     Recommend a treatment plan that I believe is in your best interest. This plan may involve therapies other than opioid pain medication.     Talk with you often about the possible benefits, and the risk of harm of any medicine that we prescribe for you.     Provide a plan on how to taper (discontinue or go off) using this medicine if the decision is made to stop its use.    As a Patient, I understand that opioid(s):     Are a controlled substance prescribed by my care team to help me function or work and manage my condition(s).     Are strong medicines and can cause serious side effects such as:    Drowsiness, which can seriously affect my driving ability    A lower breathing rate, enough to cause death    Harm to my thinking ability     Depression     Abuse of and addiction to this medicine    Need to be taken exactly as prescribed. Combining opioids with certain medicines or chemicals (such as illegal drugs, sedatives, sleeping pills, and benzodiazepines) can be dangerous or even fatal. If I stop opioids suddenly, I may have severe withdrawal symptoms.    Do not work for all types of pain nor for all patients. If they re not helpful, I may  be asked to stop them.        The risks, benefits and side effects of these medicine(s) were explained to me. I agree that:  1. I will take part in other treatments as advised by my care team. This may be psychiatry or counseling, physical therapy, behavioral therapy, group treatment or a referral to a specialist.     2. I will keep all my appointments. I understand that this is part of the monitoring of opioids. My care team may require an office visit for EVERY opioid/controlled substance refill. If I miss appointments or don t follow instructions, my care team may stop my medicine.    3. I will take my medicines as prescribed. I will not change the dose or schedule unless my care team tells me to. There will be no refills if I run out early.     4. I may be asked to come to the clinic and complete a urine drug test or complete a pill count at any time. If I don t give a urine sample or participate in a pill count, the care team may stop my medicine.    5. I will only receive prescriptions from this clinic for chronic pain. If I am treated by another provider for acute pain issues, I will tell them that I am taking opioid pain medication for chronic pain and that I have a treatment agreement with this provider. I will inform my [facility] care team within one business day if I am given a prescription for any pain medication by another healthcare provider. My [facility] care team can contact other providers and pharmacists about my use of any medicines.    6. It is up to me to make sure that I don t run out of my medicines on weekends or holidays. If my care team is willing to refill my opioid prescription without a visit, I must request refills only during office hours. Refills may take up to 3 business days to process. I will use one pharmacy to fill all my opioid and other controlled substance prescriptions. I will notify the clinic about any changes to my insurance or medication  availability.    7. I am responsible for my prescriptions. If the medicine/prescription is lost, stolen or destroyed, it will not be replaced. I also agree not to share controlled substance medicines with anyone.    8. I am aware I should not use any illegal or recreational drugs. I agree not to drink alcohol unless my care team says I can.       9. If I enroll in the [place] Medical Cannabis program, I will tell my care team prior to my next refill.     10. I will tell my care team right away if I become pregnant, have a new medical problem treated outside of my regular clinic, or have a change in my medications.    11. I understand that this medicine can affect my thinking, judgment and reaction time. Alcohol and drugs affect the brain and body, which can affect the safety of my driving. Being under the influence of alcohol or drugs can affect my decision-making, behaviors, personal safety, and the safety of others. Driving while impaired (DWI) can occur if a person is driving, operating, or in physical control of a car, motorcycle, boat, snowmobile, ATV, motorbike, off-road vehicle, or any other motor vehicle ([place] Statute 169A.20). I understand the risk if I choose to drive or operate any vehicle or machinery.    I understand that if I do not follow any of the conditions above, my prescriptions or treatment may be stopped or changed.          Opioids  What You Need to Know    What are opioids?   Opioids are pain medicines that must be prescribed by a doctor. They are also known as narcotics.     Examples are:   1. morphine (MS Contin, Mimi)  2. oxycodone (Oxycontin)  3. oxycodone and acetaminophen (Percocet)  4. hydrocodone and acetaminophen (Vicodin, Norco)   5. fentanyl patch (Duragesic)   6. hydromorphone (Dilaudid)   7. methadone  8. codeine (Tylenol #3)     What do opioids do well?   Opioids are best for severe short-term pain such as after a surgery or injury. They may work well for cancer pain. They may  help some people with long-lasting (chronic) pain.     What do opioids NOT do well?   Opioids never get rid of pain entirely, and they don t work well for most patients with chronic pain. Opioids don t reduce swelling, one of the causes of pain.                                    Other ways to manage chronic pain and improve function include:       Treat the health problem that may be causing pain    Anti-inflammation medicines, which reduce swelling and tenderness, such as ibuprofen (Advil, Motrin) or naproxen (Aleve)    Acetaminophen (Tylenol)    Antidepressants and anti-seizure medicines, especially for nerve pain    Topical treatments such as patches or creams    Injections or nerve blocks    Chiropractic or osteopathic treatment    Acupuncture, massage, deep breathing, meditation, visual imagery, aromatherapy    Use heat or ice at the pain site    Physical therapy     Exercise    Stop smoking    Take part in therapy       Risks and side effects     Talk to your doctor before you start or decide to keep taking opioids. Possible side effects include:      Lowering your breathing rate enough to cause death    Overdose, including death, especially if taking higher than prescribed doses    Worse depression symptoms; less pleasure in things you usually enjoy    Feeling tired or sluggish    Slower thoughts or cloudy thinking    Being more sensitive to pain over time; pain is harder to control    Trouble sleeping or restless sleep    Changes in hormone levels (for example, less testosterone)    Changes in sex drive or ability to have sex    Constipation    Unsafe driving    Itching and sweating    Dizziness    Nausea, throwing up and dry mouth    What else should I know about opioids?    Opioids may lead to dependence, tolerance, or addiction.      Dependence means that if you stop or reduce the medicine too quickly, you will have withdrawal symptoms. These include loose poop (diarrhea), jitters, flu-like symptoms,  nervousness and tremors. Dependence is not the same as addiction.                       Tolerance means needing higher doses over time to get the same effect. This may increase the chance of serious side effects.      Addiction is when people improperly use a substance that harms their body, their mind or their relations with others. Use of opiates can cause a relapse of addiction if you have a history of drug or alcohol abuse.      People who have used opioids for a long time may have a lower quality of life, worse depression, higher levels of pain and more visits to doctors.    You can overdose on opioids. Take these steps to lower your risk of overdose:    1. Recognize the signs:  Signs of overdose include decrease or loss of consciousness (blackout), slowed breathing, trouble waking up and blue lips. If someone is worried about overdose, they should call 911.    2. Talk to your doctor about Narcan (naloxone).   If you are at risk for overdose, you may be given a prescription for Narcan. This medicine very quickly reverses the effects of opioids.   If you overdose, a friend or family member can give you Narcan while waiting for the ambulance. They need to know the signs of overdose and how to give Narcan.     3. Don't use alcohol or street drugs.   Taking them with opioids can cause death.    4. Do not take any of these medicines unless your doctor says it s OK. Taking these with opioids can cause death:    Benzodiazepines, such as lorazepam (Ativan), alprazolam (Xanax) or diazepam (Valium)    Muscle relaxers, such as cyclobenzaprine (Flexeril)    Sleeping pills like zolpidem (Ambien)     Other opioids      How to keep you and other people safe while taking opioids:    1. Never share your opioids with others.  Opioid medicines are regulated by the Drug Enforcement Agency (FUAD). Selling or sharing medications is a criminal act.    2. Be sure to store opioids in a secure place, locked up if possible. Young children  can easily swallow them and overdose.    3. When you are traveling with your medicines, keep them in the original bottles. If you use a pill box, be sure you also carry a copy of your medicine list from your clinic or pharmacy.    4. Safe disposal of opioids    Most pharmacies have places to get rid of medicine, called disposal kiosks. Medicine disposal options are also available in every [facility]. Search your county and  medication disposal  to find more options. You can find more details at:  [email or web address]     I agree that my provider, clinic care team, and pharmacy may work with any city, state or federal law enforcement agency that investigates the misuse, sale, or other diversion of my controlled medicine. I will allow my provider to discuss my care with, or share a copy of, this agreement with any other treating provider, pharmacy or emergency room where I receive care.    I have read this agreement and have asked questions about anything I did not understand.    _______________________________________________________  Patient Signature - David Wilcox _____________________                   Date     _______________________________________________________  Provider Signature - Carolann Motley MD   _____________________                   Date     _______________________________________________________  Witness Signature (required if provider not present while patient signing)   _____________________                   Date

## 2021-04-28 NOTE — PROGRESS NOTES
[facility]    Date of visit: 4/28/2021     Chief complaint:   Chief Complaint   Patient presents with     Pain     Interval history:  David Wilcox was last seen by me on 12/16/2020    Recommendations/plan at the last visit included:  1. Physical Therapy: continue exercises as recommended by PT.   2. Clinical Health Psychologist to address issues of relaxation, behavioral change, coping style, and other factors important to improvement.  Not at this time   3. Diagnostic Studies: none  4. Procedures: none  5. Medication Management:  No changes. Refills sent yesterday  6. Recommendations to PCP: none  7. Follow up: 3 months- in person       Since his last visit, David Wilcox reports:  -he has been doing pretty well.  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    -no other new concerns/issues.   -he would like to go back to his summer regimen (Oxycontin instead of fentanyl)    Pain scores:  Pain intensity on average is 5 on a scale of 0-10.     Current pain treatments:   Percocet 7.5/325mg daily- twice a day - consistent use   not currently taking due to winter months, changed to fentanyl  Fentanyl 12mcg every 48 hrs    Robaxin (methocarbamol) 1000mg at night and in the morning- the morning dose has been helping- he will be stopping this soon and starting tizanidine  Gabapentin 1200 mg TID, beneficial  Cymbalta 60mg daily- tried going to 90mg/day but felt worse- for pain and mood  Nortriptyline 20 mg at bedtime - continues to have dry mouth but wishes to continue      Ativan 0.5mg very sparingly (averages about once every 3-6 months for severe panic attacks/airtravel      Previous medication treatments included:  Hydrocodone- not helpful    Methadone- taking after 1st surgery  Naprosyn- not helpful  Flexeril- after first back injury (1990s)  Lidocaine- not  helpful  Acetaminophen (tylenol)   Gabapentin  Fentanyl patch-12 mcg/48 hours - feels it works too strongly with heat/sun exposure    Other treatments have included:  David Wilcox has been seen at a pain clinic in the past.  MAPS- injections  PT: yes  Acupuncture: no  TENs Unit: yes- out of use- somewhat helpful  Injections: none since last surgery- very  helpful    Side Effects: None    Medications:  Current Outpatient Medications   Medication Sig Dispense Refill     albuterol (PROAIR HFA/PROVENTIL HFA/VENTOLIN HFA) 108 (90 Base) MCG/ACT inhaler INHALE 2 PUFFS INTO THE LUNGS EVERY 4 HOURS AS NEEDED FOR SHORTNESS OF BREATH 8.5 g 0     DULoxetine (CYMBALTA) 60 MG capsule Take 1 capsule (60 mg) by mouth daily . 3 month supply (Patient taking differently: Take 60 mg by mouth every morning . 3 month supply) 90 capsule 1     fentaNYL (DURAGESIC) 12 mcg/hr 72 hr patch Place 1 patch onto the skin every 48 hours remove old patch. Fill 03/29/21 to start on 03/29/21 15 patch 0     gabapentin (NEURONTIN) 600 MG tablet Take 2 tablets (1,200 mg) by mouth 3 times daily . 3 month supply 540 tablet 1     LORazepam (ATIVAN) 0.5 MG tablet One bid prn anxiety. Rare use 30 tablet 0     METHOCARBAMOL PO Take 1,000 mg by mouth 2 times daily       mirtazapine (REMERON) 15 MG tablet Take 1 tablet (15 mg) by mouth At Bedtime 90 tablet 1     nortriptyline (PAMELOR) 10 MG capsule Take 2 capsules (20 mg) by mouth At Bedtime 180 capsule 1     omeprazole (PRILOSEC) 20 MG DR capsule Take 1 capsule (20 mg) by mouth daily (Patient taking differently: Take 20 mg by mouth every morning ) 90 capsule 1     oxyCODONE (OXYCONTIN) 10 MG 12 hr tablet Take 1 tablet (10 mg) by mouth every 12 hours Fill now to start 4/29 60 tablet 0     oxyCODONE-acetaminophen (PERCOCET) 7.5-325 MG per tablet Take 0.5-1 tablets po q 6 hrs PRN pain. Max of 2/day, and on 10 days of the month, max of 3/day. Fill 4/28/21 to start on 4/28/21 70 tablet 0     TYLENOL EXTRA STRENGTH  500 MG OR TABS At Bedtime        zolpidem (AMBIEN) 10 MG tablet TAKE 1 TABLET BY MOUTH EVERY DAY AT BEDTIME AS NEEDED FOR SLEEP 30 tablet 5     amoxicillin-clavulanate (AUGMENTIN) 875-125 MG tablet Take 1 tablet by mouth 2 times daily (Patient not taking: Reported on 4/9/2021) 20 tablet 0     lisinopril (ZESTRIL) 20 MG tablet TAKE 1 TABLET(20 MG) BY MOUTH DAILY (Patient not taking: Reported on 4/28/2021) 90 tablet 0     losartan (COZAAR) 50 MG tablet Take 1 tablet (50 mg) by mouth daily (Patient not taking: Reported on 4/28/2021) 30 tablet 0     naloxone (NARCAN) 4 MG/0.1ML nasal spray Spray 1 spray (4 mg) into one nostril alternating nostrils as needed for opioid reversal every 2-3 minutes until assistance arrives (Patient not taking: Reported on 3/31/2021) 0.2 mL 0     spacer (OPTICHAMBER MAT) holding chamber Use with Inhaler 1 each 0       Medical History: any changes in medical history since they were last seen? 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 Headaches- since stopping tobacco more daily  2. 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    3. Facet arthropathy likely contributing above the level of the future  4. Distant history of alcohol and drug abuse   5. Bipolar disorder- currently managed  6. H/o meningitis after SCS trial  7. Thoracic back pain, likely myofascial etiology.    We discussed vaccines today, clarified information.    Plan:  1. Physical Therapy: continue exercises as  recommended by PT.   2. Clinical Health Psychologist to address issues of relaxation, behavioral change, coping style, and other factors important to improvement.  Not at this time   3. Diagnostic Studies: UDS and opioid agreement today  4. Procedures: none  5. Medication Management:    1. He will stop the fentanyl patch, remove, and 8-12 hours later start the Oxycontin. If any sedation, he will hold Percocet.  2. oxycontin 10mg q12 hr.  Keep Percocet the same  6. Recommendations to PCP: none  7. Follow up: 3 months, in person    33 minutes spent on the date of encounter doing chart review, history, and exam documentation and further activities as noted above.       Bindu Motley MD  [facility]

## 2021-04-28 NOTE — PATIENT INSTRUCTIONS
We are working hard to begin vaccinating more people against COVID-19. Currently, we are vaccinating:    Individuals age 65 and older    Phase 1a healthcare workers, both paid and unpaid, who are unable to do their job remotely.     People 16 and older with rare conditions or disabilities that put them at higher risk listed in Phase 1b tier 2.    People age 45-64 years with one or more underlying medical conditions listed in Phase 1b tier 3.    People age 16 or 18-44 years with two or more underlying medical conditions listed in Phase 1b tier 3.    People age 50 years and older in multigenerational housing (three or more generations living in the household)     If you fit into one of these categories, please log in to [employer] using this link to see if we have an open appointment and schedule an appointment.  Most new appointments are released on Tuesdays at 8 a.m. This is when appointment availability is best. Additional appointments may open up during the week as appointments are canceled or we receive additional vaccine.    If there are no appointments left, you will be unable to schedule.   If you have technical difficulty using [employer], call [phone] for assistance.      You can learn more about the [facility]'s phased approach to administering the vaccine, with details on each phase,?[email or web address].     As vaccine supply increases and we are able to open appointments to more groups, we will share that information on our website:  [email or web address]. Check this website to stay up to date on COVID-19 vaccination information.                ----------------------------------------------------------------  Clinic Number:  [phone]     Call with any questions about your care and for scheduling assistance.     Calls are returned Monday through Friday between 8 AM and 4:30 PM. We usually get back to you within 2 business days  depending on the issue/request.    If we are prescribing your medications:    For opioid medication refills, call the clinic or send a [employer] message 7 days in advance.  Please include:    Name of requested medication    Name of the pharmacy.    For non-opioid medications, call your pharmacy directly to request a refill. Please allow 3-4 days to be processed.     Per [place] State Law:    All controlled substance prescriptions must be filled within 30 days of being written.      For those controlled substances allowing refills, pickup must occur within 30 days of last fill.      We believe regular attendance is key to your success in our program!      Any time you are unable to keep your appointment we ask that you call us at least 24 hours in advance to cancel.This will allow us to offer the appointment time to another patient.     Multiple missed appointments may lead to dismissal from the clinic.

## 2021-04-30 ENCOUNTER — TELEPHONE (OUTPATIENT)
Dept: PALLIATIVE MEDICINE | Facility: CLINIC | Age: 51
End: 2021-04-30

## 2021-04-30 LAB
CREAT UR-MCNC: 171 MG/DL
FENTANYL UR CFM-MCNC: 24 NG/ML
FENTANYL/CREAT UR: 14 NG/MG{CREAT}
GABAPENTIN UR QL CFM: PRESENT
NORFENTANYL UR CFM-MCNC: 91 NG/ML
NORFENTANYL/CREAT UR: 53 NG/MG{CREAT}
OXYCODONE UR CFM-MCNC: 2200 NG/ML
OXYCODONE/CREAT UR: 1287 NG/MG{CREAT}
OXYMORPHONE UR CFM-MCNC: 171 NG/ML
OXYMORPHONE/CREAT UR: 100 NG/MG{CREAT}
RPT COMMENT: ABNORMAL

## 2021-04-30 NOTE — TELEPHONE ENCOUNTER
Prior Authorization Retail Medication Request    Medication/Dose: oxyCODONE (OXYCONTIN) 10 MG 12 hr tablet  ICD code (if different than what is on RX):    Previously Tried and Failed:    Rationale:      Key [id number]    Pharmacy Information     [facility]   [address] [place] [id number]  Phone: [phone] Fax: [phone]

## 2021-05-03 ENCOUNTER — TELEPHONE (OUTPATIENT)
Dept: PALLIATIVE MEDICINE | Facility: CLINIC | Age: 51
End: 2021-05-03

## 2021-05-03 NOTE — TELEPHONE ENCOUNTER
Routed PA encounter high priority.    Gita Barrios, JEREMIAHN, RN  Care Coordinator  [facility] [place]

## 2021-05-03 NOTE — TELEPHONE ENCOUNTER
Pt calling to check the status of his refill. He sated he is out of medication until the Prior Approval comes thru.      Cate CHACON    [facility] [place]

## 2021-05-03 NOTE — TELEPHONE ENCOUNTER
Central Prior Authorization Team   Phone: [phone]      Prior Authorization Approval    Authorization Effective Date: 4/3/2021  Authorization Expiration Date: 5/3/2022  Medication: oxyCODONE (OXYCONTIN) 10 MG 12 hr tablet - APPROVED  Approved Dose/Quantity: 60 FOR 30  Reference #:     Insurance Company: [employer] - Phone [phone] Fax [phone]  Expected CoPay:       CoPay Card Available:      Foundation Assistance Needed:    Which Pharmacy is filling the prescription (Not needed for infusion/clinic administered): [id number] - [place], [place] - [address] [place] [address] AT [facility] & [address]  Pharmacy Notified: Yes  Patient Notified: Yes (**Instructed pharmacy to notify patient when script is ready to /ship.**)

## 2021-05-04 NOTE — TELEPHONE ENCOUNTER
PA is approved.    JEREMIAH PeacockN, RN  Care Coordinator  [facility] [place]

## 2021-05-21 DIAGNOSIS — I10 HYPERTENSION, UNSPECIFIED TYPE: ICD-10-CM

## 2021-05-21 NOTE — LETTER
[facility]  [address]  [facility] [address]  [phone]        05/25/21    David Wilcox    [address] [place] [facility] [address]            Dear David Wilcox       APPOINTMENT REMINDER:   Our records indicates that it is time for you to be seen for a recheck.     Your current medication request will be approved for one refill but you will need to be seen before any additional refills can be approved.    Taking care of your health is important to us, and ongoing visits with your provider are vital to your care.    We look forward to seeing you in the near future.  You may call our office at [phone] to schedule a visit.    Please disregard this notice if you have already made an appointment.          Sincerely,        CATARINO García CNP

## 2021-05-24 DIAGNOSIS — M96.1 FAILED BACK SURGICAL SYNDROME: ICD-10-CM

## 2021-05-24 NOTE — TELEPHONE ENCOUNTER
Routing refill request to provider for review/approval because:  Labs not current:  Pt was advised in April to return for labs.    Ina Luke RN

## 2021-05-25 RX ORDER — LOSARTAN POTASSIUM 50 MG/1
50 TABLET ORAL DAILY
Qty: 30 TABLET | Refills: 0 | Status: SHIPPED | OUTPATIENT
Start: 2021-05-25 | End: 2021-12-27

## 2021-05-25 NOTE — TELEPHONE ENCOUNTER
He is due for a BP check and labs after changing his medication from Lisinopril to Losartan. I would like to make sure he is on the right dose. 30 tabs sent in, he will need to be seen before further refills. Please assist patient in scheduling.     CATARINO Zamora CNP

## 2021-05-27 RX ORDER — OXYCODONE HCL 10 MG/1
10 TABLET, FILM COATED, EXTENDED RELEASE ORAL EVERY 8 HOURS
Qty: 90 TABLET | Refills: 0 | Status: SHIPPED | OUTPATIENT
Start: 2021-05-27 | End: 2021-06-25

## 2021-05-27 RX ORDER — OXYCODONE AND ACETAMINOPHEN 5; 325 MG/1; MG/1
1 TABLET ORAL EVERY 6 HOURS PRN
Qty: 60 TABLET | Refills: 0 | Status: SHIPPED | OUTPATIENT
Start: 2021-05-27 | End: 2021-06-25

## 2021-06-01 ENCOUNTER — TELEPHONE (OUTPATIENT)
Dept: PALLIATIVE MEDICINE | Facility: CLINIC | Age: 51
End: 2021-06-01

## 2021-06-01 NOTE — TELEPHONE ENCOUNTER
Received fax from pharmacy stating Rx for oxyCODONE (OXYCONTIN) 10 MG 12 hr tablet was filled for a quantity of 82 instead of 90.      Routing to provider as a FYI.

## 2021-06-24 DIAGNOSIS — M96.1 FAILED BACK SURGICAL SYNDROME: ICD-10-CM

## 2021-06-24 NOTE — TELEPHONE ENCOUNTER
Reason for call:  Medication   If this is a refill request, has the caller requested the refill from the pharmacy already? No  Will the patient be using a [facility]? No  Name of the pharmacy and phone number for the current request: [facility][id number] - [place], [place] - [address] AT [facility] [address]    Name of the medication requested:   oxyCODONE (OXYCONTIN) 10 MG 12 hr tablet    oxyCODONE-acetaminophen (PERCOCET) 5-325 MG tablet    Other request: none    Phone number to reach patient:  Cell number on file:    Telephone Information:   Mobile [phone]       Best Time:  anytime    Can we leave a detailed message on this number?  YES    Travel screening: Not Applicable     Stacey NEELY    [facility]

## 2021-06-25 NOTE — TELEPHONE ENCOUNTER
Medication refill information reviewed. Spoke to [place] and asked for a  pill count for both his medications. Will hold off routing to provider temporarily incase he calls with different information as he was camping at the time we spoke.     Medication Notes       YADIRA CARRILLO Jun 1, 2021  4:27 PM   Fax from pharmacy #[id number] was filled.               oxyCODONE (OXYCONTIN) 10 MG 12 hr tablet is 16 tablets. Due 07/01/21  oxyCODONE-acetaminophen (PERCOCET) 5-325 MG tablet is  23 tablets Due 07/06/21  Prescriptions prepped for review.     Will route to provider.

## 2021-06-26 DIAGNOSIS — I10 HYPERTENSION, UNSPECIFIED TYPE: ICD-10-CM

## 2021-06-28 RX ORDER — LISINOPRIL 20 MG/1
TABLET ORAL
Qty: 90 TABLET | Refills: 0 | Status: SHIPPED | OUTPATIENT
Start: 2021-06-28 | End: 2021-07-28

## 2021-06-29 RX ORDER — OXYCODONE AND ACETAMINOPHEN 5; 325 MG/1; MG/1
1 TABLET ORAL EVERY 6 HOURS PRN
Qty: 60 TABLET | Refills: 0 | Status: SHIPPED | OUTPATIENT
Start: 2021-06-29 | End: 2021-07-28

## 2021-06-29 RX ORDER — OXYCODONE HCL 10 MG/1
10 TABLET, FILM COATED, EXTENDED RELEASE ORAL EVERY 8 HOURS
Qty: 90 TABLET | Refills: 0 | Status: SHIPPED | OUTPATIENT
Start: 2021-06-29 | End: 2021-07-28

## 2021-06-29 NOTE — TELEPHONE ENCOUNTER
Script Eprescribed to pharmacy  [place]    Will send this to MA team to notify patient.    Signed Prescriptions:                        Disp   Refills    oxyCODONE-acetaminophen (PERCOCET) 5-325 M*60 tab*0        Sig: Take 1 tablet by mouth every 6 hours as needed for           severe pain Max 2/day. Fill 07/04/21 to start on           07/06/21  Authorizing Provider: YADIRA CARRILLO    oxyCODONE (OXYCONTIN) 10 MG 12 hr tablet   90 tab*0        Sig: Take 1 tablet (10 mg) by mouth every 8 hours Fill           06/29/21 to start 07/01/21  Authorizing Provider: YADIRA CARRILLO MD  [facility]

## 2021-06-30 NOTE — TELEPHONE ENCOUNTER
[employer] message sent with Rx approval from provider.  [facility] Team

## 2021-07-26 DIAGNOSIS — M96.1 FAILED BACK SURGICAL SYNDROME: ICD-10-CM

## 2021-07-26 RX ORDER — GABAPENTIN 600 MG/1
1200 TABLET ORAL 3 TIMES DAILY
Qty: 540 TABLET | Refills: 1 | Status: SHIPPED | OUTPATIENT
Start: 2021-07-26 | End: 2021-12-27

## 2021-07-26 NOTE — TELEPHONE ENCOUNTER
Signed Prescriptions:                        Disp   Refills    gabapentin (NEURONTIN) 600 MG tablet       540 ta*1        Sig: Take 2 tablets (1,200 mg) by mouth 3 times daily . 3           month supply  Authorizing Provider: YADIRA CARRILLO MD  [facility]

## 2021-07-26 NOTE — TELEPHONE ENCOUNTER
Received fax request from   [facility] #[id number]  [address] [address]  Phone: [phone] Fax: [phone]    Pharmacy requesting refill(s) for gabapentin (NEURONTIN) 600 MG tablet    Last refilled on 07/23/21    Pt last seen on 04/28/21    Next appt scheduled for 07/28/21    Will facilitate refill.    Fabienne Benton [facility]

## 2021-07-28 ENCOUNTER — OFFICE VISIT (OUTPATIENT)
Dept: PALLIATIVE MEDICINE | Facility: CLINIC | Age: 51
End: 2021-07-28
Payer: COMMERCIAL

## 2021-07-28 VITALS — DIASTOLIC BLOOD PRESSURE: 103 MMHG | HEART RATE: 75 BPM | SYSTOLIC BLOOD PRESSURE: 145 MMHG

## 2021-07-28 DIAGNOSIS — M96.1 FAILED BACK SURGICAL SYNDROME: ICD-10-CM

## 2021-07-28 DIAGNOSIS — G89.4 CHRONIC PAIN SYNDROME: ICD-10-CM

## 2021-07-28 DIAGNOSIS — F33.42 RECURRENT MAJOR DEPRESSION IN COMPLETE REMISSION (H): ICD-10-CM

## 2021-07-28 DIAGNOSIS — M54.16 LUMBAR RADICULOPATHY: ICD-10-CM

## 2021-07-28 PROCEDURE — 99215 OFFICE O/P EST HI 40 MIN: CPT | Performed by: PSYCHIATRY & NEUROLOGY

## 2021-07-28 RX ORDER — NORTRIPTYLINE HCL 10 MG
20 CAPSULE ORAL AT BEDTIME
Qty: 180 CAPSULE | Refills: 1 | Status: SHIPPED | OUTPATIENT
Start: 2021-07-28 | End: 2021-12-27

## 2021-07-28 RX ORDER — OXYCODONE AND ACETAMINOPHEN 5; 325 MG/1; MG/1
1 TABLET ORAL EVERY 6 HOURS PRN
Qty: 60 TABLET | Refills: 0 | Status: SHIPPED | OUTPATIENT
Start: 2021-07-28 | End: 2021-08-31

## 2021-07-28 RX ORDER — OXYCODONE HCL 10 MG/1
10 TABLET, FILM COATED, EXTENDED RELEASE ORAL EVERY 8 HOURS
Qty: 15 TABLET | Refills: 0 | Status: SHIPPED | OUTPATIENT
Start: 2021-07-28 | End: 2021-08-02

## 2021-07-28 RX ORDER — OXYCODONE HCL 10 MG/1
10 TABLET, FILM COATED, EXTENDED RELEASE ORAL EVERY 8 HOURS
Qty: 90 TABLET | Refills: 0 | Status: SHIPPED | OUTPATIENT
Start: 2021-07-28 | End: 2021-08-31

## 2021-07-28 ASSESSMENT — PAIN SCALES - GENERAL: PAINLEVEL: EXTREME PAIN (8)

## 2021-07-28 NOTE — PROGRESS NOTES
[facility]    Date of visit: 7/28/2021     Chief complaint:   Chief Complaint   Patient presents with     Pain     Interval history:  David Wilcox was last seen by me on 4/28/21    Recommendations/plan at the last visit included:  1. Physical Therapy: continue exercises as recommended by PT.   2. Clinical Health Psychologist to address issues of relaxation, behavioral change, coping style, and other factors important to improvement.  Not at this time   3. Diagnostic Studies: UDS and opioid agreement today  4. Procedures: none  5. Medication Management:    1. He will stop the fentanyl patch, remove, and 8-12 hours later start the Oxycontin. If any sedation, he will hold Percocet.  2. oxycontin 10mg q12 hr.  Keep Percocet the same  6. Recommendations to PCP: none  7. Follow up: 3 months, in person     Since his last visit, David Wilcox reports:  -pain has been up and down, varying with weather.  Right above the fusion, he is starting to have more discomfort.  He also has increased pain- radiates down the right leg. At baseline has decreased function in that right leg  -overall the pain can be quite severe.    Pain scores:  Extreme Pain (8)     Current pain treatments:   Percocet 5/325mg daily- twice a day - consistent use- down from 7.5mg dose when changed from fentanyl to Oxycontin  Oxycontin 10mg q8 - helpful (no side effects)  on hold during summer month    Gabapentin 1200 mg TID, beneficial  Cymbalta 60mg daily- tried going to 90mg/day but felt worse- for pain and mood  Nortriptyline 20 mg at bedtime - continues to have dry mouth but wishes to continue      Ativan 0.5mg very sparingly (averages about once every 3-6 months for severe panic attacks/airtravel      Previous medication treatments included:  Hydrocodone- not helpful  Robaxin (methocarbamol) 1000mg at night and in the morning- the morning dose has been helping- he will be stopping this soon and  starting tizanidine  Methadone- taking after 1st surgery  Naprosyn- not helpful  Flexeril- after first back injury (1990s)  Lidocaine- not helpful  Acetaminophen (tylenol)   Gabapentin  Fentanyl patch-12 mcg/48 hours - feels it works too strongly with heat/sun exposure    Other treatments have included:  David Wilcox has been seen at a pain clinic in the past.  MAPS- injections  PT: yes  Acupuncture: no  TENs Unit: yes- out of use- somewhat helpful  Injections: none since last surgery- very  helpful    Side Effects: None    Medications:  Current Outpatient Medications   Medication Sig Dispense Refill     albuterol (PROAIR HFA/PROVENTIL HFA/VENTOLIN HFA) 108 (90 Base) MCG/ACT inhaler INHALE 2 PUFFS INTO THE LUNGS EVERY 4 HOURS AS NEEDED FOR SHORTNESS OF BREATH 8.5 g 0     DULoxetine (CYMBALTA) 60 MG capsule Take 1 capsule (60 mg) by mouth daily . 3 month supply (Patient taking differently: Take 60 mg by mouth every morning . 3 month supply) 90 capsule 1     gabapentin (NEURONTIN) 600 MG tablet Take 2 tablets (1,200 mg) by mouth 3 times daily . 3 month supply 540 tablet 1     LORazepam (ATIVAN) 0.5 MG tablet One bid prn anxiety. Rare use 30 tablet 0     losartan (COZAAR) 50 MG tablet Take 1 tablet (50 mg) by mouth daily 30 tablet 0     mirtazapine (REMERON) 15 MG tablet Take 1 tablet (15 mg) by mouth At Bedtime 90 tablet 1     naloxone (NARCAN) 4 MG/0.1ML nasal spray Spray 1 spray (4 mg) into one nostril alternating nostrils once as needed for opioid reversal every 2-3 minutes until assistance arrives 0.2 mL 1     nortriptyline (PAMELOR) 10 MG capsule Take 2 capsules (20 mg) by mouth At Bedtime . 3 month supply 180 capsule 1     omeprazole (PRILOSEC) 20 MG DR capsule Take 1 capsule (20 mg) by mouth daily (Patient taking differently: Take 20 mg by mouth every morning ) 90 capsule 1     oxyCODONE (OXYCONTIN) 10 MG 12 hr tablet Take 1 tablet (10 mg) by mouth every 8 hours Fill 8/3/21 to start on 8/5/21 90 tablet 0      oxyCODONE (OXYCONTIN) 10 MG 12 hr tablet Take 1 tablet (10 mg) by mouth every 8 hours for 5 days Fill 7/28 to start 7/31/21.  5 day supply to line up fill dates. 15 tablet 0     oxyCODONE-acetaminophen (PERCOCET) 5-325 MG tablet Take 1 tablet by mouth every 6 hours as needed for severe pain Max 2/day. Fill 8/3/21 to start on 8/5/21 60 tablet 0     spacer (OPTICHAMBER MAT) holding chamber Use with Inhaler 1 each 0     tiZANidine (ZANAFLEX) 4 MG tablet Take 1 tablet (4 mg) by mouth 3 times daily as needed for muscle spasms 0.5-1 tab tid prn muscle spasm 90 tablet 4     TYLENOL EXTRA STRENGTH 500 MG OR TABS At Bedtime        zolpidem (AMBIEN) 10 MG tablet TAKE 1 TABLET BY MOUTH EVERY DAY AT BEDTIME AS NEEDED FOR SLEEP 30 tablet 5       Medical History: any changes in medical history since they were last seen? none    Physical exam:  BP (!) 145/103   Pulse 75   Gen: awake, alert   Gait: antalgic, uses cane  MSK exam: weakness right dorsiflexion   Boot significantly scuffed compared to left   Decreased sensation to light touch lateral right thigh/calf.  Weak 4-/5 hip flexion on right . 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 Headaches- since stopping tobacco more daily  2. 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    3. Facet arthropathy likely contributing above the level of the future  4. Distant history of alcohol and drug abuse   5. Bipolar disorder- currently managed  6. H/o meningitis after SCS trial  7. Thoracic back pain, likely myofascial etiology.    We discussed vaccines today, he will likely be stopping at pharmacy to get downstairs.    Plan:  1. Physical Therapy: continue  exercises as recommended by PT.   2. Will look into options for orthotic that he can wear with boot- note to orthotic provider sent  3. Clinical Health Psychologist to address issues of relaxation, behavioral change, coping style, and other factors important to improvement.  Not at this time   4. Diagnostic Studies: MRI lumbar spine- eval for worsening. He feels pain above fusion, but radiculopathy seems to be right L5.  5. Procedures: none  6. Medication Management:    1. No changes. Refills provided.  7. Recommendations to PCP: none  8. Follow up: 3 months, will discuss imaging when back.    60 minutes spent on the date of encounter doing chart review, history, and exam documentation and further activities as noted above.       Bindu Motley MD  [facility]

## 2021-07-28 NOTE — PATIENT INSTRUCTIONS
1. 5 day script of Oxycontin  today to start 7/31  2. 30 day scripts of Oxycontin and Percocet to start 8/6,  after 8/4  3. Refills for nortriptyline, tizanidine, and the narcan sent in.  4. [facility] Scheduling:    [place], [place], [facility] [place]: [phone]        ----------------------------------------------------------------  Clinic Number:  [phone]     Call with any questions about your care and for scheduling assistance.     Calls are returned Monday through Friday between 8 AM and 4:30 PM. We usually get back to you within 2 business days depending on the issue/request.    If we are prescribing your medications:    For opioid medication refills, call the clinic or send a [employer] message 7 days in advance.  Please include:    Name of requested medication    Name of the pharmacy.    For non-opioid medications, call your pharmacy directly to request a refill. Please allow 3-4 days to be processed.     Per [place] State Law:    All controlled substance prescriptions must be filled within 30 days of being written.      For those controlled substances allowing refills, pickup must occur within 30 days of last fill.      We believe regular attendance is key to your success in our program!      Any time you are unable to keep your appointment we ask that you call us at least 24 hours in advance to cancel.This will allow us to offer the appointment time to another patient.     Multiple missed appointments may lead to dismissal from the clinic.

## 2021-07-29 DIAGNOSIS — F51.01 PRIMARY INSOMNIA: ICD-10-CM

## 2021-07-30 RX ORDER — ZOLPIDEM TARTRATE 10 MG/1
TABLET ORAL
Qty: 30 TABLET | Refills: 5 | Status: SHIPPED | OUTPATIENT
Start: 2021-07-30 | End: 2021-12-27

## 2021-08-02 RX ORDER — DULOXETIN HYDROCHLORIDE 60 MG/1
CAPSULE, DELAYED RELEASE ORAL
Qty: 90 CAPSULE | Refills: 1 | Status: SHIPPED | OUTPATIENT
Start: 2021-08-02 | End: 2021-12-27

## 2021-08-02 NOTE — TELEPHONE ENCOUNTER
"Requested Prescriptions   Pending Prescriptions Disp Refills     DULoxetine (CYMBALTA) 60 MG capsule [Pharmacy Med Name: DULOXETINE DR 60MG CAPSULES] 90 capsule 1     Sig: TAKE 1 CAPSULE(60 MG) BY MOUTH DAILY       Serotonin-Norepinephrine Reuptake Inhibitors  Failed - 7/28/2021  9:25 AM        Failed - PHQ-9 score of less than 5 in past 6 months     Please review last PHQ-9 score.           Passed - Blood pressure under 140/90 in past 12 months     BP Readings from Last 3 Encounters:   07/28/21 (!) 145/103   04/28/21 126/85   03/31/21 (!) 170/110                 Passed - Medication is active on med list        Passed - Patient is age 18 or older        Passed - Recent (6 mo) or future (30 days) visit within the authorizing provider's specialty     Patient had office visit in the last 6 months or has a visit in the next 30 days with authorizing provider or within the authorizing provider's specialty.  See \"Patient Info\" tab in inbasket, or \"Choose Columns\" in Meds & Orders section of the refill encounter.                 "

## 2021-08-06 ENCOUNTER — TELEPHONE (OUTPATIENT)
Dept: PALLIATIVE MEDICINE | Facility: CLINIC | Age: 51
End: 2021-08-06

## 2021-08-06 ENCOUNTER — HOSPITAL ENCOUNTER (OUTPATIENT)
Dept: MRI IMAGING | Facility: CLINIC | Age: 51
Discharge: HOME | End: 2021-08-06
Attending: PSYCHIATRY & NEUROLOGY | Admitting: PSYCHIATRY & NEUROLOGY
Payer: COMMERCIAL

## 2021-08-06 DIAGNOSIS — M54.16 LUMBAR RADICULOPATHY: ICD-10-CM

## 2021-08-06 DIAGNOSIS — M96.1 FAILED BACK SURGICAL SYNDROME: ICD-10-CM

## 2021-08-06 PROCEDURE — 255N000002 HC RX 255 OP 636: Performed by: PSYCHIATRY & NEUROLOGY

## 2021-08-06 PROCEDURE — 72158 MRI LUMBAR SPINE W/O & W/DYE: CPT

## 2021-08-06 PROCEDURE — A9585 GADOBUTROL INJECTION: HCPCS | Performed by: PSYCHIATRY & NEUROLOGY

## 2021-08-06 RX ORDER — GADOBUTROL 604.72 MG/ML
10 INJECTION INTRAVENOUS ONCE
Status: COMPLETED | OUTPATIENT
Start: 2021-08-06 | End: 2021-08-06

## 2021-08-06 RX ADMIN — GADOBUTROL 10 ML: 604.72 INJECTION INTRAVENOUS at 17:38

## 2021-08-09 ENCOUNTER — TELEPHONE (OUTPATIENT)
Dept: FAMILY MEDICINE | Facility: CLINIC | Age: 51
End: 2021-08-09

## 2021-08-09 NOTE — LETTER
[facility]  [address] [place] [address]  Phone: [phone]  Fax: [phone]        August 9, 2021        David Wilcox  [address] [facility] [address]    Dear David Wilcox,    I care about your health and have reviewed your health plan. I have reviewed your medical conditions, medication list, and lab results and am making recommendations based on this review, to better manage your health.    You are in particular need of attention regarding:  -High Blood Pressure    I am recommending that you:  -Schedule an office Visit       Please call us at [phone]  (or use [employer]) to address the above recommendations.     Thank you for trusting [facility] and we appreciate the opportunity to serve you.  We look forward to supporting your healthcare needs in the future.    Healthy Regards,    Dr. Llamas

## 2021-08-09 NOTE — TELEPHONE ENCOUNTER
Patient Quality Outreach      Summary:    Patient has the following on his problem list/HM:     Hypertension   Last three blood pressure readings:  BP Readings from Last 3 Encounters:   07/28/21 (!) 145/103   04/28/21 126/85   03/31/21 (!) 170/110     Blood pressure: Failed    HTN Guidelines:  ? 139/89     Patient is due/failing the following:   Hypertension follow-up visit    Type of outreach:    Sent letter.    Questions for provider review:    EVELYN Hurtado MA       Chart routed to .

## 2021-08-30 ENCOUNTER — TELEPHONE (OUTPATIENT)
Dept: PALLIATIVE MEDICINE | Facility: CLINIC | Age: 51
End: 2021-08-30

## 2021-08-30 DIAGNOSIS — M96.1 FAILED BACK SURGICAL SYNDROME: ICD-10-CM

## 2021-08-30 NOTE — TELEPHONE ENCOUNTER
Other request: Pt states the pharmacy never allows him to fill the full prescription of Oxycontin. Pt only receives 75 tablets    Patient requesting refill(s) of oxyCODONE-acetaminophen (PERCOCET) 5-325 MG tablet  Last dispensed from pharmacy on 08/05/2021    oxyCODONE (OXYCONTIN) 10 MG 12 hr tablet  Last dispensed from pharmacy on 08/28/2021    Patient's last office/virtual visit by prescribing provider on 7/28/2021  Next office/virtual appointment scheduled for 10/06/2021    Last urine drug screen date 4/28/2021  Current opioid agreement on file (completed within the last year) Yes Date of opioid agreement: 4/28/2021    E-prescribe to [facility] #[id number] [place], [place]     Will route to nursing [place] for review and preparation of prescription(s).

## 2021-08-30 NOTE — TELEPHONE ENCOUNTER
Reason for call:  Medication   If this is a refill request, has the caller requested the refill from the pharmacy already? No  Will the patient be using a [facility]? No  Name of the pharmacy and phone number for the current request: [facility] #[id number] - [place], [place] - [address] AT [address]    Name of the medication requested:   oxyCODONE (OXYCONTIN) 10 MG 12 hr tablet    oxyCODONE-acetaminophen (PERCOCET) 5-325 MG tablet    Other request: Pt states the pharmacy never allows him to fill the full prescription of Oxycontin. Pt only receives 75 tablets    Phone number to reach patient:  Cell number on file:    Telephone Information:   Mobile [phone]       Best Time:  anytime    Can we leave a detailed message on this number?  YES    Travel screening: Not Applicable     Stacey NEELY    [facility]

## 2021-08-31 RX ORDER — OXYCODONE HCL 10 MG/1
10 TABLET, FILM COATED, EXTENDED RELEASE ORAL EVERY 8 HOURS
Qty: 90 TABLET | Refills: 0 | Status: SHIPPED | OUTPATIENT
Start: 2021-08-31 | End: 2021-10-01

## 2021-08-31 RX ORDER — OXYCODONE AND ACETAMINOPHEN 5; 325 MG/1; MG/1
1 TABLET ORAL EVERY 6 HOURS PRN
Qty: 60 TABLET | Refills: 0 | Status: SHIPPED | OUTPATIENT
Start: 2021-08-31 | End: 2021-10-01

## 2021-08-31 NOTE — TELEPHONE ENCOUNTER
Script Eprescribed to pharmacy  [place]    Will send this to MA team to notify patient.    Signed Prescriptions:                        Disp   Refills    oxyCODONE-acetaminophen (PERCOCET) 5-325 M*60 tab*0        Sig: Take 1 tablet by mouth every 6 hours as needed for           severe pain Max 2/day. Fill 9/2/21 to start on           9/4/21  Authorizing Provider: YADIRA CARRILLO    oxyCODONE (OXYCONTIN) 10 MG 12 hr tablet   90 tab*0        Sig: Take 1 tablet (10 mg) by mouth every 8 hours Fill           09/02/21 to start on 09/04/21  Authorizing Provider: YADIRA CARRILLO     called pharmacy- asked that they process prior auth if needed to get #90.    Bindu Carrillo MD  [facility]

## 2021-08-31 NOTE — TELEPHONE ENCOUNTER
Medication refill information reviewed. (the 08/28/21 fill was for the remaining 15 tablets from his earlier prescription)     Due date for     oxyCODONE-acetaminophen (PERCOCET) 5-325 MG tablet 09/04/21     oxyCODONE (OXYCONTIN) 10 MG 12 hr tablet is 09/04/21     Prescriptions prepped for review.     Will route to provider.

## 2021-09-02 NOTE — TELEPHONE ENCOUNTER
Prior Authorization Approval    Authorization Effective Date: 8/3/2021  Authorization Expiration Date: 9/2/2022  Medication: oxyCODONE (OXYCONTIN) 10 MG 12 hr tablet- APPROVED   Approved Dose/Quantity:   Reference #:     Insurance Company: [employer] - Phone [phone] Fax [phone]  Expected CoPay:       CoPay Card Available:      Foundation Assistance Needed:    Which Pharmacy is filling the prescription (Not needed for infusion/clinic administered): [facility][id number] - [address] AT [address] [facility] & [address]  Pharmacy Notified: Yes  Patient Notified:  **Instructed pharmacy to notify patient when script is ready to /ship.**

## 2021-09-02 NOTE — TELEPHONE ENCOUNTER
Prior Authorization Specialty Medication Request    Medication/Dose: oxyCODONE (OXYCONTIN) 10 MG 12 hr tablet  ICD code (if different than what is on RX):    Previously Tried and Failed:      Important Lab Values:   Rationale:     Insurance Name:   Insurance ID:   Insurance Phone Number:     Pharmacy Information (if different than what is on RX)  Name:    Phone:

## 2021-09-02 NOTE — TELEPHONE ENCOUNTER
Central Prior Authorization Team  Phone: [phone]    PA Initiation    Medication: oxyCODONE (OXYCONTIN) 10 MG 12 hr tablet  Insurance Company: [employer] - Phone [phone] Fax [phone]  Pharmacy Filling the Rx: [facility] #[id number] - [place], [place] - [address] [place] [address] AT [address] [facility] [address]  Filling Pharmacy Phone: [phone]  Filling Pharmacy Fax:    Start Date: 9/2/2021

## 2021-09-02 NOTE — TELEPHONE ENCOUNTER
Pharmacy calling stating patients oxyCODONE (OXYCONTIN) 10 MG 12 hr tablet is needing a PA.        Routing to nursing to review        Briseyda Cerna    [facility]

## 2021-09-25 ENCOUNTER — HEALTH MAINTENANCE LETTER (OUTPATIENT)
Age: 51
End: 2021-09-25

## 2021-09-30 DIAGNOSIS — M96.1 FAILED BACK SURGICAL SYNDROME: ICD-10-CM

## 2021-09-30 NOTE — TELEPHONE ENCOUNTER
Reason for call:  Medication   If this is a refill request, has the caller requested the refill from the pharmacy already? No  Will the patient be using a [facility]? No  Name of the pharmacy and phone number for the current request: [facility] #[id number] - [place], [place] - [address] AT [address]    Name of the medication requested:   oxyCODONE-acetaminophen (PERCOCET) 5-325 MG tablet    oxyCODONE (OXYCONTIN) 10 MG 12 hr tablet    Other request: none    Phone number to reach patient:  Cell number on file:    Telephone Information:   Mobile [phone]       Best Time:  anytime    Can we leave a detailed message on this number?  YES    Travel screening: Not Applicable     Stacey NEELY    [facility]

## 2021-09-30 NOTE — TELEPHONE ENCOUNTER
Received call from patient requesting refill(s) of oxyCODONE-acetaminophen (PERCOCET) 5-325 MG tablet     and    oxyCODONE (OXYCONTIN) 10 MG 12 hr tablet    Both last dispensed from pharmacy on 09/02/21    Patient's last office/virtual visit by prescribing provider on 07/28/21    Next office/virtual appointment scheduled for 10/06/21    Last urine drug screen date 04/28/21    Current opioid agreement on file (completed within the last year) Yes Date of opioid agreement: 04/28/21    E-prescribe to   [facility] #[id number]  [address] [facility] [address]  Phone: [phone] Fax: [phone]    Will route to nursing pool for review and preparation of prescription(s).     Fabienne Benton [facility]

## 2021-10-01 RX ORDER — OXYCODONE AND ACETAMINOPHEN 5; 325 MG/1; MG/1
1 TABLET ORAL EVERY 6 HOURS PRN
Qty: 60 TABLET | Refills: 0 | Status: SHIPPED | OUTPATIENT
Start: 2021-10-01 | End: 2021-10-06

## 2021-10-01 RX ORDER — OXYCODONE HCL 10 MG/1
10 TABLET, FILM COATED, EXTENDED RELEASE ORAL EVERY 8 HOURS
Qty: 90 TABLET | Refills: 0 | Status: SHIPPED | OUTPATIENT
Start: 2021-10-01 | End: 2021-10-06

## 2021-10-01 NOTE — TELEPHONE ENCOUNTER
Spoke to patient. Advised prescriptions were sent to pharmacy. Confirmed pharmacy.     Gave fill date of  10/02/21    Gave start date of  10/04/21    Patient stated understanding.    Fabienne Benton [facility] [place]

## 2021-10-01 NOTE — TELEPHONE ENCOUNTER
Medication refill information reviewed.     Due date for oxyCODONE-acetaminophen (PERCOCET) 5-325 MG tablet is 10/2/21     Due date for oxyCODONE (OXYCONTIN) 10 MG 12 hr tablet is 10/2/21     Prescriptions prepped for review.     Will route to provider.     Torrey Rodrigues RN  Patient Care Supervisor   [facility] [place]

## 2021-10-01 NOTE — TELEPHONE ENCOUNTER
Script Eprescribed to pharmacy  [place]    Will send this to MA team to notify patient.    Signed Prescriptions:                        Disp   Refills    oxyCODONE (OXYCONTIN) 10 MG 12 hr tablet   90 tab*0        Sig: Take 1 tablet (10 mg) by mouth every 8 hours Fill           10/02/21 to start on 10/04/21  Authorizing Provider: YADIRA CARRILLO    oxyCODONE-acetaminophen (PERCOCET) 5-325 M*60 tab*0        Sig: Take 1 tablet by mouth every 6 hours as needed for           severe pain Max 2/day. Fill 10/2/21 to start on           10/4/21  Authorizing Provider: YADIRA CARRILLO MD  [facility]

## 2021-10-06 ENCOUNTER — OFFICE VISIT (OUTPATIENT)
Dept: PALLIATIVE MEDICINE | Facility: CLINIC | Age: 51
End: 2021-10-06
Payer: COMMERCIAL

## 2021-10-06 VITALS — HEART RATE: 79 BPM | DIASTOLIC BLOOD PRESSURE: 99 MMHG | SYSTOLIC BLOOD PRESSURE: 146 MMHG

## 2021-10-06 DIAGNOSIS — G89.4 CHRONIC PAIN SYNDROME: ICD-10-CM

## 2021-10-06 DIAGNOSIS — M96.1 FAILED BACK SURGICAL SYNDROME: Primary | ICD-10-CM

## 2021-10-06 PROCEDURE — 99212 OFFICE O/P EST SF 10 MIN: CPT | Performed by: PSYCHIATRY & NEUROLOGY

## 2021-10-06 RX ORDER — OXYCODONE AND ACETAMINOPHEN 5; 325 MG/1; MG/1
1 TABLET ORAL EVERY 6 HOURS PRN
Qty: 60 TABLET | Refills: 0 | Status: SHIPPED | OUTPATIENT
Start: 2021-10-06 | End: 2021-12-02

## 2021-10-06 RX ORDER — OXYCODONE HCL 10 MG/1
10 TABLET, FILM COATED, EXTENDED RELEASE ORAL EVERY 8 HOURS
Qty: 90 TABLET | Refills: 0 | Status: SHIPPED | OUTPATIENT
Start: 2021-10-06 | End: 2021-12-02

## 2021-10-06 ASSESSMENT — PAIN SCALES - GENERAL: PAINLEVEL: MODERATE PAIN (5)

## 2021-10-06 NOTE — PATIENT INSTRUCTIONS
Schedule follow up in 3 months.    ----------------------------------------------------------------  Clinic Number:  [phone]     Call with any questions about your care and for scheduling assistance.     Calls are returned Monday through Friday between 8 AM and 4:30 PM. We usually get back to you within 2 business days depending on the issue/request.    If we are prescribing your medications:    For opioid medication refills, call the clinic or send a [employer] message 7 days in advance.  Please include:    Name of requested medication    Name of the pharmacy.    For non-opioid medications, call your pharmacy directly to request a refill. Please allow 3-4 days to be processed.     Per [place] State Law:    All controlled substance prescriptions must be filled within 30 days of being written.      For those controlled substances allowing refills, pickup must occur within 30 days of last fill.      We believe regular attendance is key to your success in our program!      Any time you are unable to keep your appointment we ask that you call us at least 24 hours in advance to cancel.This will allow us to offer the appointment time to another patient.     Multiple missed appointments may lead to dismissal from the clinic.

## 2021-10-06 NOTE — PROGRESS NOTES
"[facility]    Date of visit: 10/6/2021      Chief complaint:   Chief Complaint   Patient presents with     Pain     Interval history:  David Wilcox was last seen by me on 7/28/21    Recommendations/plan at the last visit included:  1. Physical Therapy: continue exercises as recommended by PT.   2. Will look into options for orthotic that he can wear with boot- note to orthotic provider sent  3. Clinical Health Psychologist to address issues of relaxation, behavioral change, coping style, and other factors important to improvement.  Not at this time   4. Diagnostic Studies: MRI lumbar spine- eval for worsening. He feels pain above fusion, but radiculopathy seems to be right L5.  5. Procedures: none  6. Medication Management:    1. No changes. Refills provided.  7. Recommendations to PCP: none  8. Follow up: 3 months, will discuss imaging when back.      Since his last visit, David Wilcox reports:  -overall, feels like he is in a good place  -he is dong well right now, so doesn't want to make changes back to his typical \"winter\" meds  No new symptoms.  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   -got COVID vaccine      Pain scores:  Moderate Pain (5)     Current pain treatments:   Percocet 5/325mg daily- twice a day - consistent use- down from 7.5mg dose when changed from fentanyl to Oxycontin  Oxycontin 10mg q8 - helpful (no side effects)  Gabapentin 1200 mg TID, beneficial  Cymbalta 60mg daily- tried going to 90mg/day but felt worse- for pain and mood  Nortriptyline 20 mg at bedtime - continues to have dry mouth but wishes to continue      Ativan 0.5mg very sparingly (averages about once every 3-6 months for severe panic attacks/airtravel      Previous medication treatments included:  Hydrocodone- not helpful  Robaxin (methocarbamol) 1000mg at night and in the morning- the morning dose has been helping- he will " be stopping this soon and starting tizanidine  Methadone- taking after 1st surgery  Naprosyn- not helpful  Flexeril- after first back injury (1990s)  Lidocaine- not helpful  Acetaminophen (tylenol)   on hold during summer month- use with Percocet 7.5mg dose.    Other treatments have included:  David Wilcox has been seen at a pain clinic in the past.  MAPS- injections  PT: yes  Acupuncture: no  TENs Unit: yes- out of use- somewhat helpful  Injections: none since last surgery- very  helpful    Side Effects: None    Medications:  Current Outpatient Medications   Medication Sig Dispense Refill     albuterol (PROAIR HFA/PROVENTIL HFA/VENTOLIN HFA) 108 (90 Base) MCG/ACT inhaler INHALE 2 PUFFS INTO THE LUNGS EVERY 4 HOURS AS NEEDED FOR SHORTNESS OF BREATH 8.5 g 0     DULoxetine (CYMBALTA) 60 MG capsule TAKE 1 CAPSULE(60 MG) BY MOUTH DAILY 90 capsule 1     gabapentin (NEURONTIN) 600 MG tablet Take 2 tablets (1,200 mg) by mouth 3 times daily . 3 month supply 540 tablet 1     LORazepam (ATIVAN) 0.5 MG tablet One bid prn anxiety. Rare use 30 tablet 0     losartan (COZAAR) 50 MG tablet Take 1 tablet (50 mg) by mouth daily 30 tablet 0     mirtazapine (REMERON) 15 MG tablet Take 1 tablet (15 mg) by mouth At Bedtime 90 tablet 1     naloxone (NARCAN) 4 MG/0.1ML nasal spray Spray 1 spray (4 mg) into one nostril alternating nostrils once as needed for opioid reversal every 2-3 minutes until assistance arrives 0.2 mL 1     nortriptyline (PAMELOR) 10 MG capsule Take 2 capsules (20 mg) by mouth At Bedtime . 3 month supply 180 capsule 1     omeprazole (PRILOSEC) 20 MG DR capsule Take 1 capsule (20 mg) by mouth daily (Patient taking differently: Take 20 mg by mouth every morning ) 90 capsule 1     oxyCODONE (OXYCONTIN) 10 MG 12 hr tablet Take 1 tablet (10 mg) by mouth every 8 hours .Fill 11/1/21 to start on 11/3/21 90 tablet 0     oxyCODONE-acetaminophen (PERCOCET) 5-325 MG tablet Take 1 tablet by mouth every 6 hours as needed for severe  pain Max 2/day. Fill 11/1/21 to start on 11/3/21 60 tablet 0     tiZANidine (ZANAFLEX) 4 MG tablet Take 1 tablet (4 mg) by mouth 3 times daily as needed for muscle spasms 0.5-1 tab tid prn muscle spasm 90 tablet 4     TYLENOL EXTRA STRENGTH 500 MG OR TABS At Bedtime        zolpidem (AMBIEN) 10 MG tablet TAKE 1 TABLET BY MOUTH EVERY DAY AT BEDTIME AS NEEDED FOR SLEEP 30 tablet 5     spacer (OPTICHAMBER MAT) holding chamber Use with Inhaler (Patient not taking: Reported on 10/6/2021) 1 each 0       Medical History: any changes in medical history since they were last seen? 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 No refill on benzo  Last UDS and opioid agreement  4/28/21      Assessment:   1. Headaches- since stopping tobacco more daily  2. 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    3. Facet arthropathy likely contributing above the level of the future  4. Distant history of alcohol and drug abuse   5. Bipolar disorder- currently managed  6. H/o meningitis after SCS trial  7. Thoracic back pain, likely myofascial etiology.        Plan:  1. Physical Therapy: continue exercises as recommended by PT.   2. Will look into options for orthotic that he can wear with boot- note to orthotic provider sent  3. Clinical Health Psychologist to address issues of relaxation, behavioral change, coping style, and other factors important to improvement.  Not at this time   4. Diagnostic Studies: none  5. Procedures: none  6. Medication Management:  He wants to stick with Oxycontin, rather than going back to fentanyl this winter. No changes. Refills  provided.  7. Recommendations to PCP: none  8. Follow up: 3 months, virtual or in person.    12 minutes spent on the date of encounter doing chart review, history, and exam documentation and further activities as noted above.       Bindu Motley MD  [facility]

## 2021-10-25 DIAGNOSIS — K21.9 GASTROESOPHAGEAL REFLUX DISEASE WITHOUT ESOPHAGITIS: ICD-10-CM

## 2021-10-30 DIAGNOSIS — K21.9 GASTROESOPHAGEAL REFLUX DISEASE WITHOUT ESOPHAGITIS: ICD-10-CM

## 2021-11-01 ENCOUNTER — TELEPHONE (OUTPATIENT)
Dept: PALLIATIVE MEDICINE | Facility: CLINIC | Age: 51
End: 2021-11-01

## 2021-11-01 NOTE — TELEPHONE ENCOUNTER
oxyCODONE (OXYCONTIN) 10 MG 12 hr tablet  Sig - Route: Take 1 tablet (10 mg) by mouth every 8 hours .Fill 11/1/21 to start on 11/3/21 - Oral    oxyCODONE-acetaminophen (PERCOCET) 5-325 MG tablet  Fill 11/1/21 to start on 11/3/21     Called pt and notified of proscriptions were sent to pharmacy.    Aidan Naidu MA

## 2021-11-01 NOTE — TELEPHONE ENCOUNTER
Reason for call:  Medication   If this is a refill request, has the caller requested the refill from the pharmacy already? No  Will the patient be using a [facility]? No  Name of the pharmacy and phone number for the current request: [id number] - [place], [place] - [address] [place] [address] AT [facility] & [address]     Name of the medication requested:   oxyCODONE-acetaminophen (PERCOCET) 5-325 MG tablet     oxyCODONE (OXYCONTIN) 10 MG 12 hr tablet     Other request: none     Phone number to reach patient:  Cell number on file:        Telephone Information:   Mobile [phone]         Best Time:  anytime     Can we leave a detailed message on this number?  YES     Travel screening: Not Applicable

## 2021-12-02 DIAGNOSIS — M96.1 FAILED BACK SURGICAL SYNDROME: ICD-10-CM

## 2021-12-02 RX ORDER — OXYCODONE HCL 10 MG/1
10 TABLET, FILM COATED, EXTENDED RELEASE ORAL EVERY 8 HOURS
Qty: 90 TABLET | Refills: 0 | Status: SHIPPED | OUTPATIENT
Start: 2021-12-02 | End: 2022-01-03

## 2021-12-02 RX ORDER — OXYCODONE AND ACETAMINOPHEN 5; 325 MG/1; MG/1
1 TABLET ORAL EVERY 6 HOURS PRN
Qty: 60 TABLET | Refills: 0 | Status: SHIPPED | OUTPATIENT
Start: 2021-12-02 | End: 2022-01-03

## 2021-12-02 NOTE — TELEPHONE ENCOUNTER
Received call from patient requesting refill(s) of oxyCODONE-acetaminophen (PERCOCET) 5-325 MG tablet   oxyCODONE (OXYCONTIN) 10 MG 12 hr tablet    Last dispensed from pharmacy on both on 11/1/21    Patient's last office/virtual visit by prescribing provider on 10/6/21  Next office/virtual appointment scheduled for 12/29/21    Last urine drug screen date 4/28/21  Current opioid agreement on file (completed within the last year) Yes Date of opioid agreement: 4/28/21    E-prescribe to [id number],  pharmacy    Will route to nursing [place] for review and preparation of prescription(s).

## 2021-12-02 NOTE — TELEPHONE ENCOUNTER
Script Eprescribed to pharmacy  [place] Prescription Monitoring Program checked    Will send this to MA team to notify patient.    Signed Prescriptions:                        Disp   Refills    oxyCODONE-acetaminophen (PERCOCET) 5-325 M*60 tab*0        Sig: Take 1 tablet by mouth every 6 hours as needed for           severe pain Max 2/day. Fill 12/1/21 to start on           12/3/21  Authorizing Provider: YADIRA CARRILLO    oxyCODONE (OXYCONTIN) 10 MG 12 hr tablet   90 tab*0        Sig: Take 1 tablet (10 mg) by mouth every 8 hours .Fill           12/1/21 to start on 12/3/21  Authorizing Provider: YADIRA CARRILLO MD  [facility]

## 2021-12-02 NOTE — TELEPHONE ENCOUNTER
Reason for call:  Medication   If this is a refill request, has the caller requested the refill from the pharmacy already? No  Will the patient be using a [facility]? No  Name of the pharmacy and phone number for the current request: [id number] - [place], [place] - [address] [facility] AT [facility] [address]    Name of the medication requested: oxyCODONE-acetaminophen (PERCOCET) 5-325 MG tablet    oxyCODONE (OXYCONTIN) 10 MG 12 hr tablet    Other request: none    Phone number to reach patient:  Cell number on file:    Telephone Information:   Mobile [phone]       Best Time:  anytime    Can we leave a detailed message on this number?  YES    Travel screening: Not Applicable     Stacey NEELY    [facility]

## 2021-12-02 NOTE — TELEPHONE ENCOUNTER
Medication refill information reviewed.     Due date for oxyCODONE-acetaminophen (PERCOCET) 5-325 MG tablet  and oxyCODONE (OXYCONTIN) 10 MG 12 hr tablet is 12/03/21     Prescriptions prepped for review.     Will route to provider.

## 2021-12-02 NOTE — TELEPHONE ENCOUNTER
[employer] message sent with Rx approval from provider.  Tiny  Pain Clinic Management Team

## 2021-12-11 DIAGNOSIS — F33.42 RECURRENT MAJOR DEPRESSION IN COMPLETE REMISSION (H): ICD-10-CM

## 2021-12-11 DIAGNOSIS — F51.01 PRIMARY INSOMNIA: ICD-10-CM

## 2021-12-13 DIAGNOSIS — J30.81 ALLERGIC RHINITIS DUE TO ANIMALS: ICD-10-CM

## 2021-12-14 NOTE — TELEPHONE ENCOUNTER
"Patient called asking for refill of Mirtazapine stating he is out and has alresdy been given his \"emergency doses\" from his pharmacy.  Patient is asking for this refill today and relayed that he dose have an appointment scheduled with Dr Llamas 12/27/21.  Carole Mobley RN     "

## 2021-12-15 RX ORDER — MIRTAZAPINE 15 MG/1
TABLET, FILM COATED ORAL
Qty: 90 TABLET | Refills: 1 | Status: SHIPPED | OUTPATIENT
Start: 2021-12-15 | End: 2021-12-27

## 2021-12-15 RX ORDER — ALBUTEROL SULFATE 90 UG/1
AEROSOL, METERED RESPIRATORY (INHALATION)
Qty: 8.5 G | Refills: 11 | Status: SHIPPED | OUTPATIENT
Start: 2021-12-15 | End: 2022-12-27

## 2021-12-23 NOTE — PATIENT INSTRUCTIONS
[facility]   Procedure Discharge Instructions    Today you saw: Dr. Carolann Motley      You had an:  Lumbar Epidural steroid injection      Medications used:  Lidocaine   Bupivacaine   Dexamethasone  Omnipaque            Be cautious when walking. Numbness and/or weakness in the lower extremities may occur for up to 6-8 hours after the procedure due to effect of the local anesthetic    Do not drive for 6 hours. The effect of the local anesthetic could slow your reflexes.     You may resume your regular activities after 24 hours    Avoid strenuous activity for the first 24 hours    You may shower, however avoid swimming, tub baths or hot tubs for 24 hours following your procedure    You may have a mild to moderate increase in pain for several days following the injection.    It may take up to 14 days for the steroid medication to start working although you may feel the effect as early as a few days after the procedure.       You may use ice packs for 10-15 minutes, 3 to 4 times a day at the injection site for comfort    Do not use heat to painful areas for 6 to 8 hours. This will give the local anesthetic time to wear off and prevent you from accidentally burning your skin.     You may use anti-inflammatory medications (such as Ibuprofen or Aleve or Advil) or Tylenol for pain control if necessary    If you experience any of the following, call the Pain Clinic during work hours at [phone] or the Provider Line after hours at [phone]:  -Fever over 100 degree F  -Swelling, bleeding, redness, drainage, warmth at the injection site  -Progressive weakness or numbness in your legs   -Loss of bowel or bladder function  -Unusual new onset of pain that is not improving        
.

## 2021-12-27 ENCOUNTER — OFFICE VISIT (OUTPATIENT)
Dept: FAMILY MEDICINE | Facility: CLINIC | Age: 51
End: 2021-12-27
Payer: COMMERCIAL

## 2021-12-27 VITALS
RESPIRATION RATE: 20 BRPM | WEIGHT: 267 LBS | HEART RATE: 98 BPM | TEMPERATURE: 98.5 F | BODY MASS INDEX: 40.47 KG/M2 | OXYGEN SATURATION: 99 % | SYSTOLIC BLOOD PRESSURE: 150 MMHG | DIASTOLIC BLOOD PRESSURE: 94 MMHG | HEIGHT: 68 IN

## 2021-12-27 DIAGNOSIS — F33.42 RECURRENT MAJOR DEPRESSION IN COMPLETE REMISSION (H): ICD-10-CM

## 2021-12-27 DIAGNOSIS — Z12.11 SPECIAL SCREENING FOR MALIGNANT NEOPLASMS, COLON: ICD-10-CM

## 2021-12-27 DIAGNOSIS — F51.01 PRIMARY INSOMNIA: ICD-10-CM

## 2021-12-27 DIAGNOSIS — F11.20 CONTINUOUS OPIOID DEPENDENCE (H): ICD-10-CM

## 2021-12-27 DIAGNOSIS — F13.20 SEDATIVE, HYPNOTIC OR ANXIOLYTIC DEPENDENCE (H): ICD-10-CM

## 2021-12-27 DIAGNOSIS — Z00.00 WELL ADULT EXAM: Primary | ICD-10-CM

## 2021-12-27 DIAGNOSIS — G89.4 CHRONIC PAIN SYNDROME: ICD-10-CM

## 2021-12-27 DIAGNOSIS — E66.01 MORBID OBESITY (H): ICD-10-CM

## 2021-12-27 DIAGNOSIS — Z23 NEED FOR PROPHYLACTIC VACCINATION AND INOCULATION AGAINST INFLUENZA: ICD-10-CM

## 2021-12-27 DIAGNOSIS — I10 HYPERTENSION, UNSPECIFIED TYPE: ICD-10-CM

## 2021-12-27 DIAGNOSIS — K21.9 GASTROESOPHAGEAL REFLUX DISEASE WITHOUT ESOPHAGITIS: ICD-10-CM

## 2021-12-27 LAB
ANION GAP SERPL CALCULATED.3IONS-SCNC: 5 MMOL/L (ref 3–14)
BUN SERPL-MCNC: 14 MG/DL (ref 7–30)
CALCIUM SERPL-MCNC: 9.6 MG/DL (ref 8.5–10.1)
CHLORIDE BLD-SCNC: 106 MMOL/L (ref 94–109)
CO2 SERPL-SCNC: 27 MMOL/L (ref 20–32)
CREAT SERPL-MCNC: 0.91 MG/DL (ref 0.66–1.25)
GFR SERPL CREATININE-BSD FRML MDRD: >90 ML/MIN/1.73M2
GLUCOSE BLD-MCNC: 114 MG/DL (ref 70–99)
POTASSIUM BLD-SCNC: 4.2 MMOL/L (ref 3.4–5.3)
SODIUM SERPL-SCNC: 138 MMOL/L (ref 133–144)

## 2021-12-27 PROCEDURE — G0008 ADMIN INFLUENZA VIRUS VAC: HCPCS | Performed by: FAMILY MEDICINE

## 2021-12-27 PROCEDURE — 36415 COLL VENOUS BLD VENIPUNCTURE: CPT | Performed by: FAMILY MEDICINE

## 2021-12-27 PROCEDURE — 99396 PREV VISIT EST AGE 40-64: CPT | Mod: 25 | Performed by: FAMILY MEDICINE

## 2021-12-27 PROCEDURE — 99214 OFFICE O/P EST MOD 30 MIN: CPT | Mod: 25 | Performed by: FAMILY MEDICINE

## 2021-12-27 PROCEDURE — 80048 BASIC METABOLIC PNL TOTAL CA: CPT | Performed by: FAMILY MEDICINE

## 2021-12-27 PROCEDURE — 90682 RIV4 VACC RECOMBINANT DNA IM: CPT | Performed by: FAMILY MEDICINE

## 2021-12-27 RX ORDER — MIRTAZAPINE 15 MG/1
TABLET, FILM COATED ORAL
Qty: 90 TABLET | Refills: 3 | Status: SHIPPED | OUTPATIENT
Start: 2021-12-27 | End: 2022-11-17

## 2021-12-27 RX ORDER — ZOLPIDEM TARTRATE 10 MG/1
TABLET ORAL
Qty: 30 TABLET | Refills: 5 | Status: SHIPPED | OUTPATIENT
Start: 2021-12-27 | End: 2022-05-09

## 2021-12-27 RX ORDER — GABAPENTIN 600 MG/1
1200 TABLET ORAL 3 TIMES DAILY
Qty: 540 TABLET | Refills: 3 | Status: SHIPPED | OUTPATIENT
Start: 2021-12-27 | End: 2022-01-31

## 2021-12-27 RX ORDER — LOSARTAN POTASSIUM 50 MG/1
50 TABLET ORAL DAILY
Qty: 90 TABLET | Refills: 3 | Status: SHIPPED | OUTPATIENT
Start: 2021-12-27 | End: 2022-04-11

## 2021-12-27 RX ORDER — NORTRIPTYLINE HCL 10 MG
20 CAPSULE ORAL AT BEDTIME
Qty: 180 CAPSULE | Refills: 3 | Status: SHIPPED | OUTPATIENT
Start: 2021-12-27 | End: 2022-03-22

## 2021-12-27 RX ORDER — DULOXETIN HYDROCHLORIDE 60 MG/1
60 CAPSULE, DELAYED RELEASE ORAL DAILY
Qty: 90 CAPSULE | Refills: 3 | Status: SHIPPED | OUTPATIENT
Start: 2021-12-27 | End: 2022-11-17

## 2021-12-27 RX ORDER — OXYCODONE AND ACETAMINOPHEN 5; 325 MG/1; MG/1
1 TABLET ORAL EVERY 6 HOURS PRN
Qty: 60 TABLET | Refills: 0 | Status: CANCELLED | OUTPATIENT
Start: 2021-12-27

## 2021-12-27 RX ORDER — OXYCODONE HCL 10 MG/1
10 TABLET, FILM COATED, EXTENDED RELEASE ORAL EVERY 8 HOURS
Qty: 90 TABLET | Refills: 0 | Status: CANCELLED | OUTPATIENT
Start: 2021-12-27

## 2021-12-27 ASSESSMENT — ANXIETY QUESTIONNAIRES
GAD7 TOTAL SCORE: 4
6. BECOMING EASILY ANNOYED OR IRRITABLE: SEVERAL DAYS
5. BEING SO RESTLESS THAT IT IS HARD TO SIT STILL: SEVERAL DAYS
3. WORRYING TOO MUCH ABOUT DIFFERENT THINGS: NOT AT ALL
1. FEELING NERVOUS, ANXIOUS, OR ON EDGE: SEVERAL DAYS
2. NOT BEING ABLE TO STOP OR CONTROL WORRYING: NOT AT ALL
7. FEELING AFRAID AS IF SOMETHING AWFUL MIGHT HAPPEN: NOT AT ALL

## 2021-12-27 ASSESSMENT — MIFFLIN-ST. JEOR: SCORE: 2045.6

## 2021-12-27 ASSESSMENT — PATIENT HEALTH QUESTIONNAIRE - PHQ9
5. POOR APPETITE OR OVEREATING: SEVERAL DAYS
SUM OF ALL RESPONSES TO PHQ QUESTIONS 1-9: 9
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
## 2021-12-28 ASSESSMENT — ANXIETY QUESTIONNAIRES: GAD7 TOTAL SCORE: 4

## 2021-12-29 ENCOUNTER — OFFICE VISIT (OUTPATIENT)
Dept: PALLIATIVE MEDICINE | Facility: CLINIC | Age: 51
End: 2021-12-29
Payer: COMMERCIAL

## 2021-12-29 VITALS — DIASTOLIC BLOOD PRESSURE: 115 MMHG | SYSTOLIC BLOOD PRESSURE: 160 MMHG | HEART RATE: 79 BPM

## 2021-12-29 DIAGNOSIS — R06.83 SNORING: Primary | ICD-10-CM

## 2021-12-29 ASSESSMENT — PAIN SCALES - GENERAL: PAINLEVEL: SEVERE PAIN (6)

## 2021-12-29 NOTE — PATIENT INSTRUCTIONS
1. Try to put the full 8 hours between doses of Oxycontin.  This will mean moving your afternoon and evening doses later.  2. You can add in an additional dose of Percocet (short acting oxycodone) at night, for a total of 3/day    3. Update on Friday morning.    4. Sleep study- [phone] (I think)    5. If you want to try coming down on the gabapentin, I recommend 900mg (1.5 tabs) 3 times daily for about a week.  We could further decrease to 600mg (1 tab) 3 times daily.  You could always drop just ONE dose at a time.    ----------------------------------------------------------------  Clinic Number:  [phone]     Call with any questions about your care and for scheduling assistance.     Calls are returned Monday through Friday between 8 AM and 4:30 PM. We usually get back to you within 2 business days depending on the issue/request.    If we are prescribing your medications:    For opioid medication refills, call the clinic or send a [employer] message 7 days in advance.  Please include:    Name of requested medication    Name of the pharmacy.    For non-opioid medications, call your pharmacy directly to request a refill. Please allow 3-4 days to be processed.     Per [place] State Law:    All controlled substance prescriptions must be filled within 30 days of being written.      For those controlled substances allowing refills, pickup must occur within 30 days of last fill.      We believe regular attendance is key to your success in our program!      Any time you are unable to keep your appointment we ask that you call us at least 24 hours in advance to cancel.This will allow us to offer the appointment time to another patient.     Multiple missed appointments may lead to dismissal from the clinic.

## 2021-12-29 NOTE — PROGRESS NOTES

## 2021-12-29 NOTE — PROGRESS NOTES
"[facility]    Date of visit: 12/29/2021     Chief complaint:   Chief Complaint   Patient presents with     Pain     Interval history:  David Wilcox was last seen by me on 10/6/21    Recommendations/plan at the last visit included:  1. Physical Therapy: continue exercises as recommended by PT.   2. Will look into options for orthotic that he can wear with boot- note to orthotic provider sent  3. Clinical Health Psychologist to address issues of relaxation, behavioral change, coping style, and other factors important to improvement.  Not at this time   4. Diagnostic Studies: none  5. Procedures: none  6. Medication Management:  He wants to stick with Oxycontin, rather than going back to fentanyl this winter. No changes. Refills provided.  7. Recommendations to PCP: none  8. Follow up: 3 months, virtual or in person.        Since his last visit, David Wilcox reports:  -not able to sleep.  He takes dose of the oxycodone at   -overall, feels like he is in a good place  -he is dong well right now, so doesn't want to make changes back to his typical \"winter\" meds  No new symptoms.  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   -got COVID vaccine      Pain scores:  Severe Pain (6)     Current pain treatments:   Percocet 5/325mg daily- twice a day - consistent use- down from 7.5mg dose when changed from fentanyl to Oxycontin  Oxycontin 10mg q8 - helpful (no side effects)  Gabapentin 1200 mg TID, beneficial  Cymbalta 60mg daily- tried going to 90mg/day but felt worse- for pain and mood  Nortriptyline 20 mg at bedtime - continues to have dry mouth but wishes to continue      Ativan 0.5mg very sparingly (averages about once every 3-6 months for severe panic attacks/airtravel      Previous medication treatments included:  Hydrocodone- not helpful  Robaxin (methocarbamol) 1000mg at night and in the morning- the morning dose has " been helping- he will be stopping this soon and starting tizanidine  Methadone- taking after 1st surgery  Naprosyn- not helpful  Flexeril- after first back injury (1990s)  Lidocaine- not helpful  Acetaminophen (tylenol)   on hold during summer month- use with Percocet 7.5mg dose.    Other treatments have included:  David Wilcox has been seen at a pain clinic in the past.  MAPS- injections  PT: yes  Acupuncture: no  TENs Unit: yes- out of use- somewhat helpful  Injections: none since last surgery- very  helpful    Side Effects: None    Medications:  Current Outpatient Medications   Medication Sig Dispense Refill     albuterol (PROAIR HFA/PROVENTIL HFA/VENTOLIN HFA) 108 (90 Base) MCG/ACT inhaler INHALE 2 PUFFS INTO THE LUNGS EVERY 4 HOURS AS NEEDED FOR SHORTNESS OF BREATH 8.5 g 11     DULoxetine (CYMBALTA) 60 MG capsule Take 1 capsule (60 mg) by mouth daily 90 capsule 3     gabapentin (NEURONTIN) 600 MG tablet Take 2 tablets (1,200 mg) by mouth 3 times daily . 3 month supply 540 tablet 3     LORazepam (ATIVAN) 0.5 MG tablet One bid prn anxiety. Rare use 30 tablet 0     mirtazapine (REMERON) 15 MG tablet TAKE 1 TABLET(15 MG) BY MOUTH AT BEDTIME 90 tablet 3     naloxone (NARCAN) 4 MG/0.1ML nasal spray Spray 1 spray (4 mg) into one nostril alternating nostrils once as needed for opioid reversal every 2-3 minutes until assistance arrives 0.2 mL 1     nortriptyline (PAMELOR) 10 MG capsule Take 2 capsules (20 mg) by mouth At Bedtime . 3 month supply 180 capsule 3     omeprazole (PRILOSEC) 20 MG DR capsule Take 1 capsule (20 mg) by mouth daily 90 capsule 3     oxyCODONE (OXYCONTIN) 10 MG 12 hr tablet Take 1 tablet (10 mg) by mouth every 8 hours .Fill 12/1/21 to start on 12/3/21 90 tablet 0     oxyCODONE-acetaminophen (PERCOCET) 5-325 MG tablet Take 1 tablet by mouth every 6 hours as needed for severe pain Max 2/day. Fill 12/1/21 to start on 12/3/21 60 tablet 0     tiZANidine (ZANAFLEX) 4 MG tablet Take 1 tablet (4 mg) by  mouth 3 times daily as needed for muscle spasms 0.5-1 tab tid prn muscle spasm 90 tablet 4     TYLENOL EXTRA STRENGTH 500 MG OR TABS At Bedtime        zolpidem (AMBIEN) 10 MG tablet One prn nightly for insomnia 30 tablet 5     losartan (COZAAR) 50 MG tablet Take 1 tablet (50 mg) by mouth daily (Patient not taking: Reported on 12/29/2021) 90 tablet 3       Medical History: any changes in medical history since they were last seen? 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 No refill on benzo  Last UDS and opioid agreement  4/28/21      Assessment:   1. Headaches- since stopping tobacco more daily  2. 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    3. Facet arthropathy likely contributing above the level of the future  4. Distant history of alcohol and drug abuse   5. Bipolar disorder- currently managed  6. H/o meningitis after SCS trial  7. Thoracic back pain, likely myofascial etiology.        Plan: ***  9. Physical Therapy: continue exercises as recommended by PT.   10. Will look into options for orthotic that he can wear with boot- note to orthotic provider sent  11. Clinical Health Psychologist to address issues of relaxation, behavioral change, coping style, and other factors important to improvement.  Not at this time   12. Diagnostic Studies: none  13. Procedures: none  14. Medication Management:  He wants to stick with Oxycontin, rather than going back to fentanyl this winter. No changes. Refills provided.  15. Recommendations to PCP: none  16. Follow up: 3 months, virtual or in person.    12 minutes spent on the date of encounter doing chart review, history,  and exam documentation and further activities as noted above.       Bindu Motley MD  [facility]

## 2022-01-03 DIAGNOSIS — G89.4 CHRONIC PAIN SYNDROME: ICD-10-CM

## 2022-01-03 DIAGNOSIS — M96.1 FAILED BACK SURGICAL SYNDROME: ICD-10-CM

## 2022-01-03 RX ORDER — OXYCODONE AND ACETAMINOPHEN 5; 325 MG/1; MG/1
1 TABLET ORAL EVERY 6 HOURS PRN
Qty: 90 TABLET | Refills: 0 | Status: SHIPPED | OUTPATIENT
Start: 2022-01-03 | End: 2022-01-28

## 2022-01-03 RX ORDER — OXYCODONE HCL 10 MG/1
10 TABLET, FILM COATED, EXTENDED RELEASE ORAL EVERY 8 HOURS
Qty: 90 TABLET | Refills: 0 | Status: SHIPPED | OUTPATIENT
Start: 2022-01-03 | End: 2022-01-28

## 2022-01-03 NOTE — TELEPHONE ENCOUNTER
Signed Prescriptions:                        Disp   Refills    tiZANidine (ZANAFLEX) 4 MG tablet          90 tab*4        Sig: Take 1 tablet (4 mg) by mouth 3 times daily as needed           for muscle spasms 0.5-1 tab tid prn muscle spasm  Authorizing Provider: YADIRA CARRILLO Eprescribed to pharmacy  [place] Prescription Monitoring Program checked    Spoke with patient. He called Friday and talked with someone (no record at all). Said the middle of the night dose of Percocet worked well.  An additional dose was provided for 3/day.    Signed Prescriptions:                        Disp   Refills    tiZANidine (ZANAFLEX) 4 MG tablet          90 tab*4        Sig: Take 1 tablet (4 mg) by mouth 3 times daily as needed           for muscle spasms 0.5-1 tab tid prn muscle spasm  Authorizing Provider: YADIRA CARRILLO    oxyCODONE (OXYCONTIN) 10 MG 12 hr tablet   90 tab*0        Sig: Take 1 tablet (10 mg) by mouth every 8 hours .  Fill           now.  Authorizing Provider: YADIRA CARRILLO    oxyCODONE-acetaminophen (PERCOCET) 5-325 M*90 tab*0        Sig: Take 1 tablet by mouth every 6 hours as needed for           severe pain Max 3/day. Fill now  Authorizing Provider: YADIRA CARRILLO MD  [facility]

## 2022-01-03 NOTE — TELEPHONE ENCOUNTER
Pt calling to check the status of refill, per pt he went to pharmacy and nothing was there.      Cate CHACON    [facility] [place]

## 2022-01-03 NOTE — TELEPHONE ENCOUNTER
Received fax from pharmacy requesting refill(s) for tiZANidine (ZANAFLEX) 4 MG tablet     Last refilled on 11/28/21    Pt last seen on 12/29/21  Next appt scheduled for 3/30/22    E-prescribe to:    [facility] #[id number] - [place], [place] - [facility] [address] [facility] [address]     Will facilitate refill.

## 2022-01-04 NOTE — TELEPHONE ENCOUNTER
Spoke to patient. Advised prescriptions were sent to pharmacy. Confirmed pharmacy.     Gave fill date of today.     Patient stated understanding.    Fabienne Benton [facility] [place]

## 2022-01-10 ENCOUNTER — TELEPHONE (OUTPATIENT)
Dept: FAMILY MEDICINE | Facility: CLINIC | Age: 52
End: 2022-01-10
Payer: COMMERCIAL

## 2022-01-10 NOTE — TELEPHONE ENCOUNTER
Reason for Call:  Positive Covid     Detailed comments: Pt was tested home test -  Positive for Covid 1/9/21 and started getting sick Saturday. Headache, Body aches, chills, congested, dry cough. Dr. Llamas said he was High Risk Pt.   Please Advise next step.     Phone Number Patient can be reached at: Home number on file [phone] (home)    Best Time: Any Time      Can we leave a detailed message on this number? YES    Call taken on 1/10/2022 at 9:38 AM by Michelle Bolton

## 2022-01-10 NOTE — TELEPHONE ENCOUNTER
Spoke with David, advised to quarantine for 10 days and treat symptoms. To Ed with any difficult respirations.

## 2022-01-27 DIAGNOSIS — M96.1 FAILED BACK SURGICAL SYNDROME: ICD-10-CM

## 2022-01-27 DIAGNOSIS — G89.4 CHRONIC PAIN SYNDROME: ICD-10-CM

## 2022-01-27 NOTE — TELEPHONE ENCOUNTER
Received call from patient requesting refill(s) of oxyCODONE-acetaminophen (PERCOCET) 5-325 MG tablet     Last dispensed from pharmacy on 01/03/22    Patient's last office/virtual visit by prescribing provider on 10/06/21  Next office/virtual appointment scheduled for 03/30/22    Last urine drug screen date 04/28/21  Current opioid agreement on file (completed within the last year) Yes Date of opioid agreement: 04/28/21    E-prescribe to pharmacy-[facility] #[id number] - [place], [place] - [facility] [address] [facility] [address]     Will route to nursing [place] for review and preparation of prescription(s).

## 2022-01-28 DIAGNOSIS — M54.50 CHRONIC LOW BACK PAIN, UNSPECIFIED BACK PAIN LATERALITY, UNSPECIFIED WHETHER SCIATICA PRESENT: Primary | ICD-10-CM

## 2022-01-28 DIAGNOSIS — G89.29 CHRONIC LOW BACK PAIN, UNSPECIFIED BACK PAIN LATERALITY, UNSPECIFIED WHETHER SCIATICA PRESENT: Primary | ICD-10-CM

## 2022-01-28 RX ORDER — OXYCODONE AND ACETAMINOPHEN 5; 325 MG/1; MG/1
1 TABLET ORAL EVERY 6 HOURS PRN
Qty: 90 TABLET | Refills: 0 | Status: SHIPPED | OUTPATIENT
Start: 2022-01-28 | End: 2022-03-01

## 2022-01-28 RX ORDER — OXYCODONE HCL 10 MG/1
10 TABLET, FILM COATED, EXTENDED RELEASE ORAL EVERY 8 HOURS
Qty: 90 TABLET | Refills: 0 | Status: SHIPPED | OUTPATIENT
Start: 2022-01-28 | End: 2022-03-01

## 2022-01-28 NOTE — TELEPHONE ENCOUNTER
Medication refill information reviewed.     Due date for oxyCODONE-acetaminophen (PERCOCET) 5-325 MG tablet is 02/02/22      Prescriptions prepped for review.     Will route to provider.

## 2022-01-28 NOTE — TELEPHONE ENCOUNTER
Routing refill request to provider for review/approval because:  Drug not on the [facility] refill protocol     Larry Sandoval RN

## 2022-01-28 NOTE — TELEPHONE ENCOUNTER
Script Eprescribed to pharmacy  [place] Prescription Monitoring [place]    Will send this to MA team to notify patient.    Signed Prescriptions:                        Disp   Refills    oxyCODONE (OXYCONTIN) 10 MG 12 hr tablet   90 tab*0        Sig: Take 1 tablet (10 mg) by mouth every 8 hours . Fill           01/31/22 to start 02/02/22  Authorizing Provider: YADIRA CARRILLO    oxyCODONE-acetaminophen (PERCOCET) 5-325 M*90 tab*0        Sig: Take 1 tablet by mouth every 6 hours as needed for           severe pain Max 3/day. Fill 01/31/22 to start           02/02/22  Authorizing Provider: YADIRA CARRILLO MD  [facility]

## 2022-01-31 RX ORDER — GABAPENTIN 600 MG/1
TABLET ORAL
Qty: 540 TABLET | Refills: 3 | Status: SHIPPED | OUTPATIENT
Start: 2022-01-31 | End: 2022-07-18

## 2022-02-28 DIAGNOSIS — M96.1 FAILED BACK SURGICAL SYNDROME: ICD-10-CM

## 2022-02-28 NOTE — TELEPHONE ENCOUNTER
Reason for call:  Medication   If this is a refill request, has the caller requested the refill from the pharmacy already? No  Will the patient be using a [facility]? No  Name of the pharmacy and phone number for the current request: [facility] #[id number] - [place], [place] - [address] AT [address] [address] [address]    Name of the medication requested:   oxyCODONE (OXYCONTIN) 10 MG 12 hr tablet  oxyCODONE-acetaminophen (PERCOCET) 5-325 MG tablet      Phone number to reach patient:  Home number on file [phone] (home)    Can we leave a detailed message on this number?  YES    Travel screening: Not Applicable          Cate Beltre    [facility] [place]

## 2022-03-01 RX ORDER — OXYCODONE AND ACETAMINOPHEN 5; 325 MG/1; MG/1
1 TABLET ORAL EVERY 6 HOURS PRN
Qty: 90 TABLET | Refills: 0 | Status: SHIPPED | OUTPATIENT
Start: 2022-03-01 | End: 2022-05-03

## 2022-03-01 RX ORDER — OXYCODONE HCL 10 MG/1
10 TABLET, FILM COATED, EXTENDED RELEASE ORAL EVERY 8 HOURS
Qty: 90 TABLET | Refills: 0 | Status: SHIPPED | OUTPATIENT
Start: 2022-03-01 | End: 2022-03-29

## 2022-03-01 NOTE — TELEPHONE ENCOUNTER
[employer] message sent with Rx approval from provider.  T[facility] Management Team

## 2022-03-01 NOTE — TELEPHONE ENCOUNTER
Received call from patient requesting refill(s) of    oxyCODONE (OXYCONTIN) 10 MG 12 hr tablet    oxyCODONE-acetaminophen (PERCOCET) 5-325 MG tablet    Last dispensed from pharmacy on both on 1/31/22    Patient's last office/virtual visit by prescribing provider on 12/29/21  Next office/virtual appointment scheduled for 3/30/22    Last urine drug screen date 4/28/21  Current opioid agreement on file (completed within the last year) Yes Date of opioid agreement: 4/28/21    E-prescribe to [facility] #[id number] - [place] pharmacy    Will route to nursing [place] for review and preparation of prescription(s).

## 2022-03-01 NOTE — TELEPHONE ENCOUNTER
Medication refill information reviewed.     Due date for is oxyCODONE (OXYCONTIN) 10 MG 12 hr tablet and oxyCODONE-acetaminophen (PERCOCET) 5-325 MG tablet is 03/04/22    Prescriptions prepped for review.     Will route to provider.

## 2022-03-01 NOTE — TELEPHONE ENCOUNTER
Script Eprescribed to pharmacy  [place]    Will send this to MA team to notify patient.    Signed Prescriptions:                        Disp   Refills    oxyCODONE-acetaminophen (PERCOCET) 5-325 M*90 tab*0        Sig: Take 1 tablet by mouth every 6 hours as needed for           severe pain Max 3/day. Fill 03/02/22 to start           03/04/22  Authorizing Provider: YADIRA CARRILLO    oxyCODONE (OXYCONTIN) 10 MG 12 hr tablet   90 tab*0        Sig: Take 1 tablet (10 mg) by mouth every 8 hours . Fill           03/02/22 to start 03/04/22  Authorizing Provider: YADIRA CARRILLO MD  [facility]
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
## 2022-03-08 ENCOUNTER — OFFICE VISIT (OUTPATIENT)
Dept: SLEEP MEDICINE | Facility: CLINIC | Age: 52
End: 2022-03-08
Attending: PSYCHIATRY & NEUROLOGY
Payer: COMMERCIAL

## 2022-03-08 VITALS
HEIGHT: 68 IN | BODY MASS INDEX: 39.95 KG/M2 | WEIGHT: 263.6 LBS | HEART RATE: 79 BPM | OXYGEN SATURATION: 99 % | DIASTOLIC BLOOD PRESSURE: 82 MMHG | SYSTOLIC BLOOD PRESSURE: 132 MMHG

## 2022-03-08 DIAGNOSIS — R06.83 SNORING: ICD-10-CM

## 2022-03-08 DIAGNOSIS — G47.52 RBD (REM BEHAVIORAL DISORDER): ICD-10-CM

## 2022-03-08 DIAGNOSIS — R06.81 CENTRAL APNEA: ICD-10-CM

## 2022-03-08 DIAGNOSIS — G47.33 OSA (OBSTRUCTIVE SLEEP APNEA): Primary | ICD-10-CM

## 2022-03-08 PROCEDURE — 99203 OFFICE O/P NEW LOW 30 MIN: CPT | Performed by: PHYSICIAN ASSISTANT

## 2022-03-08 NOTE — PATIENT INSTRUCTIONS
"      MY TREATMENT INFORMATION FOR SLEEP APNEA-  David Wilcox    DOCTOR : ИВАН Pratt-CHERISE    Am I having a sleep study at a sleep center?  --->Due to normal delays, you will be contacted within 2-4 weeks to schedule    Am I having a home sleep study?  --->Watch the video for the device you are using:    -/drop off device-   https://www.[employer].com/watch?v=yGGFBdELGhk    -Disposable device sent out require phone/computer application-   https://www.[employer].com/watch?v=BCce_vbiwxE      Frequently asked questions:  1. What is Obstructive Sleep Apnea (HARI)? HARI is the most common type of sleep apnea. Apnea means, \"without breath.\"  Apnea is most often caused by narrowing or collapse of the upper airway as muscles relax during sleep.   Almost everyone has occasional apneas. Most people with sleep apnea have had brief interruptions at night frequently for many years.  The severity of sleep apnea is related to how frequent and severe the events are.   2. What are the consequences of HARI? Symptoms include: feeling sleepy during the day, snoring loudly, gasping or stopping of breathing, trouble sleeping, and occasionally morning headaches or heartburn at night.  Sleepiness can be serious and even increase the risk of falling asleep while driving. Other health consequences may include development of high blood pressure and other cardiovascular disease in persons who are susceptible. Untreated HARI  can contribute to heart disease, stroke and diabetes.   3. What are the treatment options? In most situations, sleep apnea is a lifelong disease that must be managed with daily therapy. Medications are not effective for sleep apnea and surgery is generally not considered until other therapies have been tried. Your treatment is your choice . Continuous Positive Airway (CPAP) works right away and is the therapy that is effective in nearly everyone. An oral device to hold your jaw forward is usually the next most reliable " option. Other options include postioning devices (to keep you off your back), weight loss, and surgery including a tongue pacing device. There is more detail about some of these options below.  4. Are my sleep studies covered by insurance? Although we will request verification of coverage, we advise you also check in advance of the study to ensure there is coverage.    Important tips for those choosing CPAP and similar devices   Know your equipment:  CPAP is continuous positive airway pressure that prevents obstructive sleep apnea by keeping the throat from collapsing while you are sleeping. In most cases, the device is  smart  and can slowly self-adjusts if your throat collapses and keeps a record every day of how well you are treated-this information is available to you and your care team.  BPAP is bilevel positive airway pressure that keeps your throat open and also assists each breath with a pressure boost to maintain adequate breathing.  Special kinds of BPAP are used in patients who have inadequate breathing from lung or heart disease. In most cases, the device is  smart  and can slowly self-adjusts to assist breathing. Like CPAP, the device keeps a record of how well you are treated.  Your mask is your connection to the device. You get to choose what feels most comfortable and the staff will help to make sure if fits. Here: are some examples of the different masks that are available:       Key points to remember on your journey with sleep apnea:  1. Sleep study.  PAP devices often need to be adjusted during a sleep study to show that they are effective and adjusted right.  2. Good tips to remember: Try wearing just the mask during a quiet time during the day so your body adapts to wearing it. A humidifier is recommended for comfort in most cases to prevent drying of your nose and throat. Allergy medication from your provider may help you if you are having nasal congestion.  3. Getting settled-in. It takes  more than one night for most of us to get used to wearing a mask. Try wearing just the mask during a quiet time during the day so your body adapts to wearing it. A humidifier is recommended for comfort in most cases. Our team will work with you carefully on the first day and will be in contact within 4 days and again at 2 and 4 weeks for advice and remote device adjustments. Your therapy is evaluated by the device each day.   4. Use it every night. The more you are able to sleep naturally for 7-8 hours, the more likely you will have good sleep and to prevent health risks or symptoms from sleep apnea. Even if you use it 4 hours it helps. Occasionally all of us are unable to use a medical therapy, in sleep apnea, it is not dangerous to miss one night.   5. Communicate. Call our skilled team on the number provided on the first day if your visit for problems that make it difficult to wear the device. Over 2 out of 3 patients can learn to wear the device long-term with help from our team. Remember to call our team or your sleep providers if you are unable to wear the device as we may have other solutions for those who cannot adapt to mask CPAP therapy. It is recommended that you sleep your sleep provider within the first 3 months and yearly after that if you are not having problems.   6. Use it for your health. We encourage use of CPAP masks during daytime quiet periods to allow your face and brain to adapt to the sensation of CPAP so that it will be a more natural sensation to awaken to at night or during naps. This can be very useful during the first few weeks or months of adapting to CPAP though it does not help medically to wear CPAP during wakefulness and  should not be used as a strategy just to meet guidelines.  7. Take care of your equipment. Make sure you clean your mask and tubing using directions every day and that your filter and mask are replaced as recommended or if they are not working.     BESIDES CPAP,  WHAT OTHER THERAPIES ARE THERE?    Positioning Device  Positioning devices are generally used when sleep apnea is mild and only occurs on your back.This example shows a pillow that straps around the waist. It may be appropriate for those whose sleep study shows milder sleep apnea that occurs primarily when lying flat on one's back. Preliminary studies have shown benefit but effectiveness at home may need to be verified by a home sleep test. These devices are generally not covered by medical insurance.  Examples of devices that maintain sleeping on the back to prevent snoring and mild sleep apnea.    Belt type body positioner  http://Rund[employer].Capsule Tech/    Electronic reminder  http://nightshifttherapy.com/  http://www.[employer]ule [employer].au/      Oral Appliance  What is oral appliance therapy?  An oral appliance device fits on your teeth at night like a retainer used after having braces. The device is made by a specialized dentist and requires several visits over 1-2 months before a manufactured device is made to fit your teeth and is adjusted to prevent your sleep apnea. Once an oral device is working properly, snoring should be improved. A home sleep test may be recommended at that time if to determine whether the sleep apnea is adequately treated.       Some things to remember:  -Oral devices are often, but not always, covered by your medical insurance. Be sure to check with your insurance provider.   -If you are referred for oral therapy, you will be given a list of specialized dentists to consider or you may choose to visit the Web site of the American Academy of Dental Sleep Medicine  -Oral devices are less likely to work if you have severe sleep apnea or are extremely overweight.     More detailed information  An oral appliance is a small acrylic device that fits over the upper and lower teeth  (similar to a retainer or a mouth guard). This device slightly moves jaw forward, which moves the base of the tongue forward, opens  the airway, improves breathing for effective treat snoring and obstructive sleep apnea in perhaps 7 out of 10 people .  The best working devices are custom-made by a dental device  after a mold is made of the teeth 1, 2, 3.  When is an oral appliance indicated?  Oral appliance therapy is recommended as a first-line treatment for patients with primary snoring, mild sleep apnea, and for patients with moderate sleep apnea who prefer appliance therapy to use of CPAP4, 5. Severity of sleep apnea is determined by sleep testing and is based on the number of respiratory events per hour of sleep.   How successful is oral appliance therapy?  The success rate of oral appliance therapy in patients with mild sleep apnea is 75-80% while in patients with moderate sleep apnea it is 50-70%. The chance of success in patients with severe sleep apnea is 40-50%. The research also shows that oral appliances have a beneficial effect on the cardiovascular health of HARI patients at the same magnitude as CPAP therapy7.  Oral appliances should be a second-line treatment in cases of severe sleep apnea, but if not completely successful then a combination therapy utilizing CPAP plus oral appliance therapy may be effective. Oral appliances tend to be effective in a broad range of patients although studies show that the patients who have the highest success are females, younger patients, those with milder disease, and less severe obesity. 3, 6.   Finding a dentist that practices dental sleep medicine  Specific training is available through the American Academy of Dental Sleep Medicine for dentists interested in working in the field of sleep. To find a dentist who is educated in the field of sleep and the use of oral appliances, near you, visit the Web site of the American Academy of Dental Sleep Medicine.    References  1. ajith Durbin al. Objectively measured vs self-reported compliance during oral appliance therapy for  sleep-disordered breathing. Chest 2013; 144(5): 6164-3321.  2. Yenifer, et al. Objective measurement of compliance during oral appliance therapy for sleep-disordered breathing. Thorax 2013; 68(1): 91-96.  3. Jamal et al. Mandibular advancement devices in 620 men and women with HARI and snoring: tolerability and predictors of treatment success. Chest 2004; 125: 4287-9273.  4. Jose Martin et al. Oral appliances for snoring and HARI: a review. Sleep 2006; 29: 244-262.  5. David et al. Oral appliance treatment for HARI: an update. J Clin Sleep Med 2014; 10(2): 215-227.  6. Daniel et al. Predictors of OSAH treatment outcome. J Dent Res 2007; 86: 0006-9031.      Weight Loss:    Weight loss is a long-term strategy that may improve sleep apnea in some patients.    Weight management is a personal decision and the decision should be based on your interest and the potential benefits.  If you are interested in exploring weight loss strategies, the following discussion covers the impact on weight loss on sleep apnea and the approaches that may be successful.    Being overweight does not necessarily mean you will have health consequences.  Those who have BMI over 35 or over 27 with existing medical conditions carries greater risk.   Weight loss decreases severity of sleep apnea in most people with obesity. For those with mild obesity who have developed snoring with weight gain, even 15-30 pound weight loss can improve and occasionally eliminate sleep apnea.  Structured and life-long dietary and health habits are necessary to lose weight and keep healthier weight levels.     Though there may be significant health benefits from weight loss, long-term weight loss is very difficult to achieve- studies show success with dietary management in less than 10% of people. In addition, substantial weight loss may require years of dietary control and may be difficult if patients have severe obesity. In these cases, surgical  management may be considered.  Finally, older individuals who have tolerated obesity without health complications may be less likely to benefit from weight loss strategies.        Your BMI is Body mass index is 39.63 kg/m .  Weight management is a personal decision.  If you are interested in exploring weight loss strategies, the following discussion covers the approaches that may be successful. Body mass index (BMI) is one way to tell whether you are at a healthy weight, overweight, or obese. It measures your weight in relation to your height.  A BMI of 18.5 to 24.9 is in the healthy range. A person with a BMI of 25 to 29.9 is considered overweight, and someone with a BMI of 30 or greater is considered obese. More than two-thirds of American adults are considered overweight or obese.  Being overweight or obese increases the risk for further weight gain. Excess weight may lead to heart disease and diabetes.  Creating and following plans for healthy eating and physical activity may help you improve your health.  Weight control is part of healthy lifestyle and includes exercise, emotional health, and healthy eating habits. Careful eating habits lifelong are the mainstay of weight control. Though there are significant health benefits from weight loss, long-term weight loss with diet alone may be very difficult to achieve- studies show long-term success with dietary management in less than 10% of people. Attaining a healthy weight may be especially difficult to achieve in those with severe obesity. In some cases, medications, devices and surgical management might be considered.  What can you do?  If you are overweight or obese and are interested in methods for weight loss, you should discuss this with your provider.     Consider reducing daily calorie intake by 500 calories.     Keep a food journal.     Avoiding skipping meals, consider cutting portions instead.    Diet combined with exercise helps maintain muscle while  optimizing fat loss. Strength training is particularly important for building and maintaining muscle mass. Exercise helps reduce stress, increase energy, and improves fitness. Increasing exercise without diet control, however, may not burn enough calories to loose weight.       Start walking three days a week 10-20 minutes at a time    Work towards walking thirty minutes five days a week     Eventually, increase the speed of your walking for 1-2 minutes at time    And look into health and wellness programs that may be available through your health insurance provider, employer, local community center, or sun club.          Surgery:    Surgery for obstructive sleep apnea is considered generally only when other therapies fail to work. Surgery may be discussed with you if you are having a difficult time tolerating CPAP and or when there is an abnormal structure that requires surgical correction.  Nose and throat surgeries often enlarge the airway to prevent collapse.  Most of these surgeries create pain for 1-2 weeks and up to half of the most common surgeries are not effective throughout life.  You should carefully discuss the benefits and drawbacks to surgery with your sleep provider and surgeon to determine if it is the best solution for you.   More information  Surgery for HARI is directed at areas that are responsible for narrowing or complete obstruction of the airway during sleep.  There are a wide range of procedures available to enlarge and/or stabilize the airway to prevent blockage of breathing in the three major areas where it can occur: the palate, tongue, and nasal regions.  Successful surgical treatment depends on the accurate identification of the factors responsible for obstructive sleep apnea in each person.  A personalized approach is required because there is no single treatment that works well for everyone.  Because of anatomic variation, consultation with an examination by a sleep surgeon is a  critical first step in determining what surgical options are best for each patient.  In some cases, examination during sedation may be recommended in order to guide the selection of procedures.  Patients will be counseled about risks and benefits as well as the typical recovery course after surgery. Surgery is typically not a cure for a person s HARI.  However, surgery will often significantly improve one s HARI severity (termed  success rate ).  Even in the absence of a cure, surgery will decrease the cardiovascular risk associated with OSA7; improve overall quality of life8 (sleepiness, functionality, sleep quality, etc).      Palate Procedures:  Patients with HARI often have narrowing of their airway in the region of their tonsils and uvula.  The goals of palate procedures are to widen the airway in this region as well as to help the tissues resist collapse.  Modern palate procedure techniques focus on tissue conservation and soft tissue rearrangement, rather than tissue removal.  Often the uvula is preserved in this procedure. Residual sleep apnea is common in patient after pharyngoplasty with an average reduction in sleep apnea events of 33%2.      Tongue Procedures:  ExamWhile patients are awake, the muscles that surround the throat are active and keep this region open for breathing. These muscles relax during sleep, allowing the tongue and other structures to collapse and block breathing.  There are several different tongue procedures available.  Selection of a tongue base procedure depends on characteristics seen on physical exam.  Generally, procedures are aimed at removing bulky tissues in this area or preventing the back of the tongue from falling back during sleep.  Success rates for tongue surgery range from 50-62%3.    Hypoglossal Nerve Stimulation:  Hypoglossal nerve stimulation has recently received approval from the [place] Food and Drug Administration for the treatment of obstructive sleep  apnea.  This is based on research showing that the system was safe and effective in treating sleep apnea6.  Results showed that the median AHI score decreased 68%, from 29.3 to 9.0. This therapy uses an implant system that senses breathing patterns and delivers mild stimulation to airway muscles, which keeps the airway open during sleep.  The system consists of three fully implanted components: a small generator (similar in size to a pacemaker), a breathing sensor, and a stimulation lead.  Using a small handheld remote, a patient turns the therapy on before bed and off upon awakening.    Candidates for this device must be greater than 22 years of age, have moderate to severe HARI (AHI between 20-65), BMI less than 32, have tried CPAP/oral appliance without success, and have appropriate upper airway anatomy (determined by a sleep endoscopy performed by Dr. Lowery).    Hypoglossal Nerve Stimulation Pathway:    The sleep surgeon s office will work with the patient through the insurance prior-authorization process (including communications and appeals).    Nasal Procedures:  Nasal obstruction can interfere with nasal breathing during the day and night.  Studies have shown that relief of nasal obstruction can improve the ability of some patients to tolerate positive airway pressure therapy for obstructive sleep apnea1.  Treatment options include medications such as nasal saline, topical corticosteroid and antihistamine sprays, and oral medications such as antihistamines or decongestants. Non-surgical treatments can include external nasal dilators for selected patients. If these are not successful by themselves, surgery can improve the nasal airway either alone or in combination with these other options.      Combination Procedures:  Combination of surgical procedures and other treatments may be recommended, particularly if patients have more than one area of narrowing or persistent positional disease.  The success rate of  combination surgery ranges from 66-80%2,3.    References  1. Jamie LUONG. The Role of the Nose in Snoring and Obstructive Sleep Apnoea: An Update.  Eur Arch Otorhinolaryngol. 2011; 268: 1365-73.  2.  Judy SM; Lorena JA; Johnathan JR; Pallanch JF; Margarita MB; Oneil SG; Claudine DELGADO. Surgical modifications of the upper airway for obstructive sleep apnea in adults: a systematic review and meta-analysis. SLEEP 2010;33(10):5465-9375. Zara YUAN. Hypopharyngeal surgery in obstructive sleep apnea: an evidence-based medicine review.  Arch Otolaryngol Head Neck Surg. 2006 Feb;132(2):206-13.  3. Andrea YH1, Jerson Y, Manuel ARON. The efficacy of anatomically based multilevel surgery for obstructive sleep apnea. Otolaryngol Head Neck Surg. 2003 Oct;129(4):327-35.  4. Zara YUAN, Goldberg A. Hypopharyngeal Surgery in Obstructive Sleep Apnea: An Evidence-Based Medicine Review. Arch Otolaryngol Head Neck Surg. 2006 Feb;132(2):206-13.  5. Daksha PJ et al. Upper-Airway Stimulation for Obstructive Sleep Apnea.  N Engl J Med. 2014 Jan 9;370(2):139-49.  6. Elizabeth Y et al. Increased Incidence of Cardiovascular Disease in Middle-aged Men with Obstructive Sleep Apnea. Am J Respir Crit Care Med; 2002 166: 159-165  7. Duque EM et al. Studying Life Effects and Effectiveness of Palatopharyngoplasty (SLEEP) study: Subjective Outcomes of Isolated Uvulopalatopharyngoplasty. Otolaryngol Head Neck Surg. 2011; 144: 623-631.        WHAT IF I ONLY HAVE SNORING?      Mandibular advancement devices, lateral sleep positioning, long-term weight loss and treatment of nasal allergies have been shown to improve snoring.    Exercising tongue muscles with a game (https://www.ncbi.nlm.nih.gov/pubmed/89714906) or stimulating the tongue during the day with a device (https://doi.org/10.3390/qar95515156) have improved snoring in some individuals.    Remember to Drive Safe... Drive Alive     Sleep health profoundly affects your health, mood, and your safety.  Thirty three  percent of the population (one in three of us) is not getting enough sleep and many have a sleep disorder. Not getting enough sleep or having an untreated / undertreated sleep condition may make us sleepy without even knowing it. In fact, our driving could be dramatically impaired due to our sleep health. As your provider, here are some things I would like you to know about driving:     Here are some warning signs for impairment and dangerous drowsy driving:              -Having been awake more than 16 hours               -Looking tired               -Eyelid drooping              -Head nodding (it could be too late at this point)              -Driving for more than 30 minutes     Some things you could do to make the driving safer if you are experiencing some drowsiness:              -Stop driving and rest              -Call for transportation              -Make sure your sleep disorder is adequately treated     Some things that have been shown NOT to work when experiencing drowsiness while driving:              -Turning on the radio              -Opening windows              -Eating any  distracting  /  entertaining  foods (e.g., sunflower seeds, candy, or any other)              -Talking on the phone      Your decision may not only impact your life, but also the life of others. Please, remember to drive safe for yourself and all of us.

## 2022-03-22 DIAGNOSIS — G89.4 CHRONIC PAIN SYNDROME: Primary | ICD-10-CM

## 2022-03-22 RX ORDER — NORTRIPTYLINE HCL 10 MG
20 CAPSULE ORAL AT BEDTIME
Qty: 180 CAPSULE | Refills: 3 | Status: SHIPPED | OUTPATIENT
Start: 2022-03-22 | End: 2023-03-13

## 2022-03-22 NOTE — TELEPHONE ENCOUNTER
Script Eprescribed to pharmacy  [place] Prescription Monitoring Program checked    Will send this to MA team to notify patient.    Signed Prescriptions:                        Disp   Refills    nortriptyline (PAMELOR) 10 MG capsule      180 ca*3        Sig: Take 2 capsules (20 mg) by mouth At Bedtime . 3 month           supply  Authorizing Provider: BREEZY JOY MD

## 2022-03-22 NOTE — TELEPHONE ENCOUNTER
Received fax from pharmacy requesting refill(s) for nortriptyline (PAMELOR) 10 MG capsule     Last refilled on 12/16/21    Pt last seen on 12/29/21  Next appt scheduled for None    E-prescribe to:     [id number] - [place], [place] - [address] [place] [address] [facility] [address]     Will facilitate refill.      Shayna Chase MA  [facility] [place]

## 2022-03-24 ENCOUNTER — THERAPY VISIT (OUTPATIENT)
Dept: SLEEP MEDICINE | Facility: CLINIC | Age: 52
End: 2022-03-24
Attending: PHYSICIAN ASSISTANT
Payer: COMMERCIAL

## 2022-03-24 DIAGNOSIS — R06.83 SNORING: ICD-10-CM

## 2022-03-24 DIAGNOSIS — R06.81 CENTRAL APNEA: ICD-10-CM

## 2022-03-24 DIAGNOSIS — G47.33 OSA (OBSTRUCTIVE SLEEP APNEA): ICD-10-CM

## 2022-03-24 DIAGNOSIS — G47.52 RBD (REM BEHAVIORAL DISORDER): ICD-10-CM

## 2022-03-24 PROCEDURE — 95810 POLYSOM 6/> YRS 4/> PARAM: CPT | Performed by: OTOLARYNGOLOGY

## 2022-03-29 ENCOUNTER — TELEPHONE (OUTPATIENT)
Dept: PALLIATIVE MEDICINE | Facility: CLINIC | Age: 52
End: 2022-03-29
Payer: COMMERCIAL

## 2022-03-29 DIAGNOSIS — M96.1 FAILED BACK SURGICAL SYNDROME: ICD-10-CM

## 2022-03-29 RX ORDER — OXYCODONE HCL 10 MG/1
10 TABLET, FILM COATED, EXTENDED RELEASE ORAL EVERY 8 HOURS
Qty: 90 TABLET | Refills: 0 | Status: SHIPPED | OUTPATIENT
Start: 2022-03-29 | End: 2022-05-02

## 2022-03-29 NOTE — TELEPHONE ENCOUNTER
Signed Prescriptions:                        Disp   Refills    oxyCODONE (OXYCONTIN) 10 MG 12 hr tablet   90 tab*0        Sig: Take 1 tablet (10 mg) by mouth every 8 hours . Fill           04/01/22 to start 04/03/22  Authorizing Provider: LAVINIA CAMERON      Reviewed last OV and . Refilled based on Dr. Motley's plan.    CATARINO Morgan [facility]

## 2022-03-29 NOTE — TELEPHONE ENCOUNTER
Called patient to cancel appointment. Patient aware of provider status.     Routing to review care plan.    Stacey NEELY    [facility]

## 2022-03-29 NOTE — TELEPHONE ENCOUNTER
Reason for call:  Medication   If this is a refill request, has the caller requested the refill from the pharmacy already? No  Will the patient be using a [facility]? No  Name of the pharmacy and phone number for the current request: [facility] #[id number] - [place], [place] - [address] [facility] AT [facility] [address]    Name of the medication requested: oxyCODONE (OXYCONTIN) 10 MG 12 hr tablet    Other request: Patient was scheduled 3/30 for follow up with Tiesha, will be out of medication soon    Phone number to reach patient:  Cell number on file:    Telephone Information:   Mobile [phone]       Best Time:  anytime    Can we leave a detailed message on this number?  YES    Travel screening: Not Applicable     Stacey NEELY    [facility] Management

## 2022-03-29 NOTE — TELEPHONE ENCOUNTER
Medication refill information reviewed.     Due date for oxyCODONE (OXYCONTIN) 10 MG 12 hr tablet  is 03/04/22     Plan will be to transfer to PCP    Prescriptions prepped for review.     Will route to provider.

## 2022-03-29 NOTE — TELEPHONE ENCOUNTER
Received call from patient requesting refill(s) of oxyCODONE (OXYCONTIN) 10 MG 12 hr tablet     Last dispensed from pharmacy on 03/03/22    Patient's last office/virtual visit by prescribing provider on 12/29/21  Next office/virtual appointment scheduled for None    Last urine drug screen date 04/28/21  Current opioid agreement on file (completed within the last year) Yes Date of opioid agreement: 04/28/21    E-prescribe to   [facility] #[id number] - [place], [place] - [address] [place] [address] AT [facility] [address]  [address] [place] [address]  Phone: [phone] Fax: [phone]    Will route to nursing pool for review and preparation of prescription(s).       Shayna Chase MA  [facility] Pain Management [place]

## 2022-03-29 NOTE — TELEPHONE ENCOUNTER
Spoke with patient and he is refills only stable on regimen. Currently using less then prescribed. Plan is to transfer back to PCP.    JEREMIAH PeacockN, RN  Care Coordinator  [facility] Management [place]

## 2022-04-08 LAB — SLPCOMP: NORMAL

## 2022-04-11 ENCOUNTER — OFFICE VISIT (OUTPATIENT)
Dept: FAMILY MEDICINE | Facility: CLINIC | Age: 52
End: 2022-04-11
Payer: COMMERCIAL

## 2022-04-11 VITALS
RESPIRATION RATE: 16 BRPM | TEMPERATURE: 98.6 F | HEART RATE: 82 BPM | DIASTOLIC BLOOD PRESSURE: 90 MMHG | SYSTOLIC BLOOD PRESSURE: 160 MMHG | WEIGHT: 270 LBS | HEIGHT: 68 IN | BODY MASS INDEX: 40.92 KG/M2 | OXYGEN SATURATION: 98 %

## 2022-04-11 DIAGNOSIS — E66.01 MORBID OBESITY (H): ICD-10-CM

## 2022-04-11 DIAGNOSIS — I10 HYPERTENSION, UNSPECIFIED TYPE: Primary | ICD-10-CM

## 2022-04-11 DIAGNOSIS — G89.4 CHRONIC PAIN SYNDROME: ICD-10-CM

## 2022-04-11 DIAGNOSIS — F11.20 CONTINUOUS OPIOID DEPENDENCE (H): ICD-10-CM

## 2022-04-11 PROCEDURE — 99214 OFFICE O/P EST MOD 30 MIN: CPT | Performed by: FAMILY MEDICINE

## 2022-04-11 RX ORDER — LOSARTAN POTASSIUM 100 MG/1
100 TABLET ORAL DAILY
Qty: 90 TABLET | Refills: 2 | Status: SHIPPED | OUTPATIENT
Start: 2022-04-11 | End: 2022-11-17

## 2022-04-11 ASSESSMENT — ANXIETY QUESTIONNAIRES
4. TROUBLE RELAXING: SEVERAL DAYS
GAD7 TOTAL SCORE: 4
1. FEELING NERVOUS, ANXIOUS, OR ON EDGE: SEVERAL DAYS
7. FEELING AFRAID AS IF SOMETHING AWFUL MIGHT HAPPEN: NOT AT ALL
2. NOT BEING ABLE TO STOP OR CONTROL WORRYING: NOT AT ALL
7. FEELING AFRAID AS IF SOMETHING AWFUL MIGHT HAPPEN: NOT AT ALL
GAD7 TOTAL SCORE: 4
6. BECOMING EASILY ANNOYED OR IRRITABLE: SEVERAL DAYS
3. WORRYING TOO MUCH ABOUT DIFFERENT THINGS: NOT AT ALL
5. BEING SO RESTLESS THAT IT IS HARD TO SIT STILL: SEVERAL DAYS
GAD7 TOTAL SCORE: 4

## 2022-04-11 ASSESSMENT — PATIENT HEALTH QUESTIONNAIRE - PHQ9
SUM OF ALL RESPONSES TO PHQ QUESTIONS 1-9: 7
10. IF YOU CHECKED OFF ANY PROBLEMS, HOW DIFFICULT HAVE THESE PROBLEMS MADE IT FOR YOU TO DO YOUR WORK, TAKE CARE OF THINGS AT HOME, OR GET ALONG WITH OTHER PEOPLE: VERY DIFFICULT
SUM OF ALL RESPONSES TO PHQ QUESTIONS 1-9: 7

## 2022-04-11 ASSESSMENT — PAIN SCALES - GENERAL: PAINLEVEL: SEVERE PAIN (6)

## 2022-04-11 NOTE — LETTER
[facility]  [address] [place] [address]  Phone: [phone]  Fax: [phone]      April 11, 2022      RE: David Wilcox  [address] [facility] [facility] [address]            To whom it may concern:    David Wilcox is under my professional care.  He is going on a road camping trip and will be bringing his prescription medications with him.  I am enclosing a list of his meds.      He needs no assistance in organizing and self administering his medications, he has been doing that independently for years.    Please call if any questions.    Regan Llamas MD

## 2022-04-11 NOTE — PROGRESS NOTES
"S; David Wilcox is a 51 year old male with htn.  Not controlled.  Monotherapy with 50mg losartan.  Willing to increase dose.      Chronic pain: sent back to me , his pain doctor unexpectedly .  10mg tid oxycontin and 3 percocet a day as well.  Will be due next month for fills.  Will be due for agreement and screen as well.     Has info for colon screen, but is dealing with sleep evaluation first.     Morbid obesity: \"my wife is a baker\".  He knows he has to reign in his diet if he's going to make any progress on the weight .      O:BP (!) 160/90   Pulse 82   Temp 98.6  F (37  C) (Tympanic)   Resp 16   Ht 1.737 m (5' 8.39\")   Wt 122.5 kg (270 lb)   SpO2 98%   BMI 40.59 kg/m    GEN: Alert and oriented, in no acute distress  Walks with a cane    A: htn, not controlled      Chronic pain, opioid dependency      Opioid use, ongoing         Morbid obesity, ongoing    P: up to 100mg on losartan .  Focus on modifying diet long term for wt loss.     Continue opioids as prescribed by pain clinic, f/u in a  Month for BP recheck and opioid refills.               "

## 2022-04-12 ASSESSMENT — ANXIETY QUESTIONNAIRES: GAD7 TOTAL SCORE: 4

## 2022-04-19 NOTE — PROGRESS NOTES
Does David have a CPAP/Bipap?  No     [place] Sleep Scale: 2      Sleep Study Follow-Up Visit:    Date on this visit: 4/20/2022    David Wilcox comes in today for follow-up of his sleep study done on 3/24/22 at the [facility] for loud snoring and possible sleep apnea.    Sleep latency 46 minutes with Ambien.  REM achieved.   REM latency 215 minutes.  Sleep efficiency 82.7%. Total sleep time 351 minutes.    Sleep architecture:  Stage 1, 7.3% (5%), stage 2, 30.1% (45-55%), stage 3, 51% (15-20%), stage REM, 11.7% (20-25%).  AHI was 17.8, with significant desaturations. RDI 19.8.  REM RDI 26.3, consistent with moderate REM HARI.  Supine RDI 57, consistent with severe SUPINE HARI.  Periodic Limb Movement Index 0/hour.         These findings were reviewed with patient.     Past medical/surgical history, family history, social history, medications and allergies were reviewed.      Problem List:  Patient Active Problem List    Diagnosis Date Noted     Hypertension, unspecified type 04/11/2022     Priority: Medium     Morbid obesity (H) 12/27/2021     Priority: Medium     Sedative, hypnotic or anxiolytic dependence (H) 06/03/2019     Priority: Medium     Incontinence of feces, unspecified fecal incontinence type 05/07/2018     Priority: Medium     after surgery, occasional.         Erectile dysfunction, unspecified erectile dysfunction type 09/27/2017     Priority: Medium     Chronic pain syndrome 11/23/2016     Priority: Medium     Patient is followed by Regan Llamas MD for ongoing prescription of pain medication.  All refills should be approved by this provider, or covering partner.    Medication(s): see other entry .   Maximum quantity per month: see other entry  Clinic visit frequency required: Q 3 months          Continuous opioid dependence (H) 01/14/2016     Priority: Medium     Right foot drop 08/26/2015     Priority: Medium     Nerve damage from low back.  Has AFO       Anxiety 09/23/2013     " Priority: Medium     See depression entry for more details.         CARDIOVASCULAR SCREENING; LDL GOAL LESS THAN 160 06/27/2011     Priority: Medium     Insomnia 11/11/2008     Priority: Medium     Chronic.  Ambien dependent for sleep.  6 months given 12/27/21.  Important for him to get his sleep given apparent hx of bipolar.  11/23/16, trying a switch to sonata.  ambien wasn't working great anymore.  Update: didn't work, back to ambien.  Thinks he failed lunesta as well.    Mirtazapine and TCA, try amitriptyline next time, might work better than nortriptyline?         Recurrent major depression in complete remission (H) 05/21/2008     Priority: Medium     Serious questions about bipolar.    On cymbalta with nightly ambien, chronic use. .  \"if I don't get my ambien, I might not sleep for days, then I'll get manic.\"  Been very stable with these two meds.  Failed many other ssris.     30 ativan for rare use \"panic attacks.\"  30 tabs given 1/18/18.  Should last \"months\", he agrees.      Suicide attempt in past, pills.  Checked himself into [facility] in past, that's apparently where bipolar label was brought up.      No current psychiatric involvement.  I have strongly encouraged him on that, but he hasn't been interested.         Chronic low back pain 05/21/2008     Priority: Medium     Tree fell on him in past.  S/p lumbar fusion L4-5 and L5-S1.  Chronic back/leg pain on R.  No reflex and numbness on R foot.     Was on rare (once a month) methadone use when I met him due to concerns over problems in past with short acting opiods.    Pain flared 9/10, no longer on methadone.      Pain clinic in past.      Has some malfunction of hardware in back, had revision nov 2010, L5-S1 fusion revised.         Update: cervical/thoracic/lumbar MRI updated at 6/14 in another system.  All unchanged.  Will scan  Update : had nerve stimulator placed in back, got infected, inpatient for 2+ weeks to treat for meningitis, recovered close to " baseline.  This was spring 2015.  Now on 10/325 percocet, 40/month.  Gave 3 month supply 4/3/2017.  Pain clinic doing oxycontin.         GERD (gastroesophageal reflux disease) 05/21/2008     Priority: Medium        Impression/Plan:    Moderate sleep apnea with significant hypoxemia and hypoventilation. Orders placed for apap 5-15 CMH2O.   He will follow up with me in about 3 month(s).     Fifteen minutes spent with patient, all of which were spent face-to-face counseling, consulting, coordinating plan of care.          CC: Regan Llamas

## 2022-04-20 ENCOUNTER — OFFICE VISIT (OUTPATIENT)
Dept: SLEEP MEDICINE | Facility: CLINIC | Age: 52
End: 2022-04-20
Payer: COMMERCIAL

## 2022-04-20 VITALS
DIASTOLIC BLOOD PRESSURE: 90 MMHG | HEIGHT: 68 IN | SYSTOLIC BLOOD PRESSURE: 144 MMHG | HEART RATE: 81 BPM | BODY MASS INDEX: 40.92 KG/M2 | WEIGHT: 270 LBS | OXYGEN SATURATION: 96 %

## 2022-04-20 DIAGNOSIS — G47.33 OSA (OBSTRUCTIVE SLEEP APNEA): Primary | ICD-10-CM

## 2022-04-20 PROCEDURE — 99213 OFFICE O/P EST LOW 20 MIN: CPT | Performed by: PHYSICIAN ASSISTANT

## 2022-04-20 NOTE — NURSING NOTE
Weight management plan: Patient was referred to their PCP to discuss a diet and exercise plan.     David to follow up with Primary Care provider regarding elevated blood pressure.

## 2022-05-02 ENCOUNTER — MYC REFILL (OUTPATIENT)
Dept: PALLIATIVE MEDICINE | Facility: CLINIC | Age: 52
End: 2022-05-02
Payer: COMMERCIAL

## 2022-05-02 DIAGNOSIS — M96.1 FAILED BACK SURGICAL SYNDROME: ICD-10-CM

## 2022-05-02 NOTE — TELEPHONE ENCOUNTER
Received call from patient requesting refill(s) of oxyCODONE (OXYCONTIN) 10 MG 12 hr tablet and oxyCODONE-acetaminophen (PERCOCET) 5-325 MG tablet    Last dispensed from pharmacy on 04/03/22-Oxycontin          03/01/22-Percocet    Patient's last office/virtual visit by prescribing provider on 12/21/29-Dr. parker  Next office/virtual appointment scheduled for none    Last urine drug screen date 04/28/21  Current opioid agreement on file (completed within the last year) No Date of opioid agreement: 04/28/21    [id number] - [place], [place] - [facility] [address] [facility] [address]     Will route to nursing pool for review and preparation of prescription(s).

## 2022-05-03 RX ORDER — OXYCODONE HCL 10 MG/1
10 TABLET, FILM COATED, EXTENDED RELEASE ORAL EVERY 8 HOURS
Qty: 90 TABLET | Refills: 0 | Status: SHIPPED | OUTPATIENT
Start: 2022-05-03 | End: 2022-05-09

## 2022-05-03 RX ORDER — OXYCODONE AND ACETAMINOPHEN 5; 325 MG/1; MG/1
1 TABLET ORAL EVERY 6 HOURS PRN
Qty: 90 TABLET | Refills: 0 | Status: SHIPPED | OUTPATIENT
Start: 2022-05-03 | End: 2022-05-09

## 2022-05-03 NOTE — TELEPHONE ENCOUNTER
Medication refill information reviewed.     Due date for oxyCODONE (OXYCONTIN) 10 MG 12 hr tablet and oxyCODONE-acetaminophen (PERCOCET) 5-325 MG tablet is 05/03/22     Transfer plan: It has been determined this patient needs to transfer his care to a pain provider due to his medication regimen.Outreach X1. Left a  requesting call back. Provided call back number. Transfer plan routed to  to get patient scheduled.    Prescriptions prepped for review.     Will route to provider.

## 2022-05-03 NOTE — TELEPHONE ENCOUNTER
It has been determined this patient needs to transfer his care to a pain provider due to his medication regimen. Will route to the  to schedule.     JEREMIAH PeacockN, RN  Care Coordinator  [facility] Pain Management [place]

## 2022-05-03 NOTE — TELEPHONE ENCOUNTER
Signed Prescriptions:                        Disp   Refills    oxyCODONE (OXYCONTIN) 10 MG 12 hr tablet   90 tab*0        Sig: Take 1 tablet (10 mg) by mouth every 8 hours . Fill           05/03/22 to start 05/03/22  Authorizing Provider: REA LOPEZ    oxyCODONE-acetaminophen (PERCOCET) 5-325 M*90 tab*0        Sig: Take 1 tablet by mouth every 6 hours as needed for           severe pain Max 3/day. Fill 05/03/22 to start           05/03/22  Authorizing Provider: REA LOPEZ    Reviewed [place]  May 3, 2022- no concerning fills.    Transfer call to , he needs to schedule with one of our pain management providers per his transfer plan from previous pain provider, Dr. Bindu TORIBIO, RN CNP, FNP  [facility]  [facility] [place] [place]

## 2022-05-05 DIAGNOSIS — G89.4 CHRONIC PAIN SYNDROME: ICD-10-CM

## 2022-05-05 NOTE — TELEPHONE ENCOUNTER
Received fax request from [facility] #[id number] - [place], [place] - [address] [facility] & [address] [place] [address]  pharmacy requesting refill(s) for tiZANidine (ZANAFLEX) 4 MG tablet    Last refilled on 03/30/2022    Pt last seen on 12/29/2022  Next appt scheduled for 05/27/2022    Will facilitate refill.    Jenny Morrell MA  [facility] Management [place]

## 2022-05-05 NOTE — TELEPHONE ENCOUNTER
Pharmacy called to inform that the medication is requiring a PA.      Cate Beltre    [facility] Pain Management [place]

## 2022-05-06 ENCOUNTER — TELEPHONE (OUTPATIENT)
Dept: PALLIATIVE MEDICINE | Facility: CLINIC | Age: 52
End: 2022-05-06
Payer: COMMERCIAL

## 2022-05-06 NOTE — TELEPHONE ENCOUNTER
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

## 2022-05-06 NOTE — TELEPHONE ENCOUNTER
Signed Prescriptions:                        Disp   Refills    tiZANidine (ZANAFLEX) 4 MG tablet          90 tab*4        Sig: Take 1 tablet (4 mg) by mouth 3 times daily as needed           for muscle spasms 0.5-1 tab tid prn muscle spasm  Authorizing Provider: REA LOPEZ, RN CNP, FNP  [facility]  [facility]

## 2022-05-09 ENCOUNTER — OFFICE VISIT (OUTPATIENT)
Dept: FAMILY MEDICINE | Facility: CLINIC | Age: 52
End: 2022-05-09
Payer: COMMERCIAL

## 2022-05-09 VITALS
HEIGHT: 68 IN | HEART RATE: 68 BPM | BODY MASS INDEX: 41.07 KG/M2 | SYSTOLIC BLOOD PRESSURE: 142 MMHG | WEIGHT: 271 LBS | RESPIRATION RATE: 16 BRPM | DIASTOLIC BLOOD PRESSURE: 95 MMHG | TEMPERATURE: 98.5 F

## 2022-05-09 DIAGNOSIS — E66.01 MORBID OBESITY (H): ICD-10-CM

## 2022-05-09 DIAGNOSIS — F51.01 PRIMARY INSOMNIA: ICD-10-CM

## 2022-05-09 DIAGNOSIS — F13.20 SEDATIVE, HYPNOTIC OR ANXIOLYTIC DEPENDENCE (H): ICD-10-CM

## 2022-05-09 DIAGNOSIS — I10 HYPERTENSION, UNSPECIFIED TYPE: ICD-10-CM

## 2022-05-09 DIAGNOSIS — G89.29 CHRONIC LOW BACK PAIN, UNSPECIFIED BACK PAIN LATERALITY, UNSPECIFIED WHETHER SCIATICA PRESENT: Primary | ICD-10-CM

## 2022-05-09 DIAGNOSIS — M96.1 FAILED BACK SURGICAL SYNDROME: ICD-10-CM

## 2022-05-09 DIAGNOSIS — G89.4 CHRONIC PAIN SYNDROME: ICD-10-CM

## 2022-05-09 DIAGNOSIS — F11.20 CONTINUOUS OPIOID DEPENDENCE (H): ICD-10-CM

## 2022-05-09 DIAGNOSIS — M54.50 CHRONIC LOW BACK PAIN, UNSPECIFIED BACK PAIN LATERALITY, UNSPECIFIED WHETHER SCIATICA PRESENT: Primary | ICD-10-CM

## 2022-05-09 PROCEDURE — 99214 OFFICE O/P EST MOD 30 MIN: CPT | Performed by: FAMILY MEDICINE

## 2022-05-09 RX ORDER — OXYCODONE HCL 10 MG/1
10 TABLET, FILM COATED, EXTENDED RELEASE ORAL EVERY 8 HOURS
Qty: 90 TABLET | Refills: 0 | Status: SHIPPED | OUTPATIENT
Start: 2022-05-09 | End: 2022-05-27

## 2022-05-09 RX ORDER — HYDROCHLOROTHIAZIDE 25 MG/1
25 TABLET ORAL DAILY
Qty: 90 TABLET | Refills: 3 | Status: CANCELLED | OUTPATIENT
Start: 2022-05-09

## 2022-05-09 RX ORDER — ZOLPIDEM TARTRATE 10 MG/1
TABLET ORAL
Qty: 30 TABLET | Refills: 5 | Status: SHIPPED | OUTPATIENT
Start: 2022-05-09 | End: 2022-08-30

## 2022-05-09 RX ORDER — OXYCODONE AND ACETAMINOPHEN 5; 325 MG/1; MG/1
1 TABLET ORAL EVERY 6 HOURS PRN
Qty: 90 TABLET | Refills: 0 | Status: SHIPPED | OUTPATIENT
Start: 2022-05-09 | End: 2022-05-27

## 2022-05-09 ASSESSMENT — PAIN SCALES - GENERAL: PAINLEVEL: NO PAIN (0)

## 2022-05-09 NOTE — TELEPHONE ENCOUNTER
The PA has been sent high priority. Will monitor and call patient tomorrow to update on status.    JEREMIAH PeacockN, RN  Care Coordinator  [facility] Management [place]

## 2022-05-09 NOTE — PROGRESS NOTES
"S: David Wilcox is a 51 year old male with chronic pain.  90 oxycontin a month, 90 percocet a month.  Needs fills.  Has reestablished with pain clinic for ongoing care.    Htn; improved on higher dose losartan.  Is starting cpap soon.  OK with seeing how BP settles out over next few monoths    Sleep apnea: starting cpap soon.  See how it affects pain, energy, BP, weight    Insomnia: due for ambien fills.  Long term use      Morbid obesity: trying to drop some weight, has improved diet, more active      O:BP (!) 142/95   Pulse 68   Temp 98.5  F (36.9  C) (Tympanic)   Resp 16   Ht 1.727 m (5' 8\")   Wt 122.9 kg (271 lb)   BMI 41.21 kg/m    GEN: Alert and oriented, in no acute distress    A: chronic pain, stable      opioid use, ongoing      Htn, improved, but not yet at goal      Sleep apnea, starting cpap soon      Insomnia, ambien dependent      Sedative use, chronic ambien      Morbid obesity, ongoing    P: a month supply on each medication.  Further f/u per pain clinic.      6 mo ambien given.  Long term stable use.    Will recheck BP over next few months.  Hydrochlorothiazide next  Step if needed    See how cpap settles out for him.    "

## 2022-05-09 NOTE — TELEPHONE ENCOUNTER
Prior Authorization Approval    Authorization Effective Date: 4/9/2022  Authorization Expiration Date: 5/9/2023  Medication: oxyCODONE (OXYCONTIN) 10 MG 12 hr tablet- APPROVED   Approved Dose/Quantity:   Reference #:     Insurance Company: [employer] - Phone [phone] Fax [phone]  Expected CoPay:       CoPay Card Available:      Foundation Assistance Needed:    Which Pharmacy is filling the prescription (Not needed for infusion/clinic administered): [id number] - [address] AT [facility] & [address]  Pharmacy Notified: Yes  Patient Notified:  **Instructed pharmacy to notify patient when script is ready to /ship.**

## 2022-05-09 NOTE — TELEPHONE ENCOUNTER
Pt calling to check the status of refill, he is scared due to him being out of the 6 he was given, please call to advise.      Cate Beltre    [facility] Management [place]

## 2022-05-09 NOTE — TELEPHONE ENCOUNTER
Central Prior Authorization Team  Phone: [phone]    PA Initiation    Medication: oxyCODONE (OXYCONTIN) 10 MG 12 hr tablet  Insurance Company: [employer] - Phone [phone] Fax [phone]  Pharmacy Filling the Rx: [facility] #[id number] - [place], [place] - [address] [place] [address] AT [facility] [address] [facility] [address]  Filling Pharmacy Phone: [phone]  Filling Pharmacy Fax:    Start Date: 5/9/2022

## 2022-05-09 NOTE — TELEPHONE ENCOUNTER
patient calling checking on the status of his prescription.        Briseyda Cerna    [facility]

## 2022-05-10 NOTE — TELEPHONE ENCOUNTER
Called patient and informed him of PA approval. David denies any questions or concerns ahead of upcoming transfer of care appointment with Ina Barillas APRN, CNP     Etelvina Dan RN, BSN, CMSRN  RN Care Coordinator  [facility]

## 2022-05-10 NOTE — TELEPHONE ENCOUNTER
The PA is approved.     JEREMIAH PeacockN, RN  Care Coordinator  [facility] Management [place]

## 2022-05-27 ENCOUNTER — OFFICE VISIT (OUTPATIENT)
Dept: PALLIATIVE MEDICINE | Facility: CLINIC | Age: 52
End: 2022-05-27
Payer: COMMERCIAL

## 2022-05-27 ENCOUNTER — LAB (OUTPATIENT)
Dept: LAB | Facility: CLINIC | Age: 52
End: 2022-05-27

## 2022-05-27 VITALS — HEART RATE: 66 BPM | SYSTOLIC BLOOD PRESSURE: 132 MMHG | DIASTOLIC BLOOD PRESSURE: 90 MMHG

## 2022-05-27 DIAGNOSIS — M54.41 CHRONIC BILATERAL LOW BACK PAIN WITH RIGHT-SIDED SCIATICA: Primary | ICD-10-CM

## 2022-05-27 DIAGNOSIS — G89.29 CHRONIC BILATERAL LOW BACK PAIN WITH RIGHT-SIDED SCIATICA: Primary | ICD-10-CM

## 2022-05-27 DIAGNOSIS — Z79.891 ENCOUNTER FOR LONG-TERM USE OF OPIATE ANALGESIC: ICD-10-CM

## 2022-05-27 DIAGNOSIS — M96.1 FAILED BACK SURGICAL SYNDROME: ICD-10-CM

## 2022-05-27 DIAGNOSIS — F11.90 CHRONIC, CONTINUOUS USE OF OPIOIDS: ICD-10-CM

## 2022-05-27 DIAGNOSIS — M54.16 LUMBAR RADICULOPATHY: ICD-10-CM

## 2022-05-27 LAB
CREAT UR-MCNC: 105 MG/DL
ETHANOL UR QL SCN: NORMAL

## 2022-05-27 PROCEDURE — 99000 SPECIMEN HANDLING OFFICE-LAB: CPT

## 2022-05-27 PROCEDURE — 99215 OFFICE O/P EST HI 40 MIN: CPT | Performed by: NURSE PRACTITIONER

## 2022-05-27 PROCEDURE — 80320 DRUG SCREEN QUANTALCOHOLS: CPT

## 2022-05-27 PROCEDURE — 80307 DRUG TEST PRSMV CHEM ANLYZR: CPT | Mod: 59

## 2022-05-27 PROCEDURE — 80307 DRUG TEST PRSMV CHEM ANLYZR: CPT | Mod: 90

## 2022-05-27 RX ORDER — OXYCODONE HCL 10 MG/1
10 TABLET, FILM COATED, EXTENDED RELEASE ORAL EVERY 8 HOURS
Qty: 90 TABLET | Refills: 0 | Status: SHIPPED | OUTPATIENT
Start: 2022-05-27 | End: 2022-06-28

## 2022-05-27 RX ORDER — OXYCODONE AND ACETAMINOPHEN 5; 325 MG/1; MG/1
1 TABLET ORAL EVERY 6 HOURS PRN
Qty: 90 TABLET | Refills: 0 | Status: SHIPPED | OUTPATIENT
Start: 2022-05-27 | End: 2022-06-28

## 2022-05-27 ASSESSMENT — PAIN SCALES - GENERAL: PAINLEVEL: MODERATE PAIN (4)

## 2022-05-27 NOTE — LETTER
Opioid / Opioid Plus Controlled Substance Agreement    This is an agreement between you and your provider about the safe and appropriate use of controlled substance/opioids prescribed by your care team. Controlled substances are medicines that can cause physical and mental dependence (abuse).    There are strict laws about having and using these medicines. We here at [facility] are committing to working with you in your efforts to get better. To support you in this work, we ll help you schedule regular office appointments for medicine refills. If we must cancel or change your appointment for any reason, we ll make sure you have enough medicine to last until your next appointment.     As a Provider, I will:    Listen carefully to your concerns and treat you with respect.     Recommend a treatment plan that I believe is in your best interest. This plan may involve therapies other than opioid pain medication.     Talk with you often about the possible benefits, and the risk of harm of any medicine that we prescribe for you.     Provide a plan on how to taper (discontinue or go off) using this medicine if the decision is made to stop its use.    As a Patient, I understand that opioid(s):     Are a controlled substance prescribed by my care team to help me function or work and manage my condition(s).     Are strong medicines and can cause serious side effects such as:    Drowsiness, which can seriously affect my driving ability    A lower breathing rate, enough to cause death    Harm to my thinking ability     Depression     Abuse of and addiction to this medicine    Need to be taken exactly as prescribed. Combining opioids with certain medicines or chemicals (such as illegal drugs, sedatives, sleeping pills, and benzodiazepines) can be dangerous or even fatal. If I stop opioids suddenly, I may have severe withdrawal symptoms.    Do not work for all types of pain nor for all patients. If they re not helpful, I may  be asked to stop them.        The risks, benefits and side effects of these medicine(s) were explained to me. I agree that:  1. I will take part in other treatments as advised by my care team. This may be psychiatry or counseling, physical therapy, behavioral therapy, group treatment or a referral to a specialist.     2. I will keep all my appointments. I understand that this is part of the monitoring of opioids. My care team may require an office visit for EVERY opioid/controlled substance refill. If I miss appointments or don t follow instructions, my care team may stop my medicine.    3. I will take my medicines as prescribed. I will not change the dose or schedule unless my care team tells me to. There will be no refills if I run out early.     4. I may be asked to come to the clinic and complete a urine drug test or complete a pill count at any time. If I don t give a urine sample or participate in a pill count, the care team may stop my medicine.    5. I will only receive prescriptions from this clinic for chronic pain. If I am treated by another provider for acute pain issues, I will tell them that I am taking opioid pain medication for chronic pain and that I have a treatment agreement with this provider. I will inform my [facility] care team within one business day if I am given a prescription for any pain medication by another healthcare provider. My [facility] care team can contact other providers and pharmacists about my use of any medicines.    6. It is up to me to make sure that I don t run out of my medicines on weekends or holidays. If my care team is willing to refill my opioid prescription without a visit, I must request refills only during office hours. Refills may take up to 3 business days to process. I will use one pharmacy to fill all my opioid and other controlled substance prescriptions. I will notify the clinic about any changes to my insurance or medication  availability.    7. I am responsible for my prescriptions. If the medicine/prescription is lost, stolen or destroyed, it will not be replaced. I also agree not to share controlled substance medicines with anyone.    8. I am aware I should not use any illegal or recreational drugs. I agree not to drink alcohol unless my care team says I can.       9. If I enroll in the [place] [facility] program, I will tell my care team prior to my next refill.     10. I will tell my care team right away if I become pregnant, have a new medical problem treated outside of my regular clinic, or have a change in my medications.    11. I understand that this medicine can affect my thinking, judgment and reaction time. Alcohol and drugs affect the brain and body, which can affect the safety of my driving. Being under the influence of alcohol or drugs can affect my decision-making, behaviors, personal safety, and the safety of others. Driving while impaired (DWI) can occur if a person is driving, operating, or in physical control of a car, motorcycle, boat, snowmobile, ATV, motorbike, off-road vehicle, or any other motor vehicle ([place] Statute 169A.20). I understand the risk if I choose to drive or operate any vehicle or machinery.    I understand that if I do not follow any of the conditions above, my prescriptions or treatment may be stopped or changed.          Opioids  What You Need to Know    What are opioids?   Opioids are pain medicines that must be prescribed by a doctor. They are also known as narcotics.     Examples are:   1. morphine (MS Contin, Mimi)  2. oxycodone (Oxycontin)  3. oxycodone and acetaminophen (Percocet)  4. hydrocodone and acetaminophen (Vicodin, Norco)   5. fentanyl patch (Duragesic)   6. hydromorphone (Dilaudid)   7. methadone  8. codeine (Tylenol #3)     What do opioids do well?   Opioids are best for severe short-term pain such as after a surgery or injury. They may work well for cancer pain. They may  help some people with long-lasting (chronic) pain.     What do opioids NOT do well?   Opioids never get rid of pain entirely, and they don t work well for most patients with chronic pain. Opioids don t reduce swelling, one of the causes of pain.                                    Other ways to manage chronic pain and improve function include:       Treat the health problem that may be causing pain    Anti-inflammation medicines, which reduce swelling and tenderness, such as ibuprofen (Advil, Motrin) or naproxen (Aleve)    Acetaminophen (Tylenol)    Antidepressants and anti-seizure medicines, especially for nerve pain    Topical treatments such as patches or creams    Injections or nerve blocks    Chiropractic or osteopathic treatment    Acupuncture, massage, deep breathing, meditation, visual imagery, aromatherapy    Use heat or ice at the pain site    Physical therapy     Exercise    Stop smoking    Take part in therapy       Risks and side effects     Talk to your doctor before you start or decide to keep taking opioids. Possible side effects include:      Lowering your breathing rate enough to cause death    Overdose, including death, especially if taking higher than prescribed doses    Worse depression symptoms; less pleasure in things you usually enjoy    Feeling tired or sluggish    Slower thoughts or cloudy thinking    Being more sensitive to pain over time; pain is harder to control    Trouble sleeping or restless sleep    Changes in hormone levels (for example, less testosterone)    Changes in sex drive or ability to have sex    Constipation    Unsafe driving    Itching and sweating    Dizziness    Nausea, throwing up and dry mouth    What else should I know about opioids?    Opioids may lead to dependence, tolerance, or addiction.      Dependence means that if you stop or reduce the medicine too quickly, you will have withdrawal symptoms. These include loose poop (diarrhea), jitters, flu-like symptoms,  nervousness and tremors. Dependence is not the same as addiction.                       Tolerance means needing higher doses over time to get the same effect. This may increase the chance of serious side effects.      Addiction is when people improperly use a substance that harms their body, their mind or their relations with others. Use of opiates can cause a relapse of addiction if you have a history of drug or alcohol abuse.      People who have used opioids for a long time may have a lower quality of life, worse depression, higher levels of pain and more visits to doctors.    You can overdose on opioids. Take these steps to lower your risk of overdose:    1. Recognize the signs:  Signs of overdose include decrease or loss of consciousness (blackout), slowed breathing, trouble waking up and blue lips. If someone is worried about overdose, they should call 911.    2. Talk to your doctor about Narcan (naloxone).   If you are at risk for overdose, you may be given a prescription for Narcan. This medicine very quickly reverses the effects of opioids.   If you overdose, a friend or family member can give you Narcan while waiting for the ambulance. They need to know the signs of overdose and how to give Narcan.     3. Don't use alcohol or street drugs.   Taking them with opioids can cause death.    4. Do not take any of these medicines unless your doctor says it s OK. Taking these with opioids can cause death:    Benzodiazepines, such as lorazepam (Ativan), alprazolam (Xanax) or diazepam (Valium)    Muscle relaxers, such as cyclobenzaprine (Flexeril)    Sleeping pills like zolpidem (Ambien)     Other opioids      How to keep you and other people safe while taking opioids:    1. Never share your opioids with others.  Opioid medicines are regulated by the Drug Enforcement Agency (FUAD). Selling or sharing medications is a criminal act.    2. Be sure to store opioids in a secure place, locked up if possible. Young children  can easily swallow them and overdose.    3. When you are traveling with your medicines, keep them in the original bottles. If you use a pill box, be sure you also carry a copy of your medicine list from your clinic or pharmacy.    4. Safe disposal of opioids    Most pharmacies have places to get rid of medicine, called disposal kiosks. Medicine disposal options are also available in every [facility]. Search your county and  medication disposal  to find more options. You can find more details at:  [email or web address]     I agree that my provider, clinic care team, and pharmacy may work with any city, state or federal law enforcement agency that investigates the misuse, sale, or other diversion of my controlled medicine. I will allow my provider to discuss my care with, or share a copy of, this agreement with any other treating provider, pharmacy or emergency room where I receive care.    I have read this agreement and have asked questions about anything I did not understand.    _______________________________________________________  Patient Signature - David Wilcox _____________________                   Date     _______________________________________________________  Provider Signature - CATARINO Abdullahi CNP   _____________________                   Date     _______________________________________________________  Witness Signature (required if provider not present while patient signing)   _____________________                   Date

## 2022-05-27 NOTE — PATIENT INSTRUCTIONS
PLAN:  Physical Therapy: none  Continue your regular HEP   Clinical Health Psychologist to address issues of relaxation, behavioral change, coping style, and other factors important to improvement: none  Diagnostic Studies: none  Medication Management:   Continue Percocet and OxyContin without changes. Fill 5/31 and begin 6/2  Further procedures recommended: none  Acupuncture: yes, continue  Urine toxicology screen today: yes, last took oxyContin and oxycodone this morning about 7AM   Signed CSA today   Recommendations/follow-up for PCP:  See above  Release of information: none  Follow up: 3 months. Please call [phone] to make your follow-up appointment with me.     ----------------------------------------------------------------  Clinic Number:  [phone]   Call with any questions about your care and for scheduling assistance.   Calls are returned Monday through Friday between 8 AM and 4:30 PM. We usually get back to you within 2 business days depending on the issue/request.    If we are prescribing your medications:  For opioid medication refills, call the clinic or send a [employer] message 7 days in advance.  Please include:  Name of requested medication  Name of the pharmacy.  For non-opioid medications, call your pharmacy directly to request a refill. Please allow 3-4 days to be processed.   Per [place] State Law:  All controlled substance prescriptions must be filled within 30 days of being written.    For those controlled substances allowing refills, pickup must occur within 30 days of last fill.      We believe regular attendance is key to your success in our program!    Any time you are unable to keep your appointment we ask that you call us at least 24 hours in advance to cancel.This will allow us to offer the appointment time to another patient.   Multiple missed appointments may lead to dismissal from the clinic.

## 2022-05-27 NOTE — PROGRESS NOTES
[facility] Consultation    Date of visit: 5/27/2022    Reason for consultation:    David Wilcox is a 51 year old male who is seen in consultation today for transfer of care from previous pain management provider, Dr. Bindu Motley who previously managed the patient for failed back surgical syndrome and chronic pain syndrome, last seen by Dr. Bindu Motley  In clinic on 12/29/2021. Initially seen by Dr. Bindu Motley on 7/7/2016.      Primary Care Provider is Regan Llamas.  Pain medications are being prescribed by review of [place]  shows opiates managed by Dr. Bindu Motley and her associates. Primary Care Provider prescribes regular Ambien      Chief Complaint:   Chief Complaint   Patient presents with     Pain       Pain history:  David Wilcox is a 51 year old male who first started having problems with pain as follows:    Pain history collected 5/27/2022 at initial visit  Out cutting a tree and it fell on him in 2002. He had compression fractures C5-S1, and multiple other fractures and spent almost a year in a wheelchair, initially told he would not walk again.   His initial spinal  surgery was helpful, but he then fell down the stairs and he knew something was wrong but no one would believe him. He states 8 years later they found loose hardware and he had to have a subsequent surgery to fix that. Then a 3rd surgery extended the fusion. He is now fused from L3-S1.     Pain on the right side from bottom of the ribs to the low back, around the hip and down the right leg to the toes. Bilateral hip pain posterolateral hip pain (points to buttocks).     He has had loss of bowel and bladder but it is not a common thing. This is not new, present off and on for 6 years.     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   Has regular exercise with chores and he now walks every day on the treadmill (2km in the AM and PM)  Does [employer] in [place] on Fridays and his wife does so on  "Saturdays.         Pain rating: intensity ranges from 4/10 to 10/10, and Averages 6/10 on a 0-10 scale.  Describes pain as \"pressure to feeling like I am in a vise with knives. Stabbing, shooting, crushing.\"  Pain is constant.      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 Active in community at [employer]. Stretching exercises. Music.     Aggravating factors include: moving. Any activity. Sitting up or sitting down.     Relieving factors include: laying flat on his back makes it better but worsens if he doesn't change positions often    Any bowel or bladder incontinence: intermittent, not common. Present since around 2016.       Current pain-related medication treatments include:  -Tylenol extra strength 500mg PRN   -naloxone nasal spray for opiate reversal   -Duloxetine 60mg every day (very helpful for mood and pain)  -gabapentin 600mg take 2 tabs/1200mg TID (helpful)  -nortriptyline 10mg take 2 (20mg) at bedtime (helpful)  -OxyContin 10mg Q 8 hours (helpful)  -Percocet 5.325mg take 1 tab Q 6 hours PRN max 3/day (he plans on 2/day and uses 3 if needed. helpful)  -tizanidine 4mg TID PRN for muscle spasms (helpful)      Other pertinent medications:  -lorazepam 0.5mg BID PRN anxiety (very rare use, received #30 tabs in 2018 and still has some left)  -mirtazepine 15mg at bedtime  -ambien 10mg Q HS PRN insomnia        Previous medication treatments included:  OPIATES: oxycodone (helpful), hydrocodone (not helpful), Methadone (took after surgery, very helpful), Fentanyl patches (would have issues with getting to hot and get a dump of medication. He has used this very successfully in the winter when it is colder), Dilaudid (hydromorphone, very helpful for a migraine with antibiotics)  NSAIDS: naproxyn (not helpful), ibuprofen (not helpful)  MUSCLE RELAXANTS: Methocarbamol (expense, helpful), Flexeril (helpful but he had a remote history of a suicide attempt with this drug), tizanidine " (very helpful)  ANTI-MIGRAINE MEDS: none  ANTI-DEPRESSANTS: none  SLEEP AIDS: Remeron (helpful)  ANTI-CONVULSANTS: nortriptyline (helpful)  TOPICALS: CBD oil (helps some)  ANXIOLYTICS: lorazepam (helpful)  MEDICAL CANNABIS: none  Other meds: lidocaine (not helpful)      Other treatments have included:   David Wilcox has been seen at a pain clinic in the past.  Previously managed by Dr. Bindu Motley here  PT: tried, was not helpful on multiple occasions  Chiropractic care: yes, uses this regularly for acupuncture  Acupuncture: helpful  TENs Unit: has one, helpful, uses it occasionally    Injections:   -3/26/2018 caudal ALVA with Dr. Bindu Motley  (not helpful)  -11/19/2018 right L3-4 transforaminal ALVA with Dr. Bindu Motley (not helpful)  -11/11/2019  Bilateral SI joint injections with Dr. Bindu Motley  (not helpful)    Past Medical History:  Past Medical History:   Diagnosis Date     Acute bronchitis      Acute pancreatitis      Acute sinusitis, unspecified      ADHD     Diagnosed as an adult     Anxiety depression      Asthma      Attention deficit disorder with hyperactivity(314.01)      Bipolar disorder, unspecified (H)      Cervicalgia      Dysthymic disorder      Esophageal reflux      Fixation of spine with fusion     L4L5S1     History of blood transfusion      Hyperlipidemia      Low back pain 01/21/2001    From tree falling on him     Lumbago      Meningitis     after spinal cord stimulator trial.     Narcotic abuse (H)      Neck pain     Chronic from work injury, worsened after tree fell on him     Open nondisplaced fracture of distal phalanx of finger      Other general counseling and advice for contraceptive management      Pain in limb      Personal history of other diseases of digestive system      Sciatica      Spasm of muscle      Tobacco use disorder      Past Surgical History:  Past Surgical History:   Procedure Laterality Date     BACK SURGERY  2003, 2011     FUSION LUMBAR ANTERIOR THREE+  LEVELS      L3-S1 fused (3 surgery 2002, 2010, 2014)     OPEN REDUCTION INTERNAL FIXATION FINGER(S) Right 3/3/2021    Procedure: Right index finger open reduction internal fixation with nailbed repair;  Surgeon: Vidal Rodriguez MD;  Location:  OR     Medications:  Current Outpatient Medications   Medication Sig Dispense Refill     albuterol (PROAIR HFA/PROVENTIL HFA/VENTOLIN HFA) 108 (90 Base) MCG/ACT inhaler INHALE 2 PUFFS INTO THE LUNGS EVERY 4 HOURS AS NEEDED FOR SHORTNESS OF BREATH 8.5 g 11     DULoxetine (CYMBALTA) 60 MG capsule Take 1 capsule (60 mg) by mouth daily 90 capsule 3     gabapentin (NEURONTIN) 600 MG tablet TAKE 2 TABLETS(1200 MG) BY MOUTH THREE TIMES DAILY 540 tablet 3     LORazepam (ATIVAN) 0.5 MG tablet One bid prn anxiety. Rare use 30 tablet 0     losartan (COZAAR) 100 MG tablet Take 1 tablet (100 mg) by mouth in the morning. 90 tablet 2     mirtazapine (REMERON) 15 MG tablet TAKE 1 TABLET(15 MG) BY MOUTH AT BEDTIME 90 tablet 3     naloxone (NARCAN) 4 MG/0.1ML nasal spray Spray 1 spray (4 mg) into one nostril alternating nostrils once as needed for opioid reversal every 2-3 minutes until assistance arrives 0.2 mL 1     nortriptyline (PAMELOR) 10 MG capsule Take 2 capsules (20 mg) by mouth At Bedtime . 3 month supply 180 capsule 3     omeprazole (PRILOSEC) 20 MG DR capsule Take 1 capsule (20 mg) by mouth daily 90 capsule 3     oxyCODONE (OXYCONTIN) 10 MG 12 hr tablet Take 1 tablet (10 mg) by mouth every 8 hours . Fill 05/31/22 to start 6/2/22 90 tablet 0     oxyCODONE-acetaminophen (PERCOCET) 5-325 MG tablet Take 1 tablet by mouth every 6 hours as needed for severe pain Max 3/day. Fill 05/31/22 to start 06/2/22 90 tablet 0     tiZANidine (ZANAFLEX) 4 MG tablet Take 1 tablet (4 mg) by mouth 3 times daily as needed for muscle spasms 0.5-1 tab tid prn muscle spasm 90 tablet 4     TYLENOL EXTRA STRENGTH 500 MG OR TABS At Bedtime        zolpidem (AMBIEN) 10 MG tablet One prn nightly for insomnia 30  tablet 5     Allergies:     Allergies   Allergen Reactions     Aspartame      A.k.a Nutrasweet.  Can take Truvia (stevia plant extract).     Food      Artifical sweetners-vomiting     Saccharin      Vomiting, diarrhea     Sucralose      A.k.a. Splenda     Social History:  Home situation:  with children x 2 at home.   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   History of chemical dependency treatment: quit on own     Family history:  Family History   Problem Relation Age of Onset     Hypertension Mother      Cataracts Mother      No Known Problems Father      Hypertension Maternal Grandmother      Glaucoma Maternal Grandmother      C.A.D. Paternal Grandmother      Neurologic Disorder Paternal Grandfather      Asthma Son          Review of Systems:  The 14 system ROS was reviewed with the patient and is positive for: positives are in bold  Constitutional: fever/chills, fatigue, weight gain, weight loss  Eyes/Head: headache, dizziness  ENT: ringing in ears  Allergy/Immune: allergies  Skin: itching, rash, hives  Hematologic: easy bruising  Respiratory: cough, wheezing, shortness of breath  Cardiovascular: swelling in feet, fainting, palpitations, chest pain  GI: abdominal pain, nausea, vomiting, diarrhea, constipation  Endocrine: steroid use  Musculoskeletal:  joint pain, arthritis, stiffness, gout, back pain, neck pain  Urinary: frequency, urgency, incontinence, hesitancy  Neurologic: weakness, numbness/tingling (right leg up to the right hip) seizure, stroke, memory loss  Mental health: depression, anxiety, stress, suicidal ideation      Physical Exam:  Vitals:    05/27/22 0958   BP: (!) 132/90   Pulse: 66     Exam:  Constitutional: healthy, alert and no distress  Head: normocephalic. Atraumatic.   Eyes: no redness or jaundice noted   ENT: oropharnx normal.  MMM.   Cardiovascular: RRR  no m/g/r   Respiratory: clear to auscultation A/P. Respirations easy and unlabored. Able to speak in full sentences without SOB or cough noted.    Gastrointestinal: soft, non-tender   : deferred  Skin: no suspicious lesions or rashes  Psychiatric: mentation appears normal and affect normal/bright    Musculoskeletal exam:  Gait/Station/Posture: Fair posture. Normal gait. Able to rise onto toes and heels. Able to perform tandem gait    Cervical spine:    Flex:  20 degrees   Ext: 20 degrees   Rotation to right: 80 degrees   Rotation to left: 80 degrees   Ext/rotation to the right pain free   Ext/rotation to the left pain free    Thoracic spine:  Normal     Lumbar spine:    Flex:  40 degrees   Ext: 15 degrees   Rotation/ext to right: pain free   Rotation/ext to left: pain free   SI joints: Non-tender bilaterally   Piriformis: Non-tender bilaterally   Greater trochanters: Non-tender bilaterally    Myofascial tenderness:  Lumbar paraspinals  Straight leg exam: positive right side  Girish's maneuver: negative    Neurologic exam:  CN:  Cranial nerves 2-12 are  Grossly normal  Motor:  5/5 UE and LE strength  Reflexes:     Biceps:     R:  2/4 L: 2/4   Brachioradialis   R:  1/4 L: 1/4      Patella:  R:  2/4 L: 2/4   Achilles:  R:  1/4 L: 1/4  Other reflexes:    Glasgow's negative  Sensory:  (upper and lower extremities):   Light touch: normal    Vibration: normal    Pin prick: normal    Allodynia: absent    Dysethesia: absent    Hyperalgesia: absent       Diagnostic tests:  MRI OF THE LUMBAR SPINE WITHOUT AND WITH CONTRAST August 6, 2021 5:57  PM      HISTORY: Low back pain, prior surgery, new symptoms. Worsening pain  above fusion, but also right radicular L5 or S1 distribution on the  right. Failed back surgical syndrome. Lumbar radiculopathy.     TECHNIQUE: Multiplanar, multisequence MRI images of the lumbar spine  were acquired without and with 10mL Gadavist IV contrast.     COMPARISON: Lumbar spine MR 4/27/2018, lumbar  spine CT 7/14/2016.     FINDINGS: There are five lumbar-type vertebrae for the purposes of  this dictation. Posterior spine fusion from L3 down to S1 consisting  of bilateral pedicle screws at each level with inner connecting rods,  interbody fusion devices in the L4-L5 and L5-S1 disc spaces and  laminectomies of L3, L4, L5 and S1 again noted.     Degenerative grade 1 anterolisthesis of L5 upon S1 and mild  degenerative retrolisthesis of L2 upon L3 again noted. Alignment  otherwise normal and unchanged. Vertebral body heights normal. No  fractures. No evidence for pathologic bony lesion. Marrow signal  normal.     There is loss of disc height, disc desiccation and posterior disc  bulging/herniation to varying degrees at all levels of the lumbar  spine with the exception of the resected L4-L5 and L5-S1 discs.      The tip of the conus medullaris is at the L1-L2 level which is within  normal limits. No abnormal intrathecal contrast enhancement. There is  no evidence for intrathecal abnormality.      Level by level:      T12-L1: Loss of disc height, disc desiccation and circumferential disc  bulging with a superimposed small posterior central disc herniation  (extrusion). Minimal facet arthropathy bilaterally. Minimal spinal  canal narrowing. No foraminal stenosis on either side. No change from  the comparison study.     L1-L2: Loss of disc height, disc desiccation and circumferential disc  bulging with a superimposed small posterior central disc herniation  (protrusion). Minimal facet arthropathy bilaterally. Minimal spinal  canal narrowing. No foraminal stenosis on either side. No change from  the comparison study.     L2-L3: Loss of disc height, disc desiccation and mild circumferential  disc bulging. No herniation. Moderate facet arthropathy bilaterally.  Mild spinal canal narrowing. Mild bilateral neural foraminal  narrowing. No change from the comparison study.     L3: Again noted is a fluid signal intensity  collection in the right  posterolateral aspect of the spinal canal measuring 1.7 x 0.7 cm in  the AP and transverse dimensions respectively. This may represent a  synovial cyst arising from the right facet joint versus a cystic  postoperative fluid collection or cystic foreign body reaction. This  results in mild narrowing of the thecal sac at the mid L3 level.     L3-L4: (Fused level) Loss of disc height, disc desiccation and mild  circumferential disc bulging. No herniation. Moderate facet  arthropathy bilaterally. No spinal canal stenosis. Mild bilateral  neural foraminal narrowing. No change from the comparison study.      L4-L5: (Fused level) No spinal canal stenosis. No foraminal stenosis  on either side. No change from the comparison study.     L5-S1: (Fused level) No spinal canal stenosis. No foraminal stenosis  on either side. No change from the comparison study.                                                                      IMPRESSION:  1. Postoperative and degenerative change of the lumbar spine as  detailed above without appreciable change from the comparison studies.  2. No significant spinal canal or neural foraminal stenosis at any  level of the spine.     JAI CHAVEZ MD       Other testing (labs, diagnostics):  12/27/2021  Cr. 0.91  Est GFR >90      Screening tools:     DIRE Score for ongoing opioid management is calculated as follows:    Diagnosis = 2    Intractability = 2    Risk: Psych = 2  Chem Hlth = 2  Reliability = 3  Social = 3    Efficacy = 2    Total DIRE Score = 16 (14 or higher predicts good candidate for ongoing opioid management; 13 or lower predicts poor candidate for opioid management)         Assessment:  1. Chronic bilateral low back pain with right-sided sciatica  2. Lumbar radiculopathy  3. Failed back surgical syndrome  4. Chronic continuous use of opioids  5. Encounter for long-term use of opiate analgesic    6. Distant history of alcohol and drug abuse  7. Bipolar  disorder   8. H/o menningitis after SCS trial  9. PMHx includes: Acute bronchitis, acute pancreatitis, acute sinusitis, ADHD, anxiety, depression, asthma, bipolar disorder unspecified, cervicalgia, dysthymic disorder, esophageal reflux, fixation of spine with fusion, history of blood transfusion, hyperlipidemia, low back pain, lumbar throat, meningitis after spinal cord stimulator trial, narcotic abuse, neck pain, open nondisplaced fracture of distal phalanx of finger, pain in limb, personal history of other diseases of the digestive system, sciatica, spasm of muscle, tobacco use disorder  10. PSHx includes: Back surgery in 2003 and 2011, lumbar anterior three-level fusion, ORIF right finger (3/3/2021)        Plan:  Diagnosis reviewed, treatment option addressed, and risk/benefits discussed.  Self-care instructions given.  I am recommending a multidisciplinary treatment plan to help this patient better manage his pain.      1. Physical Therapy: none  2. Continue your regular HEP   3. Clinical Health Psychologist to address issues of relaxation, behavioral change, coping style, and other factors important to improvement: none  4. Diagnostic Studies: none  5. Medication Management:   1. Continue Percocet and OxyContin without changes. Fill 5/31 and begin 6/2  6. Further procedures recommended: none  7. Acupuncture: yes, continue  8. Urine toxicology screen today: yes, last took oxyContin and oxycodone this morning about 7AM   9. Signed CSA today   10. Recommendations/follow-up for PCP:  See above  11. Release of information: none  12. Follow up: 3 months. Please call [phone] to make your follow-up appointment with me.         Face to face time: 62 minutes        Ina TORIBIO, GAYATRI CNP, FNP  [facility]  [facility] [place]

## 2022-05-28 LAB — ETHYL GLUCURONIDE UR QL SCN: NEGATIVE NG/ML

## 2022-06-03 LAB
GABAPENTIN UR QL CFM: PRESENT
OXYCODONE UR CFM-MCNC: 1200 NG/ML
OXYCODONE/CREAT UR: 1143 NG/MG {CREAT}
OXYMORPHONE UR CFM-MCNC: 357 NG/ML
OXYMORPHONE/CREAT UR: 340 NG/MG {CREAT}

## 2022-06-12 DIAGNOSIS — F33.42 RECURRENT MAJOR DEPRESSION IN COMPLETE REMISSION (H): ICD-10-CM

## 2022-06-12 DIAGNOSIS — F51.01 PRIMARY INSOMNIA: ICD-10-CM

## 2022-06-13 RX ORDER — MIRTAZAPINE 15 MG/1
TABLET, FILM COATED ORAL
Qty: 90 TABLET | Refills: 3 | OUTPATIENT
Start: 2022-06-13

## 2022-07-13 ENCOUNTER — TELEPHONE (OUTPATIENT)
Dept: FAMILY MEDICINE | Facility: CLINIC | Age: 52
End: 2022-07-13

## 2022-07-13 NOTE — TELEPHONE ENCOUNTER
[facility] pharmacist Jamarcus calling form [facility]. Patient is on vacation and forget half of his gabapentin supply. They are calling to get verbal consent to fill a 5 day supply for patient. He takes 600 mg 2 tabs(1200 mg)  three times daily.  Notify pharmacy at [phone]  Orquidea SORIANO RN

## 2022-07-17 DIAGNOSIS — M54.50 CHRONIC LOW BACK PAIN, UNSPECIFIED BACK PAIN LATERALITY, UNSPECIFIED WHETHER SCIATICA PRESENT: ICD-10-CM

## 2022-07-17 DIAGNOSIS — G89.29 CHRONIC LOW BACK PAIN, UNSPECIFIED BACK PAIN LATERALITY, UNSPECIFIED WHETHER SCIATICA PRESENT: ICD-10-CM

## 2022-07-18 RX ORDER — GABAPENTIN 600 MG/1
TABLET ORAL
Qty: 180 TABLET | Refills: 5 | Status: SHIPPED | OUTPATIENT
Start: 2022-07-18 | End: 2023-02-03

## 2022-07-18 NOTE — TELEPHONE ENCOUNTER
Routing refill request to provider for review/approval because:  Drug not on the [facility] refill protocol   Last Written Prescription Date:  1/31/22  Last Fill Quantity: 540,  # refills: 3   Last office visit: 5/9/2022 with prescribing provider:  Dr Llamas   Future Office Visit:   Next 5 appointments (look out 90 days)    Aug 30, 2022 11:00 AM  Return Visit with CATARINO Abdullahi CNP  [facility] [place] ([facility] [place] ) [address]  [place] [address]  [phone]

## 2022-08-01 DIAGNOSIS — M54.16 LUMBAR RADICULOPATHY: ICD-10-CM

## 2022-08-01 DIAGNOSIS — M96.1 FAILED BACK SURGICAL SYNDROME: ICD-10-CM

## 2022-08-01 DIAGNOSIS — G89.29 CHRONIC BILATERAL LOW BACK PAIN WITH RIGHT-SIDED SCIATICA: ICD-10-CM

## 2022-08-01 DIAGNOSIS — F11.90 CHRONIC, CONTINUOUS USE OF OPIOIDS: ICD-10-CM

## 2022-08-01 DIAGNOSIS — M54.41 CHRONIC BILATERAL LOW BACK PAIN WITH RIGHT-SIDED SCIATICA: ICD-10-CM

## 2022-08-01 NOTE — TELEPHONE ENCOUNTER
[facility] Call Center    Phone Message    May a detailed message be left on voicemail: yes     Reason for Call: Medication Refill Request    Has the patient contacted the pharmacy for the refill? Yes   Name of medication being requested: oxyCODONE (OXYCONTIN) 10 MG 12 hr tablet  Provider who prescribed the medication: Dr. Piña  Pharmacy: [id number] - [place], [place] - [address] [place] [address] AT [facility] & [address]  Date medication is needed: 08/04/22    Pt will run out Wednesday 08/03/22 night     Action Taken: Message routed to:  [facility] ([facility]):  Pain    Travel Screening: Not Applicable

## 2022-08-02 RX ORDER — OXYCODONE HCL 10 MG/1
10 TABLET, FILM COATED, EXTENDED RELEASE ORAL EVERY 8 HOURS
Qty: 90 TABLET | Refills: 0 | Status: SHIPPED | OUTPATIENT
Start: 2022-08-02 | End: 2022-08-30

## 2022-08-02 NOTE — TELEPHONE ENCOUNTER
Medication refill information reviewed.     Due date for oxyCODONE (OXYCONTIN) 10 MG 12 hr tablet  is 08/03/22     Prescriptions prepped for review.     Will route to provider.

## 2022-08-02 NOTE — TELEPHONE ENCOUNTER
Patient requesting refill(s) of oxyCODONE (OXYCONTIN) 10 MG 12 hr tablet     Last dispensed from pharmacy on 6/30/22    Patient's last office/virtual visit by prescribing provider on 5/27/22  Next office/virtual appointment scheduled for 8/30/22    Last urine drug screen date 5/27/22  Current opioid agreement on file (completed within the last year) Yes Date of opioid agreement: 5/27/22    E-prescribe to [id number] [place], [place]     Will route to nursing [place] for review and preparation of prescription(s).

## 2022-08-02 NOTE — TELEPHONE ENCOUNTER
Script Eprescribed to pharmacy  [place] Prescription Monitoring Program checked    Will send this to MA team to notify patient.    Signed Prescriptions:                        Disp   Refills    oxyCODONE (OXYCONTIN) 10 MG 12 hr tablet   90 tab*0        Sig: Take 1 tablet (10 mg) by mouth every 8 hours . Fill           08/02/22 to start 08/03/22  Authorizing Provider: BREEZY JOY MD

## 2022-08-30 ENCOUNTER — OFFICE VISIT (OUTPATIENT)
Dept: PALLIATIVE MEDICINE | Facility: CLINIC | Age: 52
End: 2022-08-30
Payer: COMMERCIAL

## 2022-08-30 VITALS — HEART RATE: 68 BPM | DIASTOLIC BLOOD PRESSURE: 79 MMHG | SYSTOLIC BLOOD PRESSURE: 123 MMHG

## 2022-08-30 DIAGNOSIS — G89.29 CHRONIC BILATERAL LOW BACK PAIN WITH RIGHT-SIDED SCIATICA: ICD-10-CM

## 2022-08-30 DIAGNOSIS — M96.1 FAILED BACK SURGICAL SYNDROME: ICD-10-CM

## 2022-08-30 DIAGNOSIS — M54.41 CHRONIC BILATERAL LOW BACK PAIN WITH RIGHT-SIDED SCIATICA: ICD-10-CM

## 2022-08-30 DIAGNOSIS — F11.90 CHRONIC, CONTINUOUS USE OF OPIOIDS: ICD-10-CM

## 2022-08-30 DIAGNOSIS — F51.01 PRIMARY INSOMNIA: ICD-10-CM

## 2022-08-30 DIAGNOSIS — M54.16 LUMBAR RADICULOPATHY: ICD-10-CM

## 2022-08-30 PROCEDURE — 99213 OFFICE O/P EST LOW 20 MIN: CPT | Performed by: NURSE PRACTITIONER

## 2022-08-30 RX ORDER — OXYCODONE HCL 10 MG/1
10 TABLET, FILM COATED, EXTENDED RELEASE ORAL EVERY 8 HOURS
Qty: 90 TABLET | Refills: 0 | Status: SHIPPED | OUTPATIENT
Start: 2022-08-30 | End: 2022-09-27

## 2022-08-30 RX ORDER — OXYCODONE AND ACETAMINOPHEN 5; 325 MG/1; MG/1
1 TABLET ORAL EVERY 6 HOURS PRN
Qty: 90 TABLET | Refills: 0 | Status: SHIPPED | OUTPATIENT
Start: 2022-08-30 | End: 2022-09-27

## 2022-08-30 RX ORDER — ZOLPIDEM TARTRATE 10 MG/1
TABLET ORAL
Qty: 30 TABLET | Refills: 0 | Status: SHIPPED | OUTPATIENT
Start: 2022-08-30 | End: 2022-11-17

## 2022-08-30 ASSESSMENT — PAIN SCALES - GENERAL: PAINLEVEL: SEVERE PAIN (6)

## 2022-08-30 NOTE — PROGRESS NOTES
[facility]    8/30/2022      Chief complaint:   Low back pain and right leg radicular pain, has been a bit better    Interval history:  David Wilcox is a 51 year old male is known to me for:  Chronic bilateral low back pain with right-sided sciatica  Lumbar radiculopathy  Failed back surgical syndrome  Chronic continuous use of opioids  Encounter for long-term use of opiate analgesic     Distant history of alcohol and drug abuse  Bipolar disorder   H/o menningitis after SCS trial  PMHx includes: Acute bronchitis, acute pancreatitis, acute sinusitis, ADHD, anxiety, depression, asthma, bipolar disorder unspecified, cervicalgia, dysthymic disorder, esophageal reflux, fixation of spine with fusion, history of blood transfusion, hyperlipidemia, low back pain, lumbar throat, meningitis after spinal cord stimulator trial, narcotic abuse, neck pain, open nondisplaced fracture of distal phalanx of finger, pain in limb, personal history of other diseases of the digestive system, sciatica, spasm of muscle, tobacco use disorder  PSHx includes: Back surgery in 2003 and 2011, lumbar anterior three-level fusion, ORIF right finger (3/3/2021)  .    Recommendations/plan at the last visit on 5/27/2022 included:  1. Physical Therapy: none  2. Continue your regular HEP   3. Clinical Health Psychologist to address issues of relaxation, behavioral change, coping style, and other factors important to improvement: none  4. Diagnostic Studies: none  5. Medication Management:   1. Continue Percocet and OxyContin without changes. Fill 5/31 and begin 6/2  6. Further procedures recommended: none  7. Acupuncture: yes, continue  8. Urine toxicology screen today: yes, last took oxyContin and oxycodone this morning about 7AM   9. Signed CSA today   10. Recommendations/follow-up for PCP:  See above  11. Release of information: none  12. Follow up: 3 months. Please call [phone] to make your follow-up appointment with  "me.            Since his last visit, David Wilcox reports:    Interval history August 30, 2022  -David states he still has the ongoing low back pain with right leg radicular symptoms. Feels pain has been a bit better.   -he is increasing his activities  -exercising regularly, doing 2 km twice daily on the treadmill, able to do walking  -doing 3 [facility] per week. ([place], [place], and [place])  -on vacation camping      Pain history collected 5/27/2022 at initial visit  Out cutting a tree and it fell on him in 2002. He had compression fractures L5-S1, and multiple other fractures and spent almost a year in a wheelchair, initially told he would not walk again.   His initial spinal  surgery was helpful, but he then fell down the stairs and he knew something was wrong but no one would believe him. He states 8 years later they found loose hardware and he had to have a subsequent surgery to fix that. Then a 3rd surgery extended the fusion. He is now fused from L3-S1.      Pain on the right side from bottom of the ribs to the low back, around the hip and down the right leg to the toes. Bilateral hip pain posterolateral hip pain (points to buttocks).      He has had loss of bowel and bladder but it is not a common thing. This is not new, present off and on for 6 years.      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   Has regular exercise with chores and he now walks every day on the treadmill (2km in the AM and PM)  Does [employer] in [place] on Fridays and his wife does so on Saturdays.           At this point, the patient's participation with our multidisciplinary team includes:  The patient has been compliant with the program.  PT - not ordered, patient is quite active at home running a [employer] - not ordered      Pain scores:  Pain intensity on average is 6-8  on a scale of 0-10.    Range is 4-9/10.   Pain right now is 6/10.   Pain is described as \"like a pillow with a knife through it and " "when bad, like I am on fire\"    Pain is constant in nature      Current pain relevant medications:   -Tylenol extra strength 500mg PRN   -naloxone nasal spray for opiate reversal   -Duloxetine 60mg every day (very helpful for mood and pain)  -gabapentin 600mg take 2 tabs/1200mg TID (helpful)  -nortriptyline 10mg take 2 (20mg) at bedtime (helpful)  -OxyContin 10mg Q 8 hours (helpful)  -Percocet 5.325mg take 1 tab Q 6 hours PRN max 3/day (he plans on 2/day and uses 3 if needed. helpful)  -tizanidine 4mg TID PRN for muscle spasms (helpful)        Other pertinent medications:  -lorazepam 0.5mg BID PRN anxiety (very rare use, received #30 tabs in 2018 and still has some left)  -mirtazepine 15mg at bedtime  -ambien 10mg Q HS PRN insomnia           Previous medication treatments included:  OPIATES: oxycodone (helpful), hydrocodone (not helpful), Methadone (took after surgery, very helpful), Fentanyl patches (would have issues with getting to hot and get a dump of medication. He has used this very successfully in the winter when it is colder), Dilaudid (hydromorphone, very helpful for a migraine with antibiotics)  NSAIDS: naproxyn (not helpful), ibuprofen (not helpful)  MUSCLE RELAXANTS: Methocarbamol (expense, helpful), Flexeril (helpful but he had a remote history of a suicide attempt with this drug), tizanidine (very helpful)  ANTI-MIGRAINE MEDS: none  ANTI-DEPRESSANTS: none  SLEEP AIDS: Remeron (helpful)  ANTI-CONVULSANTS: nortriptyline (helpful)  TOPICALS: CBD oil (helps some)  ANXIOLYTICS: lorazepam (helpful)  MEDICAL CANNABIS: none  Other meds: lidocaine (not helpful)        Other treatments have included:   David Wilcox has been seen at a pain clinic in the past.  Previously managed by Dr. Bindu Motley here  PT: tried, was not helpful on multiple occasions  Chiropractic care: yes, uses this regularly for acupuncture  Acupuncture: helpful  TENs Unit: has one, helpful, uses it occasionally  SCS trial in 2015  got " meningitis and nearly       Injections:   -3/26/2018 caudal ALVA with Dr. Bindu Motley  (not helpful)  -2018 right L3-4 transforaminal ALVA with Dr. Bindu Motley (not helpful)  -2019  Bilateral SI joint injections with Dr. Bindu Motley  (not helpful)      THE 4 A's OF OPIOID MAINTENANCE ANALGESIA    Analgesia: helpful    Activity: exercising on treadmill twice daily, more active, doing more QUIQ    Adverse effects: none    Adherence to Rx protocol: yes      Side Effects: no side effect  Patient is using the medication as prescribed: YES    Medications:  Current Outpatient Medications   Medication Sig Dispense Refill     albuterol (PROAIR HFA/PROVENTIL HFA/VENTOLIN HFA) 108 (90 Base) MCG/ACT inhaler INHALE 2 PUFFS INTO THE LUNGS EVERY 4 HOURS AS NEEDED FOR SHORTNESS OF BREATH 8.5 g 11     DULoxetine (CYMBALTA) 60 MG capsule Take 1 capsule (60 mg) by mouth daily 90 capsule 3     gabapentin (NEURONTIN) 600 MG tablet 2 tabs 3x/day 180 tablet 5     LORazepam (ATIVAN) 0.5 MG tablet One bid prn anxiety. Rare use 30 tablet 0     losartan (COZAAR) 100 MG tablet Take 1 tablet (100 mg) by mouth in the morning. 90 tablet 2     mirtazapine (REMERON) 15 MG tablet TAKE 1 TABLET(15 MG) BY MOUTH AT BEDTIME 90 tablet 3     nortriptyline (PAMELOR) 10 MG capsule Take 2 capsules (20 mg) by mouth At Bedtime . 3 month supply 180 capsule 3     omeprazole (PRILOSEC) 20 MG DR capsule Take 1 capsule (20 mg) by mouth daily 90 capsule 3     oxyCODONE (OXYCONTIN) 10 MG 12 hr tablet Take 1 tablet (10 mg) by mouth every 8 hours . Fill 22 to start 22 90 tablet 0     oxyCODONE-acetaminophen (PERCOCET) 5-325 MG tablet Take 1 tablet by mouth every 6 hours as needed for severe pain Max 3/day. Fill 22 and start 2022. 30 day supply 90 tablet 0     tiZANidine (ZANAFLEX) 4 MG tablet Take 1 tablet (4 mg) by mouth 3 times daily as needed for muscle spasms 0.5-1 tab tid prn muscle spasm 90 tablet 4      TYLENOL EXTRA STRENGTH 500 MG OR TABS At Bedtime        zolpidem (AMBIEN) 10 MG tablet One prn nightly for insomnia. Next script per Primary Care Provider. I filled as patient on vacation. 30 tablet 0     naloxone (NARCAN) 4 MG/0.1ML nasal spray Spray 1 spray (4 mg) into one nostril alternating nostrils once as needed for opioid reversal every 2-3 minutes until assistance arrives (Patient not taking: Reported on 8/30/2022) 0.2 mL 1       Medical History: any changes in medical history since they were last seen? No    Social History:   Home situation:  with children x 2 at home.   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   History of chemical dependency treatment: quit on own       Is patient a current smoker or tobacco user?  no  If yes, was cessation counseling offered?  no          Physical Exam:  Vital signs: Blood pressure 123/79, pulse 68.    Behavioral observations:  Awake, alert and cooperative    Gait:  Normal     Musculoskeletal exam:  Strength grossly equal throughout. Moves well in exam room    Neuro exam:  Deferred    Skin/vascular/autonomic:  No suspicious lesions on exposed skin.     Other:  na    Is the patient hypertensive today? no  Hypertensive on recheck of BP?   na  If yes, was patient recommended to see Primary Care Provider in follow up for management of HTN?  na        Diagnostic tests:  MRI OF THE LUMBAR SPINE WITHOUT AND WITH CONTRAST August 6, 2021 5:57  PM      HISTORY: Low back pain, prior surgery, new symptoms. Worsening pain  above fusion, but also right radicular L5 or S1 distribution on the  right. Failed back surgical syndrome. Lumbar radiculopathy.     TECHNIQUE: Multiplanar, multisequence MRI images of the lumbar spine  were acquired without and with 10mL Gadavist IV contrast.     COMPARISON: Lumbar spine MR 4/27/2018, lumbar spine CT  7/14/2016.     FINDINGS: There are five lumbar-type vertebrae for the purposes of  this dictation. Posterior spine fusion from L3 down to S1 consisting  of bilateral pedicle screws at each level with inner connecting rods,  interbody fusion devices in the L4-L5 and L5-S1 disc spaces and  laminectomies of L3, L4, L5 and S1 again noted.     Degenerative grade 1 anterolisthesis of L5 upon S1 and mild  degenerative retrolisthesis of L2 upon L3 again noted. Alignment  otherwise normal and unchanged. Vertebral body heights normal. No  fractures. No evidence for pathologic bony lesion. Marrow signal  normal.     There is loss of disc height, disc desiccation and posterior disc  bulging/herniation to varying degrees at all levels of the lumbar  spine with the exception of the resected L4-L5 and L5-S1 discs.      The tip of the conus medullaris is at the L1-L2 level which is within  normal limits. No abnormal intrathecal contrast enhancement. There is  no evidence for intrathecal abnormality.      Level by level:      T12-L1: Loss of disc height, disc desiccation and circumferential disc  bulging with a superimposed small posterior central disc herniation  (extrusion). Minimal facet arthropathy bilaterally. Minimal spinal  canal narrowing. No foraminal stenosis on either side. No change from  the comparison study.     L1-L2: Loss of disc height, disc desiccation and circumferential disc  bulging with a superimposed small posterior central disc herniation  (protrusion). Minimal facet arthropathy bilaterally. Minimal spinal  canal narrowing. No foraminal stenosis on either side. No change from  the comparison study.     L2-L3: Loss of disc height, disc desiccation and mild circumferential  disc bulging. No herniation. Moderate facet arthropathy bilaterally.  Mild spinal canal narrowing. Mild bilateral neural foraminal  narrowing. No change from the comparison study.     L3: Again noted is a fluid signal intensity collection in  the right  posterolateral aspect of the spinal canal measuring 1.7 x 0.7 cm in  the AP and transverse dimensions respectively. This may represent a  synovial cyst arising from the right facet joint versus a cystic  postoperative fluid collection or cystic foreign body reaction. This  results in mild narrowing of the thecal sac at the mid L3 level.     L3-L4: (Fused level) Loss of disc height, disc desiccation and mild  circumferential disc bulging. No herniation. Moderate facet  arthropathy bilaterally. No spinal canal stenosis. Mild bilateral  neural foraminal narrowing. No change from the comparison study.      L4-L5: (Fused level) No spinal canal stenosis. No foraminal stenosis  on either side. No change from the comparison study.     L5-S1: (Fused level) No spinal canal stenosis. No foraminal stenosis  on either side. No change from the comparison study.                                                                      IMPRESSION:  1. Postoperative and degenerative change of the lumbar spine as  detailed above without appreciable change from the comparison studies.  2. No significant spinal canal or neural foraminal stenosis at any  level of the spine.     JAI CHAVEZ MD         Other testing (labs, diagnostics):  12/27/2021  Cr. 0.91  Est GFR >90        Screening tools:      DIRE Score for ongoing opioid management is calculated as follows:     Diagnosis = 2     Intractability = 2     Risk: Psych = 2  Chem Hlth = 2  Reliability = 3  Social = 3     Efficacy = 2     Total DIRE Score = 16 (14 or higher predicts good candidate for ongoing opioid management; 13 or lower predicts poor candidate for opioid management)            [place] Prescription Monitoring Program:  Reviewed [facility] August 30, 2022- no concerning fills.  Ina TORIBIO, GAYATRI CNP, FNP  [facility] location          Assessment:   1. Chronic bilateral low back pain with right-sided sciatica  2. Lumbar  radiculopathy  3. Failed back surgical syndrome  4. Chronic continuous use of opioids  5. Primary insomnia     6. Distant history of alcohol and drug abuse  7. Bipolar disorder   8. H/o menningitis after SCS trial  9. PMHx includes: Acute bronchitis, acute pancreatitis, acute sinusitis, ADHD, anxiety, depression, asthma, bipolar disorder unspecified, cervicalgia, dysthymic disorder, esophageal reflux, fixation of spine with fusion, history of blood transfusion, hyperlipidemia, low back pain, lumbar throat, meningitis after spinal cord stimulator trial, narcotic abuse, neck pain, open nondisplaced fracture of distal phalanx of finger, pain in limb, personal history of other diseases of the digestive system, sciatica, spasm of muscle, tobacco use disorder  10. PSHx includes: Back surgery in 2003 and 2011, lumbar anterior three-level fusion, ORIF right finger (3/3/2021)        Plan:   1. Physical Therapy: none  2. Continue your regular HEP   3. Clinical Health Psychologist to address issues of relaxation, behavioral change, coping style, and other factors important to improvement: none  4. Diagnostic Studies: none  5. Medication Management:   1. Continue Percocet and OxyContin without changes. Fill 8/30 and start 8/30 (Percocet) and 9/2 for OxyContin  1. Discussed possible switch to buprenorphine (likely using Butrans  Which is a skin patch changed every 7 days, or Belbuca which is a mouth film used every 12 hours)   6. Further procedures recommended: none  7. Acupuncture: yes, continue  8. Recommendations/follow-up for PCP:  See above  9. Release of information: none  10. Follow up: in October and we can discuss switch to buprenorphine more fully.  Please call [phone] to make your follow-up appointment with me.       Face to face time: 28 minutes        Ina TORIBIO, RN CNP, FNP  [facility]  [facility]

## 2022-08-30 NOTE — PATIENT INSTRUCTIONS
Plan:   Physical Therapy: none  Continue your regular [facility]   Clinical Health Psychologist to address issues of relaxation, behavioral change, coping style, and other factors important to improvement: none  Diagnostic Studies: none  Medication Management:   Continue Percocet and OxyContin without changes. Fill 8/30 and start 8/30 (Percocet) and 9/2 for OxyContin  Discussed possible switch to buprenorphine (likely using Butrans  Which is a skin patch changed every 7 days, or Belbuca which is a mouth film used every 12 hours)   Further procedures recommended: none  Acupuncture: yes, continue  Recommendations/follow-up for PCP:  See above  Release of information: none  Follow up: in October and we can discuss switch to buprenorphine more fully.  Please call [phone] to make your follow-up appointment with me    ----------------------------------------------------------------  Clinic Number:  [phone]   Call with any questions about your care and for scheduling assistance.   Calls are returned Monday through Friday between 8 AM and 4:30 PM. We usually get back to you within 2 business days depending on the issue/request.    If we are prescribing your medications:  For opioid medication refills, call the clinic or send a [email or web address] message 7 days in advance.  Please include:  Name of requested medication  Name of the pharmacy.  For non-opioid medications, call your pharmacy directly to request a refill. Please allow 3-4 days to be processed.   Per [place] State Law:  All controlled substance prescriptions must be filled within 30 days of being written.    For those controlled substances allowing refills, pickup must occur within 30 days of last fill.      We believe regular attendance is key to your success in our program!    Any time you are unable to keep your appointment we ask that you call us at least 24 hours in advance to cancel.This will allow us to offer the appointment time to another patient.   Multiple missed  appointments may lead to dismissal from the clinic.

## 2022-09-26 DIAGNOSIS — G89.29 CHRONIC BILATERAL LOW BACK PAIN WITH RIGHT-SIDED SCIATICA: ICD-10-CM

## 2022-09-26 DIAGNOSIS — F11.90 CHRONIC, CONTINUOUS USE OF OPIOIDS: ICD-10-CM

## 2022-09-26 DIAGNOSIS — M54.16 LUMBAR RADICULOPATHY: ICD-10-CM

## 2022-09-26 DIAGNOSIS — M96.1 FAILED BACK SURGICAL SYNDROME: ICD-10-CM

## 2022-09-26 DIAGNOSIS — M54.41 CHRONIC BILATERAL LOW BACK PAIN WITH RIGHT-SIDED SCIATICA: ICD-10-CM

## 2022-09-26 NOTE — TELEPHONE ENCOUNTER
[facility] Call Center    Phone Message    May a detailed message be left on voicemail: yes     Reason for Call: Medication Refill Request    Has the patient contacted the pharmacy for the refill? Yes   Name of medication being requested: oxyCODONE (OXYCONTIN) 10 MG 12 hr tablet, oxyCODONE-acetaminophen (PERCOCET) 5-325 MG tablet  Provider who prescribed the medication: Ina Brady  Pharmacy: [facility] / [place], [place]  Date medication is needed: ASAP     Please call patient at [phone] for any questions.    Action Taken: Other: [facility]    Travel Screening: Not Applicable

## 2022-09-27 RX ORDER — OXYCODONE AND ACETAMINOPHEN 5; 325 MG/1; MG/1
1 TABLET ORAL EVERY 6 HOURS PRN
Qty: 90 TABLET | Refills: 0 | Status: SHIPPED | OUTPATIENT
Start: 2022-09-27 | End: 2022-11-29

## 2022-09-27 RX ORDER — OXYCODONE HCL 10 MG/1
10 TABLET, FILM COATED, EXTENDED RELEASE ORAL EVERY 8 HOURS
Qty: 90 TABLET | Refills: 0 | Status: SHIPPED | OUTPATIENT
Start: 2022-09-27 | End: 2022-10-24

## 2022-09-27 NOTE — TELEPHONE ENCOUNTER
Medication refill information reviewed.     Due date for     oxyCODONE (OXYCONTIN) 10 MG 12 hr tablet is 10/02/22 oxyCODONE-acetaminophen (PERCOCET) 5-325 MG tablet is 09/29/22     Prescriptions prepped for review.     Will route to provider.

## 2022-09-27 NOTE — TELEPHONE ENCOUNTER
Patient requesting refill(s) of oxyCODONE (OXYCONTIN) 10 MG 12 hr tablet  Last dispensed from pharmacy on 8/30/22    oxyCODONE-acetaminophen (PERCOCET) 5-325 MG tablet  Last dispensed from pharmacy on 8/30/22    Patient's last office/virtual visit by prescribing provider on 8/30/22  Next office/virtual appointment scheduled for 10/31/22    Last urine drug screen date 5/27/22  Current opioid agreement on file (completed within the last year) Yes Date of opioid agreement: 5/27/22    E-prescribe to [id number] [place], [place]     Will route to nursing [place] for review and preparation of prescription(s).

## 2022-09-28 NOTE — TELEPHONE ENCOUNTER
Signed Prescriptions:                        Disp   Refills    oxyCODONE (OXYCONTIN) 10 MG 12 hr tablet   90 tab*0        Sig: Take 1 tablet (10 mg) by mouth every 8 hours . Fill           09/29/22 to start 10/02/22  Authorizing Provider: INA LOPEZ    oxyCODONE-acetaminophen (PERCOCET) 5-325 M*90 tab*0        Sig: Take 1 tablet by mouth every 6 hours as needed for           severe pain Max 3/day. Fill 09/27/22 and start           09/29/22. 30 day supply  Authorizing Provider: INA LOPEZ    Reviewed [place]  September 27, 2022- no concerning fills.    Ina Lopez APRN, RN CNP, FNP  [facility]  [facility]

## 2022-09-28 NOTE — TELEPHONE ENCOUNTER
Spoke to patient, notified that prescriptions were sent to preferred pharmacy.    oxyCODONE (OXYCONTIN) 10 MG 12 hr tablet  Able to fill 09/29/22 and start 10/02/22    oxyCODONE-acetaminophen (PERCOCET) 5-325 MG tablet  Able to fill 09/27/22 and start 09/29/22      Shayna Chase MA  [facility] [place]

## 2022-10-24 DIAGNOSIS — G89.29 CHRONIC BILATERAL LOW BACK PAIN WITH RIGHT-SIDED SCIATICA: ICD-10-CM

## 2022-10-24 DIAGNOSIS — M96.1 FAILED BACK SURGICAL SYNDROME: ICD-10-CM

## 2022-10-24 DIAGNOSIS — M54.16 LUMBAR RADICULOPATHY: ICD-10-CM

## 2022-10-24 DIAGNOSIS — M54.41 CHRONIC BILATERAL LOW BACK PAIN WITH RIGHT-SIDED SCIATICA: ICD-10-CM

## 2022-10-24 DIAGNOSIS — F11.90 CHRONIC, CONTINUOUS USE OF OPIOIDS: ICD-10-CM

## 2022-10-24 RX ORDER — OXYCODONE HCL 10 MG/1
10 TABLET, FILM COATED, EXTENDED RELEASE ORAL EVERY 8 HOURS
Qty: 90 TABLET | Refills: 0 | Status: SHIPPED | OUTPATIENT
Start: 2022-10-24 | End: 2022-11-29

## 2022-10-24 NOTE — TELEPHONE ENCOUNTER
Medication refill information reviewed.     Due date for  oxyCODONE (OXYCONTIN) 10 MG 12 hr tablet is 11/01/22     Prescriptions prepped for review.     Will route to provider.

## 2022-10-24 NOTE — TELEPHONE ENCOUNTER
[facility] Call Center    Phone Message    May a detailed message be left on voicemail: yes     Reason for Call: Medication Refill Request    Has the patient contacted the pharmacy for the refill? Yes   Name of medication being requested: oxyCODONE (OXYCONTIN) 10 MG 12 hr tablet  Provider who prescribed the medication: Ina TORIBIO [facility]   Pharmacy: [id number] - [place], [place] - [address] AT [address]  Date medication is needed: 10/31         Action Taken: Message routed to:  Other: [facility]    Travel Screening: Not Applicable

## 2022-10-25 NOTE — TELEPHONE ENCOUNTER
Signed Prescriptions:                        Disp   Refills    oxyCODONE (OXYCONTIN) 10 MG 12 hr tablet   90 tab*0        Sig: Take 1 tablet (10 mg) by mouth every 8 hours . Fill           10/30/22 to start 11/01/22  Authorizing Provider: INA LOPEZ    Reviewed [place]  October 24, 2022- no concerning fills.    Ina TORIBIO, RN CNP, FNP  [facility]

## 2022-10-31 ENCOUNTER — OFFICE VISIT (OUTPATIENT)
Dept: PALLIATIVE MEDICINE | Facility: CLINIC | Age: 52
End: 2022-10-31
Payer: COMMERCIAL

## 2022-10-31 VITALS — HEART RATE: 70 BPM | SYSTOLIC BLOOD PRESSURE: 136 MMHG | DIASTOLIC BLOOD PRESSURE: 91 MMHG

## 2022-10-31 DIAGNOSIS — M54.16 LUMBAR RADICULOPATHY: ICD-10-CM

## 2022-10-31 DIAGNOSIS — M54.41 CHRONIC BILATERAL LOW BACK PAIN WITH RIGHT-SIDED SCIATICA: Primary | ICD-10-CM

## 2022-10-31 DIAGNOSIS — F11.90 CHRONIC, CONTINUOUS USE OF OPIOIDS: ICD-10-CM

## 2022-10-31 DIAGNOSIS — G89.29 CHRONIC BILATERAL LOW BACK PAIN WITH RIGHT-SIDED SCIATICA: Primary | ICD-10-CM

## 2022-10-31 DIAGNOSIS — M96.1 FAILED BACK SURGICAL SYNDROME: ICD-10-CM

## 2022-10-31 PROCEDURE — 99214 OFFICE O/P EST MOD 30 MIN: CPT | Performed by: NURSE PRACTITIONER

## 2022-10-31 ASSESSMENT — PAIN SCALES - GENERAL: PAINLEVEL: SEVERE PAIN (6)

## 2022-10-31 NOTE — PATIENT INSTRUCTIONS
Plan:   Physical Therapy: none  Continue your regular HEP   Clinical Health Psychologist to address issues of relaxation, behavioral change, coping style, and other factors important to improvement: none  Diagnostic Studies: none  Medication Management:   Continue Percocet and OxyContin without changes. Fill OxyContin on 10/31 and start 11/1   We have previously Discussed possible switch to buprenorphine (likely using Butrans  Which is a skin patch changed every 7 days, or Belbuca which is a mouth film used every 12 hours). This discussion tabled until you are back in the state long term. ([place] and [place] travel)  Further procedures recommended: none  Acupuncture: yes, continue  Recommendations/follow-up for PCP:  See above  Release of information: none  Follow up: 12 weeks, video or in-person. Please call [phone] to make your follow-up appointment with me.     ----------------------------------------------------------------  Clinic Number:  [phone]   Call with any questions about your care and for scheduling assistance.   Calls are returned Monday through Friday between 8 AM and 4:30 PM. We usually get back to you within 2 business days depending on the issue/request.    If we are prescribing your medications:  For opioid medication refills, call the clinic or send a [employer] message 7 days in advance.  Please include:  Name of requested medication  Name of the pharmacy.  For non-opioid medications, call your pharmacy directly to request a refill. Please allow 3-4 days to be processed.   Per [place] State Law:  All controlled substance prescriptions must be filled within 30 days of being written.    For those controlled substances allowing refills, pickup must occur within 30 days of last fill.      We believe regular attendance is key to your success in our program!    Any time you are unable to keep your appointment we ask that you call us at least 24 hours in advance to cancel.This will allow us to offer  the appointment time to another patient.   Multiple missed appointments may lead to dismissal from the clinic.

## 2022-10-31 NOTE — PROGRESS NOTES
[facility]      10/31/2022      Chief complaint:   Low back pain and right leg radicular pain, feeling worse with the change of seasons    Interval history:  David Wilcox is a 52 year old male is known to me for:  Chronic bilateral low back pain with right-sided sciatica  Lumbar radiculopathy  Failed back surgical syndrome  Chronic continuous use of opioids  Encounter for long-term use of opiate analgesic     Distant history of alcohol and drug abuse  Bipolar disorder   H/o menningitis after SCS trial  PMHx includes: Acute bronchitis, acute pancreatitis, acute sinusitis, ADHD, anxiety, depression, asthma, bipolar disorder unspecified, cervicalgia, dysthymic disorder, esophageal reflux, fixation of spine with fusion, history of blood transfusion, hyperlipidemia, low back pain, lumbar throat, meningitis after spinal cord stimulator trial, narcotic abuse, neck pain, open nondisplaced fracture of distal phalanx of finger, pain in limb, personal history of other diseases of the digestive system, sciatica, spasm of muscle, tobacco use disorder  PSHx includes: Back surgery in 2003 and 2011, lumbar anterior three-level fusion, ORIF right finger (3/3/2021)  .    Recommendations/plan at the last visit on 8/30/2022 included:  1. Physical Therapy: none  2. Continue your regular HEP   3. Clinical Health Psychologist to address issues of relaxation, behavioral change, coping style, and other factors important to improvement: none  4. Diagnostic Studies: none  5. Medication Management:   1. Continue Percocet and OxyContin without changes. Fill 8/30 and start 8/30 (Percocet) and 9/2 for OxyContin  1. Discussed possible switch to buprenorphine (likely using Butrans  Which is a skin patch changed every 7 days, or Belbuca which is a mouth film used every 12 hours)   6. Further procedures recommended: none  7. Acupuncture: yes, continue  8. Recommendations/follow-up for PCP:  See above  9. Release of  information: none  10. Follow up: in October and we can discuss switch to buprenorphine more fully.  Please call [phone] to make your follow-up appointment with me.           Since his last visit, David Wilcox reports:    Interval history October 31, 2022  -low back pain and right leg pain has been a little worse, he usually struggle with the change of seasons getting cooler.   -he may do acupuncture again, this has been beneficial in the past.   -may go to his son's in [place] as he is likely moving to the [place], [place] area  -we have previously discussed possible switch to buprenorphine, would like to wait until he is likely back in [place] due to travel to [place] for a couple months, then likely [place] for a bit.         Interval history August 30, 2022  -David states he still has the ongoing low back pain with right leg radicular symptoms. Feels pain has been a bit better.   -he is increasing his activities  -exercising regularly, doing 2 km twice daily on the treadmill, able to do walking  -doing 3 [employer] per week. (Misael, Vanda, and Arturo)  -on vacation camping    Pain history collected 5/27/2022 at initial visit  Out cutting a tree and it fell on him in 2002. He had compression fractures L5-S1, and multiple other fractures and spent almost a year in a wheelchair, initially told he would not walk again.   His initial spinal  surgery was helpful, but he then fell down the stairs and he knew something was wrong but no one would believe him. He states 8 years later they found loose hardware and he had to have a subsequent surgery to fix that. Then a 3rd surgery extended the fusion. He is now fused from L3-S1.      Pain on the right side from bottom of the ribs to the low back, around the hip and down the right leg to the toes. Bilateral hip pain posterolateral hip pain (points to buttocks).      He has had loss of bowel and bladder but it is not a common thing. This is not new, present off and  "on for 6 years.      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   Has regular exercise with chores and he now walks every day on the treadmill (2km in the AM and PM)  Does "[employer] in [place] on Fridays and his wife does so on Saturdays.           At this point, the patient's participation with our multidisciplinary team includes:  The patient has been compliant with the program.  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    Range is 5-10/10.   Pain right now is 6/10.   Pain is described as \"like a pillow with a knife through it and when bad, like I am on fire\"    Pain is constant in nature      Current pain relevant medications:   -Tylenol extra strength 500mg PRN   -naloxone nasal spray for opiate reversal   -Duloxetine 60mg every day (very helpful for mood and pain)  -gabapentin 600mg take 2 tabs/1200mg TID (helpful)  -nortriptyline 10mg take 2 (20mg) at bedtime (helpful)  -OxyContin 10mg Q 8 hours (helpful)  -Percocet 5.325mg take 1 tab Q 6 hours PRN max 3/day (he plans on 2/day and uses 3 if needed. helpful)  -tizanidine 4mg TID PRN for muscle spasms (helpful)        Other pertinent medications:  -lorazepam 0.5mg BID PRN anxiety (very rare use, received #30 tabs in 2018 and still has some left)  -mirtazepine 15mg at bedtime  -ambien 10mg Q HS PRN insomnia           Previous medication treatments included:  OPIATES: oxycodone (helpful), hydrocodone (not helpful), Methadone (took after surgery, very helpful), Fentanyl patches (would have issues with getting to hot and get a dump of medication. He has used this very successfully in the winter when it is colder), Dilaudid (hydromorphone, very helpful for a migraine with antibiotics)  NSAIDS: naproxyn (not helpful), ibuprofen (not helpful)  MUSCLE RELAXANTS: Methocarbamol (expense, helpful), Flexeril (helpful but he had a remote " history of a suicide attempt with this drug), tizanidine (very helpful)  ANTI-MIGRAINE MEDS: none  ANTI-DEPRESSANTS: none  SLEEP AIDS: Remeron (helpful)  ANTI-CONVULSANTS: nortriptyline (helpful)  TOPICALS: CBD oil (helps some)  ANXIOLYTICS: lorazepam (helpful)  MEDICAL CANNABIS: none  Other meds: lidocaine (not helpful)        Other treatments have included:   David Wilcox has been seen at a pain clinic in the past.  Previously managed by Dr. Bindu Motley here  PT: tried, was not helpful on multiple occasions  Chiropractic care: yes, uses this regularly for acupuncture  Acupuncture: helpful  TENs Unit: has one, helpful, uses it occasionally  SCS trial in   got meningitis and nearly       Injections:   -3/26/2018 caudal ALVA with Dr. Bindu Motley  (not helpful)  -2018 right L3-4 transforaminal ALVA with Dr. Bindu Motley (not helpful)  -2019  Bilateral SI joint injections with Dr. Bindu Motley  (not helpful)      THE 4 A's OF OPIOID MAINTENANCE ANALGESIA    Analgesia: helpful    Activity: exercising on treadmill twice daily, more active    Adverse effects: none    Adherence to Rx protocol: yes      Side Effects: no side effect  Patient is using the medication as prescribed: YES    Medications:  Current Outpatient Medications   Medication Sig Dispense Refill     albuterol (PROAIR HFA/PROVENTIL HFA/VENTOLIN HFA) 108 (90 Base) MCG/ACT inhaler INHALE 2 PUFFS INTO THE LUNGS EVERY 4 HOURS AS NEEDED FOR SHORTNESS OF BREATH 8.5 g 11     DULoxetine (CYMBALTA) 60 MG capsule Take 1 capsule (60 mg) by mouth daily 90 capsule 3     gabapentin (NEURONTIN) 600 MG tablet 2 tabs 3x/day 180 tablet 5     LORazepam (ATIVAN) 0.5 MG tablet One bid prn anxiety. Rare use 30 tablet 0     losartan (COZAAR) 100 MG tablet Take 1 tablet (100 mg) by mouth in the morning. 90 tablet 2     mirtazapine (REMERON) 15 MG tablet TAKE 1 TABLET(15 MG) BY MOUTH AT BEDTIME 90 tablet 3     naloxone (NARCAN) 4 MG/0.1ML nasal spray Spray 1  spray (4 mg) into one nostril alternating nostrils once as needed for opioid reversal every 2-3 minutes until assistance arrives 0.2 mL 1     nortriptyline (PAMELOR) 10 MG capsule Take 2 capsules (20 mg) by mouth At Bedtime . 3 month supply 180 capsule 3     omeprazole (PRILOSEC) 20 MG DR capsule Take 1 capsule (20 mg) by mouth daily 90 capsule 3     oxyCODONE (OXYCONTIN) 10 MG 12 hr tablet Take 1 tablet (10 mg) by mouth every 8 hours . Fill 10/30/22 to start 11/01/22 90 tablet 0     oxyCODONE-acetaminophen (PERCOCET) 5-325 MG tablet Take 1 tablet by mouth every 6 hours as needed for severe pain Max 3/day. Fill 09/27/22 and start 09/29/22. 30 day supply 90 tablet 0     tiZANidine (ZANAFLEX) 4 MG tablet Take 1 tablet (4 mg) by mouth 3 times daily as needed for muscle spasms 0.5-1 tab tid prn muscle spasm 90 tablet 4     TYLENOL EXTRA STRENGTH 500 MG OR TABS At Bedtime        zolpidem (AMBIEN) 10 MG tablet One prn nightly for insomnia. Next script per Primary Care Provider. I filled as patient on vacation. 30 tablet 0       Medical History: any changes in medical history since they were last seen? No    Social History:   Home situation:  with children x 2 at home.   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   History of chemical dependency treatment: quit on own       Is patient a current smoker or tobacco user?  no  If yes, was cessation counseling offered?  no          Physical Exam:  Vital signs: Blood pressure (!) 136/91, pulse 70.    Behavioral observations:  Awake, alert and cooperative    Gait:  Normal     Musculoskeletal exam:  Strength grossly equal throughout. Moves well in exam room    Neuro exam:  Deferred    Skin/vascular/autonomic:  No suspicious lesions on exposed skin.     Other:  na    Is the patient hypertensive today? Yes, mild  Hypertensive on recheck of BP?   Not  checked again  If yes, was patient recommended to see Primary Care Provider in follow up for management of HTN?  na        Diagnostic tests:  MRI OF THE LUMBAR SPINE WITHOUT AND WITH CONTRAST August 6, 2021 5:57  PM      HISTORY: Low back pain, prior surgery, new symptoms. Worsening pain  above fusion, but also right radicular L5 or S1 distribution on the  right. Failed back surgical syndrome. Lumbar radiculopathy.     TECHNIQUE: Multiplanar, multisequence MRI images of the lumbar spine  were acquired without and with 10mL Gadavist IV contrast.     COMPARISON: Lumbar spine MR 4/27/2018, lumbar spine CT 7/14/2016.     FINDINGS: There are five lumbar-type vertebrae for the purposes of  this dictation. Posterior spine fusion from L3 down to S1 consisting  of bilateral pedicle screws at each level with inner connecting rods,  interbody fusion devices in the L4-L5 and L5-S1 disc spaces and  laminectomies of L3, L4, L5 and S1 again noted.     Degenerative grade 1 anterolisthesis of L5 upon S1 and mild  degenerative retrolisthesis of L2 upon L3 again noted. Alignment  otherwise normal and unchanged. Vertebral body heights normal. No  fractures. No evidence for pathologic bony lesion. Marrow signal  normal.     There is loss of disc height, disc desiccation and posterior disc  bulging/herniation to varying degrees at all levels of the lumbar  spine with the exception of the resected L4-L5 and L5-S1 discs.      The tip of the conus medullaris is at the L1-L2 level which is within  normal limits. No abnormal intrathecal contrast enhancement. There is  no evidence for intrathecal abnormality.      Level by level:      T12-L1: Loss of disc height, disc desiccation and circumferential disc  bulging with a superimposed small posterior central disc herniation  (extrusion). Minimal facet arthropathy bilaterally. Minimal spinal  canal narrowing. No foraminal stenosis on either side. No change from  the comparison study.     L1-L2:  Loss of disc height, disc desiccation and circumferential disc  bulging with a superimposed small posterior central disc herniation  (protrusion). Minimal facet arthropathy bilaterally. Minimal spinal  canal narrowing. No foraminal stenosis on either side. No change from  the comparison study.     L2-L3: Loss of disc height, disc desiccation and mild circumferential  disc bulging. No herniation. Moderate facet arthropathy bilaterally.  Mild spinal canal narrowing. Mild bilateral neural foraminal  narrowing. No change from the comparison study.     L3: Again noted is a fluid signal intensity collection in the right  posterolateral aspect of the spinal canal measuring 1.7 x 0.7 cm in  the AP and transverse dimensions respectively. This may represent a  synovial cyst arising from the right facet joint versus a cystic  postoperative fluid collection or cystic foreign body reaction. This  results in mild narrowing of the thecal sac at the mid L3 level.     L3-L4: (Fused level) Loss of disc height, disc desiccation and mild  circumferential disc bulging. No herniation. Moderate facet  arthropathy bilaterally. No spinal canal stenosis. Mild bilateral  neural foraminal narrowing. No change from the comparison study.      L4-L5: (Fused level) No spinal canal stenosis. No foraminal stenosis  on either side. No change from the comparison study.     L5-S1: (Fused level) No spinal canal stenosis. No foraminal stenosis  on either side. No change from the comparison study.                                                                      IMPRESSION:  1. Postoperative and degenerative change of the lumbar spine as  detailed above without appreciable change from the comparison studies.  2. No significant spinal canal or neural foraminal stenosis at any  level of the spine.     JAI CHAVEZ MD         Other testing (labs, diagnostics):  12/27/2021  Cr. 0.91  Est GFR >90        Screening tools:      DIRE Score for ongoing opioid  management is calculated as follows:     Diagnosis = 2     Intractability = 2     Risk: Psych = 2  Chem Hlth = 2  Reliability = 3  Social = 3     Efficacy = 2     Total DIRE Score = 16 (14 or higher predicts good candidate for ongoing opioid management; 13 or lower predicts poor candidate for opioid management)            [place] Prescription Monitoring Program:  Reviewed [place] [facility] 10/31/2022- no concerning fills.  Ina TORIBIO RN CNP, FNP  [facility]  [place] location          Assessment:   1. Chronic bilateral low back pain with right-sided sciatica  2. Lumbar radiculopathy  3. Failed back surgical syndrome  4. Chronic continuous use of opioids    6. Distant history of alcohol and drug abuse  7. Bipolar disorder   8. H/o menningitis after SCS trial  9. PMHx includes: Acute bronchitis, acute pancreatitis, acute sinusitis, ADHD, anxiety, depression, asthma, bipolar disorder unspecified, cervicalgia, dysthymic disorder, esophageal reflux, fixation of spine with fusion, history of blood transfusion, hyperlipidemia, low back pain, lumbar throat, meningitis after spinal cord stimulator trial, narcotic abuse, neck pain, open nondisplaced fracture of distal phalanx of finger, pain in limb, personal history of other diseases of the digestive system, sciatica, spasm of muscle, tobacco use disorder  10. PSHx includes: Back surgery in 2003 and 2011, lumbar anterior three-level fusion, ORIF right finger (3/3/2021)        Plan:   1. Physical Therapy: none  2. Continue your regular HEP   3. Clinical Health Psychologist to address issues of relaxation, behavioral change, coping style, and other factors important to improvement: none  4. Diagnostic Studies: none  5. Medication Management:   1. Continue Percocet and OxyContin without changes. Fill OxyContin on 10/31 and start 11/1.   2. Patient will likely be in [place] with his son for a month or 2, then may be [place] for a few weeks. I have  checked and NPs are able to write for schedule II drugs in [place] but not in [place]. Patient aware I would need one of my physician partners to sign if he needs refill while in [place].   3. We have previously Discussed possible switch to buprenorphine (likely using Butrans  Which is a skin patch changed every 7 days, or Belbuca which is a mouth film used every 12 hours). This discussion tabled until you are back in the state long term. ([place] and [place] travel)  6. Further procedures recommended: none  7. Acupuncture: yes, continue  8. Recommendations/follow-up for PCP:  See above  9. Release of information: none  10. Follow up: 12 weeks, video or in-person. Please call [phone] to make your follow-up appointment with me.       BILLING TIME DOCUMENTATION:   TOTAL TIME includes:   Time spent preparing to see the patient: 8 minutes (reviewing records and tests)  Time spend face to face with the patient: 27 minutes  Time spent ordering tests, medications, procedures and referrals: 0 minutes  Time spent Referring and communicating with other healthcare professionals: 0 minutes  Documenting clinical information in Epic: 3 minutes    The total TIME spent on this patient on the day of the appointment was 38 minutes.           Ina TORIBIO, RN CNP, FNP  [facility]  [facility]

## 2022-11-01 ENCOUNTER — TELEPHONE (OUTPATIENT)
Dept: FAMILY MEDICINE | Facility: CLINIC | Age: 52
End: 2022-11-01

## 2022-11-01 NOTE — TELEPHONE ENCOUNTER
Reason for Call:  Other appointment    Detailed comments: Pt would like to have a sooner appt with Dr. Llamas if there is any availability.    Phone Number Patient can be reached at: Home number on file [phone] (home)    Best Time: anytime    Can we leave a detailed message on this number? YES    Call taken on 11/1/2022 at 4:44 PM by Nannette Jackman

## 2022-11-02 NOTE — TELEPHONE ENCOUNTER
Spoke with patient, schedule a few days earlier than his previous appt. Patient appreciative.  Orquidea SORIANO RN

## 2022-11-07 DIAGNOSIS — G89.4 CHRONIC PAIN SYNDROME: ICD-10-CM

## 2022-11-07 NOTE — TELEPHONE ENCOUNTER
Signed Prescriptions:                        Disp   Refills    tiZANidine (ZANAFLEX) 4 MG tablet          90 tab*4        Sig: Take 1 tablet (4 mg) by mouth 3 times daily as needed           for muscle spasms 0.5-1 tab tid prn muscle spasm  Authorizing Provider: REA LOPEZ, RN CNP, FNP  [facility]  [facility]

## 2022-11-07 NOTE — TELEPHONE ENCOUNTER
Fax received from: [id number] - [place]  Refill authorization requested    Drug: tiZANidine (ZANAFLEX) 4 MG tablet  Qty: 90  Last filled 10/09/22  Last seen: 10/31/22  Next appointment  1/10/23    Aidan Naidu MA

## 2022-11-17 ENCOUNTER — OFFICE VISIT (OUTPATIENT)
Dept: FAMILY MEDICINE | Facility: CLINIC | Age: 52
End: 2022-11-17
Payer: COMMERCIAL

## 2022-11-17 VITALS
SYSTOLIC BLOOD PRESSURE: 134 MMHG | TEMPERATURE: 97.5 F | OXYGEN SATURATION: 99 % | WEIGHT: 273.8 LBS | HEIGHT: 68 IN | DIASTOLIC BLOOD PRESSURE: 82 MMHG | RESPIRATION RATE: 22 BRPM | BODY MASS INDEX: 41.49 KG/M2 | HEART RATE: 84 BPM

## 2022-11-17 DIAGNOSIS — E66.01 MORBID OBESITY (H): ICD-10-CM

## 2022-11-17 DIAGNOSIS — F11.90 CHRONIC, CONTINUOUS USE OF OPIOIDS: ICD-10-CM

## 2022-11-17 DIAGNOSIS — R73.09 ELEVATED GLUCOSE: ICD-10-CM

## 2022-11-17 DIAGNOSIS — F51.01 PRIMARY INSOMNIA: ICD-10-CM

## 2022-11-17 DIAGNOSIS — F13.20 SEDATIVE, HYPNOTIC OR ANXIOLYTIC DEPENDENCE (H): ICD-10-CM

## 2022-11-17 DIAGNOSIS — G89.4 CHRONIC PAIN SYNDROME: ICD-10-CM

## 2022-11-17 DIAGNOSIS — M54.41 CHRONIC BILATERAL LOW BACK PAIN WITH RIGHT-SIDED SCIATICA: ICD-10-CM

## 2022-11-17 DIAGNOSIS — F11.20 CONTINUOUS OPIOID DEPENDENCE (H): ICD-10-CM

## 2022-11-17 DIAGNOSIS — K21.9 GASTROESOPHAGEAL REFLUX DISEASE WITHOUT ESOPHAGITIS: ICD-10-CM

## 2022-11-17 DIAGNOSIS — G89.29 CHRONIC BILATERAL LOW BACK PAIN WITH RIGHT-SIDED SCIATICA: ICD-10-CM

## 2022-11-17 DIAGNOSIS — Z00.00 WELL ADULT EXAM: Primary | ICD-10-CM

## 2022-11-17 DIAGNOSIS — I10 HYPERTENSION, UNSPECIFIED TYPE: ICD-10-CM

## 2022-11-17 DIAGNOSIS — Z23 NEED FOR PROPHYLACTIC VACCINATION AND INOCULATION AGAINST INFLUENZA: ICD-10-CM

## 2022-11-17 DIAGNOSIS — F33.42 RECURRENT MAJOR DEPRESSION IN COMPLETE REMISSION (H): ICD-10-CM

## 2022-11-17 LAB
ANION GAP SERPL CALCULATED.3IONS-SCNC: 12 MMOL/L (ref 7–15)
BUN SERPL-MCNC: 12.3 MG/DL (ref 6–20)
CALCIUM SERPL-MCNC: 10.4 MG/DL (ref 8.6–10)
CHLORIDE SERPL-SCNC: 103 MMOL/L (ref 98–107)
CREAT SERPL-MCNC: 0.8 MG/DL (ref 0.67–1.17)
DEPRECATED HCO3 PLAS-SCNC: 26 MMOL/L (ref 22–29)
GFR SERPL CREATININE-BSD FRML MDRD: >90 ML/MIN/1.73M2
GLUCOSE SERPL-MCNC: 149 MG/DL (ref 70–99)
HBA1C MFR BLD: 6.4 % (ref 0–5.6)
POTASSIUM SERPL-SCNC: 4.3 MMOL/L (ref 3.4–5.3)
SODIUM SERPL-SCNC: 141 MMOL/L (ref 136–145)

## 2022-11-17 PROCEDURE — 90682 RIV4 VACC RECOMBINANT DNA IM: CPT | Performed by: FAMILY MEDICINE

## 2022-11-17 PROCEDURE — G0008 ADMIN INFLUENZA VIRUS VAC: HCPCS | Performed by: FAMILY MEDICINE

## 2022-11-17 PROCEDURE — 80048 BASIC METABOLIC PNL TOTAL CA: CPT | Performed by: FAMILY MEDICINE

## 2022-11-17 PROCEDURE — 36415 COLL VENOUS BLD VENIPUNCTURE: CPT | Performed by: FAMILY MEDICINE

## 2022-11-17 PROCEDURE — 83036 HEMOGLOBIN GLYCOSYLATED A1C: CPT | Performed by: FAMILY MEDICINE

## 2022-11-17 PROCEDURE — 99214 OFFICE O/P EST MOD 30 MIN: CPT | Mod: 25 | Performed by: FAMILY MEDICINE

## 2022-11-17 PROCEDURE — 99396 PREV VISIT EST AGE 40-64: CPT | Mod: 25 | Performed by: FAMILY MEDICINE

## 2022-11-17 RX ORDER — ZOLPIDEM TARTRATE 10 MG/1
TABLET ORAL
Qty: 30 TABLET | Refills: 5 | Status: SHIPPED | OUTPATIENT
Start: 2022-11-17 | End: 2023-05-26

## 2022-11-17 RX ORDER — OXYCODONE AND ACETAMINOPHEN 5; 325 MG/1; MG/1
1 TABLET ORAL EVERY 6 HOURS PRN
Qty: 90 TABLET | Refills: 0 | Status: CANCELLED | OUTPATIENT
Start: 2022-11-17

## 2022-11-17 RX ORDER — OXYCODONE HCL 10 MG/1
10 TABLET, FILM COATED, EXTENDED RELEASE ORAL EVERY 8 HOURS
Qty: 90 TABLET | Refills: 0 | Status: CANCELLED | OUTPATIENT
Start: 2022-11-17

## 2022-11-17 RX ORDER — MIRTAZAPINE 15 MG/1
TABLET, FILM COATED ORAL
Qty: 90 TABLET | Refills: 3 | Status: SHIPPED | OUTPATIENT
Start: 2022-11-17 | End: 2023-02-07

## 2022-11-17 RX ORDER — LOSARTAN POTASSIUM 100 MG/1
100 TABLET ORAL DAILY
Qty: 90 TABLET | Refills: 3 | Status: SHIPPED | OUTPATIENT
Start: 2022-11-17 | End: 2023-08-14

## 2022-11-17 RX ORDER — DULOXETIN HYDROCHLORIDE 60 MG/1
60 CAPSULE, DELAYED RELEASE ORAL DAILY
Qty: 90 CAPSULE | Refills: 3 | Status: SHIPPED | OUTPATIENT
Start: 2022-11-17 | End: 2023-01-26

## 2022-11-17 ASSESSMENT — ANXIETY QUESTIONNAIRES
5. BEING SO RESTLESS THAT IT IS HARD TO SIT STILL: NOT AT ALL
IF YOU CHECKED OFF ANY PROBLEMS ON THIS QUESTIONNAIRE, HOW DIFFICULT HAVE THESE PROBLEMS MADE IT FOR YOU TO DO YOUR WORK, TAKE CARE OF THINGS AT HOME, OR GET ALONG WITH OTHER PEOPLE: SOMEWHAT DIFFICULT
GAD7 TOTAL SCORE: 3
7. FEELING AFRAID AS IF SOMETHING AWFUL MIGHT HAPPEN: NOT AT ALL
4. TROUBLE RELAXING: SEVERAL DAYS
7. FEELING AFRAID AS IF SOMETHING AWFUL MIGHT HAPPEN: NOT AT ALL
3. WORRYING TOO MUCH ABOUT DIFFERENT THINGS: NOT AT ALL
GAD7 TOTAL SCORE: 3
GAD7 TOTAL SCORE: 3
6. BECOMING EASILY ANNOYED OR IRRITABLE: SEVERAL DAYS
2. NOT BEING ABLE TO STOP OR CONTROL WORRYING: NOT AT ALL
8. IF YOU CHECKED OFF ANY PROBLEMS, HOW DIFFICULT HAVE THESE MADE IT FOR YOU TO DO YOUR WORK, TAKE CARE OF THINGS AT HOME, OR GET ALONG WITH OTHER PEOPLE?: SOMEWHAT DIFFICULT
1. FEELING NERVOUS, ANXIOUS, OR ON EDGE: SEVERAL DAYS

## 2022-11-17 ASSESSMENT — PAIN SCALES - GENERAL: PAINLEVEL: SEVERE PAIN (6)

## 2022-11-17 ASSESSMENT — ENCOUNTER SYMPTOMS
PALPITATIONS: 0
ARTHRALGIAS: 1
NAUSEA: 0
DIARRHEA: 0
HEARTBURN: 1
SHORTNESS OF BREATH: 0
PARESTHESIAS: 0
EYE PAIN: 0
HEMATURIA: 0
DYSURIA: 0
HEMATOCHEZIA: 0
FREQUENCY: 0
NERVOUS/ANXIOUS: 0
FEVER: 0
CHILLS: 1
HEADACHES: 1
JOINT SWELLING: 0
COUGH: 0
SORE THROAT: 0
MYALGIAS: 1
DIZZINESS: 0
WEAKNESS: 1
ABDOMINAL PAIN: 0
CONSTIPATION: 0

## 2022-11-17 ASSESSMENT — PATIENT HEALTH QUESTIONNAIRE - PHQ9
SUM OF ALL RESPONSES TO PHQ QUESTIONS 1-9: 4
10. IF YOU CHECKED OFF ANY PROBLEMS, HOW DIFFICULT HAVE THESE PROBLEMS MADE IT FOR YOU TO DO YOUR WORK, TAKE CARE OF THINGS AT HOME, OR GET ALONG WITH OTHER PEOPLE: SOMEWHAT DIFFICULT
SUM OF ALL RESPONSES TO PHQ QUESTIONS 1-9: 4

## 2022-11-17 ASSESSMENT — ACTIVITIES OF DAILY LIVING (ADL): CURRENT_FUNCTION: NO ASSISTANCE NEEDED

## 2022-11-17 NOTE — PROGRESS NOTES
"SUBJECTIVE:   CC: David is an 52 year old who presents for preventative health visit.   Patient has been advised of split billing requirements and indicates understanding: Yes  Healthy Habits:     In general, how would you rate your overall health?  Fair    Frequency of exercise:  6-7 days/week    Duration of exercise:  15-30 minutes    Do you usually eat at least 4 servings of fruit and vegetables a day, include whole grains    & fiber and avoid regularly eating high fat or \"junk\" foods?  Yes    Taking medications regularly:  Yes    Ability to successfully perform activities of daily living:  No assistance needed    Home Safety:  No safety concerns identified    Hearing Impairment:  Difficulty following a conversation in a noisy restaurant or crowded room    In the past 6 months, have you been bothered by leaking of urine?  No    In general, how would you rate your overall mental or emotional health?  Fair      PHQ-2 Total Score: 1    Additional concerns today:  No    Chronic pain: opioids per pain clinic.  Stable.    Opioid use: ongoing  Insomnia: chronic ambien and TCA and remeron  Sedative use, continuous: nighly ambien  Depression: stable on cymbalta and remeron  Morbid obesity: 41 BMI, ongoing  Htn: stable on meds, due for labs  Elevated glucose: stable  Gerd: PPI nightly            Today's PHQ-2 Score:   PHQ-2 ( 1999 [employer]) 11/17/2022   Q1: Little interest or pleasure in doing things 1   Q2: Feeling down, depressed or hopeless 0   PHQ-2 Score 1   PHQ-2 Total Score (12-17 Years)- Positive if 3 or more points; Administer PHQ-A if positive -   Q1: Little interest or pleasure in doing things Several days   Q2: Feeling down, depressed or hopeless Not at all   PHQ-2 Score 1           Social History     Tobacco Use     Smoking status: Former     Packs/day: 0.50     Types: Cigarettes, Cigars     Smokeless tobacco: Never     Tobacco comments:     <1/2 pack per day   Substance Use Topics     Alcohol use: Yes     " "Alcohol/week: 0.0 standard drinks     Comment: rare     If you drink alcohol do you typically have >3 drinks per day or >7 drinks per week? No    Alcohol Use 11/17/2022   Prescreen: >3 drinks/day or >7 drinks/week? No   Prescreen: >3 drinks/day or >7 drinks/week? -       Last PSA: No results found for: PSA    Reviewed orders with patient. Reviewed health maintenance and updated orders accordingly - Yes      Reviewed and updated as needed this visit by clinical staff   Tobacco  Allergies  Meds              Reviewed and updated as needed this visit by Provider                     Review of Systems   Constitutional: Positive for chills. Negative for fever.   HENT: Positive for congestion. Negative for ear pain, hearing loss and sore throat.    Eyes: Negative for pain and visual disturbance.   Respiratory: Negative for cough and shortness of breath.    Cardiovascular: Negative for chest pain, palpitations and peripheral edema.   Gastrointestinal: Positive for heartburn. Negative for abdominal pain, constipation, diarrhea, hematochezia and nausea.   Genitourinary: Positive for impotence. Negative for dysuria, frequency, genital sores, hematuria, penile discharge and urgency.   Musculoskeletal: Positive for arthralgias and myalgias. Negative for joint swelling.   Skin: Negative for rash.   Neurological: Positive for weakness and headaches. Negative for dizziness and paresthesias.   Psychiatric/Behavioral: Negative for mood changes. The patient is not nervous/anxious.          OBJECTIVE:   /82 (BP Location: Right arm, Patient Position: Sitting, Cuff Size: Adult Large)   Pulse 84   Temp 97.5  F (36.4  C) (Tympanic)   Resp 22   Ht 1.727 m (5' 8\")   Wt 124.2 kg (273 lb 12.8 oz)   SpO2 99%   BMI 41.63 kg/m      Physical Exam  GEN: Alert and oriented, in no acute distress  CV: RRR, no murmur  EXT: no edema or lesions noted in lower extremities  Walks with cane due to chronic foot drop  No rash  Mood good " "    ASSESSMENT/PLAN:   Well exam  Chronic pain: opioids per pain clinic.  Stable.    Opioid use: ongoing  Insomnia: chronic ambien  Sedative use, continuous: nighly ambien  Depression: stable on cymbalta  Morbid obesity: 41 BMI, ongoing  Htn: stable on meds, due for labs  Elevated glucose: stable  Gerd, stable      Fills.  Labs.  Not wanting colonoscopy right now.     F/u in 6 months.             COUNSELING:   Reviewed preventive health counseling, as reflected in patient instructions       Regular exercise       Healthy diet/nutrition      BMI:   Estimated body mass index is 41.63 kg/m  as calculated from the following:    Height as of this encounter: 1.727 m (5' 8\").    Weight as of this encounter: 124.2 kg (273 lb 12.8 oz).   Weight management plan: diet/exercise       He reports that he has quit smoking. His smoking use included cigarettes and cigars. He smoked an average of .5 packs per day. He has never used smokeless tobacco.        [facility]  "

## 2022-11-17 NOTE — LETTER
[facility]  [address] [place] [id number]  Phone: [phone]  Fax: [phone]      November 17, 2022      RE: David Wilcox  [address] [facility] [address]        To whom it may concern:      David Wilcox is under my professional care.  He is travelling and will be bringing his prescription medications with him.  I am enclosing a list of his meds.      He needs no assistance in organizing and self administering his medications, he has been doing that independently for years.    Please call if any questions.    Regan Llamas MD

## 2022-11-28 ENCOUNTER — TELEPHONE (OUTPATIENT)
Dept: PALLIATIVE MEDICINE | Facility: CLINIC | Age: 52
End: 2022-11-28

## 2022-11-28 DIAGNOSIS — G89.29 CHRONIC BILATERAL LOW BACK PAIN WITH RIGHT-SIDED SCIATICA: Primary | ICD-10-CM

## 2022-11-28 DIAGNOSIS — M54.16 LUMBAR RADICULOPATHY: ICD-10-CM

## 2022-11-28 DIAGNOSIS — M96.1 FAILED BACK SURGICAL SYNDROME: ICD-10-CM

## 2022-11-28 DIAGNOSIS — F11.90 CHRONIC, CONTINUOUS USE OF OPIOIDS: ICD-10-CM

## 2022-11-28 DIAGNOSIS — M54.41 CHRONIC BILATERAL LOW BACK PAIN WITH RIGHT-SIDED SCIATICA: Primary | ICD-10-CM

## 2022-11-28 NOTE — TELEPHONE ENCOUNTER
[facility]    Phone Message    May a detailed message be left on voicemail: yes     Reason for Call: Medication Refill Request    Has the patient contacted the pharmacy for the refill? Yes   Name of medication being requeste  oxyCODONE (OXYCONTIN) 10 MG 12 hr tablet  And   oxyCODONE-acetaminophen (PERCOCET) 5-325 MG tablet  Provider who prescribed the medication: Ina Barillas APRN CNP  Pharmacy: [address] [address], patient stated he is out of town  Date medication is needed:12/1/2022      Action Taken: Message routed to:  Other: [facility]    Travel Screening: Not Applicable

## 2022-11-29 RX ORDER — OXYCODONE HCL 10 MG/1
10 TABLET, FILM COATED, EXTENDED RELEASE ORAL EVERY 8 HOURS
Qty: 90 TABLET | Refills: 0 | Status: SHIPPED | OUTPATIENT
Start: 2022-11-29 | End: 2022-12-01

## 2022-11-29 RX ORDER — OXYCODONE AND ACETAMINOPHEN 5; 325 MG/1; MG/1
1 TABLET ORAL EVERY 6 HOURS PRN
Qty: 90 TABLET | Refills: 0 | Status: SHIPPED | OUTPATIENT
Start: 2022-11-29 | End: 2022-12-01

## 2022-11-29 NOTE — TELEPHONE ENCOUNTER
Patient requesting refill(s) of oxyCODONE (OXYCONTIN) 10 MG 12 hr tablet   Last dispensed from pharmacy on 10/31/22    oxyCODONE-acetaminophen (PERCOCET) 5-325 MG tablet  Last dispensed from pharmacy on 9/28/22    Patient's last office/virtual visit by prescribing provider on 10/31/22  Next office/virtual appointment scheduled for 01/10/23    Last urine drug screen date 5/27/22  Current opioid agreement on file (completed within the last year) Yes Date of opioid agreement: 5/27/22    E-prescribe to [facility][id number] [place], [place]     Will route to nursing [place] for review and preparation of prescription(s).

## 2022-11-29 NOTE — TELEPHONE ENCOUNTER
Signed Prescriptions:                        Disp   Refills    oxyCODONE (OXYCONTIN) 10 MG 12 hr tablet   90 tab*0        Sig: Take 1 tablet (10 mg) by mouth every 8 hours . Fill           11/29/22 to start 12/01/22  Authorizing Provider: INA LOPEZ    oxyCODONE-acetaminophen (PERCOCET) 5-325 M*90 tab*0        Sig: Take 1 tablet by mouth every 6 hours as needed for           severe pain (7-10) Max 3/day. Fill/start 11/29/22           30 day supply  Authorizing Provider: INA LOPEZ    Reviewed [place]  November 29, 2022- no concerning fills.    Ina Lopez APRN, RN CNP, FNP  [facility]  [facility]

## 2022-11-29 NOTE — TELEPHONE ENCOUNTER
Medication refill information reviewed.      Due date for  oxyCODONE (OXYCONTIN) 10 MG 12 hr tablet is 12/01/22   and oxyCODONE-acetaminophen (PERCOCET) 5-325 MG tablet is 11/29/22     Prescriptions prepped for review.     Will route to provider.

## 2022-11-30 NOTE — TELEPHONE ENCOUNTER
Please call [facility] in [place] and cancel. Incorrect pharmacy prepped. Please re-prep for provider to send to [facility], [place], [place].  Thank you.

## 2022-12-01 RX ORDER — OXYCODONE AND ACETAMINOPHEN 5; 325 MG/1; MG/1
1 TABLET ORAL EVERY 6 HOURS PRN
Qty: 90 TABLET | Refills: 0 | Status: SHIPPED | OUTPATIENT
Start: 2022-12-01 | End: 2023-01-10

## 2022-12-01 RX ORDER — OXYCODONE HCL 10 MG/1
10 TABLET, FILM COATED, EXTENDED RELEASE ORAL EVERY 8 HOURS
Qty: 90 TABLET | Refills: 0 | Status: SHIPPED | OUTPATIENT
Start: 2022-12-01 | End: 2022-12-05

## 2022-12-01 NOTE — TELEPHONE ENCOUNTER
Signed Prescriptions:                        Disp   Refills    oxyCODONE (OXYCONTIN) 10 MG 12 hr tablet   90 tab*0        Sig: Take 1 tablet (10 mg) by mouth every 8 hours .           Fill/start 12/01/22  Authorizing Provider: INA LOPEZ    oxyCODONE-acetaminophen (PERCOCET) 5-325 M*90 tab*0        Sig: Take 1 tablet by mouth every 6 hours as needed for           severe pain (7-10) Max 3/day. Fill/start 12/01/22           30 day supply  Authorizing Provider: INA LOPEZ    Reviewed [place]  December 1, 2022- no concerning fills.    Ina TORIBIO, RN CNP, FNP  [facility]  [facility]

## 2022-12-01 NOTE — TELEPHONE ENCOUNTER
[facility]    Phone Message    May a detailed message be left on voicemail: yes     Reason for Call: Other: Pharmacy called to verify prescriptions since they are [place] of [place]. Please call back when available.      Action Taken: Other: Pain    Travel Screening: Not Applicable

## 2022-12-01 NOTE — TELEPHONE ENCOUNTER
E-prescription confirmation received. Patient was informed. Routed to nursing to cancel prescriptions that were sent to [facility] in [place]. Encounter can be closed after.

## 2022-12-01 NOTE — TELEPHONE ENCOUNTER
Pended for pharmacy in [place] per patient request. Please route back to nursing if approved to Cancell prescriptions at [facility] in [facility].    Gita Barrios, BSN, RN  Care Coordinator  [facility] [place]

## 2022-12-01 NOTE — TELEPHONE ENCOUNTER
[facility]    Phone Message    May a detailed message be left on voicemail: yes     Reason for Call: Other: Patient is still waiting on his refills. Please send to the [address] [address].      Action Taken: Other: pain    Travel Screening: Not Applicable

## 2022-12-02 NOTE — TELEPHONE ENCOUNTER
[facility]    Phone Message    May a detailed message be left on voicemail: yes     Reason for Call: Other: Pharmacy is calling to verify medication. Please call to discuss.      Action Taken: Other: Pain    Travel Screening: Not Applicable

## 2022-12-02 NOTE — TELEPHONE ENCOUNTER
[facility]    Phone Message    May a detailed message be left on voicemail: yes     Reason for Call: Other: Patient called stating the oxyCODONE (OXYCONTIN) 10 MG can be filled at [facility] [address], [place] [address]    Action Taken: Message routed to:  Other: [facility]    Travel Screening: Not Applicable

## 2022-12-02 NOTE — TELEPHONE ENCOUNTER
[facility]    Phone Message    May a detailed message be left on voicemail: yes     Reason for Call: Other: Patient called stating that the pharmacy only has 11 tabs of this medication in stock. He will need a new prescription for the remainder sent to a different pharmacy. Patient will call back with pharmacy information.     Action Taken: Message routed to:  Other: [facility]    Travel Screening: Not Applicable

## 2022-12-02 NOTE — TELEPHONE ENCOUNTER
[id number] - [place], [place] - [address] AT [facility] [address]  [facility] [address]  Phone: [phone] Fax: [phone]    Pharmacy is able to fill the full quantity of Percocet and 11 tablets of OxyContin.  They cannot obtain any additional OxyContin as it is not available in their warehouse.  Ok to fill. Spoke to patient and he will notify us where to send the remainder of his prescription. He is in [place] for 1 month helping his son move. Med note placed.     Gita Barrios, JEREMIAHN, RN  Care Coordinator  [facility] [place]

## 2022-12-05 RX ORDER — OXYCODONE HCL 10 MG/1
10 TABLET, FILM COATED, EXTENDED RELEASE ORAL EVERY 8 HOURS
Qty: 90 TABLET | Refills: 0 | Status: SHIPPED | OUTPATIENT
Start: 2022-12-05 | End: 2022-12-06

## 2022-12-05 NOTE — TELEPHONE ENCOUNTER
Patient called and stated that per the pharmacy they are unable to get the Oxycontin due to the fact that it is on back order,patient states he is currently in  [facility] helping his son move and he has called numerous pharmacies in the area that he is in and receives the same answer. Patient states he will be out of his medication tomorrow 12/6/22.Patient is requesting a call back

## 2022-12-05 NOTE — TELEPHONE ENCOUNTER
#11 tablets were filled at [facility] on 12/02/22 Due to pharmacy supply.  Medication refill information reviewed. Patient would like a new prescription sent to \[facility] [address] [place] [address]    Due date for oxyCODONE (OXYCONTIN) 10 MG is 12/06/22     Prescriptions prepped for review.     Will route to provider.

## 2022-12-05 NOTE — TELEPHONE ENCOUNTER
[facility]    Phone Message    May a detailed message be left on voicemail: yes     Reason for Call: Other: Patient is calling regarding his oxycotin, the pharmacy is out of stock and he needs an alternative. Please call to discuss patient will be out of medication by morning.      Action Taken: Other: Pain    Travel Screening: Not Applicable

## 2022-12-05 NOTE — TELEPHONE ENCOUNTER
Signed Prescriptions:                        Disp   Refills    oxyCODONE-acetaminophen (PERCOCET) 5-325 M*90 tab*0        Sig: Take 1 tablet by mouth every 6 hours as needed for           severe pain (7-10) Max 3/day. Fill/start 12/01/22           30 day supply  Authorizing Provider: INA LOPEZ    oxyCODONE (OXYCONTIN) 10 MG 12 hr tablet   90 tab*0        Sig: Take 1 tablet (10 mg) by mouth every 8 hours . Fill           12/05/22 to start 12/06/22  Authorizing Provider: INA LOPEZ    Reviewed [place]  December 5, 2022- no concerning fills.    Ina Lopez APRN, RN CNP, FNP  [facility]  [facility]

## 2022-12-06 RX ORDER — MORPHINE SULFATE 15 MG/1
15 TABLET, FILM COATED, EXTENDED RELEASE ORAL EVERY 12 HOURS
Qty: 6 TABLET | Refills: 0 | Status: SHIPPED | OUTPATIENT
Start: 2022-12-06 | End: 2022-12-09

## 2022-12-06 RX ORDER — MORPHINE SULFATE 15 MG/1
15 TABLET, FILM COATED, EXTENDED RELEASE ORAL EVERY 12 HOURS
Qty: 60 TABLET | Refills: 0 | Status: SHIPPED | OUTPATIENT
Start: 2022-12-06 | End: 2023-01-06

## 2022-12-06 NOTE — TELEPHONE ENCOUNTER
[facility]    Phone Message    May a detailed message be left on voicemail: yes     Reason for Call: Other: Patient called stating he took his last pill at 6:00am this morning 12/6/2022, patient is wondering when he may be able to get his medication refilled.     Action Taken: Message routed to:  Other: [facility] pain    Travel Screening: Not Applicable

## 2022-12-06 NOTE — TELEPHONE ENCOUNTER
Called [facility] and they report they did not have the OxyContin and that the whole state is basically unable to obtain either generic or name brand OxyContin. They do have MS Contin 15 mg tablets #60.     Spoke to patient and he is screaming into the phone that he is in pain. He took his last tablet of OxyContin last night. He does not have a morphine allergy and is willing to try MS Contin 15 MG bid. Normal regimen is OxyContin 10 MG tid    Script pended for MS Contin 15 MG tablets for review.     JEREMIAH PeacockN, RN  Care Coordinator  [facility] [place]

## 2022-12-07 NOTE — TELEPHONE ENCOUNTER
Information noted. I did not see this encounter in my inbox [employer]. Thank you for your very thorough coverage, Cristina and Gita! It is much appreciated.    Ina TORIBIO, RN CNP, FNP  [facility]

## 2022-12-07 NOTE — TELEPHONE ENCOUNTER
Patient called via answering service at 6 pm. He is out of OxyContin and was supposed to have had MS Contin sent in but it was not signed before the end of the business day.     Reviewed documentation. Because this medication requires a PA, I sent in a prescription for a three day supply and a second refill for 30 days. I spoke to the pharmacy and explained the situation. If the 30 day supply goes through, they will not give him the 3 day supply.     Patient is updated on everything. I also reviewed behavioral issues noted earlier today and explained that yelling at any member of the Pain Center staff is unacceptable and will not be tolerated again. He indicated understanding of this policy.     Routing to CATARINO Rose and Gita CORTES RN as an FYI. Closing the encounter.     CATARINO Enrique, NP-C  [facility]

## 2022-12-07 NOTE — TELEPHONE ENCOUNTER
I did call the pharmacy and the medication is being filled on a discount card.  I spoke to the patient and he is tolerating the medication.     JEREMIAH PeacockN, RN  Care Coordinator  [facility] [place]

## 2022-12-13 NOTE — TELEPHONE ENCOUNTER
Information noted.     Ina TORIBIO, RN CNP, FNP  [facility]  [facility]

## 2022-12-27 DIAGNOSIS — J30.81 ALLERGIC RHINITIS DUE TO ANIMALS: ICD-10-CM

## 2022-12-27 RX ORDER — ALBUTEROL SULFATE 90 UG/1
AEROSOL, METERED RESPIRATORY (INHALATION)
Qty: 8.5 G | Refills: 11 | Status: SHIPPED | OUTPATIENT
Start: 2022-12-27 | End: 2024-03-11

## 2023-01-06 DIAGNOSIS — G89.29 CHRONIC BILATERAL LOW BACK PAIN WITH RIGHT-SIDED SCIATICA: ICD-10-CM

## 2023-01-06 DIAGNOSIS — M96.1 FAILED BACK SURGICAL SYNDROME: ICD-10-CM

## 2023-01-06 DIAGNOSIS — F11.90 CHRONIC, CONTINUOUS USE OF OPIOIDS: ICD-10-CM

## 2023-01-06 DIAGNOSIS — M54.41 CHRONIC BILATERAL LOW BACK PAIN WITH RIGHT-SIDED SCIATICA: ICD-10-CM

## 2023-01-06 DIAGNOSIS — M54.16 LUMBAR RADICULOPATHY: ICD-10-CM

## 2023-01-06 RX ORDER — MORPHINE SULFATE 15 MG/1
15 TABLET, FILM COATED, EXTENDED RELEASE ORAL EVERY 12 HOURS
Qty: 60 TABLET | Refills: 0 | Status: SHIPPED | OUTPATIENT
Start: 2023-01-06 | End: 2023-01-10

## 2023-01-06 NOTE — TELEPHONE ENCOUNTER
Received call from patient  requesting refill(s) for     morphine (MS CONTIN) 15 MG CR tablet   Last dispensed from pharmacy on 12/7/22  [facility] [phone]    Patient's last office/virtual visit by prescribing provider on 10/31/22  Next office/virtual appointment scheduled for 1/10/23    Last urine drug screen date 5/27/22  Current opioid agreement on file? Yes Date of opioid agreement: 5/27/22    [facility]cribe to:     [id number] - [place], [place] - [facility] [address] AT [facility] [address]    Will route to nursing [place] for review and preparation of prescription(s).

## 2023-01-06 NOTE — TELEPHONE ENCOUNTER
[facility]    Phone Message    May a detailed message be left on voicemail: yes     Reason for Call: Medication Refill Request    Has the patient contacted the pharmacy for the refill? Yes   Name of medication being requested: morphine (MS CONTIN) 15 MG CR tablet  Provider who prescribed the medication: Ina Barillas APRN CNP  Pharmacy: [facility] #[id number] - [place], [place] - [address] [place] [address] AT [facility] & [address]  Date medication is needed: ASAP         Action Taken: Message routed to:  Other: [facility]    Travel Screening: Not Applicable

## 2023-01-06 NOTE — TELEPHONE ENCOUNTER
Medication refill information reviewed.     Due date for morphine (MS CONTIN) 15 MG CR tablet is 01/06/23     Prescriptions prepped for review.     Will route to provider.

## 2023-01-09 NOTE — TELEPHONE ENCOUNTER
Central Prior Authorization Team   Phone: [phone]      PA Initiation    Medication:   Insurance Company: [employer] - Phone [phone] Fax [phone]  Pharmacy Filling the Rx: [facility] #[id number] - [place], [place] - [address] [place] [address] [address] AT [facility] & [address]  Filling Pharmacy Phone: [phone]  Filling Pharmacy Fax:    Start Date: 1/9/2023

## 2023-01-09 NOTE — TELEPHONE ENCOUNTER
PA Initiation : 01.09.2023  Medication: morphine (MS CONTIN) 15 MG CR tablet  Insurance Company: [employer]  Pharmacy Filling the Rx:    [facility] #[id number] - [address] AT [facility] & [address] [place] [address]    Filling Pharmacy Phone: [phone]  Filling Pharmacy Fax: [phone]  Start Date: 01.06.2023

## 2023-01-09 NOTE — TELEPHONE ENCOUNTER
Prior Authorization Approval    Authorization Effective Date: 12/10/2022  Authorization Expiration Date: 1/9/2024  Medication: Morphine Sulfate ER 15mg   Insurance Company: [employer] - Phone [phone] Fax [phone]  Which Pharmacy is filling the prescription (Not needed for infusion/clinic administered): [id number] - [place], [place] - [address] [place] [address] AT [facility] & [address]  Pharmacy Notified: Yes  Patient Notified: Yes (pharmacy will notify patient when ready)

## 2023-01-10 ENCOUNTER — OFFICE VISIT (OUTPATIENT)
Dept: PALLIATIVE MEDICINE | Facility: CLINIC | Age: 53
End: 2023-01-10
Payer: COMMERCIAL

## 2023-01-10 VITALS — DIASTOLIC BLOOD PRESSURE: 97 MMHG | HEART RATE: 69 BPM | SYSTOLIC BLOOD PRESSURE: 148 MMHG

## 2023-01-10 DIAGNOSIS — E66.813 CLASS 3 SEVERE OBESITY DUE TO EXCESS CALORIES WITH SERIOUS COMORBIDITY AND BODY MASS INDEX (BMI) OF 40.0 TO 44.9 IN ADULT (H): ICD-10-CM

## 2023-01-10 DIAGNOSIS — E66.01 CLASS 3 SEVERE OBESITY DUE TO EXCESS CALORIES WITH SERIOUS COMORBIDITY AND BODY MASS INDEX (BMI) OF 40.0 TO 44.9 IN ADULT (H): ICD-10-CM

## 2023-01-10 DIAGNOSIS — G89.29 CHRONIC BILATERAL LOW BACK PAIN WITH RIGHT-SIDED SCIATICA: Primary | ICD-10-CM

## 2023-01-10 DIAGNOSIS — M54.16 LUMBAR RADICULOPATHY: ICD-10-CM

## 2023-01-10 DIAGNOSIS — G47.33 OSA (OBSTRUCTIVE SLEEP APNEA): ICD-10-CM

## 2023-01-10 DIAGNOSIS — M54.41 CHRONIC BILATERAL LOW BACK PAIN WITH RIGHT-SIDED SCIATICA: Primary | ICD-10-CM

## 2023-01-10 DIAGNOSIS — F11.90 CHRONIC, CONTINUOUS USE OF OPIOIDS: ICD-10-CM

## 2023-01-10 DIAGNOSIS — M96.1 FAILED BACK SURGICAL SYNDROME: ICD-10-CM

## 2023-01-10 PROCEDURE — 99215 OFFICE O/P EST HI 40 MIN: CPT | Performed by: NURSE PRACTITIONER

## 2023-01-10 RX ORDER — OXYCODONE AND ACETAMINOPHEN 5; 325 MG/1; MG/1
1 TABLET ORAL EVERY 6 HOURS PRN
Qty: 90 TABLET | Refills: 0 | Status: SHIPPED | OUTPATIENT
Start: 2023-01-10 | End: 2023-02-28

## 2023-01-10 RX ORDER — MORPHINE SULFATE 15 MG/1
15 TABLET, FILM COATED, EXTENDED RELEASE ORAL EVERY 12 HOURS
Qty: 60 TABLET | Refills: 0 | Status: SHIPPED | OUTPATIENT
Start: 2023-01-10 | End: 2023-03-07

## 2023-01-10 ASSESSMENT — PAIN SCALES - GENERAL: PAINLEVEL: MODERATE PAIN (5)

## 2023-01-10 NOTE — PATIENT INSTRUCTIONS
Plan:   Physical Therapy: none  Continue your regular HEP   Clinical Health Psychologist to address issues of relaxation, behavioral change, coping style, and other factors important to improvement: none  Diagnostic Studies: none  Medication Management:   Continue Percocet max 3/day. Fill/begin 1/10/2023  Continue MSContin 15mg every 12 hours, fill 2/2 and start 2/5  Refill for naloxone nasal spray for safety  Further procedures recommended: none  Acupuncture: yes, continue  Referral to comprehensive weight management as losing weight will help to reduce pain and reduce risks for osteoarthritis, heart disease, stroke risk, etc.   Recommendations/follow-up for PCP:  See above  Release of information: none  Follow up: 12 weeks, video or in-person. Please call [phone] to make your follow-up appointment with me.     ----------------------------------------------------------------  Clinic Number:  [phone]   Call with any questions about your care and for scheduling assistance.   Calls are returned Monday through Friday between 8 AM and 4:30 PM. We usually get back to you within 2 business days depending on the issue/request.    If we are prescribing your medications:  For opioid medication refills, call the clinic or send a [employer] message 7 days in advance.  Please include:  Name of requested medication  Name of the pharmacy.  For non-opioid medications, call your pharmacy directly to request a refill. Please allow 3-4 days to be processed.   Per [place] State Law:  All controlled substance prescriptions must be filled within 30 days of being written.    For those controlled substances allowing refills, pickup must occur within 30 days of last fill.      We believe regular attendance is key to your success in our program!    Any time you are unable to keep your appointment we ask that you call us at least 24 hours in advance to cancel.This will allow us to offer the appointment time to another patient.   Multiple  missed appointments may lead to dismissal from the clinic.

## 2023-01-10 NOTE — PROGRESS NOTES
[facility]      1/10/2023      Chief complaint:   Low back pain and right leg radicular pain      Interval history:  David Wilcox is a 52 year old male is known to me for:  Chronic bilateral low back pain with right-sided sciatica  Lumbar radiculopathy  Failed back surgical syndrome  Chronic continuous use of opioids  Encounter for long-term use of opiate analgesic     Distant history of alcohol and drug abuse  Bipolar disorder   H/o menningitis after SCS trial  PMHx includes: Acute bronchitis, acute pancreatitis, acute sinusitis, ADHD, anxiety, depression, asthma, bipolar disorder unspecified, cervicalgia, dysthymic disorder, esophageal reflux, fixation of spine with fusion, history of blood transfusion, hyperlipidemia, low back pain, lumbar throat, meningitis after spinal cord stimulator trial, narcotic abuse, neck pain, open nondisplaced fracture of distal phalanx of finger, pain in limb, personal history of other diseases of the digestive system, sciatica, spasm of muscle, tobacco use disorder  PSHx includes: Back surgery in 2003 and 2011, lumbar anterior three-level fusion, ORIF right finger (3/3/2021)  .    Recommendations/plan at the last visit on 10/31/2022 included:  1. Physical Therapy: none  2. Continue your regular HEP   3. Clinical Health Psychologist to address issues of relaxation, behavioral change, coping style, and other factors important to improvement: none  4. Diagnostic Studies: none  5. Medication Management:   1. Continue Percocet and OxyContin without changes. Fill OxyContin on 10/31 and start 11/1.   2. Patient will likely be in [place] with his son for a month or 2, then may be [place] for a few weeks. I have checked and NPs are able to write for schedule II drugs in [place] but not in [place]. Patient aware I would need one of my physician partners to sign if he needs refill while in [place].   3. We have previously Discussed possible switch to buprenorphine (likely using  Butrans  Which is a skin patch changed every 7 days, or Belbuca which is a mouth film used every 12 hours). This discussion tabled until you are back in the state long term. ([place] and [place] travel)  6. Further procedures recommended: none  7. Acupuncture: yes, continue  8. Recommendations/follow-up for PCP:  See above  9. Release of information: none  10. Follow up: 12 weeks, video or in-person. Please call [phone] to make your follow-up appointment with me.         Since his last visit, David Wilcox reports:    Interval history January 10, 2023  -when in [place], unable to get OxyContin, switched to MSContin 15mg every 12 hours. This is working well, no significant side effects. Feels more sleepy in the late afternoon, could also be shorter days. Will continue to monitor.   -low back pain remains with right leg radiculopathy to the right foot 2nd through 5th digits.   -walking up to 3 km per day.  -mentions wanting to lose weight, discussed referral to comprehensive weight management clinic, patient is very interested in this.   Also states he has previously been diagnosed with OBSTRUCTIVE SLEEP APNEA, but does not have CPAP as none were in stock when he was initially diagnosed. Encouraged patient to follow up with his sleep medicine provider and get a CPAP set up.        Interval history October 31, 2022  -low back pain and right leg pain has been a little worse, he usually struggle with the change of seasons getting cooler.   -he may do acupuncture again, this has been beneficial in the past.   -may go to his son's in [place] as he is likely moving to the [place], [place] area  -we have previously discussed possible switch to buprenorphine, would like to wait until he is likely back in [place] due to travel to [place] for a couple months, then likely [place] for a bit.     Interval history August 30, 2022  -David states he still has the ongoing low back pain with right leg radicular symptoms. Feels pain has been a  "bit better.   -he is increasing his activities  -exercising regularly, doing 2 km twice daily on the treadmill, able to do walking  -doing 3 [employer] per week. (Misael, Vanda, and Arturo)  -on vacation camping    Pain history collected 5/27/2022 at initial visit  Out cutting a tree and it fell on him in 2002. He had compression fractures L5-S1, and multiple other fractures and spent almost a year in a wheelchair, initially told he would not walk again.   His initial spinal  surgery was helpful, but he then fell down the stairs and he knew something was wrong but no one would believe him. He states 8 years later they found loose hardware and he had to have a subsequent surgery to fix that. Then a 3rd surgery extended the fusion. He is now fused from L3-S1.      Pain on the right side from bottom of the ribs to the low back, around the hip and down the right leg to the toes. Bilateral hip pain posterolateral hip pain (points to buttocks).      He has had loss of bowel and bladder but it is not a common thing. This is not new, present off and on for 6 years.      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   Has regular exercise with chores and he now walks every day on the treadmill (2km in the AM and PM)  Does [employer] in [place] on Fridays and his wife does so on Saturdays.           At this point, the patient's participation with our multidisciplinary team includes:  The patient has been compliant with the program.  PT - not ordered, patient is quite active at home running a "[employer] Psych - not ordered      Pain scores:  Pain intensity on average is 6-8 on a scale of 0-10.    Range is 5-9/10.   Pain right now is 5/10.   Pain is described as \"like a pillow with a knife through it and when bad, like I am on fire\"    Pain is constant in nature      Current pain relevant medications:   -Tylenol extra strength 500mg PRN   -naloxone nasal spray for opiate reversal   -Duloxetine 60mg every day " (very helpful for mood and pain)  -gabapentin 600mg take 2 tabs/1200mg TID (helpful)  -nortriptyline 10mg take 2 (20mg) at bedtime (helpful)  -MSContin 15mg Q 12 hours (helpful-moreso than the OxyContin had been )  -Percocet 5/325mg take 1 tab Q 6 hours PRN max 3/day (he plans on 2/day and uses 3 if needed. helpful)  -tizanidine 4mg TID PRN for muscle spasms (helpful)        Other pertinent medications:  -lorazepam 0.5mg BID PRN anxiety (very rare use, received #30 tabs in 2018 and still has some left)  -mirtazepine 15mg at bedtime  -ambien 10mg Q HS PRN insomnia           Previous medication treatments included:  OPIATES: oxycodone (helpful), hydrocodone (not helpful), Methadone (took after surgery, very helpful), Fentanyl patches (would have issues with getting to hot and get a dump of medication. He has used this very successfully in the winter when it is colder), Dilaudid (hydromorphone, very helpful for a migraine with antibiotics).MSContin (more helpful than OxyContin was)  NSAIDS: naproxyn (not helpful), ibuprofen (not helpful)  MUSCLE RELAXANTS: Methocarbamol (expense, helpful), Flexeril (helpful but he had a remote history of a suicide attempt with this drug), tizanidine (very helpful)  ANTI-MIGRAINE MEDS: none  ANTI-DEPRESSANTS: none  SLEEP AIDS: Remeron (helpful)  ANTI-CONVULSANTS: nortriptyline (helpful)  TOPICALS: CBD oil (helps some)  ANXIOLYTICS: lorazepam (helpful)  MEDICAL CANNABIS: none  Other meds: lidocaine (not helpful)        Other treatments have included:   David Wilcox has been seen at a pain clinic in the past.  Previously managed by Dr. Bindu Motley here  PT: tried, was not helpful on multiple occasions  Chiropractic care: yes, uses this regularly for acupuncture  Acupuncture: helpful  TENs Unit: has one, helpful, uses it occasionally  SCS trial in   got meningitis and nearly       Injections:   -3/26/2018 caudal ALVA with Dr. Bindu Motley  (not helpful)  -2018 right L3-4  transforaminal ALVA with Dr. Bindu Motley (not helpful)  -11/11/2019  Bilateral SI joint injections with Dr. Bindu Motley  (not helpful)      THE 4 A's OF OPIOID MAINTENANCE ANALGESIA    Analgesia: helpful    Activity: exercising on treadmill twice daily, more active    Adverse effects: none    Adherence to Rx protocol: yes      Side Effects: no side effect  Patient is using the medication as prescribed: YES    Medications:  Current Outpatient Medications   Medication Sig Dispense Refill     albuterol (PROAIR HFA/PROVENTIL HFA/VENTOLIN HFA) 108 (90 Base) MCG/ACT inhaler INHALE 2 PUFFS INTO THE LUNGS EVERY 4 HOURS AS NEEDED FOR SHORTNESS OF BREATH 8.5 g 11     DULoxetine (CYMBALTA) 60 MG capsule Take 1 capsule (60 mg) by mouth daily 90 capsule 3     gabapentin (NEURONTIN) 600 MG tablet 2 tabs 3x/day 180 tablet 5     LORazepam (ATIVAN) 0.5 MG tablet One bid prn anxiety. Rare use 30 tablet 0     losartan (COZAAR) 100 MG tablet Take 1 tablet (100 mg) by mouth daily 90 tablet 3     mirtazapine (REMERON) 15 MG tablet TAKE 1 TABLET(15 MG) BY MOUTH AT BEDTIME 90 tablet 3     morphine (MS CONTIN) 15 MG CR tablet Take 1 tablet (15 mg) by mouth every 12 hours maximum 2 tablet(s) per day OK to fill 2/2/2023 and start 2/5/23. 30 day supply for chronic pain 60 tablet 0     naloxone (NARCAN) 4 MG/0.1ML nasal spray Spray 1 spray (4 mg) into one nostril alternating nostrils once as needed for opioid reversal every 2-3 minutes until assistance arrives 0.2 mL 1     nortriptyline (PAMELOR) 10 MG capsule Take 2 capsules (20 mg) by mouth At Bedtime . 3 month supply 180 capsule 3     omeprazole (PRILOSEC) 20 MG DR capsule Take 1 capsule (20 mg) by mouth daily 90 capsule 3     oxyCODONE-acetaminophen (PERCOCET) 5-325 MG tablet Take 1 tablet by mouth every 6 hours as needed for severe pain (7-10) Max 3/day. Fill/start 1/10/2023. 30 day supply 90 tablet 0     tiZANidine (ZANAFLEX) 4 MG tablet Take 1 tablet (4 mg) by mouth 3 times daily as  needed for muscle spasms 0.5-1 tab tid prn muscle spasm 90 tablet 4     TYLENOL EXTRA STRENGTH 500 MG OR TABS At Bedtime        zolpidem (AMBIEN) 10 MG tablet One prn nightly for insomnia. 30 tablet 5       Medical History: any changes in medical history since they were last seen? No    Social History:   Home situation:  with children x 2 at home.   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   History of chemical dependency treatment: quit on own       Is patient a current smoker or tobacco user?  no  If yes, was cessation counseling offered?  no          Physical Exam:  Vital signs: Blood pressure (!) 148/97, pulse 69.    Behavioral observations:  Awake, alert and cooperative    Gait:  Normal     Musculoskeletal exam:  Strength grossly equal throughout. Moves well in exam room    Neuro exam:  Deferred    Skin/vascular/autonomic:  No suspicious lesions on exposed skin.     Other:  na    Is the patient hypertensive today? yes  Hypertensive on recheck of BP?   Yes, was a little lower but I did not enter the vitals  If yes, was patient recommended to see Primary Care Provider in follow up for management of HTN?  Yes if BP remains > 140/90        Diagnostic tests:  MRI OF THE LUMBAR SPINE WITHOUT AND WITH CONTRAST August 6, 2021 5:57  PM      HISTORY: Low back pain, prior surgery, new symptoms. Worsening pain  above fusion, but also right radicular L5 or S1 distribution on the  right. Failed back surgical syndrome. Lumbar radiculopathy.     TECHNIQUE: Multiplanar, multisequence MRI images of the lumbar spine  were acquired without and with 10mL Gadavist IV contrast.     COMPARISON: Lumbar spine MR 4/27/2018, lumbar spine CT 7/14/2016.     FINDINGS: There are five lumbar-type vertebrae for the purposes of  this dictation. Posterior spine fusion from L3 down to S1 consisting  of bilateral  pedicle screws at each level with inner connecting rods,  interbody fusion devices in the L4-L5 and L5-S1 disc spaces and  laminectomies of L3, L4, L5 and S1 again noted.     Degenerative grade 1 anterolisthesis of L5 upon S1 and mild  degenerative retrolisthesis of L2 upon L3 again noted. Alignment  otherwise normal and unchanged. Vertebral body heights normal. No  fractures. No evidence for pathologic bony lesion. Marrow signal  normal.     There is loss of disc height, disc desiccation and posterior disc  bulging/herniation to varying degrees at all levels of the lumbar  spine with the exception of the resected L4-L5 and L5-S1 discs.      The tip of the conus medullaris is at the L1-L2 level which is within  normal limits. No abnormal intrathecal contrast enhancement. There is  no evidence for intrathecal abnormality.      Level by level:      T12-L1: Loss of disc height, disc desiccation and circumferential disc  bulging with a superimposed small posterior central disc herniation  (extrusion). Minimal facet arthropathy bilaterally. Minimal spinal  canal narrowing. No foraminal stenosis on either side. No change from  the comparison study.     L1-L2: Loss of disc height, disc desiccation and circumferential disc  bulging with a superimposed small posterior central disc herniation  (protrusion). Minimal facet arthropathy bilaterally. Minimal spinal  canal narrowing. No foraminal stenosis on either side. No change from  the comparison study.     L2-L3: Loss of disc height, disc desiccation and mild circumferential  disc bulging. No herniation. Moderate facet arthropathy bilaterally.  Mild spinal canal narrowing. Mild bilateral neural foraminal  narrowing. No change from the comparison study.     L3: Again noted is a fluid signal intensity collection in the right  posterolateral aspect of the spinal canal measuring 1.7 x 0.7 cm in  the AP and transverse dimensions respectively. This may represent a  synovial cyst  arising from the right facet joint versus a cystic  postoperative fluid collection or cystic foreign body reaction. This  results in mild narrowing of the thecal sac at the mid L3 level.     L3-L4: (Fused level) Loss of disc height, disc desiccation and mild  circumferential disc bulging. No herniation. Moderate facet  arthropathy bilaterally. No spinal canal stenosis. Mild bilateral  neural foraminal narrowing. No change from the comparison study.      L4-L5: (Fused level) No spinal canal stenosis. No foraminal stenosis  on either side. No change from the comparison study.     L5-S1: (Fused level) No spinal canal stenosis. No foraminal stenosis  on either side. No change from the comparison study.                                                                      IMPRESSION:  1. Postoperative and degenerative change of the lumbar spine as  detailed above without appreciable change from the comparison studies.  2. No significant spinal canal or neural foraminal stenosis at any  level of the spine.     JAI CHAVEZ MD         Other testing (labs, diagnostics):  12/27/2021  Cr. 0.91  Est GFR >90        Screening tools:      DIRE Score for ongoing opioid management is calculated as follows:     Diagnosis = 2     Intractability = 2     Risk: Psych = 2  Chem Hlth = 2  Reliability = 3  Social = 3     Efficacy = 2     Total DIRE Score = 16 (14 or higher predicts good candidate for ongoing opioid management; 13 or lower predicts poor candidate for opioid management)            [place] Prescription Monitoring Program:  Reviewed [facility] January 10, 2023- no concerning fills.  Ina TORIBIO, RN CNP, FNP  [facility]  [place] location              Assessment:   1. Chronic bilateral low back pain with right-sided sciatica  2. Lumbar radiculopathy  3. Failed back surgical syndrome  4. Chronic continuous use of opioids  5. Class 3 severe obesity due to excess calories with serious co-morbidity  (untreated OBSTRUCTIVE SLEEP APNEA) and BMI of >40 in adult  6. OBSTRUCTIVE SLEEP APNEA (currently untreated)    6. Distant history of alcohol and drug abuse  7. Bipolar disorder   8. H/o menningitis after SCS trial  9. PMHx includes: Acute bronchitis, acute pancreatitis, acute sinusitis, ADHD, anxiety, depression, asthma, bipolar disorder unspecified, cervicalgia, dysthymic disorder, esophageal reflux, fixation of spine with fusion, history of blood transfusion, hyperlipidemia, low back pain, lumbar throat, meningitis after spinal cord stimulator trial, narcotic abuse, neck pain, open nondisplaced fracture of distal phalanx of finger, pain in limb, personal history of other diseases of the digestive system, sciatica, spasm of muscle, tobacco use disorder  10. PSHx includes: Back surgery in 2003 and 2011, lumbar anterior three-level fusion, ORIF right finger (3/3/2021)        Plan:   1. Physical Therapy: none  2. Continue your regular HEP   3. Clinical Health Psychologist to address issues of relaxation, behavioral change, coping style, and other factors important to improvement: none  4. Diagnostic Studies: none  5. Medication Management:   1. Continue Percocet max 3/day. Fill/begin 1/10/2023  2. Continue MSContin 15mg every 12 hours, fill 2/2 and start 2/5  3. Refill for naloxone nasal spray for safety  6. Further procedures recommended: none  7. Acupuncture: yes, continue  8. Referral to comprehensive weight management as losing weight will help to reduce pain and reduce risks for osteoarthritis, heart disease, stroke risk, etc.   9. Recommendations/follow-up for PCP:  See above  10. Release of information: none  11. Follow up: 12 weeks, video or in-person. Please call [phone] to make your follow-up appointment with me.         BILLING TIME DOCUMENTATION:   TOTAL TIME includes:   Time spent preparing to see the patient: 2 minutes (reviewing records and tests)  Time spend face to face with the patient: 47  minutes  Time spent ordering tests, medications, procedures and referrals: 0 minutes  Time spent Referring and communicating with other healthcare professionals: 0 minutes  Documenting clinical information in Epic: 10 minutes    The total TIME spent on this patient on the day of the appointment was 59 minutes.           Ina TORIBIO RN CNP, FNP  [facility]  [facility]

## 2023-01-24 DIAGNOSIS — F33.42 RECURRENT MAJOR DEPRESSION IN COMPLETE REMISSION (H): ICD-10-CM

## 2023-01-26 RX ORDER — DULOXETIN HYDROCHLORIDE 60 MG/1
CAPSULE, DELAYED RELEASE ORAL
Qty: 90 CAPSULE | Refills: 1 | Status: SHIPPED | OUTPATIENT
Start: 2023-01-26 | End: 2023-08-14

## 2023-02-02 DIAGNOSIS — G89.29 CHRONIC LOW BACK PAIN, UNSPECIFIED BACK PAIN LATERALITY, UNSPECIFIED WHETHER SCIATICA PRESENT: ICD-10-CM

## 2023-02-02 DIAGNOSIS — M54.50 CHRONIC LOW BACK PAIN, UNSPECIFIED BACK PAIN LATERALITY, UNSPECIFIED WHETHER SCIATICA PRESENT: ICD-10-CM

## 2023-02-02 NOTE — TELEPHONE ENCOUNTER
Routing to ordering provider for consideration, not on refill protocol.           Alpa Scott     RN MSN

## 2023-02-03 RX ORDER — GABAPENTIN 600 MG/1
TABLET ORAL
Qty: 180 TABLET | Refills: 5 | Status: SHIPPED | OUTPATIENT
Start: 2023-02-03 | End: 2023-07-31

## 2023-02-06 DIAGNOSIS — F33.42 RECURRENT MAJOR DEPRESSION IN COMPLETE REMISSION (H): ICD-10-CM

## 2023-02-06 DIAGNOSIS — F51.01 PRIMARY INSOMNIA: ICD-10-CM

## 2023-02-07 RX ORDER — MIRTAZAPINE 15 MG/1
TABLET, FILM COATED ORAL
Qty: 90 TABLET | Refills: 0 | Status: SHIPPED | OUTPATIENT
Start: 2023-02-07 | End: 2023-12-12

## 2023-02-24 DIAGNOSIS — G89.29 CHRONIC BILATERAL LOW BACK PAIN WITH RIGHT-SIDED SCIATICA: ICD-10-CM

## 2023-02-24 DIAGNOSIS — M54.41 CHRONIC BILATERAL LOW BACK PAIN WITH RIGHT-SIDED SCIATICA: ICD-10-CM

## 2023-02-24 DIAGNOSIS — F11.90 CHRONIC, CONTINUOUS USE OF OPIOIDS: ICD-10-CM

## 2023-02-24 DIAGNOSIS — M96.1 FAILED BACK SURGICAL SYNDROME: ICD-10-CM

## 2023-02-24 DIAGNOSIS — M54.16 LUMBAR RADICULOPATHY: ICD-10-CM

## 2023-02-24 NOTE — TELEPHONE ENCOUNTER
[facility] Call Center    Phone Message    May a detailed message be left on voicemail: yes     Reason for Call: Medication Refill Request    Has the patient contacted the pharmacy for the refill? Yes   Name of medication being requested:oxyCODONE-acetaminophen (PERCOCET) 5-325 MG tablet  Provider who prescribed the medication: Ina Barillas APRN CNP  Pharmacy: [facility] #[id number] [address] AT [facility] & [address]  Date medication is needed: 4-5 days per pt      Action Taken: Other: [facility]    Travel Screening: Not Applicable

## 2023-02-28 RX ORDER — OXYCODONE AND ACETAMINOPHEN 5; 325 MG/1; MG/1
1 TABLET ORAL EVERY 6 HOURS PRN
Qty: 90 TABLET | Refills: 0 | Status: SHIPPED | OUTPATIENT
Start: 2023-02-28 | End: 2023-04-07

## 2023-02-28 NOTE — TELEPHONE ENCOUNTER
Medication refill information reviewed. Scheduled 04/12/23    Due date for oxyCODONE-acetaminophen (PERCOCET) 5-325  is 02/28/23     Prescriptions prepped for review.     Will route to provider.

## 2023-02-28 NOTE — TELEPHONE ENCOUNTER
Received call from patient requesting refill(s) of oxyCODONE-acetaminophen (PERCOCET) 5-325 MG tablet     Last dispensed from pharmacy on 01/10/23    Patient's last office/virtual visit by prescribing provider on 01/10/23  Next office/virtual appointment scheduled for None    Last urine drug screen date 05/27/22  Current opioid agreement on file (completed within the last year) Yes Date of opioid agreement: 05/27/22    E-prescribe to   [facility] #[id number] - [place], [place] - [address] [place] [address] AT [facility] & [address]  [address] [place] [id number]  Phone: [phone] Fax: [phone]    Will route to nursing pool for review and preparation of prescription(s).       Shayna Chase MA  [facility] [place]

## 2023-02-28 NOTE — TELEPHONE ENCOUNTER
Chart review:  Last apt follow up recommendations  1. Follow up: 12 weeks, video or in-person. Please call [phone] to make your follow-up appointment with me.     Pending Prescriptions:                       Disp   Refills    oxyCODONE-acetaminophen (PERCOCET) 5-325 *90 tab*0            Sig: Take 1 tablet by mouth every 6 hours as needed           for severe pain (7-10) Max 3/day. Fill/start           2/28/23. 30 day supply    [email or web address]

## 2023-03-01 NOTE — TELEPHONE ENCOUNTER
Signed Prescriptions:                        Disp   Refills    oxyCODONE-acetaminophen (PERCOCET) 5-325 M*90 tab*0        Sig: Take 1 tablet by mouth every 6 hours as needed for           severe pain (7-10) Max 3/day. Fill/start 2/28/23.           30 day supply  Authorizing Provider: INA LOPEZ        Reviewed [place]  February 28, 2023- no concerning fills.    Ina Lopez APRN, RN CNP, FNP  [facility]  [facility]

## 2023-03-07 DIAGNOSIS — M54.41 CHRONIC BILATERAL LOW BACK PAIN WITH RIGHT-SIDED SCIATICA: ICD-10-CM

## 2023-03-07 DIAGNOSIS — M54.16 LUMBAR RADICULOPATHY: ICD-10-CM

## 2023-03-07 DIAGNOSIS — F11.90 CHRONIC, CONTINUOUS USE OF OPIOIDS: ICD-10-CM

## 2023-03-07 DIAGNOSIS — M96.1 FAILED BACK SURGICAL SYNDROME: ICD-10-CM

## 2023-03-07 DIAGNOSIS — G89.29 CHRONIC BILATERAL LOW BACK PAIN WITH RIGHT-SIDED SCIATICA: ICD-10-CM

## 2023-03-07 RX ORDER — MORPHINE SULFATE 15 MG/1
15 TABLET, FILM COATED, EXTENDED RELEASE ORAL EVERY 12 HOURS
Qty: 60 TABLET | Refills: 0 | Status: SHIPPED | OUTPATIENT
Start: 2023-03-07 | End: 2023-04-07

## 2023-03-07 NOTE — TELEPHONE ENCOUNTER
Received call from patient requesting refill(s) of morphine (MS CONTIN) 15 MG CR tablet    Last dispensed from pharmacy on 02/05/23    Patient's last office/virtual visit by prescribing provider on 01/10/23  Next office/virtual appointment scheduled for 04/12/23    Last urine drug screen date 05/27/22  Current opioid agreement on file (completed within the last year) Yes Date of opioid agreement: 05/27/22    E-prescribe to pharmacy-[facility] #[id number] - [place], [place] - [facility] [address] AT [facility] [address]    Will route to nursing [place] for review and preparation of prescription(s).

## 2023-03-07 NOTE — TELEPHONE ENCOUNTER
Medication refill information reviewed.     Due date for morphine (MS CONTIN) 15 MG CR tablet is 03/07/23     Prescriptions prepped for review.     Will route to provider.

## 2023-03-07 NOTE — TELEPHONE ENCOUNTER
[facility] Call Center    Phone Message    May a detailed message be left on voicemail: yes     Reason for Call: Medication Refill Request    Has the patient contacted the pharmacy for the refill? Yes   Name of medication being requested: morphine (MS CONTIN) 15 MG CR tablet  Provider who prescribed the medication: Ina Barillas APRN CNP  Pharmacy: [facility] #[id number] - [place], [place] - [address] [facility] [address] AT [facility] [address]  Date medication is needed: 3/10/2023         Action Taken: Message routed to:  Other: [facility]    Travel Screening: Not Applicable

## 2023-03-07 NOTE — TELEPHONE ENCOUNTER
Signed Prescriptions:                        Disp   Refills    morphine (MS CONTIN) 15 MG CR tablet       60 tab*0        Sig: Take 1 tablet (15 mg) by mouth every 12 hours maximum           2 tablet(s) per day OK to fill 03/07/23. 30 day           supply for chronic pain  Authorizing Provider: INA LOPEZ      Please remind patient to call 5-7 BUSINESS DAYS PRIOR to needing opiate refill to avoid any possible breaks in medication. Thank-you.     Reviewed [facility] March 7, 2023- no concerning fills.    Ina Lopez APRN, RN CNP, FNP  [facility]  [facility]

## 2023-03-11 DIAGNOSIS — G89.4 CHRONIC PAIN SYNDROME: ICD-10-CM

## 2023-03-13 RX ORDER — NORTRIPTYLINE HCL 10 MG
CAPSULE ORAL
Qty: 180 CAPSULE | Refills: 2 | Status: SHIPPED | OUTPATIENT
Start: 2023-03-13 | End: 2023-12-27

## 2023-04-03 DIAGNOSIS — G89.4 CHRONIC PAIN SYNDROME: ICD-10-CM

## 2023-04-03 NOTE — TELEPHONE ENCOUNTER
Fax received from: [facility] #[id number] - [place], [place]  Refill authorization requested    Drug: tiZANidine (ZANAFLEX) 4 MG tablet  Qty: 90  Last filled 03/03/23  Last seen: 1/10/23  Next appointment 4/12/23

## 2023-04-03 NOTE — TELEPHONE ENCOUNTER
Signed Prescriptions:                        Disp   Refills    tiZANidine (ZANAFLEX) 4 MG tablet          90 tab*4        Sig: Take 1 tablet (4 mg) by mouth 3 times daily as needed           for muscle spasms 0.5-1 tab tid prn muscle spasm  Authorizing Provider: REA LOPEZ, RN CNP, FNP  [facility]  [facility]

## 2023-04-07 DIAGNOSIS — F11.90 CHRONIC, CONTINUOUS USE OF OPIOIDS: ICD-10-CM

## 2023-04-07 DIAGNOSIS — G89.29 CHRONIC BILATERAL LOW BACK PAIN WITH RIGHT-SIDED SCIATICA: ICD-10-CM

## 2023-04-07 DIAGNOSIS — M54.16 LUMBAR RADICULOPATHY: ICD-10-CM

## 2023-04-07 DIAGNOSIS — M96.1 FAILED BACK SURGICAL SYNDROME: ICD-10-CM

## 2023-04-07 DIAGNOSIS — M54.41 CHRONIC BILATERAL LOW BACK PAIN WITH RIGHT-SIDED SCIATICA: ICD-10-CM

## 2023-04-07 RX ORDER — MORPHINE SULFATE 15 MG/1
15 TABLET, FILM COATED, EXTENDED RELEASE ORAL EVERY 12 HOURS
Qty: 60 TABLET | Refills: 0 | Status: SHIPPED | OUTPATIENT
Start: 2023-04-07 | End: 2023-05-05

## 2023-04-07 RX ORDER — OXYCODONE AND ACETAMINOPHEN 5; 325 MG/1; MG/1
1 TABLET ORAL EVERY 6 HOURS PRN
Qty: 90 TABLET | Refills: 0 | Status: SHIPPED | OUTPATIENT
Start: 2023-04-07 | End: 2023-07-12

## 2023-04-07 NOTE — TELEPHONE ENCOUNTER
Patient requesting refill(s) of morphine (MS CONTIN) 15 MG CR tablet   Last dispensed from pharmacy on 3/7/23    oxyCODONE-acetaminophen (PERCOCET) 5-325 MG tablet  Last dispensed from pharmacy on 2/28/23    Patient's last office/virtual visit by prescribing provider on 1/10/23  Next office/virtual appointment scheduled for 04/12/23    Last urine drug screen date 5/27/22  Current opioid agreement on file (completed within the last year) Yes Date of opioid agreement: 5/27/22    E-prescribe to [id number] - [place], [place]     Will route to [facility] for review and preparation of prescription(s).

## 2023-04-07 NOTE — TELEPHONE ENCOUNTER
Signed Prescriptions:                        Disp   Refills    morphine (MS CONTIN) 15 MG CR tablet       60 tab*0        Sig: Take 1 tablet (15 mg) by mouth every 12 hours maximum           2 tablet(s) per day OK to fill/start 04/07/23. 30           day supply for chronic pain  Authorizing Provider: INA LOPEZ    oxyCODONE-acetaminophen (PERCOCET) 5-325 M*90 tab*0        Sig: Take 1 tablet by mouth every 6 hours as needed for           severe pain Max 3/day. Fill/start 04/07/23. 30           day supply  Authorizing Provider: INA LOPEZ    Please remind patient to call 5-7 BUSINESS DAYS PRIOR to needing opiate refill to avoid any possible breaks in medication. Thank-you.       Reviewed [place]  April 7, 2023- no concerning fills.    Ina TORIBIO, RN CNP, FNP  [facility]  [facility]

## 2023-04-07 NOTE — TELEPHONE ENCOUNTER
Medication refill information reviewed.     Due date for morphine (MS CONTIN) 15 MG CR tablet and oxyCODONE-acetaminophen (PERCOCET) 5-325 MG tablet is 04/07/23     Prescriptions prepped for review.     Will route to provider.

## 2023-04-07 NOTE — TELEPHONE ENCOUNTER
[facility] Call Center    Phone Message    May a detailed message be left on voicemail: yes     Reason for Call: Medication Refill Request    Has the patient contacted the pharmacy for the refill? Yes   Name of medication being requested: morphine (MS CONTIN) 15 MG CR tablet, oxyCODONE-acetaminophen (PERCOCET) 5-325 MG tablet  Provider who prescribed the medication: Nargis  Pharmacy:    [id number] - [address] AT [facility] [address]      Date medication is needed: patient has 2 days left.          Action Taken: Other: Pain    Travel Screening: Not Applicable

## 2023-04-12 ENCOUNTER — OFFICE VISIT (OUTPATIENT)
Dept: PALLIATIVE MEDICINE | Facility: CLINIC | Age: 53
End: 2023-04-12
Payer: COMMERCIAL

## 2023-04-12 ENCOUNTER — LAB (OUTPATIENT)
Dept: LAB | Facility: CLINIC | Age: 53
End: 2023-04-12
Payer: COMMERCIAL

## 2023-04-12 VITALS — HEART RATE: 67 BPM | DIASTOLIC BLOOD PRESSURE: 87 MMHG | SYSTOLIC BLOOD PRESSURE: 165 MMHG

## 2023-04-12 DIAGNOSIS — M54.16 LUMBAR RADICULOPATHY: Primary | ICD-10-CM

## 2023-04-12 DIAGNOSIS — Z79.891 ENCOUNTER FOR LONG-TERM USE OF OPIATE ANALGESIC: ICD-10-CM

## 2023-04-12 DIAGNOSIS — F11.90 CHRONIC, CONTINUOUS USE OF OPIOIDS: ICD-10-CM

## 2023-04-12 DIAGNOSIS — M96.1 FAILED BACK SYNDROME: ICD-10-CM

## 2023-04-12 LAB — ETHANOL UR QL SCN: NORMAL

## 2023-04-12 PROCEDURE — 99213 OFFICE O/P EST LOW 20 MIN: CPT | Performed by: NURSE PRACTITIONER

## 2023-04-12 PROCEDURE — 80320 DRUG SCREEN QUANTALCOHOLS: CPT

## 2023-04-12 ASSESSMENT — PAIN SCALES - GENERAL: PAINLEVEL: MODERATE PAIN (5)

## 2023-04-12 NOTE — PATIENT INSTRUCTIONS
Plan:   Physical Therapy: none  Continue your regular [facility]   Clinical Health Psychologist to address issues of relaxation, behavioral change, coping style, and other factors important to improvement: none  Diagnostic Studies: none  Medication Management:   Continue Percocet max 3/day.   Continue MSContin 15mg every 12 hours  Refill for naloxone nasal spray for safety  We can transition you off of MSContin which makes you feel a little cloudy onto buprenorphine (Butrans patch change every 7 days or Belbuca buccal film in your mouth twice daily. I'd likely use Butrans 10mcg/hr or Belbuca 150mcg twice daily). Let me know if you wish to switch next month. I'd need to put you on short-acting only for about 5 days, then start buprenorphine, have to hold all pain meds for 16 hours prior to the very first dose  Further procedures recommended: none  Acupuncture: yes, continue  Referral to comprehensive weight management as losing weight will help to reduce pain and reduce risks for osteoarthritis, heart disease, stroke risk, etc.   Recommendations/follow-up for PCP:  See above  Release of information: none  UDT today. Last took MSContin and Percocet this morning  Re-signed CSA today  Follow up: 12 weeks, video or in-person. Please call [phone] to make your follow-up appointment with me.     ----------------------------------------------------------------  Clinic Number:  [phone]   Call with any questions about your care and for scheduling assistance.   Calls are returned Monday through Friday between 8 AM and 4:30 PM. We usually get back to you within 2 business days depending on the issue/request.    If we are prescribing your medications:  For opioid medication refills, call the clinic or send a [employer] message 7 days in advance.  Please include:  Name of requested medication  Name of the pharmacy.  For non-opioid medications, call your pharmacy directly to request a refill. Please allow 3-4 days to be processed.    Per [place] State Law:  All controlled substance prescriptions must be filled within 30 days of being written.    For those controlled substances allowing refills, pickup must occur within 30 days of last fill.      We believe regular attendance is key to your success in our program!    Any time you are unable to keep your appointment we ask that you call us at least 24 hours in advance to cancel.This will allow us to offer the appointment time to another patient.   Multiple missed appointments may lead to dismissal from the clinic.

## 2023-04-12 NOTE — LETTER
Opioid / Opioid Plus Controlled Substance Agreement    This is an agreement between you and your provider about the safe and appropriate use of controlled substance/opioids prescribed by your care team. Controlled substances are medicines that can cause physical and mental dependence (abuse).    There are strict laws about having and using these medicines. We here at [facility] are committing to working with you in your efforts to get better. To support you in this work, we ll help you schedule regular office appointments for medicine refills. If we must cancel or change your appointment for any reason, we ll make sure you have enough medicine to last until your next appointment.     As a Provider, I will:  Listen carefully to your concerns and treat you with respect.   Recommend a treatment plan that I believe is in your best interest. This plan may involve therapies other than opioid pain medication.   Talk with you often about the possible benefits, and the risk of harm of any medicine that we prescribe for you.   Provide a plan on how to taper (discontinue or go off) using this medicine if the decision is made to stop its use.    As a Patient, I understand that opioid(s):   Are a controlled substance prescribed by my care team to help me function or work and manage my condition(s).   Are strong medicines and can cause serious side effects such as:  Drowsiness, which can seriously affect my driving ability  A lower breathing rate, enough to cause death  Harm to my thinking ability   Depression   Abuse of and addiction to this medicine  Need to be taken exactly as prescribed. Combining opioids with certain medicines or chemicals (such as illegal drugs, sedatives, sleeping pills, and benzodiazepines) can be dangerous or even fatal. If I stop opioids suddenly, I may have severe withdrawal symptoms.  Do not work for all types of pain nor for all patients. If they re not helpful, I may be asked to stop  them.        The risks, benefits and side effects of these medicine(s) were explained to me. I agree that:  I will take part in other treatments as advised by my care team. This may be psychiatry or counseling, physical therapy, behavioral therapy, group treatment or a referral to a specialist.     I will keep all my appointments. I understand that this is part of the monitoring of opioids. My care team may require an office visit for EVERY opioid/controlled substance refill. If I miss appointments or don t follow instructions, my care team may stop my medicine.    I will take my medicines as prescribed. I will not change the dose or schedule unless my care team tells me to. There will be no refills if I run out early.     I may be asked to come to the clinic and complete a urine drug test or complete a pill count at any time. If I don t give a urine sample or participate in a pill count, the care team may stop my medicine.    I will only receive prescriptions from this clinic for chronic pain. If I am treated by another provider for acute pain issues, I will tell them that I am taking opioid pain medication for chronic pain and that I have a treatment agreement with this provider. I will inform my [facility] care team within one business day if I am given a prescription for any pain medication by another healthcare provider. My [facility] care team can contact other providers and pharmacists about my use of any medicines.    It is up to me to make sure that I don t run out of my medicines on weekends or holidays. If my care team is willing to refill my opioid prescription without a visit, I must request refills only during office hours. Refills may take up to 3 business days to process. I will use one pharmacy to fill all my opioid and other controlled substance prescriptions. I will notify the clinic about any changes to my insurance or medication availability.    I am responsible for my  prescriptions. If the medicine/prescription is lost, stolen or destroyed, it will not be replaced. I also agree not to share controlled substance medicines with anyone.    I am aware I should not use any illegal or recreational drugs. I agree not to drink alcohol unless my care team says I can.       If I enroll in the [place] [facility] Cann[facility] program, I will tell my care team prior to my next refill.     I will tell my care team right away if I become pregnant, have a new medical problem treated outside of my regular clinic, or have a change in my medications.    I understand that this medicine can affect my thinking, judgment and reaction time. Alcohol and drugs affect the brain and body, which can affect the safety of my driving. Being under the influence of alcohol or drugs can affect my decision-making, behaviors, personal safety, and the safety of others. Driving while impaired (DWI) can occur if a person is driving, operating, or in physical control of a car, motorcycle, boat, snowmobile, ATV, motorbike, off-road vehicle, or any other motor vehicle ([place] Statute 169A.20). I understand the risk if I choose to drive or operate any vehicle or machinery.    I understand that if I do not follow any of the conditions above, my prescriptions or treatment may be stopped or changed.          Opioids  What You Need to Know    What are opioids?   Opioids are pain medicines that must be prescribed by a doctor. They are also known as narcotics.     Examples are:   morphine (MS Contin, Mimi)  oxycodone (Oxycontin)  oxycodone and acetaminophen (Percocet)  hydrocodone and acetaminophen (Vicodin, Norco)   fentanyl patch (Duragesic)   hydromorphone (Dilaudid)   methadone  codeine (Tylenol #3)     What do opioids do well?   Opioids are best for severe short-term pain such as after a surgery or injury. They may work well for cancer pain. They may help some people with long-lasting (chronic) pain.     What do opioids NOT do  well?   Opioids never get rid of pain entirely, and they don t work well for most patients with chronic pain. Opioids don t reduce swelling, one of the causes of pain.                                    Other ways to manage chronic pain and improve function include:     Treat the health problem that may be causing pain  Anti-inflammation medicines, which reduce swelling and tenderness, such as ibuprofen (Advil, Motrin) or naproxen (Aleve)  Acetaminophen (Tylenol)  Antidepressants and anti-seizure medicines, especially for nerve pain  Topical treatments such as patches or creams  Injections or nerve blocks  Chiropractic or osteopathic treatment  Acupuncture, massage, deep breathing, meditation, visual imagery, aromatherapy  Use heat or ice at the pain site  Physical therapy   Exercise  Stop smoking  Take part in therapy       Risks and side effects     Talk to your doctor before you start or decide to keep taking opioids. Possible side effects include:    Lowering your breathing rate enough to cause death  Overdose, including death, especially if taking higher than prescribed doses  Worse depression symptoms; less pleasure in things you usually enjoy  Feeling tired or sluggish  Slower thoughts or cloudy thinking  Being more sensitive to pain over time; pain is harder to control  Trouble sleeping or restless sleep  Changes in hormone levels (for example, less testosterone)  Changes in sex drive or ability to have sex  Constipation  Unsafe driving  Itching and sweating  Dizziness  Nausea, throwing up and dry mouth    What else should I know about opioids?    Opioids may lead to dependence, tolerance, or addiction.    Dependence means that if you stop or reduce the medicine too quickly, you will have withdrawal symptoms. These include loose poop (diarrhea), jitters, flu-like symptoms, nervousness and tremors. Dependence is not the same as addiction.                     Tolerance means needing higher doses over time to  get the same effect. This may increase the chance of serious side effects.    Addiction is when people improperly use a substance that harms their body, their mind or their relations with others. Use of opiates can cause a relapse of addiction if you have a history of drug or alcohol abuse.    People who have used opioids for a long time may have a lower quality of life, worse depression, higher levels of pain and more visits to doctors.    You can overdose on opioids. Take these steps to lower your risk of overdose:    Recognize the signs:  Signs of overdose include decrease or loss of consciousness (blackout), slowed breathing, trouble waking up and blue lips. If someone is worried about overdose, they should call 911.    Talk to your doctor about Narcan (naloxone).   If you are at risk for overdose, you may be given a prescription for Narcan. This medicine very quickly reverses the effects of opioids.   If you overdose, a friend or family member can give you Narcan while waiting for the ambulance. They need to know the signs of overdose and how to give Narcan.     Don't use alcohol or street drugs.   Taking them with opioids can cause death.    Do not take any of these medicines unless your doctor says it s OK. Taking these with opioids can cause death:  Benzodiazepines, such as lorazepam (Ativan), alprazolam (Xanax) or diazepam (Valium)  Muscle relaxers, such as cyclobenzaprine (Flexeril)  Sleeping pills like zolpidem (Ambien)   Other opioids      How to keep you and other people safe while taking opioids:    Never share your opioids with others.  Opioid medicines are regulated by the Drug Enforcement Agency (FUAD). Selling or sharing medications is a criminal act.    2. Be sure to store opioids in a secure place, locked up if possible. Young children can easily swallow them and overdose.    3. When you are traveling with your medicines, keep them in the original bottles. If you use a pill box, be sure you also  carry a copy of your medicine list from your clinic or pharmacy.    4. Safe disposal of opioids    Most pharmacies have places to get rid of medicine, called disposal kiosks. Medicine disposal options are also available in every [facility]. Search your county and  medication disposal  to find more options. You can find more details at:  https://www.pca.[facility].[place]./living-green/managing-unwanted-medications     I agree that my provider, clinic care team, and pharmacy may work with any city, state or federal law enforcement agency that investigates the misuse, sale, or other diversion of my controlled medicine. I will allow my provider to discuss my care with, or share a copy of, this agreement with any other treating provider, pharmacy or emergency room where I receive care.    I have read this agreement and have asked questions about anything I did not understand.    _______________________________________________________  Patient Signature - David Wilcox _____________________                   Date     _______________________________________________________  Provider Signature - CATARINO Abdullahi CNP   _____________________                   Date     _______________________________________________________  Witness Signature (required if provider not present while patient signing)   _____________________                   Date

## 2023-04-12 NOTE — PROGRESS NOTES
[facility]      4/12/2023      Chief complaint:   Low back pain and right leg radicular pain      Interval history:  David Wilcox is a 52 year old male is known to me for:  Chronic bilateral low back pain with right-sided sciatica  Lumbar radiculopathy  Failed back surgical syndrome  Chronic continuous use of opioids  Encounter for long-term use of opiate analgesic     Distant history of alcohol and drug abuse  Bipolar disorder   H/o menningitis after SCS trial  PMHx includes: Acute bronchitis, acute pancreatitis, acute sinusitis, ADHD, anxiety, depression, asthma, bipolar disorder unspecified, cervicalgia, dysthymic disorder, esophageal reflux, fixation of spine with fusion, history of blood transfusion, hyperlipidemia, low back pain, lumbar throat, meningitis after spinal cord stimulator trial, narcotic abuse, neck pain, open nondisplaced fracture of distal phalanx of finger, pain in limb, personal history of other diseases of the digestive system, sciatica, spasm of muscle, tobacco use disorder  PSHx includes: Back surgery in 2003 and 2011, lumbar anterior three-level fusion, ORIF right finger (3/3/2021)  .    Recommendations/plan at the last visit on 1/10/2023 included:  1. Physical Therapy: none  2. Continue your regular HEP   3. Clinical Health Psychologist to address issues of relaxation, behavioral change, coping style, and other factors important to improvement: none  4. Diagnostic Studies: none  5. Medication Management:   1. Continue Percocet max 3/day. Fill/begin 1/10/2023  2. Continue MSContin 15mg every 12 hours, fill 2/2 and start 2/5  3. Refill for naloxone nasal spray for safety  6. Further procedures recommended: none  7. Acupuncture: yes, continue  8. Referral to comprehensive weight management as losing weight will help to reduce pain and reduce risks for osteoarthritis, heart disease, stroke risk, etc.   9. Recommendations/follow-up for PCP:  See above  10. Release  of information: none  11. Follow up: 12 weeks, video or in-person. Please call [phone] to make your follow-up appointment with me.             Since his last visit, David Wilcox reports:    Interval history April 12, 2023  -pain is the same  -morphine is really helpful but makes him feel a little cloudy  -long acting OxyContin can be harder to obtain with his pharmacy  -discussed switch to buprenorphine (Butrans patch or Belbuca film) patient interested, wants to think about it and speak with his wife    Interval history January 10, 2023  -when in [place], unable to get OxyContin, switched to MSContin 15mg every 12 hours. This is working well, no significant side effects. Feels more sleepy in the late afternoon, could also be shorter days. Will continue to monitor.   -low back pain remains with right leg radiculopathy to the right foot 2nd through 5th digits.   -walking up to 3 km per day.  -mentions wanting to lose weight, discussed referral to comprehensive weight management clinic, patient is very interested in this.   Also states he has previously been diagnosed with OBSTRUCTIVE SLEEP APNEA, but does not have CPAP as none were in stock when he was initially diagnosed. Encouraged patient to follow up with his sleep medicine provider and get a CPAP set up.      Interval history October 31, 2022  -low back pain and right leg pain has been a little worse, he usually struggle with the change of seasons getting cooler.   -he may do acupuncture again, this has been beneficial in the past.   -may go to his son's in [place] as he is likely moving to the [place], [place] area  -we have previously discussed possible switch to buprenorphine, would like to wait until he is likely back in [place] due to travel to [place] for a couple months, then likely [place] for a bit.     Interval history August 30, 2022  -David states he still has the ongoing low back pain with right leg radicular symptoms. Feels pain has been a bit better.  "  -he is increasing his activities  -exercising regularly, doing 2 km twice daily on the treadmill, able to do walking  -doing 3 [employer] per week. ([place], [place], and [place])  -on vacation camping    Pain history collected 5/27/2022 at initial visit  Out cutting a tree and it fell on him in 2002. He had compression fractures L5-S1, and multiple other fractures and spent almost a year in a wheelchair, initially told he would not walk again.   His initial spinal  surgery was helpful, but he then fell down the stairs and he knew something was wrong but no one would believe him. He states 8 years later they found loose hardware and he had to have a subsequent surgery to fix that. Then a 3rd surgery extended the fusion. He is now fused from L3-S1.      Pain on the right side from bottom of the ribs to the low back, around the hip and down the right leg to the toes. Bilateral hip pain posterolateral hip pain (points to buttocks).      He has had loss of bowel and bladder but it is not a common thing. This is not new, present off and on for 6 years.      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   Has regular exercise with chores and he now walks every day on the treadmill (2km in the AM and PM)  Does [employer] in [place] on Fridays and his wife does so on Saturdays.           At this point, the patient's participation with our multidisciplinary team includes:  The patient has been compliant with the program.  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    Range is 5-9/10.   Pain right now is 5/10.   Pain is described as \"like a pillow with a knife through it and when bad, like I am on fire\"    Pain is constant in nature      Current pain relevant medications:   -Tylenol extra strength 500mg PRN   -naloxone nasal spray for opiate reversal   -Duloxetine 60mg every day (very helpful " for mood and pain)  -gabapentin 600mg take 2 tabs/1200mg TID (helpful)  -nortriptyline 10mg take 2 (20mg) at bedtime (helpful)  -MSContin 15mg Q 12 hours (helpful-moreso than the OxyContin had been but also causes him to feel a little tired )  -Percocet 5/325mg take 1 tab Q 6 hours PRN max 3/day (he plans on 2/day and uses 3 if needed. helpful)  -tizanidine 4mg TID PRN for muscle spasms (helpful)        Other pertinent medications:  -lorazepam 0.5mg BID PRN anxiety (very rare use, received #30 tabs in 2018 and still has some left)  -mirtazepine 15mg at bedtime  -ambien 10mg Q HS PRN insomnia           Previous medication treatments included:  OPIATES: oxycodone (helpful), hydrocodone (not helpful), Methadone (took after surgery, very helpful), Fentanyl patches (would have issues with getting to hot and get a dump of medication. He has used this very successfully in the winter when it is colder), Dilaudid (hydromorphone, very helpful for a migraine with antibiotics), MSContin (more helpful than OxyContin was)  NSAIDS: naproxyn (not helpful), ibuprofen (not helpful)  MUSCLE RELAXANTS: Methocarbamol (expense, helpful), Flexeril (helpful but he had a remote history of a suicide attempt with this drug), tizanidine (very helpful)  ANTI-MIGRAINE MEDS: none  ANTI-DEPRESSANTS: none  SLEEP AIDS: Remeron (helpful)  ANTI-CONVULSANTS: nortriptyline (helpful)  TOPICALS: CBD oil (helps some)  ANXIOLYTICS: lorazepam (helpful)  MEDICAL CANNABIS: none  Other meds: lidocaine (not helpful)        Other treatments have included:   David Wilcox has been seen at a pain clinic in the past.  Previously managed by Dr. Bindu Motley here  PT: tried, was not helpful on multiple occasions  Chiropractic care: yes, uses this regularly for acupuncture  Acupuncture: helpful  TENs Unit: has one, helpful, uses it occasionally  SCS trial in   got meningitis and nearly       Injections:   -3/26/2018 caudal ALVA with Dr. Bindu Motley  (not  helpful)  -11/19/2018 right L3-4 transforaminal ALVA with Dr. Bindu Motley (not helpful)  -11/11/2019  Bilateral SI joint injections with Dr. Bindu Motley  (not helpful)      THE 4 A's OF OPIOID MAINTENANCE ANALGESIA    Analgesia: helpful    Activity: exercising on treadmill twice daily, more active    Adverse effects: none    Adherence to Rx protocol: yes      Side Effects: no side effect  Patient is using the medication as prescribed: YES    Medications:  Current Outpatient Medications   Medication Sig Dispense Refill     albuterol (PROAIR HFA/PROVENTIL HFA/VENTOLIN HFA) 108 (90 Base) MCG/ACT inhaler INHALE 2 PUFFS INTO THE LUNGS EVERY 4 HOURS AS NEEDED FOR SHORTNESS OF BREATH 8.5 g 11     DULoxetine (CYMBALTA) 60 MG capsule TAKE 1 CAPSULE(60 MG) BY MOUTH DAILY 90 capsule 1     gabapentin (NEURONTIN) 600 MG tablet TAKE 2 TABLETS BY MOUTH THREE TIMES DAILY 180 tablet 5     losartan (COZAAR) 100 MG tablet Take 1 tablet (100 mg) by mouth daily 90 tablet 3     mirtazapine (REMERON) 15 MG tablet TAKE 1 TABLET(15 MG) BY MOUTH AT BEDTIME 90 tablet 0     morphine (MS CONTIN) 15 MG CR tablet Take 1 tablet (15 mg) by mouth every 12 hours maximum 2 tablet(s) per day OK to fill/start 04/07/23. 30 day supply for chronic pain 60 tablet 0     naloxone (NARCAN) 4 MG/0.1ML nasal spray Spray 1 spray (4 mg) into one nostril alternating nostrils once as needed for opioid reversal every 2-3 minutes until assistance arrives 0.2 mL 1     nortriptyline (PAMELOR) 10 MG capsule TAKE 2 CAPSULES(20 MG) BY MOUTH AT BEDTIME 180 capsule 2     omeprazole (PRILOSEC) 20 MG DR capsule Take 1 capsule (20 mg) by mouth daily 90 capsule 3     oxyCODONE-acetaminophen (PERCOCET) 5-325 MG tablet Take 1 tablet by mouth every 6 hours as needed for severe pain Max 3/day. Fill/start 04/07/23. 30 day supply 90 tablet 0     tiZANidine (ZANAFLEX) 4 MG tablet Take 1 tablet (4 mg) by mouth 3 times daily as needed for muscle spasms 0.5-1 tab tid prn muscle spasm  90 tablet 4     TYLENOL EXTRA STRENGTH 500 MG OR TABS At Bedtime        zolpidem (AMBIEN) 10 MG tablet One prn nightly for insomnia. 30 tablet 5     LORazepam (ATIVAN) 0.5 MG tablet One bid prn anxiety. Rare use (Patient not taking: Reported on 4/12/2023) 30 tablet 0       Medical History: any changes in medical history since they were last seen? No    Social History:   Home situation:  with children x 2 at home.   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   History of chemical dependency treatment: quit on own       Is patient a current smoker or tobacco user?  no  If yes, was cessation counseling offered?  no          Physical Exam:  Vital signs: Blood pressure (!) 165/87, pulse 67.    Behavioral observations:  Awake, alert and cooperative    Gait:  Normal     Musculoskeletal exam:  Strength grossly equal throughout. Moves well in exam room    Neuro exam:  Deferred    Skin/vascular/autonomic:  No suspicious lesions on exposed skin.     Other:  na    Is the patient hypertensive today? Yes, mild  Hypertensive on recheck of BP?   yes  If yes, was patient recommended to see Primary Care Provider in follow up for management of HTN?  yes        Diagnostic tests:  MRI OF THE LUMBAR SPINE WITHOUT AND WITH CONTRAST August 6, 2021 5:57  PM      HISTORY: Low back pain, prior surgery, new symptoms. Worsening pain  above fusion, but also right radicular L5 or S1 distribution on the  right. Failed back surgical syndrome. Lumbar radiculopathy.     TECHNIQUE: Multiplanar, multisequence MRI images of the lumbar spine  were acquired without and with 10mL Gadavist IV contrast.     COMPARISON: Lumbar spine MR 4/27/2018, lumbar spine CT 7/14/2016.     FINDINGS: There are five lumbar-type vertebrae for the purposes of  this dictation. Posterior spine fusion from L3 down to S1 consisting  of bilateral  pedicle screws at each level with inner connecting rods,  interbody fusion devices in the L4-L5 and L5-S1 disc spaces and  laminectomies of L3, L4, L5 and S1 again noted.     Degenerative grade 1 anterolisthesis of L5 upon S1 and mild  degenerative retrolisthesis of L2 upon L3 again noted. Alignment  otherwise normal and unchanged. Vertebral body heights normal. No  fractures. No evidence for pathologic bony lesion. Marrow signal  normal.     There is loss of disc height, disc desiccation and posterior disc  bulging/herniation to varying degrees at all levels of the lumbar  spine with the exception of the resected L4-L5 and L5-S1 discs.      The tip of the conus medullaris is at the L1-L2 level which is within  normal limits. No abnormal intrathecal contrast enhancement. There is  no evidence for intrathecal abnormality.      Level by level:      T12-L1: Loss of disc height, disc desiccation and circumferential disc  bulging with a superimposed small posterior central disc herniation  (extrusion). Minimal facet arthropathy bilaterally. Minimal spinal  canal narrowing. No foraminal stenosis on either side. No change from  the comparison study.     L1-L2: Loss of disc height, disc desiccation and circumferential disc  bulging with a superimposed small posterior central disc herniation  (protrusion). Minimal facet arthropathy bilaterally. Minimal spinal  canal narrowing. No foraminal stenosis on either side. No change from  the comparison study.     L2-L3: Loss of disc height, disc desiccation and mild circumferential  disc bulging. No herniation. Moderate facet arthropathy bilaterally.  Mild spinal canal narrowing. Mild bilateral neural foraminal  narrowing. No change from the comparison study.     L3: Again noted is a fluid signal intensity collection in the right  posterolateral aspect of the spinal canal measuring 1.7 x 0.7 cm in  the AP and transverse dimensions respectively. This may represent a  synovial cyst  arising from the right facet joint versus a cystic  postoperative fluid collection or cystic foreign body reaction. This  results in mild narrowing of the thecal sac at the mid L3 level.     L3-L4: (Fused level) Loss of disc height, disc desiccation and mild  circumferential disc bulging. No herniation. Moderate facet  arthropathy bilaterally. No spinal canal stenosis. Mild bilateral  neural foraminal narrowing. No change from the comparison study.      L4-L5: (Fused level) No spinal canal stenosis. No foraminal stenosis  on either side. No change from the comparison study.     L5-S1: (Fused level) No spinal canal stenosis. No foraminal stenosis  on either side. No change from the comparison study.                                                                      IMPRESSION:  1. Postoperative and degenerative change of the lumbar spine as  detailed above without appreciable change from the comparison studies.  2. No significant spinal canal or neural foraminal stenosis at any  level of the spine.     JAI CHAVEZ MD         Other testing (labs, diagnostics):  11/17/2022  Cr. 0.80  Est GFR >90        Screening tools:      DIRE Score for ongoing opioid management is calculated as follows:     Diagnosis = 2     Intractability = 2     Risk: Psych = 2  Chem Hlth = 2  Reliability = 3  Social = 3     Efficacy = 2     Total DIRE Score = 16 (14 or higher predicts good candidate for ongoing opioid management; 13 or lower predicts poor candidate for opioid management)            [place] Prescription Monitoring Program:  Reviewed [place] [facility] 4/12/2023- no concerning fills.  Ina TORIBIO RN CNP, FNP  [facility]  [place] location              Assessment:  1. Lumbar radiculopathy  2. Failed back surgical syndrome  3. Chronic continuous use of opioids  4. Encounter for long term use of opiate analgesic    5. Class 3 severe obesity due to excess calories with serious co-morbidity (untreated  OBSTRUCTIVE SLEEP APNEA) and BMI of >40 in adult  6. OBSTRUCTIVE SLEEP APNEA (currently untreated)  6. Distant history of alcohol and drug abuse  7. Bipolar disorder   8. H/o menningitis after SCS trial  9. PMHx includes: Acute bronchitis, acute pancreatitis, acute sinusitis, ADHD, anxiety, depression, asthma, bipolar disorder unspecified, cervicalgia, dysthymic disorder, esophageal reflux, fixation of spine with fusion, history of blood transfusion, hyperlipidemia, low back pain, lumbar throat, meningitis after spinal cord stimulator trial, narcotic abuse, neck pain, open nondisplaced fracture of distal phalanx of finger, pain in limb, personal history of other diseases of the digestive system, sciatica, spasm of muscle, tobacco use disorder  10. PSHx includes: Back surgery in 2003 and 2011, lumbar anterior three-level fusion, ORIF right finger (3/3/2021)        Plan:   1. Physical Therapy: none  2. Continue your regular HEP   3. Clinical Health Psychologist to address issues of relaxation, behavioral change, coping style, and other factors important to improvement: none  4. Diagnostic Studies: none  5. Medication Management:   6. Continue Percocet max 3/day.   7. Continue MSContin 15mg every 12 hours  8. Refill for naloxone nasal spray for safety  9. We can transition you off of MSContin which makes you feel a little cloudy onto buprenorphine (Butrans patch change every 7 days or Belbuca buccal film in your mouth twice daily. I'd likely use Butrans 10mcg/hr or Belbuca 150mcg twice daily). Let me know if you wish to switch next month. I'd need to put you on short-acting only for about 5 days, then start buprenorphine, have to hold all pain meds for 16 hours prior to the very first dose  10. Further procedures recommended: none  11. Acupuncture: yes, continue  12. Referral to comprehensive weight management as losing weight will help to reduce pain and reduce risks for osteoarthritis, heart disease, stroke risk,  etc.   13. Recommendations/follow-up for PCP:  See above  14. Release of information: none  15. UDT today. Last took MSContin and Percocet this morning  16. Re-signed CSA today  17. Follow up: 12 weeks, video or in-person. Please call [phone] to make your follow-up appointment with me.         ASSESSMENT AND PLAN:  (M54.16) Lumbar radiculopathy  (primary encounter diagnosis)  Plan: Adult Pain Clinic Follow-Up Order (Blank)        Continue Percocet and MSContin. Naloxone nasal spray for safety    (M96.1) Failed back syndrome  Plan: Adult Pain Clinic Follow-Up Order (Blank)        Continue Percocet and MSContin. Naloxone nasal spray for safety    (F11.90) Chronic, continuous use of opioids  Plan: Drug Confirmation Panel Urine with Creat,         Ethanol urine, Adult Pain Clinic Follow-Up         Order (Blank)        Continue Percocet and MSContin. Naloxone nasal spray for safety    (Z79.891) Encounter for long-term use of opiate analgesic  Plan: Drug Confirmation Panel Urine with Creat,         Ethanol urine, Adult Pain Clinic Follow-Up         Order (Blank)        Continue Percocet and MSContin. Naloxone nasal spray for safety      Face to face time: 29 minutes        Ina TORIBIO, RN CNP, FNP  [facility]  [facility]

## 2023-04-13 LAB — CREAT UR-MCNC: 170 MG/DL

## 2023-04-16 LAB
CODEINE UR CFM-MCNC: 122 NG/ML
CODEINE/CREAT UR: 72 NG/MG {CREAT}
GABAPENTIN UR QL CFM: PRESENT
MORPHINE UR CFM-MCNC: >7000 NG/ML
MORPHINE/CREAT UR: ABNORMAL
OXYCODONE UR CFM-MCNC: 728 NG/ML
OXYCODONE/CREAT UR: 428 NG/MG {CREAT}
OXYMORPHONE UR CFM-MCNC: 138 NG/ML
OXYMORPHONE/CREAT UR: 81 NG/MG {CREAT}

## 2023-04-17 NOTE — RESULT ENCOUNTER NOTE
Urine drug testing as expected. No illicit medication or alcohol noted. Continue standard monitoring while on opiates.   Ina TORIBIO RN CNP, FNP  [facility] [place]

## 2023-05-04 DIAGNOSIS — M54.41 CHRONIC BILATERAL LOW BACK PAIN WITH RIGHT-SIDED SCIATICA: ICD-10-CM

## 2023-05-04 DIAGNOSIS — G89.29 CHRONIC BILATERAL LOW BACK PAIN WITH RIGHT-SIDED SCIATICA: ICD-10-CM

## 2023-05-04 DIAGNOSIS — M54.16 LUMBAR RADICULOPATHY: ICD-10-CM

## 2023-05-04 DIAGNOSIS — F11.90 CHRONIC, CONTINUOUS USE OF OPIOIDS: ICD-10-CM

## 2023-05-04 DIAGNOSIS — M96.1 FAILED BACK SURGICAL SYNDROME: ICD-10-CM

## 2023-05-04 NOTE — TELEPHONE ENCOUNTER
[facility] Call Center    Phone Message    May a detailed message be left on voicemail: yes     Reason for Call: Medication Refill Request      Has the patient contacted the pharmacy for the refill? Yes     Name of medication being requested:     morphine (MS CONTIN) 15 MG CR tablet    Provider who prescribed the medication: Ina Barillas    Pharmacy: [facility]    Date medication is needed: ASAP

## 2023-05-04 NOTE — TELEPHONE ENCOUNTER
Received call from patient requesting refill(s) morphine (MS CONTIN) 15 MG CR tablet    Last dispensed from pharmacy on 04/07/23    Patient's last office/virtual visit by prescribing provider on 04/12/23  Next office/virtual appointment scheduled for None    Last urine drug screen date 04/12/23  Current opioid agreement on file (completed within the last year) Yes Date of opioid agreement: 04/12/23    E-prescribe to      [facility] #[id number] - [place], [place] - [address] [place] [address] [address] AT [facility] [address]      Will route to nursing [place] for review and preparation of prescription(s).    Renae Rodriguez MA  Pain management, [place]

## 2023-05-05 RX ORDER — MORPHINE SULFATE 15 MG/1
15 TABLET, FILM COATED, EXTENDED RELEASE ORAL EVERY 12 HOURS
Qty: 60 TABLET | Refills: 0 | Status: SHIPPED | OUTPATIENT
Start: 2023-05-05 | End: 2023-07-12

## 2023-05-05 NOTE — TELEPHONE ENCOUNTER
Signed Prescriptions:                        Disp   Refills    morphine (MS CONTIN) 15 MG CR tablet       60 tab*0        Sig: Take 1 tablet (15 mg) by mouth every 12 hours maximum           2 tablet(s) per day OK to fill 05/05/23 and start           05/07/23. 30 day supply for chronic pain  Authorizing Provider: INA LOPEZ        Reviewed [place]  May 5, 2023- no concerning fills.    Ina TORIBIO, RN CNP, FNP  [facility]  [facility]

## 2023-05-05 NOTE — TELEPHONE ENCOUNTER
Medication refill information reviewed.     Due date for  morphine (MS CONTIN) 15 MG CR tablet  is 05/07/23     Prescriptions prepped for review.     Will route to provider.

## 2023-05-12 ENCOUNTER — TELEPHONE (OUTPATIENT)
Dept: PALLIATIVE MEDICINE | Facility: CLINIC | Age: 53
End: 2023-05-12
Payer: COMMERCIAL

## 2023-05-12 NOTE — TELEPHONE ENCOUNTER
Central Prior Authorization Team  Phone: [phone]    Prior Authorization Approval    Authorization Effective Date: 4/12/2023  Authorization Expiration Date: 5/11/2024  Medication: buprenorphine (BUTRANS) 10 MCG/HR WK patch  Approved Dose/Quantity:   Reference #:     Insurance Company: [employer] - Phone [phone] Fax [phone]  Expected CoPay:       CoPay Card Available:      Foundation Assistance Needed:    Which Pharmacy is filling the prescription (Not needed for infusion/clinic administered): [facility] #[id number] - [place], [place] - [address] [place] [address] AT [facility] & [address] [place] [address]  Pharmacy Notified: Yes  Patient Notified:

## 2023-05-12 NOTE — TELEPHONE ENCOUNTER
Prior Authorization Retail Medication Request    Medication/Dose: buprenorphine (BUTRANS) 10 MCG/HR WK patch  ICD code (if different than what is on RX):    Previously Tried and Failed:    Rationale:      Insurance Name:  [employer]  Insurance ID:  [id number]      Pharmacy Information    [facility] #[id number] - [place], [place] - [address] [place] [address] AT [facility] & [address]  [address] [facility] [id number]  Phone: [phone] Fax: [phone]

## 2023-05-25 DIAGNOSIS — F51.01 PRIMARY INSOMNIA: ICD-10-CM

## 2023-05-25 NOTE — TELEPHONE ENCOUNTER
Routing refill request to provider for review/approval because:  Drug not on the [facility] refill protocol     GAYATRI Armstrong

## 2023-05-26 RX ORDER — ZOLPIDEM TARTRATE 10 MG/1
TABLET ORAL
Qty: 30 TABLET | Refills: 5 | Status: SHIPPED | OUTPATIENT
Start: 2023-05-26 | End: 2023-11-24

## 2023-06-05 ENCOUNTER — MYC REFILL (OUTPATIENT)
Dept: PALLIATIVE MEDICINE | Facility: CLINIC | Age: 53
End: 2023-06-05
Payer: COMMERCIAL

## 2023-06-05 DIAGNOSIS — M54.16 LUMBAR RADICULOPATHY: ICD-10-CM

## 2023-06-05 DIAGNOSIS — M96.1 FAILED BACK SURGICAL SYNDROME: ICD-10-CM

## 2023-06-05 DIAGNOSIS — F11.90 CHRONIC, CONTINUOUS USE OF OPIOIDS: ICD-10-CM

## 2023-06-05 DIAGNOSIS — M54.41 CHRONIC BILATERAL LOW BACK PAIN WITH RIGHT-SIDED SCIATICA: ICD-10-CM

## 2023-06-05 DIAGNOSIS — G89.29 CHRONIC BILATERAL LOW BACK PAIN WITH RIGHT-SIDED SCIATICA: ICD-10-CM

## 2023-06-06 NOTE — TELEPHONE ENCOUNTER
Patient requesting refill(s) of buprenorphine (BUTRANS) 10 MCG/HR WK patch  Last dispensed from pharmacy on 5/13/23    Patient's last office/virtual visit by prescribing provider on 4/12/23  Next office/virtual appointment scheduled for 7/12/23    Last urine drug screen date 4/12/23  Current opioid agreement on file (completed within the last year) Yes Date of opioid agreement: 4/12/23    E-prescribe to [facility] #[id number] [place], [place]       Will route to nursing [place] for review and preparation of prescription(s).

## 2023-06-07 RX ORDER — BUPRENORPHINE 10 UG/H
1 PATCH TRANSDERMAL
Qty: 4 PATCH | Refills: 0 | Status: SHIPPED | OUTPATIENT
Start: 2023-06-07 | End: 2023-07-07

## 2023-06-07 NOTE — TELEPHONE ENCOUNTER
Signed Prescriptions:                        Disp   Refills    buprenorphine (BUTRANS) 10 MCG/HR WK patch 4 patch0        Sig: Place 1 patch onto the skin every 7 days Fill           06/10/23 and start 06/12/23. 28 day script for           chronic pain.  Authorizing Provider: INA LOPEZ        Reviewed [place]  June 7, 2023- no concerning fills.    Ina TORIBIO, RN CNP, FNP  [facility]  [facility]

## 2023-06-07 NOTE — TELEPHONE ENCOUNTER
Medication refill information reviewed.     Due date for buprenorphine (BUTRANS) 10 MCG/HR WK patch is 06/12/23     Prescriptions prepped for review.     Will route to provider.

## 2023-07-07 ENCOUNTER — MYC REFILL (OUTPATIENT)
Dept: PALLIATIVE MEDICINE | Facility: CLINIC | Age: 53
End: 2023-07-07
Payer: COMMERCIAL

## 2023-07-07 DIAGNOSIS — M54.16 LUMBAR RADICULOPATHY: ICD-10-CM

## 2023-07-07 DIAGNOSIS — G89.29 CHRONIC BILATERAL LOW BACK PAIN WITH RIGHT-SIDED SCIATICA: ICD-10-CM

## 2023-07-07 DIAGNOSIS — F11.90 CHRONIC, CONTINUOUS USE OF OPIOIDS: ICD-10-CM

## 2023-07-07 DIAGNOSIS — M96.1 FAILED BACK SURGICAL SYNDROME: ICD-10-CM

## 2023-07-07 DIAGNOSIS — M54.41 CHRONIC BILATERAL LOW BACK PAIN WITH RIGHT-SIDED SCIATICA: ICD-10-CM

## 2023-07-07 RX ORDER — OXYCODONE AND ACETAMINOPHEN 5; 325 MG/1; MG/1
1-2 TABLET ORAL EVERY 6 HOURS PRN
Qty: 63 TABLET | Refills: 0 | Status: CANCELLED | OUTPATIENT
Start: 2023-07-07

## 2023-07-07 RX ORDER — OXYCODONE AND ACETAMINOPHEN 5; 325 MG/1; MG/1
1 TABLET ORAL EVERY 6 HOURS PRN
Qty: 58 TABLET | Refills: 0 | Status: SHIPPED | OUTPATIENT
Start: 2023-07-07 | End: 2023-08-11

## 2023-07-07 RX ORDER — BUPRENORPHINE 10 UG/H
1 PATCH TRANSDERMAL
Qty: 4 PATCH | Refills: 0 | Status: SHIPPED | OUTPATIENT
Start: 2023-07-07 | End: 2023-08-29

## 2023-07-07 NOTE — TELEPHONE ENCOUNTER
Refills have been requested for the following medications:         oxyCODONE-acetaminophen (PERCOCET) 5-325 MG tablet [Ina TORIBIO CNP]         buprenorphine (BUTRANS) 10 MCG/HR WK patch [Ina TORIBIO CNP]     Preferred pharmacy: [facility] #[id number] - [place], [place] - [address] [place] [address] AT [facility] & [address]

## 2023-07-07 NOTE — TELEPHONE ENCOUNTER
Medication refill information reviewed.       Due date for oxyCODONE-acetaminophen (PERCOCET) 5-325 MG tablet is anytime      buprenorphine (BUTRANS) 10 MCG/HR WK patch is 7/10/2023     Prescriptions prepped for review.     Will route to provider.

## 2023-07-07 NOTE — TELEPHONE ENCOUNTER
Received refill request for:      oxyCODONE-acetaminophen (PERCOCET) 5-325 MG tablet    Last dispensed from pharmacy on  5/11/2023.    buprenorphine (BUTRANS) 10 MCG/HR WK patch    Last dispensed from pharmacy on 6/12/2023.    Patient's last office/virtual visit by prescribing provider on 4/12/2023.    Next office/virtual appointment scheduled for 7/12/2023.    Last urine drug screen date 4/12/2023.    Current opioid agreement on file? Yes Date of opioid agreement: 4/12/2023.    E-prescribe to:     [facility] #[id number] - [place], [place] - [facility] [address] AT [facility] [address]    Will route to nursing pool for review and preparation of prescription(s).

## 2023-07-08 NOTE — TELEPHONE ENCOUNTER
Signed Prescriptions:                        Disp   Refills    buprenorphine (BUTRANS) 10 MCG/HR WK patch 4 patch0        Sig: Place 1 patch onto the skin every 7 days Fill 07/8/23           and start 07/10/23. 28 day script for chronic           pain.  Authorizing Provider: INA LOPEZ    oxyCODONE-acetaminophen (PERCOCET) 5-325 M*58 tab*0        Sig: Take 1 tablet by mouth every 6 hours as needed for           moderate to severe pain Max of 2/day.  Fill/start           7/7/2023. 28 day script  Authorizing Provider: INA LOPEZ        Reviewed [place]  July 7, 2023- no concerning fills.    Ina Lopez APRN, RN CNP, FNP  [facility]  [facility] [place]

## 2023-07-12 ENCOUNTER — OFFICE VISIT (OUTPATIENT)
Dept: PALLIATIVE MEDICINE | Facility: CLINIC | Age: 53
End: 2023-07-12
Payer: COMMERCIAL

## 2023-07-12 VITALS — DIASTOLIC BLOOD PRESSURE: 92 MMHG | HEART RATE: 72 BPM | SYSTOLIC BLOOD PRESSURE: 142 MMHG

## 2023-07-12 DIAGNOSIS — E66.01 CLASS 2 SEVERE OBESITY DUE TO EXCESS CALORIES WITH SERIOUS COMORBIDITY AND BODY MASS INDEX (BMI) OF 39.0 TO 39.9 IN ADULT (H): ICD-10-CM

## 2023-07-12 DIAGNOSIS — F11.90 CHRONIC, CONTINUOUS USE OF OPIOIDS: ICD-10-CM

## 2023-07-12 DIAGNOSIS — M96.1 FAILED BACK SURGICAL SYNDROME: ICD-10-CM

## 2023-07-12 DIAGNOSIS — G89.29 CHRONIC BILATERAL LOW BACK PAIN WITH RIGHT-SIDED SCIATICA: ICD-10-CM

## 2023-07-12 DIAGNOSIS — M96.1 FAILED BACK SYNDROME: ICD-10-CM

## 2023-07-12 DIAGNOSIS — E66.812 CLASS 2 SEVERE OBESITY DUE TO EXCESS CALORIES WITH SERIOUS COMORBIDITY AND BODY MASS INDEX (BMI) OF 39.0 TO 39.9 IN ADULT (H): ICD-10-CM

## 2023-07-12 DIAGNOSIS — M54.41 CHRONIC BILATERAL LOW BACK PAIN WITH RIGHT-SIDED SCIATICA: ICD-10-CM

## 2023-07-12 DIAGNOSIS — M54.16 LUMBAR RADICULOPATHY: Primary | ICD-10-CM

## 2023-07-12 DIAGNOSIS — Z79.891 ENCOUNTER FOR LONG-TERM USE OF OPIATE ANALGESIC: ICD-10-CM

## 2023-07-12 DIAGNOSIS — I10 ESSENTIAL HYPERTENSION: ICD-10-CM

## 2023-07-12 PROCEDURE — 99214 OFFICE O/P EST MOD 30 MIN: CPT | Performed by: NURSE PRACTITIONER

## 2023-07-12 RX ORDER — BUPRENORPHINE 15 UG/H
1 PATCH TRANSDERMAL
Qty: 4 PATCH | Refills: 0 | Status: SHIPPED | OUTPATIENT
Start: 2023-07-12 | End: 2023-08-28

## 2023-07-12 ASSESSMENT — PAIN SCALES - GENERAL: PAINLEVEL: MODERATE PAIN (5)

## 2023-07-12 NOTE — PROGRESS NOTES
[facility]      7/12/2023      Chief complaint:   Low back pain and right leg radicular pain      Interval history:  David Wilcox is a 52 year old male is known to me for:  Chronic bilateral low back pain with right-sided sciatica  Lumbar radiculopathy  Failed back surgical syndrome  Chronic continuous use of opioids  Encounter for long-term use of opiate analgesic     Distant history of alcohol and drug abuse  Bipolar disorder   H/o menningitis after SCS trial  PMHx includes: Acute bronchitis, acute pancreatitis, acute sinusitis, ADHD, anxiety, depression, asthma, bipolar disorder unspecified, cervicalgia, dysthymic disorder, esophageal reflux, fixation of spine with fusion, history of blood transfusion, hyperlipidemia, low back pain, lumbar throat, meningitis after spinal cord stimulator trial, narcotic abuse, neck pain, open nondisplaced fracture of distal phalanx of finger, pain in limb, personal history of other diseases of the digestive system, sciatica, spasm of muscle, tobacco use disorder  PSHx includes: Back surgery in 2003 and 2011, lumbar anterior three-level fusion, ORIF right finger (3/3/2021)  .    Recommendations/plan at the last visit on 4/12/2023 included:  Physical Therapy: none  Continue your regular [facility]   Clinical Health Psychologist to address issues of relaxation, behavioral change, coping style, and other factors important to improvement: none  Diagnostic Studies: none  Medication Management:   Continue Percocet max 3/day.   Continue MSContin 15mg every 12 hours  Refill for naloxone nasal spray for safety  We can transition you off of MSContin which makes you feel a little cloudy onto buprenorphine (Butrans patch change every 7 days or Belbuca buccal film in your mouth twice daily. I'd likely use Butrans 10mcg/hr or Belbuca 150mcg twice daily). Let me know if you wish to switch next month. I'd need to put you on short-acting only for about 5 days, then start  buprenorphine, have to hold all pain meds for 16 hours prior to the very first dose  Further procedures recommended: none  Acupuncture: yes, continue  Referral to comprehensive weight management as losing weight will help to reduce pain and reduce risks for osteoarthritis, heart disease, stroke risk, etc.   Recommendations/follow-up for PCP:  See above  Release of information: none  UDT today. Last took MSContin and Percocet this morning  Re-signed CSA today  Follow up: 12 weeks, video or in-person. Please call [phone] to make your follow-up appointment with me.           Since his last visit, David TRAVIS Lopezhe reports:    Interval history July 12, 2023  -his low back and right leg pain is the same  -stable on medications, he has looked into buprenorphine and is interested in starting Butrans for pain  -interested in weight loss, referral [facility]       Interval history April 12, 2023  -pain is the same  -morphine is really helpful but makes him feel a little cloudy  -long acting OxyContin can be harder to obtain with his pharmacy  -discussed switch to buprenorphine (Butrans patch or Belbuca film) patient interested, wants to think about it and speak with his wife    Interval history January 10, 2023  -when in [place], unable to get OxyContin, switched to MSContin 15mg every 12 hours. This is working well, no significant side effects. Feels more sleepy in the late afternoon, could also be shorter days. Will continue to monitor.   -low back pain remains with right leg radiculopathy to the right foot 2nd through 5th digits.   -walking up to 3 km per day.  -mentions wanting to lose weight, discussed referral to [facility], patient is very interested in this.   Also states he has previously been diagnosed with OBSTRUCTIVE SLEEP APNEA, but does not have CPAP as none were in stock when he was initially diagnosed. Encouraged patient to follow up with his sleep  medicine provider and get a CPAP set up.      Interval history October 31, 2022  -low back pain and right leg pain has been a little worse, he usually struggle with the change of seasons getting cooler.   -he may do acupuncture again, this has been beneficial in the past.   -may go to his son's in [place] as he is likely moving to the [place], [place] area  -we have previously discussed possible switch to buprenorphine, would like to wait until he is likely back in [place] due to travel to [place] for a couple months, then likely [place] for a bit.     Interval history August 30, 2022  -David states he still has the ongoing low back pain with right leg radicular symptoms. Feels pain has been a bit better.   -he is increasing his activities  -exercising regularly, doing 2 km twice daily on the treadmill, able to do walking  -doing 3 Smeet per week. (Misael, Vanda, and Arturo)  -on vacation camping    Pain history collected 5/27/2022 at initial visit  Out cutting a tree and it fell on him in 2002. He had compression fractures L5-S1, and multiple other fractures and spent almost a year in a wheelchair, initially told he would not walk again.   His initial spinal  surgery was helpful, but he then fell down the stairs and he knew something was wrong but no one would believe him. He states 8 years later they found loose hardware and he had to have a subsequent surgery to fix that. Then a 3rd surgery extended the fusion. He is now fused from L3-S1.      Pain on the right side from bottom of the ribs to the low back, around the hip and down the right leg to the toes. Bilateral hip pain posterolateral hip pain (points to buttocks).      He has had loss of bowel and bladder but it is not a common thing. This is not new, present off and on for 6 years.      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   Has regular exercise with chores and he now walks every day on the treadmill (2km in the AM and PM)  Does  "[employer] in [place] on Fridays and his wife does so on Saturdays.           At this point, the patient's participation with our multidisciplinary team includes:  The patient has been compliant with the program.  PT - not ordered, patient is quite active at home running a [employer] Psych - not ordered      Pain scores:  Pain intensity on average is 7 on a scale of 0-10.    Range is 5-9/10.   Pain right now is 5/10.   Pain is described as \"like a pillow with a knife through it and when bad, like I am on fire\"    Pain is constant in nature      Current pain relevant medications:   -Tylenol extra strength 500mg PRN   -naloxone nasal spray for opiate reversal   -Duloxetine 60mg every day (very helpful for mood and pain)  -gabapentin 600mg take 2 tabs/1200mg TID (helpful)  -nortriptyline 10mg take 2 (20mg) at bedtime (helpful)  -MSContin 15mg Q 12 hours (helpful-moreso than the OxyContin had been but also causes him to feel a little tired )  -Percocet 5/325mg take 1 tab Q 6 hours PRN max 3/day (he plans on 2/day and uses 3 if needed. helpful)  -tizanidine 4mg TID PRN for muscle spasms (helpful)        Other pertinent medications:  -lorazepam 0.5mg BID PRN anxiety (very rare use, received #30 tabs in 2018 and still has some left)  -mirtazepine 15mg at bedtime  -ambien 10mg Q HS PRN insomnia           Previous medication treatments included:  OPIATES: oxycodone (helpful), hydrocodone (not helpful), Methadone (took after surgery, very helpful), Fentanyl patches (would have issues with getting to hot and get a dump of medication. He has used this very successfully in the winter when it is colder), Dilaudid (hydromorphone, very helpful for a migraine with antibiotics), MSContin (more helpful than OxyContin was)  NSAIDS: naproxyn (not helpful), ibuprofen (not helpful)  MUSCLE RELAXANTS: Methocarbamol (expense, helpful), Flexeril (helpful but he had a remote history of a suicide attempt with this drug), tizanidine (very " helpful)  ANTI-MIGRAINE MEDS: none  ANTI-DEPRESSANTS: none  SLEEP AIDS: Remeron (helpful)  ANTI-CONVULSANTS: nortriptyline (helpful)  TOPICALS: CBD oil (helps some)  ANXIOLYTICS: lorazepam (helpful)  MEDICAL CANNABIS: none  Other meds: lidocaine (not helpful)        Other treatments have included:   David Wilcox has been seen at a pain clinic in the past.  Previously managed by Dr. Bindu Motley here  PT: tried, was not helpful on multiple occasions  Chiropractic care: yes, uses this regularly for acupuncture  Acupuncture: helpful  TENs Unit: has one, helpful, uses it occasionally  SCS trial in   got meningitis and nearly       Injections:   -3/26/2018 caudal ALVA with Dr. Bindu Motley  (not helpful)  -2018 right L3-4 transforaminal ALVA with Dr. Bindu Motley (not helpful)  -2019  Bilateral SI joint injections with Dr. Bindu Motley  (not helpful)      THE 4 A's OF OPIOID MAINTENANCE ANALGESIA    Analgesia: helpful    Activity: exercising on treadmill twice daily, more active    Adverse effects: none    Adherence to Rx protocol: yes      Side Effects: no side effect  Patient is using the medication as prescribed: YES    Medications:  Current Outpatient Medications   Medication Sig Dispense Refill    albuterol (PROAIR HFA/PROVENTIL HFA/VENTOLIN HFA) 108 (90 Base) MCG/ACT inhaler INHALE 2 PUFFS INTO THE LUNGS EVERY 4 HOURS AS NEEDED FOR SHORTNESS OF BREATH 8.5 g 11    buprenorphine (BUTRANS) 10 MCG/HR WK patch Place 1 patch onto the skin every 7 days Fill 23 and start 07/10/23. 28 day script for chronic pain. 4 patch 0    DULoxetine (CYMBALTA) 60 MG capsule TAKE 1 CAPSULE(60 MG) BY MOUTH DAILY 90 capsule 1    gabapentin (NEURONTIN) 600 MG tablet TAKE 2 TABLETS BY MOUTH THREE TIMES DAILY 180 tablet 5    losartan (COZAAR) 100 MG tablet Take 1 tablet (100 mg) by mouth daily 90 tablet 3    mirtazapine (REMERON) 15 MG tablet TAKE 1 TABLET(15 MG) BY MOUTH AT BEDTIME 90 tablet 0    nortriptyline  (PAMELOR) 10 MG capsule TAKE 2 CAPSULES(20 MG) BY MOUTH AT BEDTIME 180 capsule 2    omeprazole (PRILOSEC) 20 MG DR capsule Take 1 capsule (20 mg) by mouth daily 90 capsule 3    oxyCODONE-acetaminophen (PERCOCET) 5-325 MG tablet Take 1 tablet by mouth every 6 hours as needed for moderate to severe pain Max of 2/day.  Fill/start 7/7/2023. 28 day script 58 tablet 0    tiZANidine (ZANAFLEX) 4 MG tablet Take 1 tablet (4 mg) by mouth 3 times daily as needed for muscle spasms 0.5-1 tab tid prn muscle spasm 90 tablet 4    TYLENOL EXTRA STRENGTH 500 MG OR TABS At Bedtime       zolpidem (AMBIEN) 10 MG tablet One prn nightly for insomnia. 30 tablet 5    LORazepam (ATIVAN) 0.5 MG tablet One bid prn anxiety. Rare use (Patient not taking: Reported on 4/12/2023) 30 tablet 0    morphine (MS CONTIN) 15 MG CR tablet Take 1 tablet (15 mg) by mouth every 12 hours maximum 2 tablet(s) per day OK to fill 05/05/23 and start 05/07/23. 30 day supply for chronic pain (Patient not taking: Reported on 7/12/2023) 60 tablet 0    naloxone (NARCAN) 4 MG/0.1ML nasal spray Spray 1 spray (4 mg) into one nostril alternating nostrils once as needed for opioid reversal every 2-3 minutes until assistance arrives (Patient not taking: Reported on 7/12/2023) 0.2 mL 1    oxyCODONE-acetaminophen (PERCOCET) 5-325 MG tablet Take 1-2 tablets by mouth every 6 hours as needed for moderate to severe pain Max 7 tablets per day. Fill 5/5/2023 and start 5/6/2023. STOP MSContin. This is a 9 day script to work on transition from MSContin +Percocet over to buprenorphine (butrans or belbuca) + Percocet (Patient not taking: Reported on 7/12/2023) 63 tablet 0    oxyCODONE-acetaminophen (PERCOCET) 5-325 MG tablet Take 1 tablet by mouth every 6 hours as needed for severe pain Max 3/day. Fill/start 04/07/23. 30 day supply (Patient not taking: Reported on 7/12/2023) 90 tablet 0       Medical History: any changes in medical history since they were last seen? No    Social History:    Home situation:  with children x 2 at home.   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   History of chemical dependency treatment: quit on own       Is patient a current smoker or tobacco user?  no  If yes, was cessation counseling offered?  no          Physical Exam:  Vital signs: Blood pressure (!) 142/92, pulse 72.    Behavioral observations:  Awake, alert and cooperative    Gait:  Normal     Musculoskeletal exam:  Strength grossly equal throughout. Moves well in exam room    Neuro exam:  Deferred    Skin/vascular/autonomic:  No suspicious lesions on exposed skin.     Other:  na    Is the patient hypertensive today? mild  Hypertensive on recheck of BP?   Not rechecked today  If yes, was patient recommended to see Primary Care Provider in follow up for management of HTN?  yes        Diagnostic tests:  MRI OF THE LUMBAR SPINE WITHOUT AND WITH CONTRAST August 6, 2021 5:57  PM      HISTORY: Low back pain, prior surgery, new symptoms. Worsening pain  above fusion, but also right radicular L5 or S1 distribution on the  right. Failed back surgical syndrome. Lumbar radiculopathy.     TECHNIQUE: Multiplanar, multisequence MRI images of the lumbar spine  were acquired without and with 10mL Gadavist IV contrast.     COMPARISON: Lumbar spine MR 4/27/2018, lumbar spine CT 7/14/2016.     FINDINGS: There are five lumbar-type vertebrae for the purposes of  this dictation. Posterior spine fusion from L3 down to S1 consisting  of bilateral pedicle screws at each level with inner connecting rods,  interbody fusion devices in the L4-L5 and L5-S1 disc spaces and  laminectomies of L3, L4, L5 and S1 again noted.     Degenerative grade 1 anterolisthesis of L5 upon S1 and mild  degenerative retrolisthesis of L2 upon L3 again noted. Alignment  otherwise normal and unchanged. Vertebral body heights normal.  No  fractures. No evidence for pathologic bony lesion. Marrow signal  normal.     There is loss of disc height, disc desiccation and posterior disc  bulging/herniation to varying degrees at all levels of the lumbar  spine with the exception of the resected L4-L5 and L5-S1 discs.      The tip of the conus medullaris is at the L1-L2 level which is within  normal limits. No abnormal intrathecal contrast enhancement. There is  no evidence for intrathecal abnormality.      Level by level:      T12-L1: Loss of disc height, disc desiccation and circumferential disc  bulging with a superimposed small posterior central disc herniation  (extrusion). Minimal facet arthropathy bilaterally. Minimal spinal  canal narrowing. No foraminal stenosis on either side. No change from  the comparison study.     L1-L2: Loss of disc height, disc desiccation and circumferential disc  bulging with a superimposed small posterior central disc herniation  (protrusion). Minimal facet arthropathy bilaterally. Minimal spinal  canal narrowing. No foraminal stenosis on either side. No change from  the comparison study.     L2-L3: Loss of disc height, disc desiccation and mild circumferential  disc bulging. No herniation. Moderate facet arthropathy bilaterally.  Mild spinal canal narrowing. Mild bilateral neural foraminal  narrowing. No change from the comparison study.     L3: Again noted is a fluid signal intensity collection in the right  posterolateral aspect of the spinal canal measuring 1.7 x 0.7 cm in  the AP and transverse dimensions respectively. This may represent a  synovial cyst arising from the right facet joint versus a cystic  postoperative fluid collection or cystic foreign body reaction. This  results in mild narrowing of the thecal sac at the mid L3 level.     L3-L4: (Fused level) Loss of disc height, disc desiccation and mild  circumferential disc bulging. No herniation. Moderate facet  arthropathy bilaterally. No spinal canal  stenosis. Mild bilateral  neural foraminal narrowing. No change from the comparison study.      L4-L5: (Fused level) No spinal canal stenosis. No foraminal stenosis  on either side. No change from the comparison study.     L5-S1: (Fused level) No spinal canal stenosis. No foraminal stenosis  on either side. No change from the comparison study.                                                                      IMPRESSION:  1. Postoperative and degenerative change of the lumbar spine as  detailed above without appreciable change from the comparison studies.  2. No significant spinal canal or neural foraminal stenosis at any  level of the spine.     JAI CHAVEZ MD         Other testing (labs, diagnostics):  11/17/2022  Cr. 0.80  Est GFR >90        Screening tools:      DIRE Score for ongoing opioid management is calculated as follows:     Diagnosis = 2     Intractability = 2     Risk: Psych = 2  Chem Hlth = 2  Reliability = 3  Social = 3     Efficacy = 2     Total DIRE Score = 16 (14 or higher predicts good candidate for ongoing opioid management; 13 or lower predicts poor candidate for opioid management)            [place] Prescription Monitoring Program:  Reviewed [place]  7/12/2023- no concerning fills.  Ina TORIBIO, RN CNP, FNP  [facility]  [place] location              Assessment:  Lumbar radiculopathy  Failed back syndrome  Failed back surgical syndrome  Chronic bilateral low back pain with right sided sciatica  Class 2 severe obesity due to excess calories with serious comorbidity and BMI of 39-39.9  Chronic continuous use of opioids  Encounter for long term use of opiate analgesic  Essential hypertension    Class 3 severe obesity due to excess calories with serious co-morbidity (untreated OBSTRUCTIVE SLEEP APNEA) and BMI of >39 in adult  OBSTRUCTIVE SLEEP APNEA (currently untreated)  Distant history of alcohol and drug abuse  Bipolar disorder   H/o menningitis after SCS  trial  PMHx includes: Acute bronchitis, acute pancreatitis, acute sinusitis, ADHD, anxiety, depression, asthma, bipolar disorder unspecified, cervicalgia, dysthymic disorder, esophageal reflux, fixation of spine with fusion, history of blood transfusion, hyperlipidemia, low back pain, lumbar throat, meningitis after spinal cord stimulator trial, narcotic abuse, neck pain, open nondisplaced fracture of distal phalanx of finger, pain in limb, personal history of other diseases of the digestive system, sciatica, spasm of muscle, tobacco use disorder  PSHx includes: Back surgery in 2003 and 2011, lumbar anterior three-level fusion, ORIF right finger (3/3/2021)        Plan:   Physical Therapy: none  Continue your regular [facility]   Clinical Health Psychologist to address issues of relaxation, behavioral change, coping style, and other factors important to improvement: none  Diagnostic Studies: none  Medication Management:   Continue Percocet max 2/day.   Continue current dosing of Butrans until this script is done  On 8/6 fill new script for Butrans 15mcg/hr and begin use on 8/6/2023.   has naloxone nasal spray for safety  Further procedures recommended: none  Acupuncture: yes, continue  Referral to comprehensive weight management as losing weight will help to reduce pain and reduce risks for osteoarthritis, heart disease, stroke risk, etc.   Recommendations/follow-up for PCP:  See above  Release of information: none  Follow up: 12 weeks, video or in-person. Please call [phone] to make your follow-up appointment with me.   DME  Order for blood pressure cuff  Check BP at home daily for 7-10 days and let your Primary Care Provider know if your blood pressure remains at or above 140/90.       ASSESSMENT AND PLAN:  (M54.16) Lumbar radiculopathy  (primary encounter diagnosis)  Plan: buprenorphine (BUTRANS) 15 MCG/HR Wk patch            (M96.1) Failed back syndrome  Plan: buprenorphine (BUTRANS) 15 MCG/HR Wk patch             (M96.1) Failed back surgical syndrome  Plan: buprenorphine (BUTRANS) 15 MCG/HR Wk patch            (M54.41,  G89.29) Chronic bilateral low back pain with right-sided sciatica  Plan: buprenorphine (BUTRANS) 15 MCG/HR Wk patch            (E66.01,  Z68.39) Class 2 severe obesity due to excess calories with serious comorbidity and body mass index (BMI) of 39.0 to 39.9 in adult (H)  Plan: Adult Comprehensive Weight Management         Referral            (F11.90) Chronic, continuous use of opioids  Plan: buprenorphine (BUTRANS) 15 MCG/HR Wk patch            (Z79.891) Encounter for long-term use of opiate analgesic  Plan: buprenorphine (BUTRANS) 15 MCG/HR Wk patch            (I10) Essential hypertension  Plan: Home Blood Pressure Monitor Order for DME -         ONLY FOR DME              Face to face time: 28 minutes               Ina TORIBIO, RN CNP, FNP  [facility]  [facility] [place]

## 2023-07-12 NOTE — PATIENT INSTRUCTIONS
Plan:   Physical Therapy: none  Continue your regular [facility]   Clinical Health Psychologist to address issues of relaxation, behavioral change, coping style, and other factors important to improvement: none  Diagnostic Studies: none  Medication Management:   Continue Percocet max 3/day.   Continue current dosing of Butrans until this script is done  On 8/6 fill new script for Butrans 15mcg/hr and begin use on 8/6/2023.   has naloxone nasal spray for safety  Further procedures recommended: none  Acupuncture: yes, continue  Referral to comprehensive weight management as losing weight will help to reduce pain and reduce risks for osteoarthritis, heart disease, stroke risk, etc.   Recommendations/follow-up for PCP:  See above  Release of information: none  Follow up: 12 weeks, video or in-person. Please call [phone] to make your follow-up appointment with me.   DME  Order for blood pressure cuff  Check BP at home daily for 7-10 days and let your Primary Care Provider know if your blood pressure remains at or above 140/90.     ----------------------------------------------------------------  Clinic Number:  [phone]   Call with any questions about your care and for scheduling assistance.   Calls are returned Monday through Friday between 8 AM and 4:30 PM. We usually get back to you within 2 business days depending on the issue/request.    If we are prescribing your medications:  For opioid medication refills, call the clinic or send a [facility] [employer] [facility] [employer] message 7 days in advance.  Please include:  Name of requested medication  Name of the pharmacy.  For non-opioid medications, call your pharmacy directly to request a refill. Please allow 3-4 days to be processed.   Per [place] State Law:  All controlled substance prescriptions must be filled within 30 days of being written.    For those controlled substances allowing refills, pickup must occur within 30 days of last fill.      We believe regular attendance is reis to your success  in our program!    Any time you are unable to keep your appointment we ask that you call us at least 24 hours in advance to cancel.This will allow us to offer the appointment time to another patient.   Multiple missed appointments may lead to dismissal from the clinic.

## 2023-07-19 ENCOUNTER — TELEPHONE (OUTPATIENT)
Dept: FAMILY MEDICINE | Facility: CLINIC | Age: 53
End: 2023-07-19
Payer: COMMERCIAL

## 2023-07-19 NOTE — TELEPHONE ENCOUNTER
Patient Quality Outreach    Patient is due for the following:   Diabetes -  Diabetic Follow-Up Visit    Next Steps:   Schedule a office visit for diabetes     Type of outreach:    Phone, spoke to patient/parent. appointment made       Questions for provider review:    None           Shereen Barton MA

## 2023-07-30 DIAGNOSIS — G89.29 CHRONIC LOW BACK PAIN, UNSPECIFIED BACK PAIN LATERALITY, UNSPECIFIED WHETHER SCIATICA PRESENT: ICD-10-CM

## 2023-07-30 DIAGNOSIS — M54.50 CHRONIC LOW BACK PAIN, UNSPECIFIED BACK PAIN LATERALITY, UNSPECIFIED WHETHER SCIATICA PRESENT: ICD-10-CM

## 2023-07-31 RX ORDER — GABAPENTIN 600 MG/1
TABLET ORAL
Qty: 180 TABLET | Refills: 4 | Status: SHIPPED | OUTPATIENT
Start: 2023-07-31 | End: 2023-12-27

## 2023-08-11 ENCOUNTER — MYC REFILL (OUTPATIENT)
Dept: PALLIATIVE MEDICINE | Facility: CLINIC | Age: 53
End: 2023-08-11
Payer: COMMERCIAL

## 2023-08-11 DIAGNOSIS — F11.90 CHRONIC, CONTINUOUS USE OF OPIOIDS: ICD-10-CM

## 2023-08-11 DIAGNOSIS — M54.16 LUMBAR RADICULOPATHY: ICD-10-CM

## 2023-08-11 DIAGNOSIS — M96.1 FAILED BACK SURGICAL SYNDROME: ICD-10-CM

## 2023-08-11 DIAGNOSIS — G89.29 CHRONIC BILATERAL LOW BACK PAIN WITH RIGHT-SIDED SCIATICA: ICD-10-CM

## 2023-08-11 DIAGNOSIS — M54.41 CHRONIC BILATERAL LOW BACK PAIN WITH RIGHT-SIDED SCIATICA: ICD-10-CM

## 2023-08-11 RX ORDER — OXYCODONE AND ACETAMINOPHEN 5; 325 MG/1; MG/1
1 TABLET ORAL EVERY 6 HOURS PRN
Qty: 58 TABLET | Refills: 0 | Status: SHIPPED | OUTPATIENT
Start: 2023-08-11 | End: 2023-09-06

## 2023-08-11 NOTE — TELEPHONE ENCOUNTER
Received call from patient requesting refill(s) of oxyCODONE-acetaminophen (PERCOCET) 5-325 MG tablet     Last dispensed from pharmacy on 07/07/23    Patient's last office/virtual visit by prescribing provider on 07/12/23  Next office/virtual appointment scheduled for 10/11/23    Last urine drug screen date 04/12/23  Current opioid agreement on file (completed within the last year) Yes Date of opioid agreement: 04/12/23    E-prescribe to pharmacy-[facility] #[id number] - [place], [place] - [facility] [address] [facility] [address]     Will route to nursing [place] for review and preparation of prescription(s).

## 2023-08-11 NOTE — TELEPHONE ENCOUNTER
Signed Prescriptions:                        Disp   Refills    oxyCODONE-acetaminophen (PERCOCET) 5-325 M*58 tab*0        Sig: Take 1 tablet by mouth every 6 hours as needed for           moderate to severe pain Max of 2/day.  Fill/start           08/11/23  Authorizing Provider: INA LOPEZ        Reviewed [facility] August 11, 2023- no concerning fills.    Ina TORIBIO, RN CNP, FNP  [facility]  [facility] [place]

## 2023-08-11 NOTE — TELEPHONE ENCOUNTER
Refills have been requested for the following medications:         oxyCODONE-acetaminophen (PERCOCET) 5-325 MG tablet [Ina TORIBIO CNP]     Preferred pharmacy: [facility] #[id number] - [address] AT [facility] & [address]   Discharged

## 2023-08-11 NOTE — TELEPHONE ENCOUNTER
Medication refill information reviewed.     Due date for oxyCODONE-acetaminophen (PERCOCET) 5-325 MG tablet   is 08/11/23     Prescriptions prepped for review.     Will route to provider.

## 2023-08-14 ENCOUNTER — OFFICE VISIT (OUTPATIENT)
Dept: FAMILY MEDICINE | Facility: CLINIC | Age: 53
End: 2023-08-14
Payer: COMMERCIAL

## 2023-08-14 VITALS
OXYGEN SATURATION: 98 % | SYSTOLIC BLOOD PRESSURE: 138 MMHG | DIASTOLIC BLOOD PRESSURE: 88 MMHG | BODY MASS INDEX: 40.92 KG/M2 | WEIGHT: 270 LBS | TEMPERATURE: 98.6 F | HEART RATE: 56 BPM | RESPIRATION RATE: 16 BRPM | HEIGHT: 68 IN

## 2023-08-14 DIAGNOSIS — F33.42 RECURRENT MAJOR DEPRESSION IN COMPLETE REMISSION (H): ICD-10-CM

## 2023-08-14 DIAGNOSIS — K21.9 GASTROESOPHAGEAL REFLUX DISEASE WITHOUT ESOPHAGITIS: ICD-10-CM

## 2023-08-14 DIAGNOSIS — R73.09 ELEVATED GLUCOSE: ICD-10-CM

## 2023-08-14 DIAGNOSIS — I10 HYPERTENSION, UNSPECIFIED TYPE: Primary | ICD-10-CM

## 2023-08-14 DIAGNOSIS — F11.20 CONTINUOUS OPIOID DEPENDENCE (H): ICD-10-CM

## 2023-08-14 DIAGNOSIS — E66.01 MORBID OBESITY (H): ICD-10-CM

## 2023-08-14 DIAGNOSIS — G89.4 CHRONIC PAIN SYNDROME: ICD-10-CM

## 2023-08-14 DIAGNOSIS — F13.20 SEDATIVE, HYPNOTIC OR ANXIOLYTIC DEPENDENCE (H): ICD-10-CM

## 2023-08-14 LAB — HBA1C MFR BLD: 6.4 % (ref 0–5.6)

## 2023-08-14 PROCEDURE — 36415 COLL VENOUS BLD VENIPUNCTURE: CPT | Performed by: FAMILY MEDICINE

## 2023-08-14 PROCEDURE — 99214 OFFICE O/P EST MOD 30 MIN: CPT | Performed by: FAMILY MEDICINE

## 2023-08-14 PROCEDURE — 83036 HEMOGLOBIN GLYCOSYLATED A1C: CPT | Performed by: FAMILY MEDICINE

## 2023-08-14 RX ORDER — DULOXETIN HYDROCHLORIDE 60 MG/1
60 CAPSULE, DELAYED RELEASE ORAL DAILY
Qty: 90 CAPSULE | Refills: 3 | Status: SHIPPED | OUTPATIENT
Start: 2023-08-14 | End: 2024-07-03

## 2023-08-14 RX ORDER — LOSARTAN POTASSIUM 100 MG/1
100 TABLET ORAL DAILY
Qty: 90 TABLET | Refills: 3 | Status: SHIPPED | OUTPATIENT
Start: 2023-08-14 | End: 2024-06-03

## 2023-08-14 ASSESSMENT — ANXIETY QUESTIONNAIRES
2. NOT BEING ABLE TO STOP OR CONTROL WORRYING: NOT AT ALL
7. FEELING AFRAID AS IF SOMETHING AWFUL MIGHT HAPPEN: NOT AT ALL
5. BEING SO RESTLESS THAT IT IS HARD TO SIT STILL: SEVERAL DAYS
GAD7 TOTAL SCORE: 4
6. BECOMING EASILY ANNOYED OR IRRITABLE: SEVERAL DAYS
3. WORRYING TOO MUCH ABOUT DIFFERENT THINGS: NOT AT ALL
GAD7 TOTAL SCORE: 4
4. TROUBLE RELAXING: SEVERAL DAYS
1. FEELING NERVOUS, ANXIOUS, OR ON EDGE: SEVERAL DAYS
IF YOU CHECKED OFF ANY PROBLEMS ON THIS QUESTIONNAIRE, HOW DIFFICULT HAVE THESE PROBLEMS MADE IT FOR YOU TO DO YOUR WORK, TAKE CARE OF THINGS AT HOME, OR GET ALONG WITH OTHER PEOPLE: SOMEWHAT DIFFICULT

## 2023-08-14 ASSESSMENT — PATIENT HEALTH QUESTIONNAIRE - PHQ9
SUM OF ALL RESPONSES TO PHQ QUESTIONS 1-9: 7
10. IF YOU CHECKED OFF ANY PROBLEMS, HOW DIFFICULT HAVE THESE PROBLEMS MADE IT FOR YOU TO DO YOUR WORK, TAKE CARE OF THINGS AT HOME, OR GET ALONG WITH OTHER PEOPLE: SOMEWHAT DIFFICULT
SUM OF ALL RESPONSES TO PHQ QUESTIONS 1-9: 7

## 2023-08-14 NOTE — PROGRESS NOTES
"S :David Wilcox is a 52 year old maleth wi chronic pain, opioid dependent.  Doing well on 15mg/week butrans patch. Follows with pain clinic    Elevated glucose: wonder about DM2 creeping up.  Recheck    Morbid obesity: ongoing.  Is going to start a weight loss program, medically aided I think    Depression: cymbalta helps.  Fills    Insomnia: ambien dependent    Hypnotic use: ambien nightly    Htn: stable    Current Outpatient Medications   Medication    albuterol (PROAIR HFA/PROVENTIL HFA/VENTOLIN HFA) 108 (90 Base) MCG/ACT inhaler    buprenorphine (BUTRANS) 10 MCG/HR WK patch    buprenorphine (BUTRANS) 15 MCG/HR Wk patch    DULoxetine (CYMBALTA) 60 MG capsule    gabapentin (NEURONTIN) 600 MG tablet    losartan (COZAAR) 100 MG tablet    mirtazapine (REMERON) 15 MG tablet    nortriptyline (PAMELOR) 10 MG capsule    omeprazole (PRILOSEC) 20 MG DR capsule    oxyCODONE-acetaminophen (PERCOCET) 5-325 MG tablet    tiZANidine (ZANAFLEX) 4 MG tablet    TYLENOL EXTRA STRENGTH 500 MG OR TABS    zolpidem (AMBIEN) 10 MG tablet    LORazepam (ATIVAN) 0.5 MG tablet    naloxone (NARCAN) 4 MG/0.1ML nasal spray     No current facility-administered medications for this visit.        O:/88   Pulse 56   Temp 98.6  F (37  C) (Tympanic)   Resp 16   Ht 1.727 m (5' 8\")   Wt 122.5 kg (270 lb)   SpO2 98%   BMI 41.05 kg/m    GEN: Alert and oriented, in no acute distress  CV: RRR, no murmur  RESP: lungs clear bilaterally, good effort  Walks with a cane      A: chronic pain, opioid dependent.   Elevated glucose: wonder about DM2 creeping up.    Morbid obesity: ongoing.   Depression: cymbalta helps.  Fills  Insomnia: ambien dependent  hypnotic use: ambien nightly  Htn: stable    P: fills on meds for chronic issues as above in med list and orders.     Check A1C    Continue meds as in med list.     Weight management plan: starting a program            "

## 2023-08-28 ENCOUNTER — MYC REFILL (OUTPATIENT)
Dept: PALLIATIVE MEDICINE | Facility: CLINIC | Age: 53
End: 2023-08-28
Payer: COMMERCIAL

## 2023-08-28 DIAGNOSIS — M96.1 FAILED BACK SYNDROME: ICD-10-CM

## 2023-08-28 DIAGNOSIS — M96.1 FAILED BACK SURGICAL SYNDROME: ICD-10-CM

## 2023-08-28 DIAGNOSIS — M54.41 CHRONIC BILATERAL LOW BACK PAIN WITH RIGHT-SIDED SCIATICA: ICD-10-CM

## 2023-08-28 DIAGNOSIS — Z79.891 ENCOUNTER FOR LONG-TERM USE OF OPIATE ANALGESIC: ICD-10-CM

## 2023-08-28 DIAGNOSIS — M54.16 LUMBAR RADICULOPATHY: ICD-10-CM

## 2023-08-28 DIAGNOSIS — F11.90 CHRONIC, CONTINUOUS USE OF OPIOIDS: ICD-10-CM

## 2023-08-28 DIAGNOSIS — G89.29 CHRONIC BILATERAL LOW BACK PAIN WITH RIGHT-SIDED SCIATICA: ICD-10-CM

## 2023-08-29 RX ORDER — BUPRENORPHINE 15 UG/H
1 PATCH TRANSDERMAL
Qty: 4 PATCH | Refills: 0 | Status: SHIPPED | OUTPATIENT
Start: 2023-08-29 | End: 2023-09-28

## 2023-08-29 NOTE — TELEPHONE ENCOUNTER
Signed Prescriptions:                        Disp   Refills    buprenorphine (BUTRANS) 15 MCG/HR Wk patch 4 patch0        Sig: Place 1 patch onto the skin every 7 days Fill           09/03/23 and start 09/05/23  Authorizing Provider: INA LOPEZ        Reviewed [place]  August 29, 2023- no concerning fills.    Ina TORIBIO, RN CNP, FNP  [facility]  [facility] [place]

## 2023-08-29 NOTE — TELEPHONE ENCOUNTER
Medication refill information reviewed.     Due date for buprenorphine (BUTRANS) 15 MCG/HR Wk patch is 09/05/23     Prescriptions prepped for review.     Will route to provider.

## 2023-08-29 NOTE — TELEPHONE ENCOUNTER
Received refill request for:    buprenorphine (BUTRANS) 15 MCG/HR Wk patch      Last dispensed from pharmacy on 8/6/23    Patient's last office/virtual visit by prescribing provider on 7/12/23  Next office/virtual appointment scheduled for 10/11/23    Last urine drug screen date 4/12/23  Current opioid agreement on file? Yes Date of opioid agreement: 4/12/23    E-prescribe to:     [facility] #[id number] - [place], [place] - [facility] [address] AT [facility] [address]    Will route to nursing [place] for review and preparation of prescription(s).

## 2023-09-06 ENCOUNTER — MYC REFILL (OUTPATIENT)
Dept: PALLIATIVE MEDICINE | Facility: CLINIC | Age: 53
End: 2023-09-06
Payer: COMMERCIAL

## 2023-09-06 DIAGNOSIS — M54.16 LUMBAR RADICULOPATHY: ICD-10-CM

## 2023-09-06 DIAGNOSIS — M54.41 CHRONIC BILATERAL LOW BACK PAIN WITH RIGHT-SIDED SCIATICA: ICD-10-CM

## 2023-09-06 DIAGNOSIS — G89.29 CHRONIC BILATERAL LOW BACK PAIN WITH RIGHT-SIDED SCIATICA: ICD-10-CM

## 2023-09-06 DIAGNOSIS — F11.90 CHRONIC, CONTINUOUS USE OF OPIOIDS: ICD-10-CM

## 2023-09-06 DIAGNOSIS — M96.1 FAILED BACK SURGICAL SYNDROME: ICD-10-CM

## 2023-09-06 RX ORDER — OXYCODONE AND ACETAMINOPHEN 5; 325 MG/1; MG/1
1 TABLET ORAL EVERY 6 HOURS PRN
Qty: 58 TABLET | Refills: 0 | Status: SHIPPED | OUTPATIENT
Start: 2023-09-06 | End: 2023-09-28

## 2023-09-06 NOTE — TELEPHONE ENCOUNTER
The patient called the pharmacy to fill this medication yesterday the day he was due to start it. He could have called earlier than that. The pharmacy needed to order the medication which is typical for this medication. They are expecting it likely tomorrow. He can fill it at his usual pharmacy. Will recommend to patient that he call a couple of days earlier to prevent delay in obtaining his medication.     JOSEE Peacock, RN  Care Coordinator  [facility] [place]

## 2023-09-06 NOTE — TELEPHONE ENCOUNTER
Medication refill information reviewed.     Due date for oxyCODONE-acetaminophen (PERCOCET) 5-325 MG tablet  is 9/10/2023     Prescriptions prepped for review.     Will route to provider.     Stacey Tellez RN  [facility]  [phone]

## 2023-09-06 NOTE — TELEPHONE ENCOUNTER
[facility] Call Center    Phone Message    May a detailed message be left on voicemail: yes     Reason for Call: Other: Patient called stating he is a day past when he was supposed to change his patch and that his pharmacy is out of stock. He states he needs to know what to do because he is laying in bed waiting. Please review.     Action Taken: Message routed to:  Other: Pain    Travel Screening: Not Applicable

## 2023-09-06 NOTE — TELEPHONE ENCOUNTER
"Contacted patient and indicated that per CSA it is patient responsibility to locate pharmacy who has medication in stock and then notify where he would like new prescription sent.  Patient states that he has contacted pharmacies and \"I've done everything and I'm going without.\"  Writer asks about 4 separate area pharmacies and inquires if patient has contacted these, of which patient has spoken with 1.  Patient agreeable to call these alternate pharmacies and notify when he locates one with medication in stock.     Stacey Tellez RN  [facility]  [phone]    "

## 2023-09-06 NOTE — TELEPHONE ENCOUNTER
Received call from patient requesting refill(s) oxyCODONE-acetaminophen (PERCOCET) 5-325 MG tablet    Last dispensed from pharmacy on 08/11/2023    Patient's last office/virtual visit by prescribing provider on 07/12/2023  Next office/virtual appointment scheduled for 10/11/2023    Last urine drug screen date 04/12/2023  Current opioid agreement on file (completed within the last year) Yes Date of opioid agreement: 04/12/2023    E-prescribe to      [facility] #[id number] - [place], [place] - [facility] [address] AT [facility] [address]      Will route to nursing pool for review and preparation of prescription(s).     Jenny Morrell MA  [facility] [place]

## 2023-09-07 NOTE — TELEPHONE ENCOUNTER
Signed Prescriptions:                        Disp   Refills    oxyCODONE-acetaminophen (PERCOCET) 5-325 M*58 tab*0        Sig: Take 1 tablet by mouth every 6 hours as needed for           moderate to severe pain Max of 2/day.  Fill            9/8/2023 start 09/10/23  Authorizing Provider: INA LOPEZ        Reviewed [place]  September 6, 2023- no concerning fills.    Ina Lopez APRN, RN CNP, FNP  [facility]  [facility]

## 2023-09-28 ENCOUNTER — MYC REFILL (OUTPATIENT)
Dept: PALLIATIVE MEDICINE | Facility: CLINIC | Age: 53
End: 2023-09-28
Payer: COMMERCIAL

## 2023-09-28 DIAGNOSIS — Z79.891 ENCOUNTER FOR LONG-TERM USE OF OPIATE ANALGESIC: ICD-10-CM

## 2023-09-28 DIAGNOSIS — G89.29 CHRONIC BILATERAL LOW BACK PAIN WITH RIGHT-SIDED SCIATICA: ICD-10-CM

## 2023-09-28 DIAGNOSIS — M96.1 FAILED BACK SYNDROME: ICD-10-CM

## 2023-09-28 DIAGNOSIS — M54.41 CHRONIC BILATERAL LOW BACK PAIN WITH RIGHT-SIDED SCIATICA: ICD-10-CM

## 2023-09-28 DIAGNOSIS — M54.16 LUMBAR RADICULOPATHY: ICD-10-CM

## 2023-09-28 DIAGNOSIS — M96.1 FAILED BACK SURGICAL SYNDROME: ICD-10-CM

## 2023-09-28 DIAGNOSIS — F11.90 CHRONIC, CONTINUOUS USE OF OPIOIDS: ICD-10-CM

## 2023-09-28 RX ORDER — OXYCODONE AND ACETAMINOPHEN 5; 325 MG/1; MG/1
1 TABLET ORAL EVERY 6 HOURS PRN
Qty: 58 TABLET | Refills: 0 | Status: CANCELLED | OUTPATIENT
Start: 2023-09-28

## 2023-09-28 NOTE — TELEPHONE ENCOUNTER
Patient requesting refill(s) of oxyCODONE-acetaminophen (PERCOCET) 5-325 MG tablet     Per pharmacy- Last dispensed from pharmacy on 9/11/23    Patient's last office/virtual visit by prescribing provider on 7/12/23  Next office/virtual appointment scheduled for 10/11/23    Last urine drug screen date 4/12/23  Current opioid agreement on file (completed within the last year) Yes Date of opioid agreement: 4/12/23    E-prescribe to [facility] #[id number] - [place], [place]     Will route to [facility] for review and preparation of prescription(s).

## 2023-09-28 NOTE — TELEPHONE ENCOUNTER
Refills have been requested for the following medications:         oxyCODONE-acetaminophen (PERCOCET) 5-325 MG tablet [Ina TORIBIO CNP]     Preferred pharmacy: [facility] #[id number] - [address] AT [facility] & [address]

## 2023-09-28 NOTE — TELEPHONE ENCOUNTER
Medication refill information reviewed.     Due date for oxyCODONE-acetaminophen (PERCOCET) 5-325 MG tablet  Last sold 09/11/23 for a 29 day supply. Due 10/10/23    and buprenorphine (BUTRANS) 15 MCG/HR Wk patch last filled 09/07/23  is 10/05/23     Prescriptions prepped for review.     Will route to provider.

## 2023-09-29 RX ORDER — OXYCODONE AND ACETAMINOPHEN 5; 325 MG/1; MG/1
1 TABLET ORAL EVERY 6 HOURS PRN
Qty: 60 TABLET | Refills: 0 | Status: SHIPPED | OUTPATIENT
Start: 2023-09-29 | End: 2023-10-11

## 2023-09-29 RX ORDER — BUPRENORPHINE 15 UG/H
1 PATCH TRANSDERMAL
Qty: 4 PATCH | Refills: 0 | Status: SHIPPED | OUTPATIENT
Start: 2023-09-29 | End: 2023-10-11

## 2023-09-29 NOTE — TELEPHONE ENCOUNTER
Signed Prescriptions:                        Disp   Refills    buprenorphine (BUTRANS) 15 MCG/HR Wk patch 4 patch0        Sig: Place 1 patch onto the skin every 7 days Fill           10/03/23 and start 10/05/23. 28 day supply for           chronic pain.  Authorizing Provider: INA LOPEZ    oxyCODONE-acetaminophen (PERCOCET) 5-325 M*60 tab*0        Sig: Take 1 tablet by mouth every 6 hours as needed for           moderate to severe pain Max of 2/day.  Fill/start           10/08/23 start 10/10/23 30 day supply for chronic           pain  Authorizing Provider: INA LOPEZ        Reviewed [place]  September 29, 2023- no concerning fills.    Ina Lopez APRN, RN CNP, FNP  [facility]  [facility] [place]

## 2023-10-11 ENCOUNTER — OFFICE VISIT (OUTPATIENT)
Dept: PALLIATIVE MEDICINE | Facility: CLINIC | Age: 53
End: 2023-10-11
Payer: COMMERCIAL

## 2023-10-11 VITALS — DIASTOLIC BLOOD PRESSURE: 94 MMHG | SYSTOLIC BLOOD PRESSURE: 160 MMHG | HEART RATE: 73 BPM

## 2023-10-11 DIAGNOSIS — M54.41 CHRONIC BILATERAL LOW BACK PAIN WITH RIGHT-SIDED SCIATICA: ICD-10-CM

## 2023-10-11 DIAGNOSIS — M96.1 FAILED BACK SURGICAL SYNDROME: ICD-10-CM

## 2023-10-11 DIAGNOSIS — G89.29 CHRONIC BILATERAL LOW BACK PAIN WITH RIGHT-SIDED SCIATICA: ICD-10-CM

## 2023-10-11 DIAGNOSIS — Z79.891 ENCOUNTER FOR LONG-TERM USE OF OPIATE ANALGESIC: ICD-10-CM

## 2023-10-11 DIAGNOSIS — M54.16 LUMBAR RADICULOPATHY: Primary | ICD-10-CM

## 2023-10-11 DIAGNOSIS — F11.90 CHRONIC, CONTINUOUS USE OF OPIOIDS: ICD-10-CM

## 2023-10-11 DIAGNOSIS — M96.1 FAILED BACK SYNDROME: ICD-10-CM

## 2023-10-11 PROCEDURE — 99214 OFFICE O/P EST MOD 30 MIN: CPT | Performed by: NURSE PRACTITIONER

## 2023-10-11 RX ORDER — BUPRENORPHINE 15 UG/H
1 PATCH TRANSDERMAL
Qty: 4 PATCH | Refills: 0 | Status: SHIPPED | OUTPATIENT
Start: 2023-10-11 | End: 2023-10-11

## 2023-10-11 RX ORDER — BUPRENORPHINE 15 UG/H
1 PATCH TRANSDERMAL
Qty: 4 PATCH | Refills: 0 | Status: SHIPPED | OUTPATIENT
Start: 2023-10-11 | End: 2023-11-28

## 2023-10-11 RX ORDER — OXYCODONE AND ACETAMINOPHEN 5; 325 MG/1; MG/1
1 TABLET ORAL EVERY 6 HOURS PRN
Qty: 60 TABLET | Refills: 0 | Status: SHIPPED | OUTPATIENT
Start: 2023-10-11 | End: 2023-11-28

## 2023-10-11 ASSESSMENT — PAIN SCALES - GENERAL: PAINLEVEL: SEVERE PAIN (6)

## 2023-10-11 NOTE — PROGRESS NOTES
[facility]      10/11/2023      Chief complaint:   -Low back pain and right leg radicular pain, same, no change  -no new areas of pain      Interval history:  David Wilcox is a 53 year old male is known to me for:  Chronic bilateral low back pain with right-sided sciatica  Lumbar radiculopathy  Failed back surgical syndrome  Chronic continuous use of opioids  Encounter for long-term use of opiate analgesic     Distant history of alcohol and drug abuse  Bipolar disorder   H/o menningitis after SCS trial  PMHx includes: Acute bronchitis, acute pancreatitis, acute sinusitis, ADHD, anxiety, depression, asthma, bipolar disorder unspecified, cervicalgia, dysthymic disorder, esophageal reflux, fixation of spine with fusion, history of blood transfusion, hyperlipidemia, low back pain, lumbar throat, meningitis after spinal cord stimulator trial, narcotic abuse, neck pain, open nondisplaced fracture of distal phalanx of finger, pain in limb, personal history of other diseases of the digestive system, sciatica, spasm of muscle, tobacco use disorder  PSHx includes: Back surgery in 2003 and 2011, lumbar anterior three-level fusion, ORIF right finger (3/3/2021)  .    Recommendations/plan at the last visit on 7/12/2023 included:  Physical Therapy: none  Continue your regular [facility]   Clinical Health Psychologist to address issues of relaxation, behavioral change, coping style, and other factors important to improvement: none  Diagnostic Studies: none  Medication Management:   Continue Percocet max 2/day.   Continue current dosing of Butrans until this script is done  On 8/6 fill new script for Butrans 15mcg/hr and begin use on 8/6/2023.   has naloxone nasal spray for safety  Further procedures recommended: none  Acupuncture: yes, continue  Referral to comprehensive weight management as losing weight will help to reduce pain and reduce risks for osteoarthritis, heart disease, stroke risk, etc.    Recommendations/follow-up for PCP:  See above  Release of information: none  Follow up: 12 weeks, video or in-person. Please call [phone] to make your follow-up appointment with me.   DME  Order for blood pressure cuff  Check BP at home daily for 7-10 days and let your Primary Care Provider know if your blood pressure remains at or above 140/90.           Since his last visit, David Wilcox reports:    Interval history October 11, 2023  -continued low back pain, extends into the right leg to the foot. No changes.   -denies any new areas of pain  -he does feel that the increase in Butrans helped a little bit. Cannot currently increase dosing due to cost (his insurance just reset).        Interval history July 12, 2023  -his low back and right leg pain is the same  -stable on medications, he has looked into buprenorphine and is interested in starting Butrans for pain  -interested in weight loss, [facility]     Interval history April 12, 2023  -pain is the same  -morphine is really helpful but makes him feel a little cloudy  -long acting OxyContin can be harder to obtain with his pharmacy  -discussed switch to buprenorphine (Butrans patch or [place] film) patient interested, wants to think about it and speak with his wife    Interval history January 10, 2023  -when in [place], unable to get OxyContin, switched to MSContin 15mg every 12 hours. This is working well, no significant side effects. Feels more sleepy in the late afternoon, could also be shorter days. Will continue to monitor.   -low back pain remains with right leg radiculopathy to the right foot 2nd through 5th digits.   -walking up to 3 km per day.  -mentions wanting to lose weight, discussed referral to [facility], patient is very interested in this.   Also states he has previously been diagnosed with OBSTRUCTIVE SLEEP APNEA, but does not have CPAP as none were in stock when he was  initially diagnosed. Encouraged patient to follow up with his sleep medicine provider and get a CPAP set up.      Interval history October 31, 2022  -low back pain and right leg pain has been a little worse, he usually struggle with the change of seasons getting cooler.   -he may do acupuncture again, this has been beneficial in the past.   -may go to his son's in [place] as he is likely moving to the [place], [place] area  -we have previously discussed possible switch to buprenorphine, would like to wait until he is likely back in [place] due to travel to [place] for a couple months, then likely [place] for a bit.     Interval history August 30, 2022  -David states he still has the ongoing low back pain with right leg radicular symptoms. Feels pain has been a bit better.   -he is increasing his activities  -exercising regularly, doing 2 km twice daily on the treadmill, able to do walking  -doing 3 [employer] per week. (Misael, Vanda, and Arturo)  -on vacation camping    Pain history collected 5/27/2022 at initial visit  Out cutting a tree and it fell on him in 2002. He had compression fractures L5-S1, and multiple other fractures and spent almost a year in a wheelchair, initially told he would not walk again.   His initial spinal  surgery was helpful, but he then fell down the stairs and he knew something was wrong but no one would believe him. He states 8 years later they found loose hardware and he had to have a subsequent surgery to fix that. Then a 3rd surgery extended the fusion. He is now fused from L3-S1.      Pain on the right side from bottom of the ribs to the low back, around the hip and down the right leg to the toes. Bilateral hip pain posterolateral hip pain (points to buttocks).      He has had loss of bowel and bladder but it is not a common thing. This is not new, present off and on for 6 years.      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   Has regular exercise with chores and he now  "walks every day on the treadmill (2km in the AM and PM)  Does [employer] in [place] on Fridays and his wife does so on Saturdays.           At this point, the patient's participation with our multidisciplinary team includes:  The patient has been compliant with the program.  PT - not ordered, patient is quite active at home running a [employer] - not ordered      Pain scores:  Pain intensity on average is 5-7 on a scale of 0-10.    Range is 3-9/10.   Pain right now is 6/10.   Pain is described as \"like a pillow with a knife through it and when bad, like I am on fire\"    Pain is constant in nature      Current pain relevant medications:   -Tylenol extra strength 500mg PRN   -naloxone nasal spray for opiate reversal   -Duloxetine 60mg every day (very helpful for mood and pain)  -gabapentin 600mg take 2 tabs/1200mg TID (helpful)  -nortriptyline 10mg take 2 (20mg) at bedtime (helpful)  -MSContin 15mg Q 12 hours (helpful-moreso than the OxyContin had been but also causes him to feel a little tired )  -Percocet 5/325mg take 1 tab Q 6 hours PRN max 3/day (he plans on 2/day and uses 3 if needed. helpful)  -tizanidine 4mg TID PRN for muscle spasms (helpful)        Other pertinent medications:  -lorazepam 0.5mg BID PRN anxiety (very rare use, received #30 tabs in 2018 and still has some left)  -mirtazepine 15mg at bedtime  -ambien 10mg Q HS PRN insomnia           Previous medication treatments included:  OPIATES: oxycodone (helpful), hydrocodone (not helpful), Methadone (took after surgery, very helpful), Fentanyl patches (would have issues with getting to hot and get a dump of medication. He has used this very successfully in the winter when it is colder), Dilaudid (hydromorphone, very helpful for a migraine with antibiotics), MSContin (more helpful than OxyContin was)  NSAIDS: naproxyn (not helpful), ibuprofen (not helpful)  MUSCLE RELAXANTS: Methocarbamol (expense, helpful), Flexeril (helpful but he had a remote " history of a suicide attempt with this drug), tizanidine (very helpful)  ANTI-MIGRAINE MEDS: none  ANTI-DEPRESSANTS: none  SLEEP AIDS: Remeron (helpful)  ANTI-CONVULSANTS: nortriptyline (helpful)  TOPICALS: CBD oil (helps some)  ANXIOLYTICS: lorazepam (helpful)  MEDICAL CANNABIS: none  Other meds: lidocaine (not helpful)        Other treatments have included:   David Wilcox has been seen at a pain clinic in the past.  Previously managed by Dr. Bindu Motley here  PT: tried, was not helpful on multiple occasions  Chiropractic care: yes, uses this regularly for acupuncture  Acupuncture: helpful  TENs Unit: has one, helpful, uses it occasionally  SCS trial in   got meningitis and nearly       Injections:   -3/26/2018 caudal ALVA with Dr. Bindu Motley  (not helpful)  -2018 right L3-4 transforaminal ALVA with Dr. Bindu Motley (not helpful)  -2019  Bilateral SI joint injections with Dr. Bindu Motley  (not helpful)      THE 4 A's OF OPIOID MAINTENANCE ANALGESIA    Analgesia: helpful    Activity: exercising on treadmill twice daily, more active    Adverse effects: none    Adherence to Rx protocol: yes      Side Effects: no side effect  Patient is using the medication as prescribed: YES    Medications:  Current Outpatient Medications   Medication Sig Dispense Refill    albuterol (PROAIR HFA/PROVENTIL HFA/VENTOLIN HFA) 108 (90 Base) MCG/ACT inhaler INHALE 2 PUFFS INTO THE LUNGS EVERY 4 HOURS AS NEEDED FOR SHORTNESS OF BREATH 8.5 g 11    buprenorphine (BUTRANS) 15 MCG/HR Wk patch Place 1 patch onto the skin every 7 days Fill 10/03/23 and start 10/05/23. 28 day supply for chronic pain. 4 patch 0    DULoxetine (CYMBALTA) 60 MG capsule Take 1 capsule (60 mg) by mouth daily 90 capsule 3    gabapentin (NEURONTIN) 600 MG tablet TAKE 2 TABLETS BY MOUTH THREE TIMES DAILY 180 tablet 4    losartan (COZAAR) 100 MG tablet Take 1 tablet (100 mg) by mouth daily 90 tablet 3    mirtazapine (REMERON) 15 MG tablet TAKE 1  TABLET(15 MG) BY MOUTH AT BEDTIME 90 tablet 0    naloxone (NARCAN) 4 MG/0.1ML nasal spray Spray 1 spray (4 mg) into one nostril alternating nostrils once as needed for opioid reversal every 2-3 minutes until assistance arrives 0.2 mL 1    nortriptyline (PAMELOR) 10 MG capsule TAKE 2 CAPSULES(20 MG) BY MOUTH AT BEDTIME 180 capsule 2    omeprazole (PRILOSEC) 20 MG DR capsule Take 1 capsule (20 mg) by mouth daily 90 capsule 3    oxyCODONE-acetaminophen (PERCOCET) 5-325 MG tablet Take 1 tablet by mouth every 6 hours as needed for moderate to severe pain Max of 2/day.  Fill/start 11/7/23 start 11/9/23 16 day supply for chronic pain, this is to get Butrans and oxycodone due on the same day. Following script should be for a 28 day supply. 60 tablet 0    tiZANidine (ZANAFLEX) 4 MG tablet Take 1 tablet (4 mg) by mouth 3 times daily as needed for muscle spasms 0.5-1 tab tid prn muscle spasm 90 tablet 4    TYLENOL EXTRA STRENGTH 500 MG OR TABS At Bedtime       zolpidem (AMBIEN) 10 MG tablet One prn nightly for insomnia. 30 tablet 5    LORazepam (ATIVAN) 0.5 MG tablet One bid prn anxiety. Rare use (Patient not taking: Reported on 10/11/2023) 30 tablet 0       Medical History: any changes in medical history since they were last seen? No    Social History:   Home situation:  with children x 2 at home.   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   History of chemical dependency treatment: quit on own       Is patient a current smoker or tobacco user?  no  If yes, was cessation counseling offered?  no          Physical Exam:  Vital signs: Blood pressure (!) 160/94, pulse 73.    Behavioral observations:  Awake, alert and cooperative    Gait:  Normal     Musculoskeletal exam:  Strength grossly equal throughout. Moves well in exam room    Neuro exam:  Deferred    Skin/vascular/autonomic:  No suspicious  lesions on exposed skin.     Other:  na    Is the patient hypertensive today? yes  Hypertensive on recheck of BP?   Not rechecked, no symptoms at present, clinic had a fire alarm and we were essentially done with our visit so BP not rechecked  If yes, was patient recommended to see Primary Care Provider in follow up for management of HTN?  yes        Diagnostic tests:  MRI OF THE LUMBAR SPINE WITHOUT AND WITH CONTRAST August 6, 2021 5:57  PM      HISTORY: Low back pain, prior surgery, new symptoms. Worsening pain  above fusion, but also right radicular L5 or S1 distribution on the  right. Failed back surgical syndrome. Lumbar radiculopathy.     TECHNIQUE: Multiplanar, multisequence MRI images of the lumbar spine  were acquired without and with 10mL Gadavist IV contrast.     COMPARISON: Lumbar spine MR 4/27/2018, lumbar spine CT 7/14/2016.     FINDINGS: There are five lumbar-type vertebrae for the purposes of  this dictation. Posterior spine fusion from L3 down to S1 consisting  of bilateral pedicle screws at each level with inner connecting rods,  interbody fusion devices in the L4-L5 and L5-S1 disc spaces and  laminectomies of L3, L4, L5 and S1 again noted.     Degenerative grade 1 anterolisthesis of L5 upon S1 and mild  degenerative retrolisthesis of L2 upon L3 again noted. Alignment  otherwise normal and unchanged. Vertebral body heights normal. No  fractures. No evidence for pathologic bony lesion. Marrow signal  normal.     There is loss of disc height, disc desiccation and posterior disc  bulging/herniation to varying degrees at all levels of the lumbar  spine with the exception of the resected L4-L5 and L5-S1 discs.      The tip of the conus medullaris is at the L1-L2 level which is within  normal limits. No abnormal intrathecal contrast enhancement. There is  no evidence for intrathecal abnormality.      Level by level:      T12-L1: Loss of disc height, disc desiccation and circumferential disc  bulging with  a superimposed small posterior central disc herniation  (extrusion). Minimal facet arthropathy bilaterally. Minimal spinal  canal narrowing. No foraminal stenosis on either side. No change from  the comparison study.     L1-L2: Loss of disc height, disc desiccation and circumferential disc  bulging with a superimposed small posterior central disc herniation  (protrusion). Minimal facet arthropathy bilaterally. Minimal spinal  canal narrowing. No foraminal stenosis on either side. No change from  the comparison study.     L2-L3: Loss of disc height, disc desiccation and mild circumferential  disc bulging. No herniation. Moderate facet arthropathy bilaterally.  Mild spinal canal narrowing. Mild bilateral neural foraminal  narrowing. No change from the comparison study.     L3: Again noted is a fluid signal intensity collection in the right  posterolateral aspect of the spinal canal measuring 1.7 x 0.7 cm in  the AP and transverse dimensions respectively. This may represent a  synovial cyst arising from the right facet joint versus a cystic  postoperative fluid collection or cystic foreign body reaction. This  results in mild narrowing of the thecal sac at the mid L3 level.     L3-L4: (Fused level) Loss of disc height, disc desiccation and mild  circumferential disc bulging. No herniation. Moderate facet  arthropathy bilaterally. No spinal canal stenosis. Mild bilateral  neural foraminal narrowing. No change from the comparison study.      L4-L5: (Fused level) No spinal canal stenosis. No foraminal stenosis  on either side. No change from the comparison study.     L5-S1: (Fused level) No spinal canal stenosis. No foraminal stenosis  on either side. No change from the comparison study.                                                                      IMPRESSION:  1. Postoperative and degenerative change of the lumbar spine as  detailed above without appreciable change from the comparison studies.  2. No significant  spinal canal or neural foraminal stenosis at any  level of the spine.     JAI CHAVEZ MD         Other testing (labs, diagnostics):  11/17/2022  Cr. 0.80  Est GFR >90        Screening tools:      DIRE Score for ongoing opioid management is calculated as follows:     Diagnosis = 2     Intractability = 2     Risk: Psych = 2  Chem Hlth = 2  Reliability = 3  Social = 3     Efficacy = 2     Total DIRE Score = 16 (14 or higher predicts good candidate for ongoing opioid management; 13 or lower predicts poor candidate for opioid management)            [place] Prescription Monitoring [place]:  [place]ed [place]  10/11/2023- no concerning fills.  Ina TORIBIO, RN CNP, FNP  [facility]  [place] location              Assessment:  Lumbar radiculopathy  Failed back syndrome  Failed back surgical syndrome  Chronic bilateral low back pain with right sided sciatica  Chronic continuous use of opioids  Encounter for long term use of opiate analgesic    Essential hypertension  Class 3 severe obesity due to excess calories with serious co-morbidity (untreated OBSTRUCTIVE SLEEP APNEA) and BMI of >39 in adult  OBSTRUCTIVE SLEEP APNEA (currently untreated)  Distant history of alcohol and drug abuse  Bipolar disorder   H/o menningitis after SCS trial  PMHx includes: Acute bronchitis, acute pancreatitis, acute sinusitis, ADHD, anxiety, depression, asthma, bipolar disorder unspecified, cervicalgia, dysthymic disorder, esophageal reflux, fixation of spine with fusion, history of blood transfusion, hyperlipidemia, low back pain, lumbar throat, meningitis after spinal cord stimulator trial, narcotic abuse, neck pain, open nondisplaced fracture of distal phalanx of finger, pain in limb, personal history of other diseases of the digestive system, sciatica, spasm of muscle, tobacco use disorder  PSHx includes: Back surgery in 2003 and 2011, lumbar anterior three-level fusion, ORIF right finger  (3/3/2021)        Plan:   Physical Therapy: none  Continue your regular HEP   Clinical Health Psychologist to address issues of relaxation, behavioral change, coping style, and other factors important to improvement: none  Diagnostic Studies: none  Medication Management:   Continue Percocet max 2/day. Fill 10/7 and start 10/9, 16 day script which will get the Butrans and oxycodone due on the same days each month  Continue Butrans 15mcg/hr fill 11/1 and start 11/3  has naloxone nasal spray for safety  Further procedures recommended: none  Acupuncture: yes, continue  Previous referral to comprehensive weight management as losing weight will help to reduce pain and reduce risks for osteoarthritis, heart disease, stroke risk, etc. This appt is later in October  Recommendations/follow-up for PCP:  See above  Release of information: none  Follow up: 12 weeks, video or in-person. Please call [phone] to make your follow-up appointment with me.       ASSESSMENT AND PLAN:  (M54.16) Lumbar radiculopathy  (primary encounter diagnosis)  (M96.1) Failed back syndrome     (M96.1) Failed back surgical syndrome    (M54.41,  G89.29) Chronic bilateral low back pain with right-sided sciatica    (F11.90) Chronic, continuous use of opioids  (Z79.891) Encounter for long-term use of opiate analgesic  Plan: oxyCODONE-acetaminophen (PERCOCET) 5-325 MG         tablet, Adult Pain Clinic Follow-Up Order,         buprenorphine (BUTRANS) 15 MCG/HR Wk patch, continue Percocet      BILLING TIME DOCUMENTATION:   TOTAL TIME includes:   Time spent preparing to see the patient: 5 minutes (reviewing records and tests)  Time spend face to face with the patient: 15 minutes  Time spent ordering tests, medications, procedures and referrals: 0 minutes  Time spent Referring and communicating with other healthcare professionals: 0 minutes  Documenting clinical information in Epic: 10 minutes    The total TIME spent on this patient on the day of the  appointment was 30 minutes.           Ina TORIBIO RN CNP, FNP  [facility]  [facility] [place]

## 2023-10-11 NOTE — PATIENT INSTRUCTIONS
Plan:   Physical Therapy: none  Continue your regular HEP   Clinical Health Psychologist to address issues of relaxation, behavioral change, coping style, and other factors important to improvement: none  Diagnostic Studies: none  Medication Management:   Continue Percocet max 2/day. Fill 10/7 and start 10/9, 16 day script which will get the Butrans and oxycodone due on the same days each month  Continue Butrans 15mcg/hr fill 11/1 and start 11/3  has naloxone nasal spray for safety  Further procedures recommended: none  Acupuncture: yes, continue  Previous referral to comprehensive weight management as losing weight will help to reduce pain and reduce risks for osteoarthritis, heart disease, stroke risk, etc. This appt is later in October  Recommendations/follow-up for PCP:  See above  Release of information: none  Follow up: 12 weeks, video or in-person. Please call [phone] to make your follow-up appointment with me.     ----------------------------------------------------------------  Clinic Number:  [phone]   Call with any questions about your care and for scheduling assistance.   Calls are returned Monday through Friday between 8 AM and 4:30 PM. We usually get back to you within 2 business days depending on the issue/request.    If we are prescribing your medications:  For opioid medication refills, call the clinic or send a Hubblr message 7 days in advance.  Please include:  Name of requested medication  Name of the pharmacy.  For non-opioid medications, call your pharmacy directly to request a refill. Please allow 3-4 days to be processed.   Per [place] State Law:  All controlled substance prescriptions must be filled within 30 days of being written.    For those controlled substances allowing refills, pickup must occur within 30 days of last fill.      We believe regular attendance is key to your success in our program!    Any time you are unable to keep your appointment we ask that you call us at least  24 hours in advance to cancel.This will allow us to offer the appointment time to another patient.   Multiple missed appointments may lead to dismissal from the clinic.

## 2023-10-18 ENCOUNTER — PATIENT OUTREACH (OUTPATIENT)
Dept: CARE COORDINATION | Facility: CLINIC | Age: 53
End: 2023-10-18
Payer: COMMERCIAL

## 2023-10-26 ENCOUNTER — MYC REFILL (OUTPATIENT)
Dept: PALLIATIVE MEDICINE | Facility: CLINIC | Age: 53
End: 2023-10-26
Payer: COMMERCIAL

## 2023-10-26 DIAGNOSIS — Z79.891 ENCOUNTER FOR LONG-TERM USE OF OPIATE ANALGESIC: ICD-10-CM

## 2023-10-26 DIAGNOSIS — F11.90 CHRONIC, CONTINUOUS USE OF OPIOIDS: ICD-10-CM

## 2023-10-26 DIAGNOSIS — G89.29 CHRONIC BILATERAL LOW BACK PAIN WITH RIGHT-SIDED SCIATICA: ICD-10-CM

## 2023-10-26 DIAGNOSIS — M54.16 LUMBAR RADICULOPATHY: ICD-10-CM

## 2023-10-26 DIAGNOSIS — M96.1 FAILED BACK SURGICAL SYNDROME: ICD-10-CM

## 2023-10-26 DIAGNOSIS — M54.41 CHRONIC BILATERAL LOW BACK PAIN WITH RIGHT-SIDED SCIATICA: ICD-10-CM

## 2023-10-26 DIAGNOSIS — M96.1 FAILED BACK SYNDROME: ICD-10-CM

## 2023-10-26 RX ORDER — OXYCODONE AND ACETAMINOPHEN 5; 325 MG/1; MG/1
1 TABLET ORAL EVERY 6 HOURS PRN
Qty: 60 TABLET | Refills: 0 | Status: CANCELLED | OUTPATIENT
Start: 2023-10-26

## 2023-10-26 RX ORDER — BUPRENORPHINE 15 UG/H
1 PATCH TRANSDERMAL
Qty: 4 PATCH | Refills: 0 | Status: CANCELLED | OUTPATIENT
Start: 2023-10-26

## 2023-10-26 NOTE — TELEPHONE ENCOUNTER
Received call from patient requesting refill(s) of  oxyCODONE-acetaminophen (PERCOCET) 5-325 MG tablet   and  buprenorphine (BUTRANS) 15 MCG/HR Wk patch     These are already at the pharmacy.Shaila sent to patient.

## 2023-10-26 NOTE — PROGRESS NOTES
"Video-Visit Details    Type of service:  Video Visit    Video Start Time: 2:00 PM   Video End Time: 2:25 PM     Originating Location (pt. Location): Home    Distant Location (provider location): Offsite (providers home) [facility] [place]     Platform used for Video Visit: [employer]    New Weight Management Nutrition Consultation    David Wilcox is a 53 year old male presents today for new weight management nutrition consultation.  Patient referred by Dr. Hood on October 27, 2023.    Patient with Co-morbidities of obesity including:      10/26/2023     1:45 PM   --   I have the following health issues associated with obesity Pre-Diabetes    High Blood Pressure    Sleep Apnea   I have the following symptoms associated with obesity Knee Pain    Depression    Groin Rash     Anthropometrics:  Initial Consult Weight: 271 lb on 10/27/23   Estimated body mass index is 41.25 kg/m  as calculated from the following:    Height as of 8/14/23: 1.727 m (5' 8\").    Weight as of an earlier encounter on 10/27/23: 123.1 kg (271 lb 4.8 oz).    Medications for Weight Loss:  Wegovy - if approved by insurance and kind find inventory of it.   Phentermine as back up plan.     NUTRITION HISTORY  Food allergies: Artificial Sweeteners - vomiting/diarrhea.   Food intolerances: None   Vitamin/Mineral Supplements: Mens MVI   Previous methods of diet modification for weight loss: Exercise, diet   RD before: None     Wife does the cooking.   Protein: likes all kinds of animal proteins.   Banana/2 mandarins everyday. Serving of vegetables with dinner.     Diet recall:   Gets up at 6 AM and has a bowl of frosted mini wheats with milk   10:30 or 11 am - yogurt or sting cheese  Noon- 1 PM, can of soup with crackers or sandwich (homemade bread with turkey/cheese)  Afternoon - Yogurt or sting cheese (opposite from the morning)  Dinner - Varies, tries to keep portions to small or medium   Evening snack - Marshmallow " cereal like Nilson charms.   Hydration: drinks 2 bottles of water per cup of coffee and 12 cups of black coffee         10/26/2023     1:45 PM   Diet Recall Review with Patient   If you do eat breakfast, what types of food do you eat? Ceareal   If you do eat lunch, what types of food do you typically eat? Soup  or a sandwich   If you do eat supper, what types of food do you typically eat? Pasta, rice, ground pork, lamb   If you do snack, what types of food do you typically eat? Yogurt or crackers,  string cheese   How many glasses of juice do you drink in a typical day? 0   How many of glasses of milk do you drink in a typical day? 2   If you do drink milk, what type? Whole   How many 8oz glasses of sugar containing drinks such as Eloy-Aid/sweet tea do you drink in a day? 0   How many cans/bottles of sugar pop/soda/tea/sports drinks do you drink in a day? 0   How many cans/bottles of diet pop/soda/tea or sports drink do you drink in a day? 0   How often do you have a drink of alcohol? Monthly or Less   If you do drink, how many drinks might you have in a day? 1 or 2         10/26/2023     1:45 PM   Eating Habits   Generally, my meals include foods like these bread, pasta, rice, potatoes, corn, crackers, sweet dessert, pop, or juice Almost Everyday   Generally, my meals include foods like these fried meats, brats, burgers, french fries, pizza, cheese, chips, or ice cream Less Than Weekly   Eat fast food (like McDonalds, Burger Adalberto, Taco Bell) Less Than Weekly   Eat at a buffet or sit-down restaurant Less Than Weekly   Eat most of my meals in front of the TV or computer Almost Everyday   Often skip meals, eat at random times, have no regular eating times Less Than Weekly   Rarely sit down for a meal but snack or graze throughout A Few Times a Week   Eat extra snacks between meals A Few Times a Week   Eat most of my food at the end of the day Never   Eat in the middle of the night or wake up at night to eat Less Than  Weekly   Eat extra snacks to prevent or correct low blood sugar Everyday   Eat to prevent acid reflux or stomach pain Never   Worry about not having enough food to eat Never   I eat when I am depressed Once a Week   I eat when I am stressed Once a Week   I eat when I am bored Almost Everyday   I eat when I am happy or as a reward Less Than Weekly   I feel hungry all the time even if I just have eaten A Few Times a Week   Feeling full is important to me Almost Everyday   I finish all the food on my plate even if I am already full Less Than Weekly   I can't resist eating delicious food or walk past the good food/smell Less Than Weekly   I eat/snack without noticing that I am eating Almost Everyday   I eat when I am preparing the meal Never   I eat more than usual when I see others eating Never   I have trouble not eating sweets, ice cream, cookies, or chips if they are around the house A Few Times a Week   I think about food all day Less Than Weekly   What foods, if any, do you crave? Sweets/Candy/Chocolate   Please list any other foods you crave? 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 Broken his spine twice.         10/26/2023     1:45 PM   Activity/Exercise History   How much of a typical 12 hour day do you spend sitting? Less Than Half the Day   How much of a typical 12 hour day do you spend lying down? Less Than Half the Day   How much of a  typical day do you spend walking/standing? Less Than Half the Day   How many hours (not including work) do you spend on the TV/Video Games/Computer/Tablet/Phone? 6 Hours or More   How many times a week are you active for the purpose of exercise? 6-7 Times a Week   What keeps you from being more active? Pain    Shortness of Breath   How many total minutes do you spend doing some activity for the purpose of exercising when you exercise? 15-30 Minutes     Nutrition Prescription  Recommended energy/nutrient modification.    Nutrition Diagnosis  Obesity r/t long history of positive energy balance aeb BMI >30.    Nutrition Intervention  Discussed having a good source of protein at all meals, aiming for at least 60 grams of protein each day.  Handouts provided on volumetric eating to help satiety level on fewer calories; portion control and healthy food choices (Plate Method and Volumetrics handouts). Patient demonstrates understanding. Co-developed goals to work towards.   Provided pt with list of goals and resources below via [employer].     Expected Engagement: good  Follow-Up Plans: TBD     Nutrition Goals  1) Have a good source of protein at all meals and aim for at least 60 grams of protein daily.   2) Increase fruits and vegetables in diet. Aim to have 2 servings of fruits and 3 servings of vegetables each day.   3) Fiber goal is 25-30 grams. Start with trying to achieve 15 grams initially and build up from there with time.   4) Avoid snacking as able. If snack is needed use lean protein and/or fruit/vegetable. Examples:   - 2 cup popcorn   - 1 cup mixed berries   - 15 almonds, walnuts, cashews   - carrot/celery sticks and 2 tbsp low-fat ranch   - 1 hard boiled egg   - Part-skim mozzarella cheese stick   - Low-fat, low-sugar greek yogurt with 1/2 cup berries   - Med apple or pear   - sliced bell peppers with 1/2 cup salsa   - 1/2 cup roasted chickpeas   - sliced cucumbers with vinegar   - 1/2 cup of cottage cheese   -  beef/turkey jerky or sticks   - no bake protein balls     100 calorie sweets: Smart Sweets, Dr. Hernandez's Xylitol candy, Fiber One desserts, Fit and Active 100 calorie snack sweets at [employer]; [employer] 100 calorie pre-portioned cookies, sugar-free pudding, sugar-free jello.    The Plate Method  [email or web address]    Protein Sources   [email or web address]     Non-meat protein Ideas  Quinoa  Eggs  Dairy (Cottage cheese, low fat cheese, greek yogurt)   Nuts  Beans  Lentils  Protein pasta   Nutritional yeast  Garden of life raw meal powder  Liquid aminos  Homemade meats - a taco meat could be made with chopped cauliflower/mushroom as an example   Hummus (could do homemade if preferred)  Hemp hearts   Tofu  Seitan      Carbohydrates  [email or web address]     Mindful Eating  [email or web address]     Summary of Volumetrics Eating Plan  [email or web address]     Follow-Up: December 13.     Time spent with patient: 25 minutes.  Sadie Rodrigez RD, LD

## 2023-10-27 ENCOUNTER — OFFICE VISIT (OUTPATIENT)
Dept: ENDOCRINOLOGY | Facility: CLINIC | Age: 53
End: 2023-10-27
Payer: COMMERCIAL

## 2023-10-27 ENCOUNTER — VIRTUAL VISIT (OUTPATIENT)
Dept: ENDOCRINOLOGY | Facility: CLINIC | Age: 53
End: 2023-10-27
Payer: COMMERCIAL

## 2023-10-27 VITALS
BODY MASS INDEX: 41.25 KG/M2 | HEART RATE: 89 BPM | DIASTOLIC BLOOD PRESSURE: 90 MMHG | OXYGEN SATURATION: 98 % | SYSTOLIC BLOOD PRESSURE: 136 MMHG | WEIGHT: 271.3 LBS

## 2023-10-27 DIAGNOSIS — E66.01 CLASS 3 SEVERE OBESITY DUE TO EXCESS CALORIES WITH SERIOUS COMORBIDITY IN ADULT, UNSPECIFIED BMI (H): Primary | ICD-10-CM

## 2023-10-27 DIAGNOSIS — E66.813 CLASS 3 SEVERE OBESITY DUE TO EXCESS CALORIES WITH SERIOUS COMORBIDITY IN ADULT, UNSPECIFIED BMI (H): Primary | ICD-10-CM

## 2023-10-27 DIAGNOSIS — Z71.3 NUTRITIONAL COUNSELING: Primary | ICD-10-CM

## 2023-10-27 DIAGNOSIS — E66.01 CLASS 2 SEVERE OBESITY DUE TO EXCESS CALORIES WITH SERIOUS COMORBIDITY AND BODY MASS INDEX (BMI) OF 39.0 TO 39.9 IN ADULT (H): ICD-10-CM

## 2023-10-27 DIAGNOSIS — E66.812 CLASS 2 SEVERE OBESITY DUE TO EXCESS CALORIES WITH SERIOUS COMORBIDITY AND BODY MASS INDEX (BMI) OF 39.0 TO 39.9 IN ADULT (H): ICD-10-CM

## 2023-10-27 PROCEDURE — 99203 OFFICE O/P NEW LOW 30 MIN: CPT | Performed by: INTERNAL MEDICINE

## 2023-10-27 PROCEDURE — 97802 MEDICAL NUTRITION INDIV IN: CPT | Mod: VID

## 2023-10-27 PROCEDURE — 99207 PR NO CHARGE LOS: CPT | Mod: VID

## 2023-10-27 ASSESSMENT — PAIN SCALES - GENERAL: PAINLEVEL: EXTREME PAIN (8)
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
## 2023-10-27 NOTE — NURSING NOTE
Is the patient currently in the state of [place]? YES    Visit mode:VIDEO    If the visit is dropped, the patient can be reconnected by: VIDEO VISIT: Text to cell phone:   Telephone Information:   Mobile [phone]    and VIDEO VISIT: Send to e-mail at: [email or web address]    Will anyone else be joining the visit? NO  (If patient encounters technical issues they should call [phone]    How would you like to obtain your AVS? MyChart    Are changes needed to the allergy or medication list? No    Reason for visit: Consult    Jenny PRAJAPATI

## 2023-10-27 NOTE — NURSING NOTE
(   Chief Complaint   Patient presents with    New Patient    )    ( Weight: 271 lb 4.8 oz )  (   )  (   )  (   )  (   )  (   )  (   )  (   )  (   )    ( BP: (!) 136/90 )  (   )  (   )  (   )  ( Pulse: 89 )  (   )  ( SpO2: 98 % )    (   Patient Active Problem List   Diagnosis    Recurrent major depression in complete remission (H24)    Chronic low back pain    GERD (gastroesophageal reflux disease)    Insomnia    CARDIOVASCULAR SCREENING; LDL GOAL LESS THAN 160    Anxiety    Right foot drop    Continuous opioid dependence (H)    Chronic pain syndrome    Erectile dysfunction, unspecified erectile dysfunction type    Incontinence of feces, unspecified fecal incontinence type    Sedative, hypnotic or anxiolytic dependence (H)    Morbid obesity (H)    Hypertension, unspecified type    )  (   Current Outpatient Medications   Medication Sig Dispense Refill    albuterol (PROAIR HFA/PROVENTIL HFA/VENTOLIN HFA) 108 (90 Base) MCG/ACT inhaler INHALE 2 PUFFS INTO THE LUNGS EVERY 4 HOURS AS NEEDED FOR SHORTNESS OF BREATH 8.5 g 11    buprenorphine (BUTRANS) 15 MCG/HR Wk patch Place 1 patch onto the skin every 7 days Fill 11/1/23 and start 11/3/23. 28 day supply for chronic pain. 4 patch 0    DULoxetine (CYMBALTA) 60 MG capsule Take 1 capsule (60 mg) by mouth daily 90 capsule 3    gabapentin (NEURONTIN) 600 MG tablet TAKE 2 TABLETS BY MOUTH THREE TIMES DAILY 180 tablet 4    LORazepam (ATIVAN) 0.5 MG tablet One bid prn anxiety. Rare use (Patient not taking: Reported on 10/11/2023) 30 tablet 0    losartan (COZAAR) 100 MG tablet Take 1 tablet (100 mg) by mouth daily 90 tablet 3    mirtazapine (REMERON) 15 MG tablet TAKE 1 TABLET(15 MG) BY MOUTH AT BEDTIME 90 tablet 0    naloxone (NARCAN) 4 MG/0.1ML nasal spray Spray 1 spray (4 mg) into one nostril alternating nostrils once as needed for opioid reversal every 2-3 minutes until assistance arrives 0.2 mL 1    nortriptyline (PAMELOR) 10 MG capsule TAKE 2 CAPSULES(20 MG) BY MOUTH AT  BEDTIME 180 capsule 2    omeprazole (PRILOSEC) 20 MG DR capsule Take 1 capsule (20 mg) by mouth daily 90 capsule 3    oxyCODONE-acetaminophen (PERCOCET) 5-325 MG tablet Take 1 tablet by mouth every 6 hours as needed for moderate to severe pain Max of 2/day.  Fill/start 11/7/23 start 11/9/23 16 day supply for chronic pain, this is to get Butrans and oxycodone due on the same day. Following script should be for a 28 day supply. 60 tablet 0    tiZANidine (ZANAFLEX) 4 MG tablet Take 1 tablet (4 mg) by mouth 3 times daily as needed for muscle spasms 0.5-1 tab tid prn muscle spasm 90 tablet 4    TYLENOL EXTRA STRENGTH 500 MG OR TABS At Bedtime       zolpidem (AMBIEN) 10 MG tablet One prn nightly for insomnia. 30 tablet 5    )  ( Diabetes Eval:    )    ( Pain Eval:  Extreme Pain (8) )    ( Wound Eval:       )    (   History   Smoking Status    Former    Packs/day: 0.50    Types: Cigarettes, Cigars   Smokeless Tobacco    Never    )    ( Signed By:  Neal Stone, EMT; October 27, 2023; 10:32 AM )
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
## 2023-10-27 NOTE — PATIENT INSTRUCTIONS
Abiodun Hernandez,     It was nice to meet you today!    Follow-up with RD on December 13.     Thank you,    Sadie Rodrigez, RAKESH, LD  If you would like to schedule or reschedule an appointment with the RD, please call [phone]    Nutrition Goals  1) Have a good source of protein at all meals and aim for at least 60 grams of protein daily.   2) Increase fruits and vegetables in diet. Aim to have 2 servings of fruits and 3 servings of vegetables each day.   3) Fiber goal is 25-30 grams. Start with trying to achieve 15 grams initially and build up from there with time.   4) Avoid snacking as able. If snack is needed use lean protein and/or fruit/vegetable. Examples:   - 2 cup popcorn   - 1 cup mixed berries   - 15 almonds, walnuts, cashews   - carrot/celery sticks and 2 tbsp low-fat ranch   - 1 hard boiled egg   - Part-skim mozzarella cheese stick   - Low-fat, low-sugar greek yogurt with 1/2 cup berries   - Med apple or pear   - sliced bell peppers with 1/2 cup salsa   - 1/2 cup roasted chickpeas   - sliced cucumbers with vinegar   - 1/2 cup of cottage cheese   - beef/turkey jerky or sticks   - no bake protein balls     100 calorie sweets: Smart Sweets, Dr. Hernandez's Xylitol candy, Fiber One desserts, Fit and Active 100 calorie snack sweets at [employer]; [employer] 100 calorie pre-portioned cookies, sugar-free pudding, sugar-free jello.    The Plate Method  [email or web address]    Protein Sources   [email or web address]     Non-meat protein Ideas  Quinoa  Eggs  Dairy (Cottage cheese, low fat cheese, greek yogurt)   Nuts  Beans  Lentils  Protein pasta   Nutritional yeast  Garden of life raw meal powder  Liquid aminos  Homemade meats - a taco meat could be made with chopped cauliflower/mushroom as an example   Hummus (could do homemade if preferred)  Hemp hearts   Tofu  Seitan      Carbohydrates  [email or web address]     Mindful Eating  [email or web address]     Summary of Volumetrics Eating  Plan  [email or web address]       COMPREHENSIVE WEIGHT MANAGEMENT PROGRAM  VIRTUAL SUPPORT GROUPS    For Support Group Information:      We offer support groups for patients who are working on weight loss and considering, preparing for, or have had weight loss surgery.     There is no cost for this opportunity.  You are invited to attend the?Virtual Support Groups?provided by any of the following locations:    [facility] via Bubbli Teams with Isa Quijano RN  2.   [place] via Bubbli Teams with Kalen Caldera, PhD, LP  3.   [place] via Bubbli Teams with Violet Ac RN  4.   [facility] via Bubbli Teams with Violet Núñez, [facility]    The following Support Group information can also be found on our website:  [email or web address]      [facility] Weight Loss Surgery Support Group    [facility] Weight Loss Surgery Support Group  The support group is a patient-lead forum that meets monthly to share experiences, encouragement and education. It is open to those who have had weight loss surgery, are scheduled for surgery, or are considering surgery.   WHEN: This group meets on the 3rd Wednesday of each month from 5:00PM - 6:00PM virtually using Microsoft Teams.   FACILITATOR: Led by Isa Trinh RD, LD, RN, the program's Clinical Coordinator.   TO REGISTER: Please contact the clinic via Toutpost or call the nurse line directly at [phone] to inform our staff that you would like an invite sent to you and to let us know the email you would like the invite sent to. Prior to the meeting, a link with directions on how to join the meeting will be sent to you.    2023 Meetings   September 20  October 18  November 15  December 20    [facility] [place] Support Groups    Connections Bariatric Care Support Group?  This is open to all pre- and post- operative  "bariatric surgery patients as well as their support system.   WHEN: Starting June 2023, this group meets the 3rd Tuesday of each month from 6:30 PM - 8:00 PM virtually using Microsoft Teams.   FACILITATOR: Led by Kalen Caldera, Ph.D who is a Licensed Psychologist with the [facility] Comprehensive Weight [facility] Program.   TO REGISTER: Please send an email to Kalen Caldera, Ph.D., LP at?[email or web address] you would like an invitation to the group and to learn about using Microsoft Teams.    2023 Meetings  September 19 Sukhwinder Stack MD, FACS, [facility],   [facility], \"Body Contouring and the Bariatric Surgery Patient\".  October 17: Violet Ac RN, [facility],  Hospital Stay and Compliance   November 21: Candice Anand RD, LD, [facility],  Holiday Eating   December 19    [facility]ctions Post-Operative Bariatric Surgery Support Group  This is a support group for [facility] bariatric patients (and those external to [facility]) who have had bariatric surgery and are at least 3 months post-surgery.  WHEN: This support group meets the 4th Wednesday of the month from 11:00 AM - 12:00 PM virtually using Microsoft Teams.   FACILITATOR: Led by Certified Bariatric Nurse, Violet Ac RN.   TO REGISTER: Please send an email to Violet at [email or web address] if you would like an invitation to the group and to learn about using Microsoft Teams.    2023 Meetings September 27 October 25 November 22 December 27    [facility]   Healthy Lifestyle Virtual Support Group      Healthy Lifestyle Group  This is a 60 minute virtual coaching group for those who want to lead a healthier lifestyle. Come together to set goals and overcome barriers in a supportive group environment. We will address the four pillars of health--nutrition, exercise, sleep and emotional " "well-being.  This group is highly recommended for those who are participating in the 24 week Healthy Lifestyle Plan and our Health Coaching sessions.  WHEN: This group meets the first Friday of the month, 12:30 PM - 1:30 PM online, via a zoom meeting.    FACILITATOR: Led by National Board Certified Health and , Violet Núñez [facility].  TO REGISTER: Please call the Call Center at [phone] to register. You will get an appointment to attend in [place]. Fifteen minutes prior to the meeting, complete the e-check in and you will get the link to join the meeting.  There is no charge to attend this group and space is limited.    2023 and 2024 Meeting Topics and Dates:    November 3: Introduction to Mindfulness (Learn simple and effective mindfulness practices and how it can benefit you)  December 8: Let's Talk (guided discussion on our wins and challenges)  January 5: New Years Vision: Manifest your Best 2024! (Guided imagery,  journaling and discussion)  February 2: Let's Talk  March 1: 10 Percent Happier by Martin Morris (Book Bites; a guided discussion on the nuggets of wisdom from favorite wellness books; no need to read the book but highly encouraged)  April 5: Let's Talk  May 3: \"Essentialism; The Disciplined Pursuit of Less by Emil Ramos (book bites discussion)  June 7: Let's Talk  July 5: NO MEETING, off for the 4th of July Holiday  August 2: The Blue Zones, Secrets for Living a Longer Life by Martin Nogueira (book bites discussion)          "

## 2023-10-27 NOTE — PATIENT INSTRUCTIONS
"Welcome to our weight management program!   We are excited to join you on your weight loss journey    Thank you for allowing us the privilege of caring for you. We hope we provided you with the excellent service you deserve.   Please let us know if there is anything else we can do for you so that we can be sure you are leaving completely satisfied with your care experience.    To ensure the quality of our services you may be receiving a patient satisfaction survey from an independent patient satisfaction monitoring company.    The greatest compliment you can give is a \"Likely to Recommend\"    You saw Dr. Hood today.    Instructions per today's visit:       Follow up appointments:  Lauren Bloch (pharmacist) in 4-6 wks, Dr. Hood in 2-3 mo    Interested in working with a health ?  Health coaches work with you to improve your overall health and wellbeing.  They look at the whole person, and may involve discussion of different areas of life, including, but not limited to the four pillars of health (sleep, exercise, nutrition, and stress management). Discuss with your care team if you would like to start working a health .  Health Coaching-3 Pack:    $99 for three health coaching visits    Visits may be done in person or via phone    Coaching is a partnership between the  and the client; Coaches do not prescribe or diagnose    Coaching helps inspire the client to reach his/her personal goals     For any questions/concerns contact Eli Farrell LPN at [phone]     To schedule appointments with our team, please call [phone]     Please call during clinic hours Monday through Friday 8:00a - 4:00p if you have questions or you can contact us via Skip Hop at anytime. ?    Lab results will be communicated through My Chart or letter (if My Chart not used). Please call the clinic if you have not received communication after 1 week or if you have any questions.?      Fax: [phone]    Thank " you,  Medical Weight Management Team      Medications we discussed--  1.   WEGOVY (semaglutide)    What is Wegovy?    Wegovy (semaglutide) injection 2.4 mg is an injectable prescription medicine used for adults with obesity (BMI ?30) or overweight (excess weight) (BMI ?27) who also have weight-related medical problems to help them lose weight and keep the weight off.    1.  Start Wegovy (semaglutide) 0.25 mg once weekly for 4 weeks, then if tolerating increase to 0.5 mg weekly for 4 weeks, then if tolerating increase to 1 mg weekly for 4 weeks, then if tolerating increase to 1.7 mg weekly for 4 weeks, then if tolerating increase to 2.4 mg weekly thereafter.    -Each Wegovy pen is a once weekly single-dose prefilled pen with a pen injector already built within the pen. Discard the Wegovy pen after use in sharps container.     2. Storage: make sure that when you get the prescription that you store the prescription in the refrigerator until it is time to use the Wegovy pen.  Once it is time to use the Wegovy pen, you can keep the pen at room temperature and it is good for up to 28 days at room temperature.     3.  Potential common side effects: nausea, headache, diarrhea, stomach upset.  If these become unmanageable or concerning symptoms, please make sure to call or mychart.    4. Wegovy should be discontinued for ?2 months prior to becoming pregnant.     Prior Authorization Process for Weight Management Medications: You are being prescribed a medication that will likely need to undergo a prior authorization.  A prior authorization is when the clinic needs to fill out a form that is sent to your insurance company to obtain coverage for that medication. The prescription will NOT automatically go to your pharmacy today if it needs a Prior Authorization. If the medication prior authorization is approved, the care team will contact you and the prescription will be released to your pharmacy. If denied, we will work to try  and appeal the prior authorization if possible. The initial prior authorization process takes up to 5-10 business days and appeals can take up to 30 days. If you do not hear from us at the end of that time, you may contact the clinic.    Go to site: Wegovy video to learn more and watch instruction videos.    For any questions or concerns please send a Flazio message to our team or call our weight management call center at [phone] during regular business hours. For questions during evenings or weekends your messages will be addressed during the next business day.  For emergencies please call 911 or seek immediate medical care.      2.  MEDICATION considered AT THIS APPOINTMENT    We are starting Phentermine. Take one-half tablet in the morning.  Call the nurse if you have any questions or concerns. (Do not stop taking it if you don't think it's working. For some people it works without them knowing it.)    Phentermine is being prescribed because you identified hunger as one of the main causes for your extra weight.      Our patients on Phentermine find that they:    >feel less hunger    >find it easier to push the plate away   >have an easier time eating less    For some of our patients, these feelings are very real and immediate. For other patients, the feelings are less obvious. They don't feel much of a change but find they've lost weight. Like all weight loss medications, Phentermine  works best when you help it work. This means:  1. Having less tempting high calorie (fattening) food around the house or office. (For people with strong cravings this is very important.)   2. Staying away from situations or people that may trigger your cravings .   3. Eating out only one time or less each week.  4. Eating your meals at a table with the TV or computer off.    Side-effects. Phentermine is generally well tolerated. The main side-effects we see are feelings of racing pulse or rapid heart beat. Some people can get  an elevated blood pressure. Because of this we may have you come back within a week or so of starting the medication for a blood pressure check.         In order to get refills of this or any medication we prescribe you must be seen in the medical weight mgmt clinic every 2-3 months. Please have your pharmacy fax a refill request.

## 2023-11-01 ENCOUNTER — PATIENT OUTREACH (OUTPATIENT)
Dept: CARE COORDINATION | Facility: CLINIC | Age: 53
End: 2023-11-01
Payer: COMMERCIAL

## 2023-11-20 DIAGNOSIS — E66.813 CLASS 3 SEVERE OBESITY DUE TO EXCESS CALORIES WITH SERIOUS COMORBIDITY IN ADULT, UNSPECIFIED BMI (H): Primary | ICD-10-CM

## 2023-11-20 DIAGNOSIS — E66.01 CLASS 3 SEVERE OBESITY DUE TO EXCESS CALORIES WITH SERIOUS COMORBIDITY IN ADULT, UNSPECIFIED BMI (H): Primary | ICD-10-CM

## 2023-11-20 RX ORDER — PHENTERMINE HYDROCHLORIDE 37.5 MG/1
TABLET ORAL
Qty: 30 TABLET | Refills: 2 | Status: SHIPPED | OUTPATIENT
Start: 2023-11-20 | End: 2024-03-08

## 2023-11-24 DIAGNOSIS — F51.01 PRIMARY INSOMNIA: ICD-10-CM

## 2023-11-24 RX ORDER — ZOLPIDEM TARTRATE 10 MG/1
TABLET ORAL
Qty: 30 TABLET | Refills: 5 | Status: SHIPPED | OUTPATIENT
Start: 2023-11-24 | End: 2024-05-24

## 2023-11-28 ENCOUNTER — MYC REFILL (OUTPATIENT)
Dept: PALLIATIVE MEDICINE | Facility: CLINIC | Age: 53
End: 2023-11-28
Payer: COMMERCIAL

## 2023-11-28 DIAGNOSIS — F11.90 CHRONIC, CONTINUOUS USE OF OPIOIDS: ICD-10-CM

## 2023-11-28 DIAGNOSIS — M54.16 LUMBAR RADICULOPATHY: ICD-10-CM

## 2023-11-28 DIAGNOSIS — G89.29 CHRONIC BILATERAL LOW BACK PAIN WITH RIGHT-SIDED SCIATICA: ICD-10-CM

## 2023-11-28 DIAGNOSIS — Z79.891 ENCOUNTER FOR LONG-TERM USE OF OPIATE ANALGESIC: ICD-10-CM

## 2023-11-28 DIAGNOSIS — M54.41 CHRONIC BILATERAL LOW BACK PAIN WITH RIGHT-SIDED SCIATICA: ICD-10-CM

## 2023-11-28 DIAGNOSIS — M96.1 FAILED BACK SURGICAL SYNDROME: ICD-10-CM

## 2023-11-28 DIAGNOSIS — M96.1 FAILED BACK SYNDROME: ICD-10-CM

## 2023-11-28 RX ORDER — BUPRENORPHINE 15 UG/H
1 PATCH TRANSDERMAL
Qty: 4 PATCH | Refills: 0 | Status: CANCELLED | OUTPATIENT
Start: 2023-11-28

## 2023-11-28 RX ORDER — BUPRENORPHINE 15 UG/H
1 PATCH TRANSDERMAL
Qty: 4 PATCH | Refills: 0 | Status: SHIPPED | OUTPATIENT
Start: 2023-11-28 | End: 2023-12-21

## 2023-11-28 RX ORDER — OXYCODONE AND ACETAMINOPHEN 5; 325 MG/1; MG/1
1 TABLET ORAL EVERY 6 HOURS PRN
Qty: 60 TABLET | Refills: 0 | Status: SHIPPED | OUTPATIENT
Start: 2023-11-28 | End: 2024-01-09

## 2023-11-28 NOTE — TELEPHONE ENCOUNTER
Processed in a separate encounter. Closing here.     JEREMIAH PeacockN, RN  Care Coordinator  [facility]

## 2023-11-28 NOTE — TELEPHONE ENCOUNTER
Medication refill information reviewed.     Butrans 15 mcg patch 12/01/23    Due date for oxyCODONE-acetaminophen (PERCOCET) 5-325 MG tablet is 12/08/23     Prescriptions prepped for review.     Will route to provider.

## 2023-11-28 NOTE — TELEPHONE ENCOUNTER
Refills have been requested for the following medications:         oxyCODONE-acetaminophen (PERCOCET) 5-325 MG tablet [Ina Barillas]     Preferred pharmacy: [facility] #[id number] - [place], [place] - [address] [place] [address] [address] AT [facility] & [address] [place] [address]

## 2023-11-28 NOTE — TELEPHONE ENCOUNTER
Received refill request for:    oxyCODONE-acetaminophen (PERCOCET) 5-325 MG tablet      Last dispensed from pharmacy on 11/8/23    Patient's last office/virtual visit by prescribing provider on 10/11/23  Next office/virtual appointment scheduled for 1/15/24    Last urine drug screen date 4/12/23  Current opioid agreement on file? Yes Date of opioid agreement: 4/12/23    E-prescribe to:     [facility] #[id number] - [place], [place] - [facility] [address] AT [facility] [address] & [address] [facility] [address]    Will route to nursing Warren for review and preparation of prescription(s).

## 2023-11-28 NOTE — TELEPHONE ENCOUNTER
Refills have been requested for the following medications:         buprenorphine (BUTRANS) 15 MCG/HR Wk patch [Ina Barillas]     Preferred pharmacy: [facility] #[id number] - [address] AT [facility] & [address] [facility] [address]

## 2023-11-29 NOTE — TELEPHONE ENCOUNTER
Signed Prescriptions:                        Disp   Refills    oxyCODONE-acetaminophen (PERCOCET) 5-325 M*60 tab*0        Sig: Take 1 tablet by mouth every 6 hours as needed for           moderate to severe pain Max of 2/day. Fill           12/06/23 to start 12/08/23.  Authorizing Provider: INA LOPEZ    buprenorphine (BUTRANS) 15 MCG/HR Wk patch 4 patch0        Sig: Place 1 patch onto the skin every 7 days Fill           11/29/23 and start 12/01/23. 28 day supply for           chronic pain.  Authorizing Provider: INA LOPEZ        Reviewed [place]  November 28, 2023- no concerning fills.    Ina Lopez APRN, RN CNP, FNP  [facility]  [facility] [place]

## 2023-12-12 ENCOUNTER — MYC REFILL (OUTPATIENT)
Dept: FAMILY MEDICINE | Facility: CLINIC | Age: 53
End: 2023-12-12
Payer: COMMERCIAL

## 2023-12-12 DIAGNOSIS — F51.01 PRIMARY INSOMNIA: ICD-10-CM

## 2023-12-12 DIAGNOSIS — F33.42 RECURRENT MAJOR DEPRESSION IN COMPLETE REMISSION (H): ICD-10-CM

## 2023-12-12 NOTE — PROGRESS NOTES
"Video-Visit Details    Type of service:  Video Visit    Video Start Time: 10:57 AM  Video End Time: 11:11 AM     Originating Location (pt. Location): Home    Distant Location (provider location): Offsite (providers home) [facility] [place]     Platform used for Video Visit: i-drive    Return Weight Management Nutrition Consultation    David Wilcox is a 53 year old male presents today for return weight management nutrition consultation.  Patient referred by Dr. Hood on October 27, 2023.    Patient was made aware of limited RD coverage with Medicare. Patient wanted to proceed with today's visit.     Patient with Co-morbidities of obesity including:      10/26/2023     1:45 PM   --   I have the following health issues associated with obesity Pre-Diabetes    High Blood Pressure    Sleep Apnea   I have the following symptoms associated with obesity Knee Pain    Depression    Groin Rash     Anthropometrics:  Initial Consult Weight: 271 lb on 10/27/23   Estimated body mass index is 41.25 kg/m  as calculated from the following:    Height as of this encounter: 1.727 m (5' 8\").    Weight as of 10/27/23: 123.1 kg (271 lb 4.8 oz).  Current Weight: No updated weight. Pt admits that he does not like to weight himself frequently.     Medications for Weight Loss:  Phentermine - Feels he is more bloated but isn't totally sure if it is related to the med or not.     NUTRITION HISTORY  Food allergies: Artificial Sweeteners - vomiting/diarrhea.   Food intolerances: None   Vitamin/Mineral Supplements: Mens MVI   Previous methods of diet modification for weight loss: Exercise, diet   RD before: None     Wife does the cooking.   Protein: likes all kinds of animal proteins.   Banana/2 mandarins everyday. Serving of vegetables with dinner.     Diet recall:   Gets up at 6 AM and has a bowl of frosted mini wheats with milk   10:30 or 11 am - yogurt or sting cheese  Noon- 1 PM, can of soup with crackers or " sandwich (homemade bread with turkey/cheese)  Afternoon - Yogurt or sting cheese (opposite from the morning)  Dinner - Varies, tries to keep portions to small or medium   Evening snack - Marshmallow cereal like Nilson charms.   Hydration: drinks 2 bottles of water per cup of coffee and 12 cups of black coffee     December 2023: Patient reports he has been more intentional on reducing his sugar intake in his diet. He has been trying to eat more vegetables and protein. [place] so has access to a lot of fresh foods, currently has lamb and pork in the freezer right now. Eats 3 meals per day (1 mid morning, late lunch, and dinner around 7 pm).  Will snack on occasion and if he does will incorporate a fruit at this time. Has been trying to prioritize protein at all meals. For breakfast has been having 2 string cheese and lunch meat, Lunch is a roast with fruit, dinner meals have been onion/celery or broccoli + meat + rice. Overall water intake is good, patient drinks a pot of coffee per day and with each cup of coffee he drinks a small water bottle of water.         10/26/2023     1:45 PM   Diet Recall Review with Patient   If you do eat breakfast, what types of food do you eat? Ceareal   If you do eat lunch, what types of food do you typically eat? Soup  or a sandwich   If you do eat supper, what types of food do you typically eat? Pasta, rice, ground pork, lamb   If you do snack, what types of food do you typically eat? Yogurt or crackers,  string cheese   How many glasses of juice do you drink in a typical day? 0   How many of glasses of milk do you drink in a typical day? 2   If you do drink milk, what type? Whole   How many 8oz glasses of sugar containing drinks such as Eloy-Aid/sweet tea do you drink in a day? 0   How many cans/bottles of sugar pop/soda/tea/sports drinks do you drink in a day? 0   How many cans/bottles of diet pop/soda/tea or sports drink do you drink in a day? 0   How often do you have a  drink of alcohol? Monthly or Less   If you do drink, how many drinks might you have in a day? 1 or 2         10/26/2023     1:45 PM   Eating Habits   Generally, my meals include foods like these bread, pasta, rice, potatoes, corn, crackers, sweet dessert, pop, or juice Almost Everyday   Generally, my meals include foods like these fried meats, brats, burgers, french fries, pizza, cheese, chips, or ice cream Less Than Weekly   Eat fast food (like McDonalds, Burger Adalberto, Taco Bell) Less Than Weekly   Eat at a buffet or sit-down restaurant Less Than Weekly   Eat most of my meals in front of the TV or computer Almost Everyday   Often skip meals, eat at random times, have no regular eating times Less Than Weekly   Rarely sit down for a meal but snack or graze throughout A Few Times a Week   Eat extra snacks between meals A Few Times a Week   Eat most of my food at the end of the day Never   Eat in the middle of the night or wake up at night to eat Less Than Weekly   Eat extra snacks to prevent or correct low blood sugar Everyday   Eat to prevent acid reflux or stomach pain Never   Worry about not having enough food to eat Never   I eat when I am depressed Once a Week   I eat when I am stressed Once a Week   I eat when I am bored Almost Everyday   I eat when I am happy or as a reward Less Than Weekly   I feel hungry all the time even if I just have eaten A Few Times a Week   Feeling full is important to me Almost Everyday   I finish all the food on my plate even if I am already full Less Than Weekly   I can't resist eating delicious food or walk past the good food/smell Less Than Weekly   I eat/snack without noticing that I am eating Almost Everyday   I eat when I am preparing the meal Never   I eat more than usual when I see others eating Never   I have trouble not eating sweets, ice cream, cookies, or chips if they are around the house A Few Times a Week   I think about food all day Less Than Weekly   What foods, if  any, do you crave? Sweets/Candy/Chocolate   Please list any other foods you crave? 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 Broken his spine twice.         10/26/2023     1:45 PM   Activity/Exercise History   How much of a typical 12 hour day do you spend sitting? Less Than Half the Day   How much of a typical 12 hour day do you spend lying down? Less Than Half the Day   How much of a typical day do you spend walking/standing? Less Than Half the Day   How many hours (not including work) do you spend on the TV/Video Games/Computer/Tablet/Phone? 6 Hours or More   How many times a week are you active for the purpose of exercise? 6-7 Times a Week   What keeps you from being more active? Pain    Shortness of Breath   How many total minutes do you spend doing some activity for the purpose of exercising when you exercise? 15-30 Minutes     Progress to Previous Goals  1) Have a good source of protein at all meals and aim for at least 60 grams of protein daily.   2) Increase fruits and vegetables in diet. Aim to have 2 servings of fruits and 3 servings of vegetables each day.   3) Fiber goal is 25-30 grams. Start with trying to achieve 15 grams initially and build up from there with time.   4) Avoid snacking as able. If snack is needed use lean protein and/or fruit/vegetable.     Nutrition Prescription  Recommended energy/nutrient  modification.    Nutrition Diagnosis  Obesity r/t long history of positive energy balance aeb BMI >30.    Nutrition Intervention  Reviewed current dietary habits and pts history   Answered pt questions. Patient needs to see if his insurance covers these dietitian visits. Plans to continue with same nutrition goals and healthy habits he has been establishing. Reach out to nurse if Phentermine side effect of bloating continues.   Coordination of care   Nutrition education   AVS and handouts via [employer]      Expected Engagement: good  Follow-Up Plans: TBD     Nutrition Goals  1) Have a good source of protein at all meals and aim for at least 60 grams of protein daily.   2) Increase fruits and vegetables in diet. Aim to have 2 servings of fruits and 3 servings of vegetables each day.   3) Fiber goal is 25-30 grams. Start with trying to achieve 15 grams initially and build up from there with time.   4) Avoid snacking as able. If snack is needed use lean protein and/or fruit/vegetable. Examples:   - 2 cup popcorn   - 1 cup mixed berries   - 15 almonds, walnuts, cashews   - carrot/celery sticks and 2 tbsp low-fat ranch   - 1 hard boiled egg   - Part-skim mozzarella cheese stick   - Low-fat, low-sugar greek yogurt with 1/2 cup berries   - Med apple or pear   - sliced bell peppers with 1/2 cup salsa   - 1/2 cup roasted chickpeas   - sliced cucumbers with vinegar   - 1/2 cup of cottage cheese   - beef/turkey jerky or sticks   - no bake protein balls     Protein Sources   [email or web address]     Non-meat protein Ideas  Quinoa  Eggs  Dairy (Cottage cheese, low fat cheese, greek yogurt)   Nuts  Beans  Lentils  Protein pasta   Nutritional yeast  Garden of life raw meal powder  Liquid aminos  Homemade meats - a taco meat could be made with chopped cauliflower/mushroom as an example   Hummus (could do homemade if preferred)  Hemp hearts   Tofu  Seitan      10 Tips for Healthy Holidays:   1. Continue to eat 3 meals daily  "and avoid snacks. Do not skip meals to \"save\" calories for holiday meal, you will likely overeat.     2. Remember the basic guidelines, eat slowly take 20-30 minutes to enjoy your meal. Stop as soon as you are satisfied.     3. Stay away from the buffet table or appetizers prior to the meal. This leads to snacking between meals or filling up on snacks prior to the meal, which can lead to overeating.     4. During the meal eat your protein first, then the vegetables and last the starchy dishes. Again remember to stop as soon as you feel satisfied; it is okay to leave food on the plate.     5. Alcohol is not recommended. If choose to have an alcoholic beverage go easy on the alcoholic drinks, it is better to eat you calories instead of drinking them. Try water, non-alcoholic wine (does have calories), crystal light, junior and other low caloric beverage, put them in a fancy glass with a slice of lemon or lime to make them look nice. Remember alcohol will dehydrate you.     6. Drink plenty of water between meals.    7. Bring a dish to share, therefore you know that there will be a healthier option to eat.     8. Continue your current exercise routine, it is easier to fall out of your exercise routine than to get back into the habit of exercising after the holidays. Try to do a family activity outside or ask if anyone wants to take a walk pre or post meal.     9. Be kind to yourself, if you over indulged that's okay, all is not lost. Re-commit to healthy eating habits, forgive yourself and move on.     10. Try new traditions like adding in another vegetable dish or trying a healthier version of family favorites.     Follow-Up: as needed.     Time spent with patient: 14 minutes.  Sadie Rodrigez RD, LD    "

## 2023-12-13 ENCOUNTER — VIRTUAL VISIT (OUTPATIENT)
Dept: ENDOCRINOLOGY | Facility: CLINIC | Age: 53
End: 2023-12-13
Payer: COMMERCIAL

## 2023-12-13 VITALS — HEIGHT: 68 IN | BODY MASS INDEX: 41.25 KG/M2

## 2023-12-13 DIAGNOSIS — E66.812 CLASS 2 SEVERE OBESITY DUE TO EXCESS CALORIES WITH SERIOUS COMORBIDITY AND BODY MASS INDEX (BMI) OF 39.0 TO 39.9 IN ADULT (H): ICD-10-CM

## 2023-12-13 DIAGNOSIS — E66.01 CLASS 2 SEVERE OBESITY DUE TO EXCESS CALORIES WITH SERIOUS COMORBIDITY AND BODY MASS INDEX (BMI) OF 39.0 TO 39.9 IN ADULT (H): ICD-10-CM

## 2023-12-13 DIAGNOSIS — Z71.3 NUTRITIONAL COUNSELING: Primary | ICD-10-CM

## 2023-12-13 PROCEDURE — 97803 MED NUTRITION INDIV SUBSEQ: CPT | Mod: VID

## 2023-12-13 PROCEDURE — 99207 PR NO CHARGE LOS: CPT | Mod: VID

## 2023-12-13 RX ORDER — MIRTAZAPINE 15 MG/1
TABLET, FILM COATED ORAL
Qty: 90 TABLET | Refills: 0 | OUTPATIENT
Start: 2023-12-13

## 2023-12-13 RX ORDER — MIRTAZAPINE 15 MG/1
TABLET, FILM COATED ORAL
Qty: 90 TABLET | Refills: 1 | Status: SHIPPED | OUTPATIENT
Start: 2023-12-13 | End: 2024-06-03

## 2023-12-13 NOTE — PATIENT INSTRUCTIONS
"Abiodun Hernandez,     Follow-up with RD as needed.     Thank you,    Sadie Rodrigez, RD, LD  If you would like to schedule or reschedule an appointment with the RD, please call [phone]    Nutrition Goals  1) Have a good source of protein at all meals and aim for at least 60 grams of protein daily.   2) Increase fruits and vegetables in diet. Aim to have 2 servings of fruits and 3 servings of vegetables each day.   3) Fiber goal is 25-30 grams. Start with trying to achieve 15 grams initially and build up from there with time.   4) Avoid snacking as able. If snack is needed use lean protein and/or fruit/vegetable. Examples:   - 2 cup popcorn   - 1 cup mixed berries   - 15 almonds, walnuts, cashews   - carrot/celery sticks and 2 tbsp low-fat ranch   - 1 hard boiled egg   - Part-skim mozzarella cheese stick   - Low-fat, low-sugar greek yogurt with 1/2 cup berries   - Med apple or pear   - sliced bell peppers with 1/2 cup salsa   - 1/2 cup roasted chickpeas   - sliced cucumbers with vinegar   - 1/2 cup of cottage cheese   - beef/turkey jerky or sticks   - no bake protein balls     Protein Sources   http://"[employer]"/439149.pdf     Non-meat protein Ideas  Quinoa  Eggs  Dairy (Cottage cheese, low fat cheese, greek yogurt)   Nuts  Beans  Lentils  Protein pasta   Nutritional yeast  Garden of life raw meal powder  Liquid aminos  Homemade meats - a taco meat could be made with chopped cauliflower/mushroom as an example   Hummus (could do homemade if preferred)  Hemp hearts   Tofu  Seitan      10 Tips for Healthy Holidays:   1. Continue to eat 3 meals daily and avoid snacks. Do not skip meals to \"save\" calories for holiday meal, you will likely overeat.     2. Remember the basic guidelines, eat slowly take 20-30 minutes to enjoy your meal. Stop as soon as you are satisfied.     3. Stay away from the buffet table or appetizers prior to the meal. This leads to snacking between meals or filling up on snacks prior to the meal, which " can lead to overeating.     4. During the meal eat your protein first, then the vegetables and last the starchy dishes. Again remember to stop as soon as you feel satisfied; it is okay to leave food on the plate.     5. Alcohol is not recommended. If choose to have an alcoholic beverage go easy on the alcoholic drinks, it is better to eat you calories instead of drinking them. Try water, non-alcoholic wine (does have calories), crystal light, junior and other low caloric beverage, put them in a fancy glass with a slice of lemon or lime to make them look nice. Remember alcohol will dehydrate you.     6. Drink plenty of water between meals.    7. Bring a dish to share, therefore you know that there will be a healthier option to eat.     8. Continue your current exercise routine, it is easier to fall out of your exercise routine than to get back into the habit of exercising after the holidays. Try to do a family activity outside or ask if anyone wants to take a walk pre or post meal.     9. Be kind to yourself, if you over indulged that's okay, all is not lost. Re-commit to healthy eating habits, forgive yourself and move on.     10. Try new traditions like adding in another vegetable dish or trying a healthier version of family favorites.       COMPREHENSIVE WEIGHT MANAGEMENT PROGRAM  VIRTUAL SUPPORT GROUPS    For Support Group Information:      We offer support groups for patients who are working on weight loss and considering, preparing for, or have had weight loss surgery.     There is no cost for this opportunity.  You are invited to attend the?Virtual Support Groups?provided by any of the following locations:    [facility] via [employer] Teams with Isa Quijano RN  2.   [place] via [place] with Kalen Caldera, PhD, LP  3.   [place] via [employer] Teams with Violet Ac RN  4.   [facility] via [employer] Teams with Violet Núñez, [facility]    The following Support Group information can also be  "found on our website:  [email or web address]      [facility] Weight Loss Surgery Support Group    [facility] Weight Loss Surgery Support Group  The support group is a patient-lead forum that meets monthly to share experiences, encouragement and education. It is open to those who have had weight loss surgery, are scheduled for surgery, or are considering surgery.   WHEN: This group meets on the 3rd Wednesday of each month from 5:00PM - 6:00PM virtually using Microsoft Teams.   FACILITATOR: Led by Isa Trinh RD, LD, RN, the program's Clinical Coordinator.   TO REGISTER: Please contact the clinic via [employer] or call the nurse line directly at [phone] to inform our staff that you would like an invite sent to you and to let us know the email you would like the invite sent to. Prior to the meeting, a link with directions on how to join the meeting will be sent to you.    2023 Meetings   September 20  October 18  November 15  December 20    [facility] Support Groups    [facility] Bariatric Care Support Group?  This is open to all pre- and post- operative bariatric surgery patients as well as their support system.   WHEN: Starting June 2023, this group meets the 3rd Tuesday of each month from 6:30 PM - 8:00 PM virtually using Microsoft Teams.   FACILITATOR: Led by Kalen Caldera, Ph.D who is a Licensed Psychologist with the [facility] Comprehensive Weight Management Program.   TO REGISTER: Please send an email to Kalen Caldera, Ph.D., LP [email or web address]?if you would like an invitation to the group and to learn about using Microsoft Teams.    2023 Meetings  September 19 Sukhwinder Stack MD, FACS, [facility],   [facility], \"Body Contouring and the Bariatric Surgery " "Patient\".  October 17: Violet Ac RN, [facility],  Hospital Stay and Compliance   November 21: Candice Anand RD, LD, [facility],  Holiday Eating   December 19    [facility] Post-Operative Bariatric Surgery Support Group  This is a support group for [facility] bariatric patients (and those external to [facility]) who have had bariatric surgery and are at least 3 months post-surgery.  WHEN: This support group meets the 4th Wednesday of the month from 11:00 AM - 12:00 PM virtually using Microsoft Teams.   FACILITATOR: Led by Certified Bariatric Nurse, Violet Ac RN.   TO REGISTER: Please send an email to Violet at [email or web address] if you would like an invitation to the group and to learn about using Microsoft Teams.    2023 Meetings  September 27 October 25 November 22 December 27    [facility]   Healthy Lifestyle Virtual Support Group      Healthy Lifestyle Group  This is a 60 minute virtual coaching group for those who want to lead a healthier lifestyle. Come together to set goals and overcome barriers in a supportive group environment. We will address the four pillars of health--nutrition, exercise, sleep and emotional well-being.  This group is highly recommended for those who are participating in the 24 week Healthy Lifestyle Plan and our Health Coaching sessions.  WHEN: This group meets the first Friday of the month, 12:30 PM - 1:30 PM online, via a zoom meeting.    FACILITATOR: Led by National Board Certified Health and , Violet Núñez, [facility].  TO REGISTER: Please call the Call Center at [phone] to register. You will get an appointment to attend in [employer]. Fifteen minutes prior to the meeting, complete the e-check in and you will get the link to join the meeting.  There is no charge to attend this group and space is limited.    2023 and 2024 Meeting Topics and Dates:    November 3: Introduction to " "Mindfulness (Learn simple and effective mindfulness practices and how it can benefit you)  December 8: Let's Talk (guided discussion on our wins and challenges)  January 5: New Years Vision: Manifest your Best 2024! (Guided imagery,  journaling and discussion)  February 2: Let's Talk  March 1: 10 Percent Happier by Martin Morris (Book Bites; a guided discussion on the nuggets of wisdom from favorite wellness books; no need to read the book but highly encouraged)  April 5: Let's Talk  May 3: \"Essentialism; The Disciplined Pursuit of Less by Emil Ramos (book bites discussion)  June 7: Let's Talk  July 5: NO MEETING, off for the 4th of July Holiday  August 2: The Blue Zones, Secrets for Living a Longer Life by Martin Nogueira (book bites discussion)          "

## 2023-12-13 NOTE — LETTER
"12/13/2023       RE: David Wilcox  [address] [facility] [address]     Dear Colleague,    Thank you for referring your patient, David Wilcox, to the [facility] [place] at [facility]. Please see a copy of my visit note below.    Video-Visit Details    Type of service:  Video Visit    Video Start Time: 10:57 AM  Video End Time: 11:11 AM     Originating Location (pt. Location): Home    Distant Location (provider location): Offsite (providers home) [facility] [place]     Platform used for Video Visit: Cennox    Return Weight Management Nutrition Consultation    David Wilcox is a 53 year old male presents today for return weight management nutrition consultation.  Patient referred by Dr. Hood on October 27, 2023.    Patient was made aware of limited RD coverage with Medicare. Patient wanted to proceed with today's visit.     Patient with Co-morbidities of obesity including:      10/26/2023     1:45 PM   --   I have the following health issues associated with obesity Pre-Diabetes    High Blood Pressure    Sleep Apnea   I have the following symptoms associated with obesity Knee Pain    Depression    Groin Rash     Anthropometrics:  Initial Consult Weight: 271 lb on 10/27/23   Estimated body mass index is 41.25 kg/m  as calculated from the following:    Height as of this encounter: 1.727 m (5' 8\").    Weight as of 10/27/23: 123.1 kg (271 lb 4.8 oz).  Current Weight: No updated weight. Pt admits that he does not like to weight himself frequently.     Medications for Weight Loss:  Phentermine - Feels he is more bloated but isn't totally sure if it is related to the med or not.     NUTRITION HISTORY  Food allergies: Artificial Sweeteners - vomiting/diarrhea.   Food intolerances: None   Vitamin/Mineral Supplements: Mens MVI   Previous methods of diet modification for weight loss: Exercise, diet   RD " before: None     Wife does the cooking.   Protein: likes all kinds of animal proteins.   Banana/2 mandarins everyday. Serving of vegetables with dinner.     Diet recall:   Gets up at 6 AM and has a bowl of frosted mini wheats with milk   10:30 or 11 am - yogurt or sting cheese  Noon- 1 PM, can of soup with crackers or sandwich (homemade bread with turkey/cheese)  Afternoon - Yogurt or sting cheese (opposite from the morning)  Dinner - Varies, tries to keep portions to small or medium   Evening snack - Marshmallow cereal like Nilson charms.   Hydration: drinks 2 bottles of water per cup of coffee and 12 cups of black coffee     December 2023: Patient reports he has been more intentional on reducing his sugar intake in his diet. He has been trying to eat more vegetables and protein. [place] so has access to a lot of fresh foods, currently has lamb and pork in the freezer right now. Eats 3 meals per day (1 mid morning, late lunch, and dinner around 7 pm).  Will snack on occasion and if he does will incorporate a fruit at this time. Has been trying to prioritize protein at all meals. For breakfast has been having 2 string cheese and lunch meat, Lunch is a roast with fruit, dinner meals have been onion/celery or broccoli + meat + rice. Overall water intake is good, patient drinks a pot of coffee per day and with each cup of coffee he drinks a small water bottle of water.         10/26/2023     1:45 PM   Diet Recall Review with Patient   If you do eat breakfast, what types of food do you eat? Ceareal   If you do eat lunch, what types of food do you typically eat? Soup  or a sandwich   If you do eat supper, what types of food do you typically eat? Pasta, rice, ground pork, lamb   If you do snack, what types of food do you typically eat? Yogurt or crackers,  string cheese   How many glasses of juice do you drink in a typical day? 0   How many of glasses of milk do you drink in a typical day? 2   If you do drink  milk, what type? Whole   How many 8oz glasses of sugar containing drinks such as Eloy-Aid/sweet tea do you drink in a day? 0   How many cans/bottles of sugar pop/soda/tea/sports drinks do you drink in a day? 0   How many cans/bottles of diet pop/soda/tea or sports drink do you drink in a day? 0   How often do you have a drink of alcohol? Monthly or Less   If you do drink, how many drinks might you have in a day? 1 or 2         10/26/2023     1:45 PM   Eating Habits   Generally, my meals include foods like these bread, pasta, rice, potatoes, corn, crackers, sweet dessert, pop, or juice Almost Everyday   Generally, my meals include foods like these fried meats, brats, burgers, french fries, pizza, cheese, chips, or ice cream Less Than Weekly   Eat fast food (like McDonalds, Burger Adalberto, Taco Bell) Less Than Weekly   Eat at a buffet or sit-down restaurant Less Than Weekly   Eat most of my meals in front of the TV or computer Almost Everyday   Often skip meals, eat at random times, have no regular eating times Less Than Weekly   Rarely sit down for a meal but snack or graze throughout A Few Times a Week   Eat extra snacks between meals A Few Times a Week   Eat most of my food at the end of the day Never   Eat in the middle of the night or wake up at night to eat Less Than Weekly   Eat extra snacks to prevent or correct low blood sugar Everyday   Eat to prevent acid reflux or stomach pain Never   Worry about not having enough food to eat Never   I eat when I am depressed Once a Week   I eat when I am stressed Once a Week   I eat when I am bored Almost Everyday   I eat when I am happy or as a reward Less Than Weekly   I feel hungry all the time even if I just have eaten A Few Times a Week   Feeling full is important to me Almost Everyday   I finish all the food on my plate even if I am already full Less Than Weekly   I can't resist eating delicious food or walk past the good food/smell Less Than Weekly   I eat/snack  without noticing that I am eating Almost Everyday   I eat when I am preparing the meal Never   I eat more than usual when I see others eating Never   I have trouble not eating sweets, ice cream, cookies, or chips if they are around the house A Few Times a Week   I think about food all day Less Than Weekly   What foods, if any, do you crave? Sweets/Candy/Chocolate   Please list any other foods you crave? 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 Broken his spine twice.         10/26/2023     1:45 PM   Activity/Exercise History   How much of a typical 12 hour day do you spend sitting? Less Than Half the Day   How much of a typical 12 hour day do you spend lying down? Less Than Half the Day   How much of a typical day do you spend walking/standing? Less Than Half the Day   How many hours (not including work) do you spend on the TV/Video Games/Computer/Tablet/Phone? 6 Hours or More   How many times a week are you active for the purpose of exercise? 6-7 Times a Week   What keeps you from being more active? Pain    Shortness of Breath   How many total minutes do you spend doing some activity for the purpose of exercising when you exercise? 15-30 Minutes     Progress to Previous Goals  1) Have a good source of protein at all meals and aim for at least 60 grams of protein daily.   2) Increase fruits and vegetables in diet. Aim to have 2 servings of  fruits and 3 servings of vegetables each day.   3) Fiber goal is 25-30 grams. Start with trying to achieve 15 grams initially and build up from there with time.   4) Avoid snacking as able. If snack is needed use lean protein and/or fruit/vegetable.     Nutrition Prescription  Recommended energy/nutrient modification.    Nutrition Diagnosis  Obesity r/t long history of positive energy balance aeb BMI >30.    Nutrition Intervention  Reviewed current dietary habits and pts history   Answered pt questions. Patient needs to see if his insurance covers these dietitian visits. Plans to continue with same nutrition goals and healthy habits he has been establishing. Reach out to nurse if Phentermine side effect of bloating continues.   Coordination of care   Nutrition education   AVS and handouts via [employer]      Expected Engagement: good  Follow-Up Plans: TBD     Nutrition Goals  1) Have a good source of protein at all meals and aim for at least 60 grams of protein daily.   2) Increase fruits and vegetables in diet. Aim to have 2 servings of fruits and 3 servings of vegetables each day.   3) Fiber goal is 25-30 grams. Start with trying to achieve 15 grams initially and build up from there with time.   4) Avoid snacking as able. If snack is needed use lean protein and/or fruit/vegetable. Examples:   - 2 cup popcorn   - 1 cup mixed berries   - 15 almonds, walnuts, cashews   - carrot/celery sticks and 2 tbsp low-fat ranch   - 1 hard boiled egg   - Part-skim mozzarella cheese stick   - Low-fat, low-sugar greek yogurt with 1/2 cup berries   - Med apple or pear   - sliced bell peppers with 1/2 cup salsa   - 1/2 cup roasted chickpeas   - sliced cucumbers with vinegar   - 1/2 cup of cottage cheese   - beef/turkey jerky or sticks   - no bake protein balls     Protein Sources   http://[employer]/539418.pdf     Non-meat protein Ideas  Quinoa  Eggs  Dairy (Cottage cheese, low fat cheese, greek yogurt)   Nuts  Beans  Lentils  Protein  "pasta   Nutritional yeast  Garden of life raw meal powder  Liquid aminos  Homemade meats - a taco meat could be made with chopped cauliflower/mushroom as an example   Hummus (could do homemade if preferred)  Hemp hearts   Tofu  Senoé      10 Tips for Healthy Holidays:   1. Continue to eat 3 meals daily and avoid snacks. Do not skip meals to \"save\" calories for holiday meal, you will likely overeat.     2. Remember the basic guidelines, eat slowly take 20-30 minutes to enjoy your meal. Stop as soon as you are satisfied.     3. Stay away from the buffet table or appetizers prior to the meal. This leads to snacking between meals or filling up on snacks prior to the meal, which can lead to overeating.     4. During the meal eat your protein first, then the vegetables and last the starchy dishes. Again remember to stop as soon as you feel satisfied; it is okay to leave food on the plate.     5. Alcohol is not recommended. If choose to have an alcoholic beverage go easy on the alcoholic drinks, it is better to eat you calories instead of drinking them. Try water, non-alcoholic wine (does have calories), crystal light, junior and other low caloric beverage, put them in a fancy glass with a slice of lemon or lime to make them look nice. Remember alcohol will dehydrate you.     6. Drink plenty of water between meals.    7. Bring a dish to share, therefore you know that there will be a healthier option to eat.     8. Continue your current exercise routine, it is easier to fall out of your exercise routine than to get back into the habit of exercising after the holidays. Try to do a family activity outside or ask if anyone wants to take a walk pre or post meal.     9. Be kind to yourself, if you over indulged that's okay, all is not lost. Re-commit to healthy eating habits, forgive yourself and move on.     10. Try new traditions like adding in another vegetable dish or trying a healthier version of family favorites. "     Follow-Up: as needed.     Time spent with patient: 14 minutes.  Sadie Rodrigez RD, LD

## 2023-12-13 NOTE — NURSING NOTE
Is the patient currently in the state of [place]? YES    Visit mode:VIDEO    If the visit is dropped, the patient can be reconnected by: VIDEO VISIT: Text to cell phone:   Telephone Information:   Mobile [phone]       Will anyone else be joining the visit? NO  (If patient encounters technical issues they should call [phone]    How would you like to obtain your AVS? MyChart    Are changes needed to the allergy or medication list? Pt stated no changes to allergies and Pt stated no med changes    Reason for visit: Follow Up    Dona PRAJAPATI

## 2023-12-21 DIAGNOSIS — M96.1 FAILED BACK SYNDROME: ICD-10-CM

## 2023-12-21 DIAGNOSIS — M96.1 FAILED BACK SURGICAL SYNDROME: ICD-10-CM

## 2023-12-21 DIAGNOSIS — M54.16 LUMBAR RADICULOPATHY: ICD-10-CM

## 2023-12-21 DIAGNOSIS — F11.90 CHRONIC, CONTINUOUS USE OF OPIOIDS: ICD-10-CM

## 2023-12-21 DIAGNOSIS — G89.29 CHRONIC BILATERAL LOW BACK PAIN WITH RIGHT-SIDED SCIATICA: ICD-10-CM

## 2023-12-21 DIAGNOSIS — Z79.891 ENCOUNTER FOR LONG-TERM USE OF OPIATE ANALGESIC: ICD-10-CM

## 2023-12-21 DIAGNOSIS — M54.41 CHRONIC BILATERAL LOW BACK PAIN WITH RIGHT-SIDED SCIATICA: ICD-10-CM

## 2023-12-21 RX ORDER — BUPRENORPHINE 15 UG/H
1 PATCH TRANSDERMAL
Qty: 4 PATCH | Refills: 0 | Status: SHIPPED | OUTPATIENT
Start: 2023-12-21 | End: 2024-03-22 | Stop reason: DRUGHIGH

## 2023-12-21 NOTE — TELEPHONE ENCOUNTER
Received refill request for:    buprenorphine (BUTRANS) 15 MCG/HR Wk patch      Last dispensed from pharmacy on 11/29/2023.    Patient's last office/virtual visit by prescribing provider on 10/11/2023.  Next office/virtual appointment scheduled for 1/15/2024.    Last urine drug screen date 4/12/2023.  Current opioid agreement on file? Yes Date of opioid agreement: 4/12/2023.    E-prescribe to:     [id number] - [place], [place] - [facility] [address] AT [facility] & [address]    Will route to nursing [place] for review and preparation of prescription(s).

## 2023-12-21 NOTE — TELEPHONE ENCOUNTER
[facility] Call Center    Phone Message    May a detailed message be left on voicemail: yes     Reason for Call: Medication Refill Request    Has the patient contacted the pharmacy for the refill? Yes   Name of medication being requested: buprenorphine (BUTRANS) 15 MCG/HR Wk patch   Provider who prescribed the medication: Nargis  Pharmacy:    [id number] - [place], [place] - [address] [place] [address] AT [facility] & [address]        Date medication is needed: Patient had discussed increasing the butrans patch to 20MCG. He need it by 12/23/23       Action Taken: Other: Pain    Travel Screening: Not Applicable

## 2023-12-21 NOTE — TELEPHONE ENCOUNTER
Signed Prescriptions:                        Disp   Refills    buprenorphine (BUTRANS) 15 MCG/HR Wk patch 4 patch0        Sig: Place 1 patch onto the skin every 7 days Fill           12/27/23 and start 12/29/23. 28 day supply for           chronic pain.  Authorizing Provider: INA LOPEZ        Reviewed [place]  December 21, 2023- no concerning fills.    Ina TORIBIO, RN CNP, FNP  [facility] Management [facility]

## 2023-12-21 NOTE — TELEPHONE ENCOUNTER
Medication refill information reviewed. Spoke with patient and he reports he spoke to Ina about increasing to a 20 mcg patch if needed. He is being active and having a high level of pain on the current 15 mcg dose. Average pain is 7-8/10. Pended 20 mcg patch for review.     Due date for buprenorphine (BUTRANS) 20 MCG/HR Wk patch    is 12/29/23     Prescriptions prepped for review.     Will route to provider.

## 2023-12-27 DIAGNOSIS — M54.50 CHRONIC LOW BACK PAIN, UNSPECIFIED BACK PAIN LATERALITY, UNSPECIFIED WHETHER SCIATICA PRESENT: ICD-10-CM

## 2023-12-27 DIAGNOSIS — G89.29 CHRONIC LOW BACK PAIN, UNSPECIFIED BACK PAIN LATERALITY, UNSPECIFIED WHETHER SCIATICA PRESENT: ICD-10-CM

## 2023-12-27 DIAGNOSIS — G89.4 CHRONIC PAIN SYNDROME: ICD-10-CM

## 2023-12-27 RX ORDER — GABAPENTIN 600 MG/1
TABLET ORAL
Qty: 180 TABLET | Refills: 4 | Status: SHIPPED | OUTPATIENT
Start: 2023-12-27 | End: 2024-04-15

## 2023-12-27 RX ORDER — NORTRIPTYLINE HCL 10 MG
CAPSULE ORAL
Qty: 180 CAPSULE | Refills: 3 | Status: SHIPPED | OUTPATIENT
Start: 2023-12-27 | End: 2024-07-03

## 2024-01-03 ENCOUNTER — TELEPHONE (OUTPATIENT)
Dept: ENDOCRINOLOGY | Facility: CLINIC | Age: 54
End: 2024-01-03
Payer: COMMERCIAL

## 2024-01-03 ENCOUNTER — VIRTUAL VISIT (OUTPATIENT)
Dept: CARDIOLOGY | Facility: CLINIC | Age: 54
End: 2024-01-03
Attending: NURSE PRACTITIONER
Payer: COMMERCIAL

## 2024-01-03 VITALS — HEIGHT: 68 IN | BODY MASS INDEX: 41.25 KG/M2

## 2024-01-03 DIAGNOSIS — K21.9 GASTROESOPHAGEAL REFLUX DISEASE WITHOUT ESOPHAGITIS: ICD-10-CM

## 2024-01-03 DIAGNOSIS — E66.01 MORBID OBESITY (H): Primary | ICD-10-CM

## 2024-01-03 DIAGNOSIS — F41.9 ANXIETY: ICD-10-CM

## 2024-01-03 DIAGNOSIS — F32.A DEPRESSION, UNSPECIFIED DEPRESSION TYPE: ICD-10-CM

## 2024-01-03 DIAGNOSIS — I10 HYPERTENSION, UNSPECIFIED TYPE: ICD-10-CM

## 2024-01-03 DIAGNOSIS — F51.01 PRIMARY INSOMNIA: ICD-10-CM

## 2024-01-03 DIAGNOSIS — G89.29 CHRONIC LOW BACK PAIN, UNSPECIFIED BACK PAIN LATERALITY, UNSPECIFIED WHETHER SCIATICA PRESENT: ICD-10-CM

## 2024-01-03 DIAGNOSIS — M54.50 CHRONIC LOW BACK PAIN, UNSPECIFIED BACK PAIN LATERALITY, UNSPECIFIED WHETHER SCIATICA PRESENT: ICD-10-CM

## 2024-01-03 RX ORDER — ACETAMINOPHEN 500 MG
1500 TABLET ORAL AT BEDTIME
COMMUNITY

## 2024-01-03 ASSESSMENT — PAIN SCALES - GENERAL: PAINLEVEL: EXTREME PAIN (8)

## 2024-01-03 NOTE — PROGRESS NOTES
Medication Therapy Management (MTM) Encounter    ASSESSMENT:                            Medication Adherence/Access: See below for considerations    Weight Management:   Will consider weight loss medication GLP-1 agonist as alternative to phentermine as patient reports that phentermine is not effective and last blood pressure.  Will see if weight loss medication GLP-1 agonist is covered under patient's new insurance.  Addendum 1/10/2024: appears that weight loss injectables are not covered by patient's insurance therefore will need to discuss alternative options.  Would likely not suggest phentermine increase until blood pressure and heart rate can be reevaluated.  Pharmacist reaching out to patient.    Chronic Pain:   Stable for now.     Depression/Anxiety:    Stable for now.     Insomnia:   Needs improvement, symptoms showing episodes of sleep related disordered eating synonymous with side effect of zolpidem. Discussed concerns regarding this. Recommend consider trial of decreasing zolpidem as often sleep related eating disorder is dose related. Patient declinded. Will send thoughts to primary care provider.     Hypertension:   Blood pressure not at goal < 140/90. Would benefit from blood pressure repeat.     GERD:   Stable.      PLAN:                            Consider Saxenda or Zepbound - pharmacist to reach out to Pharmacy Liaison team.   Consider decrease zolpidem to 5 mg daily - patient declined. Recommend further evaluation with primary care provider.     Follow-up: Return in about 3 months (around 4/3/2024) for Medication Therapy Management Pharmacist Visit.    SUBJECTIVE/OBJECTIVE:                          David Wilcox is a 53 year old male contacted via secure video for an initial visit. He was referred to me from Dr. Hood.      Reason for visit: comprehensive review of medications     Allergies/ADRs: Reviewed in chart  Past Medical History: Reviewed in chart  Tobacco: He reports that he has quit  "smoking. His smoking use included cigarettes and cigars. He smoked an average of 0.5 packs per day. He has never used smokeless tobacco.  Alcohol: not currently using  Caffeine: 12 cups coffee per day     Medication Adherence/Access: no issues reported    Weight Management:   Phentermine 18.75 mg once daily.     Followed by Dr. Traci Hood, seen for [facility]. Patient is experiencing the follow side effects: None. No insomnia. No heart palpitations. He thought that Wegovy was not covered so switched to Phentermine. Would consider an injectable option if covered by insurance.   Diet/Eating Habits: Patient reports Breakfast: frosted shredded wheat, coffee; Lunch: yogurt, sandwich; Dinner: varies. Does have form of vegetable and meat for meals. Wife cooks.     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 Feels that losing weight would help with mobility and improved pain.     Current weight today: 270 lb   Wt Readings from Last 4 Encounters:   10/27/23 123.1 kg (271 lb 4.8 oz)   08/14/23 122.5 kg (270 lb)   11/17/22 124.2 kg (273 lb 12.8 oz)   05/09/22 122.9 kg (271 lb)     Estimated body mass index is 41.25 kg/m  as calculated from the following:    Height as of this encounter: 1.727 m (5' 8\").    Weight as of 10/27/23: 123.1 kg (271 lb 4.8 oz).    BP Readings from Last 3 Encounters:   10/27/23 (!) 136/90   10/11/23 (!) 160/94   08/14/23 138/88     Chronic Pain:   Gabapentin 1200 mg three times daily   Buprenorphine 15 mcg/hr weekly  Oxycodone-acetaminophen 5-325 mg in AM and noon, up to every 6 hours as needed  Tizanidine 4 mg three times daily as needed, taking scheduled   Nortriptyline 20 mg bedtime     Does find that as of now pain is managed generally well. He is able to go about his day to day with manageable pain. He reports that he often spends his day in his work shed. Describes greater pain at end of day but is able to complete the necessary tasks. " "Generally no medication side effects. Does feel tired through the day.     Depression/Anxiety:    Duloxetine 60mg once daily  Mirtazapine 15 mg bedtime   Nortriptyline 20 mg bedtime   Lorazepam 0.5 mg as needed     Patient reports symptoms are stable.  Reports history of significant depression. Does find duloxetine very effective for mood. Mirtazapine helpful for mood and sleep. Rarely using lorazepam. Nortriptyline on board also for pain.       4/11/2022     2:04 PM 11/17/2022    10:25 AM 8/14/2023    10:55 AM   PHQ   PHQ-9 Total Score 7 4 7   Q9: Thoughts of better off dead/self-harm past 2 weeks Not at all Not at all Not at all           4/11/2022     2:04 PM 11/17/2022    10:26 AM 8/14/2023    10:56 AM   KAMRYN-7 SCORE   Total Score 4 (minimal anxiety) 3 (minimal anxiety) 4 (minimal anxiety)   Total Score 4 3 4     Insomnia:   Zolpidem 10 mg nightly as needed     He reports that he finds the zolpidem helpful for sleep. He does describe sleep-related eating disorder. He reports that he has been told that he eats a bowl of cereal in middle of the night most nights. Falls asleep 9 am and wake up at 5 am. When waking up next day, tired. \"Always tired\". Has been on zolpidem for decades. Sleep eating has been going on for years. Finds he is dependent on zolpidem.     Hypertension:   Losartan 100 mg daily     Patient reports no current medication side effects.  Patient does not self-monitor blood pressure.    BP Readings from Last 3 Encounters:   10/27/23 (!) 136/90   10/11/23 (!) 160/94   08/14/23 138/88     Pulse Readings from Last 3 Encounters:   10/27/23 89   10/11/23 73   08/14/23 56     GERD:   Omeprazole 20 mg once daily   Patient reports no current symptoms.   Patient feels that current regimen is effective.    Today's Vitals: Ht 1.727 m (5' 8\")   BMI 41.25 kg/m    ----------------  I spent 45 minutes with this patient today. All changes were made via collaborative practice agreement with Dr. Hood. A copy of " the visit note was provided to the patient's provider(s).    A summary of these recommendations is available via clinic portal.    Lauren Bloch, PharmD, BCACP   Medication Therapy Management Pharmacist   [facility]    Telemedicine Visit Details  Type of service:  Video Conference via AmWell  Start Time:  4:45 PM  End Time:  5:30 PM     Medication Therapy Recommendations  Insomnia    Current Medication: zolpidem (AMBIEN) 10 MG tablet   Rationale: Undesirable effect - Adverse medication event - Safety   Recommendation: Decrease Dose - zolpidem 5 MG tablet   Status: Contact Provider - Awaiting Response         Morbid obesity (H)    Current Medication: phentermine (ADIPEX-P) 37.5 MG tablet   Rationale: Condition refractory to medication - Ineffective medication - Effectiveness   Recommendation: Change Medication - Zepbound 2.5 MG/0.5ML Soaj   Status: Accepted per CPA

## 2024-01-03 NOTE — LETTER
1/3/2024      RE: David Wilcox  [address]  [place] [address]       Dear Colleague,    Thank you for the opportunity to participate in the care of your patient, David Wilcox, at the [facility] [place] [facility]. Please see a copy of my visit note below.    Medication Therapy Management (MTM) Encounter    ASSESSMENT:                            Medication Adherence/Access: See below for considerations    Weight Management:   Will consider weight loss medication GLP-1 agonist as alternative to phentermine as patient reports that phentermine is not effective and last blood pressure.  Will see if weight loss medication GLP-1 agonist is covered under patient's new insurance.  Addendum 1/10/2024: appears that weight loss injectables are not covered by patient's insurance therefore will need to discuss alternative options.  Would likely not suggest phentermine increase until blood pressure and heart rate can be reevaluated.  Pharmacist reaching out to patient.    Chronic Pain:   Stable for now.     Depression/Anxiety:    Stable for now.     Insomnia:   Needs improvement, symptoms showing episodes of sleep related disordered eating synonymous with side effect of zolpidem. Discussed concerns regarding this. Recommend consider trial of decreasing zolpidem as often sleep related eating disorder is dose related. Patient declinded. Will send thoughts to primary care provider.     Hypertension:   Blood pressure not at goal < 140/90. Would benefit from blood pressure repeat.     GERD:   Stable.      PLAN:                            Consider Saxenda or Zepbound - pharmacist to reach out to Pharmacy Liaison team.   Consider decrease zolpidem to 5 mg daily - patient declined. Recommend further evaluation with primary care provider.     Follow-up: Return in about 3 months (around 4/3/2024) for Medication Therapy Management Pharmacist  "Visit.    SUBJECTIVE/OBJECTIVE:                          David Wilcox is a 53 year old male contacted via secure video for an initial visit. He was referred to me from Dr. Hood.      Reason for visit: comprehensive review of medications     Allergies/ADRs: Reviewed in chart  Past Medical History: Reviewed in chart  Tobacco: He reports that he has quit smoking. His smoking use included cigarettes and cigars. He smoked an average of 0.5 packs per day. He has never used smokeless tobacco.  Alcohol: not currently using  Caffeine: 12 cups coffee per day     Medication Adherence/Access: no issues reported    Weight Management:   Phentermine 18.75 mg once daily.     Followed by Dr. Traci Hood, seen for [facility]. Patient is experiencing the follow side effects: None. No insomnia. No heart palpitations. He thought that Wegovy was not covered so switched to Phentermine. Would consider an injectable option if covered by insurance.   Diet/Eating Habits: Patient reports Breakfast: frosted shredded wheat, coffee; Lunch: yogurt, sandwich; Dinner: varies. Does have form of vegetable and meat for meals. Wife cooks.     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 Feels that losing weight would help with mobility and improved pain.     Current weight today: 270 lb   Wt Readings from Last 4 Encounters:   10/27/23 123.1 kg (271 lb 4.8 oz)   08/14/23 122.5 kg (270 lb)   11/17/22 124.2 kg (273 lb 12.8 oz)   05/09/22 122.9 kg (271 lb)     Estimated body mass index is 41.25 kg/m  as calculated from the following:    Height as of this encounter: 1.727 m (5' 8\").    Weight as of 10/27/23: 123.1 kg (271 lb 4.8 oz).    BP Readings from Last 3 Encounters:   10/27/23 (!) 136/90   10/11/23 (!) 160/94   08/14/23 138/88     Chronic Pain:   Gabapentin 1200 mg three times daily   Buprenorphine 15 mcg/hr weekly  Oxycodone-acetaminophen 5-325 mg in AM and noon, up to every 6 hours as " "needed  Tizanidine 4 mg three times daily as needed, taking scheduled   Nortriptyline 20 mg bedtime     Does find that as of now pain is managed generally well. He is able to go about his day to day with manageable pain. He reports that he often spends his day in his work shed. Describes greater pain at end of day but is able to complete the necessary tasks. Generally no medication side effects. Does feel tired through the day.     Depression/Anxiety:    Duloxetine 60mg once daily  Mirtazapine 15 mg bedtime   Nortriptyline 20 mg bedtime   Lorazepam 0.5 mg as needed     Patient reports symptoms are stable.  Reports history of significant depression. Does find duloxetine very effective for mood. Mirtazapine helpful for mood and sleep. Rarely using lorazepam. Nortriptyline on board also for pain.       4/11/2022     2:04 PM 11/17/2022    10:25 AM 8/14/2023    10:55 AM   PHQ   PHQ-9 Total Score 7 4 7   Q9: Thoughts of better off dead/self-harm past 2 weeks Not at all Not at all Not at all           4/11/2022     2:04 PM 11/17/2022    10:26 AM 8/14/2023    10:56 AM   KAMRYN-7 SCORE   Total Score 4 (minimal anxiety) 3 (minimal anxiety) 4 (minimal anxiety)   Total Score 4 3 4     Insomnia:   Zolpidem 10 mg nightly as needed     He reports that he finds the zolpidem helpful for sleep. He does describe sleep-related eating disorder. He reports that he has been told that he eats a bowl of cereal in middle of the night most nights. Falls asleep 9 am and wake up at 5 am. When waking up next day, tired. \"Always tired\". Has been on zolpidem for decades. Sleep eating has been going on for years. Finds he is dependent on zolpidem.     Hypertension:   Losartan 100 mg daily     Patient reports no current medication side effects.  Patient does not self-monitor blood pressure.    BP Readings from Last 3 Encounters:   10/27/23 (!) 136/90   10/11/23 (!) 160/94   08/14/23 138/88     Pulse Readings from Last 3 Encounters:   10/27/23 89 " "  10/11/23 73   08/14/23 56     GERD:   Omeprazole 20 mg once daily   Patient reports no current symptoms.   Patient feels that current regimen is effective.    Today's Vitals: Ht 1.727 m (5' 8\")   BMI 41.25 kg/m    ----------------  I spent 45 minutes with this patient today. All changes were made via collaborative practice agreement with Dr. Hood. A copy of the visit note was provided to the patient's provider(s).    A summary of these recommendations is available via clinic portal.    Lauren Bloch, PharmD, BCACP   Medication Therapy Management Pharmacist   [facility] [place]    Telemedicine Visit Details  Type of service:  Video Conference via InComm  Start Time:  4:45 PM  End Time:  5:30 PM     Medication Therapy Recommendations  Insomnia    Current Medication: zolpidem (AMBIEN) 10 MG tablet   Rationale: Undesirable effect - Adverse medication event - Safety   Recommendation: Decrease Dose - zolpidem 5 MG tablet   Status: Contact Provider - Awaiting Response         Morbid obesity (H)    Current Medication: phentermine (ADIPEX-P) 37.5 MG tablet   Rationale: Condition refractory to medication - Ineffective medication - Effectiveness   Recommendation: Change Medication - Zepbound 2.5 MG/0.5ML Soaj   Status: Accepted per CPA                Please do not hesitate to contact me if you have any questions/concerns.     Sincerely,     Lauren T. Bloch, [facility]  "

## 2024-01-03 NOTE — PROGRESS NOTES
"Virtual Visit Details    Type of service:  Video Visit   Video Start Time: {video visit start/end time for provider to select:538046}  Video End Time:{video visit start/end time for provider to select:527846}    Originating Location (pt. Location): {video visit patient location:186251::\"Home\"}  {PROVIDER LOCATION On-site should be selected for visits conducted from your clinic location or adjoining [facility] hospital, academic office, or other nearby [facility] building. Off-site should be selected for all other provider locations, including home:227476}  Distant Location (provider location):  {virtual location provider:247742}  Platform used for Video Visit: {Virtual Visit Platforms:741357::\"imedo\"}  "

## 2024-01-04 NOTE — TELEPHONE ENCOUNTER
MTM referral placed due to [facility] practice shift. CPA with Dr. Hood.     Lauren Bloch, PharmD, BCACP   Medication Therapy Management Pharmacist   [facility] [place]

## 2024-01-09 ENCOUNTER — MYC REFILL (OUTPATIENT)
Dept: PALLIATIVE MEDICINE | Facility: CLINIC | Age: 54
End: 2024-01-09
Payer: COMMERCIAL

## 2024-01-09 DIAGNOSIS — G89.29 CHRONIC BILATERAL LOW BACK PAIN WITH RIGHT-SIDED SCIATICA: ICD-10-CM

## 2024-01-09 DIAGNOSIS — F11.90 CHRONIC, CONTINUOUS USE OF OPIOIDS: ICD-10-CM

## 2024-01-09 DIAGNOSIS — M96.1 FAILED BACK SURGICAL SYNDROME: ICD-10-CM

## 2024-01-09 DIAGNOSIS — M54.41 CHRONIC BILATERAL LOW BACK PAIN WITH RIGHT-SIDED SCIATICA: ICD-10-CM

## 2024-01-09 DIAGNOSIS — M54.16 LUMBAR RADICULOPATHY: ICD-10-CM

## 2024-01-10 NOTE — TELEPHONE ENCOUNTER
Refills have been requested for the following medications:         oxyCODONE-acetaminophen (PERCOCET) 5-325 MG tablet [Ina Barillas]     Preferred pharmacy: [facility] #[id number] - [place], [place] - [address] [place] [address] [address] AT [facility] & [address] [place] [address]

## 2024-01-10 NOTE — TELEPHONE ENCOUNTER
Received [place] message from patient requesting refill(s) for:    oxyCODONE-acetaminophen (PERCOCET) 5-325 MG tablet      Last dispensed from pharmacy on 12/6/23    Patient's last office/virtual visit by prescribing provider on 10/11/23  Next office/virtual appointment scheduled for 1/15/24    Last urine drug screen date 4/12/23  Current opioid agreement on file? Yes Date of opioid agreement: 4/12/23    E-prescribe to:    [id number] - [place], [place] - [facility] [address] AT [facility] [address]    Will route to nursing [place] for review and preparation of prescription(s).

## 2024-01-11 RX ORDER — OXYCODONE AND ACETAMINOPHEN 5; 325 MG/1; MG/1
1 TABLET ORAL EVERY 6 HOURS PRN
Qty: 60 TABLET | Refills: 0 | Status: SHIPPED | OUTPATIENT
Start: 2024-01-11 | End: 2024-01-15

## 2024-01-11 NOTE — TELEPHONE ENCOUNTER
Medication refill information reviewed.     Due date for oxyCODONE-acetaminophen (PERCOCET) 5-325 MG tablet is 01/11/24     Prescriptions prepped for review.     Will route to provider.

## 2024-01-11 NOTE — CONFIDENTIAL NOTE
[facility] Call Center    Phone Message    May a detailed message be left on voicemail: yes     Reason for Call: Medication Refill Request    Has the patient contacted the pharmacy for the refill? Yes   Name of medication being requested: oxyCODONE-acetaminophen (PERCOCET) 5-325 MG tablet   Provider who prescribed the medication: Ina Barillas  Pharmacy: [facility] #[id number] - [place], [place] - [address] [place] [address] AT [facility] & [address]   Date medication is needed: ASAP   Pt did put in for refill on 1/9 already. Writer did let him know that status currently says reordering and probably just waiting for Ina to sign it.     Action Taken: Message routed to:  Other: [facility]    Travel Screening: Not Applicable

## 2024-01-11 NOTE — TELEPHONE ENCOUNTER
Signed Prescriptions:                        Disp   Refills    oxyCODONE-acetaminophen (PERCOCET) 5-325 M*60 tab*0        Sig: Take 1 tablet by mouth every 6 hours as needed for           moderate to severe pain Max of 2/day. Fill           01/11/24 to start 01/11/24.  Authorizing Provider: INA LOPEZ        Reviewed [facility] January 11, 2024- no concerning fills.    Ina TORIBIO, RN CNP, FNP  [facility]  [facility]

## 2024-01-15 ENCOUNTER — OFFICE VISIT (OUTPATIENT)
Dept: PALLIATIVE MEDICINE | Facility: CLINIC | Age: 54
End: 2024-01-15
Attending: NURSE PRACTITIONER
Payer: COMMERCIAL

## 2024-01-15 VITALS — HEART RATE: 74 BPM | DIASTOLIC BLOOD PRESSURE: 81 MMHG | SYSTOLIC BLOOD PRESSURE: 143 MMHG

## 2024-01-15 DIAGNOSIS — M54.16 LUMBAR RADICULOPATHY: Primary | ICD-10-CM

## 2024-01-15 DIAGNOSIS — F11.90 CHRONIC, CONTINUOUS USE OF OPIOIDS: ICD-10-CM

## 2024-01-15 DIAGNOSIS — M96.1 FAILED BACK SYNDROME: ICD-10-CM

## 2024-01-15 DIAGNOSIS — M96.1 FAILED BACK SURGICAL SYNDROME: ICD-10-CM

## 2024-01-15 DIAGNOSIS — M54.41 CHRONIC BILATERAL LOW BACK PAIN WITH RIGHT-SIDED SCIATICA: ICD-10-CM

## 2024-01-15 DIAGNOSIS — Z79.891 ENCOUNTER FOR LONG-TERM USE OF OPIATE ANALGESIC: ICD-10-CM

## 2024-01-15 DIAGNOSIS — G89.29 CHRONIC INTRACTABLE PAIN: ICD-10-CM

## 2024-01-15 DIAGNOSIS — M54.6 CHRONIC BILATERAL THORACIC BACK PAIN: ICD-10-CM

## 2024-01-15 DIAGNOSIS — M62.838 MUSCLE SPASM: ICD-10-CM

## 2024-01-15 DIAGNOSIS — M79.18 MYOFASCIAL PAIN: ICD-10-CM

## 2024-01-15 DIAGNOSIS — G89.29 CHRONIC BILATERAL LOW BACK PAIN WITH RIGHT-SIDED SCIATICA: ICD-10-CM

## 2024-01-15 DIAGNOSIS — G89.29 CHRONIC BILATERAL THORACIC BACK PAIN: ICD-10-CM

## 2024-01-15 PROCEDURE — 99214 OFFICE O/P EST MOD 30 MIN: CPT | Performed by: NURSE PRACTITIONER

## 2024-01-15 RX ORDER — BUPRENORPHINE 20 UG/H
1 PATCH TRANSDERMAL
Qty: 4 PATCH | Refills: 0 | Status: SHIPPED | OUTPATIENT
Start: 2024-01-15 | End: 2024-02-27

## 2024-01-15 RX ORDER — OXYCODONE AND ACETAMINOPHEN 5; 325 MG/1; MG/1
1 TABLET ORAL EVERY 6 HOURS PRN
Qty: 75 TABLET | Refills: 0 | Status: SHIPPED | OUTPATIENT
Start: 2024-01-15 | End: 2024-03-13

## 2024-01-15 ASSESSMENT — PAIN SCALES - GENERAL: PAINLEVEL: EXTREME PAIN (8)

## 2024-01-15 NOTE — PATIENT INSTRUCTIONS
"Recommendations from today's MTM visit:                                                    MTM (medication therapy management) is a service provided by a clinical pharmacist designed to help you get the most of out of your medicines.   Today we reviewed what your medicines are for, how to know if they are working, that your medicines are safe and how to make your medicine regimen as easy as possible.      Consider Saxenda or Zepbound - pharmacist to reach out to Pharmacy Liaison team.   Consider decrease zolpidem to 5 mg daily - patient declined. Recommend further evaluation with primary care provider.     Follow-up: Return in about 3 months (around 4/3/2024) for Medication Therapy Management Pharmacist Visit.    It was great speaking with you today.  I value your experience and would be very thankful for your time in providing feedback in our clinic survey. In the next few days, you may receive an email or text message from [employer] with a link to a survey related to your  clinical pharmacist.\"     To schedule another MTM appointment, please call the clinic directly or you may call the MTM scheduling line at [phone] or toll-free at [phone].     My Clinical Pharmacist's contact information:                                                      Please feel free to contact me with any questions or concerns you have.      Lauren Bloch, PharmD, BCACP   Medication Therapy Management Pharmacist   [facility] [place]       "

## 2024-01-15 NOTE — PROGRESS NOTES
[facility]      1/15/2024      Chief complaint:   -Low back pain and right leg radicular pain, same, no change in this pain  -now having more mid back pain      Interval history:  David Wilcox is a 53 year old male is known to me for:  Chronic bilateral low back pain with right-sided sciatica  Lumbar radiculopathy  Failed back surgical syndrome  Chronic continuous use of opioids  Encounter for long-term use of opiate analgesic     Distant history of alcohol and drug abuse  Bipolar disorder   H/o menningitis after SCS trial  PMHx includes: Acute bronchitis, acute pancreatitis, acute sinusitis, ADHD, anxiety, depression, asthma, bipolar disorder unspecified, cervicalgia, dysthymic disorder, esophageal reflux, fixation of spine with fusion, history of blood transfusion, hyperlipidemia, low back pain, lumbar throat, meningitis after spinal cord stimulator trial, narcotic abuse, neck pain, open nondisplaced fracture of distal phalanx of finger, pain in limb, personal history of other diseases of the digestive system, sciatica, spasm of muscle, tobacco use disorder  PSHx includes: Back surgery in 2003 and 2011, lumbar anterior three-level fusion, ORIF right finger (3/3/2021)  .    Recommendations/plan at the last visit on 10/11/2023 included:  Physical Therapy: none  Continue your regular [facility]   Clinical Health Psychologist to address issues of relaxation, behavioral change, coping style, and other factors important to improvement: none  Diagnostic Studies: none  Medication Management:   Continue Percocet max 2/day. Fill 10/7 and start 10/9, 16 day script which will get the Butrans and oxycodone due on the same days each month  Continue Butrans 15mcg/hr fill 11/1 and start 11/3  has naloxone nasal spray for safety  Further procedures recommended: none  Acupuncture: yes, continue  Previous referral to comprehensive weight management as losing weight will help to reduce pain and reduce risks for  osteoarthritis, heart disease, stroke risk, etc. This appt is later in October  Recommendations/follow-up for PCP:  See above  Release of information: none  Follow up: 12 weeks, video or in-person. Please call [phone] to make your follow-up appointment with me.         Since his last visit, David Wilcox reports:    Interval history January 15, 2024  -colder weather has bothered chronic low back and right leg pain. Otherwise stable.   -has had about a month long issue with increased mid back pain. No known injury or illness to explain this pain.   -stable on current medications      Interval history October 11, 2023  -continued low back pain, extends into the right leg to the foot. No changes.   -denies any new areas of pain  -he does feel that the increase in Butrans helped a little bit. Cannot currently increase dosing due to cost (his insurance just reset).      Interval history July 12, 2023  -his low back and right leg pain is the same  -stable on medications, he has looked into buprenorphine and is interested in starting Butrans for pain  -interested in weight loss, [facility] to [facility]     Interval history April 12, 2023  -pain is the same  -morphine is really helpful but makes him feel a little cloudy  -long acting OxyContin can be harder to obtain with his pharmacy  -discussed switch to buprenorphine (Butrans patch or [place] film) patient interested, wants to think about it and speak with his wife    Interval history January 10, 2023  -when in [place], unable to get OxyContin, switched to MSContin 15mg every 12 hours. This is working well, no significant side effects. Feels more sleepy in the late afternoon, could also be shorter days. Will continue to monitor.   -low back pain remains with right leg radiculopathy to the right foot 2nd through 5th digits.   -walking up to 3 km per day.  -mentions wanting to lose weight, discussed referral to comprehensive weight management  clinic, patient is very interested in this.   Also states he has previously been diagnosed with OBSTRUCTIVE SLEEP APNEA, but does not have CPAP as none were in stock when he was initially diagnosed. Encouraged patient to follow up with his sleep medicine provider and get a CPAP set up.      Interval history October 31, 2022  -low back pain and right leg pain has been a little worse, he usually struggle with the change of seasons getting cooler.   -he may do acupuncture again, this has been beneficial in the past.   -may go to his son's in [place] as he is likely moving to the [place], [place] area  -we have previously discussed possible switch to buprenorphine, would like to wait until he is likely back in [place] due to travel to [place] for a couple months, then likely [place] for a bit.     Interval history August 30, 2022  -David states he still has the ongoing low back pain with right leg radicular symptoms. Feels pain has been a bit better.   -he is increasing his activities  -exercising regularly, doing 2 km twice daily on the treadmill, able to do walking  -doing 3 [employer] per week. (Misael, Vanda, and Arturo)  -on vacation camping    Pain history collected 5/27/2022 at initial visit  Out cutting a tree and it fell on him in 2002. He had compression fractures L5-S1, and multiple other fractures and spent almost a year in a wheelchair, initially told he would not walk again.   His initial spinal  surgery was helpful, but he then fell down the stairs and he knew something was wrong but no one would believe him. He states 8 years later they found loose hardware and he had to have a subsequent surgery to fix that. Then a 3rd surgery extended the fusion. He is now fused from L3-S1.      Pain on the right side from bottom of the ribs to the low back, around the hip and down the right leg to the toes. Bilateral hip pain posterolateral hip pain (points to buttocks).      He has had loss of bowel and bladder  "but it is not a common thing. This is not new, present off and on for 6 years.      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   Has regular exercise with chores and he now walks every day on the treadmill (2km in the AM and PM)  Does [employer] in [place] on Fridays and his wife does so on Saturdays.           At this point, the patient's participation with our multidisciplinary team includes:  The patient has been compliant with the program.  PT - not ordered, patient is quite active at home running a [employer] - not ordered      Pain scores:  Pain intensity on average is 6-7 on a scale of 0-10.    Range is 5-10/10.   Pain right now is 8/10.   Pain is described as \"like a pillow with a knife through it and when bad, like I am on fire\"    Pain is constant in nature      Current pain relevant medications:   -Tylenol extra strength 500mg PRN   -naloxone nasal spray for opiate reversal   -Duloxetine 60mg every day (very helpful for mood and pain)  -gabapentin 600mg take 2 tabs/1200mg TID (helpful)  -nortriptyline 10mg take 2 (20mg) at bedtime (helpful)  -Butrans 15mcg/hr TD Q 7 days (helpful)  -Percocet 5/325mg take 1 tab Q 6 hours PRN max 3/day (he plans on 2/day and uses 3 if needed. helpful)  -tizanidine 4mg TID PRN for muscle spasms (helpful)        Other pertinent medications:  -lorazepam 0.5mg BID PRN anxiety (very rare use, received #30 tabs in 2018 and still has some left)  -mirtazepine 15mg at bedtime  -ambien 10mg Q HS PRN insomnia           Previous medication treatments included:  OPIATES: oxycodone (helpful), hydrocodone (not helpful), Methadone (took after surgery, very helpful), Fentanyl patches (would have issues with getting to hot and get a dump of medication. He has used this very successfully in the winter when it is colder), Dilaudid (hydromorphone, very helpful for a migraine with antibiotics), MSContin (more helpful than OxyContin was)  NSAIDS: naproxyn (not helpful), ibuprofen " (not helpful)  MUSCLE RELAXANTS: Methocarbamol (expense, helpful), Flexeril (helpful but he had a remote history of a suicide attempt with this drug), tizanidine (very helpful)  ANTI-MIGRAINE MEDS: none  ANTI-DEPRESSANTS: none  SLEEP AIDS: Remeron (helpful)  ANTI-CONVULSANTS: nortriptyline (helpful)  TOPICALS: CBD oil (helps some)  ANXIOLYTICS: lorazepam (helpful)  MEDICAL CANNABIS: none  Other meds: lidocaine (not helpful)        Other treatments have included:   David Wilcox has been seen at a pain clinic in the past.  Previously managed by Dr. Bindu Motley here  PT: tried, was not helpful on multiple occasions  Chiropractic care: yes, uses this regularly for acupuncture  Acupuncture: helpful  TENs Unit: has one, helpful, uses it occasionally  SCS trial in   got meningitis and nearly       Injections:   -3/26/2018 caudal ALVA with Dr. Bindu Motley  (not helpful)  -2018 right L3-4 transforaminal ALVA with Dr. Bindu Motley (not helpful)  -2019  Bilateral SI joint injections with Dr. Bindu Motley  (not helpful)      THE 4 A's OF OPIOID MAINTENANCE ANALGESIA    Analgesia: helpful    Activity: exercising on treadmill twice daily, more active    Adverse effects: none    Adherence to Rx protocol: yes      Side Effects: no side effect  Patient is using the medication as prescribed: YES    Medications:  Current Outpatient Medications   Medication Sig Dispense Refill    acetaminophen (TYLENOL) 500 MG tablet Take 1,500 mg by mouth at bedtime      albuterol (PROAIR HFA/PROVENTIL HFA/VENTOLIN HFA) 108 (90 Base) MCG/ACT inhaler INHALE 2 PUFFS INTO THE LUNGS EVERY 4 HOURS AS NEEDED FOR SHORTNESS OF BREATH 8.5 g 11    buprenorphine (BUTRANS) 15 MCG/HR Wk patch Place 1 patch onto the skin every 7 days Fill 23 and start 23. 28 day supply for chronic pain. 4 patch 0    buprenorphine (BUTRANS) 20 MCG/HR WK patch Place 1 patch onto the skin every 7 days Fill 2024 and start on 2024. 28 day  script. 4 patch 0    DULoxetine (CYMBALTA) 60 MG capsule Take 1 capsule (60 mg) by mouth daily 90 capsule 3    gabapentin (NEURONTIN) 600 MG tablet TAKE 2 TABLETS BY MOUTH THREE TIMES DAILY 180 tablet 4    LORazepam (ATIVAN) 0.5 MG tablet One bid prn anxiety. Rare use 30 tablet 0    losartan (COZAAR) 100 MG tablet Take 1 tablet (100 mg) by mouth daily 90 tablet 3    mirtazapine (REMERON) 15 MG tablet TAKE 1 TABLET(15 MG) BY MOUTH AT BEDTIME 90 tablet 1    naloxone (NARCAN) 4 MG/0.1ML nasal spray Spray 1 spray (4 mg) into one nostril alternating nostrils once as needed for opioid reversal every 2-3 minutes until assistance arrives 0.2 mL 1    nortriptyline (PAMELOR) 10 MG capsule TAKE 2 CAPSULES(20 MG) BY MOUTH AT BEDTIME 180 capsule 3    omeprazole (PRILOSEC) 20 MG DR capsule Take 1 capsule (20 mg) by mouth daily 90 capsule 3    oxyCODONE-acetaminophen (PERCOCET) 5-325 MG tablet Take 1 tablet by mouth every 6 hours as needed for moderate to severe pain Max of 3/day. Fill 2/8/24 to start 2/10/24. 75 tablet 0    phentermine (ADIPEX-P) 37.5 MG tablet 1/2 tablet each morning 30 tablet 2    tiZANidine (ZANAFLEX) 4 MG tablet Take 1 tablet (4 mg) by mouth 3 times daily as needed for muscle spasms 0.5-1 tab tid prn muscle spasm 90 tablet 4    zolpidem (AMBIEN) 10 MG tablet TAKE 1 TABLET BY MOUTH EVERY NIGHT AS NEEDED FOR INSOMNIA 30 tablet 5       Medical History: any changes in medical history since they were last seen? No    Social History:   Home situation:  with children x 2 at home.   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   History of chemical dependency treatment: quit on own       Is patient a current smoker or tobacco user?  no  If yes, was cessation counseling offered?  no          Physical Exam:  Vital signs: Blood pressure (!) 143/81, pulse 74.    Behavioral observations:   Awake, alert and cooperative    Gait:  Normal     Musculoskeletal exam:    Strength grossly equal throughout. Moves well in exam room  Myofascial tenderness thoracic spine paraspinals with trigger points that are palpable    Neuro exam:  Deferred    Skin/vascular/autonomic:  No suspicious lesions on exposed skin.     Other:  na            Diagnostic tests:  MRI OF THE LUMBAR SPINE WITHOUT AND WITH CONTRAST August 6, 2021 5:57  PM      HISTORY: Low back pain, prior surgery, new symptoms. Worsening pain  above fusion, but also right radicular L5 or S1 distribution on the  right. Failed back surgical syndrome. Lumbar radiculopathy.     TECHNIQUE: Multiplanar, multisequence MRI images of the lumbar spine  were acquired without and with 10mL Gadavist IV contrast.     COMPARISON: Lumbar spine MR 4/27/2018, lumbar spine CT 7/14/2016.     FINDINGS: There are five lumbar-type vertebrae for the purposes of  this dictation. Posterior spine fusion from L3 down to S1 consisting  of bilateral pedicle screws at each level with inner connecting rods,  interbody fusion devices in the L4-L5 and L5-S1 disc spaces and  laminectomies of L3, L4, L5 and S1 again noted.     Degenerative grade 1 anterolisthesis of L5 upon S1 and mild  degenerative retrolisthesis of L2 upon L3 again noted. Alignment  otherwise normal and unchanged. Vertebral body heights normal. No  fractures. No evidence for pathologic bony lesion. Marrow signal  normal.     There is loss of disc height, disc desiccation and posterior disc  bulging/herniation to varying degrees at all levels of the lumbar  spine with the exception of the resected L4-L5 and L5-S1 discs.      The tip of the conus medullaris is at the L1-L2 level which is within  normal limits. No abnormal intrathecal contrast enhancement. There is  no evidence for intrathecal abnormality.      Level by level:      T12-L1: Loss of disc height, disc desiccation and circumferential disc  bulging with a  superimposed small posterior central disc herniation  (extrusion). Minimal facet arthropathy bilaterally. Minimal spinal  canal narrowing. No foraminal stenosis on either side. No change from  the comparison study.     L1-L2: Loss of disc height, disc desiccation and circumferential disc  bulging with a superimposed small posterior central disc herniation  (protrusion). Minimal facet arthropathy bilaterally. Minimal spinal  canal narrowing. No foraminal stenosis on either side. No change from  the comparison study.     L2-L3: Loss of disc height, disc desiccation and mild circumferential  disc bulging. No herniation. Moderate facet arthropathy bilaterally.  Mild spinal canal narrowing. Mild bilateral neural foraminal  narrowing. No change from the comparison study.     L3: Again noted is a fluid signal intensity collection in the right  posterolateral aspect of the spinal canal measuring 1.7 x 0.7 cm in  the AP and transverse dimensions respectively. This may represent a  synovial cyst arising from the right facet joint versus a cystic  postoperative fluid collection or cystic foreign body reaction. This  results in mild narrowing of the thecal sac at the mid L3 level.     L3-L4: (Fused level) Loss of disc height, disc desiccation and mild  circumferential disc bulging. No herniation. Moderate facet  arthropathy bilaterally. No spinal canal stenosis. Mild bilateral  neural foraminal narrowing. No change from the comparison study.      L4-L5: (Fused level) No spinal canal stenosis. No foraminal stenosis  on either side. No change from the comparison study.     L5-S1: (Fused level) No spinal canal stenosis. No foraminal stenosis  on either side. No change from the comparison study.                                                                      IMPRESSION:  1. Postoperative and degenerative change of the lumbar spine as  detailed above without appreciable change from the comparison studies.  2. No significant  spinal canal or neural foraminal stenosis at any  level of the spine.     JAI CHAVEZ MD         Other testing (labs, diagnostics):  11/17/2022  Cr. 0.80  Est GFR >90        Screening tools:      DIRE Score for ongoing opioid management is calculated as follows:     Diagnosis = 2     Intractability = 2     Risk: Psych = 2  Chem Hlth = 2  Reliability = 3  Social = 3     Efficacy = 2     Total DIRE Score = 16 (14 or higher predicts good candidate for ongoing opioid management; 13 or lower predicts poor candidate for opioid management)            [place] Prescription Monitoring [place]:  [place]ed [place]  1/15/2024- no concerning fills.  Ina TORIBIO, RN CNP, FNP  [facility]  [place] location              Assessment:  Lumbar radiculopathy  Failed back surgical syndrome  Failed back syndrome  Chronic bilateral low back pain with right sided sciatica  Chronic bilateral thoracic back pain  Myofascial pain  Muscle spasm  Chronic intractable pain  Chronic continuous use of opioids  Encounter for long term use of opiate analgesic    Essential hypertension  Class 3 severe obesity due to excess calories with serious co-morbidity (untreated OBSTRUCTIVE SLEEP APNEA) and BMI of >39 in adult  OBSTRUCTIVE SLEEP APNEA (currently untreated)  Distant history of alcohol and drug abuse  Bipolar disorder   H/o menningitis after SCS trial  PMHx includes: Acute bronchitis, acute pancreatitis, acute sinusitis, ADHD, anxiety, depression, asthma, bipolar disorder unspecified, cervicalgia, dysthymic disorder, esophageal reflux, fixation of spine with fusion, history of blood transfusion, hyperlipidemia, low back pain, lumbar throat, meningitis after spinal cord stimulator trial, narcotic abuse, neck pain, open nondisplaced fracture of distal phalanx of finger, pain in limb, personal history of other diseases of the digestive system, sciatica, spasm of muscle, tobacco use disorder  PSHx includes: Back  surgery in 2003 and 2011, lumbar anterior three-level fusion, ORIF right finger (3/3/2021)        Plan:   Physical Therapy: none  Continue your regular [facility]   Clinical Health Psychologist to address issues of relaxation, behavioral change, coping style, and other factors important to improvement: none  Diagnostic Studies: none  Medication Management:   Continue Percocet max 2/day until next month script when I am increasing to #75/month, you may use a max of 3/day on 15 days of the month if needed. Fill 2/8 and start 2/10.   INCREASE Butrans 20mcg/hr fill 1/29 and start 2/1  has naloxone nasal spray for safety  Further procedures recommended: schedule trigger point injections likely with ultrasound with Dr. Hickman, our office will contact you   Acupuncture: yes, continue  Recommendations/follow-up for PCP:  See above  Release of information: none  Follow up: 12 weeks, video or in-person. Please call [phone] to make your follow-up appointment with me.       ASSESSMENT AND PLAN:  (M54.16) Lumbar radiculopathy  (primary encounter diagnosis)  Comment:   Plan: oxyCODONE-acetaminophen (PERCOCET) 5-325 MG         tablet, tiZANidine (ZANAFLEX) 4 MG tablet,         buprenorphine (BUTRANS) 20 MCG/HR WK patch,         Adult Pain Clinic Follow-Up Order            (M96.1) Failed back surgical syndrome  Comment:   Plan: oxyCODONE-acetaminophen (PERCOCET) 5-325 MG         tablet, tiZANidine (ZANAFLEX) 4 MG tablet,         buprenorphine (BUTRANS) 20 MCG/HR WK patch,         Adult Pain Clinic Follow-Up Order            (M96.1) Failed back syndrome  Comment:   Plan: oxyCODONE-acetaminophen (PERCOCET) 5-325 MG         tablet, tiZANidine (ZANAFLEX) 4 MG tablet,         buprenorphine (BUTRANS) 20 MCG/HR WK patch,         Adult Pain Clinic Follow-Up Order            (M54.41,  G89.29) Chronic bilateral low back pain with right-sided sciatica  Comment:   Plan: oxyCODONE-acetaminophen (PERCOCET) 5-325 MG         tablet, tiZANidine  (ZANAFLEX) 4 MG tablet,         buprenorphine (BUTRANS) 20 MCG/HR WK patch,         Adult Pain Clinic Follow-Up Order            (M54.6,  G89.29) Chronic bilateral thoracic back pain  Comment:   Plan: PAIN INJECTION EVAL/TREAT/FOLLOW UP, Adult Pain        Clinic Follow-Up Order            (M79.18) Myofascial pain  Comment:   Plan: PAIN INJECTION EVAL/TREAT/FOLLOW UP, Adult Pain        Clinic Follow-Up Order            (M62.838) Muscle spasm  Comment:   Plan: PAIN INJECTION EVAL/TREAT/FOLLOW UP, Adult Pain        Clinic Follow-Up Order            (G89.29) Chronic intractable pain  Comment:   Plan: oxyCODONE-acetaminophen (PERCOCET) 5-325 MG         tablet, tiZANidine (ZANAFLEX) 4 MG tablet,         buprenorphine (BUTRANS) 20 MCG/HR WK patch,         Adult Pain Clinic Follow-Up Order            (F11.90) Chronic, continuous use of opioids  Comment:   Plan: naloxone (NARCAN) 4 MG/0.1ML nasal spray,         oxyCODONE-acetaminophen (PERCOCET) 5-325 MG         tablet, buprenorphine (BUTRANS) 20 MCG/HR WK         patch, Adult Pain Clinic Follow-Up Order            (Z79.891) Encounter for long-term use of opiate analgesic  Comment:   Plan: naloxone (NARCAN) 4 MG/0.1ML nasal spray, Adult        Pain Clinic Follow-Up Order               Face to face time: 28 minutes        Ina TORIBIO, RN CNP, FNP  [facility]  [facility]

## 2024-01-15 NOTE — PATIENT INSTRUCTIONS
Plan:   Physical Therapy: none  Continue your regular [facility]   Clinical Health Psychologist to address issues of relaxation, behavioral change, coping style, and other factors important to improvement: none  Diagnostic Studies: none  Medication Management:   Continue Percocet max 2/day until next month script when I am increasing to #75/month, you may use a max of 3/day on 15 days of the month if needed. Fill 2/8 and start 2/10.   INCREASE Butrans 20mcg/hr fill 1/29 and start 2/1  has naloxone nasal spray for safety  Further procedures recommended: schedule trigger point injections likely with ultrasound with Dr. Hickman, our office will contact you   Acupuncture: yes, continue  Recommendations/follow-up for PCP:  See above  Release of information: none  Follow up: 12 weeks, video or in-person. Please call [phone] to make your follow-up appointment with me.     ----------------------------------------------------------------  Clinic Number:  [phone]   Call with any questions about your care and for scheduling assistance.   Calls are returned Monday through Friday between 8 AM and 4:30 PM. We usually get back to you within 2 business days depending on the issue/request.    If we are prescribing your medications:  For opioid medication refills, call the clinic or send a SlideMail message 7 days in advance.  Please include:  Name of requested medication  Name of the pharmacy.  For non-opioid medications, call your pharmacy directly to request a refill. Please allow 3-4 days to be processed.   Per [place] State Law:  All controlled substance prescriptions must be filled within 30 days of being written.    For those controlled substances allowing refills, pickup must occur within 30 days of last fill.      We believe regular attendance is key to your success in our program!    Any time you are unable to keep your appointment we ask that you call us at least 24 hours in advance to cancel.This will allow us to offer the  appointment time to another patient.   Multiple missed appointments may lead to dismissal from the clinic.

## 2024-02-04 ENCOUNTER — HEALTH MAINTENANCE LETTER (OUTPATIENT)
Age: 54
End: 2024-02-04

## 2024-02-27 ENCOUNTER — MYC REFILL (OUTPATIENT)
Dept: PALLIATIVE MEDICINE | Facility: CLINIC | Age: 54
End: 2024-02-27
Payer: COMMERCIAL

## 2024-02-27 DIAGNOSIS — F11.90 CHRONIC, CONTINUOUS USE OF OPIOIDS: ICD-10-CM

## 2024-02-27 DIAGNOSIS — M96.1 FAILED BACK SYNDROME: ICD-10-CM

## 2024-02-27 DIAGNOSIS — G89.29 CHRONIC BILATERAL LOW BACK PAIN WITH RIGHT-SIDED SCIATICA: ICD-10-CM

## 2024-02-27 DIAGNOSIS — G89.29 CHRONIC INTRACTABLE PAIN: ICD-10-CM

## 2024-02-27 DIAGNOSIS — M96.1 FAILED BACK SURGICAL SYNDROME: ICD-10-CM

## 2024-02-27 DIAGNOSIS — M54.16 LUMBAR RADICULOPATHY: ICD-10-CM

## 2024-02-27 DIAGNOSIS — M54.41 CHRONIC BILATERAL LOW BACK PAIN WITH RIGHT-SIDED SCIATICA: ICD-10-CM

## 2024-02-27 RX ORDER — BUPRENORPHINE 20 UG/H
1 PATCH TRANSDERMAL
Qty: 4 PATCH | Refills: 0 | Status: SHIPPED | OUTPATIENT
Start: 2024-02-27 | End: 2024-03-21

## 2024-02-27 NOTE — TELEPHONE ENCOUNTER
Medication refill information reviewed.     Due date for buprenorphine (BUTRANS) 20 MCG/HR WK patch is 02/29/24     Prescriptions prepped for review.     Will route to provider.

## 2024-02-27 NOTE — TELEPHONE ENCOUNTER
Received call from patient requesting refill(s) of buprenorphine (BUTRANS) 20 MCG/HR WK patch      Last dispensed from pharmacy on 01/29/24    Patient's last office/virtual visit by prescribing provider on 01/15/24  Next office/virtual appointment scheduled for 04/15/24    Last urine drug screen date 04/12/23  Current opioid agreement on file (completed within the last year) Yes Date of opioid agreement: 04/12/23    E-prescribe to     [id number] - [place], [place] - [address] [place] [address] AT [facility] & [address]  [address] [facility] [address]  Phone: [phone] Fax: [phone]    Will route to nursing pool for review and preparation of prescription(s).       Shayna Chase MA  [facility] [place]

## 2024-02-27 NOTE — TELEPHONE ENCOUNTER
Signed Prescriptions:                        Disp   Refills    buprenorphine (BUTRANS) 20 MCG/HR WK patch 4 patch0        Sig: Place 1 patch onto the skin every 7 days Fill           02/27/24 and start on 02/29/24. 28 day script.  Authorizing Provider: INA LOPEZ        Reviewed [place]  February 27, 2024- no concerning fills.    Ina TORIBIO, RN CNP, FNP  [facility]

## 2024-03-03 ASSESSMENT — PAIN SCALES - PAIN ENJOYMENT GENERAL ACTIVITY SCALE (PEG)
AVG_PAIN_PASTWEEK: 6
PEG_TOTALSCORE: 4.67
INTERFERED_ENJOYMENT_LIFE: 5
INTERFERED_GENERAL_ACTIVITY: 3

## 2024-03-04 ENCOUNTER — OFFICE VISIT (OUTPATIENT)
Dept: PALLIATIVE MEDICINE | Facility: CLINIC | Age: 54
End: 2024-03-04
Attending: NURSE PRACTITIONER
Payer: COMMERCIAL

## 2024-03-04 VITALS — HEART RATE: 69 BPM | SYSTOLIC BLOOD PRESSURE: 137 MMHG | DIASTOLIC BLOOD PRESSURE: 88 MMHG

## 2024-03-04 DIAGNOSIS — M79.18 MYOFASCIAL PAIN: Primary | ICD-10-CM

## 2024-03-04 DIAGNOSIS — M54.6 CHRONIC BILATERAL THORACIC BACK PAIN: ICD-10-CM

## 2024-03-04 DIAGNOSIS — G89.29 CHRONIC BILATERAL THORACIC BACK PAIN: ICD-10-CM

## 2024-03-04 DIAGNOSIS — M62.838 MUSCLE SPASM: ICD-10-CM

## 2024-03-04 PROCEDURE — 20553 NJX 1/MLT TRIGGER POINTS 3/>: CPT | Performed by: PAIN MEDICINE

## 2024-03-04 RX ORDER — BUPIVACAINE HYDROCHLORIDE 2.5 MG/ML
10 INJECTION, SOLUTION EPIDURAL; INFILTRATION; INTRACAUDAL ONCE
Status: COMPLETED | OUTPATIENT
Start: 2024-03-04 | End: 2024-03-04

## 2024-03-04 RX ORDER — TRIAMCINOLONE ACETONIDE 40 MG/ML
40 INJECTION, SUSPENSION INTRA-ARTICULAR; INTRAMUSCULAR ONCE
Status: COMPLETED | OUTPATIENT
Start: 2024-03-04 | End: 2024-03-04

## 2024-03-04 RX ADMIN — TRIAMCINOLONE ACETONIDE 40 MG: 40 INJECTION, SUSPENSION INTRA-ARTICULAR; INTRAMUSCULAR at 09:56

## 2024-03-04 RX ADMIN — BUPIVACAINE HYDROCHLORIDE 25 MG: 2.5 INJECTION, SOLUTION EPIDURAL; INFILTRATION; INTRACAUDAL at 09:56

## 2024-03-04 ASSESSMENT — PAIN SCALES - GENERAL: PAINLEVEL: SEVERE PAIN (6)

## 2024-03-04 NOTE — PATIENT INSTRUCTIONS
[facility]  Post Procedure Instructions    Today you had:  trigger point injections   occipital nerve block   bursa injection  Joint Injection    Medications used:  lidocaine   bupivacaine   kenalog   dexamethasone        Go to the emergency room if you develop any shortness of breath  Monitor the injection sites for signs and symptoms of infection-fever, chills, redness, swelling, warmth, or drainage to areas.  You may have soreness at injection sites for up to 24 hours.  It may take up to 14 days for the steroid medication to start working although you may feel the effect as early as a few days after the procedure.     You may apply ice to the painful areas to help minimize the discomfort of the needle pokes.  Do not apply heat to sites for at least 12 hours.  You may use anti-inflammatory medications or Tylenol for pain control if necessary    Pain Clinic phone number during work hours (Monday through Friday 8 am-4:30 pm) at [phone] or the Provider Line after hours at [phone]:

## 2024-03-04 NOTE — PROGRESS NOTES
David was seen today for pain.    Diagnoses and all orders for this visit:    Myofascial pain  -     PAIN INJECTION EVAL/TREAT/FOLLOW UP  -     NO CHARGE LOS  -     triamcinolone (KENALOG-40) injection 40 mg  -     bupivacaine (MARCAINE) 0.25% preservative free injection    Muscle spasm  -     PAIN INJECTION EVAL/TREAT/FOLLOW UP    Chronic bilateral thoracic back pain  -     PAIN INJECTION EVAL/TREAT/FOLLOW UP        Trigger points were identified by patient, and marked when appropriate.  The area was prepped with Chloroprep.    Using clean technique, injections were completed using a 25G, 3.5 inch needle.  After negative aspiration, injection was completed.  A total of 5 locations were injected.  When possible, tissue was retracted from the chest wall to avoid lung injury.    Muscle groups injected:  Bilateral trapezius, latissimus dorsi, quadratus lumborum     Injection solution contained:  10ml of 0.25% bupivacaine and 40mg of Kenalog        Brandon Hickman MD  [facility] [employer]

## 2024-03-08 ENCOUNTER — VIRTUAL VISIT (OUTPATIENT)
Dept: ENDOCRINOLOGY | Facility: CLINIC | Age: 54
End: 2024-03-08
Payer: COMMERCIAL

## 2024-03-08 VITALS — WEIGHT: 262 LBS | BODY MASS INDEX: 39.71 KG/M2 | HEIGHT: 68 IN

## 2024-03-08 DIAGNOSIS — E66.01 CLASS 2 SEVERE OBESITY DUE TO EXCESS CALORIES WITH SERIOUS COMORBIDITY IN ADULT, UNSPECIFIED BMI (H): Primary | ICD-10-CM

## 2024-03-08 DIAGNOSIS — E66.812 CLASS 2 SEVERE OBESITY DUE TO EXCESS CALORIES WITH SERIOUS COMORBIDITY IN ADULT, UNSPECIFIED BMI (H): Primary | ICD-10-CM

## 2024-03-08 DIAGNOSIS — R73.03 PREDIABETES: ICD-10-CM

## 2024-03-08 PROCEDURE — 99214 OFFICE O/P EST MOD 30 MIN: CPT | Mod: 95 | Performed by: INTERNAL MEDICINE

## 2024-03-08 RX ORDER — PHENTERMINE HYDROCHLORIDE 37.5 MG/1
TABLET ORAL
Qty: 60 TABLET | Refills: 2 | Status: SHIPPED | OUTPATIENT
Start: 2024-03-08 | End: 2024-08-19 | Stop reason: SINTOL

## 2024-03-08 ASSESSMENT — PAIN SCALES - GENERAL: PAINLEVEL: SEVERE PAIN (6)

## 2024-03-08 NOTE — PROGRESS NOTES
"Virtual Visit Details    Type of service:  Video Visit   Originating Location (pt. Location): Home    Distant Location (provider location):  Off-site  Platform used for Video Visit: Sabina    Joined the call at 3/8/2024, 11:06:38 am.  Left the call at 3/8/2024, 11:22:48 am.  You were on the call for 16 minutes 9 seconds .      Return Medical Weight Management Note     David Wilcox  MRN:  [id number]  :  1970  CHERYL:  3/8/2024    Dear Regan Llamas MD,    I had the pleasure of seeing your patient David Wilcox. He is a 53 year old male who I am continuing to see for treatment of obesity related to:        10/26/2023     1:45 PM   --   I have the following health issues associated with obesity Pre-Diabetes    High Blood Pressure    Sleep Apnea   I have the following symptoms associated with obesity Knee Pain    Depression    Groin Rash       INTERVAL HISTORY:  Return visit, reviewed and relevant is the following, as extracted from earlier note--  ---------------------------  \"Full intake/assessment was done to determine barriers to weight loss success and develop a treatment plan. David Wilcox is a 53 year old male interested in treatment of medical problems associated with excess weight.\"  ---------------------------    Of note from our discussion today--  --sister had MH issues post bariatric surgery; pt does not want to consider  --readdressed meds and lifestyle goals for wt loss    CURRENT WEIGHT:   262 lbs 0 oz  Body mass index is 39.84 kg/m .     Initial Weight (lbs): 271 lbs       Cumulative weight loss (lbs): 9  Weight Loss Percentage: 3.32%        3/8/2024    10:31 AM   Changes and Difficulties   I have made the following changes to my diet since my last visit: none   With regards to my diet, I am still struggling with: no   I have made the following changes to my activity/exercise since my last visit: out in garage several hours a day with wood turning, mellow   With regards to my activity/exercise, I " "am still struggling with: winter, hard to get out being disabled.       VITALS:  Ht 1.727 m (5' 8\")   Wt 118.8 kg (262 lb)   BMI 39.84 kg/m      MEDICATIONS:   Current Outpatient Medications   Medication Sig Dispense Refill    acetaminophen (TYLENOL) 500 MG tablet Take 1,500 mg by mouth at bedtime      albuterol (PROAIR HFA/PROVENTIL HFA/VENTOLIN HFA) 108 (90 Base) MCG/ACT inhaler INHALE 2 PUFFS INTO THE LUNGS EVERY 4 HOURS AS NEEDED FOR SHORTNESS OF BREATH 8.5 g 11    buprenorphine (BUTRANS) 15 MCG/HR Wk patch Place 1 patch onto the skin every 7 days Fill 12/27/23 and start 12/29/23. 28 day supply for chronic pain. 4 patch 0    buprenorphine (BUTRANS) 20 MCG/HR WK patch Place 1 patch onto the skin every 7 days Fill 02/27/24 and start on 02/29/24. 28 day script. 4 patch 0    DULoxetine (CYMBALTA) 60 MG capsule Take 1 capsule (60 mg) by mouth daily 90 capsule 3    gabapentin (NEURONTIN) 600 MG tablet TAKE 2 TABLETS BY MOUTH THREE TIMES DAILY 180 tablet 4    LORazepam (ATIVAN) 0.5 MG tablet One bid prn anxiety. Rare use 30 tablet 0    losartan (COZAAR) 100 MG tablet Take 1 tablet (100 mg) by mouth daily 90 tablet 3    mirtazapine (REMERON) 15 MG tablet TAKE 1 TABLET(15 MG) BY MOUTH AT BEDTIME 90 tablet 1    naloxone (NARCAN) 4 MG/0.1ML nasal spray Spray 1 spray (4 mg) into one nostril alternating nostrils once as needed for opioid reversal every 2-3 minutes until assistance arrives 0.2 mL 1    nortriptyline (PAMELOR) 10 MG capsule TAKE 2 CAPSULES(20 MG) BY MOUTH AT BEDTIME 180 capsule 3    omeprazole (PRILOSEC) 20 MG DR capsule Take 1 capsule (20 mg) by mouth daily 90 capsule 3    oxyCODONE-acetaminophen (PERCOCET) 5-325 MG tablet Take 1 tablet by mouth every 6 hours as needed for moderate to severe pain Max of 3/day. Fill 2/8/24 to start 2/10/24. 75 tablet 0    phentermine (ADIPEX-P) 37.5 MG tablet 1/2 tablet each morning 30 tablet 2    tiZANidine (ZANAFLEX) 4 MG tablet Take 1 tablet (4 mg) by mouth 3 times daily " as needed for muscle spasms 0.5-1 tab tid prn muscle spasm 90 tablet 4    zolpidem (AMBIEN) 10 MG tablet TAKE 1 TABLET BY MOUTH EVERY NIGHT AS NEEDED FOR INSOMNIA 30 tablet 5           3/8/2024    10:31 AM   Weight Loss Medication History Reviewed With Patient   Which weight loss medications are you currently taking on a regular basis? Phentermine   Are you having any side effects from the weight loss medication that we have prescribed you? No     ASSESSMENT/PLAN:  David is a patient with mature onset  obesity with significant element of familial/genetic influence and with current health consequences.  David ROBLES Brenden eats a high carb and high fat diet at times.     His problem is complicated by strong craving/reward pathways, hunger component also present           PLAN:    (Continues)  Decrease portion sizes  No meals in front of TV screen  Purge house of food triggers  Volumetrics eating plan     Craving/Reward   Ancillary testing:  N/A.  Food Plan:  Volumetrics and High protein/low carbohydrate.   Activity Plan:  see comments, some chronic pain limitations but does exercise.  Supplementary:  N/A.   Medication:  The patient will continue medication in pursuit of improved medical status as influenced by body weight. He will continue medication (see comments below).  There is a mutual understanding of the goals and risks of this therapy. The patient is in agreement. He is educated on dosage regimen and possible side effects.     Additionally--    --sorbitol coating (or some other component of the coating/vehicle) led to an allergic rxn with Topiramate--->zonisamide as an alternative? Have reached out to pharmacy to check  --check that test claim by pharm was denied for Wegovy;  --screen HbA1c  --up to full phentermine; pt will see PCP to follow up on HTN in a few days; pt to check BP/HR daily over next couple of weeks and send readings, we will check in if have not gotten them. Goal to keep BP <140/90 and HR wnl. Pt's  recent BP around 135/85  --RTC about 3-4 mo        Time: 32 min spent on evaluation, management, counseling, education, & motivational interviewing (video) combined with previsit prep and post visit follow up care/clinical charting same day    Sincerely,    Traci Hood MD

## 2024-03-08 NOTE — NURSING NOTE
Is the patient currently in the state of [place]? YES    Visit mode:VIDEO    If the visit is dropped, the patient can be reconnected by: VIDEO VISIT: Text to cell phone:   Telephone Information:   Mobile [phone]       Will anyone else be joining the visit? NO  (If patient encounters technical issues they should call [phone] :[phone])    How would you like to obtain your AVS? MyChart    Are changes needed to the allergy or medication list? Pt stated no changes to allergies and Pt stated no med changes  Please remove any meds marked not taking and any flagged for removal.    Reason for visit: RECHECK    Wt/ht other than 24 hrs:  earlier this week  Pain more than one location:  5/6 lower back and leg  Aarti CARLISLEF

## 2024-03-08 NOTE — PATIENT INSTRUCTIONS
"Welcome to our weight management program!   We are excited to join you on your weight loss journey    Thank you for allowing us the privilege of caring for you. We hope we provided you with the excellent service you deserve.   Please let us know if there is anything else we can do for you so that we can be sure you are leaving completely satisfied with your care experience.    To ensure the quality of our services you may be receiving a patient satisfaction survey from an independent patient satisfaction monitoring company.    The greatest compliment you can give is a \"Likely to Recommend\"    You saw Dr. Hood today.    Instructions per today's visit:   --Medications--going up to the full pheentermine tablet; please check your pulse/BP daily and send to us in a couple of weeks, or let us know earlier if you have BPs >140/90 an/or pulse >=100 resting with this change  --you are seeing your primary care doctor in a few days to follow up on your BP (on the higher dose of phentermine)  --we are planning return with Dr. Hood again in about 3-4 mo  --we are looking into another alternative besides topiramate to add to the phentermine    Follow up appointments:  We are planning return with Dr. Hood again in about 3-4 mo  Interested in working with a health ?  Health coaches work with you to improve your overall health and wellbeing.  They look at the whole person, and may involve discussion of different areas of life, including, but not limited to the four pillars of health (sleep, exercise, nutrition, and stress management). Discuss with your care team if you would like to start working a health .  Health Coaching-3 Pack:    $99 for three health coaching visits    Visits may be done in person or via phone    Coaching is a partnership between the  and the client; Coaches do not prescribe or diagnose    Coaching helps inspire the client to reach his/her personal goals     For any questions/concerns " contact Eli Farrell LPN at [phone]     To schedule appointments with our team, please call [phone]     Please call during clinic hours Monday through Friday 8:00a - 4:00p if you have questions or you can contact us via Int[employer] at anytime. ?    Lab results will be communicated through My Chart or letter (if My Chart not used). Please call the clinic if you have not received communication after 1 week or if you have any questions.?      Fax: [phone]    Thank you,  Medical Weight Management Team

## 2024-03-08 NOTE — LETTER
"3/8/2024       RE: David Wilcox  [address] [facility] [address]     Dear Colleague,    Thank you for referring your patient, David Wilcox, to the [facility]. Please see a copy of my visit note below.      Return Medical Weight Management Note     David Wilcox  MRN:  [id number]  :  1970  CHERYL:  3/8/2024    Dear Regan Llamas MD,    I had the pleasure of seeing your patient David Wilcox. He is a 53 year old male who I am continuing to see for treatment of obesity related to:        10/26/2023     1:45 PM   --   I have the following health issues associated with obesity Pre-Diabetes    High Blood Pressure    Sleep Apnea   I have the following symptoms associated with obesity Knee Pain    Depression    Groin Rash       INTERVAL HISTORY:  Return visit, reviewed and relevant is the following, as extracted from earlier note--  ---------------------------  \"Full intake/assessment was done to determine barriers to weight loss success and develop a treatment plan. David Wilcox is a 53 year old male interested in treatment of medical problems associated with excess weight.\"  ---------------------------    Of note from our discussion today--  --sister had MH issues post bariatric surgery; pt does not want to consider  --readdressed meds and lifestyle goals for wt loss    CURRENT WEIGHT:   262 lbs 0 oz  Body mass index is 39.84 kg/m .     Initial Weight (lbs): 271 lbs       Cumulative weight loss (lbs): 9  Weight Loss Percentage: 3.32%        3/8/2024    10:31 AM   Changes and Difficulties   I have made the following changes to my diet since my last visit: none   With regards to my diet, I am still struggling with: no   I have made the following changes to my activity/exercise since my last visit: out in garage several hours a day with wood turning, mellow   With regards to my activity/exercise, I am still " "struggling with: winter, hard to get out being disabled.       VITALS:  Ht 1.727 m (5' 8\")   Wt 118.8 kg (262 lb)   BMI 39.84 kg/m      MEDICATIONS:   Current Outpatient Medications   Medication Sig Dispense Refill    acetaminophen (TYLENOL) 500 MG tablet Take 1,500 mg by mouth at bedtime      albuterol (PROAIR HFA/PROVENTIL HFA/VENTOLIN HFA) 108 (90 Base) MCG/ACT inhaler INHALE 2 PUFFS INTO THE LUNGS EVERY 4 HOURS AS NEEDED FOR SHORTNESS OF BREATH 8.5 g 11    buprenorphine (BUTRANS) 15 MCG/HR Wk patch Place 1 patch onto the skin every 7 days Fill 12/27/23 and start 12/29/23. 28 day supply for chronic pain. 4 patch 0    buprenorphine (BUTRANS) 20 MCG/HR WK patch Place 1 patch onto the skin every 7 days Fill 02/27/24 and start on 02/29/24. 28 day script. 4 patch 0    DULoxetine (CYMBALTA) 60 MG capsule Take 1 capsule (60 mg) by mouth daily 90 capsule 3    gabapentin (NEURONTIN) 600 MG tablet TAKE 2 TABLETS BY MOUTH THREE TIMES DAILY 180 tablet 4    LORazepam (ATIVAN) 0.5 MG tablet One bid prn anxiety. Rare use 30 tablet 0    losartan (COZAAR) 100 MG tablet Take 1 tablet (100 mg) by mouth daily 90 tablet 3    mirtazapine (REMERON) 15 MG tablet TAKE 1 TABLET(15 MG) BY MOUTH AT BEDTIME 90 tablet 1    naloxone (NARCAN) 4 MG/0.1ML nasal spray Spray 1 spray (4 mg) into one nostril alternating nostrils once as needed for opioid reversal every 2-3 minutes until assistance arrives 0.2 mL 1    nortriptyline (PAMELOR) 10 MG capsule TAKE 2 CAPSULES(20 MG) BY MOUTH AT BEDTIME 180 capsule 3    omeprazole (PRILOSEC) 20 MG DR capsule Take 1 capsule (20 mg) by mouth daily 90 capsule 3    oxyCODONE-acetaminophen (PERCOCET) 5-325 MG tablet Take 1 tablet by mouth every 6 hours as needed for moderate to severe pain Max of 3/day. Fill 2/8/24 to start 2/10/24. 75 tablet 0    phentermine (ADIPEX-P) 37.5 MG tablet 1/2 tablet each morning 30 tablet 2    tiZANidine (ZANAFLEX) 4 MG tablet Take 1 tablet (4 mg) by mouth 3 times daily as needed " for muscle spasms 0.5-1 tab tid prn muscle spasm 90 tablet 4    zolpidem (AMBIEN) 10 MG tablet TAKE 1 TABLET BY MOUTH EVERY NIGHT AS NEEDED FOR INSOMNIA 30 tablet 5           3/8/2024    10:31 AM   Weight Loss Medication History Reviewed With Patient   Which weight loss medications are you currently taking on a regular basis? Phentermine   Are you having any side effects from the weight loss medication that we have prescribed you? No     ASSESSMENT/PLAN:  David is a patient with mature onset  obesity with significant element of familial/genetic influence and with current health consequences.  David ROBLES Brenden eats a high carb and high fat diet at times.     His problem is complicated by strong craving/reward pathways, hunger component also present           PLAN:    (Continues)  Decrease portion sizes  No meals in front of TV screen  Purge house of food triggers  Volumetrics eating plan     Craving/Reward   Ancillary testing:  N/A.  Food Plan:  Volumetrics and High protein/low carbohydrate.   Activity Plan:  see comments, some chronic pain limitations but does exercise.  Supplementary:  N/A.   Medication:  The patient will continue medication in pursuit of improved medical status as influenced by body weight. He will continue medication (see comments below).  There is a mutual understanding of the goals and risks of this therapy. The patient is in agreement. He is educated on dosage regimen and possible side effects.     Additionally--    --sorbitol coating (or some other component of the coating/vehicle) led to an allergic rxn with Topiramate--->zonisamide as an alternative? Have reached out to pharmacy to check  --check that test claim by pharm was denied for Wegovy;  --screen HbA1c  --up to full phentermine; pt will see PCP to follow up on HTN in a few days; pt to check BP/HR daily over next couple of weeks and send readings, we will check in if have not gotten them. Goal to keep BP <140/90 and HR wnl. Pt's recent BP  around 135/85  --RTC about 3-4 mo        Time: 32 min spent on evaluation, management, counseling, education, & motivational interviewing (video) combined with previsit prep and post visit follow up care/clinical charting same day    Sincerely,    Traci Hood MD

## 2024-03-11 ENCOUNTER — OFFICE VISIT (OUTPATIENT)
Dept: FAMILY MEDICINE | Facility: CLINIC | Age: 54
End: 2024-03-11
Payer: COMMERCIAL

## 2024-03-11 VITALS
HEIGHT: 69 IN | DIASTOLIC BLOOD PRESSURE: 84 MMHG | HEART RATE: 68 BPM | RESPIRATION RATE: 16 BRPM | BODY MASS INDEX: 38.66 KG/M2 | TEMPERATURE: 98.2 F | OXYGEN SATURATION: 98 % | WEIGHT: 261 LBS | SYSTOLIC BLOOD PRESSURE: 136 MMHG

## 2024-03-11 DIAGNOSIS — Z12.11 COLON CANCER SCREENING: ICD-10-CM

## 2024-03-11 DIAGNOSIS — F11.20 CONTINUOUS OPIOID DEPENDENCE (H): ICD-10-CM

## 2024-03-11 DIAGNOSIS — M54.41 CHRONIC BILATERAL LOW BACK PAIN WITH RIGHT-SIDED SCIATICA: ICD-10-CM

## 2024-03-11 DIAGNOSIS — Z00.00 WELL ADULT EXAM: Primary | ICD-10-CM

## 2024-03-11 DIAGNOSIS — J30.81 ALLERGIC RHINITIS DUE TO ANIMALS: ICD-10-CM

## 2024-03-11 DIAGNOSIS — G89.29 CHRONIC BILATERAL LOW BACK PAIN WITH RIGHT-SIDED SCIATICA: ICD-10-CM

## 2024-03-11 DIAGNOSIS — R73.09 ELEVATED GLUCOSE: ICD-10-CM

## 2024-03-11 LAB — HBA1C MFR BLD: 6.3 % (ref 0–5.6)

## 2024-03-11 PROCEDURE — 83036 HEMOGLOBIN GLYCOSYLATED A1C: CPT | Performed by: FAMILY MEDICINE

## 2024-03-11 PROCEDURE — 99396 PREV VISIT EST AGE 40-64: CPT | Mod: 25 | Performed by: FAMILY MEDICINE

## 2024-03-11 PROCEDURE — 36415 COLL VENOUS BLD VENIPUNCTURE: CPT | Performed by: FAMILY MEDICINE

## 2024-03-11 PROCEDURE — 99213 OFFICE O/P EST LOW 20 MIN: CPT | Mod: 25 | Performed by: FAMILY MEDICINE

## 2024-03-11 RX ORDER — OXYCODONE AND ACETAMINOPHEN 5; 325 MG/1; MG/1
1 TABLET ORAL EVERY 6 HOURS PRN
Qty: 75 TABLET | Refills: 0 | Status: CANCELLED | OUTPATIENT
Start: 2024-03-11

## 2024-03-11 RX ORDER — LORAZEPAM 0.5 MG/1
TABLET ORAL
Qty: 30 TABLET | Refills: 0 | Status: CANCELLED | OUTPATIENT
Start: 2024-03-11

## 2024-03-11 RX ORDER — ALBUTEROL SULFATE 90 UG/1
AEROSOL, METERED RESPIRATORY (INHALATION)
Qty: 8.5 G | Refills: 11 | Status: SHIPPED | OUTPATIENT
Start: 2024-03-11

## 2024-03-11 SDOH — HEALTH STABILITY: PHYSICAL HEALTH: ON AVERAGE, HOW MANY MINUTES DO YOU ENGAGE IN EXERCISE AT THIS LEVEL?: 40 MIN

## 2024-03-11 SDOH — HEALTH STABILITY: PHYSICAL HEALTH: ON AVERAGE, HOW MANY DAYS PER WEEK DO YOU ENGAGE IN MODERATE TO STRENUOUS EXERCISE (LIKE A BRISK WALK)?: 2 DAYS

## 2024-03-11 ASSESSMENT — ACTIVITIES OF DAILY LIVING (ADL): CURRENT_FUNCTION: NO ASSISTANCE NEEDED

## 2024-03-11 ASSESSMENT — PAIN SCALES - GENERAL: PAINLEVEL: MODERATE PAIN (5)

## 2024-03-11 ASSESSMENT — PATIENT HEALTH QUESTIONNAIRE - PHQ9
SUM OF ALL RESPONSES TO PHQ QUESTIONS 1-9: 5
10. IF YOU CHECKED OFF ANY PROBLEMS, HOW DIFFICULT HAVE THESE PROBLEMS MADE IT FOR YOU TO DO YOUR WORK, TAKE CARE OF THINGS AT HOME, OR GET ALONG WITH OTHER PEOPLE: NOT DIFFICULT AT ALL
SUM OF ALL RESPONSES TO PHQ QUESTIONS 1-9: 5

## 2024-03-11 ASSESSMENT — SOCIAL DETERMINANTS OF HEALTH (SDOH): HOW OFTEN DO YOU GET TOGETHER WITH FRIENDS OR RELATIVES?: MORE THAN THREE TIMES A WEEK

## 2024-03-11 NOTE — PROGRESS NOTES
{PROVIDER CHARTING PREFERENCE:041612}    Subjective   David is a 53 year old, presenting for the following health issues:  Physical      3/11/2024     2:38 PM   Additional Questions   Roomed by Michaela HARMON CMA     HPI     {MA/LPN/RN Pre-Provider Visit Orders- hCG/UA/Strep (Optional):160644}  {SUPERLIST (Optional):761281}  {additonal problems for provider to add (Optional):321339}    {ROS Picklists (Optional):747929}      Objective    There were no vitals taken for this visit.  There is no height or weight on file to calculate BMI.  Physical Exam   {Exam List (Optional):932078}    {Diagnostic Test Results (Optional):441547}        Signed Electronically by: Regan Llamas MD  {Email feedback regarding this note to [email or web address]   :[id number]}

## 2024-03-11 NOTE — PROGRESS NOTES
"Preventive Care Visit  [facility]  Regan Llamas MD, Family Medicine  Mar 11, 2024    Assessment & Plan   Well exam  Chronic pain, stable on butrans patch   Opioid dependency, stable  Elevated glucose, recheck    Fills on albuterol for his allergies, continue pain clinic.    Continue with opioids    Keep working on wt loss.  Back this summer for fills on chronic meds.         BMI  Estimated body mass index is 39.11 kg/m  as calculated from the following:    Height as of this encounter: 1.74 m (5' 8.5\").    Weight as of this encounter: 118.4 kg (261 lb).   Weight management plan: diet/exercise     Counseling  Appropriate preventive services were discussed with this patient, including applicable screening as appropriate for fall prevention, nutrition, physical activity, Tobacco-use cessation, weight loss and cognition.  Checklist reviewing preventive services available has been given to the patient.  Reviewed patient's diet, addressing concerns and/or questions.   He is at risk for lack of exercise and has been provided with information to increase physical activity for the benefit of his well-being.   The patient was instructed to see the dentist every 6 months.   The patient's PHQ-9 score is consistent with mild depression. He was provided with information regarding depression.   I have reviewed Opioid Use Disorder and Substance Use Disorder risk factors and made any needed referrals.           Yvette Hernandez is a 53 year old, presenting for the following:  Physical  Chronic pain, stable on butrans patch   Opioid dependency, stable  Elevated glucose, recheck    Other issues stable as in problem list.         3/11/2024     2:38 PM   Additional Questions   Roomed by Michaela HARMON CMA       Health Care Directive  Patient does not have a Health Care Directive or Living Will: Discussed advance care planning with patient; however, patient declined at this time.    Healthy Habits:     In " "general, how would you rate your overall health?  Good    Frequency of exercise:  1 day/week    Duration of exercise:  30-45 minutes    Do you usually eat at least 4 servings of fruit and vegetables a day, include whole grains    & fiber and avoid regularly eating high fat or \"junk\" foods?  Yes    Taking medications regularly:  Yes    Barriers to taking medications:  None    Medication side effects:  None    Ability to successfully perform activities of daily living:  No assistance needed    Home Safety:  No safety concerns identified    Hearing Impairment:  No hearing concerns    In the past 6 months, have you been bothered by leaking of urine?  No    In general, how would you rate your overall mental or emotional health?  Very good    Additional concerns today:  No          3/11/2024   General Health   How would you rate your overall physical health? Good   Feel stress (tense, anxious, or unable to sleep) To some extent   (!) STRESS CONCERN      3/11/2024   Nutrition   Diet: Regular (no restrictions)         3/11/2024   Exercise   Days per week of moderate/strenous exercise 2 days   Average minutes spent exercising at this level 40 min   (!) EXERCISE CONCERN      3/11/2024   Social Factors   Frequency of gathering with friends or relatives More than three times a week   Worry food won't last until get money to buy more Patient declined   Food not last or not have enough money for food? Patient declined   Do you have housing?  Patient declined   Are you worried about losing your housing? Patient declined   Lack of transportation? Patient declined   Unable to get utilities (heat,electricity)? Patient declined         3/11/2024   Activities of Daily Living- Home Safety   Needs help with the following daily activites None of the above   Safety concerns in the home None of the above         3/11/2024   Dental   Dentist two times every year? (!) NO         3/11/2024   Hearing Screening   Hearing concerns? None of the " above         3/11/2024   Driving Risk Screening   Patient/family members have concerns about driving No         3/11/2024   General Alertness/Fatigue Screening   Have you been more tired than usual lately? No         3/11/2024   Urinary Incontinence Screening   Bothered by leaking urine in past 6 months No         3/11/2024   TB Screening   Were you born outside of [place]?  No       Today's PHQ-9 Score:       3/11/2024     2:42 PM   PHQ-9 SCORE   PHQ-9 Total Score MyChart 5 (Mild depression)   PHQ-9 Total Score 5         3/11/2024   Substance Use   Alcohol more than 3/day or more than 7/wk No   Do you have a current opioid prescription? (!) YES   How severe/bad is pain from 1 to 10? 5/10   Do you use any other substances recreationally? No     Social History     Tobacco Use    Smoking status: Former     Packs/day: .5     Types: Cigarettes, Cigars    Smokeless tobacco: Never    Tobacco comments:     <1/2 pack per day   Vaping Use    Vaping Use: Never used   Substance Use Topics    Alcohol use: Yes     Alcohol/week: 0.0 standard drinks of alcohol     Comment: rare    Drug use: No             3/11/2024   One time HIV Screening   Previous HIV test? Decline   ASCVD Risk   The ASCVD Risk score (Raúl PROCTOR, et al., 2019) failed to calculate for the following reasons:    Cannot find a previous HDL lab    Cannot find a previous total cholesterol lab          Reviewed and updated as needed this visit by Provider                    Current providers sharing in care for this patient include:  Patient Care Team:  Regan Llamas MD as PCP - General (Family Medicine)  Regan Llamas MD as Assigned PCP  Ina Barillas APRN CNP as Assigned Neuroscience Provider  Ina Barillas APRN CNP as Assigned Pain Medication Provider  Traci Hood MD as Assigned Endocrinology Provider  Bloch, Lauren Turner, RPH as Pharmacist (Pharmacist)  Bloch, Lauren Turner, RPH as Assigned [facility] Pharmacist    The following health  "maintenance items are reviewed in Epic and correct as of today:  Health Maintenance   Topic Date Due    ANNUAL REVIEW OF HM ORDERS  Never done    COLORECTAL CANCER SCREENING  Never done    HIV SCREENING  Never done    HEPATITIS C SCREENING  Never done    HEPATITIS A IMMUNIZATION (1 of 2 - Risk 2-dose series) Never done    HEPATITIS B IMMUNIZATION (2 of 3 - 19+ 3-dose series) 03/21/2003    LIPID  05/09/2017    ZOSTER IMMUNIZATION (1 of 2) Never done    LUNG CANCER SCREENING  09/15/2021    DTAP/TDAP/TD IMMUNIZATION (3 - Td or Tdap) 02/21/2023    INFLUENZA VACCINE (1) 09/01/2023    COVID-19 Vaccine (2 - 2023-24 season) 09/01/2023    MEDICARE ANNUAL WELLNESS VISIT  11/17/2023    URINE DRUG SCREEN  04/12/2024    CONTROLLED SUBSTANCE AGREEMENT FOR CHRONIC PAIN MANAGEMENT  04/12/2024    KAMRYN ASSESSMENT  08/14/2024    PHQ-9  03/11/2025    GLUCOSE  11/17/2025    ADVANCE CARE PLANNING  12/27/2026    DEPRESSION ACTION PLAN  Completed    Pneumococcal Vaccine: Pediatrics (0 to 5 Years) and At-Risk Patients (6 to 64 Years)  Aged Out    IPV IMMUNIZATION  Aged Out    HPV IMMUNIZATION  Aged Out    MENINGITIS IMMUNIZATION  Aged Out    RSV MONOCLONAL ANTIBODY  Aged Out            Objective    Exam  /84 (BP Location: Right arm, Patient Position: Sitting, Cuff Size: Adult Large)   Pulse 68   Temp 98.2  F (36.8  C) (Tympanic)   Resp 16   Ht 1.74 m (5' 8.5\")   Wt 118.4 kg (261 lb)   SpO2 98%   BMI 39.11 kg/m     Estimated body mass index is 39.11 kg/m  as calculated from the following:    Height as of this encounter: 1.74 m (5' 8.5\").    Weight as of this encounter: 118.4 kg (261 lb).    Physical Exam  GEN: Alert and oriented, in no acute distress  CV: RRR, no murmur  RESP: lungs clear bilaterally, good effort  EXT: no edema or lesions noted in lower extremities  Walks with a cane  Mood normal, speech normal.  Good eye contact   Signed Electronically by: Regan Llamas MD    "

## 2024-03-12 ENCOUNTER — TELEPHONE (OUTPATIENT)
Dept: ENDOCRINOLOGY | Facility: CLINIC | Age: 54
End: 2024-03-12
Payer: COMMERCIAL

## 2024-03-12 NOTE — TELEPHONE ENCOUNTER
Patient Contacted  and scheduled the following:    Appointment type: LIDYA KEANE   Provider: Ksenia Hood PA-C  Return date: 06/12/2024  Specialty phone number: [phone]  Additional appointment(s) needed: no   Additonal Notes:  pt, pt preferred something in suggested 3 mo time frame, Dr. Hood  booking out until 08/30/2024

## 2024-03-13 ENCOUNTER — MYC REFILL (OUTPATIENT)
Dept: PALLIATIVE MEDICINE | Facility: CLINIC | Age: 54
End: 2024-03-13
Payer: COMMERCIAL

## 2024-03-13 DIAGNOSIS — M96.1 FAILED BACK SURGICAL SYNDROME: ICD-10-CM

## 2024-03-13 DIAGNOSIS — G89.29 CHRONIC BILATERAL LOW BACK PAIN WITH RIGHT-SIDED SCIATICA: ICD-10-CM

## 2024-03-13 DIAGNOSIS — M54.16 LUMBAR RADICULOPATHY: ICD-10-CM

## 2024-03-13 DIAGNOSIS — G89.29 CHRONIC INTRACTABLE PAIN: ICD-10-CM

## 2024-03-13 DIAGNOSIS — F11.90 CHRONIC, CONTINUOUS USE OF OPIOIDS: ICD-10-CM

## 2024-03-13 DIAGNOSIS — M54.41 CHRONIC BILATERAL LOW BACK PAIN WITH RIGHT-SIDED SCIATICA: ICD-10-CM

## 2024-03-13 DIAGNOSIS — M96.1 FAILED BACK SYNDROME: ICD-10-CM

## 2024-03-14 NOTE — TELEPHONE ENCOUNTER
Received call from patient requesting refill(s) of     OXYCODONE-ACETAMINOPHEN 5-325 MG PO TABS  Last dispensed from pharmacy on: Feb. 8, 2024       Patient's last office/virtual visit by prescribing provider on: Mar. 4, 2024   Next office/virtual appointment scheduled for: Apr. 15, 2024     UDT: Apr. 12, 2023   CSA: Apr. 12, 2023     E-prescribe to    [id number] - [place], [place] - [address] [place] [address] [address] AT [facility] & [address]      Will route to nursing [place] for review and preparation of prescription(s).

## 2024-03-14 NOTE — TELEPHONE ENCOUNTER
Medication refill information reviewed.     Due date for OXYCODONE-ACETAMINOPHEN 5-325 MG PO TABS  is 03/14/24     Prescriptions prepped for review.     Will route to provider.

## 2024-03-15 RX ORDER — OXYCODONE AND ACETAMINOPHEN 5; 325 MG/1; MG/1
1 TABLET ORAL EVERY 6 HOURS PRN
Qty: 75 TABLET | Refills: 0 | Status: SHIPPED | OUTPATIENT
Start: 2024-03-15 | End: 2024-04-15

## 2024-03-15 NOTE — TELEPHONE ENCOUNTER
3/15/2024 11:51 AM     REFILL REQUEST:  I am signing this on behalf of my colleague CATARINO Pelayo, CNP who is out of the office. Please see their previous documentation regarding the appropriateness of these prescriptions. Chart reviewed - Request appears appropriate. PDMP reviewed for all controlled substance refills. If any concerns are found, it will be noted.     Refilled for 30 day supply.  Script e-Prescribed to pharmacy    CATARINO Negro, DNP, FNP-C

## 2024-03-16 DIAGNOSIS — E66.01 CLASS 2 SEVERE OBESITY DUE TO EXCESS CALORIES WITH SERIOUS COMORBIDITY IN ADULT, UNSPECIFIED BMI (H): Primary | ICD-10-CM

## 2024-03-16 DIAGNOSIS — E66.812 CLASS 2 SEVERE OBESITY DUE TO EXCESS CALORIES WITH SERIOUS COMORBIDITY IN ADULT, UNSPECIFIED BMI (H): Primary | ICD-10-CM

## 2024-03-21 ENCOUNTER — MYC REFILL (OUTPATIENT)
Dept: PALLIATIVE MEDICINE | Facility: CLINIC | Age: 54
End: 2024-03-21
Payer: COMMERCIAL

## 2024-03-21 DIAGNOSIS — M54.41 CHRONIC BILATERAL LOW BACK PAIN WITH RIGHT-SIDED SCIATICA: ICD-10-CM

## 2024-03-21 DIAGNOSIS — G89.29 CHRONIC BILATERAL LOW BACK PAIN WITH RIGHT-SIDED SCIATICA: ICD-10-CM

## 2024-03-21 DIAGNOSIS — M96.1 FAILED BACK SURGICAL SYNDROME: ICD-10-CM

## 2024-03-21 DIAGNOSIS — M96.1 FAILED BACK SYNDROME: ICD-10-CM

## 2024-03-21 DIAGNOSIS — M54.16 LUMBAR RADICULOPATHY: ICD-10-CM

## 2024-03-21 DIAGNOSIS — G89.29 CHRONIC INTRACTABLE PAIN: ICD-10-CM

## 2024-03-21 DIAGNOSIS — F11.90 CHRONIC, CONTINUOUS USE OF OPIOIDS: ICD-10-CM

## 2024-03-22 RX ORDER — BUPRENORPHINE 20 UG/H
1 PATCH TRANSDERMAL
Qty: 4 PATCH | Refills: 0 | Status: SHIPPED | OUTPATIENT
Start: 2024-03-22 | End: 2024-04-15

## 2024-03-22 NOTE — TELEPHONE ENCOUNTER
Received call from patient requesting refill(s) of     BUPRENORPHINE 20 MCG/HR TD PTWK  Last dispensed from pharmacy on: Feb. 29, 2024       Patient's last office/virtual visit by prescribing provider on: Apr. 15, 2024   Next office/virtual appointment scheduled for: Mar. 4, 2024     UDT: Apr. 12, 2023   CSA: Apr. 12, 2023     E-prescribe to    [id number] - [place], [place] - [address] [place] [address] [address] AT [facility] & [address]    Will route to nursing [place] for review and preparation of prescription(s).

## 2024-03-22 NOTE — TELEPHONE ENCOUNTER
Medication refill information reviewed.     Due date for BUPRENORPHINE 20 MCG/HR TD PTWK  is 03/28/24     Prescriptions prepped for review.     Will route to provider.

## 2024-03-22 NOTE — TELEPHONE ENCOUNTER
Signed Prescriptions:                        Disp   Refills    buprenorphine (BUTRANS) 20 MCG/HR WK patch 4 patch0        Sig: Place 1 patch onto the skin every 7 days Fill           03/26/24 and start on 03/28/24. 28 day script.  Authorizing Provider: INA LOPEZ        Reviewed [place]  March 22, 2024- no concerning fills.    Ina TORIBIO, RN CNP, FNP  [facility]     Statement Selected

## 2024-04-15 ENCOUNTER — LAB (OUTPATIENT)
Dept: LAB | Facility: CLINIC | Age: 54
End: 2024-04-15
Payer: COMMERCIAL

## 2024-04-15 ENCOUNTER — OFFICE VISIT (OUTPATIENT)
Dept: PALLIATIVE MEDICINE | Facility: CLINIC | Age: 54
End: 2024-04-15
Attending: NURSE PRACTITIONER
Payer: COMMERCIAL

## 2024-04-15 VITALS — SYSTOLIC BLOOD PRESSURE: 128 MMHG | DIASTOLIC BLOOD PRESSURE: 87 MMHG | HEART RATE: 69 BPM

## 2024-04-15 DIAGNOSIS — F11.90 CHRONIC, CONTINUOUS USE OF OPIOIDS: ICD-10-CM

## 2024-04-15 DIAGNOSIS — M96.1 FAILED BACK SYNDROME: ICD-10-CM

## 2024-04-15 DIAGNOSIS — M79.18 MYOFASCIAL PAIN: ICD-10-CM

## 2024-04-15 DIAGNOSIS — Z79.899 ENCOUNTER FOR LONG-TERM (CURRENT) USE OF MEDICATIONS: ICD-10-CM

## 2024-04-15 DIAGNOSIS — M54.6 CHRONIC BILATERAL THORACIC BACK PAIN: ICD-10-CM

## 2024-04-15 DIAGNOSIS — M54.16 LUMBAR RADICULOPATHY: Primary | ICD-10-CM

## 2024-04-15 DIAGNOSIS — G89.29 CHRONIC BILATERAL LOW BACK PAIN WITH RIGHT-SIDED SCIATICA: ICD-10-CM

## 2024-04-15 DIAGNOSIS — M96.1 FAILED BACK SURGICAL SYNDROME: ICD-10-CM

## 2024-04-15 DIAGNOSIS — G89.29 CHRONIC INTRACTABLE PAIN: ICD-10-CM

## 2024-04-15 DIAGNOSIS — G89.29 CHRONIC BILATERAL THORACIC BACK PAIN: ICD-10-CM

## 2024-04-15 DIAGNOSIS — M54.41 CHRONIC BILATERAL LOW BACK PAIN WITH RIGHT-SIDED SCIATICA: ICD-10-CM

## 2024-04-15 DIAGNOSIS — M62.838 MUSCLE SPASM: ICD-10-CM

## 2024-04-15 DIAGNOSIS — Z79.891 ENCOUNTER FOR LONG-TERM USE OF OPIATE ANALGESIC: ICD-10-CM

## 2024-04-15 PROCEDURE — 99214 OFFICE O/P EST MOD 30 MIN: CPT | Performed by: NURSE PRACTITIONER

## 2024-04-15 PROCEDURE — 80307 DRUG TEST PRSMV CHEM ANLYZR: CPT

## 2024-04-15 PROCEDURE — G0481 DRUG TEST DEF 8-14 CLASSES: HCPCS | Mod: 59

## 2024-04-15 RX ORDER — GABAPENTIN 600 MG/1
TABLET ORAL
Qty: 180 TABLET | Refills: 4 | Status: SHIPPED | OUTPATIENT
Start: 2024-04-15 | End: 2024-09-23

## 2024-04-15 RX ORDER — OXYCODONE AND ACETAMINOPHEN 5; 325 MG/1; MG/1
1 TABLET ORAL EVERY 6 HOURS PRN
Qty: 75 TABLET | Refills: 0 | Status: SHIPPED | OUTPATIENT
Start: 2024-04-15 | End: 2024-05-17

## 2024-04-15 RX ORDER — BUPRENORPHINE 20 UG/H
1 PATCH TRANSDERMAL
Qty: 4 PATCH | Refills: 0 | Status: SHIPPED | OUTPATIENT
Start: 2024-04-15 | End: 2024-05-17

## 2024-04-15 ASSESSMENT — PAIN SCALES - GENERAL: PAINLEVEL: SEVERE PAIN (7)

## 2024-04-15 NOTE — LETTER

## 2024-04-15 NOTE — PATIENT INSTRUCTIONS
Plan:   Physical Therapy: none  Continue your regular HEP   Clinical Health Psychologist to address issues of relaxation, behavioral change, coping style, and other factors important to improvement: none  Diagnostic Studies: none  Medication Management:   Continue Percocet max 2/day until next month script when I am increasing to #75/month, you may use a max of 3/day on 15 days of the month if needed. Fill and begin 4/15  continueButrans 20mcg/hr fill 4/22 and start 4/24  has naloxone nasal spray for safety  Continue gabapentin  Further procedures recommended: none  Contact me if you need orders to repeat trigger point injection  Acupuncture: yes, continue  Recommendations/follow-up for PCP:  See above  Release of information: none  UDT today, last took Percocet this morning, wearing Butrans on upper chest today  Signed CSA today   Follow up: 12 weeks, video or in-person. Please call [phone] to make your follow-up appointment with me.     ----------------------------------------------------------------  Clinic Number:  [phone]   Call with any questions about your care and for scheduling assistance.   Calls are returned Monday through Friday between 8 AM and 4:30 PM. We usually get back to you within 2 business days depending on the issue/request.    If we are prescribing your medications:  For opioid medication refills, call the clinic or send a [facility] message 7 days in advance.  Please include:  Name of requested medication  Name of the pharmacy.  For non-opioid medications, call your pharmacy directly to request a refill. Please allow 3-4 days to be processed.   Per [place] State Law:  All controlled substance prescriptions must be filled within 30 days of being written.    For those controlled substances allowing refills, pickup must occur within 30 days of last fill.      We believe regular attendance is key to your success in our program!    Any time you are unable to keep your appointment we ask that you  call us at least 24 hours in advance to cancel.This will allow us to offer the appointment time to another patient.   Multiple missed appointments may lead to dismissal from the clinic.

## 2024-04-15 NOTE — PROGRESS NOTES
[facility]      4/15/2024      Chief complaint:   -Low back pain and right leg radicular pain, same, no change in this pain        Interval history:  David Wilcox is a 53 year old male is known to me for:  Chronic bilateral low back pain with right-sided sciatica  Lumbar radiculopathy  Failed back surgical syndrome  Chronic continuous use of opioids  Encounter for long-term use of opiate analgesic     Distant history of alcohol and drug abuse  Bipolar disorder   H/o menningitis after SCS trial  PMHx includes: Acute bronchitis, acute pancreatitis, acute sinusitis, ADHD, anxiety, depression, asthma, bipolar disorder unspecified, cervicalgia, dysthymic disorder, esophageal reflux, fixation of spine with fusion, history of blood transfusion, hyperlipidemia, low back pain, lumbar throat, meningitis after spinal cord stimulator trial, narcotic abuse, neck pain, open nondisplaced fracture of distal phalanx of finger, pain in limb, personal history of other diseases of the digestive system, sciatica, spasm of muscle, tobacco use disorder  PSHx includes: Back surgery in 2003 and 2011, lumbar anterior three-level fusion, ORIF right finger (3/3/2021)  .    Recommendations/plan at the last visit on 1/15/2024 included:  Physical Therapy: none  Continue your regular [facility]   Clinical Health Psychologist to address issues of relaxation, behavioral change, coping style, and other factors important to improvement: none  Diagnostic Studies: none  Medication Management:   Continue Percocet max 2/day until next month script when I am increasing to #75/month, you may use a max of 3/day on 15 days of the month if needed. Fill 2/8 and start 2/10.   INCREASE Butrans 20mcg/hr fill 1/29 and start 2/1  has naloxone nasal spray for safety  Further procedures recommended: schedule trigger point injections likely with ultrasound with Dr. Hickman, our office will contact you   Acupuncture: yes,  continue  Recommendations/follow-up for PCP:  See above  Release of information: none  Follow up: 12 weeks, video or in-person. Please call [phone] to make your follow-up appointment with me.       Since his last visit, David Wilcox reports:    Interval history April 15, 2024  -had been down in [place], [place] with his son working on a truck. Had a lot of fun restoring it.   -drove back to [place] last night.   -low back and right leg pain is worse due to different activity. No red flag symptoms at all.   -feels his pain is under better control with increased dosing of Butrans and use of oxycodone for breakthrough. He does not want to make any changes at this time in his medication regimen.   -trigger point injections done in early March were helpful for about a week or so.        Interval history January 15, 2024  -colder weather has bothered chronic low back and right leg pain. Otherwise stable.   -has had about a month long issue with increased mid back pain. No known injury or illness to explain this pain.   -stable on current medications    Interval history October 11, 2023  -continued low back pain, extends into the right leg to the foot. No changes.   -denies any new areas of pain  -he does feel that the increase in Butrans helped a little bit. Cannot currently increase dosing due to cost (his insurance just reset).      Interval history July 12, 2023  -his low back and right leg pain is the same  -stable on medications, he has looked into buprenorphine and is interested in starting Butrans for pain  -interested in weight loss, [facility]     Interval history April 12, 2023  -pain is the same  -morphine is really helpful but makes him feel a little cloudy  -long acting OxyContin can be harder to obtain with his pharmacy  -discussed switch to buprenorphine (Butrans patch or [place] film) patient interested, wants to think about it and speak with his wife    Interval  history January 10, 2023  -when in [place], unable to get OxyContin, switched to MSContin 15mg every 12 hours. This is working well, no significant side effects. Feels more sleepy in the late afternoon, could also be shorter days. Will continue to monitor.   -low back pain remains with right leg radiculopathy to the right foot 2nd through 5th digits.   -walking up to 3 km per day.  -mentions wanting to lose weight, discussed referral to [facility], patient is very interested in this.   Also states he has previously been diagnosed with OBSTRUCTIVE SLEEP APNEA, but does not have CPAP as none were in stock when he was initially diagnosed. Encouraged patient to follow up with his sleep medicine provider and get a CPAP set up.      Pain history collected 5/27/2022 at initial visit  Out cutting a tree and it fell on him in 2002. He had compression fractures L5-S1, and multiple other fractures and spent almost a year in a wheelchair, initially told he would not walk again.   His initial spinal  surgery was helpful, but he then fell down the stairs and he knew something was wrong but no one would believe him. He states 8 years later they found loose hardware and he had to have a subsequent surgery to fix that. Then a 3rd surgery extended the fusion. He is now fused from L3-S1.      Pain on the right side from bottom of the ribs to the low back, around the hip and down the right leg to the toes. Bilateral hip pain posterolateral hip pain (points to buttocks).      He has had loss of bowel and bladder but it is not a common thing. This is not new, present off and on for 6 years.      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   Has regular exercise with chores and he now walks every day on the treadmill (2km in the AM and PM)  Does [employer] in [place] on Fridays and his wife does so on Saturdays.           At this point, the patient's participation with our multidisciplinary team  "includes:  The patient has been compliant with the program.  PT - not ordered, patient is quite active at home running a [employer] - not ordered      Pain scores:  Pain intensity on average is 7-8 on a scale of 0-10.    Range is 5-9/10.   Pain right now is 7/10.   Pain is described as \"like a pillow with a knife through it and when bad, like I am on fire\"    Pain is constant in nature      Current pain relevant medications:   -Tylenol extra strength 500mg PRN   -naloxone nasal spray for opiate reversal   -Duloxetine 60mg every day (very helpful for mood and pain)  -gabapentin 600mg take 2 tabs/1200mg TID (helpful)  -nortriptyline 10mg take 2 (20mg) at bedtime (helpful)  -Butrans 20mcg/hr TD Q 7 days (helpful)  -Percocet 5/325mg take 1 tab Q 6 hours PRN max 3/day (he plans on 2/day and uses 3 if needed. Helpful)  -tizanidine 4mg TID PRN for muscle spasms (helpful)        Other pertinent medications:  -lorazepam 0.5mg BID PRN anxiety (very rare use, received #30 tabs in 2018 and still has some left)  -mirtazepine 15mg at bedtime  -ambien 10mg Q HS PRN insomnia           Previous medication treatments included:  OPIATES: oxycodone (helpful), hydrocodone (not helpful), Methadone (took after surgery, very helpful), Fentanyl patches (would have issues with getting to hot and get a dump of medication. He has used this very successfully in the winter when it is colder), Dilaudid (hydromorphone, very helpful for a migraine with antibiotics), MSContin (more helpful than OxyContin was)  NSAIDS: naproxyn (not helpful), ibuprofen (not helpful)  MUSCLE RELAXANTS: Methocarbamol (expense, helpful), Flexeril (helpful but he had a remote history of a suicide attempt with this drug), tizanidine (very helpful)  ANTI-MIGRAINE MEDS: none  ANTI-DEPRESSANTS: none  SLEEP AIDS: Remeron (helpful)  ANTI-CONVULSANTS: nortriptyline (helpful)  TOPICALS: CBD oil (helps some)  ANXIOLYTICS: lorazepam (helpful)  MEDICAL CANNABIS: none  Other " meds: lidocaine (not helpful)        Other treatments have included:   David Wilcox has been seen at a pain clinic in the past.  Previously managed by Dr. Bindu Motley here  PT: tried, was not helpful on multiple occasions  Chiropractic care: yes, uses this regularly for acupuncture  Acupuncture: helpful  TENs Unit: has one, helpful, uses it occasionally  SCS trial in   got meningitis and nearly       Injections:   -3/26/2018 caudal ALVA with Dr. Bindu Motley  (not helpful)  -2018 right L3-4 transforaminal ALVA with Dr. Bindu Motley (not helpful)  -2019  Bilateral SI joint injections with Dr. Bindu Motley  (not helpful)  -3/4/2024 trigger point injections with Dr. Brandon Hickman (helpful for about a week) @ bilat trap, latissimus dorsi and quadratus lumborum using 0.25% bupivicaine and 40mg Kenalog       THE 4 A's OF OPIOID MAINTENANCE ANALGESIA    Analgesia: helpful    Activity: exercising on treadmill twice daily, more active    Adverse effects: none    Adherence to Rx protocol: yes      Side Effects: no side effect  Patient is using the medication as prescribed: YES    Medications:  Current Outpatient Medications   Medication Sig Dispense Refill    acetaminophen (TYLENOL) 500 MG tablet Take 1,500 mg by mouth at bedtime      albuterol (PROAIR HFA/PROVENTIL HFA/VENTOLIN HFA) 108 (90 Base) MCG/ACT inhaler INHALE 2 PUFFS INTO THE LUNGS EVERY 4 HOURS AS NEEDED FOR SHORTNESS OF BREATH 8.5 g 11    buprenorphine (BUTRANS) 20 MCG/HR WK patch Place 1 patch onto the skin every 7 days Fill 24 and start on 24. 28 day script. 4 patch 0    DULoxetine (CYMBALTA) 60 MG capsule Take 1 capsule (60 mg) by mouth daily 90 capsule 3    gabapentin (NEURONTIN) 600 MG tablet TAKE 2 TABLETS BY MOUTH THREE TIMES DAILY 180 tablet 4    LORazepam (ATIVAN) 0.5 MG tablet One bid prn anxiety. Rare use 30 tablet 0    losartan (COZAAR) 100 MG tablet Take 1 tablet (100 mg) by mouth daily 90 tablet 3    mirtazapine  (REMERON) 15 MG tablet TAKE 1 TABLET(15 MG) BY MOUTH AT BEDTIME 90 tablet 1    naloxone (NARCAN) 4 MG/0.1ML nasal spray Spray 1 spray (4 mg) into one nostril alternating nostrils once as needed for opioid reversal every 2-3 minutes until assistance arrives 0.2 mL 1    nortriptyline (PAMELOR) 10 MG capsule TAKE 2 CAPSULES(20 MG) BY MOUTH AT BEDTIME 180 capsule 3    omeprazole (PRILOSEC) 20 MG DR capsule Take 1 capsule (20 mg) by mouth daily 90 capsule 3    oxyCODONE-acetaminophen (PERCOCET) 5-325 MG tablet Take 1 tablet by mouth every 6 hours as needed for moderate to severe pain Max of 3/day. Fill/Start 03/14/24 75 tablet 0    phentermine (ADIPEX-P) 37.5 MG tablet 1 tablet each morning 60 tablet 2    Semaglutide-Weight Management (WEGOVY) 0.25 MG/0.5ML pen Inject 0.25 mg Subcutaneous once a week 2 mL 5    tiZANidine (ZANAFLEX) 4 MG tablet Take 1 tablet (4 mg) by mouth 3 times daily as needed for muscle spasms 0.5-1 tab tid prn muscle spasm 90 tablet 4    zolpidem (AMBIEN) 10 MG tablet TAKE 1 TABLET BY MOUTH EVERY NIGHT AS NEEDED FOR INSOMNIA 30 tablet 5       Medical History: any changes in medical history since they were last seen? No    Social History:   Home situation:  with children x 2 at home.   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   History of chemical dependency treatment: quit on own       Is patient a current smoker or tobacco user?  no  If yes, was cessation counseling offered?  no          Physical Exam:  Vital signs: There were no vitals taken for this visit.    Behavioral observations:  Awake, alert and cooperative    Gait:  Normal     Musculoskeletal exam:    Strength grossly equal throughout. Moves well in exam room    Neuro exam:  Deferred    Skin/vascular/autonomic:  No suspicious lesions on exposed skin.     Other:  na            Diagnostic tests:  MRI OF THE  LUMBAR SPINE WITHOUT AND WITH CONTRAST August 6, 2021 5:57  PM      HISTORY: Low back pain, prior surgery, new symptoms. Worsening pain  above fusion, but also right radicular L5 or S1 distribution on the  right. Failed back surgical syndrome. Lumbar radiculopathy.     TECHNIQUE: Multiplanar, multisequence MRI images of the lumbar spine  were acquired without and with 10mL Gadavist IV contrast.     COMPARISON: Lumbar spine MR 4/27/2018, lumbar spine CT 7/14/2016.     FINDINGS: There are five lumbar-type vertebrae for the purposes of  this dictation. Posterior spine fusion from L3 down to S1 consisting  of bilateral pedicle screws at each level with inner connecting rods,  interbody fusion devices in the L4-L5 and L5-S1 disc spaces and  laminectomies of L3, L4, L5 and S1 again noted.     Degenerative grade 1 anterolisthesis of L5 upon S1 and mild  degenerative retrolisthesis of L2 upon L3 again noted. Alignment  otherwise normal and unchanged. Vertebral body heights normal. No  fractures. No evidence for pathologic bony lesion. Marrow signal  normal.     There is loss of disc height, disc desiccation and posterior disc  bulging/herniation to varying degrees at all levels of the lumbar  spine with the exception of the resected L4-L5 and L5-S1 discs.      The tip of the conus medullaris is at the L1-L2 level which is within  normal limits. No abnormal intrathecal contrast enhancement. There is  no evidence for intrathecal abnormality.      Level by level:      T12-L1: Loss of disc height, disc desiccation and circumferential disc  bulging with a superimposed small posterior central disc herniation  (extrusion). Minimal facet arthropathy bilaterally. Minimal spinal  canal narrowing. No foraminal stenosis on either side. No change from  the comparison study.     L1-L2: Loss of disc height, disc desiccation and circumferential disc  bulging with a superimposed small posterior central disc herniation  (protrusion). Minimal  facet arthropathy bilaterally. Minimal spinal  canal narrowing. No foraminal stenosis on either side. No change from  the comparison study.     L2-L3: Loss of disc height, disc desiccation and mild circumferential  disc bulging. No herniation. Moderate facet arthropathy bilaterally.  Mild spinal canal narrowing. Mild bilateral neural foraminal  narrowing. No change from the comparison study.     L3: Again noted is a fluid signal intensity collection in the right  posterolateral aspect of the spinal canal measuring 1.7 x 0.7 cm in  the AP and transverse dimensions respectively. This may represent a  synovial cyst arising from the right facet joint versus a cystic  postoperative fluid collection or cystic foreign body reaction. This  results in mild narrowing of the thecal sac at the mid L3 level.     L3-L4: (Fused level) Loss of disc height, disc desiccation and mild  circumferential disc bulging. No herniation. Moderate facet  arthropathy bilaterally. No spinal canal stenosis. Mild bilateral  neural foraminal narrowing. No change from the comparison study.      L4-L5: (Fused level) No spinal canal stenosis. No foraminal stenosis  on either side. No change from the comparison study.     L5-S1: (Fused level) No spinal canal stenosis. No foraminal stenosis  on either side. No change from the comparison study.                                                                      IMPRESSION:  1. Postoperative and degenerative change of the lumbar spine as  detailed above without appreciable change from the comparison studies.  2. No significant spinal canal or neural foraminal stenosis at any  level of the spine.     JAI CHAVEZ MD         Other testing (labs, diagnostics):  11/17/2022  Cr. 0.80  Est GFR >90        Screening tools:      DIRE Score for ongoing opioid management is calculated as follows:     Diagnosis = 2     Intractability = 2     Risk: Psych = 2  Chem Hlth = 2  Reliability = 3  Social = 3     Efficacy  = 2     Total DIRE Score = 16 (14 or higher predicts good candidate for ongoing opioid management; 13 or lower predicts poor candidate for opioid management)            [place] Prescription Monitoring Program:  Reviewed [place]  4/15/2024- no concerning fills.  Ina TORIBIO RN CNP, FNP  [facility]  [place] location              Assessment:  Lumbar radiculopathy  Failed back surgical syndrome  Failed back syndrome  Chronic bilateral low back pain with right sided sciatica  Chronic bilateral thoracic back pain  Myofascial pain  Muscle spasm  Chronic intractable pain  Chronic continuous use of opioids  Encounter for long term use of opiate analgesic  Signed CSA 4/15/2024   UDT done 4/15/2024     Essential hypertension  Class 3 severe obesity due to excess calories with serious co-morbidity (untreated OBSTRUCTIVE SLEEP APNEA) and BMI of >39 in adult  OBSTRUCTIVE SLEEP APNEA (currently untreated)  Distant history of alcohol and drug abuse  Bipolar disorder   H/o menningitis after SCS trial  PMHx includes: Acute bronchitis, acute pancreatitis, acute sinusitis, ADHD, anxiety, depression, asthma, bipolar disorder unspecified, cervicalgia, dysthymic disorder, esophageal reflux, fixation of spine with fusion, history of blood transfusion, hyperlipidemia, low back pain, lumbar throat, meningitis after spinal cord stimulator trial, narcotic abuse, neck pain, open nondisplaced fracture of distal phalanx of finger, pain in limb, personal history of other diseases of the digestive system, sciatica, spasm of muscle, tobacco use disorder  PSHx includes: Back surgery in 2003 and 2011, lumbar anterior three-level fusion, ORIF right finger (3/3/2021)        Plan:   Physical Therapy: none  Continue your regular [facility]   Clinical Health Psychologist to address issues of relaxation, behavioral change, coping style, and other factors important to improvement: none  Diagnostic Studies: none  Medication  Management:   Continue Percocet max 2/day until next month script when I am increasing to #75/month, you may use a max of 3/day on 15 days of the month if needed. Fill and begin 4/15  continueButrans 20mcg/hr fill 4/22 and start 4/24  has naloxone nasal spray for safety  Continue gabapentin  Further procedures recommended: none  Contact me if you need orders to repeat trigger point injection  Acupuncture: yes, continue  Recommendations/follow-up for PCP:  See above  Release of information: none  UDT today, last took Percocet this morning, wearing Butrans on upper chest today  Signed CSA today   Follow up: 12 weeks, video or in-person. Please call [phone] to make your follow-up appointment with me.       ASSESSMENT AND PLAN:  (M54.16) Lumbar radiculopathy  (primary encounter diagnosis)  Comment:   Plan: buprenorphine (BUTRANS) 20 MCG/HR WK patch,         oxyCODONE-acetaminophen (PERCOCET) 5-325 MG         tablet, Adult Pain Clinic Follow-Up Order            (M96.1) Failed back surgical syndrome  Comment:   Plan: buprenorphine (BUTRANS) 20 MCG/HR WK patch,         oxyCODONE-acetaminophen (PERCOCET) 5-325 MG         tablet, Adult Pain Clinic Follow-Up Order            (M96.1) Failed back syndrome  Comment:   Plan: buprenorphine (BUTRANS) 20 MCG/HR WK patch,         oxyCODONE-acetaminophen (PERCOCET) 5-325 MG         tablet, Adult Pain Clinic Follow-Up Order            (M54.41,  G89.29) Chronic bilateral low back pain with right-sided sciatica  Comment:   Plan: buprenorphine (BUTRANS) 20 MCG/HR WK patch,         oxyCODONE-acetaminophen (PERCOCET) 5-325 MG         tablet, Adult Pain Clinic Follow-Up Order            (M54.6,  G89.29) Chronic bilateral thoracic back pain  Comment:   Plan: Adult Pain Clinic Follow-Up Order            (M79.18) Myofascial pain  Comment:   Plan: Adult Pain Clinic Follow-Up Order            (M62.838) Muscle spasm  Comment:   Plan: Adult Pain Clinic Follow-Up Order            (G89.29)  Chronic intractable pain  Comment:   Plan: buprenorphine (BUTRANS) 20 MCG/HR WK patch,         oxyCODONE-acetaminophen (PERCOCET) 5-325 MG         tablet, Adult Pain Clinic Follow-Up Order            (F11.90) Chronic, continuous use of opioids  Comment:   Plan: Drug Confirmation Panel Urine with Creat,         Ethanol urine, Ethyl Glucuronide Screen with         Reflex to Confirmation, Urine, buprenorphine         (BUTRANS) 20 MCG/HR WK patch,         oxyCODONE-acetaminophen (PERCOCET) 5-325 MG         tablet, Adult Pain Clinic Follow-Up Order            (Z79.891) Encounter for long-term use of opiate analgesic  Comment:   Plan: Adult Pain Clinic Follow-Up Order              BILLING TIME DOCUMENTATION:   TOTAL TIME includes:   Time spent preparing to see the patient: 5 minutes (reviewing records and tests)  Time spend face to face with the patient: 19 minutes  Time spent ordering tests, medications, procedures and referrals: 0 minutes  Time spent Referring and communicating with other healthcare professionals: 0 minutes  Documenting clinical information in Epic: 4 minutes    The total TIME spent on this patient on the day of the appointment was 28 minutes.                         Ina TORIBIO, RN CNP, FNP  [facility]  [facility]

## 2024-04-16 LAB
CREAT UR-MCNC: 149 MG/DL
ETHANOL UR QL SCN: NORMAL
ETHYL GLUCURONIDE UR QL SCN: NEGATIVE NG/ML

## 2024-04-17 LAB
BUPRENORPHINE UR CFM-MCNC: 11 NG/ML
BUPRENORPHINE/CREAT UR: 7 NG/MG {CREAT}
GABAPENTIN UR QL CFM: PRESENT
NORBUPRENORPHINE UR CFM-MCNC: 11 NG/ML
NORBUPRENORPHINE/CREAT UR: 7 NG/MG {CREAT}
OXYCODONE UR CFM-MCNC: 344 NG/ML
OXYCODONE/CREAT UR: 231 NG/MG {CREAT}
OXYMORPHONE UR CFM-MCNC: 103 NG/ML
OXYMORPHONE/CREAT UR: 69 NG/MG {CREAT}

## 2024-04-18 ENCOUNTER — TELEPHONE (OUTPATIENT)
Dept: ENDOCRINOLOGY | Facility: CLINIC | Age: 54
End: 2024-04-18
Payer: COMMERCIAL

## 2024-04-18 NOTE — TELEPHONE ENCOUNTER
Patient confirmed scheduled appointment:  Date: 06/12/24  Time: 100 pm  Visit type: RET MWM   Provider: Ksenia Hood PA-C  Location: Virtual Visit  Testing/imaging: n/a  Additional notes: Provider unavailable in am, pt rescheduled in afternoon

## 2024-04-20 NOTE — RESULT ENCOUNTER NOTE
Urine drug testing as expected. No illicit medication or alcohol noted. Continue standard monitoring while on opiates.   Ina TORIBIO RN CNP, FNP  [facility] [place]

## 2024-05-17 ENCOUNTER — MYC REFILL (OUTPATIENT)
Dept: PALLIATIVE MEDICINE | Facility: CLINIC | Age: 54
End: 2024-05-17
Payer: COMMERCIAL

## 2024-05-17 DIAGNOSIS — G89.29 CHRONIC BILATERAL LOW BACK PAIN WITH RIGHT-SIDED SCIATICA: ICD-10-CM

## 2024-05-17 DIAGNOSIS — M54.41 CHRONIC BILATERAL LOW BACK PAIN WITH RIGHT-SIDED SCIATICA: ICD-10-CM

## 2024-05-17 DIAGNOSIS — F11.90 CHRONIC, CONTINUOUS USE OF OPIOIDS: ICD-10-CM

## 2024-05-17 DIAGNOSIS — M54.16 LUMBAR RADICULOPATHY: ICD-10-CM

## 2024-05-17 DIAGNOSIS — M96.1 FAILED BACK SURGICAL SYNDROME: ICD-10-CM

## 2024-05-17 DIAGNOSIS — G89.29 CHRONIC INTRACTABLE PAIN: ICD-10-CM

## 2024-05-17 DIAGNOSIS — M96.1 FAILED BACK SYNDROME: ICD-10-CM

## 2024-05-17 NOTE — TELEPHONE ENCOUNTER
Medication refill information reviewed.     Due date for buprenorphine (BUTRANS) 20 MCG/HR WK patch  is 05/22/24     Prescriptions prepped for review.     Will route to provider.

## 2024-05-17 NOTE — TELEPHONE ENCOUNTER
Medication refill information reviewed.     Due date for oxyCODONE-acetaminophen (PERCOCET) 5-325 MG tablet  is 05/17/24     Prescriptions prepped for review.     Will route to provider.

## 2024-05-17 NOTE — TELEPHONE ENCOUNTER
Received call from patient requesting refill(s) of     OXYCODONE-ACETAMINOPHEN 5-325 MG PO TABS  Last dispensed from pharmacy on: Apr. 15, 2024           Patient's last office/virtual visit by prescribing provider on: Apr. 15, 2024   Next office/virtual appointment scheduled for: July 8, 2024         UDT: Apr. 15, 2024   CSA: Apr. 16, 2024     E-prescribe to    [id number] - [place], [place] - [address] [place] [address] CONCEPCION AT [facility] & [address]      Will route to nursing Brandon for review and preparation of prescription(s).

## 2024-05-17 NOTE — TELEPHONE ENCOUNTER
Received call from patient requesting refill(s) of     BUPRENORPHINE 20 MCG/HR TD PTWK  Last dispensed from pharmacy on: Apr. 24, 2024           Patient's last office/virtual visit by prescribing provider on: Apr. 15, 2024   Next office/virtual appointment scheduled for: July. 8, 2024       UDT: Apr. 15, 2024   CSA: Apr. 16, 2024           E-prescribe to    [facility] #[id number] - [place], [place] - [address] [place] [address] CONCEPCION AT [facility] & [address]      Will route to nursing [place] for review and preparation of prescription(s).

## 2024-05-17 NOTE — TELEPHONE ENCOUNTER
Refills have been requested for the following medications:         buprenorphine (BUTRANS) 20 MCG/HR WK patch [Ina Barillas]     Preferred pharmacy: [facility] #[id number] - [address] AT [facility] & [address]   AOU-L&D

## 2024-05-19 RX ORDER — BUPRENORPHINE 20 UG/H
1 PATCH TRANSDERMAL
Qty: 4 PATCH | Refills: 0 | Status: SHIPPED | OUTPATIENT
Start: 2024-05-19 | End: 2024-06-13

## 2024-05-19 RX ORDER — OXYCODONE AND ACETAMINOPHEN 5; 325 MG/1; MG/1
1 TABLET ORAL EVERY 6 HOURS PRN
Qty: 75 TABLET | Refills: 0 | Status: SHIPPED | OUTPATIENT
Start: 2024-05-19 | End: 2024-07-03

## 2024-05-19 NOTE — TELEPHONE ENCOUNTER
Signed Prescriptions:                        Disp   Refills    buprenorphine (BUTRANS) 20 MCG/HR WK patch 4 patch0        Sig: Place 1 patch onto the skin every 7 days Fill           05/20/24 and start on 05/22/24. 28 day script.  Authorizing Provider: INA LOPEZ        Reviewed [place]  May 19, 2024- no concerning fills.    Ina TORIBIO, RN CNP, FNP  [facility]

## 2024-05-19 NOTE — TELEPHONE ENCOUNTER
Signed Prescriptions:                        Disp   Refills    oxyCODONE-acetaminophen (PERCOCET) 5-325 M*75 tab*0        Sig: Take 1 tablet by mouth every 6 hours as needed for           moderate to severe pain Max of 3/day. Fill/Start           05/19/24. 30 day script for chronic pain  Authorizing Provider: INA LOPEZ        Reviewed [facility] May 19, 2024- no concerning fills.    Ina Lopez APRN, RN CNP, FNP  [facility]  [facility] [place]

## 2024-05-23 ENCOUNTER — TELEPHONE (OUTPATIENT)
Dept: PALLIATIVE MEDICINE | Facility: CLINIC | Age: 54
End: 2024-05-23
Payer: COMMERCIAL

## 2024-05-23 DIAGNOSIS — F51.01 PRIMARY INSOMNIA: ICD-10-CM

## 2024-05-23 NOTE — TELEPHONE ENCOUNTER
Central Prior Authorization Team - Phone: [phone]     PA request received via C[employer] Grill[employer] encounter. Telephone encounter created for PA.

## 2024-05-23 NOTE — TELEPHONE ENCOUNTER
Please initiate high priority PA for Butrans.   This is a long acting medication and risk of withdrawal exists.     Routing to EVELYN johnson.     Stacey Tellez RN  [facility]  [phone]

## 2024-05-23 NOTE — TELEPHONE ENCOUNTER
Central Prior Authorization Team - Phone: [phone]     Hello,   I have created a telephone encounter for this PA request and routed it to the PA team.   Due to accessibility our team is unable to process PA's through other request types.   Please create telephone encounters for all PA requests.   If you would like an up to date SOP please reach out to Sadie Mcmahon.   Thank you!

## 2024-05-23 NOTE — TELEPHONE ENCOUNTER
[facility] Call Center    Phone Message    May a detailed message be left on voicemail: yes     Reason for Call: Other: Patient calling stating his Butrans is needing a prior auth.  He is out as of last night.  Can this be expedited to get this in today? Please call him back to advise.      Action Taken: Message routed to:  Other: [facility]    Travel Screening: Not Applicable

## 2024-05-23 NOTE — TELEPHONE ENCOUNTER
Prior Authorization Specialty Medication Request    Medication/Dose:     BUPRENORPHINE 20 MCG/HR TD PTWK        Diagnosis and ICD code (if different than what is on RX): failed back surgical syndrome         MEDICA - [employer]  Subscriber:  David ROBLES Brenden  Subscriber ID:[id number]  Relationship:Self  Member:David ROBLES Brenden  Member ID:[id number]  LOB:None  Plan year:  1/1/2024 - [place]  Effective dates:  1/10/2021 - 12/31/2022  3/8/2024 - [place]  Group number:          Mckenna Barrios, Clinic Facilitator  [facility] [place]

## 2024-05-23 NOTE — TELEPHONE ENCOUNTER
Central Prior Authorization Team - Phone: [phone]     Prior Authorization Approval    Medication: BUPRENORPHINE 20 MCG/HR TD PTWK  Authorization Effective Date: 4/23/2024  Authorization Expiration Date: 5/23/1935  Approved Dose/Quantity: 4  Reference #:     Insurance Company: [employer] Non-Specialty PA's - Phone [phone] Fax [phone]  Expected CoPay: $    CoPay Card Available:      Financial Assistance Needed:   Which Pharmacy is filling the prescription: [id number] - [place], [place] - [address] [place] [address] AT [facility] & [address]  Pharmacy Notified: YES  Patient Notified: YES pharmacy will notify when ready

## 2024-05-24 RX ORDER — ZOLPIDEM TARTRATE 10 MG/1
TABLET ORAL
Qty: 30 TABLET | Refills: 5 | Status: SHIPPED | OUTPATIENT
Start: 2024-05-24

## 2024-05-31 DIAGNOSIS — F33.42 RECURRENT MAJOR DEPRESSION IN COMPLETE REMISSION (H): ICD-10-CM

## 2024-05-31 DIAGNOSIS — I10 HYPERTENSION, UNSPECIFIED TYPE: ICD-10-CM

## 2024-05-31 DIAGNOSIS — F51.01 PRIMARY INSOMNIA: ICD-10-CM

## 2024-06-03 RX ORDER — LOSARTAN POTASSIUM 100 MG/1
100 TABLET ORAL DAILY
Qty: 90 TABLET | Refills: 2 | Status: SHIPPED | OUTPATIENT
Start: 2024-06-03

## 2024-06-03 RX ORDER — MIRTAZAPINE 15 MG/1
TABLET, FILM COATED ORAL
Qty: 90 TABLET | Refills: 2 | Status: SHIPPED | OUTPATIENT
Start: 2024-06-03

## 2024-06-12 ENCOUNTER — VIRTUAL VISIT (OUTPATIENT)
Dept: ENDOCRINOLOGY | Facility: CLINIC | Age: 54
End: 2024-06-12
Payer: COMMERCIAL

## 2024-06-12 VITALS — WEIGHT: 244 LBS | HEIGHT: 69 IN | BODY MASS INDEX: 36.14 KG/M2

## 2024-06-12 DIAGNOSIS — G89.29 CHRONIC LOW BACK PAIN, UNSPECIFIED BACK PAIN LATERALITY, UNSPECIFIED WHETHER SCIATICA PRESENT: ICD-10-CM

## 2024-06-12 DIAGNOSIS — E66.812 CLASS 2 SEVERE OBESITY DUE TO EXCESS CALORIES WITH SERIOUS COMORBIDITY IN ADULT, UNSPECIFIED BMI (H): ICD-10-CM

## 2024-06-12 DIAGNOSIS — M54.50 CHRONIC LOW BACK PAIN, UNSPECIFIED BACK PAIN LATERALITY, UNSPECIFIED WHETHER SCIATICA PRESENT: ICD-10-CM

## 2024-06-12 DIAGNOSIS — F33.42 RECURRENT MAJOR DEPRESSION IN COMPLETE REMISSION (H): ICD-10-CM

## 2024-06-12 DIAGNOSIS — K21.9 GASTROESOPHAGEAL REFLUX DISEASE WITHOUT ESOPHAGITIS: ICD-10-CM

## 2024-06-12 DIAGNOSIS — E66.01 CLASS 2 SEVERE OBESITY DUE TO EXCESS CALORIES WITH SERIOUS COMORBIDITY IN ADULT, UNSPECIFIED BMI (H): ICD-10-CM

## 2024-06-12 DIAGNOSIS — E66.01 MORBID OBESITY (H): Primary | ICD-10-CM

## 2024-06-12 DIAGNOSIS — F11.20 CONTINUOUS OPIOID DEPENDENCE (H): ICD-10-CM

## 2024-06-12 DIAGNOSIS — G89.4 CHRONIC PAIN SYNDROME: ICD-10-CM

## 2024-06-12 PROCEDURE — G2211 COMPLEX E/M VISIT ADD ON: HCPCS | Mod: 95

## 2024-06-12 PROCEDURE — 99203 OFFICE O/P NEW LOW 30 MIN: CPT | Mod: 95

## 2024-06-12 ASSESSMENT — PAIN SCALES - GENERAL: PAINLEVEL: SEVERE PAIN (6)

## 2024-06-12 NOTE — PATIENT INSTRUCTIONS
"Thank you for allowing us the privilege of caring for you. We hope we provided you with the excellent service you deserve.   Please let us know if there is anything else we can do for you so that we can be sure you are completely satisfied with your care experience.    To ensure the quality of our services you may be receiving a patient satisfaction survey from an independent patient satisfaction monitoring company.    The greatest compliment you can give is a \"Likely to Recommend\"    Your visit was with Georgina Hood PA-C today.    Instructions per today's visit:     Abiodun Wilcox, it was great to visit with you today.  Here is a review of our visit.  If our clinic scheduler is not able to reach you please call [phone] to schedule your next appointments.    Plan  Continue phentermine at current dose, refills not needed at this time   Can consider metformin in the future to help offset metabolic effects of current obesogenic medications   Regarding current obesogenic medications (mirtazapine, gabapentin, nortriptyline) : Could consider discussing with pcp or establishing with psychiatrist to see if alternative medication options exist   Goals we discussed today: focusing on mental health, improved sleep.   Labs ordered today: none   Follow up with Dr. Hood in 3 months  Referral placed for mental health provider, crisis resources provided given your worsening depression.   Keep up the excellent work!       Information about Video Visits with [employer]: video visit information  _________________________________________________________________________________________________________________________________________________________  If you are asked by your clinic team to have your blood pressure checked:  [facility] do offer several locations for blood pressure checks. Please follow the below link to schedule an appointment. Scheduling an appointment at the pharmacy for a blood pressure check " is now preferred.    Appointment Plus ([email or web address])  _________________________________________________________________________________________________________________________________________________________  Important contact and scheduling information:  Please call our contact center at [phone] to schedule your next appointments.  To find a lab location near you, please call [phone].  For any nursing questions or concerns call Eli Farrell LPN at [phone] or Lucille Jurado RN at [phone]  Please call during clinic hours Monday through Friday 8:00a - 4:00p if you have questions or you can contact us via [employer] at anytime and we will reply during clinic hours.    Lab results will be communicated through My Chart or letter (if My Chart not used). Please call the clinic if you have not received communication after 1 week or if you have any questions.?  Clinic Fax: [phone]    _________________________________________________________________________________________________________________________________________________________  Meal Replacement Products:    Here is the link to our new e-store where you can purchase our meal replacement products     [employer] [place] E-Store  [email or web address]    The one week starter kit is a great way to sample a variety of products and see what works for you.    If you want more information about the product go to: [employer] Clickable    If you are an employee or [facility] or [facility] please contact your care team for a 10% estore discount    Free Shipping for orders over $75     Benefits of meal replacements products:    Portion and calorie control  Improved nutrition  Structured eating  Simplified food choices  Avoid contact with trigger  foods  _________________________________________________________________________________________________________________________________________________________  Interested in working with a health ?  Health coaches work with you to improve your overall health and wellbeing.  They look at the whole person, and may involve discussion of different areas of life, including, but not limited to the four pillars of health (sleep, exercise, nutrition, and stress management). Discuss with your care team if you would like to start working a health .  Health Coaching-3 Pack: Schedule by calling [phone]    $99 for three health coaching visits    Visits may be done in person or via phone    Coaching is a partnership between the  and the client; Coaches do not prescribe or diagnose    Coaching helps inspire the client to reach his/her personal goals   _________________________________________________________________________________________________________________________________________________________  24 Week Healthy Lifestyle Plan:    Our mission in the 24-week Healthy Lifestyle Plan is to provide you with individualized care by giving you the tools, education and support you need to lose weight and maintain a healthy lifestyle. In your 24-week journey, you ll be supported by a dedicated weight loss team that includes registered dietitians, medical weight management providers, health coaches, and nurses -- all with special expertise in weight loss -- to help you every step of the way.     Monthly meetings with your registered dietician or medical weight management provider help to review your progress, update your care plan, and make any adjustments needed to ensure success. Between these visits, weekly and bi-weekly health  visits will help you focus on the four pillars of weight loss -- stress, sleep, nutrition, and exercise -- and how you can best adapt each to achieve sustainable weight loss  results.    In addition, you will be given exclusive access to online wellbeing classes through [employer].  Your initial visit will be with a medical weight management provider who will help to understand your weight loss goals and ensure this program is the right fit for you. Please let our team know if you are interested in the 24 week plan by sending a message to your care team or calling [phone] to schedule.  _________________________________________________________________________________________________________________________________________________________  __________  [place] [facility] Get Moving Program  Our team of physical therapists is trained to help you understand and take control of your condition. They will perform a thorough evaluation to determine your ability for activity and develop a customized plan to fit your goals and physical ability.  Scheduling: Unsure if the Get Moving program is right for you? Discuss the program with your medical provider or diabetes educator. You can also call us at [phone] to ask questions or schedule an appointment.   [facility] Get Moving Program  ____________________________________________________________________________________________________________________________________________________________________________   [facility] Diabetes Prevention Program (DPP)  If you have prediabetes and Medicare please contact us via Share[employer]t to learn more about the Diabetes Prevention Program (DPP)  Program Details:   [facility] offers the year-long Diabetes Prevention Program (DPP). The program helps you to make lifestyle changes that prevent or delay type 2 diabetes by supporting healthy eating, increased physical activity, stress reduction and use of coping skills.   On average, previous [facility] DPP cohorts have lost and maintained at least 5% of their starting weight throughout the program and averaged more than 150 minutes of physical  activity per week.  Participants meet weekly for one-hour group sessions over sixteen weeks, every other week for the next 8 weeks, and monthly for the last six months.   A year-long maintenance program is also available for participants who complete the first year.   Location & Cost:   During the COVID-19 Public Health Emergency, the program is offered virtually. When in-person classes can resume, they will be held at [facility].  For people with Medicare, the program is covered in full. A self-pay option will also be available for those with non-Medicare insurance plans.   ______________________________________________________________________________________________________________________________________________________________________________________________________________________________    To work with a Behavioral Health Psychologist:    Call to schedule:    Oscar Fowler - [phone]  Georgiana Matthews - [phone]  Peggy Escoto - [phone]  Scarlett Garcia - [phone]   Abena Zaidi PhD (cannot accept Medicare) [phone]        Thank you,   [facility] Comprehensive Weight Management Team

## 2024-06-12 NOTE — NURSING NOTE
Is the patient currently in the state of [place]? YES    Visit mode:VIDEO    If the visit is dropped, the patient can be reconnected by: VIDEO VISIT: Text to cell phone:   Telephone Information:   Mobile [phone]       Will anyone else be joining the visit? NO  (If patient encounters technical issues they should call [phone])    How would you like to obtain your AVS? MyChart    Are changes needed to the allergy or medication list? No    Are refills needed on medications prescribed by this physician? NO  Pt stated [facility] never went through.     Reason for visit: RECHECK    Wt other than 24 hrs:    Pain more than one location:  6 shoulders, low back  Aarti CARLISLEF

## 2024-06-12 NOTE — LETTER
2024       RE: David Wilcox  [address]  [facility] [facility] [address]     Dear Colleague,    Thank you for referring your patient, David Wilcox, to the [facility] [place] [facility]. Please see a copy of my visit note below.      Return Medical Weight Management Note     David Wilcox  MRN:  [id number]  :  1970  CHERYL:  2024    Dear Regan Llamas MD,    I had the pleasure of seeing your patient David Wilcox. He is a 53 year old male who I am continuing to see for treatment of obesity related to:        10/26/2023     1:45 PM   --   I have the following health issues associated with obesity Pre-Diabetes    High Blood Pressure    Sleep Apnea   I have the following symptoms associated with obesity Knee Pain    Depression    Groin Rash       Assessment & Plan  Problem List Items Addressed This Visit       Chronic low back pain    Relevant Orders    Adult Mental Health  Referral    Chronic pain syndrome    Relevant Orders    Adult Mental Health  Referral    Continuous opioid dependence (H)    Relevant Orders    Adult Mental Health  Referral    GERD (gastroesophageal reflux disease)    Relevant Orders    Adult Mental Health  Referral    Morbid obesity (H) - Primary    Relevant Orders    Adult Mental Health  Referral    Recurrent major depression in complete remission (H24)    Relevant Orders    Adult Mental Health  Referral     Other Visit Diagnoses       Class 2 severe obesity due to excess calories with serious comorbidity in adult, unspecified BMI (H)               Plan  Continue phentermine at current dose, refills not needed at this time   Can consider metformin in the future to help offset metabolic effects of current obesogenic medications   Regarding current obesogenic medications (mirtazapine, gabapentin, nortriptyline) : Could consider discussing with pcp or  establishing with psychiatrist to see if alternative medication options exist   Goals we discussed today: focusing on mental health, improved sleep.   Labs ordered today: none   Follow up with Dr. Hood in 3 months  Referral placed for mental health provider, crisis resources provided given worsening depression, return and emergency precautions discussed.   Keep up the excellent work!         INTERVAL HISTORY:  MWM with Dr. Hood since 10/27/23  David is a patient with mature onset  obesity with significant element of familial/genetic influence and with current health consequences.  David A Brenden eats a high carb and high fat diet at times.     His problem is complicated by strong craving/reward pathways, hunger component also present    Last seen 3/8/24  Wegovy denied by insurance   Up to phentermine 37.5mg. Didn't tolerate topiramate.       Since last visit:   Down 17lbs since last visit.   Worsened mood lately, was in a big fight with wife.   Experienced one day of suicidal ideation last week reports this as since gone away, denies current suicidal ideation. Denies recent suicidal plans or attempts.   Does not feel that the phentermine is worsening his mood, is hoping to continue at current dose.   Is interested in getting re-established with a therapist to work through this worsened mood, referral placed.     We discussed the importance of prioritizing mental health and that it is appropriate that in times of worsened mental health to shift focus to weight maintenance as opposed to weight loss. Discussed future medication options for weight loss including adding on metformin to offset obesogenic effects of some of his psych meds, could also work with pcp or get established with a psychiatrist to trial getting off some of said obesogenic meds (mirtazipine, gabapentin, nortriptyline).   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     Recent stressors: worsened mental health lately, will place referral for therapist. Denies current SI.     Recent sleep changes: sleep has been \"awful,\" estimates he's getting 4-5 hours and is exhausting. Will go to sleep around 8:30pm, will wake up between 11pm and 2am and will have a really hard time falling asleep due to chronic pain. Has follow up with pain management next month to work on this.           CURRENT WEIGHT:   244 lbs 0 oz                      6/12/2024    12:42 PM   Changes and Difficulties   I have made the following changes to my diet since my last visit: eat less, routine,   With regards to my diet, I am still struggling with: post evening medication, not thinking, start eating grazing   I have made the following changes to my activity/exercise since my last visit: no   With regards to my activity/exercise, I am still struggling with: no             MEDICATIONS:   Current Outpatient Medications   Medication Sig Dispense Refill    acetaminophen (TYLENOL) 500 MG tablet Take 1,500 mg by mouth at bedtime      albuterol (PROAIR HFA/PROVENTIL HFA/VENTOLIN HFA) 108 (90 Base) MCG/ACT inhaler INHALE 2 PUFFS INTO THE LUNGS EVERY 4 HOURS AS NEEDED FOR SHORTNESS OF BREATH 8.5 g 11    buprenorphine (BUTRANS) 20 MCG/HR WK patch Place 1 patch onto the skin every 7 days Fill 05/20/24 and start on 05/22/24. 28 day script. 4 patch 0    DULoxetine (CYMBALTA) 60 MG capsule Take 1 capsule (60 mg) by mouth " "daily 90 capsule 3    gabapentin (NEURONTIN) 600 MG tablet TAKE 2 TABLETS BY MOUTH THREE TIMES DAILY 180 tablet 4    LORazepam (ATIVAN) 0.5 MG tablet One bid prn anxiety. Rare use 30 tablet 0    losartan (COZAAR) 100 MG tablet TAKE 1 TABLET(100 MG) BY MOUTH DAILY 90 tablet 2    mirtazapine (REMERON) 15 MG tablet TAKE 1 TABLET(15 MG) BY MOUTH AT BEDTIME 90 tablet 2    naloxone (NARCAN) 4 MG/0.1ML nasal spray Spray 1 spray (4 mg) into one nostril alternating nostrils once as needed for opioid reversal every 2-3 minutes until assistance arrives 0.2 mL 1    nortriptyline (PAMELOR) 10 MG capsule TAKE 2 CAPSULES(20 MG) BY MOUTH AT BEDTIME 180 capsule 3    omeprazole (PRILOSEC) 20 MG DR capsule Take 1 capsule (20 mg) by mouth daily 90 capsule 3    oxyCODONE-acetaminophen (PERCOCET) 5-325 MG tablet Take 1 tablet by mouth every 6 hours as needed for moderate to severe pain Max of 3/day. Fill/Start 05/19/24. 30 day script for chronic pain 75 tablet 0    phentermine (ADIPEX-P) 37.5 MG tablet 1 tablet each morning 60 tablet 2    tiZANidine (ZANAFLEX) 4 MG tablet Take 1 tablet (4 mg) by mouth 3 times daily as needed for muscle spasms 0.5-1 tab tid prn muscle spasm 90 tablet 4    zolpidem (AMBIEN) 10 MG tablet TAKE 1 TABLET BY MOUTH EVERY NIGHT AS NEEDED FOR INSOMNIA 30 tablet 5           6/12/2024    12:42 PM   Weight Loss Medication History Reviewed With Patient   Which weight loss medications are you currently taking on a regular basis? Phentermine   Are you having any side effects from the weight loss medication that we have prescribed you? No               7/27/2011     1:00 PM   ROLDAN Score (Last Two)   ROLDAN Raw Score 50   Activation Score 86.3   ROLDAN Level 4         PHYSICAL EXAM:  Objective   Ht 1.74 m (5' 8.5\")   Wt 110.7 kg (244 lb)   BMI 36.56 kg/m      Vitals - Patient Reported  Pain Score: Severe Pain (6)  Pain Loc: Shoulder (low back)      Vitals:  No vitals were obtained today due to virtual visit.    GENERAL: alert " and no distress  EYES: Eyes grossly normal to inspection.  No discharge or erythema, or obvious scleral/conjunctival abnormalities.  RESP: No audible wheeze, cough, or visible cyanosis.    SKIN: Visible skin clear. No significant rash, abnormal pigmentation or lesions.  NEURO: Cranial nerves grossly intact.  Mentation and speech appropriate for age.  PSYCH: Appropriate affect, tone, and pace of words    Sincerely,    Georgina Hood PA-C      41 minutes spent by me on the date of the encounter doing chart review, history and exam, documentation and further activities per the note    The longitudinal plan of care for the diagnosis(es)/condition(s) as documented were addressed during this visit. Due to the added complexity in care, I will continue to support David in the subsequent management and with ongoing continuity of care.     Virtual Visit Details    Type of service:  Video Visit   Video Start Time: 12:57 PM  Video End Time: 1:16pm     Originating Location (pt. Location): Home    Distant Location (provider location):  Off-site platform used for Video Visit: [employer]

## 2024-06-12 NOTE — PROGRESS NOTES
Virtual Visit Details    Type of service:  Video Visit   Video Start Time: 12:57 PM  Video End Time: 1:16pm     Originating Location (pt. Location): Home    Distant Location (provider location):  Off-site  Platform used for Video Visit: Sabina

## 2024-06-12 NOTE — PROGRESS NOTES
Return Medical Weight Management Note     David Wilcox  MRN:  [id number]  :  1970  CHERYL:  2024    Dear Regan Llamas MD,    I had the pleasure of seeing your patient David Wilcox. He is a 53 year old male who I am continuing to see for treatment of obesity related to:        10/26/2023     1:45 PM   --   I have the following health issues associated with obesity Pre-Diabetes    High Blood Pressure    Sleep Apnea   I have the following symptoms associated with obesity Knee Pain    Depression    Groin Rash       Assessment & Plan   Problem List Items Addressed This Visit       Chronic low back pain    Relevant Orders    Adult Mental Health  Referral    Chronic pain syndrome    Relevant Orders    Adult Mental Health  Referral    Continuous opioid dependence (H)    Relevant Orders    Adult Mental Health  Referral    GERD (gastroesophageal reflux disease)    Relevant Orders    Adult Mental Health  Referral    Morbid obesity (H) - Primary    Relevant Orders    Adult Mental Health  Referral    Recurrent major depression in complete remission (H24)    Relevant Orders    Adult Mental Health  Referral     Other Visit Diagnoses       Class 2 severe obesity due to excess calories with serious comorbidity in adult, unspecified BMI (H)               Plan  Continue phentermine at current dose, refills not needed at this time   Can consider metformin in the future to help offset metabolic effects of current obesogenic medications   Regarding current obesogenic medications (mirtazapine, gabapentin, nortriptyline) : Could consider discussing with pcp or establishing with psychiatrist to see if alternative medication options exist   Goals we discussed today: focusing on mental health, improved sleep.   Labs ordered today: none   Follow up with Dr. Hood in 3 months  Referral placed for mental health provider, crisis resources provided given worsening depression,  return and emergency precautions discussed.   Keep up the excellent work!         INTERVAL HISTORY:  MWM with Dr. Hood since 10/27/23  David is a patient with mature onset  obesity with significant element of familial/genetic influence and with current health consequences.  David A Brenden eats a high carb and high fat diet at times.     His problem is complicated by strong craving/reward pathways, hunger component also present    Last seen 3/8/24  Wegovy denied by insurance   Up to phentermine 37.5mg. Didn't tolerate topiramate.       Since last visit:   Down 17lbs since last visit.   Worsened mood lately, was in a big fight with wife.   Experienced one day of suicidal ideation last week reports this as since gone away, denies current suicidal ideation. Denies recent suicidal plans or attempts.   Does not feel that the phentermine is worsening his mood, is hoping to continue at current dose.   Is interested in getting re-established with a therapist to work through this worsened mood, referral placed.     We discussed the importance of prioritizing mental health and that it is appropriate that in times of worsened mental health to shift focus to weight maintenance as opposed to weight loss. Discussed future medication options for weight loss including adding on metformin to offset obesogenic effects of some of his psych meds, could also work with pcp or get established with a psychiatrist to trial getting off some of said obesogenic meds (mirtazipine, gabapentin, nortriptyline).   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     Recent stressors: worsened mental health lately, will place referral for therapist. Denies current SI.     Recent sleep changes: sleep has been \"awful,\" estimates he's getting 4-5 hours and is exhausting. Will go to sleep around 8:30pm, will wake up between 11pm and 2am and will have a really hard time falling asleep due to chronic pain. Has follow up with pain management next month to work on this.           CURRENT WEIGHT:   244 lbs 0 oz                      6/12/2024    12:42 PM   Changes and Difficulties   I have made the following changes to my diet since my last visit: eat less, routine,   With regards to my diet, I am still struggling with: post evening medication, not thinking, start eating grazing   I have made the following changes to my activity/exercise since my last visit: no   With regards to my activity/exercise, I am still struggling with: no             MEDICATIONS:   Current Outpatient Medications   Medication Sig Dispense Refill    acetaminophen (TYLENOL) 500 MG tablet Take 1,500 mg by mouth at bedtime      albuterol (PROAIR HFA/PROVENTIL HFA/VENTOLIN HFA) 108 (90 Base) MCG/ACT inhaler INHALE 2 PUFFS INTO THE LUNGS EVERY 4 HOURS AS NEEDED FOR SHORTNESS OF BREATH 8.5 g 11    buprenorphine (BUTRANS) 20 MCG/HR WK patch Place 1 patch onto the skin every 7 days Fill 05/20/24 and start on 05/22/24. 28 day script. 4 patch 0    DULoxetine (CYMBALTA) 60 MG capsule Take 1 capsule (60 mg) by mouth daily 90 capsule 3    gabapentin (NEURONTIN) 600 MG tablet TAKE 2 TABLETS BY MOUTH THREE TIMES DAILY 180 tablet 4    LORazepam (ATIVAN) 0.5 MG tablet One bid prn anxiety. Rare use 30 tablet 0    losartan (COZAAR) 100 MG tablet TAKE 1 TABLET(100 MG) BY MOUTH DAILY 90 tablet 2    mirtazapine (REMERON) 15 MG tablet TAKE 1 " "TABLET(15 MG) BY MOUTH AT BEDTIME 90 tablet 2    naloxone (NARCAN) 4 MG/0.1ML nasal spray Spray 1 spray (4 mg) into one nostril alternating nostrils once as needed for opioid reversal every 2-3 minutes until assistance arrives 0.2 mL 1    nortriptyline (PAMELOR) 10 MG capsule TAKE 2 CAPSULES(20 MG) BY MOUTH AT BEDTIME 180 capsule 3    omeprazole (PRILOSEC) 20 MG DR capsule Take 1 capsule (20 mg) by mouth daily 90 capsule 3    oxyCODONE-acetaminophen (PERCOCET) 5-325 MG tablet Take 1 tablet by mouth every 6 hours as needed for moderate to severe pain Max of 3/day. Fill/Start 05/19/24. 30 day script for chronic pain 75 tablet 0    phentermine (ADIPEX-P) 37.5 MG tablet 1 tablet each morning 60 tablet 2    tiZANidine (ZANAFLEX) 4 MG tablet Take 1 tablet (4 mg) by mouth 3 times daily as needed for muscle spasms 0.5-1 tab tid prn muscle spasm 90 tablet 4    zolpidem (AMBIEN) 10 MG tablet TAKE 1 TABLET BY MOUTH EVERY NIGHT AS NEEDED FOR INSOMNIA 30 tablet 5           6/12/2024    12:42 PM   Weight Loss Medication History Reviewed With Patient   Which weight loss medications are you currently taking on a regular basis? Phentermine   Are you having any side effects from the weight loss medication that we have prescribed you? No               7/27/2011     1:00 PM   ROLDAN Score (Last Two)   ROLDAN Raw Score 50   Activation Score 86.3   ROLDAN Level 4         PHYSICAL EXAM:  Objective    Ht 1.74 m (5' 8.5\")   Wt 110.7 kg (244 lb)   BMI 36.56 kg/m      Vitals - Patient Reported  Pain Score: Severe Pain (6)  Pain Loc: Shoulder (low back)      Vitals:  No vitals were obtained today due to virtual visit.    GENERAL: alert and no distress  EYES: Eyes grossly normal to inspection.  No discharge or erythema, or obvious scleral/conjunctival abnormalities.  RESP: No audible wheeze, cough, or visible cyanosis.    SKIN: Visible skin clear. No significant rash, abnormal pigmentation or lesions.  NEURO: Cranial nerves grossly intact.  Mentation " and speech appropriate for age.  PSYCH: Appropriate affect, tone, and pace of words        Sincerely,    Georgina Hood PA-C      41 minutes spent by me on the date of the encounter doing chart review, history and exam, documentation and further activities per the note    The longitudinal plan of care for the diagnosis(es)/condition(s) as documented were addressed during this visit. Due to the added complexity in care, I will continue to support David in the subsequent management and with ongoing continuity of care.

## 2024-06-13 ENCOUNTER — MYC REFILL (OUTPATIENT)
Dept: PALLIATIVE MEDICINE | Facility: CLINIC | Age: 54
End: 2024-06-13
Payer: COMMERCIAL

## 2024-06-13 DIAGNOSIS — G89.29 CHRONIC INTRACTABLE PAIN: ICD-10-CM

## 2024-06-13 DIAGNOSIS — M96.1 FAILED BACK SYNDROME: ICD-10-CM

## 2024-06-13 DIAGNOSIS — M54.41 CHRONIC BILATERAL LOW BACK PAIN WITH RIGHT-SIDED SCIATICA: ICD-10-CM

## 2024-06-13 DIAGNOSIS — F11.90 CHRONIC, CONTINUOUS USE OF OPIOIDS: ICD-10-CM

## 2024-06-13 DIAGNOSIS — M96.1 FAILED BACK SURGICAL SYNDROME: ICD-10-CM

## 2024-06-13 DIAGNOSIS — M54.16 LUMBAR RADICULOPATHY: ICD-10-CM

## 2024-06-13 DIAGNOSIS — G89.29 CHRONIC BILATERAL LOW BACK PAIN WITH RIGHT-SIDED SCIATICA: ICD-10-CM

## 2024-06-13 NOTE — TELEPHONE ENCOUNTER
Medication refill information reviewed.     Due date for  buprenorphine (BUTRANS) 20 MCG/HR WK patch is 06/19/24     Prescriptions prepped for review.     Will route to provider.

## 2024-06-13 NOTE — TELEPHONE ENCOUNTER
Received request for a refill(s) of buprenorphine (BUTRANS) 20 MCG/HR WK patch      Last dispensed from pharmacy on 05/23/24    Patient's last office/virtual visit by prescribing provider on 04/15/24  Next office/virtual appointment scheduled for 07/08/24    Last urine drug screen date 04/15/24  Current opioid agreement on file (completed within the last year) Yes Date of opioid agreement: 04/15/24    E-prescribe to     [id number] - [place], [place] - [address] [place] [address] AT [facility] & [address]  [address] [place] [id number]  Phone: [phone] Fax: [phone]    Will route to nursing pool for review and preparation of prescription(s).       Shayna Chase MA  [facility] [place]

## 2024-06-14 RX ORDER — BUPRENORPHINE 20 UG/H
1 PATCH TRANSDERMAL
Qty: 4 PATCH | Refills: 0 | Status: SHIPPED | OUTPATIENT
Start: 2024-06-14 | End: 2024-07-08

## 2024-06-14 NOTE — TELEPHONE ENCOUNTER
Signed Prescriptions:                        Disp   Refills    buprenorphine (BUTRANS) 20 MCG/HR WK patch 4 patch0        Sig: Place 1 patch onto the skin every 7 days Fill           06/17/24 and start on 06/19/24. 28 day script.  Authorizing Provider: INA LOEPZ        Reviewed [place]  June 14, 2024- no concerning fills.    Ina TORIBIO, RN CNP, FNP  [facility]

## 2024-06-19 ENCOUNTER — TELEPHONE (OUTPATIENT)
Dept: ENDOCRINOLOGY | Facility: CLINIC | Age: 54
End: 2024-06-19
Payer: COMMERCIAL

## 2024-06-19 NOTE — TELEPHONE ENCOUNTER
Patient confirmed scheduled appointment:  Date: 12/06/24  Time: 1100 am   Visit type: RET MWM   Provider:    Location: virtual   Testing/imaging: n/a  Additional notes: n/a

## 2024-06-26 DIAGNOSIS — M96.1 FAILED BACK SYNDROME: ICD-10-CM

## 2024-06-26 DIAGNOSIS — G89.29 CHRONIC INTRACTABLE PAIN: ICD-10-CM

## 2024-06-26 DIAGNOSIS — M54.41 CHRONIC BILATERAL LOW BACK PAIN WITH RIGHT-SIDED SCIATICA: ICD-10-CM

## 2024-06-26 DIAGNOSIS — M96.1 FAILED BACK SURGICAL SYNDROME: ICD-10-CM

## 2024-06-26 DIAGNOSIS — M54.16 LUMBAR RADICULOPATHY: ICD-10-CM

## 2024-06-26 DIAGNOSIS — G89.29 CHRONIC BILATERAL LOW BACK PAIN WITH RIGHT-SIDED SCIATICA: ICD-10-CM

## 2024-06-26 NOTE — TELEPHONE ENCOUNTER
Signed Prescriptions:                        Disp   Refills    tiZANidine (ZANAFLEX) 4 MG tablet          90 tab*4        Sig: Take 1 tablet (4 mg) by mouth 3 times daily as needed           for muscle spasms 0.5-1 tab tid prn muscle spasm  Authorizing Provider: REA LOPEZ, RN CNP, FNP  [facility]  [facility]

## 2024-06-26 NOTE — TELEPHONE ENCOUNTER
[facility]   [id number] - [place], [place]     Drug: tiZANidine (ZANAFLEX) 4 MG tablet   Qty: 90  Last filled 05/31/24  Last seen: 04/15/24  Next appointment 7/8/24

## 2024-06-30 ASSESSMENT — ANXIETY QUESTIONNAIRES
7. FEELING AFRAID AS IF SOMETHING AWFUL MIGHT HAPPEN: NEARLY EVERY DAY
4. TROUBLE RELAXING: NEARLY EVERY DAY
IF YOU CHECKED OFF ANY PROBLEMS ON THIS QUESTIONNAIRE, HOW DIFFICULT HAVE THESE PROBLEMS MADE IT FOR YOU TO DO YOUR WORK, TAKE CARE OF THINGS AT HOME, OR GET ALONG WITH OTHER PEOPLE: EXTREMELY DIFFICULT
7. FEELING AFRAID AS IF SOMETHING AWFUL MIGHT HAPPEN: NEARLY EVERY DAY
1. FEELING NERVOUS, ANXIOUS, OR ON EDGE: NEARLY EVERY DAY
6. BECOMING EASILY ANNOYED OR IRRITABLE: NEARLY EVERY DAY
GAD7 TOTAL SCORE: 21
2. NOT BEING ABLE TO STOP OR CONTROL WORRYING: NEARLY EVERY DAY
5. BEING SO RESTLESS THAT IT IS HARD TO SIT STILL: NEARLY EVERY DAY
GAD7 TOTAL SCORE: 21
3. WORRYING TOO MUCH ABOUT DIFFERENT THINGS: NEARLY EVERY DAY
8. IF YOU CHECKED OFF ANY PROBLEMS, HOW DIFFICULT HAVE THESE MADE IT FOR YOU TO DO YOUR WORK, TAKE CARE OF THINGS AT HOME, OR GET ALONG WITH OTHER PEOPLE?: EXTREMELY DIFFICULT

## 2024-07-03 ENCOUNTER — MYC REFILL (OUTPATIENT)
Dept: PALLIATIVE MEDICINE | Facility: CLINIC | Age: 54
End: 2024-07-03

## 2024-07-03 ENCOUNTER — OFFICE VISIT (OUTPATIENT)
Dept: FAMILY MEDICINE | Facility: CLINIC | Age: 54
End: 2024-07-03
Payer: COMMERCIAL

## 2024-07-03 VITALS
HEIGHT: 69 IN | WEIGHT: 238 LBS | TEMPERATURE: 98.9 F | BODY MASS INDEX: 35.25 KG/M2 | HEART RATE: 74 BPM | DIASTOLIC BLOOD PRESSURE: 68 MMHG | SYSTOLIC BLOOD PRESSURE: 112 MMHG | RESPIRATION RATE: 18 BRPM | OXYGEN SATURATION: 99 %

## 2024-07-03 DIAGNOSIS — G89.29 CHRONIC INTRACTABLE PAIN: ICD-10-CM

## 2024-07-03 DIAGNOSIS — K21.9 GASTROESOPHAGEAL REFLUX DISEASE WITHOUT ESOPHAGITIS: ICD-10-CM

## 2024-07-03 DIAGNOSIS — F41.9 ANXIETY: ICD-10-CM

## 2024-07-03 DIAGNOSIS — F31.75 BIPOLAR DISORDER, IN PARTIAL REMISSION, MOST RECENT EPISODE DEPRESSED (H): Primary | ICD-10-CM

## 2024-07-03 DIAGNOSIS — G89.29 CHRONIC BILATERAL LOW BACK PAIN WITH RIGHT-SIDED SCIATICA: ICD-10-CM

## 2024-07-03 DIAGNOSIS — M54.41 CHRONIC BILATERAL LOW BACK PAIN WITH RIGHT-SIDED SCIATICA: ICD-10-CM

## 2024-07-03 DIAGNOSIS — E66.01 MORBID OBESITY (H): ICD-10-CM

## 2024-07-03 DIAGNOSIS — M96.1 FAILED BACK SURGICAL SYNDROME: ICD-10-CM

## 2024-07-03 DIAGNOSIS — F13.20 SEDATIVE, HYPNOTIC OR ANXIOLYTIC DEPENDENCE (H): ICD-10-CM

## 2024-07-03 DIAGNOSIS — M96.1 FAILED BACK SYNDROME: ICD-10-CM

## 2024-07-03 DIAGNOSIS — R73.09 ELEVATED GLUCOSE: ICD-10-CM

## 2024-07-03 DIAGNOSIS — M54.16 LUMBAR RADICULOPATHY: ICD-10-CM

## 2024-07-03 DIAGNOSIS — F11.20 CONTINUOUS OPIOID DEPENDENCE (H): ICD-10-CM

## 2024-07-03 DIAGNOSIS — G89.4 CHRONIC PAIN SYNDROME: ICD-10-CM

## 2024-07-03 DIAGNOSIS — F11.90 CHRONIC, CONTINUOUS USE OF OPIOIDS: ICD-10-CM

## 2024-07-03 LAB
ANION GAP SERPL CALCULATED.3IONS-SCNC: 13 MMOL/L (ref 7–15)
BUN SERPL-MCNC: 12.1 MG/DL (ref 6–20)
CALCIUM SERPL-MCNC: 9.3 MG/DL (ref 8.6–10)
CHLORIDE SERPL-SCNC: 107 MMOL/L (ref 98–107)
CREAT SERPL-MCNC: 0.88 MG/DL (ref 0.67–1.17)
DEPRECATED HCO3 PLAS-SCNC: 23 MMOL/L (ref 22–29)
EGFRCR SERPLBLD CKD-EPI 2021: >90 ML/MIN/1.73M2
GLUCOSE SERPL-MCNC: 106 MG/DL (ref 70–99)
HBA1C MFR BLD: 5.9 % (ref 0–5.6)
POTASSIUM SERPL-SCNC: 4.3 MMOL/L (ref 3.4–5.3)
SODIUM SERPL-SCNC: 143 MMOL/L (ref 135–145)

## 2024-07-03 PROCEDURE — 36415 COLL VENOUS BLD VENIPUNCTURE: CPT | Performed by: FAMILY MEDICINE

## 2024-07-03 PROCEDURE — 80048 BASIC METABOLIC PNL TOTAL CA: CPT | Performed by: FAMILY MEDICINE

## 2024-07-03 PROCEDURE — 96127 BRIEF EMOTIONAL/BEHAV ASSMT: CPT | Performed by: FAMILY MEDICINE

## 2024-07-03 PROCEDURE — 83036 HEMOGLOBIN GLYCOSYLATED A1C: CPT | Performed by: FAMILY MEDICINE

## 2024-07-03 PROCEDURE — G2211 COMPLEX E/M VISIT ADD ON: HCPCS | Performed by: FAMILY MEDICINE

## 2024-07-03 PROCEDURE — 99214 OFFICE O/P EST MOD 30 MIN: CPT | Performed by: FAMILY MEDICINE

## 2024-07-03 RX ORDER — NORTRIPTYLINE HCL 10 MG
20 CAPSULE ORAL AT BEDTIME
Qty: 180 CAPSULE | Refills: 3 | Status: SHIPPED | OUTPATIENT
Start: 2024-07-03

## 2024-07-03 RX ORDER — DULOXETIN HYDROCHLORIDE 60 MG/1
60 CAPSULE, DELAYED RELEASE ORAL DAILY
Qty: 90 CAPSULE | Refills: 3 | Status: SHIPPED | OUTPATIENT
Start: 2024-07-03

## 2024-07-03 ASSESSMENT — PAIN SCALES - GENERAL: PAINLEVEL: EXTREME PAIN (8)

## 2024-07-03 NOTE — PROGRESS NOTES
S :David Wilcox is a 53 year old male with anxiety.  Fills on cymbalta and nortriptyline.  Chronic pain too, goes to pain clinic.      Bippolar: worse with phentermine, cut dose back ,feeling better.  May need to stop.  Had some suicidal thoughts on higher dose    Elevated glucose: recheck    Morbid obesity: 35 BMI with gerd.  Has dropped weight on phentermine    Gerd: PPI.  Stable.      Sedative use: ambien nightly, stable  Opioid use, dependent.  Butrans patch per pain clinic, stable.      Current Outpatient Medications   Medication Sig Dispense Refill    acetaminophen (TYLENOL) 500 MG tablet Take 1,500 mg by mouth at bedtime      albuterol (PROAIR HFA/PROVENTIL HFA/VENTOLIN HFA) 108 (90 Base) MCG/ACT inhaler INHALE 2 PUFFS INTO THE LUNGS EVERY 4 HOURS AS NEEDED FOR SHORTNESS OF BREATH 8.5 g 11    buprenorphine (BUTRANS) 20 MCG/HR WK patch Place 1 patch onto the skin every 7 days Fill 06/17/24 and start on 06/19/24. 28 day script. 4 patch 0    DULoxetine (CYMBALTA) 60 MG capsule Take 1 capsule (60 mg) by mouth daily 90 capsule 3    gabapentin (NEURONTIN) 600 MG tablet TAKE 2 TABLETS BY MOUTH THREE TIMES DAILY 180 tablet 4    LORazepam (ATIVAN) 0.5 MG tablet One bid prn anxiety. Rare use 30 tablet 0    losartan (COZAAR) 100 MG tablet TAKE 1 TABLET(100 MG) BY MOUTH DAILY 90 tablet 2    mirtazapine (REMERON) 15 MG tablet TAKE 1 TABLET(15 MG) BY MOUTH AT BEDTIME 90 tablet 2    nortriptyline (PAMELOR) 10 MG capsule Take 2 capsules (20 mg) by mouth at bedtime 180 capsule 3    omeprazole (PRILOSEC) 20 MG DR capsule Take 1 capsule (20 mg) by mouth daily 90 capsule 3    oxyCODONE-acetaminophen (PERCOCET) 5-325 MG tablet Take 1 tablet by mouth every 6 hours as needed for moderate to severe pain Max of 3/day. Fill/Start 05/19/24. 30 day script for chronic pain 75 tablet 0    phentermine (ADIPEX-P) 37.5 MG tablet 1 tablet each morning 60 tablet 2    tiZANidine (ZANAFLEX) 4 MG tablet Take 1 tablet (4 mg) by mouth 3 times daily  "as needed for muscle spasms 0.5-1 tab tid prn muscle spasm 90 tablet 4    zolpidem (AMBIEN) 10 MG tablet TAKE 1 TABLET BY MOUTH EVERY NIGHT AS NEEDED FOR INSOMNIA 30 tablet 5    naloxone (NARCAN) 4 MG/0.1ML nasal spray Spray 1 spray (4 mg) into one nostril alternating nostrils once as needed for opioid reversal every 2-3 minutes until assistance arrives (Patient not taking: Reported on 7/3/2024) 0.2 mL 1       O:/68   Pulse 74   Temp 98.9  F (37.2  C) (Tympanic)   Resp 18   Ht 1.74 m (5' 8.5\")   Wt 108 kg (238 lb)   SpO2 99%   BMI 35.66 kg/m    GEN: Alert and oriented, in no acute distress  CV: RRR, no murmur  EXT: no edema or lesions noted in lower extremities  Walks with cane.  Moving normal for him    A: anxiety.  Worse on phentermine,  but improving on lower dose  Bippolar: worse with phentermine, but improving on lower dose   Elevated glucose: recheck  Morbid obesity: 35 BMI with gerd.  Improved   Gerd: PPI.  Stable.    Sedative use: ambien nightly, stable  Opioid use, dependent.     P: fills on cybalta, nortriptyline, omeprazole.  Continue 1/2 dose phentermine, stop if negative thoughts return or anxiety worsens    Update A1C, met panel.  Continue with pain clinic, pain meds, mood meds.  See above list    Back in 6-12 months.     The longitudinal plan of care for the diagnosis(es)/condition(s) as documented were addressed during this visit. Due to the added complexity in care, I will continue to support David in the subsequent management and with ongoing continuity of care.    "

## 2024-07-05 RX ORDER — OXYCODONE AND ACETAMINOPHEN 5; 325 MG/1; MG/1
1 TABLET ORAL EVERY 6 HOURS PRN
Qty: 75 TABLET | Refills: 0 | Status: SHIPPED | OUTPATIENT
Start: 2024-07-05 | End: 2024-08-05

## 2024-07-05 NOTE — TELEPHONE ENCOUNTER
Signed Prescriptions:                        Disp   Refills    oxyCODONE-acetaminophen (PERCOCET) 5-325 M*75 tab*0        Sig: Take 1 tablet by mouth every 6 hours as needed for           moderate to severe pain Max of 3/day. Fill/Start           07/5/24. 30 day script for chronic pain  Authorizing Provider: INA LOPEZ        Reviewed [place]  July 5, 2024- no concerning fills.    Ina Lopez APRN, RN CNP, FNP  [facility]  [facility]

## 2024-07-05 NOTE — TELEPHONE ENCOUNTER
Received [employer] message from patient requesting refill(s) for:    oxyCODONE-acetaminophen (PERCOCET) 5-325 MG tablet      Last dispensed from pharmacy on 5/19/24    Patient's last office/virtual visit by prescribing provider on 4/15/24  Next office/virtual appointment scheduled for 7/8/24    Last urine drug screen date 4/15/24  Current opioid agreement on file? Yes Date of opioid agreement: 4/15/24    [facility]:     [id number] - [place], [place] - [facility] [address] AT [facility] [address]      Will route to nursing [place] for review and preparation of prescription(s).

## 2024-07-05 NOTE — TELEPHONE ENCOUNTER
Medication refill information reviewed.     Percocet last due:  Fill/Start 05/19/24. 30 day script for chronic pain   Due date:  anytime      Prescriptions prepped for review.     Lisa RN-BSN  [facility]

## 2024-07-05 NOTE — TELEPHONE ENCOUNTER
Refills have been requested for the following medications:         oxyCODONE-acetaminophen (PERCOCET) 5-325 MG tablet [Ina Barillas]     Preferred pharmacy: [facility] #[id number] - [place], [place] - [address] [place] [address] [address] AT [facility] & [address] [place] [address]

## 2024-07-07 ASSESSMENT — PAIN SCALES - PAIN ENJOYMENT GENERAL ACTIVITY SCALE (PEG)
INTERFERED_ENJOYMENT_LIFE: 8
INTERFERED_GENERAL_ACTIVITY: 7
AVG_PAIN_PASTWEEK: 6
PEG_TOTALSCORE: 7

## 2024-07-08 ENCOUNTER — OFFICE VISIT (OUTPATIENT)
Dept: PALLIATIVE MEDICINE | Facility: CLINIC | Age: 54
End: 2024-07-08
Payer: COMMERCIAL

## 2024-07-08 VITALS — HEART RATE: 67 BPM | DIASTOLIC BLOOD PRESSURE: 78 MMHG | SYSTOLIC BLOOD PRESSURE: 129 MMHG

## 2024-07-08 DIAGNOSIS — F11.90 CHRONIC, CONTINUOUS USE OF OPIOIDS: ICD-10-CM

## 2024-07-08 DIAGNOSIS — M54.41 CHRONIC BILATERAL LOW BACK PAIN WITH RIGHT-SIDED SCIATICA: ICD-10-CM

## 2024-07-08 DIAGNOSIS — G89.29 CHRONIC BILATERAL LOW BACK PAIN WITH RIGHT-SIDED SCIATICA: ICD-10-CM

## 2024-07-08 DIAGNOSIS — M96.1 FAILED BACK SYNDROME: ICD-10-CM

## 2024-07-08 DIAGNOSIS — M96.1 FAILED BACK SURGICAL SYNDROME: ICD-10-CM

## 2024-07-08 DIAGNOSIS — M54.16 LUMBAR RADICULOPATHY: Primary | ICD-10-CM

## 2024-07-08 DIAGNOSIS — F32.A DEPRESSION, UNSPECIFIED DEPRESSION TYPE: ICD-10-CM

## 2024-07-08 DIAGNOSIS — G89.29 CHRONIC INTRACTABLE PAIN: ICD-10-CM

## 2024-07-08 PROCEDURE — 99214 OFFICE O/P EST MOD 30 MIN: CPT | Performed by: NURSE PRACTITIONER

## 2024-07-08 RX ORDER — BUPRENORPHINE 20 UG/H
1 PATCH TRANSDERMAL
Qty: 4 PATCH | Refills: 0 | Status: SHIPPED | OUTPATIENT
Start: 2024-07-08 | End: 2024-08-15

## 2024-07-08 RX ORDER — DULOXETIN HYDROCHLORIDE 30 MG/1
30 CAPSULE, DELAYED RELEASE ORAL DAILY
Qty: 30 CAPSULE | Refills: 1 | Status: SHIPPED | OUTPATIENT
Start: 2024-07-08 | End: 2024-08-19

## 2024-07-08 ASSESSMENT — PAIN SCALES - GENERAL: PAINLEVEL: EXTREME PAIN (8)

## 2024-07-08 NOTE — PATIENT INSTRUCTIONS
"Plan:   Physical Therapy: none  Continue your regular [facility]   Clinical Health Psychologist to address issues of relaxation, behavioral change, coping style, and other factors important to improvement: none  Diagnostic Studies: none  Medication Management:   Continue Percocet max 3/day until next month script when I am increasing to #75/month. Fill and begin 7/8  continueButrans 20mcg/hr fill 7/15 and start 7/17  has naloxone nasal spray for safety  Continue gabapentin  INCREASE Cymbalta (duloxetine) to 90mg per day by taking a 30mg capsule in addition to the 60mg capsule. Stop if mood worsens. ER if suicidal. Normal to have mild headache, stomach might feel unsettled, or looser stools for 3-10 days  Further procedures recommended: none  Contact me if you need orders to repeat trigger point injection  Acupuncture: yes, continue  Recommendations/follow-up for PCP:  See above  Release of information: none  Follow up: 6 weeks, video visit scheduled with me on Monday 8/19 at 11:30 AM. Please call [phone] to make your follow-up appointment with me.   Check out the carson \"RISE\" for some behavioral things to help you sleep better.     ----------------------------------------------------------------  Clinic Number:  [phone]   Call with any questions about your care and for scheduling assistance.   Calls are returned Monday through Friday between 8 AM and 4:30 PM. We usually get back to you within 2 business days depending on the issue/request.    If we are prescribing your medications:  For opioid medication refills, call the clinic or send a [employer] message 7 days in advance.  Please include:  Name of requested medication  Name of the pharmacy.  For non-opioid medications, call your pharmacy directly to request a refill. Please allow 3-4 days to be processed.   Per [place] State Law:  All controlled substance prescriptions must be filled within 30 days of being written.    For those controlled substances allowing refills, " pickup must occur within 30 days of last fill.      We believe regular attendance is key to your success in our program!    Any time you are unable to keep your appointment we ask that you call us at least 24 hours in advance to cancel.This will allow us to offer the appointment time to another patient.   Multiple missed appointments may lead to dismissal from the clinic.

## 2024-07-08 NOTE — PROGRESS NOTES
[facility]      7/8/2024      Chief complaint:   -Low back pain and right leg radicular pain, same, no change in this pain  -lots of family issues  -had felt suicidal after his family went out to celebrate his daughter's 18th birthday without him  -now having passive suicidal ideation, though not as severe as it was around the 27th of June  -seeing a counselor tomorrow in-person with Debbie.   -newer onset mid back pain when taking shirt off for a shower, it was like a lightning jab or sledgehammer and was gone within a couple of days.   -has lost 40 lbs, on phentermine      Interval history:  David Wilcox is a 53 year old male is known to me for:  Chronic bilateral low back pain with right-sided sciatica  Lumbar radiculopathy  Failed back surgical syndrome  Chronic continuous use of opioids  Encounter for long-term use of opiate analgesic     Distant history of alcohol and drug abuse  Bipolar disorder   H/o menningitis after SCS trial  PMHx includes: Acute bronchitis, acute pancreatitis, acute sinusitis, ADHD, anxiety, depression, asthma, bipolar disorder unspecified, cervicalgia, dysthymic disorder, esophageal reflux, fixation of spine with fusion, history of blood transfusion, hyperlipidemia, low back pain, lumbar throat, meningitis after spinal cord stimulator trial, narcotic abuse, neck pain, open nondisplaced fracture of distal phalanx of finger, pain in limb, personal history of other diseases of the digestive system, sciatica, spasm of muscle, tobacco use disorder  PSHx includes: Back surgery in 2003 and 2011, lumbar anterior three-level fusion, ORIF right finger (3/3/2021)  .    Recommendations/plan at the last visit on 4/15/2024 included:  Physical Therapy: none  Continue your regular [facility]   Clinical Health Psychologist to address issues of relaxation, behavioral change, coping style, and other factors important to improvement: none  Diagnostic Studies: none  Medication  "Management:   Continue Percocet max 2/day until next month script when I am increasing to #75/month, you may use a max of 3/day on 15 days of the month if needed. Fill and begin 4/15  Continue Butrans 20mcg/hr fill 4/22 and start 4/24  has naloxone nasal spray for safety  Continue gabapentin  Further procedures recommended: none  Contact me if you need orders to repeat trigger point injection  Acupuncture: yes, continue  Recommendations/follow-up for PCP:  See above  Release of information: none  UDT today, last took Percocet this morning, wearing Butrans on upper chest today  Signed CSA today   Follow up: 12 weeks, video or in-person. Please call [phone] to make your follow-up appointment with me.       Since his last visit, David Wilcox reports:    Interval history July 8, 2024  --Low back pain and right leg radicular pain, same, no change in this pain  -lots of family issues, increased stress at home  -states it \"all came to head\" recently  -had felt suicidal after his family went out to celebrate his daughter's 18th birthday without him, called suicide hotline.   -now having passive suicidal ideation, though not as severe as it was around the 27th of June  -seeing a counselor tomorrow in-person with Debbie.   -newer onset mid back pain when taking shirt off for a shower, it was like a lightning jab or sledgehammer and was gone within a couple of days.   -has lost 40 lbs, on phentermine  -discussed increase in Duloxetine. Patient is willing to do so as this has been very helpful for his mood in the past.         Interval history April 15, 2024  -had been down in [place], [place] with his son working on a truck. Had a lot of fun restoring it.   -drove back to [place] last night.   -low back and right leg pain is worse due to different activity. No red flag symptoms at all.   -feels his pain is under better control with increased dosing of Butrans and use of oxycodone for breakthrough. He does not want to make any " changes at this time in his medication regimen.   -trigger point injections done in early March were helpful for about a week or so.      Interval history January 15, 2024  -colder weather has bothered chronic low back and right leg pain. Otherwise stable.   -has had about a month long issue with increased mid back pain. No known injury or illness to explain this pain.   -stable on current medications    Interval history October 11, 2023  -continued low back pain, extends into the right leg to the foot. No changes.   -denies any new areas of pain  -he does feel that the increase in Butrans helped a little bit. Cannot currently increase dosing due to cost (his insurance just reset).      Pain history collected 5/27/2022 at initial visit  Out cutting a tree and it fell on him in 2002. He had compression fractures L5-S1, and multiple other fractures and spent almost a year in a wheelchair, initially told he would not walk again.   His initial spinal  surgery was helpful, but he then fell down the stairs and he knew something was wrong but no one would believe him. He states 8 years later they found loose hardware and he had to have a subsequent surgery to fix that. Then a 3rd surgery extended the fusion. He is now fused from L3-S1.      Pain on the right side from bottom of the ribs to the low back, around the hip and down the right leg to the toes. Bilateral hip pain posterolateral hip pain (points to buttocks).      He has had loss of bowel and bladder but it is not a common thing. This is not new, present off and on for 6 years.      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   Has regular exercise with chores and he now walks every day on the treadmill (2km in the AM and PM)  Does [employer] properties and services in [place] on Fridays and his wife does so on Saturdays.           At this point, the patient's participation with our multidisciplinary team includes:  The patient has been compliant with the program.  PT - not  "ordered, patient is quite active at home running a [employer] - not ordered      Pain scores:  Pain intensity on average is 7 on a scale of 0-10.    Range is 5-9/10.   Pain right now is 8/10.   Pain is described as \"like a pillow with a knife through it and when bad, like I am on fire\"    Pain is constant in nature      Current pain relevant medications:   -Tylenol extra strength 500mg PRN   -naloxone nasal spray for opiate reversal   -Duloxetine 60mg every day (very helpful for mood and pain)  -gabapentin 600mg take 2 tabs/1200mg TID (helpful)  -nortriptyline 10mg take 2 (20mg) at bedtime (helpful)  -Butrans 20mcg/hr TD Q 7 days (helpful)  -Percocet 5/325mg take 1 tab Q 6 hours PRN max 3/day (he plans on 2/day and uses 3 if needed. Helpful)  -tizanidine 4mg TID PRN for muscle spasms (helpful)        Other pertinent medications:  -lorazepam 0.5mg BID PRN anxiety (very rare use, received #30 tabs in 2018 and still has some left)  -mirtazepine 15mg at bedtime  -ambien 10mg Q HS PRN insomnia           Previous medication treatments included:  OPIATES: oxycodone (helpful), hydrocodone (not helpful), Methadone (took after surgery, very helpful), Fentanyl patches (would have issues with getting to hot and get a dump of medication. He has used this very successfully in the winter when it is colder), Dilaudid (hydromorphone, very helpful for a migraine with antibiotics), MSContin (more helpful than OxyContin was)  NSAIDS: naproxyn (not helpful), ibuprofen (not helpful)  MUSCLE RELAXANTS: Methocarbamol (expense, helpful), Flexeril (helpful but he had a remote history of a suicide attempt with this drug), tizanidine (very helpful)  ANTI-MIGRAINE MEDS: none  ANTI-DEPRESSANTS: none  SLEEP AIDS: Remeron (helpful)  ANTI-CONVULSANTS: nortriptyline (helpful)  TOPICALS: CBD oil (helps some)  ANXIOLYTICS: lorazepam (helpful)  MEDICAL CANNABIS: none  Other meds: lidocaine (not helpful)        Other treatments have included: "   David Wilcox has been seen at a pain clinic in the past.  Previously managed by Dr. Bindu Motley here  PT: tried, was not helpful on multiple occasions  Chiropractic care: yes, uses this regularly for acupuncture  Acupuncture: helpful  TENs Unit: has one, helpful, uses it occasionally  SCS trial in   got meningitis and nearly       Injections:   -3/26/2018 caudal ALVA with Dr. Bindu Motley  (not helpful)  -2018 right L3-4 transforaminal ALVA with Dr. Bindu Motley (not helpful)  -2019  Bilateral SI joint injections with Dr. Bindu Motley  (not helpful)  -3/4/2024 trigger point injections with Dr. Brandon Hickman (helpful for about a week) @ bilat trap, latissimus dorsi and quadratus lumborum using 0.25% bupivicaine and 40mg Kenalog       THE 4 A's OF OPIOID MAINTENANCE ANALGESIA    Analgesia: helpful    Activity: exercising on treadmill twice daily, more active    Adverse effects: none    Adherence to Rx protocol: yes      Side Effects: no side effect  Patient is using the medication as prescribed: YES    Medications:  Current Outpatient Medications   Medication Sig Dispense Refill    acetaminophen (TYLENOL) 500 MG tablet Take 1,500 mg by mouth at bedtime      albuterol (PROAIR HFA/PROVENTIL HFA/VENTOLIN HFA) 108 (90 Base) MCG/ACT inhaler INHALE 2 PUFFS INTO THE LUNGS EVERY 4 HOURS AS NEEDED FOR SHORTNESS OF BREATH 8.5 g 11    buprenorphine (BUTRANS) 20 MCG/HR WK patch Place 1 patch onto the skin every 7 days Fill 24 and start on 24. 28 day script. 4 patch 0    DULoxetine (CYMBALTA) 60 MG capsule Take 1 capsule (60 mg) by mouth daily 90 capsule 3    gabapentin (NEURONTIN) 600 MG tablet TAKE 2 TABLETS BY MOUTH THREE TIMES DAILY 180 tablet 4    LORazepam (ATIVAN) 0.5 MG tablet One bid prn anxiety. Rare use 30 tablet 0    losartan (COZAAR) 100 MG tablet Take 1 tablet (100 mg) by mouth daily 90 tablet 3    mirtazapine (REMERON) 15 MG tablet TAKE 1 TABLET(15 MG) BY MOUTH AT BEDTIME 90  tablet 1    naloxone (NARCAN) 4 MG/0.1ML nasal spray Spray 1 spray (4 mg) into one nostril alternating nostrils once as needed for opioid reversal every 2-3 minutes until assistance arrives 0.2 mL 1    nortriptyline (PAMELOR) 10 MG capsule TAKE 2 CAPSULES(20 MG) BY MOUTH AT BEDTIME 180 capsule 3    omeprazole (PRILOSEC) 20 MG DR capsule Take 1 capsule (20 mg) by mouth daily 90 capsule 3    oxyCODONE-acetaminophen (PERCOCET) 5-325 MG tablet Take 1 tablet by mouth every 6 hours as needed for moderate to severe pain Max of 3/day. Fill/Start 03/14/24 75 tablet 0    phentermine (ADIPEX-P) 37.5 MG tablet 1 tablet each morning 60 tablet 2    Semaglutide-Weight Management (WEGOVY) 0.25 MG/0.5ML pen Inject 0.25 mg Subcutaneous once a week 2 mL 5    tiZANidine (ZANAFLEX) 4 MG tablet Take 1 tablet (4 mg) by mouth 3 times daily as needed for muscle spasms 0.5-1 tab tid prn muscle spasm 90 tablet 4    zolpidem (AMBIEN) 10 MG tablet TAKE 1 TABLET BY MOUTH EVERY NIGHT AS NEEDED FOR INSOMNIA 30 tablet 5       Medical History: any changes in medical history since they were last seen? No    Social History:   Home situation:  with children x 2 at home.   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   History of chemical dependency treatment: quit on own       Is patient a current smoker or tobacco user?  no  If yes, was cessation counseling offered?  no          Physical Exam:  Vital signs: /78   Pulse 67     Behavioral observations:  Awake, alert and cooperative    Gait:  Normal     Musculoskeletal exam:    Strength grossly equal throughout. Moves well in exam room    Neuro exam:  Deferred    Skin/vascular/autonomic:  No suspicious lesions on exposed skin.     Other:  na            Diagnostic tests:  MRI OF THE LUMBAR SPINE WITHOUT AND WITH CONTRAST August 6, 2021 5:57  PM      HISTORY: Low back  pain, prior surgery, new symptoms. Worsening pain  above fusion, but also right radicular L5 or S1 distribution on the  right. Failed back surgical syndrome. Lumbar radiculopathy.     TECHNIQUE: Multiplanar, multisequence MRI images of the lumbar spine  were acquired without and with 10mL Gadavist IV contrast.     COMPARISON: Lumbar spine MR 4/27/2018, lumbar spine CT 7/14/2016.     FINDINGS: There are five lumbar-type vertebrae for the purposes of  this dictation. Posterior spine fusion from L3 down to S1 consisting  of bilateral pedicle screws at each level with inner connecting rods,  interbody fusion devices in the L4-L5 and L5-S1 disc spaces and  laminectomies of L3, L4, L5 and S1 again noted.     Degenerative grade 1 anterolisthesis of L5 upon S1 and mild  degenerative retrolisthesis of L2 upon L3 again noted. Alignment  otherwise normal and unchanged. Vertebral body heights normal. No  fractures. No evidence for pathologic bony lesion. Marrow signal  normal.     There is loss of disc height, disc desiccation and posterior disc  bulging/herniation to varying degrees at all levels of the lumbar  spine with the exception of the resected L4-L5 and L5-S1 discs.      The tip of the conus medullaris is at the L1-L2 level which is within  normal limits. No abnormal intrathecal contrast enhancement. There is  no evidence for intrathecal abnormality.      Level by level:      T12-L1: Loss of disc height, disc desiccation and circumferential disc  bulging with a superimposed small posterior central disc herniation  (extrusion). Minimal facet arthropathy bilaterally. Minimal spinal  canal narrowing. No foraminal stenosis on either side. No change from  the comparison study.     L1-L2: Loss of disc height, disc desiccation and circumferential disc  bulging with a superimposed small posterior central disc herniation  (protrusion). Minimal facet arthropathy bilaterally. Minimal spinal  canal narrowing. No foraminal stenosis  on either side. No change from  the comparison study.     L2-L3: Loss of disc height, disc desiccation and mild circumferential  disc bulging. No herniation. Moderate facet arthropathy bilaterally.  Mild spinal canal narrowing. Mild bilateral neural foraminal  narrowing. No change from the comparison study.     L3: Again noted is a fluid signal intensity collection in the right  posterolateral aspect of the spinal canal measuring 1.7 x 0.7 cm in  the AP and transverse dimensions respectively. This may represent a  synovial cyst arising from the right facet joint versus a cystic  postoperative fluid collection or cystic foreign body reaction. This  results in mild narrowing of the thecal sac at the mid L3 level.     L3-L4: (Fused level) Loss of disc height, disc desiccation and mild  circumferential disc bulging. No herniation. Moderate facet  arthropathy bilaterally. No spinal canal stenosis. Mild bilateral  neural foraminal narrowing. No change from the comparison study.      L4-L5: (Fused level) No spinal canal stenosis. No foraminal stenosis  on either side. No change from the comparison study.     L5-S1: (Fused level) No spinal canal stenosis. No foraminal stenosis  on either side. No change from the comparison study.                                                                      IMPRESSION:  1. Postoperative and degenerative change of the lumbar spine as  detailed above without appreciable change from the comparison studies.  2. No significant spinal canal or neural foraminal stenosis at any  level of the spine.     JAI CHAVEZ MD         Other testing (labs, diagnostics):  7/3/2024  Cr. 0.88  Est GFR >90        Screening tools:      DIRE Score for ongoing opioid management is calculated as follows:     Diagnosis = 2     Intractability = 2     Risk: Psych = 2  Chem Hlth = 2  Reliability = 3  Social = 3     Efficacy = 2     Total DIRE Score = 16 (14 or higher predicts good candidate for ongoing opioid  management; 13 or lower predicts poor candidate for opioid management)            [place] Pres[place] Monitoring [place]:  [place]ed [place]  7/8/2024- no concerning fills.  Ina TORIBIO, RN CNP, FNP  [facility]  [place] location              Assessment:  Lumbar radiculopathy  Failed back surgical syndrome  Failed back syndrome  Chronic bilateral low back pain with right sided sciatica  depression  Chronic intractable pain  Chronic continuous use of opioids    Encounter for long term use of opiate analgesic  Signed CSA 4/15/2024   UDT done 4/15/2024   Essential hypertension  Class 3 severe obesity due to excess calories with serious co-morbidity (untreated OBSTRUCTIVE SLEEP APNEA) and BMI of >39 in adult  OBSTRUCTIVE SLEEP APNEA (currently untreated)  Distant history of alcohol and drug abuse  Bipolar disorder   H/o menningitis after SCS trial  PMHx includes: Acute bronchitis, acute pancreatitis, acute sinusitis, ADHD, anxiety, depression, asthma, bipolar disorder unspecified, cervicalgia, dysthymic disorder, esophageal reflux, fixation of spine with fusion, history of blood transfusion, hyperlipidemia, low back pain, lumbar throat, meningitis after spinal cord stimulator trial, narcotic abuse, neck pain, open nondisplaced fracture of distal phalanx of finger, pain in limb, personal history of other diseases of the digestive system, sciatica, spasm of muscle, tobacco use disorder  PSHx includes: Back surgery in 2003 and 2011, lumbar anterior three-level fusion, ORIF right finger (3/3/2021)        Plan:   Physical Therapy: none  Continue your regular [facility]   Clinical Health Psychologist to address issues of relaxation, behavioral change, coping style, and other factors important to improvement: none  Diagnostic Studies: none  Medication Management:   Continue Percocet max 3/day until next month script when I am increasing to #75/month. Fill and begin 7/8  continueButrans 20mcg/hr fill  "7/15 and start 7/17  has naloxone nasal spray for safety  Continue gabapentin  INCREASE Cymbalta (duloxetine) to 90mg per day by taking a 30mg capsule in addition to the 60mg capsule. Stop if mood worsens. ER if suicidal. Normal to have mild headache, stomach might feel unsettled, or looser stools for 3-10 days  Further procedures recommended: none  Contact me if you need orders to repeat trigger point injection  Acupuncture: yes, continue  Recommendations/follow-up for PCP:  See above  Release of information: none  Follow up: 6 weeks, video visit scheduled with me on Monday 8/19 at 11:30 AM. Please call [phone] to make your follow-up appointment with me.   Check out the carson \"RISE\" for some behavioral things to help you sleep better.       ASSESSMENT AND PLAN:  (M54.16) Lumbar radiculopathy  (primary encounter diagnosis)  Comment:   Plan: buprenorphine (BUTRANS) 20 MCG/HR WK patch            (M96.1) Failed back surgical syndrome  Comment:   Plan: buprenorphine (BUTRANS) 20 MCG/HR WK patch            (M96.1) Failed back syndrome  Comment:   Plan: buprenorphine (BUTRANS) 20 MCG/HR WK patch            (M54.41,  G89.29) Chronic bilateral low back pain with right-sided sciatica  Comment:   Plan: buprenorphine (BUTRANS) 20 MCG/HR WK patch            (F32.A) Depression, unspecified depression type  Comment:   Plan: DULoxetine (CYMBALTA) 30 MG capsule            (G89.29) Chronic intractable pain  Comment:   Plan: buprenorphine (BUTRANS) 20 MCG/HR WK patch            (F11.90) Chronic, continuous use of opioids  Comment:   Plan: buprenorphine (BUTRANS) 20 MCG/HR WK patch              BILLING TIME DOCUMENTATION:   TOTAL TIME includes:   Time spent preparing to see the patient: 1 minutes (reviewing records and tests)  Time spend face to face with the patient: 29 minutes  Time spent ordering tests, medications, procedures and referrals: 0 minutes  Time spent Referring and communicating with other healthcare professionals: 0 " minutes  Documenting clinical information in Epic: 2 minutes    The total TIME spent on this patient on the day of the appointment was 32 minutes.              Ina TORIBIO RN CNP, FNP  [facility]

## 2024-07-12 ENCOUNTER — PATIENT OUTREACH (OUTPATIENT)
Dept: CARE COORDINATION | Facility: CLINIC | Age: 54
End: 2024-07-12
Payer: COMMERCIAL

## 2024-07-12 NOTE — PROGRESS NOTES
Clinical Product Navigator RN reviewed chart; patient on payer product coverage.  Review results:   CPN Initial Information Gathering  Referral Source: Health Plan    CC notified of admission via health plan report. Admission information is unavailable via Epic. Per report, Patient is admitted to the inpatient mental health unit at [facility]. CC will send a letter introducing CPN services and requesting a call back. No further CPN interventions planned at this time.     KAT Chamberlain (she/her/hers)  Social Work Clinic Care Coordinator   [facility]  [email or web address]  [phone]

## 2024-07-12 NOTE — LETTER
[facility]  [address] [place] [id number]      David,    I am a RN clinical navigator that works on behalf of [facility]; I wanted to introduce myself and role to you, as I can help you connect with recommended providers for ongoing care within our health care system.    This role serves as a liaison between [facility]'s clinical network and the health insurance plans to provide guidance on establishing primary and specialty care needs. One of the benefits of having a primary care provider from the network is that your care team will help coordinate your care and guide you through any additional care you may need. Our care team is focused and trained to treat the whole person and can help you with all your physical, emotional, and social concerns.    Also, our Primary Care Clinics are all supported by Clinic Care Coordination:    The clinic care coordination team is made up of a registered nurse, , financial resource worker and community health worker who understand the health care system. The goal of clinic care coordination is to help you manage your health and improve access to the health care system. Our team works alongside your provider to assist you in determining your health and social needs. We can help you obtain health care and community resources, providing you with necessary information and education. We can work with you through any barriers and develop a care plan that helps coordinate and strengthen the communication between you and your care team.  Our services are voluntary and are offered without charge to you personally.    Please feel free to contact me with any questions or concerns regarding care coordination and what we can offer.      We are focused on providing you with the highest-quality healthcare experience possible.    Sincerely,     GAYATRI Jennings  [phone]

## 2024-07-24 ENCOUNTER — MYC REFILL (OUTPATIENT)
Dept: PALLIATIVE MEDICINE | Facility: CLINIC | Age: 54
End: 2024-07-24
Payer: COMMERCIAL

## 2024-07-24 DIAGNOSIS — G89.29 CHRONIC INTRACTABLE PAIN: ICD-10-CM

## 2024-07-24 DIAGNOSIS — M54.41 CHRONIC BILATERAL LOW BACK PAIN WITH RIGHT-SIDED SCIATICA: ICD-10-CM

## 2024-07-24 DIAGNOSIS — G89.29 CHRONIC BILATERAL LOW BACK PAIN WITH RIGHT-SIDED SCIATICA: ICD-10-CM

## 2024-07-24 DIAGNOSIS — M54.16 LUMBAR RADICULOPATHY: ICD-10-CM

## 2024-07-24 DIAGNOSIS — M96.1 FAILED BACK SYNDROME: ICD-10-CM

## 2024-07-24 DIAGNOSIS — F11.90 CHRONIC, CONTINUOUS USE OF OPIOIDS: ICD-10-CM

## 2024-07-24 DIAGNOSIS — M96.1 FAILED BACK SURGICAL SYNDROME: ICD-10-CM

## 2024-07-25 RX ORDER — BUPRENORPHINE 20 UG/H
1 PATCH TRANSDERMAL
Qty: 4 PATCH | Refills: 0 | Status: CANCELLED | OUTPATIENT
Start: 2024-07-25

## 2024-07-25 NOTE — TELEPHONE ENCOUNTER
Received request for a refill(s) of buprenorphine (BUTRANS) 20 MCG/HR WK patch      Last dispensed from pharmacy on 07/15/24    Patient's last office/virtual visit by prescribing provider on 07/08/24  Next office/virtual appointment scheduled for 08/19/24    Last urine drug screen date 04/15/24  Current opioid agreement on file (completed within the last year) Yes Date of opioid agreement: 04/15/24    E-prescribe to pharmacy-[id number] - [place], [place] - [facility] [address] [facility] [address]     Will route to nursing Gormania for review and preparation of prescription(s).

## 2024-07-25 NOTE — TELEPHONE ENCOUNTER
Health Call Center    Phone Message    May a detailed message be left on voicemail: yes     Reason for Call: Medication Refill Request    Has the patient contacted the pharmacy for the refill? Yes   Name of medication being requested: buprenorphine (BUTRANS) 20 MCG/HR WK patch   Provider who prescribed the medication: Nargis   Pharmacy:   [facility] #[id number] - [place], [place] - [address] [place] [address] AT [facility] [address]     Date medication is needed: Patient is out as of last night. Please fill ASAP per patient request.        Action Taken: Message routed to:  Other: Pain     Travel Screening: Not Applicable     Date of Service:

## 2024-07-25 NOTE — TELEPHONE ENCOUNTER
Medication refill information reviewed.     Due date for  buprenorphine (BUTRANS) 20 MCG/HR WK patch is 08/14/24     Prescriptions prepped for review.     Will route to provider.

## 2024-08-05 ENCOUNTER — MYC REFILL (OUTPATIENT)
Dept: PALLIATIVE MEDICINE | Facility: CLINIC | Age: 54
End: 2024-08-05
Payer: COMMERCIAL

## 2024-08-05 DIAGNOSIS — M54.41 CHRONIC BILATERAL LOW BACK PAIN WITH RIGHT-SIDED SCIATICA: ICD-10-CM

## 2024-08-05 DIAGNOSIS — M96.1 FAILED BACK SYNDROME: ICD-10-CM

## 2024-08-05 DIAGNOSIS — M54.16 LUMBAR RADICULOPATHY: ICD-10-CM

## 2024-08-05 DIAGNOSIS — G89.29 CHRONIC BILATERAL LOW BACK PAIN WITH RIGHT-SIDED SCIATICA: ICD-10-CM

## 2024-08-05 DIAGNOSIS — G89.29 CHRONIC INTRACTABLE PAIN: ICD-10-CM

## 2024-08-05 DIAGNOSIS — F11.90 CHRONIC, CONTINUOUS USE OF OPIOIDS: ICD-10-CM

## 2024-08-05 DIAGNOSIS — M96.1 FAILED BACK SURGICAL SYNDROME: ICD-10-CM

## 2024-08-05 RX ORDER — OXYCODONE AND ACETAMINOPHEN 5; 325 MG/1; MG/1
1 TABLET ORAL EVERY 6 HOURS PRN
Qty: 75 TABLET | Refills: 0 | Status: SHIPPED | OUTPATIENT
Start: 2024-08-05 | End: 2024-08-19

## 2024-08-05 NOTE — TELEPHONE ENCOUNTER
Refills have been requested for the following medications:         oxyCODONE-acetaminophen (PERCOCET) 5-325 MG tablet [Ina Barillas]     Preferred pharmacy: [id number] - [place], [place] - [address] [place] [address] [address]CEPCION AT [facility] & [address] [place] [address]

## 2024-08-05 NOTE — TELEPHONE ENCOUNTER
Signed Prescriptions:                        Disp   Refills    oxyCODONE-acetaminophen (PERCOCET) 5-325 M*75 tab*0        Sig: Take 1 tablet by mouth every 6 hours as needed for           moderate to severe pain Max of 3/day. Fill/Start           08/5/24. 30 day script for chronic pain  Authorizing Provider: INA LOPEZ        Reviewed [place]  August 5, 2024- no concerning fills.    Ina Lopez APRN, RN CNP, FNP  [facility]  [facility]

## 2024-08-05 NOTE — TELEPHONE ENCOUNTER
Medication refill information reviewed.     Due date for oxyCODONE-acetaminophen (PERCOCET) 5-325 MG tablet  is 08/05/24     Prescriptions prepped for review.     Will route to provider.

## 2024-08-05 NOTE — TELEPHONE ENCOUNTER
Received call from patient requesting refill(s) of oxyCODONE-acetaminophen (PERCOCET) 5-325 MG tablet    Last dispensed from pharmacy on 7/5/2024.    Patient's last office/virtual visit by prescribing provider on 7/8/2024.  Next office/virtual appointment scheduled for 8/19/2024.    Last urine drug screen date 4/15/2024.  Current opioid agreement on file (completed within the last year) Yes Date of opioid agreement: 4/15/2024.    E-prescribe to:    [facility] #[id number] - [place], [place] - [address] [place] [address] [address]CE[address]ION AT [facility] [address] & [address]    Will route to nursing [place] for review and preparation of prescription(s).

## 2024-08-15 ENCOUNTER — MYC REFILL (OUTPATIENT)
Dept: PALLIATIVE MEDICINE | Facility: CLINIC | Age: 54
End: 2024-08-15
Payer: COMMERCIAL

## 2024-08-15 DIAGNOSIS — M54.41 CHRONIC BILATERAL LOW BACK PAIN WITH RIGHT-SIDED SCIATICA: ICD-10-CM

## 2024-08-15 DIAGNOSIS — M96.1 FAILED BACK SURGICAL SYNDROME: ICD-10-CM

## 2024-08-15 DIAGNOSIS — F11.90 CHRONIC, CONTINUOUS USE OF OPIOIDS: ICD-10-CM

## 2024-08-15 DIAGNOSIS — G89.29 CHRONIC BILATERAL LOW BACK PAIN WITH RIGHT-SIDED SCIATICA: ICD-10-CM

## 2024-08-15 DIAGNOSIS — M54.16 LUMBAR RADICULOPATHY: ICD-10-CM

## 2024-08-15 DIAGNOSIS — M96.1 FAILED BACK SYNDROME: ICD-10-CM

## 2024-08-15 DIAGNOSIS — G89.29 CHRONIC INTRACTABLE PAIN: ICD-10-CM

## 2024-08-15 NOTE — TELEPHONE ENCOUNTER
Received request for a refill(s) of buprenorphine (BUTRANS) 20 MCG/HR WK patch      Last dispensed from pharmacy on 08/07/24    Patient's last office/virtual visit by prescribing provider on 07/08/24  Next office/virtual appointment scheduled for 08/19/24    Last urine drug screen date 04/15/24  Current opioid agreement on file (completed within the last year) Yes Date of opioid agreement: 04/15/24    E-prescribe to       [facility] #[id number] - [place], [place] - [address] [place] [address] AT [facility] & [address]  [address] [place] [id number]  Phone: [phone] Fax: [phone]    Will route to nursing pool for review and preparation of prescription(s).       Shayna Chase MA  [facility] [place]

## 2024-08-15 NOTE — TELEPHONE ENCOUNTER
Medication refill information reviewed. (The 08/07 fill was for 2 patches only)    Due date for buprenorphine (BUTRANS) 20 MCG/HR WK patch is 08/22/24     Prescriptions prepped for review.     Will route to provider.

## 2024-08-16 RX ORDER — BUPRENORPHINE 20 UG/H
1 PATCH TRANSDERMAL
Qty: 4 PATCH | Refills: 0 | Status: SHIPPED | OUTPATIENT
Start: 2024-08-16 | End: 2024-09-12

## 2024-08-16 NOTE — TELEPHONE ENCOUNTER
Signed Prescriptions:                        Disp   Refills    buprenorphine (BUTRANS) 20 MCG/HR WK patch 4 patch0        Sig: Place 1 patch onto the skin every 7 days Fill           08/20/24 and start on 08/22/24. 28 day script.  Authorizing Provider: INA LOPEZ        Reviewed [place]  August 16, 2024- no concerning fills.    Ina TORIBIO, RN CNP, FNP  [facility]

## 2024-08-19 ENCOUNTER — VIRTUAL VISIT (OUTPATIENT)
Dept: PALLIATIVE MEDICINE | Facility: CLINIC | Age: 54
End: 2024-08-19
Payer: COMMERCIAL

## 2024-08-19 DIAGNOSIS — G89.29 CHRONIC BILATERAL LOW BACK PAIN WITH RIGHT-SIDED SCIATICA: ICD-10-CM

## 2024-08-19 DIAGNOSIS — F32.A DEPRESSION, UNSPECIFIED DEPRESSION TYPE: ICD-10-CM

## 2024-08-19 DIAGNOSIS — F11.90 CHRONIC, CONTINUOUS USE OF OPIOIDS: ICD-10-CM

## 2024-08-19 DIAGNOSIS — M96.1 FAILED BACK SYNDROME: ICD-10-CM

## 2024-08-19 DIAGNOSIS — M54.16 LUMBAR RADICULOPATHY: Primary | ICD-10-CM

## 2024-08-19 DIAGNOSIS — M96.1 FAILED BACK SURGICAL SYNDROME: ICD-10-CM

## 2024-08-19 DIAGNOSIS — G89.29 CHRONIC INTRACTABLE PAIN: ICD-10-CM

## 2024-08-19 DIAGNOSIS — M54.41 CHRONIC BILATERAL LOW BACK PAIN WITH RIGHT-SIDED SCIATICA: ICD-10-CM

## 2024-08-19 PROCEDURE — 99214 OFFICE O/P EST MOD 30 MIN: CPT | Mod: 95 | Performed by: NURSE PRACTITIONER

## 2024-08-19 RX ORDER — DULOXETIN HYDROCHLORIDE 30 MG/1
30 CAPSULE, DELAYED RELEASE ORAL DAILY
Qty: 90 CAPSULE | Refills: 1 | Status: SHIPPED | OUTPATIENT
Start: 2024-08-19

## 2024-08-19 RX ORDER — ARIPIPRAZOLE 5 MG/1
5 TABLET ORAL DAILY
COMMUNITY

## 2024-08-19 RX ORDER — OXYCODONE AND ACETAMINOPHEN 5; 325 MG/1; MG/1
1 TABLET ORAL EVERY 6 HOURS PRN
Qty: 48 TABLET | Refills: 0 | Status: SHIPPED | OUTPATIENT
Start: 2024-08-19 | End: 2024-09-06

## 2024-08-19 ASSESSMENT — PAIN SCALES - GENERAL: PAINLEVEL: MODERATE PAIN (5)

## 2024-08-19 NOTE — PATIENT INSTRUCTIONS
Plan:   Physical Therapy: none  Continue your regular HEP   Clinical Health Psychologist to address issues of relaxation, behavioral change, coping style, and other factors important to improvement: I support your ongoing work for mental health   Diagnostic Studies: none  Medication Management:   Continue Percocet max 3/day fill 9/2 and start 9/4 to last through 9/19. #48 tabs in this script. 16 day script to get butrans and Percocet due on the same day. Next script will be for #75 tabs  Continue Butrans 20mcg/hr fill 7/15 and start 7/17  has naloxone nasal spray for safety  Continue gabapentin  CONTINUE Cymbalta (duloxetine) to 90mg per day by taking a 30mg capsule in addition to the 60mg capsule.  Further procedures recommended: none  Contact me if you need orders to repeat trigger point injection  Acupuncture: yes, continue  Recommendations/follow-up for PCP:  See above  Release of information: none  Follow up: 12 weeks, IN-PERSON visit scheduled with me on  11/19 at 1 PM IN [place].    --------------------    Clinic Number:  [phone]   Call with any questions about your care and for scheduling assistance.   Calls are returned Monday through Friday between 8 AM and 4:30 PM. We usually get back to you within 2 business days depending on the issue/request.    If we are prescribing your medications:  For opioid medication refills, call the clinic or send a [facility] message 7 days in advance.  Please include:  Name of requested medication  Name of the pharmacy.  For non-opioid medications, call your pharmacy directly to request a refill. Please allow 3-4 days to be processed.   Per [place] State Law:  All controlled substance prescriptions must be filled within 30 days of being written.    For those controlled substances allowing refills, pickup must occur within 30 days of last fill.      We believe regular attendance is key to your success in our program!    Any time you are unable to keep your appointment we ask that  you call us at least 24 hours in advance to cancel.This will allow us to offer the appointment time to another patient.   Multiple missed appointments may lead to dismissal from the clinic.

## 2024-08-19 NOTE — PROGRESS NOTES
David is a 53 year old who is being evaluated via a billable video visit.    How would you like to obtain your AVS? MyChart  If the video visit is dropped, the invitation should be resent by: Text to cell phone: [phone]  Will anyone else be joining your video visit? No    Video-Visit Details          4/15/2024    11:04 AM 7/3/2024    12:50 PM 8/19/2024    10:10 AM   PEG Score   PEG Total Score 4.67 5 3.67        Type of service:  Video Visit   Video start: unable to connect with [employer] or [employer], changed to telephone call.   Video End Time:see above  Originating Location (pt. Location): Home    Distant Location (provider location):  On-site  Platform used for Video Visit: changed to telephone call  Phone call begin [phone]  Phone call end [phone]    Is Pt currently in [place]? Yes    NOTE:  If Pt is not in [place], Appointment needs to be canceled and rescheduled.       [facility]      8/19/2024      Chief complaint:   -Low back pain and right leg radicular pain, same  -lots of family issues  -he will be starting with a new therapist with [facility] in the near future. Finished with work with Ms. Kim Pandey in the [facility] system, this was helpful for his mental health struggles.   -intermittent onset mid back pain when taking shirt off for a shower, it was like a lightning jab or sledgehammer and was gone within a couple of days.         Interval history:  David Wilcox is a 53 year old male is known to me for:  Chronic bilateral low back pain with right-sided sciatica  Lumbar radiculopathy  Failed back surgical syndrome  Chronic continuous use of opioids  Encounter for long-term use of opiate analgesic     Distant history of alcohol and drug abuse  Bipolar disorder   H/o menningitis after SCS trial  PMHx includes: Acute bronchitis, acute pancreatitis, acute sinusitis, ADHD, anxiety, depression, asthma, bipolar disorder unspecified, cervicalgia, dysthymic disorder, esophageal  "reflux, fixation of spine with fusion, history of blood transfusion, hyperlipidemia, low back pain, lumbar throat, meningitis after spinal cord stimulator trial, narcotic abuse, neck pain, open nondisplaced fracture of distal phalanx of finger, pain in limb, personal history of other diseases of the digestive system, sciatica, spasm of muscle, tobacco use disorder  PSHx includes: Back surgery in 2003 and 2011, lumbar anterior three-level fusion, ORIF right finger (3/3/2021)  .    Recommendations/plan at the last visit on 7/8/2024 included:  Physical Therapy: none  Continue your regular [facility]   Clinical Health Psychologist to address issues of relaxation, behavioral change, coping style, and other factors important to improvement: none  Diagnostic Studies: none  Medication Management:   Continue Percocet max 3/day until next month script when I am increasing to #75/month. Fill and begin 7/8  continueButrans 20mcg/hr fill 7/15 and start 7/17  has naloxone nasal spray for safety  Continue gabapentin  INCREASE Cymbalta (duloxetine) to 90mg per day by taking a 30mg capsule in addition to the 60mg capsule. Stop if mood worsens. ER if suicidal. Normal to have mild headache, stomach might feel unsettled, or looser stools for 3-10 days  Further procedures recommended: none  Contact me if you need orders to repeat trigger point injection  Acupuncture: yes, continue  Recommendations/follow-up for PCP:  See above  Release of information: none  Follow up: 6 weeks, video visit scheduled with me on Monday 8/19 at 11:30 AM. Please call [phone] to make your follow-up appointment with me.   Check out the carson \"RISE\" for some behavioral things to help you sleep better.       Since his last visit, David Wilcox reports:    Interval history August 19, 2024  - David is feeling better mentally. Has been working with MsAlisha Wallace with [facility]. That is done and will transition to work with a new therapist with [facility]. " "  -he is off of phentermine, ?? Effect on mental health  -now also on Abilify. Doing well on cymbalta 90mg/day.   -low back pain with right leg pain is the same.   -having intermittent sharp pain in the mid back. It is sharp when it happens, but not present all of the time.         Interval history July 8, 2024  --Low back pain and right leg radicular pain, same, no change in this pain  -lots of family issues, increased stress at home  -states it \"all came to head\" recently  -had felt suicidal after his family went out to celebrate his daughter's 18th birthday without him, called suicide hotline.   -now having passive suicidal ideation, though not as severe as it was around the 27th of June  -seeing a counselor tomorrow in-person with Debbie.   -newer onset mid back pain when taking shirt off for a shower, it was like a lightning jab or sledgehammer and was gone within a couple of days.   -has lost 40 lbs, on phentermine  -discussed increase in Duloxetine. Patient is willing to do so as this has been very helpful for his mood in the past.    Interval history April 15, 2024  -had been down in [place], [place] with his son working on a truck. Had a lot of fun restoring it.   -drove back to [place] last night.   -low back and right leg pain is worse due to different activity. No red flag symptoms at all.   -feels his pain is under better control with increased dosing of Butrans and use of oxycodone for breakthrough. He does not want to make any changes at this time in his medication regimen.   -trigger point injections done in early March were helpful for about a week or so.      Interval history January 15, 2024  -colder weather has bothered chronic low back and right leg pain. Otherwise stable.   -has had about a month long issue with increased mid back pain. No known injury or illness to explain this pain.   -stable on current medications    Pain history collected 5/27/2022 at initial visit  Out cutting a tree and it fell " "on him in 2002. He had compression fractures L5-S1, and multiple other fractures and spent almost a year in a wheelchair, initially told he would not walk again.   His initial spinal  surgery was helpful, but he then fell down the stairs and he knew something was wrong but no one would believe him. He states 8 years later they found loose hardware and he had to have a subsequent surgery to fix that. Then a 3rd surgery extended the fusion. He is now fused from L3-S1.      Pain on the right side from bottom of the ribs to the low back, around the hip and down the right leg to the toes. Bilateral hip pain posterolateral hip pain (points to buttocks).      He has had loss of bowel and bladder but it is not a common thing. This is not new, present off and on for 6 years.      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   Has regular exercise with chores and he now walks every day on the treadmill (2km in the AM and PM)  Does [employer] in [place] on Fridays and his wife does so on Saturdays.           At this point, the patient's participation with our multidisciplinary team includes:  The patient has been compliant with the program.  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 Kim Pandey      Pain scores:  Pain intensity on average is 6 on a scale of 0-10.    Range is 4-9/10.   Pain right now is 5/10.   Pain is described as \"like a pillow with a knife through it and when bad, like I am on fire or like someone hit me with a sledgehammer\"    Pain is constant in nature      Current pain relevant medications:   -Tylenol extra strength 500mg PRN   -naloxone nasal spray for opiate reversal   -Duloxetine 90mg every day (very helpful for mood and pain)  -gabapentin 600mg take 2 tabs/1200mg TID (helpful)  -nortriptyline 10mg take 2 (20mg) at bedtime (helpful)  -Butrans 20mcg/hr TD Q 7 days (helpful)  -Percocet 5/325mg take 1 " tab Q 6 hours PRN max 3/day (he plans on 2/day and uses 3 if needed. Helpful)  -tizanidine 4mg TID PRN for muscle spasms (helpful)        Other pertinent medications:  -lorazepam 0.5mg BID PRN anxiety (very rare use, received #30 tabs in 2018 and still has some left)  -mirtazepine 15mg at bedtime  -ambien 10mg Q HS PRN insomnia           Previous medication treatments included:  OPIATES: oxycodone (helpful), hydrocodone (not helpful), Methadone (took after surgery, very helpful), Fentanyl patches (would have issues with getting to hot and get a dump of medication. He has used this very successfully in the winter when it is colder), Dilaudid (hydromorphone, very helpful for a migraine with antibiotics), MSContin (more helpful than OxyContin was)  NSAIDS: naproxyn (not helpful), ibuprofen (not helpful)  MUSCLE RELAXANTS: Methocarbamol (expense, helpful), Flexeril (helpful but he had a remote history of a suicide attempt with this drug), tizanidine (very helpful)  ANTI-MIGRAINE MEDS: none  ANTI-DEPRESSANTS: none  SLEEP AIDS: Remeron (helpful)  ANTI-CONVULSANTS: nortriptyline (helpful)  TOPICALS: CBD oil (helps some)  ANXIOLYTICS: lorazepam (helpful)  MEDICAL CANNABIS: none  Other meds: lidocaine (not helpful)        Other treatments have included:   David Wilcox has been seen at a pain clinic in the past.  Previously managed by Dr. Bindu Motley here  PT: tried, was not helpful on multiple occasions  Chiropractic care: yes, uses this regularly for acupuncture  Acupuncture: helpful  TENs Unit: has one, helpful, uses it occasionally  SCS trial in   got meningitis and nearly       Injections:   -3/26/2018 caudal ALVA with Dr. Bindu Motley  (not helpful)  -2018 right L3-4 transforaminal ALVA with Dr. Bindu Motley (not helpful)  -2019  Bilateral SI joint injections with Dr. Bindu Motley  (not helpful)  -3/4/2024 trigger point injections with Dr. Brandon Hickman (helpful for about a week) @ [facility],  latissimus dorsi and quadratus lumborum using 0.25% bupivicaine and 40mg Kenalog       THE 4 A's OF OPIOID MAINTENANCE ANALGESIA    Analgesia: helpful    Activity: exercising on treadmill twice daily, more active    Adverse effects: none    Adherence to Rx protocol: yes      Side Effects: no side effect  Patient is using the medication as prescribed: YES    Medications:  Current Outpatient Medications   Medication Sig Dispense Refill    acetaminophen (TYLENOL) 500 MG tablet Take 1,500 mg by mouth at bedtime      albuterol (PROAIR HFA/PROVENTIL HFA/VENTOLIN HFA) 108 (90 Base) MCG/ACT inhaler INHALE 2 PUFFS INTO THE LUNGS EVERY 4 HOURS AS NEEDED FOR SHORTNESS OF BREATH 8.5 g 11    buprenorphine (BUTRANS) 20 MCG/HR WK patch Place 1 patch onto the skin every 7 days Fill 03/26/24 and start on 03/28/24. 28 day script. 4 patch 0    DULoxetine (CYMBALTA) 60 MG capsule Take 1 capsule (60 mg) by mouth daily 90 capsule 3    gabapentin (NEURONTIN) 600 MG tablet TAKE 2 TABLETS BY MOUTH THREE TIMES DAILY 180 tablet 4    LORazepam (ATIVAN) 0.5 MG tablet One bid prn anxiety. Rare use 30 tablet 0    losartan (COZAAR) 100 MG tablet Take 1 tablet (100 mg) by mouth daily 90 tablet 3    mirtazapine (REMERON) 15 MG tablet TAKE 1 TABLET(15 MG) BY MOUTH AT BEDTIME 90 tablet 1    naloxone (NARCAN) 4 MG/0.1ML nasal spray Spray 1 spray (4 mg) into one nostril alternating nostrils once as needed for opioid reversal every 2-3 minutes until assistance arrives 0.2 mL 1    nortriptyline (PAMELOR) 10 MG capsule TAKE 2 CAPSULES(20 MG) BY MOUTH AT BEDTIME 180 capsule 3    omeprazole (PRILOSEC) 20 MG DR capsule Take 1 capsule (20 mg) by mouth daily 90 capsule 3    oxyCODONE-acetaminophen (PERCOCET) 5-325 MG tablet Take 1 tablet by mouth every 6 hours as needed for moderate to severe pain Max of 3/day. Fill/Start 03/14/24 75 tablet 0    phentermine (ADIPEX-P) 37.5 MG tablet 1 tablet each morning 60 tablet 2    Semaglutide-Weight Management (WEGOVY)  0.25 MG/0.5ML pen Inject 0.25 mg Subcutaneous once a week 2 mL 5    tiZANidine (ZANAFLEX) 4 MG tablet Take 1 tablet (4 mg) by mouth 3 times daily as needed for muscle spasms 0.5-1 tab tid prn muscle spasm 90 tablet 4    zolpidem (AMBIEN) 10 MG tablet TAKE 1 TABLET BY MOUTH EVERY NIGHT AS NEEDED FOR INSOMNIA 30 tablet 5       Medical History: any changes in medical history since they were last seen? No    Social History:   Home situation:  with children x 2 at home.   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   History of chemical dependency treatment: quit on own       Is patient a current smoker or tobacco user?  no  If yes, was cessation counseling offered?  no          Physical Exam:  Vital signs: There were no vitals taken for this visit.    Behavioral observations:  Awake, alert. Cooperative.   Pulm: respirations easy and unlabored. Able to speak in full sentences without SOB or cough noted.              Diagnostic tests:  MRI OF THE LUMBAR SPINE WITHOUT AND WITH CONTRAST August 6, 2021 5:57  PM      HISTORY: Low back pain, prior surgery, new symptoms. Worsening pain  above fusion, but also right radicular L5 or S1 distribution on the  right. Failed back surgical syndrome. Lumbar radiculopathy.     TECHNIQUE: Multiplanar, multisequence MRI images of the lumbar spine  were acquired without and with 10mL Gadavist IV contrast.     COMPARISON: Lumbar spine MR 4/27/2018, lumbar spine CT 7/14/2016.     FINDINGS: There are five lumbar-type vertebrae for the purposes of  this dictation. Posterior spine fusion from L3 down to S1 consisting  of bilateral pedicle screws at each level with inner connecting rods,  interbody fusion devices in the L4-L5 and L5-S1 disc spaces and  laminectomies of L3, L4, L5 and S1 again noted.     Degenerative grade 1 anterolisthesis of L5 upon S1 and  mild  degenerative retrolisthesis of L2 upon L3 again noted. Alignment  otherwise normal and unchanged. Vertebral body heights normal. No  fractures. No evidence for pathologic bony lesion. Marrow signal  normal.     There is loss of disc height, disc desiccation and posterior disc  bulging/herniation to varying degrees at all levels of the lumbar  spine with the exception of the resected L4-L5 and L5-S1 discs.      The tip of the conus medullaris is at the L1-L2 level which is within  normal limits. No abnormal intrathecal contrast enhancement. There is  no evidence for intrathecal abnormality.      Level by level:      T12-L1: Loss of disc height, disc desiccation and circumferential disc  bulging with a superimposed small posterior central disc herniation  (extrusion). Minimal facet arthropathy bilaterally. Minimal spinal  canal narrowing. No foraminal stenosis on either side. No change from  the comparison study.     L1-L2: Loss of disc height, disc desiccation and circumferential disc  bulging with a superimposed small posterior central disc herniation  (protrusion). Minimal facet arthropathy bilaterally. Minimal spinal  canal narrowing. No foraminal stenosis on either side. No change from  the comparison study.     L2-L3: Loss of disc height, disc desiccation and mild circumferential  disc bulging. No herniation. Moderate facet arthropathy bilaterally.  Mild spinal canal narrowing. Mild bilateral neural foraminal  narrowing. No change from the comparison study.     L3: Again noted is a fluid signal intensity collection in the right  posterolateral aspect of the spinal canal measuring 1.7 x 0.7 cm in  the AP and transverse dimensions respectively. This may represent a  synovial cyst arising from the right facet joint versus a cystic  postoperative fluid collection or cystic foreign body reaction. This  results in mild narrowing of the thecal sac at the mid L3 level.     L3-L4: (Fused level) Loss of disc height,  disc desiccation and mild  circumferential disc bulging. No herniation. Moderate facet  arthropathy bilaterally. No spinal canal stenosis. Mild bilateral  neural foraminal narrowing. No change from the comparison study.      L4-L5: (Fused level) No spinal canal stenosis. No foraminal stenosis  on either side. No change from the comparison study.     L5-S1: (Fused level) No spinal canal stenosis. No foraminal stenosis  on either side. No change from the comparison study.                                                                      IMPRESSION:  1. Postoperative and degenerative change of the lumbar spine as  detailed above without appreciable change from the comparison studies.  2. No significant spinal canal or neural foraminal stenosis at any  level of the spine.     JAI CHAVEZ MD         Other testing (labs, diagnostics):  7/3/2024  Cr. 0.88  Est GFR >90        Screening tools:      DIRE Score for ongoing opioid management is calculated as follows:     Diagnosis = 2     Intractability = 2     Risk: Psych = 2  Chem Hlth = 2  Reliability = 3  Social = 3     Efficacy = 2     Total DIRE Score = 16 (14 or higher predicts good candidate for ongoing opioid management; 13 or lower predicts poor candidate for opioid management)            [place] Prescription Monitoring Program:  Reviewed [place]  8/19/2024- no concerning fills.  Ina TORIBIO, RN CNP, FNP  [facility]  [place] location              Assessment:  Lumbar radiculopathy  Failed back surgical syndrome  Failed back syndrome  Chronic bilateral low back pain with right sided sciatica  depression  Chronic intractable pain  Chronic continuous use of opioids    Encounter for long term use of opiate analgesic  Signed CSA 4/15/2024   UDT done 4/15/2024   Essential hypertension  Class 3 severe obesity due to excess calories with serious co-morbidity (untreated OBSTRUCTIVE SLEEP APNEA) and BMI of >39 in adult  OBSTRUCTIVE SLEEP  APNEA (currently untreated)  Distant history of alcohol and drug abuse  Bipolar disorder   H/o menningitis after SCS trial  PMHx includes: Acute bronchitis, acute pancreatitis, acute sinusitis, ADHD, anxiety, depression, asthma, bipolar disorder unspecified, cervicalgia, dysthymic disorder, esophageal reflux, fixation of spine with fusion, history of blood transfusion, hyperlipidemia, low back pain, lumbar throat, meningitis after spinal cord stimulator trial, narcotic abuse, neck pain, open nondisplaced fracture of distal phalanx of finger, pain in limb, personal history of other diseases of the digestive system, sciatica, spasm of muscle, tobacco use disorder  PSHx includes: Back surgery in 2003 and 2011, lumbar anterior three-level fusion, ORIF right finger (3/3/2021)        Plan:   Physical Therapy: none  Continue your regular [facility]   Clinical Health Psychologist to address issues of relaxation, behavioral change, coping style, and other factors important to improvement: I support your ongoing work for mental health   Diagnostic Studies: none  Medication Management:   Continue Percocet max 3/day fill 9/2 and start 9/4 to last through 9/19. #48 tabs in this script. 16 day script to get butrans and Percocet due on the same day. Next script will be for #75 tabs  Continue Butrans 20mcg/hr fill 7/15 and start 7/17  has naloxone nasal spray for safety  Continue gabapentin  CONTINUE Cymbalta (duloxetine) to 90mg per day by taking a 30mg capsule in addition to the 60mg capsule.  Further procedures recommended: none  Contact me if you need orders to repeat trigger point injection  Acupuncture: yes, continue  Recommendations/follow-up for PCP:  See above  Release of information: none  Follow up: 12 weeks, IN-PERSON visit scheduled with me on  11/19 at 1 PM IN [place].          ASSESSMENT AND PLAN:  (M54.16) Lumbar radiculopathy  (primary encounter diagnosis)  Comment:   Plan: oxyCODONE-acetaminophen (PERCOCET) 5-325 MG          tablet            (M96.1) Failed back surgical syndrome  Comment:   Plan: oxyCODONE-acetaminophen (PERCOCET) 5-325 MG         tablet            (M96.1) Failed back syndrome  Comment:   Plan: oxyCODONE-acetaminophen (PERCOCET) 5-325 MG         tablet            (M54.41,  G89.29) Chronic bilateral low back pain with right-sided sciatica  Comment:   Plan: oxyCODONE-acetaminophen (PERCOCET) 5-325 MG         tablet            (F32.A) Depression, unspecified depression type  Comment:   Plan: DULoxetine (CYMBALTA) 30 MG capsule            (G89.29) Chronic intractable pain  Comment:   Plan: oxyCODONE-acetaminophen (PERCOCET) 5-325 MG         tablet            (F11.90) Chronic, continuous use of opioids  Comment:   Plan: oxyCODONE-acetaminophen (PERCOCET) 5-325 MG         tablet              BILLING TIME DOCUMENTATION:   TOTAL TIME includes:   Time spent preparing to see the patient: 5 minutes (reviewing records and tests)  Time spend face to face with the patient: 20 minutes  Time spent ordering tests, medications, procedures and referrals: 0 minutes  Time spent Referring and communicating with other healthcare professionals: 0 minutes  Documenting clinical information in Epic: 8 minutes    The total TIME spent on this patient on the day of the appointment was 33 minutes.              Ina TORIBIO, RN CNP, FNP  [facility]

## 2024-09-06 ENCOUNTER — MYC REFILL (OUTPATIENT)
Dept: PALLIATIVE MEDICINE | Facility: CLINIC | Age: 54
End: 2024-09-06
Payer: COMMERCIAL

## 2024-09-06 DIAGNOSIS — M54.16 LUMBAR RADICULOPATHY: ICD-10-CM

## 2024-09-06 DIAGNOSIS — M96.1 FAILED BACK SYNDROME: ICD-10-CM

## 2024-09-06 DIAGNOSIS — F11.90 CHRONIC, CONTINUOUS USE OF OPIOIDS: ICD-10-CM

## 2024-09-06 DIAGNOSIS — M96.1 FAILED BACK SURGICAL SYNDROME: ICD-10-CM

## 2024-09-06 DIAGNOSIS — G89.29 CHRONIC INTRACTABLE PAIN: ICD-10-CM

## 2024-09-06 DIAGNOSIS — M54.41 CHRONIC BILATERAL LOW BACK PAIN WITH RIGHT-SIDED SCIATICA: ICD-10-CM

## 2024-09-06 DIAGNOSIS — G89.29 CHRONIC BILATERAL LOW BACK PAIN WITH RIGHT-SIDED SCIATICA: ICD-10-CM

## 2024-09-06 RX ORDER — OXYCODONE AND ACETAMINOPHEN 5; 325 MG/1; MG/1
1 TABLET ORAL EVERY 6 HOURS PRN
Qty: 75 TABLET | Refills: 0 | Status: SHIPPED | OUTPATIENT
Start: 2024-09-06

## 2024-09-06 NOTE — TELEPHONE ENCOUNTER
Medication refill information reviewed.     Due date for OXYCODONE-ACETAMINOPHEN 5-325 MG PO TABS  is 9/20/2024     Prescriptions prepped for review.     Will route to provider.

## 2024-09-06 NOTE — TELEPHONE ENCOUNTER
Received call from patient requesting refill(s) of     OXYCODONE-ACETAMINOPHEN 5-325 MG PO TABS  Last dispensed from pharmacy on: Aug. 5, 2024           Patient's last office/virtual visit by prescribing provider on: Aug. 19, 2024   Next office/virtual appointment scheduled for: Nov. 19, 2024         UDT: Apr. 15, 2024   CSA: Apr. 16, 2024       E-prescribe to    [id number] - [place], [place] - [address] [place] [address] [address] AT [facility] & [address]      Will route to nursing Lee for review and preparation of prescription(s).

## 2024-09-06 NOTE — TELEPHONE ENCOUNTER
Signed Prescriptions:                        Disp   Refills    oxyCODONE-acetaminophen (PERCOCET) 5-325 M*75 tab*0        Sig: Take 1 tablet by mouth every 6 hours as needed for           moderate to severe pain. Max of 3/day. Fill           9/18/2024 Start 09/20/24.  #75 tabs for 30 day           supply  Authorizing Provider: INA LOPEZ        Reviewed [place]  September 6, 2024- no concerning fills.    Ina Lopez APRN, RN CNP, FNP  [facility]  [facility]

## 2024-09-06 NOTE — TELEPHONE ENCOUNTER
Refills have been requested for the following medications:         oxyCODONE-acetaminophen (PERCOCET) 5-325 MG tablet [Ina Barillas]     Preferred pharmacy: [id number] - [place], [place] - [address] [place] [address] CONCE[address]ION AT [facility] & [address] [place] [address]

## 2024-09-12 ENCOUNTER — MYC REFILL (OUTPATIENT)
Dept: PALLIATIVE MEDICINE | Facility: CLINIC | Age: 54
End: 2024-09-12
Payer: COMMERCIAL

## 2024-09-12 DIAGNOSIS — G89.29 CHRONIC BILATERAL LOW BACK PAIN WITH RIGHT-SIDED SCIATICA: ICD-10-CM

## 2024-09-12 DIAGNOSIS — M54.16 LUMBAR RADICULOPATHY: ICD-10-CM

## 2024-09-12 DIAGNOSIS — G89.29 CHRONIC INTRACTABLE PAIN: ICD-10-CM

## 2024-09-12 DIAGNOSIS — M96.1 FAILED BACK SURGICAL SYNDROME: ICD-10-CM

## 2024-09-12 DIAGNOSIS — M96.1 FAILED BACK SYNDROME: ICD-10-CM

## 2024-09-12 DIAGNOSIS — M54.41 CHRONIC BILATERAL LOW BACK PAIN WITH RIGHT-SIDED SCIATICA: ICD-10-CM

## 2024-09-12 DIAGNOSIS — F11.90 CHRONIC, CONTINUOUS USE OF OPIOIDS: ICD-10-CM

## 2024-09-12 RX ORDER — BUPRENORPHINE 20 UG/H
1 PATCH TRANSDERMAL
Qty: 4 PATCH | Refills: 0 | Status: SHIPPED | OUTPATIENT
Start: 2024-09-12

## 2024-09-12 NOTE — TELEPHONE ENCOUNTER
[facility] [employer] [facility] requesting refill(s) for:    buprenorphine (BUTRANS) 20 MCG/HR WK patch      Last dispensed from pharmacy on 8/21/24    Patient's last office/virtual visit by prescribing provider on 8/19/24  Next office/virtual appointment scheduled for 10/15/24    Last urine drug screen date 4/15/24  Current opioid agreement on file? Yes Date of opioid agreement: 4/15/24    E-prescribe to:    [id number] - [place], [place] - [facility] [address] AT [facility] [address]    Will route to nursing pool for review and preparation of prescription(s).

## 2024-09-12 NOTE — TELEPHONE ENCOUNTER
Signed Prescriptions:                        Disp   Refills    buprenorphine (BUTRANS) 20 MCG/HR WK patch 4 patch0        Sig: Place 1 patch onto the skin every 7 days. Fill           09/17/24 and start on 09/19/24. 28 day script.  Authorizing Provider: INA LOPEZ        Reviewed [place]  September 12, 2024- no concerning fills.    Ina TORIBIO, RN CNP, FNP  [facility]

## 2024-09-12 NOTE — TELEPHONE ENCOUNTER
Medication refill information reviewed.     Due date for buprenorphine (BUTRANS) 20 MCG/HR WK patch is  09/19/24    Prescriptions prepped for review.     Will route to provider.

## 2024-09-23 ENCOUNTER — MYC REFILL (OUTPATIENT)
Dept: PALLIATIVE MEDICINE | Facility: CLINIC | Age: 54
End: 2024-09-23
Payer: COMMERCIAL

## 2024-09-23 DIAGNOSIS — M54.16 LUMBAR RADICULOPATHY: ICD-10-CM

## 2024-09-23 DIAGNOSIS — M96.1 FAILED BACK SURGICAL SYNDROME: Primary | ICD-10-CM

## 2024-09-23 DIAGNOSIS — G89.29 CHRONIC BILATERAL LOW BACK PAIN WITH RIGHT-SIDED SCIATICA: ICD-10-CM

## 2024-09-23 DIAGNOSIS — M54.41 CHRONIC BILATERAL LOW BACK PAIN WITH RIGHT-SIDED SCIATICA: ICD-10-CM

## 2024-09-23 RX ORDER — GABAPENTIN 600 MG/1
TABLET ORAL
Qty: 180 TABLET | Refills: 4 | Status: SHIPPED | OUTPATIENT
Start: 2024-09-23

## 2024-09-23 NOTE — TELEPHONE ENCOUNTER
Received MyChart request from patient requesting refill(s) for gabapentin (NEURONTIN) 600 MG tablet     Last refilled on 08/17/24    Pt last seen on 08/19/24  Next appt scheduled for 10/15/24    Will facilitate refill.

## 2024-09-23 NOTE — TELEPHONE ENCOUNTER
Refills have been requested for the following medications:         gabapentin (NEURONTIN) 600 MG tablet [Ina Barillas]      Patient Comment: I'm told I'm ou of refills and I am out of pills as of this past weekend     Preferred pharmacy: [facility] #[id number] - [place], [place] - [address] [place] [address] AT [facility] & [address] [facility] [address]

## 2024-09-23 NOTE — TELEPHONE ENCOUNTER
[facility] Call Center    Phone Message    May a detailed message be left on voicemail: yes     Reason for Call: Other: Patient calling back regarding his Gabapentin.  He has been out since yesterday and is hoping to get this filled today.      Action Taken: Message routed to:  Other: [facility]    Travel Screening: Not Applicable     Date of Service:

## 2024-10-17 ENCOUNTER — MYC REFILL (OUTPATIENT)
Dept: PALLIATIVE MEDICINE | Facility: CLINIC | Age: 54
End: 2024-10-17
Payer: COMMERCIAL

## 2024-10-17 DIAGNOSIS — G89.29 CHRONIC INTRACTABLE PAIN: ICD-10-CM

## 2024-10-17 DIAGNOSIS — M54.41 CHRONIC BILATERAL LOW BACK PAIN WITH RIGHT-SIDED SCIATICA: ICD-10-CM

## 2024-10-17 DIAGNOSIS — M96.1 FAILED BACK SYNDROME: ICD-10-CM

## 2024-10-17 DIAGNOSIS — F11.90 CHRONIC, CONTINUOUS USE OF OPIOIDS: ICD-10-CM

## 2024-10-17 DIAGNOSIS — M96.1 FAILED BACK SURGICAL SYNDROME: ICD-10-CM

## 2024-10-17 DIAGNOSIS — G89.29 CHRONIC BILATERAL LOW BACK PAIN WITH RIGHT-SIDED SCIATICA: ICD-10-CM

## 2024-10-17 DIAGNOSIS — M54.16 LUMBAR RADICULOPATHY: ICD-10-CM

## 2024-10-18 RX ORDER — BUPRENORPHINE 20 UG/H
1 PATCH TRANSDERMAL
Qty: 4 PATCH | Refills: 0 | Status: SHIPPED | OUTPATIENT
Start: 2024-10-18 | End: 2024-11-11

## 2024-10-18 NOTE — TELEPHONE ENCOUNTER
Signed Prescriptions:                        Disp   Refills    buprenorphine (BUTRANS) 20 MCG/HR WK patch 4 patch0        Sig: Place 1 patch onto the skin every 7 days. Fill/start           10/18/24. 28 day script.  Authorizing Provider: INA LOPEZ        Reviewed [place]  October 18, 2024- no concerning fills.    Ina TORIBIO, RN CNP, FNP  [facility]  [facility]

## 2024-10-18 NOTE — TELEPHONE ENCOUNTER
Medication refill information reviewed.     Due date for buprenorphine (BUTRANS) 20 MCG/HR WK patch is 10/18/24     Prescriptions prepped for review.     Will route to provider.

## 2024-10-18 NOTE — TELEPHONE ENCOUNTER
Received call from patient requesting refill(s) buprenorphine (BUTRANS) 20 MCG/HR WK patch    Last dispensed from pharmacy on 09/18/2024    Patient's last office/virtual visit by prescribing provider on 08/19/2024  Next office/virtual appointment scheduled for 11/19/2024    Last urine drug screen date 04/15/2024  Current opioid agreement on file (completed within the last year) Yes Date of opioid agreement: 04/16/2024    E-prescribe to      [facility] #[id number] - [place], [place] - [facility] [address] AT [facility] [address]    Will route to nursing [place] for review and preparation of prescription(s).     Jenny Morrell MA  [facility] [place]

## 2024-10-24 DIAGNOSIS — M96.1 FAILED BACK SYNDROME: ICD-10-CM

## 2024-10-24 DIAGNOSIS — M54.16 LUMBAR RADICULOPATHY: ICD-10-CM

## 2024-10-24 DIAGNOSIS — F11.90 CHRONIC, CONTINUOUS USE OF OPIOIDS: ICD-10-CM

## 2024-10-24 DIAGNOSIS — M54.41 CHRONIC BILATERAL LOW BACK PAIN WITH RIGHT-SIDED SCIATICA: ICD-10-CM

## 2024-10-24 DIAGNOSIS — G89.29 CHRONIC BILATERAL LOW BACK PAIN WITH RIGHT-SIDED SCIATICA: ICD-10-CM

## 2024-10-24 DIAGNOSIS — M96.1 FAILED BACK SURGICAL SYNDROME: ICD-10-CM

## 2024-10-24 DIAGNOSIS — G89.29 CHRONIC INTRACTABLE PAIN: ICD-10-CM

## 2024-10-24 NOTE — TELEPHONE ENCOUNTER
Received request for a refill(s) of oxyCODONE-acetaminophen (PERCOCET) 5-325 MG tablet      Last dispensed from pharmacy on 09/18/24    Patient's last office/virtual visit by prescribing provider on 08/19/24  Next office/virtual appointment scheduled for 11/19/24    Last urine drug screen date 04/15/24  Current opioid agreement on file (completed within the last year) Yes Date of opioid agreement: 04/15/24    E-prescribe to pharmacy-[facility] #[id number] - [place], [place] - [facility] [address] AT [facility] [address]     Will route to nursing [place] for review and preparation of prescription(s).

## 2024-10-24 NOTE — TELEPHONE ENCOUNTER
[facility] Call Center    Phone Message    May a detailed message be left on voicemail: yes     Reason for Call: Medication Refill Request    Has the patient contacted the pharmacy for the refill? Yes   Name of medication being requested: oxyCODONE-acetaminophen (PERCOCET) 5-325 MG tablet     Provider who prescribed the medication: Ina Barillas APRN CNP     Pharmacy:    [facility] #[id number] [address] AT [facility] & [address]    Date medication is needed: 10/26/24         Action Taken: Other: [facility]    Travel Screening: Not Applicable     Date of Service:

## 2024-10-24 NOTE — TELEPHONE ENCOUNTER
Medication refill information reviewed.     Due date for oxyCODONE-acetaminophen (PERCOCET) 5-325 MG tablet is 10/24/24     Prescriptions prepped for review.     Will route to provider.

## 2024-10-25 RX ORDER — OXYCODONE AND ACETAMINOPHEN 5; 325 MG/1; MG/1
1 TABLET ORAL EVERY 6 HOURS PRN
Qty: 75 TABLET | Refills: 0 | Status: SHIPPED | OUTPATIENT
Start: 2024-10-25

## 2024-10-26 NOTE — TELEPHONE ENCOUNTER
Signed Prescriptions:                        Disp   Refills    oxyCODONE-acetaminophen (PERCOCET) 5-325 M*75 tab*0        Sig: Take 1 tablet by mouth every 6 hours as needed for           moderate to severe pain. Max of 3/day. Fill/Start           10/25/24.  #75 tabs for 30 day supply  Authorizing Provider: INA LOPEZ        Reviewed [place]  October 25, 2024- no concerning fills.    Ina Lopez APRN, RN CNP, FNP  [facility]  [facility]

## 2024-11-11 ENCOUNTER — MYC REFILL (OUTPATIENT)
Dept: PALLIATIVE MEDICINE | Facility: CLINIC | Age: 54
End: 2024-11-11
Payer: COMMERCIAL

## 2024-11-11 DIAGNOSIS — F11.90 CHRONIC, CONTINUOUS USE OF OPIOIDS: ICD-10-CM

## 2024-11-11 DIAGNOSIS — G89.29 CHRONIC BILATERAL LOW BACK PAIN WITH RIGHT-SIDED SCIATICA: ICD-10-CM

## 2024-11-11 DIAGNOSIS — M54.16 LUMBAR RADICULOPATHY: ICD-10-CM

## 2024-11-11 DIAGNOSIS — M54.41 CHRONIC BILATERAL LOW BACK PAIN WITH RIGHT-SIDED SCIATICA: ICD-10-CM

## 2024-11-11 DIAGNOSIS — M96.1 FAILED BACK SYNDROME: ICD-10-CM

## 2024-11-11 DIAGNOSIS — M96.1 FAILED BACK SURGICAL SYNDROME: ICD-10-CM

## 2024-11-11 DIAGNOSIS — G89.29 CHRONIC INTRACTABLE PAIN: ICD-10-CM

## 2024-11-12 RX ORDER — BUPRENORPHINE 20 UG/H
1 PATCH TRANSDERMAL
Qty: 4 PATCH | Refills: 0 | Status: SHIPPED | OUTPATIENT
Start: 2024-11-12

## 2024-11-12 NOTE — TELEPHONE ENCOUNTER
Refills have been requested for the following medications:         buprenorphine (BUTRANS) 20 MCG/HR WK patch [Ina Barillas]     Preferred pharmacy: [facility] #[id number] - [address] AT [facility] & [address] [facility] [address]

## 2024-11-12 NOTE — TELEPHONE ENCOUNTER
Received call from patient requesting refill(s) of Butrans    Last dispensed from pharmacy on: Oct. 18, 2024       Patient's last office/virtual visit by prescribing provider on: Aug. 19, 2024   Next office/virtual appointment scheduled for: Nov. 19, 2024       UDT: Apr. 15, 2024   CSA: Apr. 16, 2024     E-prescribe to    [facility] #[id number] - [place], [place] - [address] [place] [address] [address] AT [facility] & [address] [facility] [address]    Will route to nursing [place] for review and preparation of prescription(s).

## 2024-11-12 NOTE — TELEPHONE ENCOUNTER
Medication refill information reviewed.     Due date for buprenorphine (BUTRANS) 20 MCG/HR WK patch  is 11/15/24     Prescriptions prepped for review.     Will route to provider.

## 2024-11-13 NOTE — TELEPHONE ENCOUNTER
Signed Prescriptions:                        Disp   Refills    buprenorphine (BUTRANS) 20 MCG/HR WK patch 4 patch0        Sig: Place 1 patch onto the skin every 7 days. Fill           11/13/24 to start 11/15/24. 28 day script.  Authorizing Provider: INA LOPEZ        Reviewed [place]  November 12, 2024- no concerning fills.    Ina TORIBIO, RN CNP, FNP  [facility]

## 2024-11-19 ENCOUNTER — OFFICE VISIT (OUTPATIENT)
Dept: PALLIATIVE MEDICINE | Facility: CLINIC | Age: 54
End: 2024-11-19
Payer: COMMERCIAL

## 2024-11-19 VITALS — SYSTOLIC BLOOD PRESSURE: 137 MMHG | DIASTOLIC BLOOD PRESSURE: 91 MMHG | OXYGEN SATURATION: 98 % | HEART RATE: 77 BPM

## 2024-11-19 DIAGNOSIS — M54.6 CHRONIC THORACIC SPINE PAIN: ICD-10-CM

## 2024-11-19 DIAGNOSIS — G89.29 CHRONIC THORACIC SPINE PAIN: ICD-10-CM

## 2024-11-19 DIAGNOSIS — M47.812 CERVICAL SPONDYLOSIS WITHOUT MYELOPATHY: ICD-10-CM

## 2024-11-19 DIAGNOSIS — M54.2 CHRONIC NECK PAIN: Primary | ICD-10-CM

## 2024-11-19 DIAGNOSIS — G89.29 CHRONIC NECK PAIN: Primary | ICD-10-CM

## 2024-11-19 DIAGNOSIS — G89.29 CHRONIC INTRACTABLE PAIN: ICD-10-CM

## 2024-11-19 DIAGNOSIS — F32.A DEPRESSION, UNSPECIFIED DEPRESSION TYPE: ICD-10-CM

## 2024-11-19 DIAGNOSIS — M96.1 FAILED BACK SYNDROME: ICD-10-CM

## 2024-11-19 DIAGNOSIS — M96.1 FAILED BACK SURGICAL SYNDROME: ICD-10-CM

## 2024-11-19 DIAGNOSIS — G89.29 CHRONIC BILATERAL LOW BACK PAIN WITH RIGHT-SIDED SCIATICA: ICD-10-CM

## 2024-11-19 DIAGNOSIS — F11.90 CHRONIC, CONTINUOUS USE OF OPIOIDS: ICD-10-CM

## 2024-11-19 DIAGNOSIS — M54.16 LUMBAR RADICULOPATHY: ICD-10-CM

## 2024-11-19 DIAGNOSIS — M54.41 CHRONIC BILATERAL LOW BACK PAIN WITH RIGHT-SIDED SCIATICA: ICD-10-CM

## 2024-11-19 PROCEDURE — 99214 OFFICE O/P EST MOD 30 MIN: CPT | Performed by: NURSE PRACTITIONER

## 2024-11-19 RX ORDER — GABAPENTIN 600 MG/1
TABLET ORAL
Qty: 540 TABLET | Refills: 3 | Status: SHIPPED | OUTPATIENT
Start: 2024-11-19

## 2024-11-19 RX ORDER — OXYCODONE AND ACETAMINOPHEN 5; 325 MG/1; MG/1
1 TABLET ORAL EVERY 6 HOURS PRN
Qty: 75 TABLET | Refills: 0 | Status: SHIPPED | OUTPATIENT
Start: 2024-11-19

## 2024-11-19 RX ORDER — BUPRENORPHINE 20 UG/H
1 PATCH TRANSDERMAL
Qty: 4 PATCH | Refills: 0 | Status: SHIPPED | OUTPATIENT
Start: 2024-11-19

## 2024-11-19 RX ORDER — DULOXETIN HYDROCHLORIDE 30 MG/1
30 CAPSULE, DELAYED RELEASE ORAL DAILY
Qty: 90 CAPSULE | Refills: 1 | Status: SHIPPED | OUTPATIENT
Start: 2024-11-19

## 2024-11-19 ASSESSMENT — PAIN SCALES - GENERAL: PAINLEVEL_OUTOF10: MODERATE PAIN (5)

## 2024-11-19 NOTE — PATIENT INSTRUCTIONS
Plan:   Physical Therapy: none  Continue your regular HEP   Clinical Health Psychologist to address issues of relaxation, behavioral change, coping style, and other factors important to improvement: I support your ongoing work for mental health   Diagnostic Studies: obtain cervical and thoracic MRI scans  [facility] Scheduling:  [facility] [place]: [phone]  [facility]: [phone]  [facility] (including [place]): [phone]  Medication Management:   Continue Percocet max 3/day fill 11/22 and start 11/24   Continue Butrans 20mcg/hr fill 12/14 and start 12/16  has naloxone nasal spray for safety  Continue gabapentin  CONTINUE Cymbalta (duloxetine) to 90mg per day by taking a 30mg capsule in addition to the 60mg capsule.  Further procedures recommended: none  Acupuncture: yes, continue  Recommendations/follow-up for PCP:  See above  Release of information: none  Follow up: 12 weeks can be in-person or virtual.     =======================    Clinic Number:  [phone]   Call with any questions about your care and for scheduling assistance.   Calls are returned Monday through Friday between 8 AM and 4:30 PM. We usually get back to you within 2 business days depending on the issue/request.    If we are prescribing your medications:  For opioid medication refills, call the clinic or send a [employer] message 7 days in advance.  Please include:  Name of requested medication  Name of the pharmacy.  For non-opioid medications, call your pharmacy directly to request a refill. Please allow 3-4 days to be processed.   Per [place] State Law:  All controlled substance prescriptions must be filled within 30 days of being written.    For those controlled substances allowing refills, pickup must occur within 30 days of last fill.      We believe regular attendance is key to your success in our program!    Any time you are unable to keep your appointment we ask that you call us at least 24 hours in  advance to cancel.This will allow us to offer the appointment time to another patient.   Multiple missed appointments may lead to dismissal from the clinic.

## 2024-11-19 NOTE — PROGRESS NOTES
[facility]      11/19/2024      Chief complaint:   -Low back pain and right leg radicular pain to the right foot and toes  -intermittent onset mid back pain when taking shirt off for a shower, it is like a lightning jab or sledgehammer, this can occur every few days (lifting a bale of hay or something like that)  -he started seeing a new provider in [facility] and [facility]. He and his wife are going to couples counseling likely starting next month        Interval history:  David Wilcox is a 54 year old male is known to me for:  Chronic bilateral low back pain with right-sided sciatica  Lumbar radiculopathy  Failed back surgical syndrome  Chronic continuous use of opioids  Encounter for long-term use of opiate analgesic     Distant history of alcohol and drug abuse  Bipolar disorder   H/o menningitis after SCS trial  PMHx includes: Acute bronchitis, acute pancreatitis, acute sinusitis, ADHD, anxiety, depression, asthma, bipolar disorder unspecified, cervicalgia, dysthymic disorder, esophageal reflux, fixation of spine with fusion, history of blood transfusion, hyperlipidemia, low back pain, lumbar throat, meningitis after spinal cord stimulator trial, narcotic abuse, neck pain, open nondisplaced fracture of distal phalanx of finger, pain in limb, personal history of other diseases of the digestive system, sciatica, spasm of muscle, tobacco use disorder  PSHx includes: Back surgery in 2003 and 2011, lumbar anterior three-level fusion, ORIF right finger (3/3/2021)  .        Recommendations/plan at the last visit on 8/19/2024 included:  Physical Therapy: none  Continue your regular [facility]   Clinical Health Psychologist to address issues of relaxation, behavioral change, coping style, and other factors important to improvement: I support your ongoing work for mental health   Diagnostic Studies: none  Medication Management:   Continue Percocet max 3/day fill 9/2 and start 9/4 to last through  9/19. #48 tabs in this script. 16 day script to get butrans and Percocet due on the same day. Next script will be for #75 tabs  Continue Butrans 20mcg/hr fill 7/15 and start 7/17  has naloxone nasal spray for safety  Continue gabapentin  CONTINUE Cymbalta (duloxetine) to 90mg per day by taking a 30mg capsule in addition to the 60mg capsule.  Further procedures recommended: none  Contact me if you need orders to repeat trigger point injection  Acupuncture: yes, continue  Recommendations/follow-up for PCP:  See above  Release of information: none  Follow up: 12 weeks, IN-PERSON visit scheduled with me on  11/19 at 1 PM IN [place].        Since his last visit, David ROBLES Brenden reports:    Interval history November 19, 2024  --Low back pain and right leg radicular pain to the right foot and toes  -intermittent onset mid back pain when taking shirt off for a shower, it is like a lightning jab or sledgehammer, this can occur every few days (lifting a bale of hay or something like that)  -the pain is in the lower neck and upper to mid back. No imaging of cervical or thoracic spine since 2025. Will obtain updated imaging as he may be candidate for interventional options for this pain.   -he started seeing a new provider in [facility] and [facility]. He and his wife are going to couples counseling likely starting next month.  -his son got  in [place] at the end of October.        Interval history August 19, 2024  - David is feeling better mentally. Has been working with MsAlisha Katherin with [employer]. That is done and will transition to work with a new therapist with [facility].   -he is off of phentermine, ?? Effect on mental health  -now also on Abilify. Doing well on cymbalta 90mg/day.   -low back pain with right leg pain is the same.   -having intermittent sharp pain in the mid back. It is sharp when it happens, but not present all of the time.     Interval history July 8, 2024  --Low back pain and right leg  "radicular pain, same, no change in this pain  -lots of family issues, increased stress at home  -states it \"all came to head\" recently  -had felt suicidal after his family went out to celebrate his daughter's 18th birthday without him, called suicide hotline.   -now having passive suicidal ideation, though not as severe as it was around the 27th of June  -seeing a counselor tomorrow in-person with Debbie.   -newer onset mid back pain when taking shirt off for a shower, it was like a lightning jab or sledgehammer and was gone within a couple of days.   -has lost 40 lbs, on phentermine  -discussed increase in Duloxetine. Patient is willing to do so as this has been very helpful for his mood in the past.    Interval history April 15, 2024  -had been down in [place], [place] with his son working on a truck. Had a lot of fun restoring it.   -drove back to [place] last night.   -low back and right leg pain is worse due to different activity. No red flag symptoms at all.   -feels his pain is under better control with increased dosing of Butrans and use of oxycodone for breakthrough. He does not want to make any changes at this time in his medication regimen.   -trigger point injections done in early March were helpful for about a week or so.      Interval history January 15, 2024  -colder weather has bothered chronic low back and right leg pain. Otherwise stable.   -has had about a month long issue with increased mid back pain. No known injury or illness to explain this pain.   -stable on current medications    Pain history collected 5/27/2022 at initial visit  Out cutting a tree and it fell on him in 2002. He had compression fractures L5-S1, and multiple other fractures and spent almost a year in a wheelchair, initially told he would not walk again.   His initial spinal  surgery was helpful, but he then fell down the stairs and he knew something was wrong but no one would believe him. He states 8 years later they found loose " "hardware and he had to have a subsequent surgery to fix that. Then a 3rd surgery extended the fusion. He is now fused from L3-S1.      Pain on the right side from bottom of the ribs to the low back, around the hip and down the right leg to the toes. Bilateral hip pain posterolateral hip pain (points to buttocks).      He has had loss of bowel and bladder but it is not a common thing. This is not new, present off and on for 6 years.      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   Has regular exercise with chores and he now walks every day on the treadmill (2km in the AM and PM)  Does [employer] in [place] on Fridays and his wife does so on Saturdays.           At this point, the patient's participation with our multidisciplinary team includes:  The patient has been compliant with the program.  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 Kim Pandey      Pain scores:  Pain intensity on average is 7 on a scale of 0-10.    Range is 4-9/10.   Pain right now is 5/10.   Pain is described as \"like a pillow with a knife through it and when bad, like I am on fire or like someone hit me with a sledgehammer\"    Pain is constant in nature      Current pain relevant medications:   -Tylenol extra strength 500mg PRN   -naloxone nasal spray for opiate reversal   -Duloxetine 90mg every day (very helpful for mood and pain)  -gabapentin 600mg take 2 tabs/1200mg TID (helpful)  -nortriptyline 10mg take 2 (20mg) at bedtime (helpful)  -Butrans 20mcg/hr TD Q 7 days (helpful)  -Percocet 5/325mg take 1 tab Q 6 hours PRN max 3/day (he plans on 2/day and uses 3 if needed. Helpful)  -tizanidine 4mg TID PRN for muscle spasms (helpful)        Other pertinent medications:  -lorazepam 0.5mg BID PRN anxiety (very rare use, received #30 tabs in 2018 and still has some left)  -mirtazepine 15mg at bedtime  -ambien 10mg Q HS PRN insomnia         "   Previous medication treatments included:  OPIATES: oxycodone (helpful), hydrocodone (not helpful), Methadone (took after surgery, very helpful), Fentanyl patches (would have issues with getting to hot and get a dump of medication. He has used this very successfully in the winter when it is colder), Dilaudid (hydromorphone, very helpful for a migraine with antibiotics), MSContin (more helpful than OxyContin was)  NSAIDS: naproxyn (not helpful), ibuprofen (not helpful)  MUSCLE RELAXANTS: Methocarbamol (expense, helpful), Flexeril (helpful but he had a remote history of a suicide attempt with this drug), tizanidine (very helpful)  ANTI-MIGRAINE MEDS: none  ANTI-DEPRESSANTS: none  SLEEP AIDS: Remeron (helpful)  ANTI-CONVULSANTS: nortriptyline (helpful)  TOPICALS: CBD oil (helps some)  ANXIOLYTICS: lorazepam (helpful)  MEDICAL CANNABIS: none  Other meds: lidocaine (not helpful)        Other treatments have included:   David Wilcox has been seen at a pain clinic in the past.  Previously managed by Dr. Bindu Motley here  PT: tried, was not helpful on multiple occasions  Chiropractic care: yes, uses this regularly for acupuncture  Acupuncture: helpful  TENs Unit: has one, helpful, uses it occasionally  SCS trial in   got meningitis and nearly       Injections:   -3/26/2018 caudal ALVA with Dr. Bindu Motley  (not helpful)  -2018 right L3-4 transforaminal ALVA with Dr. Bindu Motley (not helpful)  -2019  Bilateral SI joint injections with Dr. Bindu Motley  (not helpful)  -3/4/2024 trigger point injections with Dr. Brandon Hickman (helpful for about a week) @ bilat trap, latissimus dorsi and quadratus lumborum using 0.25% bupivicaine and 40mg Kenalog       THE 4 A's OF OPIOID MAINTENANCE ANALGESIA    Analgesia: helpful    Activity: exercising on treadmill twice daily, more active    Adverse effects: none    Adherence to Rx protocol: yes      Side Effects: no side effect  Patient is using the medication  as prescribed: YES    Medications:  Current Outpatient Medications   Medication Sig Dispense Refill    acetaminophen (TYLENOL) 500 MG tablet Take 1,500 mg by mouth at bedtime      albuterol (PROAIR HFA/PROVENTIL HFA/VENTOLIN HFA) 108 (90 Base) MCG/ACT inhaler INHALE 2 PUFFS INTO THE LUNGS EVERY 4 HOURS AS NEEDED FOR SHORTNESS OF BREATH 8.5 g 11    buprenorphine (BUTRANS) 20 MCG/HR WK patch Place 1 patch onto the skin every 7 days Fill 03/26/24 and start on 03/28/24. 28 day script. 4 patch 0    DULoxetine (CYMBALTA) 60 MG capsule Take 1 capsule (60 mg) by mouth daily 90 capsule 3    gabapentin (NEURONTIN) 600 MG tablet TAKE 2 TABLETS BY MOUTH THREE TIMES DAILY 180 tablet 4    LORazepam (ATIVAN) 0.5 MG tablet One bid prn anxiety. Rare use 30 tablet 0    losartan (COZAAR) 100 MG tablet Take 1 tablet (100 mg) by mouth daily 90 tablet 3    mirtazapine (REMERON) 15 MG tablet TAKE 1 TABLET(15 MG) BY MOUTH AT BEDTIME 90 tablet 1    naloxone (NARCAN) 4 MG/0.1ML nasal spray Spray 1 spray (4 mg) into one nostril alternating nostrils once as needed for opioid reversal every 2-3 minutes until assistance arrives 0.2 mL 1    nortriptyline (PAMELOR) 10 MG capsule TAKE 2 CAPSULES(20 MG) BY MOUTH AT BEDTIME 180 capsule 3    omeprazole (PRILOSEC) 20 MG DR capsule Take 1 capsule (20 mg) by mouth daily 90 capsule 3    oxyCODONE-acetaminophen (PERCOCET) 5-325 MG tablet Take 1 tablet by mouth every 6 hours as needed for moderate to severe pain Max of 3/day. Fill/Start 03/14/24 75 tablet 0    phentermine (ADIPEX-P) 37.5 MG tablet 1 tablet each morning 60 tablet 2    Semaglutide-Weight Management (WEGOVY) 0.25 MG/0.5ML pen Inject 0.25 mg Subcutaneous once a week 2 mL 5    tiZANidine (ZANAFLEX) 4 MG tablet Take 1 tablet (4 mg) by mouth 3 times daily as needed for muscle spasms 0.5-1 tab tid prn muscle spasm 90 tablet 4    zolpidem (AMBIEN) 10 MG tablet TAKE 1 TABLET BY MOUTH EVERY NIGHT AS NEEDED FOR INSOMNIA 30 tablet 5       Medical  History: any changes in medical history since they were last seen? No    Social History:   Home situation:  with children x 2 at home.   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   History of chemical dependency treatment: quit on own       Is patient a current smoker or tobacco user?  no  If yes, was cessation counseling offered?  no          Physical Exam:  Vital signs: BP (!) 137/91   Pulse 77   SpO2 98%     Behavioral observations:  Awake, alert, conversant and cooperative     Gait:  normal    Musculoskeletal exam:  strength grossly equal throughout    Neuro exam:  SILT in all extremities     Skin/vascular/autonomic:  No suspicious lesions on exposed skin.     Other:  na              Diagnostic tests:  MRI OF THE LUMBAR SPINE WITHOUT AND WITH CONTRAST August 6, 2021 5:57  PM      HISTORY: Low back pain, prior surgery, new symptoms. Worsening pain  above fusion, but also right radicular L5 or S1 distribution on the  right. Failed back surgical syndrome. Lumbar radiculopathy.     TECHNIQUE: Multiplanar, multisequence MRI images of the lumbar spine  were acquired without and with 10mL Gadavist IV contrast.     COMPARISON: Lumbar spine MR 4/27/2018, lumbar spine CT 7/14/2016.     FINDINGS: There are five lumbar-type vertebrae for the purposes of  this dictation. Posterior spine fusion from L3 down to S1 consisting  of bilateral pedicle screws at each level with inner connecting rods,  interbody fusion devices in the L4-L5 and L5-S1 disc spaces and  laminectomies of L3, L4, L5 and S1 again noted.     Degenerative grade 1 anterolisthesis of L5 upon S1 and mild  degenerative retrolisthesis of L2 upon L3 again noted. Alignment  otherwise normal and unchanged. Vertebral body heights normal. No  fractures. No evidence for pathologic bony lesion. Marrow signal  normal.     There is loss of  disc height, disc desiccation and posterior disc  bulging/herniation to varying degrees at all levels of the lumbar  spine with the exception of the resected L4-L5 and L5-S1 discs.      The tip of the conus medullaris is at the L1-L2 level which is within  normal limits. No abnormal intrathecal contrast enhancement. There is  no evidence for intrathecal abnormality.      Level by level:      T12-L1: Loss of disc height, disc desiccation and circumferential disc  bulging with a superimposed small posterior central disc herniation  (extrusion). Minimal facet arthropathy bilaterally. Minimal spinal  canal narrowing. No foraminal stenosis on either side. No change from  the comparison study.     L1-L2: Loss of disc height, disc desiccation and circumferential disc  bulging with a superimposed small posterior central disc herniation  (protrusion). Minimal facet arthropathy bilaterally. Minimal spinal  canal narrowing. No foraminal stenosis on either side. No change from  the comparison study.     L2-L3: Loss of disc height, disc desiccation and mild circumferential  disc bulging. No herniation. Moderate facet arthropathy bilaterally.  Mild spinal canal narrowing. Mild bilateral neural foraminal  narrowing. No change from the comparison study.     L3: Again noted is a fluid signal intensity collection in the right  posterolateral aspect of the spinal canal measuring 1.7 x 0.7 cm in  the AP and transverse dimensions respectively. This may represent a  synovial cyst arising from the right facet joint versus a cystic  postoperative fluid collection or cystic foreign body reaction. This  results in mild narrowing of the thecal sac at the mid L3 level.     L3-L4: (Fused level) Loss of disc height, disc desiccation and mild  circumferential disc bulging. No herniation. Moderate facet  arthropathy bilaterally. No spinal canal stenosis. Mild bilateral  neural foraminal narrowing. No change from the comparison study.      L4-L5:  (Fused level) No spinal canal stenosis. No foraminal stenosis  on either side. No change from the comparison study.     L5-S1: (Fused level) No spinal canal stenosis. No foraminal stenosis  on either side. No change from the comparison study.                                                                      IMPRESSION:  1. Postoperative and degenerative change of the lumbar spine as  detailed above without appreciable change from the comparison studies.  2. No significant spinal canal or neural foraminal stenosis at any  level of the spine.     JAI CHAVEZ MD         Other testing (labs, diagnostics):  7/3/2024  Cr. 0.88  Est GFR >90        Screening tools:      DIRE Score for ongoing opioid management is calculated as follows:     Diagnosis = 2     Intractability = 2     Risk: Psych = 2  Chem Hlth = 2  Reliability = 3  Social = 3     Efficacy = 2     Total DIRE Score = 16 (14 or higher predicts good candidate for ongoing opioid management; 13 or lower predicts poor candidate for opioid management)            [place] Prescription Monitoring Program:  Reviewed [place]  11/19/2024- no concerning fills.  Ina TORIBIO, RN CNP, FNP  [facility]  [place] location              Assessment:  Chronic neck pain  Cervical spondylosis without myelopathy  Chronic thoracic pain  Lumbar radiculopathy  Failed back surgical syndrome  Failed back syndrome  Chronic bilateral low back pain with right sided sciatica  depression  Chronic intractable pain  Chronic continuous use of opioids    Encounter for long term use of opiate analgesic  Signed CSA 4/15/2024   UDT done 4/15/2024   Essential hypertension  Class 3 severe obesity due to excess calories with serious co-morbidity (untreated OBSTRUCTIVE SLEEP APNEA) and BMI of >39 in adult  OBSTRUCTIVE SLEEP APNEA (currently untreated)  Distant history of alcohol and drug abuse  Bipolar disorder   H/o menningitis after SCS trial  PMHx includes: Acute  bronchitis, acute pancreatitis, acute sinusitis, ADHD, anxiety, depression, asthma, bipolar disorder unspecified, cervicalgia, dysthymic disorder, esophageal reflux, fixation of spine with fusion, history of blood transfusion, hyperlipidemia, low back pain, lumbar throat, meningitis after spinal cord stimulator trial, narcotic abuse, neck pain, open nondisplaced fracture of distal phalanx of finger, pain in limb, personal history of other diseases of the digestive system, sciatica, spasm of muscle, tobacco use disorder  PSHx includes: Back surgery in 2003 and 2011, lumbar anterior three-level fusion, ORIF right finger (3/3/2021)        Plan:   Physical Therapy: none  Continue your regular [facility]   Clinical Health Psychologist to address issues of relaxation, behavioral change, coping style, and other factors important to improvement: I support your ongoing work for mental health   Diagnostic Studies: obtain cervical and thoracic MRI scans  [facility] Scheduling:  [place] [place]: [phone]  [place]: [phone]  [facility] (including [place]): [phone]  Medication Management:   Continue Percocet max 3/day fill 11/22 and start 11/24   Continue Butrans 20mcg/hr fill 12/14 and start 12/16  has naloxone nasal spray for safety  Continue gabapentin  CONTINUE Cymbalta (duloxetine) to 90mg per day by taking a 30mg capsule in addition to the 60mg capsule.  Further procedures recommended: none  Acupuncture: yes, continue  Recommendations/follow-up for PCP:  See above  Release of information: none  Follow up: 12 weeks can be in-person or virtual.          ASSESSMENT AND PLAN:  (M54.2,  G89.29) Chronic neck pain  (primary encounter diagnosis)  Comment:   Plan: MR Cervical Spine w/o Contrast, Adult Pain         Clinic Follow-Up Order            (M47.812) Cervical spondylosis without myelopathy  Comment:   Plan: MR Cervical Spine w/o Contrast, Adult Pain         Clinic Follow-Up Order             (M54.6,  G89.29) Chronic thoracic spine pain  Comment:   Plan: MR Thoracic Spine w/o Contrast, Adult Pain         Clinic Follow-Up Order            (M54.16) Lumbar radiculopathy  Comment:   Plan: buprenorphine (BUTRANS) 20 MCG/HR WK patch,         gabapentin (NEURONTIN) 600 MG tablet,         oxyCODONE-acetaminophen (PERCOCET) 5-325 MG         tablet, tiZANidine (ZANAFLEX) 4 MG tablet,         Adult Pain Clinic Follow-Up Order            (M96.1) Failed back surgical syndrome  Comment:   Plan: buprenorphine (BUTRANS) 20 MCG/HR WK patch,         gabapentin (NEURONTIN) 600 MG tablet,         oxyCODONE-acetaminophen (PERCOCET) 5-325 MG         tablet, tiZANidine (ZANAFLEX) 4 MG tablet,         Adult Pain Clinic Follow-Up Order            (M96.1) Failed back syndrome  Comment:   Plan: buprenorphine (BUTRANS) 20 MCG/HR WK patch,         oxyCODONE-acetaminophen (PERCOCET) 5-325 MG         tablet, tiZANidine (ZANAFLEX) 4 MG tablet,         Adult Pain Clinic Follow-Up Order            (M54.41,  G89.29) Chronic bilateral low back pain with right-sided sciatica  Comment:   Plan: buprenorphine (BUTRANS) 20 MCG/HR WK patch,         gabapentin (NEURONTIN) 600 MG tablet,         oxyCODONE-acetaminophen (PERCOCET) 5-325 MG         tablet, tiZANidine (ZANAFLEX) 4 MG tablet,         Adult Pain Clinic Follow-Up Order            (F32.A) Depression, unspecified depression type  Comment:   Plan: DULoxetine (CYMBALTA) 30 MG capsule, Adult Pain        Clinic Follow-Up Order            (G89.29) Chronic intractable pain  Comment:   Plan: buprenorphine (BUTRANS) 20 MCG/HR WK patch,         oxyCODONE-acetaminophen (PERCOCET) 5-325 MG         tablet, tiZANidine (ZANAFLEX) 4 MG tablet,         Adult Pain Clinic Follow-Up Order            (F11.90) Chronic, continuous use of opioids  Comment:   Plan: buprenorphine (BUTRANS) 20 MCG/HR WK patch,         oxyCODONE-acetaminophen (PERCOCET) 5-325 MG         tablet, Adult Pain Clinic Follow-Up  Order              BILLING TIME DOCUMENTATION:   TOTAL TIME includes:   Time spent preparing to see the patient: 2 minutes (reviewing records and tests)  Time spend face to face with the patient: 31 minutes  Time spent ordering tests, medications, procedures and referrals: 0 minutes  Time spent Referring and communicating with other healthcare professionals: 0 minutes  Documenting clinical information in Epic: 5 minutes    The total TIME spent on this patient on the day of the appointment was 38 minutes.               Ina TORIBIO, RN CNP, FNP  [facility]  [facility] [place] [facility]

## 2024-11-20 DIAGNOSIS — F51.01 PRIMARY INSOMNIA: ICD-10-CM

## 2024-11-20 RX ORDER — ZOLPIDEM TARTRATE 10 MG/1
TABLET ORAL
Qty: 30 TABLET | Refills: 5 | Status: SHIPPED | OUTPATIENT
Start: 2024-11-20

## 2025-01-07 ENCOUNTER — MYC REFILL (OUTPATIENT)
Dept: PALLIATIVE MEDICINE | Facility: CLINIC | Age: 55
End: 2025-01-07
Payer: COMMERCIAL

## 2025-01-07 DIAGNOSIS — M54.41 CHRONIC BILATERAL LOW BACK PAIN WITH RIGHT-SIDED SCIATICA: ICD-10-CM

## 2025-01-07 DIAGNOSIS — M54.16 LUMBAR RADICULOPATHY: ICD-10-CM

## 2025-01-07 DIAGNOSIS — G89.29 CHRONIC BILATERAL LOW BACK PAIN WITH RIGHT-SIDED SCIATICA: ICD-10-CM

## 2025-01-07 DIAGNOSIS — M96.1 FAILED BACK SURGICAL SYNDROME: ICD-10-CM

## 2025-01-07 DIAGNOSIS — M96.1 FAILED BACK SYNDROME: ICD-10-CM

## 2025-01-07 DIAGNOSIS — F11.90 CHRONIC, CONTINUOUS USE OF OPIOIDS: ICD-10-CM

## 2025-01-07 DIAGNOSIS — G89.29 CHRONIC INTRACTABLE PAIN: ICD-10-CM

## 2025-01-07 NOTE — TELEPHONE ENCOUNTER
Received call from patient requesting refill(s) buprenorphine (BUTRANS) 20 MCG/HR WK patch    Last dispensed from pharmacy on 12/16/2024    Patient's last office/virtual visit by prescribing provider on 11/19/2024  Next office/virtual appointment scheduled for 02/19/2025    Last urine drug screen date 04/15/2024  Current opioid agreement on file (completed within the last year) Yes Date of opioid agreement: 04/15/2024    E-prescribe to      [id number] - [place], [place] - [facility] [address] [facility] [address]    Will route to nursing [place] for review and preparation of prescription(s).     Jenny Morrell MA  [facility] [place]

## 2025-01-08 RX ORDER — BUPRENORPHINE 20 UG/H
1 PATCH TRANSDERMAL
Qty: 4 PATCH | Refills: 0 | Status: SHIPPED | OUTPATIENT
Start: 2025-01-08

## 2025-01-08 NOTE — TELEPHONE ENCOUNTER
Medication refill information reviewed.     Due date for  buprenorphine (BUTRANS) 20 MCG/HR WK patch  is 01/13/25     Prescriptions prepped for review.     Will route to provider.

## 2025-01-08 NOTE — TELEPHONE ENCOUNTER
Signed Prescriptions:                        Disp   Refills    buprenorphine (BUTRANS) 20 MCG/HR WK patch 4 patch0        Sig: Place 1 patch onto the skin every 7 days. Fill           01/11/25 to start 01/13/25. 28 day script.  Authorizing Provider: INA LOPEZ        Reviewed [place]  January 8, 2025- no concerning fills.    Ina TORIBIO, RN CNP, FNP  [facility]  [facility] [place]

## 2025-01-27 ENCOUNTER — VIRTUAL VISIT (OUTPATIENT)
Dept: PHARMACY | Facility: CLINIC | Age: 55
End: 2025-01-27
Attending: INTERNAL MEDICINE
Payer: COMMERCIAL

## 2025-01-27 VITALS — BODY MASS INDEX: 38.77 KG/M2 | WEIGHT: 255 LBS

## 2025-01-27 DIAGNOSIS — M54.50 CHRONIC LOW BACK PAIN, UNSPECIFIED BACK PAIN LATERALITY, UNSPECIFIED WHETHER SCIATICA PRESENT: ICD-10-CM

## 2025-01-27 DIAGNOSIS — J45.909 ASTHMA: ICD-10-CM

## 2025-01-27 DIAGNOSIS — G47.33 OSA (OBSTRUCTIVE SLEEP APNEA): ICD-10-CM

## 2025-01-27 DIAGNOSIS — I10 HYPERTENSION, UNSPECIFIED TYPE: ICD-10-CM

## 2025-01-27 DIAGNOSIS — G89.29 CHRONIC LOW BACK PAIN, UNSPECIFIED BACK PAIN LATERALITY, UNSPECIFIED WHETHER SCIATICA PRESENT: ICD-10-CM

## 2025-01-27 DIAGNOSIS — E66.01 MORBID OBESITY (H): Primary | ICD-10-CM

## 2025-01-27 RX ORDER — LORAZEPAM 0.5 MG/1
0.5 TABLET ORAL PRN
COMMUNITY

## 2025-01-27 RX ORDER — MULTIPLE VITAMINS W/ MINERALS TAB 9MG-400MCG
1 TAB ORAL DAILY
COMMUNITY

## 2025-01-27 NOTE — NURSING NOTE
Current patient location: [address] [place] [id number]    Is the patient currently in the state of [place]? YES    Visit mode: VIDEO    If the visit is dropped, the patient can be reconnected by:TELEPHONE VISIT: Phone number:   Telephone Information:   Mobile [phone]       Will anyone else be joining the visit? NO  (If patient encounters technical issues they should call [phone]    Are changes needed to the allergy or medication list? Pt stated no med changes    Are refills needed on medications prescribed by this physician? NO    Rooming Documentation:  Not applicable    Reason for visit: Medication Therapy Management    Abena PRAJAPATI

## 2025-01-27 NOTE — PROGRESS NOTES
Medication Therapy Management (MTM) Encounter    ASSESSMENT:                            Medication Adherence/Access: See below for considerations.    Weight Management /HARI:   David would benefit from initiating pharmacotherapy with Zepbound for HARI and weight management. Given class II obesity and moderate HARI (sleep study 3/24/2022), recommend initiating GLP1/GIP therapy as data to support most significant weight loss. Dr. Hood discussed GLP-1 therapy in December visit, indications, contraindications (denies history of MTC, MEN2, and pancreatitis), potential side effects, and estimated costs of each. Dr. Hood ordered semaglutide compound however he never started therapy due to high cost / affordability.  He has not yet consulted with psych regarding restarting phentermine, he will call to schedule this.  We discussed that medications must always be used together with lifestyle changes such as improvements in diet choices, portion control and establishing and maintaining a regular exercise program, recommended meeting with dietician if covered by insurance.      Hypertension   At goal of <140/90 mmHg, monitors at home (125-140/80s mmHg)     Pain:  Stable, is now able to walk 3 miles daily with current regimen but would like less medication therapy. Opioid induced constipation stable with Metamucil therapy.     Asthma   Uncontrolled during summer months (albuterol several times daily), very minimal albuterol use in winter months. Discussed using steroid inhaler for maintenance during summer months, he will discuss with his primary care provider whom manages his asthma therapy.     PLAN:                            Call [phone]  to schedule with psychiatry     Will place order for Zepbound 2.5 mg weekly to get Prior Authorization started with indication for Obstructive Sleep Apnea, order will not release to your pharmacy until Prior Authorization has been approved or denied   - information on Zepbound (see  below)     Protein goal:  20-30 grams of protein per meal, at least 60 grams per day     Call number on the back of your insurance card to see if dietician services are covered, if so can call [phone] to schedule    Could discuss with your PCP using steroid inhaler for summer months      Follow-up:  - Next MTM dependant on Zepbound start  - bazinga! [employer] message with scheduling tool      - Next provider visit: 6/13/25 Traci Hood MD       SUBJECTIVE/OBJECTIVE:                          David Wilcox is a 54 year old male seen for an initial visit. He was referred to me from Traci Hood MD.      Reason for visit: comprehensive medication review and medical weight management.    Allergies/ADRs: Reviewed in chart  Past Medical History: Reviewed in chart  Tobacco: He reports that he has quit smoking. His smoking use included cigarettes and cigars. He has never used smokeless tobacco.  Alcohol: not currently using  Caffeine: 4x 20 Oz coffee, drinks until 7:30 - 8 pm (stopping caffeine does not impact sleep)    Medication Adherence/Access:   - previous GLP-1 coverage attempts denied    Weight Management /HARI:   - no current medication therapy    He never started semaglutide, too expensive.  Has scheduled with psychiatrist outside [facility] for May to discuss phentermine restart, unable to get in with [facility].  Had sleep study in 2022 which shown moderate HARI, he would be interested in pursuing Zepbound coverage with this new indication.  He does not use CPAP currently.  Discussed Zepbound in detail, administration, mechanism, effectiveness, side effects, and most effective with lifestyle modifications.     Nutrition/Eating Habits:      Meal times:      - Breakfast:  bowl of cereal in the morning (frosted min-wheats)     - Lunch:  4 eggs, 2 slices bread    - Dinner:  wife cooks (protein, veggie) burrito  - Snacks: afternoon chobani greek yogurts with 2 string cheese, before bed bowl of cereal, has  "uncontrolled snacking when takes ambien and this helps (has cooked 4 course meals while sleeping but eating before bed prevents this)    - Water per day:  1 gallon of water   Sleep:  8 hrs/ night, not rested due to waking in pain (broke his back)  Exercise/Activity: 3 miles / day on treadmill, restarted in December.   Medications Tried/Failed:  Topiramate: did not tolerate  Wegovy: no coverage    - Last provider visit: 12/6/24 Traci Hood MD    - Next provider visit: 6/13/25 Traci Hood MD       Initial Consult Weight: 271 lb     Current weight today: 255 lbs 0 oz  Cumulative Weight Loss: -16 lb, -5.9% from baseline    Wt Readings from Last 4 Encounters:   01/27/25 255 lb (115.7 kg)   12/06/24 260 lb (117.9 kg)   07/03/24 238 lb (108 kg)   06/12/24 244 lb (110.7 kg)     Estimated body mass index is 38.77 kg/m  as calculated from the following:    Height as of 12/6/24: 5' 8\" (1.727 m).    Weight as of this encounter: 255 lb (115.7 kg).    Hypertension   - losartan 100 mg once daily     Patient reports no current medication side effects  Patient self monitors blood pressure.  Home BP monitoring 125-140 / 80 mmHg .       Pain:  - acetaminophen 1,500 mg at bedtime and as needed infrequently   - Butrans 20 mcg patch weekly  - gabapentin 1,200 mg three times daily   - Nortriptyline 20 mg at bedtime   - Percocet 5/325 1 twice daily and 1/2 as needed (max 2.5 tablets / day)  - tizanidine 4 mg three times daily     Is working but would prefer to not take any of them, he is happy he is able to walk 3 miles per day.  Metamucil twice daily helps prevent constipation, current 1-2 BM / day.     Asthma   - albuterol inhaler as needed    - summer can be several times per day, winter hardly at all using    Quit smoking 5-6 years ago and was shortly after diagnosed with asthma. Has not considered using steroid inhaler for summer months but familiar with them as son has used.   Patient reports no current medication side " effects.  Sob / panic like symptoms when has symptoms.    Triggers include:  unsure, but starts in March - November .  Patient reports the following symptoms: none.         Today's Vitals: Wt 255 lb (115.7 kg)   BMI 38.77 kg/m    ----------------    I spent 59 minutes with this patient today. All changes were made via collaborative practice agreement with Traci Hood.     A summary of these recommendations was sent via [employer].    Kimberlyn Swanson, PharmD    Medication Therapy Management Pharmacist  [facility]      Telemedicine Visit Details  The patient's medications can be safely assessed via a telemedicine encounter.  Type of service:  Video Conference via [employer]  Originating Location (pt. Location): Home    Distant Location (provider location):  Off-site  Start Time:  10:09 am   End Time: 11:08 AM     Medication Therapy Recommendations  Asthma   1 Rationale: Synergistic therapy - Needs additional medication therapy - Indication   Recommendation: Start Medication   Status: Patient Agreed - Adherence/Education   Identified Date: 1/27/2025 Completed Date: 1/28/2025   Note: recommend steroid inhaler         HARI (obstructive sleep apnea)   1 Rationale: Untreated condition - Needs additional medication therapy - Indication   Recommendation: Start Medication - Zepbound 2.5 MG/0.5ML Soaj   Status: Accepted per CPA   Identified Date: 1/27/2025 Completed Date: 1/28/2025

## 2025-01-27 NOTE — PATIENT INSTRUCTIONS
"Recommendations from MTM Pharmacist visit:                                                    MTM (medication therapy management) is a service provided by a clinical pharmacist designed to help you get the most of out of your medicines.  You may be sent a phone or email survey evaluating today's visit.  Please provide feedback you have for the service he received today if you are able.      Call [phone]  to schedule with psychiatry     Will place order for Zepbound 2.5 mg weekly to get Prior Authorization started with indication for Obstructive Sleep Apnea, order will not release to your pharmacy until Prior Authorization has been approved or denied   - information on Zepbound (see below)     Protein goal:  20-30 grams of protein per meal, at least 60 grams per day     Call number on the back of your insurance card to see if dietician services are covered, if so can call [phone] to schedule    Could discuss with your PCP using steroid inhaler for summer months      Follow-up:  - Next MTM dependant on Zepbound start  - Flayr message with scheduling tool      - Next provider visit: 6/13/25 Traci Hood MD       It was great speaking with you today.  I value your experience and would be very thankful for your time in providing feedback in our clinic survey. In the next few days, you may receive an email or text message from [employer] with a link to a survey related to your  clinical pharmacist.\"     To schedule another MTM appointment, please call the clinic directly ([facility] Phone Number: [phone] (schedules for [facility] and [facility] - providers, dietitians, health coaches) or you may call the MTM scheduling line at [phone] or toll-free at [phone].     My Clinical Pharmacist's contact information:                                                      Please feel free to contact me with any questions or concerns you have.    "   Kimberlyn Swanson, PharmD    Medication Therapy Management Pharmacist   [facility]    Zepbound (Tirzepatide)    Zepbound (Tirzepatide): is an injectable prescription medicine recently FDA approved for adults with obesity or overweight with an additional condition affected by their weight (type 2 diabetes, high blood pressure, high cholesterol, obstructive sleep apnea, etc). Zepbound contains the same active ingredient as what is in Mounjaro, an injectable FDA approved for Type 2 Diabtes. Zepbound is intended to be used along with dietary/behavioral changes and consistent exercise to improve assist with weight loss and other health improvement outcomes.     It is given as a shot once a week. It activates the body's receptors for GIP (glucose-dependent insulinotropic polypeptide) and GLP-1 (glucagon-like peptide-1) receptor, two naturally occurring hormones that help tell the brain that you are full. It also works is by slowing down how quickly food leaves your stomach. What this means for you: You will start to feel puentes more quickly, notice portion changes and eat less often between meals. It is still important to maintain consistent eating schedule with meals +/- snacks and get in good hydration.      Dosing:  Initial dose: 2.5 mg injected subcutaneously once weekly for 4 weeks  Further dose changes can include: increase to 5 mg once weekly for 4 weeks, then 7.5 mg once weekly for 4 weeks, then 10 mg once weekly for 4 weeks then 12.5 mg once weekly for 4 weeks, then 15 mg (maximum dose) once weekly.    Dose changes are based on how you are tolerating the current dose, what benefits you are seeing at the current dose and if improvement in effectiveness of the drug is needed. The goal is to find the dose that works best for you with the least amount of side effects. You may find you reach this at a lower dose than the maximum dose.     Side effects: Patients are advised  "to eat more slowly and purposefully. Give yourself less portions. You may find after starting this medication you have a new point of fullness. It is also important to stay adequately hydrated on the medication to reduce risks of some of these side effects. Many of these side effects (nausea, diarrhea, etc) occurred during dose escalation but did improve over time with continuing the medication. If side effects are unmanageable, reach out to your prescribing provider.     The risk of pancreatitis (inflammation of the pancreas) has been associated with this type of medication, but is very rare.  If you have had pancreatitis in the past, this medication may not be for you. Please let us know about any past history of pancreas problems. If you experience persistent severe abdominal pain (sometimes radiating to the back potentially accompanied by vomiting and have a fever), stop the medication and contact your provider immediately for assessment. They will do a blood test to check for pancreatitis.       How to Inject:   https://www.zepbound.[employer].com/how-to-use    Storage:   Store your pen in the refrigerator. You may store your pen at room temperature for up to 21 days. If you store the pen at room temperature, do not return the pen to the refrigerator. Discard the pen if not used within 21 days after removing from the refrigerator.      For any questions or concerns please send a oohilove message to our team or call our weight management call center at [phone] during regular business hours.         Protein:    Quick/Easy Protein Sources:  Hard boiled eggs  Part-skim cheese sticks  Baby Bell cheese rounds  Low-fat/low-sugar Greek yogurt  Low-fat cottage cheese  Lean deli meat (chicken/turkey/ham)  Roasted chickpeas or lentils  Nuts   Turkey meat stick  Protein shake/bar  \"P3\" snack (cheese, nuts, deli meat)  Aldi's \"Protein Bread\"   \"Egglife\" egg white wrap    Tuna/salmon can/packet     Protein Supplements: "   Unflavored protein powder: Genepro, Isopure (25-30 gm protein per 1 scoop); Vital Proteins collagen powder (1 tbsp = 5 gm pro)  You may also use flavored protein powder if you prefer.   Protein shots: https://store.[employer].BigTent Design/collections/protein-shots  Pre-made protein shakes (no more than 210 Calories, at least 20 grams of protein, and less than 10 grams of sugar):   Premier Protein (160 Calories, 30 g protein)  Boost/Ensure Max (160 calories, 30 gm protein)   Fairlife Core Power (170 calories, 26 gm protein)  Aldi's Elevation Protein Shake (160 calories, 30 gm protein)   Equate Protein Shake (160 calories, 30 gm protein)  Slim Fast Advanced Nutrition (180 Calories, 20 g protein)  Muscle Milk, lactose-free, 17 oz bottle (210 Calories, 30 g protein)  Glucerna Protein Smart (150 anton, 30 gm protein)  Clear Protein Drinks:  BiPro  Premier Protein clear  [id number]  M Health Protein 15 Concentrate  Bone broth  Beneprotein protein powder mixed with 4-6 oz of fluid  [id number]  Gatorade Zero with Protein   Vital Proteins collagen powder mixed with clear fluid    Meal Replacement Shake Options:   *Protein Shake Criteria: no more than 210 Calories, at least 20 grams of protein, and less than 10 grams of sugar   Premier Protein (160 Calories, 30 g protein)  Slim Fast Advanced Nutrition (180 Calories, 20 g protein)  Muscle Milk, lactose-free, 17 oz bottle (210 Calories, 30 g protein)  Integrated Supplements, no artificial sugars (110 Calories, 20 g protein)  Boost/Ensure Max (160 calories, 30 gm protein)   Fairlife Protein Shakes (160-230 calories, 26-42 gm protein)  AldiPlandai Biotechnologys Elevation Protein Powder (180 calories, 30 gm protein)   Orgain Protein Shakes (130-160 calories, 20-26 gm protein)     Meal Replacement Bar Options:  Quest Protein Bars (190 Calories, 20 g protein)  Built Bar (170 Calories, 15-20 g protein)  One Protein Bar (210 calories, 20 g protein)  Juan Signature Protein Bar (Costco) (190 Calories, 21 g  protein)  Pure Protein Bars (180 Calories, 21 g protein)    Low Calorie Frozen Meal:  Healthy Choice Power Bowls  James Chávez    ---------------------------------------------------------------  Tips to Increase the Protein in Your Diet  You may need more protein in your diet to help you heal from an illness, surgery or wound. Extra protein can also help you gain weight. Here are some ideas for adding high-protein foods to your meals.  Meat and fish  Add chopped cooked meat to vegetables, salads, casseroles, soups, sauces and biscuit dough.  Use in omelets, soufflés, quiches, sandwich fillings and chicken or turkey stuffing.  Wrap in pie crust or biscuit dough to make a turnover.  Add to stuffed baked potatoes.  Make a dip with diced meat or flaked fish mixed with sour cream and spices.  Chopped, hard-cooked eggs  Add to salads.  Use for snacks and sandwich filling.  High-protein milk  To make high-protein milk, mix 1 quart whole milk with 1 cup powdered milk.  Add to cream soups, mashed potatoes, scrambled eggs, cereals and dried eggnog mix.  Use as an ingredient in puddings, custards, hot chocolate, milk shakes and pancakes.  Powdered milk  If you don't have any high-protein milk on hand, you can use powdered milk. Add 3 tablespoons to:  gravies, sauces, cream soups, mayonnaise  casseroles, meat patties, meatloaf, tuna salad, deviled ham  scalloped or mashed potatoes, creamed spinach  scrambled eggs, egg salad  cereals  yogurt, milk drinks, ice cream, frozen desserts, puddings, custards.  Add 4 to 6 tablespoons powdered milk to make:  cream sauces  breads, muffins, pancakes, waffles, cookies, cakes  cream pies, frostings, cake fillings  fruit cobblers, bread or rice pudding, gelatin desserts.  For high-protein eggnog, add 3 to 6 tablespoons powdered milk to prepared eggnog.  Hard or soft cheese  Melt on sandwiches, breads, tortillas, hamburgers, hot dogs, other meats, vegetables,  eggs and pies.  Grate into soups, chili, sauces, casseroles, vegetables, potatoes, rice, noodles or meatloaf.  Eat with toast or crackers, or melt for denys dip.  Cottage cheese or ricotta cheese  Mix with or scoop on top of fruits and vegetables.  Add to casseroles, lasagna, spaghetti, noodles and egg dishes (omelets, scrambled eggs, soufflés).  Use in gelatin, pudding-type desserts, cheesecake and pancake batter.  Use to stuff crepes, pasta shells or manicotti.  Fruit yogurt  Blend with fruits for a fruit smoothie.  Use as a dip for fruits and vegetables.  Scoop on top of pancakes or waffles.  Tofu  Blend silken tofu with fruits and juices for a smoothie.  Add chunks of firm tofu to soups and stews, or crumble into meatloaf.  Blend dried onion soup mix into soft or silken tofu for dip.  Use pureed silken tofu for part of the mayonnaise, sour cream, cream cheese or ricotta cheese called for in recipes.  Beans  Use cooked beans or peas in soups, casseroles, pasta, tacos and burritos.  Nuts and seeds  Note: For children under 3, discuss with the child's care team.  Use in casseroles, breads, muffins, pancakes, cookies and waffles.  Sprinkle on fruits, cereals, ice cream, yogurt, vegetables and salads.  Mix with raisins, dried fruits and chocolate chips for a snack.  Nut butters  Note: For children under 3, discuss with the child's care team.  Spread on sandwiches, toast, muffins, crackers, waffles, pancakes and fruit slices.  Use as a dip for raw vegetables.  Blend with milk drinks, or swirl through ice cream, yogurt or hot cereal.  Nutrition supplements (nutrition bars, drinks and powders)  Add powders to milk drinks and desserts.  Mix with ice cream, milk and fruit for a high-protein milk shake.    For informational purposes only. Not to replace the advice of your health care provider. Clinically reviewed by Carole Mack RD, LD, and the Clinical Nutrition Service Line. Copyright   2005 [facility]  "Services. All rights reserved. [employer] 439388 - REV 04/24.  -----------------------------------------------------------------------------------------------------------------  Learning About High-Protein Foods  What foods are high in protein?     The foods you eat contain nutrients, such as vitamins and minerals. Protein is a nutrient. Your body needs the right amount to stay healthy and work as it should. You can use the list below to help you make choices about which foods to eat.  Here are some examples of foods that are high in protein.  Dairy and dairy alternatives  Cheese  Milk  Soy milk  Yogurt (especially Greek)  Meat  Beef  Chicken  Ham  Lamb  Lunch meat  Pork  Sausage  Turkey  Other protein foods  Beans (black, garbanzo, kidney, lima)  Eggs  Hummus  Lentils  Nuts  Peanut butter and other nut butters  Peas  Soybeans  Tofu  Veggie or soy danica (Check the nutrition label for the amount of protein in each serving.)  Seafood  Anchovies  Cod  Crab  Halibut  [place]  Sardines  Shrimp  Tilapia  [place]  Tuna  Protein supplements  Bars (Check the nutrition label for the amount of protein in each serving.)  Drinks  Powders  Work with your doctor to find out how much of this nutrient you need. Depending on your health, you may need more or less of it in your diet.  Where can you learn more?  Go to https://www.iLumen.net/patiented  Enter P335 in the search box to learn more about \"Learning About High-Protein Foods.\"  Current as of: September 20, 2023               Content Version: 14.0    3264-6661 [employer].   Care instructions adapted under license by your healthcare professional. If you have questions about a medical condition or this instruction, always ask your healthcare professional. [employer] disclaims any warranty or liability for your use of this information.   "

## 2025-01-27 NOTE — Clinical Note
Currently not on therapy, semaglutide prev ordered but never started due to cost, has not had psych visit to assess phentermine, he will try to schedule this.  Ordered Zepbound with hopes to get covered with HARI indication, MTM per medication start or as needed, Dr. Hood 6/13 - Kimberlyn BUTTS

## 2025-02-02 ENCOUNTER — MYC REFILL (OUTPATIENT)
Dept: PALLIATIVE MEDICINE | Facility: CLINIC | Age: 55
End: 2025-02-02
Payer: COMMERCIAL

## 2025-02-02 DIAGNOSIS — M54.16 LUMBAR RADICULOPATHY: ICD-10-CM

## 2025-02-02 DIAGNOSIS — M96.1 FAILED BACK SYNDROME: ICD-10-CM

## 2025-02-02 DIAGNOSIS — F11.90 CHRONIC, CONTINUOUS USE OF OPIOIDS: ICD-10-CM

## 2025-02-02 DIAGNOSIS — M54.41 CHRONIC BILATERAL LOW BACK PAIN WITH RIGHT-SIDED SCIATICA: ICD-10-CM

## 2025-02-02 DIAGNOSIS — G89.29 CHRONIC INTRACTABLE PAIN: ICD-10-CM

## 2025-02-02 DIAGNOSIS — M96.1 FAILED BACK SURGICAL SYNDROME: ICD-10-CM

## 2025-02-02 DIAGNOSIS — G89.29 CHRONIC BILATERAL LOW BACK PAIN WITH RIGHT-SIDED SCIATICA: ICD-10-CM

## 2025-02-03 RX ORDER — BUPRENORPHINE 20 UG/H
1 PATCH TRANSDERMAL
Qty: 4 PATCH | Refills: 0 | Status: SHIPPED | OUTPATIENT
Start: 2025-02-03

## 2025-02-03 NOTE — TELEPHONE ENCOUNTER
Signed Prescriptions:                        Disp   Refills    buprenorphine (BUTRANS) 20 MCG/HR WK patch 4 patch0        Sig: Place 1 patch onto the skin every 7 days. Fill           02/07/25 to start 02/09/25. 28 day script.  Authorizing Provider: INA LOPEZ        Reviewed [place]  February 3, 2025- no concerning fills.    Ina TORIBIO, RN CNP, FNP  [facility]  [facility]

## 2025-02-03 NOTE — TELEPHONE ENCOUNTER
Received call from patient requesting refill(s) of     BUPRENORPHINE 20 MCG/HR TD PTWK          Last dispensed from pharmacy on: Jan 11, 2025     Patient's last office/virtual visit by prescribing provider on: Nov. 19, 2024   Next office/virtual appointment scheduled for: Feb. 19, 2025         UDT: Apr. 15, 2024   CSA: Apr. 16, 2024       E-prescribe to    [id number] - [place], [place] - [address] [place] [address] [address]CE[address] AT [facility] & [address]      Will route to nursing [place] for review and preparation of prescription(s).

## 2025-02-03 NOTE — TELEPHONE ENCOUNTER
David CRAVEN Pain Nurse (supporting Ina Barillas, APRN CNP)21 hours ago (10:08 AM)       Refills have been requested for the following medications:         buprenorphine (BUTRANS) 20 MCG/HR WK patch [Ina Barillas]     Preferred pharmacy: [facility][id number] - [place], [place] - [address] [place] [address] AT [facility] & [address]

## 2025-02-03 NOTE — TELEPHONE ENCOUNTER
Medication refill information reviewed.     Due date for buprenorphine (BUTRANS) 20 MCG/HR WK patch is 02/09/25     Prescriptions prepped for review.     Will route to provider.

## 2025-02-05 ENCOUNTER — MYC REFILL (OUTPATIENT)
Dept: PALLIATIVE MEDICINE | Facility: CLINIC | Age: 55
End: 2025-02-05
Payer: COMMERCIAL

## 2025-02-05 DIAGNOSIS — F11.90 CHRONIC, CONTINUOUS USE OF OPIOIDS: ICD-10-CM

## 2025-02-05 DIAGNOSIS — M96.1 FAILED BACK SYNDROME: ICD-10-CM

## 2025-02-05 DIAGNOSIS — G89.29 CHRONIC BILATERAL LOW BACK PAIN WITH RIGHT-SIDED SCIATICA: ICD-10-CM

## 2025-02-05 DIAGNOSIS — M54.16 LUMBAR RADICULOPATHY: ICD-10-CM

## 2025-02-05 DIAGNOSIS — G89.29 CHRONIC INTRACTABLE PAIN: ICD-10-CM

## 2025-02-05 DIAGNOSIS — M54.41 CHRONIC BILATERAL LOW BACK PAIN WITH RIGHT-SIDED SCIATICA: ICD-10-CM

## 2025-02-05 DIAGNOSIS — M96.1 FAILED BACK SURGICAL SYNDROME: ICD-10-CM

## 2025-02-05 RX ORDER — OXYCODONE AND ACETAMINOPHEN 5; 325 MG/1; MG/1
1 TABLET ORAL EVERY 6 HOURS PRN
Qty: 75 TABLET | Refills: 0 | Status: SHIPPED | OUTPATIENT
Start: 2025-02-05

## 2025-02-05 NOTE — TELEPHONE ENCOUNTER
Received call from patient requesting refill(s) of     OXYCODONE-ACETAMINOPHEN 5-325 MG PO TABS          Last dispensed from pharmacy on : Gautam. 3, 2025       Patient's last office/virtual visit by prescribing provider on: Nov 19, 2024   Next office/virtual appointment scheduled for: Feb. 19, 2025        UDT; Apr. 15, 2024   CSA: Apr. 16, 2024       E-prescribe to    [id number] - [place], [place] - [address] [place] [address] [address] [facility] [address]      Will route to nursing [place] for review and preparation of prescription(s).

## 2025-02-05 NOTE — TELEPHONE ENCOUNTER
Medication refill information reviewed.     Due date for OXYCODONE-ACETAMINOPHEN 5-325 MG PO TABS is 02/05/25     Prescriptions prepped for review.     Will route to provider.

## 2025-02-06 NOTE — TELEPHONE ENCOUNTER
Signed Prescriptions:                        Disp   Refills    oxyCODONE-acetaminophen (PERCOCET) 5-325 M*75 tab*0        Sig: Take 1 tablet by mouth every 6 hours as needed for           moderate to severe pain. Max of 3/day. Okay to           fill/start 02/05/25.  #75 tabs for 30 day supply  Authorizing Provider: INA LOPEZ        Reviewed [place]  February 5, 2025- no concerning fills.    Ina Lopez APRN, RN CNP, FNP  [facility]  [facility] [place]

## 2025-02-10 ENCOUNTER — PATIENT OUTREACH (OUTPATIENT)
Dept: CARE COORDINATION | Facility: CLINIC | Age: 55
End: 2025-02-10
Payer: COMMERCIAL

## 2025-02-17 NOTE — PROGRESS NOTES
[facility]      2/19/2025      Chief complaint:    -constant low back pain down the right leg to the foot  -has upper thoracic and neck pain, has pain that extends into the bilateral arms to the forearms, occasionally has numbness in the hands bilateral  -denies loss of bowel or bladder control  -denies weakness of the arms  -weakness in the right leg is not new. Has had foot drop in the right foot, not new.         Interval history:  David Wilcox is a 54 year old male is known to me for:  Chronic bilateral low back pain with right-sided sciatica  Lumbar radiculopathy  Failed back surgical syndrome  Chronic continuous use of opioids  Encounter for long-term use of opiate analgesic     Distant history of alcohol and drug abuse  Bipolar disorder   H/o menningitis after SCS trial  PMHx includes: Acute bronchitis, acute pancreatitis, acute sinusitis, ADHD, anxiety, depression, asthma, bipolar disorder unspecified, cervicalgia, dysthymic disorder, esophageal reflux, fixation of spine with fusion, history of blood transfusion, hyperlipidemia, low back pain, lumbar throat, meningitis after spinal cord stimulator trial, narcotic abuse, neck pain, open nondisplaced fracture of distal phalanx of finger, pain in limb, personal history of other diseases of the digestive system, sciatica, spasm of muscle, tobacco use disorder  PSHx includes: Back surgery in 2003 and 2011, lumbar anterior three-level fusion, ORIF right finger (3/3/2021)  .        Recommendations/plan at the last visit on 11/19/2024 included:  Physical Therapy: none  Continue your regular [facility]   Clinical Health Psychologist to address issues of relaxation, behavioral change, coping style, and other factors important to improvement: I support your ongoing work for mental health   Diagnostic Studies: obtain cervical and thoracic MRI scans  [place] [facility] Scheduling:  [place] [place]: [phone]  [place], [place]:  [phone]  [facility] (including [place]): [phone]  Medication Management:   Continue Percocet max 3/day fill 11/22 and start 11/24   Continue Butrans 20mcg/hr fill 12/14 and start 12/16  has naloxone nasal spray for safety  Continue gabapentin  CONTINUE Cymbalta (duloxetine) to 90mg per day by taking a 30mg capsule in addition to the 60mg capsule.  Further procedures recommended: none  Acupuncture: yes, continue  Recommendations/follow-up for PCP:  See above  Release of information: none  Follow up: 12 weeks can be in-person or virtual.            Since his last visit, David Wilcox reports:    Interval history February 19, 2025  -***   -he  is walking 3 miles per day and 3 miles per hour and elevation of 5 degrees.   -***      Interval history November 19, 2024  --Low back pain and right leg radicular pain to the right foot and toes  -intermittent onset mid back pain when taking shirt off for a shower, it is like a lightning jab or sledgehammer, this can occur every few days (lifting a bale of hay or something like that)  -the pain is in the lower neck and upper to mid back. No imaging of cervical or thoracic spine since 2025. Will obtain updated imaging as he may be candidate for interventional options for this pain.   -he started seeing a new provider in [place] [facility]. He and his wife are going to couples counseling likely starting next month.  -his son got  in [place] at the end of October.11  Interval history August 19, 2024  - David is feeling better mentally. Has been working with Ms. Pandey with [facility]. That is done and will transition to work with a new therapist with [facility].   -he is off of phentermine, ?? Effect on mental health  -now also on Abilify. Doing well on cymbalta 90mg/day.   -low back pain with right leg pain is the same.   -having intermittent sharp pain in the mid back. It is sharp when it happens, but not present all of the time.  "    Interval history July 8, 2024  --Low back pain and right leg radicular pain, same, no change in this pain  -lots of family issues, increased stress at home  -states it \"all came to head\" recently  -had felt suicidal after his family went out to celebrate his daughter's 18th birthday without him, called suicide hotline.   -now having passive suicidal ideation, though not as severe as it was around the 27th of June  -seeing a counselor tomorrow in-person with [facility].   -newer onset mid back pain when taking shirt off for a shower, it was like a lightning jab or sledgehammer and was gone within a couple of days.   -has lost 40 lbs, on phentermine  -discussed increase in Duloxetine. Patient is willing to do so as this has been very helpful for his mood in the past.    Interval history April 15, 2024  -had been down in [place], [place] with his son working on a truck. Had a lot of fun restoring it.   -drove back to [place] last night.   -low back and right leg pain is worse due to different activity. No red flag symptoms at all.   -feels his pain is under better control with increased dosing of Butrans and use of oxycodone for breakthrough. He does not want to make any changes at this time in his medication regimen.   -trigger point injections done in early March were helpful for about a week or so.      Interval history January 15, 2024  -colder weather has bothered chronic low back and right leg pain. Otherwise stable.   -has had about a month long issue with increased mid back pain. No known injury or illness to explain this pain.   -stable on current medications    Pain history collected 5/27/2022 at initial visit  Out cutting a tree and it fell on him in 2002. He had compression fractures L5-S1, and multiple other fractures and spent almost a year in a wheelchair, initially told he would not walk again.   His initial spinal  surgery was helpful, but he then fell down the stairs and he knew something was wrong but no " "one would believe him. He states 8 years later they found loose hardware and he had to have a subsequent surgery to fix that. Then a 3rd surgery extended the fusion. He is now fused from L3-S1.      Pain on the right side from bottom of the ribs to the low back, around the hip and down the right leg to the toes. Bilateral hip pain posterolateral hip pain (points to buttocks).      He has had loss of bowel and bladder but it is not a common thing. This is not new, present off and on for 6 years.      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   Has regular exercise with chores and he now walks every day on the treadmill (2km in the AM and PM)  Does [employer] in [place] on Fridays and his wife does so on Saturdays.           At this point, the patient's participation with our multidisciplinary team includes:  The patient has been compliant with the program.  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 Kim Pandey      Pain scores:  Pain intensity on average is 7 on a scale of 0-10.    Range is 4-9/10.   Pain right now is 6/10.   Pain is described as \"like a pillow with a knife through it and when bad, like I am on fire or like someone hit me with a sledgehammer\"    Pain is constant in nature      Current pain relevant medications:   -Tylenol extra strength 500mg PRN   -naloxone nasal spray for opiate reversal   -Duloxetine 90mg every day (very helpful for mood and pain)  -gabapentin 600mg take 2 tabs/1200mg TID (helpful)  -nortriptyline 10mg take 2 (20mg) at bedtime (helpful)  -Butrans 20mcg/hr TD Q 7 days (helpful)  -Percocet 5/325mg take 1 tab Q 6 hours PRN max 3/day (he plans on 2/day and uses 3 if needed. Helpful)  -tizanidine 4mg TID PRN for muscle spasms (helpful)        Other pertinent medications:  -lorazepam 0.5mg BID PRN anxiety (very rare use, received #30 tabs in 2018 and still has some " left)  -mirtazepine 15mg at bedtime  -ambien 10mg Q HS PRN insomnia           Previous medication treatments included:  OPIATES: oxycodone (helpful), hydrocodone (not helpful), Methadone (took after surgery, very helpful), Fentanyl patches (would have issues with getting to hot and get a dump of medication. He has used this very successfully in the winter when it is colder), Dilaudid (hydromorphone, very helpful for a migraine with antibiotics), MSContin (more helpful than OxyContin was)  NSAIDS: naproxyn (not helpful), ibuprofen (not helpful)  MUSCLE RELAXANTS: Methocarbamol (expense, helpful), Flexeril (helpful but he had a remote history of a suicide attempt with this drug), tizanidine (very helpful)  ANTI-MIGRAINE MEDS: none  ANTI-DEPRESSANTS: none  SLEEP AIDS: Remeron (helpful)  ANTI-CONVULSANTS: nortriptyline (helpful)  TOPICALS: CBD oil (helps some)  ANXIOLYTICS: lorazepam (helpful)  MEDICAL CANNABIS: none  Other meds: lidocaine (not helpful)        Other treatments have included:   David Wilcox has been seen at a pain clinic in the past.  Previously managed by Dr. Bindu Motley here  PT: tried, was not helpful on multiple occasions  Chiropractic care: yes, uses this regularly for acupuncture  Acupuncture: helpful  TENs Unit: has one, helpful, uses it occasionally  SCS trial in   got meningitis and nearly       Injections:   -3/26/2018 caudal ALVA with Dr. Bindu Motley  (not helpful)  -2018 right L3-4 transforaminal ALVA with Dr. Bindu Motley (not helpful)  -2019  Bilateral SI joint injections with Dr. Bindu Motley  (not helpful)  -3/4/2024 trigger point injections with Dr. Brandon Hickman (helpful for about a week) @ [facility], latissimus dorsi and quadratus lumborum using 0.25% bupivicaine and 40mg Kenalog       THE 4 A's OF OPIOID MAINTENANCE ANALGESIA    Analgesia: helpful***    Activity: exercising on treadmill twice daily, more active***    Adverse effects: none    Adherence to Rx  protocol: yes      Side Effects: no side effect***  Patient is using the medication as prescribed: YES***    Medications:  Current Outpatient Medications   Medication Sig Dispense Refill    acetaminophen (TYLENOL) 500 MG tablet Take 1,500 mg by mouth at bedtime      albuterol (PROAIR HFA/PROVENTIL HFA/VENTOLIN HFA) 108 (90 Base) MCG/ACT inhaler INHALE 2 PUFFS INTO THE LUNGS EVERY 4 HOURS AS NEEDED FOR SHORTNESS OF BREATH 8.5 g 11    buprenorphine (BUTRANS) 20 MCG/HR WK patch Place 1 patch onto the skin every 7 days Fill 03/26/24 and start on 03/28/24. 28 day script. 4 patch 0    DULoxetine (CYMBALTA) 60 MG capsule Take 1 capsule (60 mg) by mouth daily 90 capsule 3    gabapentin (NEURONTIN) 600 MG tablet TAKE 2 TABLETS BY MOUTH THREE TIMES DAILY 180 tablet 4    LORazepam (ATIVAN) 0.5 MG tablet One bid prn anxiety. Rare use 30 tablet 0    losartan (COZAAR) 100 MG tablet Take 1 tablet (100 mg) by mouth daily 90 tablet 3    mirtazapine (REMERON) 15 MG tablet TAKE 1 TABLET(15 MG) BY MOUTH AT BEDTIME 90 tablet 1    naloxone (NARCAN) 4 MG/0.1ML nasal spray Spray 1 spray (4 mg) into one nostril alternating nostrils once as needed for opioid reversal every 2-3 minutes until assistance arrives 0.2 mL 1    nortriptyline (PAMELOR) 10 MG capsule TAKE 2 CAPSULES(20 MG) BY MOUTH AT BEDTIME 180 capsule 3    omeprazole (PRILOSEC) 20 MG DR capsule Take 1 capsule (20 mg) by mouth daily 90 capsule 3    oxyCODONE-acetaminophen (PERCOCET) 5-325 MG tablet Take 1 tablet by mouth every 6 hours as needed for moderate to severe pain Max of 3/day. Fill/Start 03/14/24 75 tablet 0    phentermine (ADIPEX-P) 37.5 MG tablet 1 tablet each morning 60 tablet 2    Semaglutide-Weight Management (WEGOVY) 0.25 MG/0.5ML pen Inject 0.25 mg Subcutaneous once a week 2 mL 5    tiZANidine (ZANAFLEX) 4 MG tablet Take 1 tablet (4 mg) by mouth 3 times daily as needed for muscle spasms 0.5-1 tab tid prn muscle spasm 90 tablet 4    zolpidem (AMBIEN) 10 MG tablet  TAKE 1 TABLET BY MOUTH EVERY NIGHT AS NEEDED FOR INSOMNIA 30 tablet 5       Medical History: any changes in medical history since they were last seen? No    Social History:   Home situation:  with children x 2 at home.   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  Tobacco use: quit smoking 2020  Alcohol use: very rarely  Drug use: tried lots of drugs in the past but not heroin.   History of chemical dependency treatment: quit on own       Is patient a current smoker or tobacco user?  no  If yes, was cessation counseling offered?  no          Physical Exam:  Vital signs: /74   Pulse 78     Behavioral observations:  ***    Gait:  ***    Musculoskeletal exam:  ***    Neuro exam:  ***    Skin/vascular/autonomic:  ***    Other:  ***              Diagnostic tests:  MRI OF THE LUMBAR SPINE WITHOUT AND WITH CONTRAST August 6, 2021 5:57  PM      HISTORY: Low back pain, prior surgery, new symptoms. Worsening pain  above fusion, but also right radicular L5 or S1 distribution on the  right. Failed back surgical syndrome. Lumbar radiculopathy.     TECHNIQUE: Multiplanar, multisequence MRI images of the lumbar spine  were acquired without and with 10mL Gadavist IV contrast.     COMPARISON: Lumbar spine MR 4/27/2018, lumbar spine CT 7/14/2016.     FINDINGS: There are five lumbar-type vertebrae for the purposes of  this dictation. Posterior spine fusion from L3 down to S1 consisting  of bilateral pedicle screws at each level with inner connecting rods,  interbody fusion devices in the L4-L5 and L5-S1 disc spaces and  laminectomies of L3, L4, L5 and S1 again noted.     Degenerative grade 1 anterolisthesis of L5 upon S1 and mild  degenerative retrolisthesis of L2 upon L3 again noted. Alignment  otherwise normal and unchanged. Vertebral body heights normal. No  fractures. No evidence for pathologic bony lesion. Marrow signal  normal.     There is loss of disc height, disc desiccation and posterior  disc  bulging/herniation to varying degrees at all levels of the lumbar  spine with the exception of the resected L4-L5 and L5-S1 discs.      The tip of the conus medullaris is at the L1-L2 level which is within  normal limits. No abnormal intrathecal contrast enhancement. There is  no evidence for intrathecal abnormality.      Level by level:      T12-L1: Loss of disc height, disc desiccation and circumferential disc  bulging with a superimposed small posterior central disc herniation  (extrusion). Minimal facet arthropathy bilaterally. Minimal spinal  canal narrowing. No foraminal stenosis on either side. No change from  the comparison study.     L1-L2: Loss of disc height, disc desiccation and circumferential disc  bulging with a superimposed small posterior central disc herniation  (protrusion). Minimal facet arthropathy bilaterally. Minimal spinal  canal narrowing. No foraminal stenosis on either side. No change from  the comparison study.     L2-L3: Loss of disc height, disc desiccation and mild circumferential  disc bulging. No herniation. Moderate facet arthropathy bilaterally.  Mild spinal canal narrowing. Mild bilateral neural foraminal  narrowing. No change from the comparison study.     L3: Again noted is a fluid signal intensity collection in the right  posterolateral aspect of the spinal canal measuring 1.7 x 0.7 cm in  the AP and transverse dimensions respectively. This may represent a  synovial cyst arising from the right facet joint versus a cystic  postoperative fluid collection or cystic foreign body reaction. This  results in mild narrowing of the thecal sac at the mid L3 level.     L3-L4: (Fused level) Loss of disc height, disc desiccation and mild  circumferential disc bulging. No herniation. Moderate facet  arthropathy bilaterally. No spinal canal stenosis. Mild bilateral  neural foraminal narrowing. No change from the comparison study.      L4-L5: (Fused level) No spinal canal stenosis. No  foraminal stenosis  on either side. No change from the comparison study.     L5-S1: (Fused level) No spinal canal stenosis. No foraminal stenosis  on either side. No change from the comparison study.                                                                      IMPRESSION:  1. Postoperative and degenerative change of the lumbar spine as  detailed above without appreciable change from the comparison studies.  2. No significant spinal canal or neural foraminal stenosis at any  level of the spine.     JAI CHAVEZ MD       MR CERVICAL SPINE WITHOUT CONTRAST, MR THORACIC SPINE WITHOUT CONTRAST  December 5, 2024 11:05 AM (reviewed with patient in clinic visit on 2/19/2025)     HISTORY: Neck pain. History of spine surgery. No suspected hardware  failure. Worsening neck pain. No known/automatically detected  potential contraindications to imaging. Chronic neck pain. Cervical  spondylosis without myelopathy.     COMPARISON: No prior similar studies.     TECHNIQUE: Multiplanar multisequence cervical and thoracic MR without  contrast.     FINDINGS:  Cervical: Mild anterolisthesis at C7-T1. Straightening of normal  cervical lordosis.      No abnormal spinal cord signal. No abnormal bone marrow signal.     Multilevel degenerative changes with loss of disc height, osteophyte  formation and facet arthropathy. The findings on a level by level  basis are as follows:     C2-C3: Bilateral facet arthropathy. No spinal canal or neural  foraminal stenosis.     C3-C4: Mild disc height loss and disc degeneration. Disc osteophyte  complex and bilateral uncinate spurring. Bilateral facet arthropathy.  Severe right and moderate to severe left neural foraminal stenosis.  Mild spinal canal stenosis.     C4-C5: Mild to moderate disc height loss and disc degeneration. Disc  osteophyte complex and bilateral uncinate spurring. Bilateral facet  arthropathy. Severe bilateral neural foraminal stenosis. Mild to  moderate spinal canal  stenosis.     C5-C6: Mild to moderate disc height loss and disc degeneration. Disc  osteophyte complex and uncinate spurring, eccentric to the left. Left  greater than right facet arthropathy. Severe left and moderate right  neural foraminal stenosis. Mild spinal canal stenosis.     C6-C7: Mild to moderate disc height loss and disc degeneration. Disc  osteophyte complex and bilateral uncinate spurring. Bilateral facet  arthropathy. Severe-bilateral neural foraminal stenosis. No spinal  canal stenosis.     C7-T1: Disc osteophyte complex and bilateral uncinate spurring.  Bilateral facet arthropathy. No significant spinal canal or neural  foraminal stenosis.     Multiple T2 hyperintense right thyroid nodules.     Thoracic: Right convex scoliosis with apex at T6-T7.      No abnormal bone marrow signal.     Multilevel disc height loss, disc degeneration and facet arthropathy.  Disc bulges at T2-T3, T4-T5, T5-T6 and T9-T10. Central protrusion at  T3-T4 and left central/subarticular protrusion at T6-T7. No high-grade  spinal canal or neural foraminal stenosis.     The spinal cord contour and signal pattern appear within normal  limits.       Normal paraspinous soft tissues.                                                                      IMPRESSION:   1. Cervical:  a. Multilevel cervical spondylosis with mild-to-moderate spinal canal  stenosis at C4-C5 and mild spinal canal stenosis at C3-C4 and C5-C6.  b. Severe bilateral neural foraminal stenosis at C4-C5 and C6-C7,  severe right and moderate to severe left neural foraminal stenosis at  C3-C4 and severe left and moderate right neural foraminal stenosis at  C5-C6.  c. No abnormal spinal cord.     2. Thoracic:  a. Multilevel thoracic spondylosis without high-grade spinal canal or  neural foraminal stenosis.  b. No abnormal spinal cord signal.     KARISSA WISE MD           Other testing (labs, diagnostics):  7/3/2024  Cr. 0.88  Est GFR >90        Screening tools:       DIRE Score for ongoing opioid management is calculated as follows:     Diagnosis = 2     Intractability = 2     Risk: Psych = 2  Chem Hlth = 2  Reliability = 3  Social = 3     Efficacy = 2     Total DIRE Score = 16 (14 or higher predicts good candidate for ongoing opioid management; 13 or lower predicts poor candidate for opioid management)            [place] Prescription Monitoring Program:  Reviewed [place]  2/19/2025- no concerning fills.  Ina TORIBIO, RN CNP, FNP  [facility]  [place] location              Assessment: ***  Chronic neck pain  Cervical spondylosis without myelopathy  Chronic thoracic pain  Lumbar radiculopathy  Failed back surgical syndrome  Failed back syndrome  Chronic bilateral low back pain with right sided sciatica  depression  Chronic intractable pain  Chronic continuous use of opioids    Encounter for long term use of opiate analgesic  Signed CSA  2/19/2025  UDT done 2/19/2025  Essential hypertension  Class 3 severe obesity due to excess calories with serious co-morbidity (untreated OBSTRUCTIVE SLEEP APNEA) and BMI of >39 in adult  OBSTRUCTIVE SLEEP APNEA (currently untreated)  Distant history of alcohol and drug abuse  Bipolar disorder   H/o menningitis after SCS trial  PMHx includes: Acute bronchitis, acute pancreatitis, acute sinusitis, ADHD, anxiety, depression, asthma, bipolar disorder unspecified, cervicalgia, dysthymic disorder, esophageal reflux, fixation of spine with fusion, history of blood transfusion, hyperlipidemia, low back pain, lumbar throat, meningitis after spinal cord stimulator trial, narcotic abuse, neck pain, open nondisplaced fracture of distal phalanx of finger, pain in limb, personal history of other diseases of the digestive system, sciatica, spasm of muscle, tobacco use disorder  PSHx includes: Back surgery in 2003 and 2011, lumbar anterior three-level fusion, ORIF right finger (3/3/2021)        Plan:   Physical Therapy:  none  Continue your regular [facility]   Clinical Health Psychologist to address issues of relaxation, behavioral change, coping style, and other factors important to improvement: I support your ongoing work for mental health   Diagnostic Studies: cervical flexion and extension x-ray today  Medication Management:   Continue Percocet max 3/day fill 3/5 and start 3/7  Continue Butrans 20mcg/hr fill 3/7  and start 3/9  has naloxone nasal spray for safety-re-ordered  Continue gabapentin  CONTINUE Cymbalta (duloxetine) to 90mg per day by taking a 30mg capsule in addition to the 60mg capsule.  Further procedures recommended: schedule cervical ALVA, our office will contact you  UDT today, wearing Butrans and took Percocet last this morning  Signed CSA  Acupuncture: yes, continue  Recommendations/follow-up for PCP:  See above  Release of information: none  Follow up: 12 weeks can be in-person         ASSESSMENT AND PLAN:  ***        Ina TOIRBIO, RN CNP, FNP  [facility]  [facility]   diagnosis)  Comment:   Plan: PAIN INJECTION EVAL/TREAT/FOLLOW UP, Adult Pain        Clinic Follow-Up Order            (M50.30) DDD (degenerative disc disease), cervical  Comment:   Plan: XR Cervical Spine Complete w Obl & Flex/Ext,         PAIN INJECTION EVAL/TREAT/FOLLOW UP, Adult Pain        Clinic Follow-Up Order            (M43.12) Anterolisthesis of cervical spine  Comment:   Plan: XR Cervical Spine Complete w Obl & Flex/Ext,         PAIN INJECTION EVAL/TREAT/FOLLOW UP, Adult Pain        Clinic Follow-Up Order            (M47.812) Cervical spondylosis without myelopathy  Comment:   Plan: PAIN INJECTION EVAL/TREAT/FOLLOW UP, Adult Pain        Clinic Follow-Up Order            (M54.2,  G89.29) Chronic neck pain  Comment:   Plan: PAIN INJECTION EVAL/TREAT/FOLLOW UP, Adult Pain        Clinic Follow-Up Order            (M54.6,  G89.29) Chronic thoracic spine pain  Comment:   Plan: PAIN INJECTION EVAL/TREAT/FOLLOW UP, Adult Pain        Clinic Follow-Up Order            (M96.1) Failed back surgical syndrome  Comment:   Plan: buprenorphine (BUTRANS) 20 MCG/HR WK patch,         oxyCODONE-acetaminophen (PERCOCET) 5-325 MG         tablet, Adult Pain Clinic Follow-Up Order,         DISCONTINUED: oxyCODONE-acetaminophen         (PERCOCET) 5-325 MG tablet            (M54.41,  G89.29) Chronic bilateral low back pain with right-sided sciatica  Comment:   Plan: buprenorphine (BUTRANS) 20 MCG/HR WK patch,         oxyCODONE-acetaminophen (PERCOCET) 5-325 MG         tablet, Adult Pain Clinic Follow-Up Order,         DISCONTINUED: oxyCODONE-acetaminophen         (PERCOCET) 5-325 MG tablet            (M54.16) Lumbar radiculopathy  Comment:   Plan: buprenorphine (BUTRANS) 20 MCG/HR WK patch,         oxyCODONE-acetaminophen (PERCOCET) 5-325 MG         tablet, Adult Pain Clinic Follow-Up Order,         DISCONTINUED: oxyCODONE-acetaminophen         (PERCOCET) 5-325 MG tablet            (M96.1) Failed back syndrome  Comment:   Plan:  buprenorphine (BUTRANS) 20 MCG/HR WK patch,         oxyCODONE-acetaminophen (PERCOCET) 5-325 MG         tablet, Adult Pain Clinic Follow-Up Order,         DISCONTINUED: oxyCODONE-acetaminophen         (PERCOCET) 5-325 MG tablet            (G89.29) Chronic intractable pain  Comment:   Plan: buprenorphine (BUTRANS) 20 MCG/HR WK patch,         oxyCODONE-acetaminophen (PERCOCET) 5-325 MG         tablet, Adult Pain Clinic Follow-Up Order,         DISCONTINUED: oxyCODONE-acetaminophen         (PERCOCET) 5-325 MG tablet            (R29.6) Recurrent falls  Comment:   Plan:     (F11.90) Chronic, continuous use of opioids  Comment:   Plan: Ethanol urine, Ethyl Glucuronide Screen with         Reflex to Confirmation, Urine, Drug         Confirmation Panel Urine with Creatinine,         buprenorphine (BUTRANS) 20 MCG/HR WK patch,         naloxone (NARCAN) 4 MG/0.1ML nasal spray,         oxyCODONE-acetaminophen (PERCOCET) 5-325 MG         tablet, Adult Pain Clinic Follow-Up Order,         DISCONTINUED: oxyCODONE-acetaminophen         (PERCOCET) 5-325 MG tablet            (F32.A) Depression, unspecified depression type  Comment:   Plan: Adult Pain Clinic Follow-Up Order            (Z79.891) Encounter for long-term use of opiate analgesic  Comment:   Plan: Ethanol urine, Ethyl Glucuronide Screen with         Reflex to Confirmation, Urine, Drug         Confirmation Panel Urine with Creatinine,         naloxone (NARCAN) 4 MG/0.1ML nasal spray, Adult        Pain Clinic Follow-Up Order              Face to face time with patient: 37 minutes             Ina TORIBIO RN CNP, FNP  [facility]  [place]

## 2025-02-19 ENCOUNTER — LAB (OUTPATIENT)
Dept: LAB | Facility: CLINIC | Age: 55
End: 2025-02-19
Payer: COMMERCIAL

## 2025-02-19 ENCOUNTER — ANCILLARY PROCEDURE (OUTPATIENT)
Dept: GENERAL RADIOLOGY | Facility: CLINIC | Age: 55
End: 2025-02-19
Attending: NURSE PRACTITIONER
Payer: COMMERCIAL

## 2025-02-19 ENCOUNTER — OFFICE VISIT (OUTPATIENT)
Dept: PALLIATIVE MEDICINE | Facility: CLINIC | Age: 55
End: 2025-02-19
Payer: COMMERCIAL

## 2025-02-19 VITALS — DIASTOLIC BLOOD PRESSURE: 74 MMHG | HEART RATE: 78 BPM | SYSTOLIC BLOOD PRESSURE: 108 MMHG

## 2025-02-19 DIAGNOSIS — G89.29 CHRONIC INTRACTABLE PAIN: ICD-10-CM

## 2025-02-19 DIAGNOSIS — M54.12 CERVICAL RADICULAR PAIN: Primary | ICD-10-CM

## 2025-02-19 DIAGNOSIS — F11.90 CHRONIC, CONTINUOUS USE OF OPIOIDS: ICD-10-CM

## 2025-02-19 DIAGNOSIS — G89.29 CHRONIC NECK PAIN: ICD-10-CM

## 2025-02-19 DIAGNOSIS — M54.2 CHRONIC NECK PAIN: ICD-10-CM

## 2025-02-19 DIAGNOSIS — M96.1 FAILED BACK SURGICAL SYNDROME: ICD-10-CM

## 2025-02-19 DIAGNOSIS — M54.6 CHRONIC THORACIC SPINE PAIN: ICD-10-CM

## 2025-02-19 DIAGNOSIS — M54.16 LUMBAR RADICULOPATHY: ICD-10-CM

## 2025-02-19 DIAGNOSIS — G89.29 CHRONIC THORACIC SPINE PAIN: ICD-10-CM

## 2025-02-19 DIAGNOSIS — M47.812 CERVICAL SPONDYLOSIS WITHOUT MYELOPATHY: ICD-10-CM

## 2025-02-19 DIAGNOSIS — M54.41 CHRONIC BILATERAL LOW BACK PAIN WITH RIGHT-SIDED SCIATICA: ICD-10-CM

## 2025-02-19 DIAGNOSIS — F32.A DEPRESSION, UNSPECIFIED DEPRESSION TYPE: ICD-10-CM

## 2025-02-19 DIAGNOSIS — M43.12 ANTEROLISTHESIS OF CERVICAL SPINE: ICD-10-CM

## 2025-02-19 DIAGNOSIS — M50.30 DDD (DEGENERATIVE DISC DISEASE), CERVICAL: ICD-10-CM

## 2025-02-19 DIAGNOSIS — Z79.891 ENCOUNTER FOR LONG-TERM USE OF OPIATE ANALGESIC: Primary | ICD-10-CM

## 2025-02-19 DIAGNOSIS — M54.12 CERVICAL RADICULAR PAIN: ICD-10-CM

## 2025-02-19 DIAGNOSIS — G89.29 CHRONIC BILATERAL LOW BACK PAIN WITH RIGHT-SIDED SCIATICA: ICD-10-CM

## 2025-02-19 DIAGNOSIS — R29.6 RECURRENT FALLS: ICD-10-CM

## 2025-02-19 DIAGNOSIS — Z79.891 ENCOUNTER FOR LONG-TERM USE OF OPIATE ANALGESIC: ICD-10-CM

## 2025-02-19 DIAGNOSIS — M96.1 FAILED BACK SYNDROME: ICD-10-CM

## 2025-02-19 LAB
CREAT UR-MCNC: 142 MG/DL
ETHANOL UR QL SCN: NORMAL

## 2025-02-19 PROCEDURE — 99000 SPECIMEN HANDLING OFFICE-LAB: CPT

## 2025-02-19 PROCEDURE — 80307 DRUG TEST PRSMV CHEM ANLYZR: CPT | Mod: 90

## 2025-02-19 PROCEDURE — 3074F SYST BP LT 130 MM HG: CPT | Performed by: NURSE PRACTITIONER

## 2025-02-19 PROCEDURE — 99214 OFFICE O/P EST MOD 30 MIN: CPT | Performed by: NURSE PRACTITIONER

## 2025-02-19 PROCEDURE — G2211 COMPLEX E/M VISIT ADD ON: HCPCS | Performed by: NURSE PRACTITIONER

## 2025-02-19 PROCEDURE — 80307 DRUG TEST PRSMV CHEM ANLYZR: CPT

## 2025-02-19 PROCEDURE — 72052 X-RAY EXAM NECK SPINE 6/>VWS: CPT | Mod: TC | Performed by: STUDENT IN AN ORGANIZED HEALTH CARE EDUCATION/TRAINING PROGRAM

## 2025-02-19 PROCEDURE — 1125F AMNT PAIN NOTED PAIN PRSNT: CPT | Performed by: NURSE PRACTITIONER

## 2025-02-19 PROCEDURE — 3078F DIAST BP <80 MM HG: CPT | Performed by: NURSE PRACTITIONER

## 2025-02-19 RX ORDER — BUPRENORPHINE 20 UG/H
1 PATCH TRANSDERMAL
Qty: 4 PATCH | Refills: 0 | Status: SHIPPED | OUTPATIENT
Start: 2025-02-19

## 2025-02-19 RX ORDER — OXYCODONE AND ACETAMINOPHEN 5; 325 MG/1; MG/1
1 TABLET ORAL EVERY 6 HOURS PRN
Qty: 75 TABLET | Refills: 0 | Status: SHIPPED | OUTPATIENT
Start: 2025-02-19

## 2025-02-19 RX ORDER — OXYCODONE AND ACETAMINOPHEN 5; 325 MG/1; MG/1
1 TABLET ORAL EVERY 6 HOURS PRN
Qty: 75 TABLET | Refills: 0 | Status: SHIPPED | OUTPATIENT
Start: 2025-02-19 | End: 2025-02-19

## 2025-02-19 ASSESSMENT — PAIN SCALES - GENERAL: PAINLEVEL_OUTOF10: MODERATE PAIN (6)

## 2025-02-19 NOTE — LETTER

## 2025-02-19 NOTE — PATIENT INSTRUCTIONS
Plan:   Physical Therapy: none  Continue your regular [facility]   Clinical Health Psychologist to address issues of relaxation, behavioral change, coping style, and other factors important to improvement: I support your ongoing work for mental health   Diagnostic Studies: cervical flexion and extension x-ray today  Medication Management:   Continue Percocet max 3/day fill 3/5 and start 3/7  Continue Butrans 20mcg/hr fill 3/7  and start 3/9  has naloxone nasal spray for safety-re-ordered  Continue gabapentin  CONTINUE Cymbalta (duloxetine) to 90mg per day by taking a 30mg capsule in addition to the 60mg capsule.  Further procedures recommended: schedule cervical ALVA, our office will contact you  UDT today, wearing Butrans and took Percocet last this morning  Signed CSA  Acupuncture: yes, continue  Recommendations/follow-up for PCP:  See above  Release of information: none  Follow up: 12 weeks can be in-person     ----------------------------------------------------------------  Clinic Number:  [phone]   Call with any questions about your care and for scheduling assistance.   Calls are returned Monday through Friday between 8 AM and 4:30 PM. We usually get back to you within 2 business days depending on the issue/request.    If we are prescribing your medications:  For opioid medication refills, call the clinic or send a Jotky message 7 days in advance.  Please include:  Name of requested medication  Name of the pharmacy.  For non-opioid medications, call your pharmacy directly to request a refill. Please allow 3-4 days to be processed.   Per [place] State Law:  All controlled substance prescriptions must be filled within 30 days of being written.    For those controlled substances allowing refills, pickup must occur within 30 days of last fill.      We believe regular attendance is key to your success in our program!    Any time you are unable to keep your appointment we ask that you call us at least 24 hours in advance  to cancel.This will allow us to offer the appointment time to another patient.   Multiple missed appointments may lead to dismissal from the clinic.

## 2025-02-20 LAB — ETHYL GLUCURONIDE UR QL SCN: NEGATIVE NG/ML

## 2025-02-20 NOTE — RESULT ENCOUNTER NOTE
Results as expected.   Ina TORIBIO RN CNP, FNP  [facility]  [facility]

## 2025-02-24 ENCOUNTER — PATIENT OUTREACH (OUTPATIENT)
Dept: CARE COORDINATION | Facility: CLINIC | Age: 55
End: 2025-02-24
Payer: COMMERCIAL

## 2025-02-25 LAB
BUPRENORPHINE UR CFM-MCNC: 34 NG/ML
BUPRENORPHINE/CREAT UR: 24 NG/MG {CREAT}
GABAPENTIN UR QL CFM: PRESENT
NORBUPRENORPHINE UR CFM-MCNC: 26 NG/ML
NORBUPRENORPHINE/CREAT UR: 18 NG/MG {CREAT}
OXYCODONE UR CFM-MCNC: 1200 NG/ML
OXYCODONE/CREAT UR: 845 NG/MG {CREAT}
OXYMORPHONE UR CFM-MCNC: 87 NG/ML
OXYMORPHONE/CREAT UR: 61 NG/MG {CREAT}

## 2025-03-01 NOTE — TELEPHONE ENCOUNTER
Central Prior Authorization Team - Phone: [phone]     PA Initiation    Medication: BUPRENORPHINE 20 MCG/HR TD PTWK  Insurance Company: [employer] Non-Specialty PA's - Phone [phone] Fax [phone]  Pharmacy Filling the Rx: [id number] - [place], [place] - [facility] [address] AT [facility] & [address]  Filling Pharmacy Phone: [phone]  Filling Pharmacy Fax:    Start Date: 5/23/2024       normal...

## 2025-03-11 ENCOUNTER — RADIOLOGY INJECTION OFFICE VISIT (OUTPATIENT)
Dept: PALLIATIVE MEDICINE | Facility: CLINIC | Age: 55
End: 2025-03-11
Attending: NURSE PRACTITIONER
Payer: COMMERCIAL

## 2025-03-11 VITALS — SYSTOLIC BLOOD PRESSURE: 131 MMHG | DIASTOLIC BLOOD PRESSURE: 85 MMHG | HEART RATE: 64 BPM | OXYGEN SATURATION: 98 %

## 2025-03-11 DIAGNOSIS — M43.12 ANTEROLISTHESIS OF CERVICAL SPINE: ICD-10-CM

## 2025-03-11 DIAGNOSIS — M50.30 DDD (DEGENERATIVE DISC DISEASE), CERVICAL: ICD-10-CM

## 2025-03-11 DIAGNOSIS — M54.2 CHRONIC NECK PAIN: ICD-10-CM

## 2025-03-11 DIAGNOSIS — M54.6 CHRONIC THORACIC SPINE PAIN: ICD-10-CM

## 2025-03-11 DIAGNOSIS — M54.12 CERVICAL RADICULAR PAIN: ICD-10-CM

## 2025-03-11 DIAGNOSIS — G89.29 CHRONIC NECK PAIN: ICD-10-CM

## 2025-03-11 DIAGNOSIS — M54.12 CERVICAL RADICULOPATHY: ICD-10-CM

## 2025-03-11 DIAGNOSIS — M47.812 CERVICAL SPONDYLOSIS WITHOUT MYELOPATHY: ICD-10-CM

## 2025-03-11 DIAGNOSIS — G89.29 CHRONIC THORACIC SPINE PAIN: ICD-10-CM

## 2025-03-11 PROCEDURE — 62321 NJX INTERLAMINAR CRV/THRC: CPT | Performed by: PAIN MEDICINE

## 2025-03-11 RX ORDER — DEXAMETHASONE SODIUM PHOSPHATE 10 MG/ML
10 INJECTION, SOLUTION INTRAMUSCULAR; INTRAVENOUS ONCE
Status: COMPLETED | OUTPATIENT
Start: 2025-03-11 | End: 2025-03-11

## 2025-03-11 RX ADMIN — DEXAMETHASONE SODIUM PHOSPHATE 10 MG: 10 INJECTION, SOLUTION INTRAMUSCULAR; INTRAVENOUS at 13:40

## 2025-03-11 ASSESSMENT — PAIN SCALES - GENERAL
PAINLEVEL_OUTOF10: MODERATE PAIN (5)
PAINLEVEL_OUTOF10: MODERATE PAIN (4)

## 2025-03-11 NOTE — NURSING NOTE
Discharge Information    IV Discontiued Time:  NA    Amount of Fluid Infused:  NA    Discharge Criteria = When patient returns to baseline or as per MD order    Consciousness:  Pt is fully awake    Circulation:  BP +/- 20% of pre-procedure level    Respiration:  Patient is able to breathe deeply    O2 Sat:  Patient is able to maintain O2 Sat >92% on room air    Activity:  Moves 4 extremities on command    Ambulation:  Patient is able to stand and walk or stand and pivot into wheelchair    Dressing:  Clean/dry or No Dressing    Notes:   Discharge instructions and AVS given to patient    Patient meets criteria for discharge?  YES    Admitted to PCU?  No    Responsible adult present to accompany patient home?  Yes    Signature/Title:    Keysha Ken RN  RN Care Coordinator  [facility] [place]

## 2025-03-11 NOTE — PROGRESS NOTES
[facility] - Procedure Note    Date of Visit: 3/11/2025    Procedure performed: C7-T1 interlaminar epidural steroid injection with fluoroscopic guidance  Diagnosis: Cervical radiculitis/radiculopathy  : Brandon Hickman MD  Anesthesia: none    Indications: David Wilcox is a 54 year old male who is seen a for cervical epidural steroid injection. The patient describes neck pain rad to his ue and le. The patient has been exhibiting symptoms consistent with cervical intraspinal inflammation and radiculopathy. Symptoms have been persistent, disabling, and intermittently severe. The patient reports minimal improvement with conservative treatment, including meds/pt.    Cervical MRIrev    Allergies:      Allergies   Allergen Reactions    Aspartame      A.k.a Nutrasweet.  Can take Truvia (stevia plant extract).    Food      Artifical sweetners-vomiting    Saccharin      Vomiting, diarrhea    Sucralose      A.k.a. Splenda        Vitals:  /85   Pulse 64   SpO2 98%     Review of Systems: The patient denies recent fever, chills, illness, use of antibiotics or anticoagulants. All other 10-point review of systems negative.     Procedure: The procedure and risks were explained, and informed written consent was obtained from the patient. Risks include but are not limited to: infection, bleeding, increased pain, and damage to soft tissue, nerve, muscle, and vasculature structures. After getting informed consent, patient was brought into the procedure suite and was placed in a prone position on the procedure table. A Pause for the Cause was performed. Patient was prepped and draped in sterile fashion.     The C7-T1 interspace was identified with use of fluoroscopy in AP view. A 25-gauge, 1.5 inch needle was used to anesthetize the skin and subcutaneous tissue entry site with a total of 2 ml of 1% lidocaine. Under fluoroscopic visualization, a 22-gauge, 3.5 inch Tuohy epidural needle was slowly  advanced towards the epidural space a few millimeters right of midline. The latter part of the needle advancement was guided with fluoroscopy in the lateral view. The epidural space was identified using loss of resistance technique. After negative aspiration for heme and cerebrospinal fluid, a total of 1 mL of Omnipaque was injected to confirm needle placement. 9 mL of contrast was wasted. Epidurogram confirmed spread within the posterior epidural space. 2 ml of  NS,1 ml 10mg/ml of dexamethasone, and 1 ml of preservative free 0.25% bupivacaine was injected. The needle was removed.  Images were saved to PACS.    The patient tolerated the procedure well, and there was no evidence of procedural complications. No new sensory or motor deficits were noted following the procedure. The patient was stable and able to ambulate on discharge home. Post-procedure instructions were provided.     Pre-procedure pain score: 5/10 in the neck, 5/10 in the arm  Post-procedure pain score: 4/10 in the neck, 4/10 in the arm    Assessment/Plan: David Wilcox is a 54 year old male s/p cervical interlaminar epidural steroid injection today for cervical spondylosis and radiculitis/radiculopathy.     Following today's procedure, the patient was advised to contact the [facility] for any of the following:   Fever, chills, or night sweats   New onset of pain, numbness, or weakness   Any questions/concerns regarding the procedure  If unable to contact the Pain Center, the patient was instructed to go to a local Emergency Room for any complications.     Follow-up with provider in 2 weeks for post-procedure evaluation.    Brandon Hickman MD   Pain Management    [facility]

## 2025-03-11 NOTE — PATIENT INSTRUCTIONS
[facility]   Procedure Discharge Instructions    Today you saw:    Dr. Brandon Hickman,         You had a(n):  Cervical epidural steroid injection    Medications used for cervical epidural: Lidocaine, Omnipaque, Dexamethasone, Bupividcaine, normal saline        Be cautious with activities. Numbness and/or weakness in the upper extremities may occur for up to 6-8 hours after the procedure due to effect of the local anesthetic  Do not drive for 6 hours. The effect of the local anesthetic could slow your reflexes.   You may resume your regular activities after 24 hours  Avoid strenuous activity for the first 24 hours  You may shower, however avoid swimming, tub baths or hot tubs for 24 hours following your procedure  You may have a mild to moderate increase in pain for several days following the injection.  It may take up to 14 days for the steroid medication to start working although you may feel the effect as early as a few days after the procedure.     You may use ice packs for 10-15 minutes, 3 to 4 times a day at the injection site for comfort  Do not use heat to painful areas for 6 to 8 hours. This will give the local anesthetic time to wear off and prevent you from accidentally burning your skin.   Unless you have been directed to avoid the use of anti-inflammatory medications (NSAIDS), you may use medications such as ibuprofen, Aleve or Tylenol for pain control if needed.   If you have diabetes, check your blood sugar more frequently than usual as your blood sugar may be higher than normal for 10-14 days following a steroid injection. Contact your doctor who manages your diabetes if your blood sugar is higher than usual  Possible side effects of steroids that you may experience include flushing, elevated blood pressure, increased appetite, mild headaches and restlessness.  All of these symptoms will get better with time.  If you experience any of the following, call the Pain Clinic  during work hours (Mon-Friday 8-4:30 pm) at [phone] or the Provider Line after hours at [phone]:  -Fever over 100 degree F  -Swelling, bleeding, redness, drainage, warmth at the injection site  -Progressive weakness or numbness in your arms  -Loss of bowel or bladder function  -Unusual headache not relieved by Tylenol or other pain reliever  -Unusual new onset of pain that is not improving

## 2025-03-11 NOTE — NURSING NOTE
Pre-procedure Intake  If YES to any questions or NO to having a   Please complete laminated checklist and leave on the computer keyboard for Provider, verbally inform provider if able.    For SCS Trial, RFA's or any sedation procedure:  Have you been fasting? NA  If yes, for how long?     Are you taking any any blood thinners such as Coumadin, Warfarin, Jantoven, Pradaxa Xarelto, Eliquis, Edoxaban, Enoxaparin, Lovenox, Heparin, Arixtra, Fondaparinux, or Fragmin? OR Antiplatelet medication such as Plavix, Brilinta, or Effient?   No   If yes, when did you take your last dose?     Do you take aspirin?  No  If cervical procedure, have you held aspirin for 6 days?       Is the Pt taking any GLP-1 Antagonist (hold needed for sedation patients only)  (semaglutide (Ozempic, Wegovy), dulaglutide (Trulicity), exenatide ER (Bydureon), tirzepatide (Mounjaro), Liraglutide (Saxenda, Victoza), semaglutide (Rybelsus)     No   If yes, when did you take your last dose?     Do you have any allergies to contrast dye, iodine, steroid and/or numbing medications?  NO    Are you currently taking antibiotics or have an active infection?  NO    Have you had a fever/elevated temperature within the past week? NO    Are you currently taking oral steroids? NO    Do you have a ? Yes    Are you pregnant or breastfeeding?  Not Applicable    Have you received any vaccinations in the last week? NO    Notify provider and RNs if systolic BP >170, diastolic BP >100, P >100 or O2 sats < 90%

## 2025-03-27 ENCOUNTER — MYC REFILL (OUTPATIENT)
Dept: PALLIATIVE MEDICINE | Facility: CLINIC | Age: 55
End: 2025-03-27
Payer: COMMERCIAL

## 2025-03-27 DIAGNOSIS — G89.29 CHRONIC BILATERAL LOW BACK PAIN WITH RIGHT-SIDED SCIATICA: ICD-10-CM

## 2025-03-27 DIAGNOSIS — F11.90 CHRONIC, CONTINUOUS USE OF OPIOIDS: ICD-10-CM

## 2025-03-27 DIAGNOSIS — M96.1 FAILED BACK SURGICAL SYNDROME: ICD-10-CM

## 2025-03-27 DIAGNOSIS — M54.41 CHRONIC BILATERAL LOW BACK PAIN WITH RIGHT-SIDED SCIATICA: ICD-10-CM

## 2025-03-27 DIAGNOSIS — M96.1 FAILED BACK SYNDROME: ICD-10-CM

## 2025-03-27 DIAGNOSIS — M54.16 LUMBAR RADICULOPATHY: ICD-10-CM

## 2025-03-27 DIAGNOSIS — G89.29 CHRONIC INTRACTABLE PAIN: ICD-10-CM

## 2025-03-28 NOTE — TELEPHONE ENCOUNTER
Received call from patient requesting refill(s) oxyCODONE-acetaminophen (PERCOCET) 5-325 MG tablet    Last dispensed from pharmacy on 02/05/2025    Patient's last office/virtual visit by prescribing provider on 02/19/2025  Next office/virtual appointment scheduled for 05/14/2025    Last urine drug screen date 02/19/2025  Current opioid agreement on file (completed within the last year) Yes Date of opioid agreement: 02/19/2025    E-prescribe to      [facility] #[id number] - [place], [place] - [facility] [address] [facility] [address]    Will route to nursing pool for review and preparation of prescription(s).     Jenny Morrell MA  [facility] [place]

## 2025-03-28 NOTE — TELEPHONE ENCOUNTER
Medication refill information reviewed.     Due date for oxyCODONE-acetaminophen (PERCOCET) 5-325 MG tablet  is 4/6/2025     Prescriptions prepped for review.     Will route to provider.

## 2025-03-28 NOTE — TELEPHONE ENCOUNTER
3/27/25  9:02 PM  Refills have been requested for the following medications:         oxyCODONE-acetaminophen (PERCOCET) 5-325 MG tablet [Ina Barillas]     Preferred pharmacy: [facility] #[id number] - [place], [place] - [address] [place] [address] AT [facility] & [address] [facility] [address]

## 2025-03-30 RX ORDER — OXYCODONE AND ACETAMINOPHEN 5; 325 MG/1; MG/1
1 TABLET ORAL EVERY 6 HOURS PRN
Qty: 75 TABLET | Refills: 0 | Status: SHIPPED | OUTPATIENT
Start: 2025-03-30 | End: 2025-04-01

## 2025-03-31 NOTE — TELEPHONE ENCOUNTER
Signed Prescriptions:                        Disp   Refills    oxyCODONE-acetaminophen (PERCOCET) 5-325 M*75 tab*0        Sig: Take 1 tablet by mouth every 6 hours as needed for           moderate to severe pain. Max of 3/day. Okay to           fill 04/04/2025 start 04/06/25.  #75 tabs for 30           day supply  Authorizing Provider: INA LOPEZ        Reviewed [place]  March 30, 2025- no concerning fills.    Ina Lopez APRN, RN CNP, FNP  [facility]  [facility]

## 2025-04-01 RX ORDER — OXYCODONE AND ACETAMINOPHEN 5; 325 MG/1; MG/1
1 TABLET ORAL EVERY 6 HOURS PRN
Qty: 75 TABLET | Refills: 0 | Status: SHIPPED | OUTPATIENT
Start: 2025-04-01

## 2025-04-01 NOTE — TELEPHONE ENCOUNTER
[facility] Call Center    Phone Message    May a detailed message be left on voicemail: yes     Reason for Call: Other: Patient is out of this medication and is needing to pick it up today. Please call back with updates.      Action Taken: Message routed to:  Other: [facility]    Travel Screening: Not Applicable

## 2025-04-01 NOTE — TELEPHONE ENCOUNTER
Medication refill information reviewed. He has not filled this since 02/08/25. Routing to provider to please send in with corrected dates.     Due date for oxyCODONE-acetaminophen (PERCOCET) 5-325 MG tablet  is 04/01/25     Prescriptions prepped for review.     Will route to provider.

## 2025-04-01 NOTE — TELEPHONE ENCOUNTER
Signed Prescriptions:                        Disp   Refills    oxyCODONE-acetaminophen (PERCOCET) 5-325 M*75 tab*0        Sig: Take 1 tablet by mouth every 6 hours as needed for           moderate to severe pain. Max of 3/day. Fill/start           04/01/25.  #75 tabs for 30 day supply  Authorizing Provider: INA LOPEZ        Reviewed [place]  April 1, 2025- no concerning fills.    Ina Lopez APRN, RN CNP, FNP  [facility]  [facility]

## 2025-04-07 ENCOUNTER — MYC REFILL (OUTPATIENT)
Dept: PALLIATIVE MEDICINE | Facility: CLINIC | Age: 55
End: 2025-04-07
Payer: COMMERCIAL

## 2025-04-07 DIAGNOSIS — G89.29 CHRONIC BILATERAL LOW BACK PAIN WITH RIGHT-SIDED SCIATICA: ICD-10-CM

## 2025-04-07 DIAGNOSIS — M54.41 CHRONIC BILATERAL LOW BACK PAIN WITH RIGHT-SIDED SCIATICA: ICD-10-CM

## 2025-04-07 DIAGNOSIS — G89.29 CHRONIC INTRACTABLE PAIN: ICD-10-CM

## 2025-04-07 DIAGNOSIS — M96.1 FAILED BACK SURGICAL SYNDROME: ICD-10-CM

## 2025-04-07 DIAGNOSIS — F11.90 CHRONIC, CONTINUOUS USE OF OPIOIDS: ICD-10-CM

## 2025-04-07 DIAGNOSIS — M96.1 FAILED BACK SYNDROME: ICD-10-CM

## 2025-04-07 DIAGNOSIS — M54.16 LUMBAR RADICULOPATHY: ICD-10-CM

## 2025-04-07 RX ORDER — BUPRENORPHINE 20 UG/H
1 PATCH TRANSDERMAL
Qty: 4 PATCH | Refills: 0 | Status: SHIPPED | OUTPATIENT
Start: 2025-04-07

## 2025-04-07 NOTE — TELEPHONE ENCOUNTER
Received request for a refill(s) of     buprenorphine (BUTRANS) 20 MCG/HR WK patch        Last dispensed from pharmacy on 3/15/2025    Patient's last office/virtual visit by prescribing provider on 2/19/2025  Next office/virtual appointment scheduled for 5/14/2025    Last urine drug screen date 2/19/2025  Current opioid agreement on file (completed within the last year) YesDate of opioid agreement: 2/19/2025    E-prescribe to [id number] - [place], [place] - [facility] [address] AT [facility] & [address] [place] [address]  pharmacy    Will route to nursing [place] for review and preparation of prescription(s).

## 2025-04-07 NOTE — TELEPHONE ENCOUNTER
Signed Prescriptions:                        Disp   Refills    buprenorphine (BUTRANS) 20 MCG/HR WK patch 4 patch0        Sig: Place 1 patch onto the skin every 7 days. Fill           04/10/25 to start 04/12/25. 28 day script.  Authorizing Provider: INA LOPEZ        Reviewed [place]  April 7, 2025- no concerning fills.    Ina TORIBIO, RN CNP, FNP  [facility]

## 2025-04-07 NOTE — TELEPHONE ENCOUNTER
Refills have been requested for the following medications:         buprenorphine (BUTRANS) 20 MCG/HR WK patch [Ina Barillas]     Preferred pharmacy: [facility] #[id number] - [address] AT [facility] & [address] [facility] [address]

## 2025-04-07 NOTE — TELEPHONE ENCOUNTER
Medication refill information reviewed.     Due date for buprenorphine (BUTRANS) 20 MCG/HR WK patch  is 04/12/25     Prescriptions prepped for review.     Will route to provider.

## 2025-04-19 ENCOUNTER — HEALTH MAINTENANCE LETTER (OUTPATIENT)
Age: 55
End: 2025-04-19

## 2025-04-29 NOTE — TELEPHONE ENCOUNTER
"Spoke with David and he reports his OxyContin is not lasting the full 12 hours. He also reports he does not want additional medication. He wants the pain to go away.     We discussed changing the the timing of his medication for more coverage including utilizing the Percocet toward the time when he feels the OxyContin is wearing off and patient felt there was no room for flexibility in that schedule. He then reported that he was able to change his patch every 2 days after doing a PA. When asked if he was inquiring about using the OxyContin 3 times daily he states again \"I don't want more medication but I'll do what I have to do.\"    JEREMIAH PeacockN, RN  Care Coordinator  [facility]    "
Called pt and notified of prescriptions are available for  today.    Aidan Naidu MA    
I would be ok with going to Oxycontin 10mg every 8 hours.  If truly wants to not go up on the daily amount, then removing one of the Percocet would be close.    Otherwise, I am ok with changing the Oxycontin and maintaining the Percocet at 2/day, and he can use less if possible.  We could also switch to 5/325mg tabs down the road.    Looks like new script starts soon, so if he wants to let me know how he wants to change, we can then put that in for the new script.    Bindu Motley MD  [facility]   
Outreach X1. Left a  requesting call back. Provided call back number.     4/28/21 Plan:    1. Medication Management:    1. He will stop the fentanyl patch, remove, and 8-12 hours later start the Oxycontin. If any sedation, he will hold Percocet.  2. oxycontin 10mg q12 hr.  Keep Percocet the same  2. Recommendations to PCP: none  3. Follow up: 3 months, in person    JOSEE Peacock, RN  Care Coordinator  [facility] [place]    
Per pt the medication he is currently taking is not lasting. He wants to speak with nursing regarding this.      Cate CHACON    [facility] [place]    
Script Eprescribed to pharmacy  [place] Prescription Monitoring Program checked    Will send this to MA team to notify patient.    Signed Prescriptions:                        Disp   Refills    oxyCODONE (OXYCONTIN) 10 MG 12 hr tablet   90 tab*0        Sig: Take 1 tablet (10 mg) by mouth every 8 hours Fill           05/27/21 to start 05/28/21  Authorizing Provider: YADIRA CARRILLO    oxyCODONE-acetaminophen (PERCOCET) 5-325 M*60 tab*0        Sig: Take 1 tablet by mouth every 6 hours as needed for           severe pain Max 2/day. Fill 05/27/21 to start on           05/28/21  Authorizing Provider: YADIRA CARRILLO MD  [facility]   
Spoke with patient and he liked the idea of adding the additional dose of OxyContin 10 MG and lowering the strength of his Percocet to 5-325. He uses the [facility] in [place] and I did ask him to notify me if it requires a PA so we can get working on that. Script pended for review.    JEREMIAH PeacockN, RN  Care Coordinator  [facility] [place]    
no

## 2025-05-02 NOTE — TELEPHONE ENCOUNTER
Addended by: YULI CHAVES on: 5/2/2025 11:03 AM     Modules accepted: Orders     Received refill request for:    BUPRENORPHINE 15 MCG/HR PATCH      Last dispensed from pharmacy on 11/2/23    Patient's last office/virtual visit by prescribing provider on 10/11/23  Next office/virtual appointment scheduled for 1/15/24    Last urine drug screen date 4/12/23  Current opioid agreement on file? Yes Date of opioid agreement: 4/12/23    E-prescribe to:     [facility] #[id number] - [place], [place] - [facility] [address] AT [facility] [address]    Will route to nursing [place] for review and preparation of prescription(s).

## 2025-05-07 ENCOUNTER — MYC REFILL (OUTPATIENT)
Dept: PALLIATIVE MEDICINE | Facility: CLINIC | Age: 55
End: 2025-05-07
Payer: COMMERCIAL

## 2025-05-07 DIAGNOSIS — M54.16 LUMBAR RADICULOPATHY: ICD-10-CM

## 2025-05-07 DIAGNOSIS — M96.1 FAILED BACK SYNDROME: ICD-10-CM

## 2025-05-07 DIAGNOSIS — M54.41 CHRONIC BILATERAL LOW BACK PAIN WITH RIGHT-SIDED SCIATICA: ICD-10-CM

## 2025-05-07 DIAGNOSIS — G89.29 CHRONIC INTRACTABLE PAIN: ICD-10-CM

## 2025-05-07 DIAGNOSIS — G89.29 CHRONIC BILATERAL LOW BACK PAIN WITH RIGHT-SIDED SCIATICA: ICD-10-CM

## 2025-05-07 DIAGNOSIS — M96.1 FAILED BACK SURGICAL SYNDROME: ICD-10-CM

## 2025-05-07 DIAGNOSIS — F11.90 CHRONIC, CONTINUOUS USE OF OPIOIDS: ICD-10-CM

## 2025-05-08 RX ORDER — BUPRENORPHINE 20 UG/H
1 PATCH TRANSDERMAL
Qty: 4 PATCH | Refills: 0 | Status: SHIPPED | OUTPATIENT
Start: 2025-05-08

## 2025-05-08 NOTE — TELEPHONE ENCOUNTER
Signed Prescriptions:                        Disp   Refills    buprenorphine (BUTRANS) 20 MCG/HR WK patch 4 patch0        Sig: Place 1 patch onto the skin every 7 days. Fill           05/08/25 to start 05/10/25. 28 day script.  Authorizing Provider: INA LOPEZ        Reviewed [place]  May 8, 2025- no concerning fills.    Ina TORIBIO, RN CNP, FNP  [facility]  [facility]

## 2025-05-08 NOTE — TELEPHONE ENCOUNTER
Refills have been requested for the following medications:         buprenorphine (BUTRANS) 20 MCG/HR WK patch [Ina Barillas]     Preferred pharmacy: [facility] #[id number] - [address] AT [facility] & [address] [facility] [address]

## 2025-05-08 NOTE — TELEPHONE ENCOUNTER
Received request for a refill(s) of     buprenorphine (BUTRANS) 20 MCG/HR WK patch        Last dispensed from pharmacy on 4/10/2025    Patient's last office/virtual visit by prescribing provider on 2/19/2025  Next office/virtual appointment scheduled for 5/14/2025    Last urine drug screen date 2/19/2025  Current opioid agreement on file (completed within the last year) YesDate of opioid agreement: 2/19/2025    [id number] - [place], [place] - [facility] [address] AT [facility] & [address] [place] [address]  pharmacy    Will route to nursing [place] for review and preparation of prescription(s).

## 2025-05-08 NOTE — TELEPHONE ENCOUNTER
Medication refill information reviewed.     Due date for buprenorphine (BUTRANS) 20 MCG/HR WK patch is 05/10/25     Prescriptions prepped for review.     Will route to provider.

## 2025-05-20 DIAGNOSIS — F32.A DEPRESSION, UNSPECIFIED DEPRESSION TYPE: ICD-10-CM

## 2025-05-20 RX ORDER — DULOXETIN HYDROCHLORIDE 30 MG/1
30 CAPSULE, DELAYED RELEASE ORAL DAILY
Qty: 90 CAPSULE | Refills: 1 | Status: SHIPPED | OUTPATIENT
Start: 2025-05-20

## 2025-05-20 NOTE — TELEPHONE ENCOUNTER
Received fax request from [facility] pharmacy requesting refill(s) for DULoxetine (CYMBALTA) 30 MG capsule     Last refilled on 02/18/25    Pt last seen on 05/14/25  Next appt scheduled for 08/13/25    Will facilitate refill.

## 2025-05-20 NOTE — TELEPHONE ENCOUNTER
Signed Prescriptions:                        Disp   Refills    DULoxetine (CYMBALTA) 30 MG capsule        90 cap*1        Sig: Take 1 capsule (30 mg) by mouth daily. Take with a           60mg capsule to equal 90mg/day. 3 month supply  Authorizing Provider: REA LOPEZ, RN CNP, FNP  [facility]  [facility] [place]

## 2025-05-22 DIAGNOSIS — F51.01 PRIMARY INSOMNIA: ICD-10-CM

## 2025-05-24 DIAGNOSIS — F51.01 PRIMARY INSOMNIA: ICD-10-CM

## 2025-05-24 DIAGNOSIS — I10 HYPERTENSION, UNSPECIFIED TYPE: ICD-10-CM

## 2025-05-24 DIAGNOSIS — F33.42 RECURRENT MAJOR DEPRESSION IN COMPLETE REMISSION: ICD-10-CM

## 2025-05-27 RX ORDER — LOSARTAN POTASSIUM 100 MG/1
100 TABLET ORAL DAILY
Qty: 90 TABLET | Refills: 2 | Status: SHIPPED | OUTPATIENT
Start: 2025-05-27

## 2025-05-27 RX ORDER — ZOLPIDEM TARTRATE 10 MG/1
TABLET ORAL
Qty: 30 TABLET | OUTPATIENT
Start: 2025-05-27

## 2025-05-27 NOTE — TELEPHONE ENCOUNTER
Potassium   Date Value Ref Range Status   07/03/2024 4.3 3.4 - 5.3 mmol/L Final   12/27/2021 4.2 3.4 - 5.3 mmol/L Final   03/03/2021 4.0 3.4 - 5.3 mmol/L Final     GFR Estimate   Date Value Ref Range Status   07/03/2024 >90 >60 mL/min/1.73m2 Final     Comment:     eGFR calculated using 2021 CKD-EPI equation.   03/03/2021 >90 >60 mL/min/[1.73_m2] Final     Comment:     Non  GFR Calc  Starting 12/18/2018, serum creatinine based estimated GFR (eGFR) will be   calculated using the Chronic Kidney Disease Epidemiology Collaboration   (CKD-EPI) equation.

## 2025-05-28 RX ORDER — MIRTAZAPINE 15 MG/1
15 TABLET, FILM COATED ORAL AT BEDTIME
Qty: 90 TABLET | Refills: 0 | Status: SHIPPED | OUTPATIENT
Start: 2025-05-28

## 2025-06-01 ENCOUNTER — MYC REFILL (OUTPATIENT)
Dept: PALLIATIVE MEDICINE | Facility: CLINIC | Age: 55
End: 2025-06-01
Payer: COMMERCIAL

## 2025-06-01 DIAGNOSIS — M54.16 LUMBAR RADICULOPATHY: ICD-10-CM

## 2025-06-01 DIAGNOSIS — G89.29 CHRONIC INTRACTABLE PAIN: ICD-10-CM

## 2025-06-01 DIAGNOSIS — G89.29 CHRONIC BILATERAL LOW BACK PAIN WITH RIGHT-SIDED SCIATICA: ICD-10-CM

## 2025-06-01 DIAGNOSIS — M54.41 CHRONIC BILATERAL LOW BACK PAIN WITH RIGHT-SIDED SCIATICA: ICD-10-CM

## 2025-06-01 DIAGNOSIS — F11.90 CHRONIC, CONTINUOUS USE OF OPIOIDS: ICD-10-CM

## 2025-06-01 DIAGNOSIS — M96.1 FAILED BACK SYNDROME: ICD-10-CM

## 2025-06-01 DIAGNOSIS — M96.1 FAILED BACK SURGICAL SYNDROME: ICD-10-CM

## 2025-06-02 RX ORDER — BUPRENORPHINE 20 UG/H
1 PATCH TRANSDERMAL
Qty: 4 PATCH | Refills: 0 | Status: SHIPPED | OUTPATIENT
Start: 2025-06-02

## 2025-06-02 NOTE — TELEPHONE ENCOUNTER
Medication refill information reviewed.     Due date for  buprenorphine (BUTRANS) 20 MCG/HR WK patch  is 06/07/25     Prescriptions prepped for review.     Will route to provider.

## 2025-06-02 NOTE — TELEPHONE ENCOUNTER
Refills have been requested for the following medications:         buprenorphine (BUTRANS) 20 MCG/HR WK patch [Ina Barillas]     Preferred pharmacy: [facility] #[id number] - [address] AT [facility] & [address] [facility] [address]

## 2025-06-02 NOTE — TELEPHONE ENCOUNTER
Signed Prescriptions:                        Disp   Refills    buprenorphine (BUTRANS) 20 MCG/HR WK patch 4 patch0        Sig: Place 1 patch onto the skin every 7 days. Fill           06/05/25 to start 06/07/25. 28 day script.  Authorizing Provider: INA LOPEZ        Reviewed [place]  June 2, 2025- no concerning fills.    Ina TORIBIO, RN CNP, FNP  [facility]

## 2025-06-02 NOTE — TELEPHONE ENCOUNTER
Received request for a refill(s) of buprenorphine (BUTRANS) 20 MCG/HR WK patch      Last dispensed from pharmacy on 05/08/25    Patient's last office/virtual visit by prescribing provider on 05/14/25  Next office/virtual appointment scheduled for 08/13/25    Last urine drug screen date 02/19/25  Current opioid agreement on file (completed within the last year) Yes Date of opioid agreement: 02/19/25    E-prescribe to     [facility] #[id number] - [place], [place] - [address] [place] [address] AT [facility] & [address]  [address] [address]  Phone: [phone] Fax: [phone]    Will route to nursing pool for review and preparation of prescription(s).       Shayna Chase MA  [facility] [place]

## 2025-06-06 DIAGNOSIS — G89.4 CHRONIC PAIN SYNDROME: ICD-10-CM

## 2025-06-09 RX ORDER — NORTRIPTYLINE HYDROCHLORIDE 10 MG/1
CAPSULE ORAL
Qty: 180 CAPSULE | Refills: 0 | Status: SHIPPED | OUTPATIENT
Start: 2025-06-09

## 2025-06-16 ENCOUNTER — TELEPHONE (OUTPATIENT)
Dept: ENDOCRINOLOGY | Facility: CLINIC | Age: 55
End: 2025-06-16
Payer: COMMERCIAL

## 2025-06-16 NOTE — TELEPHONE ENCOUNTER
The prescribed drug/drug class Weight loss for GLP-1 is classified as a Plan Exclusion, meaning it is not covered under the plan.  Consequently, the liaison team will not be submitting a prior authorization for this drug or any drug within this drug class for this diagnosis alone. The order will be sent to the patient's preferred pharmacy, and the patient will need to pay out of pocket.     Medicare considers Weight loss as cosmetic.

## 2025-07-01 ENCOUNTER — MYC REFILL (OUTPATIENT)
Dept: PALLIATIVE MEDICINE | Facility: CLINIC | Age: 55
End: 2025-07-01
Payer: COMMERCIAL

## 2025-07-01 DIAGNOSIS — M54.16 LUMBAR RADICULOPATHY: ICD-10-CM

## 2025-07-01 DIAGNOSIS — M54.41 CHRONIC BILATERAL LOW BACK PAIN WITH RIGHT-SIDED SCIATICA: ICD-10-CM

## 2025-07-01 DIAGNOSIS — M96.1 FAILED BACK SYNDROME: ICD-10-CM

## 2025-07-01 DIAGNOSIS — F11.90 CHRONIC, CONTINUOUS USE OF OPIOIDS: ICD-10-CM

## 2025-07-01 DIAGNOSIS — G89.29 CHRONIC BILATERAL LOW BACK PAIN WITH RIGHT-SIDED SCIATICA: ICD-10-CM

## 2025-07-01 DIAGNOSIS — G89.29 CHRONIC INTRACTABLE PAIN: ICD-10-CM

## 2025-07-01 DIAGNOSIS — M96.1 FAILED BACK SURGICAL SYNDROME: ICD-10-CM

## 2025-07-02 RX ORDER — BUPRENORPHINE 20 UG/H
1 PATCH TRANSDERMAL
Qty: 4 PATCH | Refills: 0 | Status: SHIPPED | OUTPATIENT
Start: 2025-07-02

## 2025-07-02 NOTE — TELEPHONE ENCOUNTER
Received request for a refill(s) of   buprenorphine (BUTRANS) 20 MCG/HR WK patch      Last dispensed from pharmacy on 6/5/2025    Patient's last office/virtual visit by prescribing provider on 5/14/2025  Next office/virtual appointment scheduled for 8/13/2025    Last urine drug screen date 2/19/2025  Current opioid agreement on file (completed within the last year) YesDate of opioid agreement: 2/19/2025    [id number] - [place], [place] - [facility] [address] AT [facility] & [address] [place] [address]  pharmacy    Will route to nursing Winston for review and preparation of prescription(s).

## 2025-07-02 NOTE — TELEPHONE ENCOUNTER
Refills have been requested for the following medications:         buprenorphine (BUTRANS) 20 MCG/HR WK patch [Ina Barillas]     Preferred pharmacy: [facility] #[id number] - [address] AT [facility] & [address] [facility] [address]

## 2025-07-02 NOTE — TELEPHONE ENCOUNTER
Medication refill information reviewed.     Due date for buprenorphine (BUTRANS) 20 MCG/HR WK patch   is 07/05/25     Prescriptions prepped for review.     Will route to provider.

## 2025-07-02 NOTE — TELEPHONE ENCOUNTER
Signed Prescriptions:                        Disp   Refills    buprenorphine (BUTRANS) 20 MCG/HR WK patch 4 patch0        Sig: Place 1 patch onto the skin every 7 days. Fill           07/03/25 to start 07/05/25. 28 day script.  Authorizing Provider: INA LOPEZ        Reviewed [place]  July 2, 2025- no concerning fills.    Ina TORIBIO, RN CNP, FNP  [facility]  [facility] [place]

## 2025-07-06 DIAGNOSIS — F41.9 ANXIETY: ICD-10-CM

## 2025-07-06 DIAGNOSIS — K21.9 GASTROESOPHAGEAL REFLUX DISEASE WITHOUT ESOPHAGITIS: ICD-10-CM

## 2025-07-07 RX ORDER — OMEPRAZOLE 20 MG/1
20 CAPSULE, DELAYED RELEASE ORAL DAILY
Qty: 30 CAPSULE | Refills: 0 | Status: SHIPPED | OUTPATIENT
Start: 2025-07-07

## 2025-07-09 RX ORDER — DULOXETIN HYDROCHLORIDE 60 MG/1
60 CAPSULE, DELAYED RELEASE ORAL DAILY
Qty: 90 CAPSULE | Refills: 0 | Status: SHIPPED | OUTPATIENT
Start: 2025-07-09

## 2025-07-16 ENCOUNTER — MYC REFILL (OUTPATIENT)
Dept: PALLIATIVE MEDICINE | Facility: CLINIC | Age: 55
End: 2025-07-16

## 2025-07-16 ENCOUNTER — OFFICE VISIT (OUTPATIENT)
Dept: FAMILY MEDICINE | Facility: CLINIC | Age: 55
End: 2025-07-16
Payer: COMMERCIAL

## 2025-07-16 ENCOUNTER — MYC REFILL (OUTPATIENT)
Dept: FAMILY MEDICINE | Facility: CLINIC | Age: 55
End: 2025-07-16

## 2025-07-16 VITALS
BODY MASS INDEX: 38.04 KG/M2 | WEIGHT: 251 LBS | TEMPERATURE: 97.2 F | HEIGHT: 68 IN | HEART RATE: 66 BPM | DIASTOLIC BLOOD PRESSURE: 80 MMHG | OXYGEN SATURATION: 98 % | SYSTOLIC BLOOD PRESSURE: 116 MMHG | RESPIRATION RATE: 18 BRPM

## 2025-07-16 DIAGNOSIS — G89.29 CHRONIC INTRACTABLE PAIN: ICD-10-CM

## 2025-07-16 DIAGNOSIS — M96.1 FAILED BACK SYNDROME: ICD-10-CM

## 2025-07-16 DIAGNOSIS — F13.20 SEDATIVE, HYPNOTIC OR ANXIOLYTIC DEPENDENCE (H): ICD-10-CM

## 2025-07-16 DIAGNOSIS — M54.16 LUMBAR RADICULOPATHY: ICD-10-CM

## 2025-07-16 DIAGNOSIS — M96.1 FAILED BACK SURGICAL SYNDROME: ICD-10-CM

## 2025-07-16 DIAGNOSIS — Z00.00 WELL ADULT EXAM: Primary | ICD-10-CM

## 2025-07-16 DIAGNOSIS — F11.20 CONTINUOUS OPIOID DEPENDENCE (H): ICD-10-CM

## 2025-07-16 DIAGNOSIS — I10 HYPERTENSION, UNSPECIFIED TYPE: ICD-10-CM

## 2025-07-16 DIAGNOSIS — F11.90 CHRONIC, CONTINUOUS USE OF OPIOIDS: ICD-10-CM

## 2025-07-16 DIAGNOSIS — F51.01 PRIMARY INSOMNIA: ICD-10-CM

## 2025-07-16 DIAGNOSIS — R73.09 ELEVATED GLUCOSE: ICD-10-CM

## 2025-07-16 DIAGNOSIS — M54.41 CHRONIC BILATERAL LOW BACK PAIN WITH RIGHT-SIDED SCIATICA: ICD-10-CM

## 2025-07-16 DIAGNOSIS — K21.9 GASTROESOPHAGEAL REFLUX DISEASE WITHOUT ESOPHAGITIS: ICD-10-CM

## 2025-07-16 DIAGNOSIS — E66.01 MORBID OBESITY (H): ICD-10-CM

## 2025-07-16 DIAGNOSIS — F33.42 RECURRENT MAJOR DEPRESSION IN COMPLETE REMISSION: ICD-10-CM

## 2025-07-16 DIAGNOSIS — G89.4 CHRONIC PAIN SYNDROME: ICD-10-CM

## 2025-07-16 DIAGNOSIS — G89.29 CHRONIC BILATERAL LOW BACK PAIN WITH RIGHT-SIDED SCIATICA: ICD-10-CM

## 2025-07-16 DIAGNOSIS — F41.9 ANXIETY: ICD-10-CM

## 2025-07-16 PROBLEM — F31.75 BIPOLAR DISORDER, IN PARTIAL REMISSION, MOST RECENT EPISODE DEPRESSED (H): Status: RESOLVED | Noted: 2024-07-03 | Resolved: 2025-07-16

## 2025-07-16 LAB
ANION GAP SERPL CALCULATED.3IONS-SCNC: 11 MMOL/L (ref 7–15)
BUN SERPL-MCNC: 10.4 MG/DL (ref 6–20)
CALCIUM SERPL-MCNC: 9.4 MG/DL (ref 8.8–10.4)
CHLORIDE SERPL-SCNC: 101 MMOL/L (ref 98–107)
CHOLEST SERPL-MCNC: 217 MG/DL
CREAT SERPL-MCNC: 0.82 MG/DL (ref 0.67–1.17)
EGFRCR SERPLBLD CKD-EPI 2021: >90 ML/MIN/1.73M2
EST. AVERAGE GLUCOSE BLD GHB EST-MCNC: 126 MG/DL
FASTING STATUS PATIENT QL REPORTED: NO
FASTING STATUS PATIENT QL REPORTED: NO
GLUCOSE SERPL-MCNC: 98 MG/DL (ref 70–99)
HBA1C MFR BLD: 6 % (ref 0–5.6)
HCO3 SERPL-SCNC: 25 MMOL/L (ref 22–29)
HDLC SERPL-MCNC: 38 MG/DL
LDLC SERPL CALC-MCNC: 133 MG/DL
NONHDLC SERPL-MCNC: 179 MG/DL
POTASSIUM SERPL-SCNC: 4 MMOL/L (ref 3.4–5.3)
SODIUM SERPL-SCNC: 137 MMOL/L (ref 135–145)
TRIGL SERPL-MCNC: 228 MG/DL

## 2025-07-16 PROCEDURE — 36415 COLL VENOUS BLD VENIPUNCTURE: CPT | Performed by: FAMILY MEDICINE

## 2025-07-16 PROCEDURE — 3074F SYST BP LT 130 MM HG: CPT | Performed by: FAMILY MEDICINE

## 2025-07-16 PROCEDURE — 99214 OFFICE O/P EST MOD 30 MIN: CPT | Mod: 25 | Performed by: FAMILY MEDICINE

## 2025-07-16 PROCEDURE — 80061 LIPID PANEL: CPT | Performed by: FAMILY MEDICINE

## 2025-07-16 PROCEDURE — 99396 PREV VISIT EST AGE 40-64: CPT | Mod: 25 | Performed by: FAMILY MEDICINE

## 2025-07-16 PROCEDURE — 3044F HG A1C LEVEL LT 7.0%: CPT | Performed by: FAMILY MEDICINE

## 2025-07-16 PROCEDURE — 83036 HEMOGLOBIN GLYCOSYLATED A1C: CPT | Performed by: FAMILY MEDICINE

## 2025-07-16 PROCEDURE — 80048 BASIC METABOLIC PNL TOTAL CA: CPT | Performed by: FAMILY MEDICINE

## 2025-07-16 PROCEDURE — 3079F DIAST BP 80-89 MM HG: CPT | Performed by: FAMILY MEDICINE

## 2025-07-16 PROCEDURE — 1125F AMNT PAIN NOTED PAIN PRSNT: CPT | Performed by: FAMILY MEDICINE

## 2025-07-16 RX ORDER — OXYCODONE AND ACETAMINOPHEN 5; 325 MG/1; MG/1
1 TABLET ORAL EVERY 6 HOURS PRN
Qty: 75 TABLET | Refills: 0 | Status: SHIPPED | OUTPATIENT
Start: 2025-07-16

## 2025-07-16 RX ORDER — MIRTAZAPINE 15 MG/1
15 TABLET, FILM COATED ORAL AT BEDTIME
Qty: 90 TABLET | Refills: 3 | Status: SHIPPED | OUTPATIENT
Start: 2025-07-16

## 2025-07-16 RX ORDER — DULOXETIN HYDROCHLORIDE 60 MG/1
60 CAPSULE, DELAYED RELEASE ORAL DAILY
Qty: 90 CAPSULE | Refills: 3 | Status: SHIPPED | OUTPATIENT
Start: 2025-07-16

## 2025-07-16 RX ORDER — ZOLPIDEM TARTRATE 10 MG/1
TABLET ORAL
Qty: 30 TABLET | Refills: 5 | OUTPATIENT
Start: 2025-07-16

## 2025-07-16 RX ORDER — NORTRIPTYLINE HYDROCHLORIDE 10 MG/1
20 CAPSULE ORAL AT BEDTIME
Qty: 180 CAPSULE | Refills: 3 | Status: SHIPPED | OUTPATIENT
Start: 2025-07-16

## 2025-07-16 RX ORDER — PRAZOSIN HYDROCHLORIDE 1 MG/1
1 CAPSULE ORAL AT BEDTIME
Qty: 90 CAPSULE | Refills: 3 | Status: SHIPPED | OUTPATIENT
Start: 2025-07-16

## 2025-07-16 RX ORDER — ALBUTEROL SULFATE 90 UG/1
INHALANT RESPIRATORY (INHALATION)
Qty: 8.5 G | Refills: 11 | Status: SHIPPED | OUTPATIENT
Start: 2025-07-16

## 2025-07-16 RX ORDER — OMEPRAZOLE 20 MG/1
20 CAPSULE, DELAYED RELEASE ORAL DAILY
Qty: 30 CAPSULE | Refills: 3 | Status: SHIPPED | OUTPATIENT
Start: 2025-07-16

## 2025-07-16 SDOH — HEALTH STABILITY: PHYSICAL HEALTH: ON AVERAGE, HOW MANY DAYS PER WEEK DO YOU ENGAGE IN MODERATE TO STRENUOUS EXERCISE (LIKE A BRISK WALK)?: 7 DAYS

## 2025-07-16 SDOH — HEALTH STABILITY: PHYSICAL HEALTH: ON AVERAGE, HOW MANY MINUTES DO YOU ENGAGE IN EXERCISE AT THIS LEVEL?: 40 MIN

## 2025-07-16 ASSESSMENT — ASTHMA QUESTIONNAIRES
ACT_TOTALSCORE: 21
QUESTION_1 LAST FOUR WEEKS HOW MUCH OF THE TIME DID YOUR ASTHMA KEEP YOU FROM GETTING AS MUCH DONE AT WORK, SCHOOL OR AT HOME: A LITTLE OF THE TIME
QUESTION_4 LAST FOUR WEEKS HOW OFTEN HAVE YOU USED YOUR RESCUE INHALER OR NEBULIZER MEDICATION (SUCH AS ALBUTEROL): ONCE A WEEK OR LESS
QUESTION_3 LAST FOUR WEEKS HOW OFTEN DID YOUR ASTHMA SYMPTOMS (WHEEZING, COUGHING, SHORTNESS OF BREATH, CHEST TIGHTNESS OR PAIN) WAKE YOU UP AT NIGHT OR EARLIER THAN USUAL IN THE MORNING: ONCE OR TWICE
QUESTION_2 LAST FOUR WEEKS HOW OFTEN HAVE YOU HAD SHORTNESS OF BREATH: ONCE OR TWICE A WEEK
QUESTION_5 LAST FOUR WEEKS HOW WOULD YOU RATE YOUR ASTHMA CONTROL: COMPLETELY CONTROLLED

## 2025-07-16 ASSESSMENT — ANXIETY QUESTIONNAIRES
4. TROUBLE RELAXING: NEARLY EVERY DAY
GAD7 TOTAL SCORE: 8
7. FEELING AFRAID AS IF SOMETHING AWFUL MIGHT HAPPEN: NOT AT ALL
3. WORRYING TOO MUCH ABOUT DIFFERENT THINGS: NOT AT ALL
5. BEING SO RESTLESS THAT IT IS HARD TO SIT STILL: SEVERAL DAYS
1. FEELING NERVOUS, ANXIOUS, OR ON EDGE: NEARLY EVERY DAY
GAD7 TOTAL SCORE: 8
2. NOT BEING ABLE TO STOP OR CONTROL WORRYING: NOT AT ALL
6. BECOMING EASILY ANNOYED OR IRRITABLE: SEVERAL DAYS
GAD7 TOTAL SCORE: INCOMPLETE

## 2025-07-16 ASSESSMENT — PAIN SCALES - GENERAL: PAINLEVEL_OUTOF10: MODERATE PAIN (6)

## 2025-07-16 ASSESSMENT — SOCIAL DETERMINANTS OF HEALTH (SDOH): HOW OFTEN DO YOU GET TOGETHER WITH FRIENDS OR RELATIVES?: NEVER

## 2025-07-16 NOTE — TELEPHONE ENCOUNTER
Signed Prescriptions:                        Disp   Refills    oxyCODONE-acetaminophen (PERCOCET) 5-325 M*75 tab*0        Sig: Take 1 tablet by mouth every 6 hours as needed for           moderate to severe pain. Max of 3/day. Fill/start           07/16/25.  #75 tabs for 30 day supply  Authorizing Provider: INA LOPEZ        Reviewed [place]  July 16, 2025- no concerning fills.    Ina Lopez APRN, RN CNP, FNP  [facility]  [facility] [place]

## 2025-07-16 NOTE — TELEPHONE ENCOUNTER
Medication refill information reviewed.     Due date for oxyCODONE-acetaminophen (PERCOCET) 5-325 MG tablet  is 07/16/25     Prescriptions prepped for review.     Will route to provider.

## 2025-07-16 NOTE — TELEPHONE ENCOUNTER
Patient request for a refill(s) of oxyCODONE-acetaminophen (PERCOCET) 5-325 MG tablet     Last dispensed from pharmacy on 6/16/25    Patient's last office/virtual visit by prescribing provider on 5/14/25  Next office/virtual appointment scheduled for 8/13/25    Last urine drug screen date 2/19/25  Current opioid agreement on file (completed within the last year) Yes Date of opioid agreement: 02/19/25    E-prescribe to   [facility] #[id number] - [place], [place]      Will route to [facility] for review and preparation of prescription(s).

## 2025-07-16 NOTE — PROGRESS NOTES
"Preventive Care Visit  [facility]  Regan Llamas MD, Family Medicine  Jul 16, 2025      Assessment & Plan   Well exam  Htn, stable  Anxiety, stable  Gerd, stable  Chronic pain, stable  Opioid use, ongoing   Insomnia, med dependent  Sedative use, ongoing  Morbid obesity, ongoing   Elevated glucose, recheck.     fills on meds for chronic issues as above in med list and orders.   Labs ordered as appropriate for monitoring the listed medical conditions.    Continue medications for medical issues as above in med list and orders     BMI  Estimated body mass index is 38.16 kg/m  as calculated from the following:    Height as of this encounter: 1.727 m (5' 8\").    Weight as of this encounter: 113.9 kg (251 lb).   Weight management plan: diet/exercise    Counseling  Appropriate preventive services were addressed with this patient via screening, questionnaire, or discussion as appropriate for fall prevention, nutrition, physical activity, Tobacco-use cessation, social engagement, weight loss and cognition.  Checklist reviewing preventive services available has been given to the patient.  Reviewed patient's diet, addressing concerns and/or questions.   Patient is at risk for social isolation and has been provided with information about the benefit of social connection.   He is at risk for psychosocial distress and has been provided with information to reduce risk.   Discussed possible causes of fatigue. I have reviewed Opioid Use Disorder and Substance Use Disorder risk factors and made any needed referrals.           Yvette Hernandez is a 54 year old, presenting for the following:  Physical, Pain, and Anxiety  Htn, stable on meds, fills and labs   Anxiety, stable on meds, needs refills    Gerd, stable on PPI. Fills   Chronic pain, stable see below   Opioid use, ongoing , goes to pain clinic, butrans patch weekly   Insomnia, med dependent, remeron and ambien   Sedative use, ongoing ambien nightly "   Morbid obesity 38 BMI with htn, gerd   Elevated glucose, recheck        7/16/2025     2:30 PM   Additional Questions   Roomed by linda   Accompanied by self            Advance Care Planning    Discussed advance care planning with patient; informed AVS has link to [facility].        7/16/2025   General Health   How would you rate your overall physical health? (!) FAIR   Feel stress (tense, anxious, or unable to sleep) Rather much   (!) STRESS CONCERN      7/16/2025   Nutrition   Diet: Regular (no restrictions)         7/16/2025   Exercise   Days per week of moderate/strenous exercise 7 days   Average minutes spent exercising at this level 40 min         7/16/2025   Social Factors   Frequency of gathering with friends or relatives Never   Worry food won't last until get money to buy more No   Food not last or not have enough money for food? No   Do you have housing? (Housing is defined as stable permanent housing and does not include staying outside in a car, in a tent, in an abandoned building, in an overnight shelter, or couch-surfing.) Yes   Are you worried about losing your housing? No   Lack of transportation? No   Unable to get utilities (heat,electricity)? No   (!) SOCIAL CONNECTIONS CONCERN      7/16/2025   Fall Risk   Fallen 2 or more times in the past year? Yes   Trouble with walking or balance? Yes   Reason Gait Speed Test Not Completed Unable to complete test, patient reported symptoms          7/16/2025   Activities of Daily Living- Home Safety   Needs help with the following daily activites None of the above   Safety concerns in the home None of the above         7/16/2025   Dental   Dentist two times every year? Yes         7/16/2025   Hearing Screening   Hearing concerns? None of the above         7/16/2025   Driving Risk Screening   Patient/family members have concerns about driving No         7/16/2025   General Alertness/Fatigue Screening   Have you been more tired than usual lately? (!)  YES         7/16/2025   Urinary Incontinence Screening   Bothered by leaking urine in past 6 months No       Today's PHQ-9 Score:       7/16/2025     2:09 PM   PHQ-9 SCORE   PHQ-9 Total Score MyChart 7 (Mild depression)   PHQ-9 Total Score 7        Patient-reported         7/16/2025   Substance Use   Alcohol more than 3/day or more than 7/wk Not Applicable   Do you have a current opioid prescription? (!) YES   How severe/bad is pain from 1 to 10? 6/10   Do you use any other substances recreationally? No          No data to display              Low Risk (0-3)  Moderate Risk (4-7)  High Risk (>8)  Social History     Tobacco Use    Smoking status: Former     Current packs/day: 0.50     Types: Cigarettes, Cigars    Smokeless tobacco: Never    Tobacco comments:     <1/2 pack per day   Vaping Use    Vaping status: Never Used   Substance Use Topics    Alcohol use: Yes     Alcohol/week: 0.0 standard drinks of alcohol     Comment: rare    Drug use: No             7/16/2025   One time HIV Screening   Previous HIV test? No         Reviewed and updated as needed this visit by Provider   Tobacco  Allergies  Meds  Problems  Med Hx  Surg Hx  Fam Hx     Sexual Activity            Current providers sharing in care for this patient include:  Patient Care Team:  Regan Llamas MD as PCP - General (Family Medicine)  Regan Llamas MD as Assigned PCP  Ina Barillas APRN CNP as Assigned Neuroscience Provider  Ina Barillas APRN CNP as Assigned Pain Medication Provider  Traci Hood MD as Assigned Endocrinology Provider  Georgina Hood PA-C as Assigned Surgical Provider  Kimberlyn Swanson RPH as Pharmacist (Pharmacist Ambulatory Care)  Kimberlyn Swanson RPH as Assigned [facility] Pharmacist    The following health maintenance items are reviewed in Epic and correct as of today:  Health Maintenance   Topic Date Due    ANNUAL REVIEW OF HM ORDERS  Never done    HIV SCREENING  Never done    HEPATITIS C SCREENING   Never done    PNEUMOCOCCAL VACCINE 50+ YEARS (1 of 2 - PCV) Never done    HEPATITIS A VACCINE (1 of 2 - Risk 2-dose series) Never done    HEPATITIS B VACCINE (2 of 3 - 19+ 3-dose series) 03/21/2003    LIPID  05/09/2017    ZOSTER VACCINE (1 of 2) Never done    LUNG CANCER SCREENING  09/15/2021    DTAP/TDAP/TD VACCINE (3 - Td or Tdap) 02/21/2023    COVID-19 VACCINE (2 - 2024-25 season) 09/01/2024    MEDICARE ANNUAL WELLNESS VISIT  03/11/2025    BMP  07/03/2025    INFLUENZA VACCINE (1) 09/01/2025    ASTHMA CONTROL TEST  01/16/2026    ASTHMA ACTION PLAN  01/27/2026    URINE DRUG SCREEN  02/19/2026    CONTROLLED SUBSTANCE AGREEMENT FOR CHRONIC PAIN MANAGEMENT  02/19/2026    KAMRYN ASSESSMENT  07/16/2026    PHQ-9  07/16/2026    COLORECTAL CANCER SCREENING  11/20/2027    DIABETES SCREENING  07/16/2028    ADVANCE CARE PLANNING  03/11/2029    HPV VACCINE  Aged Out    MENINGITIS VACCINE  Aged Out       Current Outpatient Medications   Medication Sig Dispense Refill    acetaminophen (TYLENOL) 500 MG tablet Take 1,500 mg by mouth at bedtime      albuterol (PROAIR HFA/PROVENTIL HFA/VENTOLIN HFA) 108 (90 Base) MCG/ACT inhaler INHALE 2 PUFFS INTO THE LUNGS EVERY 4 HOURS AS NEEDED FOR SHORTNESS OF BREATH 8.5 g 11    buprenorphine (BUTRANS) 20 MCG/HR WK patch Place 1 patch onto the skin every 7 days. Fill 07/03/25 to start 07/05/25. 28 day script. 4 patch 0    DULoxetine (CYMBALTA) 30 MG capsule Take 1 capsule (30 mg) by mouth daily. Take with a 60mg capsule to equal 90mg/day. 3 month supply 90 capsule 1    DULoxetine (CYMBALTA) 60 MG capsule Take 1 capsule (60 mg) by mouth daily. 90 capsule 3    gabapentin (NEURONTIN) 600 MG tablet TAKE 2 TABLETS BY MOUTH THREE TIMES DAILY 540 tablet 3    LORazepam (ATIVAN) 0.5 MG tablet Take 0.5 mg by mouth as needed for anxiety (takes twice per year for panic).      losartan (COZAAR) 100 MG tablet TAKE 1 TABLET(100 MG) BY MOUTH DAILY 90 tablet 2    mirtazapine (REMERON) 15 MG tablet Take 1 tablet  "(15 mg) by mouth at bedtime. 90 tablet 3    multivitamin w/minerals (THERA-VIT-M) tablet Take 1 tablet by mouth daily.      naloxone (NARCAN) 4 MG/0.1ML nasal spray Spray 1 spray (4 mg) into one nostril alternating nostrils once as needed for opioid reversal. every 2-3 minutes until assistance arrives 0.2 mL 1    nortriptyline (PAMELOR) 10 MG capsule Take 2 capsules (20 mg) by mouth at bedtime. 180 capsule 3    omeprazole (PRILOSEC) 20 MG DR capsule Take 1 capsule (20 mg) by mouth daily. 30 capsule 3    oxyCODONE-acetaminophen (PERCOCET) 5-325 MG tablet Take 1 tablet by mouth every 6 hours as needed for moderate to severe pain. Max of 3/day. Fill 05/30/25 start 06/01/25.  #75 tabs for 30 day supply 75 tablet 0    prazosin (MINIPRESS) 1 MG capsule Take 1 capsule (1 mg) by mouth at bedtime. 90 capsule 3    psyllium (METAMUCIL/KONSYL) 58.6 % powder Take 1 Tablespoonful by mouth 2 times daily.      tiZANidine (ZANAFLEX) 4 MG tablet Take 1 tablet (4 mg) by mouth 3 times daily as needed for muscle spasms. 0.5-1 tab tid prn muscle spasm 90 tablet 4    Vitamin D3 (CHOLECALCIFEROL) 125 MCG (5000 UT) tablet Take 125 mcg by mouth daily.      zolpidem (AMBIEN) 10 MG tablet TAKE 1 TABLET BY MOUTH EVERY NIGHT AS NEEDED FOR INSOMNIA 30 tablet 5    semaglutide-weight management (WEGOVY) 0.25 MG/0.5ML pen Inject 0.5 mLs (0.25 mg) subcutaneously once a week. (Patient not taking: Reported on 7/16/2025) 2 mL 5          Objective    Exam  /80   Pulse 66   Temp 97.2  F (36.2  C) (Tympanic)   Resp 18   Ht 1.727 m (5' 8\")   Wt 113.9 kg (251 lb)   SpO2 98%   BMI 38.16 kg/m     Estimated body mass index is 38.16 kg/m  as calculated from the following:    Height as of this encounter: 1.727 m (5' 8\").    Weight as of this encounter: 113.9 kg (251 lb).    Physical Exam  Gen: alert and oriented, in no acute distress, affect within normal limits  Neck: supple with no masses or nodes  Throat: oropharynx clear, no exudate or " tonsillar/palate asymmetry.    CV: RRR, no murmur  Lungs: clear bilaterally with good effort  Abd: nontender, no mass  Ext: no edema or lesions   Neuro: moving all extremities, gait normal, no focal deficts noted           7/16/2025   Mini Cog   Clock Draw Score 2 Normal   3 Item Recall 3 objects recalled   Mini Cog Total Score 5              Signed Electronically by: Regan Llamas MD

## 2025-07-28 ENCOUNTER — MYC REFILL (OUTPATIENT)
Dept: PALLIATIVE MEDICINE | Facility: CLINIC | Age: 55
End: 2025-07-28
Payer: COMMERCIAL

## 2025-07-28 DIAGNOSIS — M96.1 FAILED BACK SURGICAL SYNDROME: ICD-10-CM

## 2025-07-28 DIAGNOSIS — G89.29 CHRONIC INTRACTABLE PAIN: ICD-10-CM

## 2025-07-28 DIAGNOSIS — M96.1 FAILED BACK SYNDROME: ICD-10-CM

## 2025-07-28 DIAGNOSIS — M54.16 LUMBAR RADICULOPATHY: ICD-10-CM

## 2025-07-28 DIAGNOSIS — F11.90 CHRONIC, CONTINUOUS USE OF OPIOIDS: ICD-10-CM

## 2025-07-28 DIAGNOSIS — M54.41 CHRONIC BILATERAL LOW BACK PAIN WITH RIGHT-SIDED SCIATICA: ICD-10-CM

## 2025-07-28 DIAGNOSIS — G89.29 CHRONIC BILATERAL LOW BACK PAIN WITH RIGHT-SIDED SCIATICA: ICD-10-CM

## 2025-07-28 NOTE — TELEPHONE ENCOUNTER
Received request for a refill(s) of buprenorphine (BUTRANS) 20 MCG/HR WK patch     Last dispensed from pharmacy on 7/7/2025    Patient's last office/virtual visit by prescribing provider on 5/14/2025  Next office/virtual appointment scheduled for 8/13/2025    Last urine drug screen date 2/19/2025  Current opioid agreement on file (completed within the last year) YesDate of opioid agreement: 2/19/2025    [id number] - [place], [place] - [facility] [address] AT [facility] & [address] [place] [address]  pharmacy    Will route to nursing Winn for review and preparation of prescription(s).

## 2025-07-28 NOTE — TELEPHONE ENCOUNTER
Medication refill information reviewed.     Due date for  buprenorphine (BUTRANS) 20 MCG/HR WK patch  is 08/02/25     Prescriptions prepped for review.     Will route to provider.

## 2025-07-29 RX ORDER — BUPRENORPHINE 20 UG/H
1 PATCH TRANSDERMAL
Qty: 4 PATCH | Refills: 0 | Status: SHIPPED | OUTPATIENT
Start: 2025-07-29

## 2025-07-29 NOTE — TELEPHONE ENCOUNTER
Signed Prescriptions:                        Disp   Refills    buprenorphine (BUTRANS) 20 MCG/HR WK patch 4 patch0        Sig: Place 1 patch onto the skin every 7 days. Fill           07/31/25 to start 08/02/25. 28 day script.  Authorizing Provider: INA LOPEZ        Reviewed [place]  July 29, 2025- no concerning fills.    Ian TORIBIO, RN CNP, FNP  [facility]  [facility] [place]

## 2025-08-04 ENCOUNTER — VIRTUAL VISIT (OUTPATIENT)
Dept: PHARMACY | Facility: CLINIC | Age: 55
End: 2025-08-04
Attending: INTERNAL MEDICINE
Payer: COMMERCIAL

## 2025-08-04 VITALS — HEIGHT: 68 IN | WEIGHT: 240 LBS | BODY MASS INDEX: 36.37 KG/M2

## 2025-08-04 DIAGNOSIS — E66.812 OBESITY, CLASS 2: Primary | ICD-10-CM

## 2025-08-04 DIAGNOSIS — J45.909 ASTHMA: ICD-10-CM

## 2025-08-04 DIAGNOSIS — R73.03 PREDIABETES: ICD-10-CM

## 2025-08-04 DIAGNOSIS — G47.33 OSA (OBSTRUCTIVE SLEEP APNEA): ICD-10-CM

## 2025-08-04 ASSESSMENT — PAIN SCALES - GENERAL: PAINLEVEL_OUTOF10: MODERATE PAIN (6)

## 2025-08-13 PROBLEM — Z53.9 NO SHOW: Status: ACTIVE | Noted: 2025-08-13

## 2025-08-19 ENCOUNTER — OFFICE VISIT (OUTPATIENT)
Dept: PALLIATIVE MEDICINE | Facility: CLINIC | Age: 55
End: 2025-08-19
Payer: COMMERCIAL

## 2025-08-19 VITALS — SYSTOLIC BLOOD PRESSURE: 132 MMHG | DIASTOLIC BLOOD PRESSURE: 86 MMHG | HEART RATE: 60 BPM

## 2025-08-19 DIAGNOSIS — M96.1 FAILED BACK SURGICAL SYNDROME: ICD-10-CM

## 2025-08-19 DIAGNOSIS — G89.29 CHRONIC INTRACTABLE PAIN: ICD-10-CM

## 2025-08-19 DIAGNOSIS — M54.41 CHRONIC BILATERAL LOW BACK PAIN WITH RIGHT-SIDED SCIATICA: ICD-10-CM

## 2025-08-19 DIAGNOSIS — G89.29 CHRONIC BILATERAL LOW BACK PAIN WITH RIGHT-SIDED SCIATICA: ICD-10-CM

## 2025-08-19 DIAGNOSIS — F11.90 CHRONIC, CONTINUOUS USE OF OPIOIDS: ICD-10-CM

## 2025-08-19 DIAGNOSIS — M96.1 FAILED BACK SYNDROME: ICD-10-CM

## 2025-08-19 DIAGNOSIS — M54.16 LUMBAR RADICULOPATHY: ICD-10-CM

## 2025-08-19 PROCEDURE — 99214 OFFICE O/P EST MOD 30 MIN: CPT | Performed by: NURSE PRACTITIONER

## 2025-08-19 PROCEDURE — 1125F AMNT PAIN NOTED PAIN PRSNT: CPT | Performed by: NURSE PRACTITIONER

## 2025-08-19 PROCEDURE — 3075F SYST BP GE 130 - 139MM HG: CPT | Performed by: NURSE PRACTITIONER

## 2025-08-19 PROCEDURE — 3079F DIAST BP 80-89 MM HG: CPT | Performed by: NURSE PRACTITIONER

## 2025-08-19 RX ORDER — OXYCODONE AND ACETAMINOPHEN 5; 325 MG/1; MG/1
1 TABLET ORAL EVERY 6 HOURS PRN
Qty: 75 TABLET | Refills: 0 | Status: SHIPPED | OUTPATIENT
Start: 2025-08-19

## 2025-08-19 ASSESSMENT — PAIN SCALES - PAIN ENJOYMENT GENERAL ACTIVITY SCALE (PEG)
AVG_PAIN_PASTWEEK: 6
PEG_TOTALSCORE: 5.33
INTERFERED_ENJOYMENT_LIFE: 5
INTERFERED_GENERAL_ACTIVITY: 5

## 2025-08-19 ASSESSMENT — PAIN SCALES - GENERAL: PAINLEVEL_OUTOF10: MODERATE PAIN (5)

## 2025-08-20 ENCOUNTER — OFFICE VISIT (OUTPATIENT)
Dept: FAMILY MEDICINE | Facility: CLINIC | Age: 55
End: 2025-08-20
Payer: COMMERCIAL

## 2025-08-20 VITALS
RESPIRATION RATE: 20 BRPM | HEART RATE: 60 BPM | SYSTOLIC BLOOD PRESSURE: 124 MMHG | TEMPERATURE: 97.6 F | WEIGHT: 251 LBS | DIASTOLIC BLOOD PRESSURE: 84 MMHG | BODY MASS INDEX: 38.04 KG/M2 | HEIGHT: 68 IN | OXYGEN SATURATION: 98 %

## 2025-08-20 DIAGNOSIS — G47.30 SLEEP APNEA, UNSPECIFIED TYPE: Primary | ICD-10-CM

## 2025-08-20 PROBLEM — Z53.9 NO SHOW: Status: RESOLVED | Noted: 2025-08-13 | Resolved: 2025-08-20

## 2025-08-20 PROCEDURE — 99213 OFFICE O/P EST LOW 20 MIN: CPT | Performed by: FAMILY MEDICINE

## 2025-08-20 PROCEDURE — 3074F SYST BP LT 130 MM HG: CPT | Performed by: FAMILY MEDICINE

## 2025-08-20 PROCEDURE — 3079F DIAST BP 80-89 MM HG: CPT | Performed by: FAMILY MEDICINE

## 2025-08-20 PROCEDURE — 1126F AMNT PAIN NOTED NONE PRSNT: CPT | Performed by: FAMILY MEDICINE

## 2025-08-20 ASSESSMENT — PATIENT HEALTH QUESTIONNAIRE - PHQ9
SUM OF ALL RESPONSES TO PHQ QUESTIONS 1-9: 3
10. IF YOU CHECKED OFF ANY PROBLEMS, HOW DIFFICULT HAVE THESE PROBLEMS MADE IT FOR YOU TO DO YOUR WORK, TAKE CARE OF THINGS AT HOME, OR GET ALONG WITH OTHER PEOPLE: SOMEWHAT DIFFICULT
SUM OF ALL RESPONSES TO PHQ QUESTIONS 1-9: 3

## 2025-08-20 ASSESSMENT — PAIN SCALES - GENERAL: PAINLEVEL_OUTOF10: NO PAIN (0)

## 2025-08-21 ENCOUNTER — PATIENT OUTREACH (OUTPATIENT)
Dept: CARE COORDINATION | Facility: CLINIC | Age: 55
End: 2025-08-21
Payer: COMMERCIAL

## 2025-08-24 ENCOUNTER — MYC MEDICAL ADVICE (OUTPATIENT)
Dept: PALLIATIVE MEDICINE | Facility: CLINIC | Age: 55
End: 2025-08-24
Payer: COMMERCIAL

## 2025-08-24 DIAGNOSIS — M54.16 LUMBAR RADICULOPATHY: ICD-10-CM

## 2025-08-24 DIAGNOSIS — G89.29 CHRONIC BILATERAL LOW BACK PAIN WITH RIGHT-SIDED SCIATICA: ICD-10-CM

## 2025-08-24 DIAGNOSIS — F11.90 CHRONIC, CONTINUOUS USE OF OPIOIDS: ICD-10-CM

## 2025-08-24 DIAGNOSIS — M54.41 CHRONIC BILATERAL LOW BACK PAIN WITH RIGHT-SIDED SCIATICA: ICD-10-CM

## 2025-08-24 DIAGNOSIS — G89.29 CHRONIC INTRACTABLE PAIN: ICD-10-CM

## 2025-08-24 DIAGNOSIS — M96.1 FAILED BACK SURGICAL SYNDROME: ICD-10-CM

## 2025-08-24 DIAGNOSIS — M96.1 FAILED BACK SYNDROME: ICD-10-CM

## 2025-08-25 ENCOUNTER — PATIENT OUTREACH (OUTPATIENT)
Dept: CARE COORDINATION | Facility: CLINIC | Age: 55
End: 2025-08-25
Payer: COMMERCIAL

## 2025-08-25 RX ORDER — BUPRENORPHINE 20 UG/H
1 PATCH TRANSDERMAL
Qty: 4 PATCH | Refills: 0 | Status: SHIPPED | OUTPATIENT
Start: 2025-08-25

## 2025-08-27 ENCOUNTER — TELEPHONE (OUTPATIENT)
Dept: PALLIATIVE MEDICINE | Facility: CLINIC | Age: 55
End: 2025-08-27
Payer: COMMERCIAL

## (undated) DEVICE — BNDG ESMARK 4" STERILE LF 820-3412

## (undated) DEVICE — SU ETHILON 4-0 PC-3 18" 1864G

## (undated) DEVICE — GLOVE PROTEXIS POWDER FREE 7.5 ORTHOPEDIC 2D73ET75

## (undated) DEVICE — LINEN TOWEL PACK X6 WHITE 5487

## (undated) DEVICE — SU CHROMIC 5-0 P-3 18" 687G

## (undated) DEVICE — DRAPE C-ARM MINI 5423

## (undated) DEVICE — SU ETHILON 5-0 PC-3 18" 1865G

## (undated) DEVICE — TOURNIQUET SGL BLADDER 18"X4" RED 5921-218-135

## (undated) DEVICE — SOL WATER IRRIG 1000ML BOTTLE 2F7114

## (undated) DEVICE — Device

## (undated) DEVICE — DRAPE SHEET MED 44X70" 9355

## (undated) DEVICE — LINEN TOWEL PACK X30 5481

## (undated) DEVICE — SUCTION MANIFOLD NEPTUNE 2 SYS 4 PORT 0702-020-000

## (undated) DEVICE — GLOVE ESTEEM BLUE W/NEU-THERA 7.5  2D73PB75

## (undated) DEVICE — SOL NACL 0.9% IRRIG 1000ML BOTTLE 2F7124

## (undated) RX ORDER — BUPIVACAINE HYDROCHLORIDE 2.5 MG/ML
INJECTION, SOLUTION EPIDURAL; INFILTRATION; INTRACAUDAL
Status: DISPENSED
Start: 2021-03-03

## (undated) RX ORDER — FENTANYL CITRATE 50 UG/ML
INJECTION, SOLUTION INTRAMUSCULAR; INTRAVENOUS
Status: DISPENSED
Start: 2021-03-03

## (undated) RX ORDER — CEFAZOLIN SODIUM 2 G/100ML
INJECTION, SOLUTION INTRAVENOUS
Status: DISPENSED
Start: 2021-03-03

## (undated) RX ORDER — PROPOFOL 10 MG/ML
INJECTION, EMULSION INTRAVENOUS
Status: DISPENSED
Start: 2021-03-03